# Patient Record
Sex: MALE | Race: WHITE | Employment: OTHER | URBAN - METROPOLITAN AREA
[De-identification: names, ages, dates, MRNs, and addresses within clinical notes are randomized per-mention and may not be internally consistent; named-entity substitution may affect disease eponyms.]

---

## 2017-01-30 PROBLEM — F41.9 ANXIETY: Status: ACTIVE | Noted: 2017-01-30

## 2017-01-30 PROBLEM — I10 ESSENTIAL HYPERTENSION: Status: ACTIVE | Noted: 2017-01-30

## 2017-01-30 PROBLEM — J41.0 SIMPLE CHRONIC BRONCHITIS (HCC): Status: ACTIVE | Noted: 2017-01-30

## 2017-03-27 PROBLEM — R10.13 EPIGASTRIC PAIN: Status: ACTIVE | Noted: 2017-03-27

## 2017-08-04 PROBLEM — Z86.79 S/P AAA (ABDOMINAL AORTIC ANEURYSM) REPAIR: Status: ACTIVE | Noted: 2017-08-04

## 2017-08-04 PROBLEM — Z98.890 S/P AAA (ABDOMINAL AORTIC ANEURYSM) REPAIR: Status: ACTIVE | Noted: 2017-08-04

## 2017-11-17 PROBLEM — J40 BRONCHITIS: Status: ACTIVE | Noted: 2017-11-17

## 2018-04-15 ENCOUNTER — APPOINTMENT (OUTPATIENT)
Dept: GENERAL RADIOLOGY | Age: 73
DRG: 247 | End: 2018-04-15
Payer: MEDICARE

## 2018-04-15 ENCOUNTER — HOSPITAL ENCOUNTER (INPATIENT)
Age: 73
LOS: 1 days | Discharge: HOME | DRG: 247 | End: 2018-04-16
Attending: EMERGENCY MEDICINE | Admitting: INTERNAL MEDICINE
Payer: MEDICARE

## 2018-04-15 DIAGNOSIS — I25.9 CHEST PAIN DUE TO MYOCARDIAL ISCHEMIA, UNSPECIFIED ISCHEMIC CHEST PAIN TYPE: ICD-10-CM

## 2018-04-15 DIAGNOSIS — I21.3 ST ELEVATION MYOCARDIAL INFARCTION (STEMI), UNSPECIFIED ARTERY (HCC): Primary | ICD-10-CM

## 2018-04-15 LAB
ABO/RH: NORMAL
ALBUMIN SERPL-MCNC: 3.8 G/DL (ref 3.5–5.2)
ALP BLD-CCNC: 89 U/L (ref 40–129)
ALT SERPL-CCNC: 9 U/L (ref 0–40)
ANION GAP SERPL CALCULATED.3IONS-SCNC: 13 MMOL/L (ref 7–16)
ANTIBODY SCREEN: NORMAL
AST SERPL-CCNC: 13 U/L (ref 0–39)
BILIRUB SERPL-MCNC: 0.7 MG/DL (ref 0–1.2)
BUN BLDV-MCNC: 15 MG/DL (ref 8–23)
CALCIUM SERPL-MCNC: 9 MG/DL (ref 8.6–10.2)
CHLORIDE BLD-SCNC: 101 MMOL/L (ref 98–107)
CO2: 25 MMOL/L (ref 22–29)
CREAT SERPL-MCNC: 1.3 MG/DL (ref 0.7–1.2)
EKG ATRIAL RATE: 92 BPM
EKG ATRIAL RATE: 95 BPM
EKG P AXIS: 65 DEGREES
EKG P AXIS: 69 DEGREES
EKG P-R INTERVAL: 172 MS
EKG P-R INTERVAL: 200 MS
EKG Q-T INTERVAL: 338 MS
EKG Q-T INTERVAL: 340 MS
EKG QRS DURATION: 78 MS
EKG QRS DURATION: 82 MS
EKG QTC CALCULATION (BAZETT): 420 MS
EKG QTC CALCULATION (BAZETT): 424 MS
EKG R AXIS: -56 DEGREES
EKG R AXIS: 39 DEGREES
EKG T AXIS: 56 DEGREES
EKG T AXIS: 74 DEGREES
EKG VENTRICULAR RATE: 92 BPM
EKG VENTRICULAR RATE: 95 BPM
GFR AFRICAN AMERICAN: >60
GFR NON-AFRICAN AMERICAN: 54 ML/MIN/1.73
GLUCOSE BLD-MCNC: 117 MG/DL (ref 74–109)
HCT VFR BLD CALC: 45.2 % (ref 37–54)
HEMOGLOBIN: 14.9 G/DL (ref 12.5–16.5)
INR BLD: 0.9
LV EF: 55 %
LVEF MODALITY: NORMAL
MCH RBC QN AUTO: 30.1 PG (ref 26–35)
MCHC RBC AUTO-ENTMCNC: 33 % (ref 32–34.5)
MCV RBC AUTO: 91.3 FL (ref 80–99.9)
PDW BLD-RTO: 14.1 FL (ref 11.5–15)
PLATELET # BLD: 276 E9/L (ref 130–450)
PMV BLD AUTO: 11.1 FL (ref 7–12)
POTASSIUM SERPL-SCNC: 4.1 MMOL/L (ref 3.5–5)
PROTHROMBIN TIME: 10.7 SEC (ref 9.3–12.4)
RBC # BLD: 4.95 E12/L (ref 3.8–5.8)
SODIUM BLD-SCNC: 139 MMOL/L (ref 132–146)
TOTAL PROTEIN: 6.5 G/DL (ref 6.4–8.3)
TROPONIN: <0.01 NG/ML (ref 0–0.03)
WBC # BLD: 12.5 E9/L (ref 4.5–11.5)

## 2018-04-15 PROCEDURE — 36415 COLL VENOUS BLD VENIPUNCTURE: CPT

## 2018-04-15 PROCEDURE — 6360000002 HC RX W HCPCS: Performed by: EMERGENCY MEDICINE

## 2018-04-15 PROCEDURE — 85027 COMPLETE CBC AUTOMATED: CPT

## 2018-04-15 PROCEDURE — 6370000000 HC RX 637 (ALT 250 FOR IP): Performed by: INTERNAL MEDICINE

## 2018-04-15 PROCEDURE — 2500000003 HC RX 250 WO HCPCS

## 2018-04-15 PROCEDURE — 71045 X-RAY EXAM CHEST 1 VIEW: CPT

## 2018-04-15 PROCEDURE — 80053 COMPREHEN METABOLIC PANEL: CPT

## 2018-04-15 PROCEDURE — 2580000003 HC RX 258: Performed by: EMERGENCY MEDICINE

## 2018-04-15 PROCEDURE — 4A023N7 MEASUREMENT OF CARDIAC SAMPLING AND PRESSURE, LEFT HEART, PERCUTANEOUS APPROACH: ICD-10-PCS | Performed by: INTERNAL MEDICINE

## 2018-04-15 PROCEDURE — 93010 ELECTROCARDIOGRAM REPORT: CPT | Performed by: INTERNAL MEDICINE

## 2018-04-15 PROCEDURE — 6360000002 HC RX W HCPCS

## 2018-04-15 PROCEDURE — 93005 ELECTROCARDIOGRAM TRACING: CPT | Performed by: INTERNAL MEDICINE

## 2018-04-15 PROCEDURE — B2151ZZ FLUOROSCOPY OF LEFT HEART USING LOW OSMOLAR CONTRAST: ICD-10-PCS | Performed by: INTERNAL MEDICINE

## 2018-04-15 PROCEDURE — 84484 ASSAY OF TROPONIN QUANT: CPT

## 2018-04-15 PROCEDURE — 6360000002 HC RX W HCPCS: Performed by: INTERNAL MEDICINE

## 2018-04-15 PROCEDURE — 02703ZZ DILATION OF CORONARY ARTERY, ONE ARTERY, PERCUTANEOUS APPROACH: ICD-10-PCS | Performed by: INTERNAL MEDICINE

## 2018-04-15 PROCEDURE — B2111ZZ FLUOROSCOPY OF MULTIPLE CORONARY ARTERIES USING LOW OSMOLAR CONTRAST: ICD-10-PCS | Performed by: INTERNAL MEDICINE

## 2018-04-15 PROCEDURE — 85610 PROTHROMBIN TIME: CPT

## 2018-04-15 PROCEDURE — 027034Z DILATION OF CORONARY ARTERY, ONE ARTERY WITH DRUG-ELUTING INTRALUMINAL DEVICE, PERCUTANEOUS APPROACH: ICD-10-PCS | Performed by: INTERNAL MEDICINE

## 2018-04-15 PROCEDURE — C1769 GUIDE WIRE: HCPCS

## 2018-04-15 PROCEDURE — 86900 BLOOD TYPING SEROLOGIC ABO: CPT

## 2018-04-15 PROCEDURE — 2709999900 HC NON-CHARGEABLE SUPPLY

## 2018-04-15 PROCEDURE — 6370000000 HC RX 637 (ALT 250 FOR IP): Performed by: EMERGENCY MEDICINE

## 2018-04-15 PROCEDURE — 93005 ELECTROCARDIOGRAM TRACING: CPT | Performed by: EMERGENCY MEDICINE

## 2018-04-15 PROCEDURE — C9606 PERC D-E COR REVASC W AMI S: HCPCS | Performed by: INTERNAL MEDICINE

## 2018-04-15 PROCEDURE — 86901 BLOOD TYPING SEROLOGIC RH(D): CPT

## 2018-04-15 PROCEDURE — 86850 RBC ANTIBODY SCREEN: CPT

## 2018-04-15 PROCEDURE — C1887 CATHETER, GUIDING: HCPCS

## 2018-04-15 PROCEDURE — 99291 CRITICAL CARE FIRST HOUR: CPT | Performed by: INTERNAL MEDICINE

## 2018-04-15 PROCEDURE — 93458 L HRT ARTERY/VENTRICLE ANGIO: CPT | Performed by: INTERNAL MEDICINE

## 2018-04-15 PROCEDURE — 93306 TTE W/DOPPLER COMPLETE: CPT

## 2018-04-15 PROCEDURE — C1874 STENT, COATED/COV W/DEL SYS: HCPCS

## 2018-04-15 PROCEDURE — 2140000000 HC CCU INTERMEDIATE R&B

## 2018-04-15 PROCEDURE — C1725 CATH, TRANSLUMIN NON-LASER: HCPCS

## 2018-04-15 PROCEDURE — 99285 EMERGENCY DEPT VISIT HI MDM: CPT

## 2018-04-15 PROCEDURE — 92928 PRQ TCAT PLMT NTRAC ST 1 LES: CPT | Performed by: INTERNAL MEDICINE

## 2018-04-15 PROCEDURE — APPSS30 APP SPLIT SHARED TIME 16-30 MINUTES: Performed by: NURSE PRACTITIONER

## 2018-04-15 PROCEDURE — C1894 INTRO/SHEATH, NON-LASER: HCPCS

## 2018-04-15 PROCEDURE — 2580000003 HC RX 258: Performed by: INTERNAL MEDICINE

## 2018-04-15 RX ORDER — HEPARIN SODIUM 1000 [USP'U]/ML
4000 INJECTION, SOLUTION INTRAVENOUS; SUBCUTANEOUS ONCE
Status: COMPLETED | OUTPATIENT
Start: 2018-04-15 | End: 2018-04-15

## 2018-04-15 RX ORDER — METOPROLOL SUCCINATE 50 MG/1
50 TABLET, EXTENDED RELEASE ORAL DAILY
Status: DISCONTINUED | OUTPATIENT
Start: 2018-04-16 | End: 2018-04-16 | Stop reason: HOSPADM

## 2018-04-15 RX ORDER — NICOTINE 21 MG/24HR
1 PATCH, TRANSDERMAL 24 HOURS TRANSDERMAL DAILY
Status: DISCONTINUED | OUTPATIENT
Start: 2018-04-15 | End: 2018-04-16 | Stop reason: HOSPADM

## 2018-04-15 RX ORDER — HEPARIN SODIUM 10000 [USP'U]/ML
5000 INJECTION, SOLUTION INTRAVENOUS; SUBCUTANEOUS EVERY 8 HOURS
Status: DISCONTINUED | OUTPATIENT
Start: 2018-04-15 | End: 2018-04-16 | Stop reason: HOSPADM

## 2018-04-15 RX ORDER — PANTOPRAZOLE SODIUM 40 MG/1
40 TABLET, DELAYED RELEASE ORAL
Status: DISCONTINUED | OUTPATIENT
Start: 2018-04-15 | End: 2018-04-16 | Stop reason: HOSPADM

## 2018-04-15 RX ORDER — ASPIRIN 81 MG/1
81 TABLET, CHEWABLE ORAL DAILY
Status: DISCONTINUED | OUTPATIENT
Start: 2018-04-15 | End: 2018-04-15 | Stop reason: SDUPTHER

## 2018-04-15 RX ORDER — ATORVASTATIN CALCIUM 40 MG/1
80 TABLET, FILM COATED ORAL NIGHTLY
Status: DISCONTINUED | OUTPATIENT
Start: 2018-04-15 | End: 2018-04-16 | Stop reason: HOSPADM

## 2018-04-15 RX ORDER — METOPROLOL SUCCINATE 25 MG/1
25 TABLET, EXTENDED RELEASE ORAL ONCE
Status: COMPLETED | OUTPATIENT
Start: 2018-04-15 | End: 2018-04-15

## 2018-04-15 RX ORDER — FLUTICASONE FUROATE AND VILANTEROL 100; 25 UG/1; UG/1
1 POWDER RESPIRATORY (INHALATION) DAILY
Status: DISCONTINUED | OUTPATIENT
Start: 2018-04-15 | End: 2018-04-15 | Stop reason: SDUPTHER

## 2018-04-15 RX ORDER — ACETAMINOPHEN 325 MG/1
650 TABLET ORAL EVERY 6 HOURS PRN
Status: DISCONTINUED | OUTPATIENT
Start: 2018-04-15 | End: 2018-04-15 | Stop reason: SDUPTHER

## 2018-04-15 RX ORDER — SODIUM CHLORIDE 9 MG/ML
INJECTION, SOLUTION INTRAVENOUS CONTINUOUS
Status: ACTIVE | OUTPATIENT
Start: 2018-04-15 | End: 2018-04-15

## 2018-04-15 RX ORDER — SODIUM CHLORIDE 0.9 % (FLUSH) 0.9 %
10 SYRINGE (ML) INJECTION EVERY 12 HOURS SCHEDULED
Status: DISCONTINUED | OUTPATIENT
Start: 2018-04-15 | End: 2018-04-16 | Stop reason: HOSPADM

## 2018-04-15 RX ORDER — ACETAMINOPHEN 325 MG/1
650 TABLET ORAL EVERY 4 HOURS PRN
Status: DISCONTINUED | OUTPATIENT
Start: 2018-04-15 | End: 2018-04-16 | Stop reason: HOSPADM

## 2018-04-15 RX ORDER — METOPROLOL SUCCINATE 25 MG/1
25 TABLET, EXTENDED RELEASE ORAL DAILY
Status: DISCONTINUED | OUTPATIENT
Start: 2018-04-15 | End: 2018-04-15

## 2018-04-15 RX ORDER — ASPIRIN 81 MG/1
81 TABLET, CHEWABLE ORAL DAILY
Status: DISCONTINUED | OUTPATIENT
Start: 2018-04-16 | End: 2018-04-16 | Stop reason: HOSPADM

## 2018-04-15 RX ORDER — ALBUTEROL SULFATE 90 UG/1
2 AEROSOL, METERED RESPIRATORY (INHALATION) EVERY 6 HOURS PRN
Status: DISCONTINUED | OUTPATIENT
Start: 2018-04-15 | End: 2018-04-16 | Stop reason: HOSPADM

## 2018-04-15 RX ORDER — ONDANSETRON 2 MG/ML
4 INJECTION INTRAMUSCULAR; INTRAVENOUS EVERY 6 HOURS PRN
Status: DISCONTINUED | OUTPATIENT
Start: 2018-04-15 | End: 2018-04-16 | Stop reason: HOSPADM

## 2018-04-15 RX ORDER — ISOSORBIDE MONONITRATE 30 MG/1
30 TABLET, EXTENDED RELEASE ORAL DAILY
Status: DISCONTINUED | OUTPATIENT
Start: 2018-04-15 | End: 2018-04-16 | Stop reason: HOSPADM

## 2018-04-15 RX ORDER — LORAZEPAM 1 MG/1
1 TABLET ORAL 2 TIMES DAILY
Status: DISCONTINUED | OUTPATIENT
Start: 2018-04-15 | End: 2018-04-16 | Stop reason: HOSPADM

## 2018-04-15 RX ORDER — ONDANSETRON 2 MG/ML
4 INJECTION INTRAMUSCULAR; INTRAVENOUS EVERY 6 HOURS PRN
Status: DISCONTINUED | OUTPATIENT
Start: 2018-04-15 | End: 2018-04-15 | Stop reason: SDUPTHER

## 2018-04-15 RX ORDER — NITROGLYCERIN 0.4 MG/1
0.4 TABLET SUBLINGUAL EVERY 5 MIN PRN
Status: DISCONTINUED | OUTPATIENT
Start: 2018-04-15 | End: 2018-04-16 | Stop reason: HOSPADM

## 2018-04-15 RX ORDER — LISINOPRIL 5 MG/1
5 TABLET ORAL DAILY
Status: DISCONTINUED | OUTPATIENT
Start: 2018-04-15 | End: 2018-04-16 | Stop reason: HOSPADM

## 2018-04-15 RX ORDER — ROPINIROLE 1 MG/1
1 TABLET, FILM COATED ORAL NIGHTLY
Status: DISCONTINUED | OUTPATIENT
Start: 2018-04-15 | End: 2018-04-16 | Stop reason: HOSPADM

## 2018-04-15 RX ORDER — SODIUM CHLORIDE 0.9 % (FLUSH) 0.9 %
10 SYRINGE (ML) INJECTION PRN
Status: DISCONTINUED | OUTPATIENT
Start: 2018-04-15 | End: 2018-04-16 | Stop reason: HOSPADM

## 2018-04-15 RX ADMIN — METOPROLOL SUCCINATE 25 MG: 25 TABLET, FILM COATED, EXTENDED RELEASE ORAL at 16:33

## 2018-04-15 RX ADMIN — LORAZEPAM 1 MG: 1 TABLET ORAL at 20:30

## 2018-04-15 RX ADMIN — TIOTROPIUM BROMIDE 18 MCG: 18 CAPSULE ORAL; RESPIRATORY (INHALATION) at 13:07

## 2018-04-15 RX ADMIN — ISOSORBIDE MONONITRATE 30 MG: 30 TABLET ORAL at 15:13

## 2018-04-15 RX ADMIN — NITROGLYCERIN 0.4 MG: 0.4 TABLET SUBLINGUAL at 14:00

## 2018-04-15 RX ADMIN — NITROGLYCERIN 0.4 MG: 0.4 TABLET SUBLINGUAL at 14:05

## 2018-04-15 RX ADMIN — TICAGRELOR 90 MG: 90 TABLET ORAL at 20:31

## 2018-04-15 RX ADMIN — TICAGRELOR 90 MG: 90 TABLET ORAL at 08:57

## 2018-04-15 RX ADMIN — HEPARIN SODIUM 5000 UNITS: 10000 INJECTION, SOLUTION INTRAVENOUS; SUBCUTANEOUS at 08:57

## 2018-04-15 RX ADMIN — HEPARIN SODIUM 5000 UNITS: 10000 INJECTION, SOLUTION INTRAVENOUS; SUBCUTANEOUS at 16:30

## 2018-04-15 RX ADMIN — Medication 10 ML: at 20:31

## 2018-04-15 RX ADMIN — TICAGRELOR 180 MG: 90 TABLET ORAL at 00:23

## 2018-04-15 RX ADMIN — Medication 10 ML: at 08:58

## 2018-04-15 RX ADMIN — ROPINIROLE HYDROCHLORIDE 1 MG: 1 TABLET, FILM COATED ORAL at 20:31

## 2018-04-15 RX ADMIN — METOPROLOL SUCCINATE 25 MG: 25 TABLET, FILM COATED, EXTENDED RELEASE ORAL at 06:46

## 2018-04-15 RX ADMIN — PANTOPRAZOLE SODIUM 40 MG: 40 TABLET, DELAYED RELEASE ORAL at 05:57

## 2018-04-15 RX ADMIN — HEPARIN SODIUM 4000 UNITS: 1000 INJECTION, SOLUTION INTRAVENOUS; SUBCUTANEOUS at 00:24

## 2018-04-15 RX ADMIN — MOMETASONE FUROATE AND FORMOTEROL FUMARATE DIHYDRATE 2 PUFF: 200; 5 AEROSOL RESPIRATORY (INHALATION) at 13:07

## 2018-04-15 RX ADMIN — SODIUM CHLORIDE: 9 INJECTION, SOLUTION INTRAVENOUS at 02:46

## 2018-04-15 RX ADMIN — ATORVASTATIN CALCIUM 80 MG: 40 TABLET, FILM COATED ORAL at 20:30

## 2018-04-15 RX ADMIN — NITROGLYCERIN 1 INCH: 20 OINTMENT TOPICAL at 05:57

## 2018-04-15 RX ADMIN — LORAZEPAM 1 MG: 1 TABLET ORAL at 08:57

## 2018-04-15 RX ADMIN — LISINOPRIL 5 MG: 5 TABLET ORAL at 15:13

## 2018-04-15 RX ADMIN — MOMETASONE FUROATE AND FORMOTEROL FUMARATE DIHYDRATE 2 PUFF: 200; 5 AEROSOL RESPIRATORY (INHALATION) at 20:31

## 2018-04-15 ASSESSMENT — PAIN SCALES - GENERAL
PAINLEVEL_OUTOF10: 2
PAINLEVEL_OUTOF10: 0
PAINLEVEL_OUTOF10: 6
PAINLEVEL_OUTOF10: 0
PAINLEVEL_OUTOF10: 0

## 2018-04-15 ASSESSMENT — PAIN DESCRIPTION - ONSET: ONSET: SUDDEN

## 2018-04-15 ASSESSMENT — PAIN DESCRIPTION - PROGRESSION: CLINICAL_PROGRESSION: GRADUALLY IMPROVING

## 2018-04-15 ASSESSMENT — PAIN DESCRIPTION - LOCATION: LOCATION: CHEST;ABDOMEN

## 2018-04-15 ASSESSMENT — PAIN DESCRIPTION - ORIENTATION: ORIENTATION: RIGHT;LOWER

## 2018-04-15 ASSESSMENT — PAIN DESCRIPTION - DESCRIPTORS: DESCRIPTORS: ACHING

## 2018-04-15 ASSESSMENT — PAIN DESCRIPTION - FREQUENCY: FREQUENCY: CONTINUOUS

## 2018-04-15 ASSESSMENT — PAIN DESCRIPTION - PAIN TYPE: TYPE: ACUTE PAIN

## 2018-04-16 VITALS
OXYGEN SATURATION: 95 % | BODY MASS INDEX: 24.47 KG/M2 | WEIGHT: 174.8 LBS | DIASTOLIC BLOOD PRESSURE: 57 MMHG | TEMPERATURE: 99.4 F | SYSTOLIC BLOOD PRESSURE: 106 MMHG | HEIGHT: 71 IN | HEART RATE: 94 BPM | RESPIRATION RATE: 18 BRPM

## 2018-04-16 LAB
ALBUMIN SERPL-MCNC: 3.6 G/DL (ref 3.5–5.2)
ALP BLD-CCNC: 94 U/L (ref 40–129)
ALT SERPL-CCNC: 36 U/L (ref 0–40)
ANION GAP SERPL CALCULATED.3IONS-SCNC: 16 MMOL/L (ref 7–16)
AST SERPL-CCNC: 174 U/L (ref 0–39)
BASOPHILS ABSOLUTE: 0 E9/L (ref 0–0.2)
BASOPHILS RELATIVE PERCENT: 0.2 % (ref 0–2)
BILIRUB SERPL-MCNC: 1 MG/DL (ref 0–1.2)
BUN BLDV-MCNC: 15 MG/DL (ref 8–23)
CALCIUM SERPL-MCNC: 9.1 MG/DL (ref 8.6–10.2)
CHLORIDE BLD-SCNC: 96 MMOL/L (ref 98–107)
CHOLESTEROL, TOTAL: 164 MG/DL (ref 0–199)
CO2: 22 MMOL/L (ref 22–29)
CREAT SERPL-MCNC: 1.2 MG/DL (ref 0.7–1.2)
EKG ATRIAL RATE: 83 BPM
EKG P AXIS: 69 DEGREES
EKG P-R INTERVAL: 174 MS
EKG Q-T INTERVAL: 356 MS
EKG QRS DURATION: 82 MS
EKG QTC CALCULATION (BAZETT): 418 MS
EKG R AXIS: -40 DEGREES
EKG T AXIS: 58 DEGREES
EKG VENTRICULAR RATE: 83 BPM
EOSINOPHILS ABSOLUTE: 0 E9/L (ref 0.05–0.5)
EOSINOPHILS RELATIVE PERCENT: 0.1 % (ref 0–6)
GFR AFRICAN AMERICAN: >60
GFR NON-AFRICAN AMERICAN: 59 ML/MIN/1.73
GLUCOSE BLD-MCNC: 94 MG/DL (ref 74–109)
HCT VFR BLD CALC: 42.4 % (ref 37–54)
HDLC SERPL-MCNC: 35 MG/DL
HEMOGLOBIN: 14 G/DL (ref 12.5–16.5)
LDL CHOLESTEROL CALCULATED: 103 MG/DL (ref 0–99)
LYMPHOCYTES ABSOLUTE: 0.97 E9/L (ref 1.5–4)
LYMPHOCYTES RELATIVE PERCENT: 5.1 % (ref 20–42)
MAGNESIUM: 1.9 MG/DL (ref 1.6–2.6)
MCH RBC QN AUTO: 30 PG (ref 26–35)
MCHC RBC AUTO-ENTMCNC: 33 % (ref 32–34.5)
MCV RBC AUTO: 90.8 FL (ref 80–99.9)
MONOCYTES ABSOLUTE: 2.91 E9/L (ref 0.1–0.95)
MONOCYTES RELATIVE PERCENT: 15.4 % (ref 2–12)
NEUTROPHILS ABSOLUTE: 15.52 E9/L (ref 1.8–7.3)
NEUTROPHILS RELATIVE PERCENT: 79.5 % (ref 43–80)
PDW BLD-RTO: 14.4 FL (ref 11.5–15)
PLATELET # BLD: 256 E9/L (ref 130–450)
PMV BLD AUTO: 11.7 FL (ref 7–12)
POTASSIUM REFLEX MAGNESIUM: 3.8 MMOL/L (ref 3.5–5)
RBC # BLD: 4.67 E12/L (ref 3.8–5.8)
RBC # BLD: NORMAL 10*6/UL
SODIUM BLD-SCNC: 134 MMOL/L (ref 132–146)
TOTAL PROTEIN: 6.5 G/DL (ref 6.4–8.3)
TRIGL SERPL-MCNC: 131 MG/DL (ref 0–149)
VLDLC SERPL CALC-MCNC: 26 MG/DL
WBC # BLD: 19.4 E9/L (ref 4.5–11.5)

## 2018-04-16 PROCEDURE — 83735 ASSAY OF MAGNESIUM: CPT

## 2018-04-16 PROCEDURE — 99231 SBSQ HOSP IP/OBS SF/LOW 25: CPT | Performed by: INTERNAL MEDICINE

## 2018-04-16 PROCEDURE — 93010 ELECTROCARDIOGRAM REPORT: CPT | Performed by: INTERNAL MEDICINE

## 2018-04-16 PROCEDURE — 6370000000 HC RX 637 (ALT 250 FOR IP): Performed by: INTERNAL MEDICINE

## 2018-04-16 PROCEDURE — 6360000002 HC RX W HCPCS: Performed by: INTERNAL MEDICINE

## 2018-04-16 PROCEDURE — 80061 LIPID PANEL: CPT

## 2018-04-16 PROCEDURE — 80053 COMPREHEN METABOLIC PANEL: CPT

## 2018-04-16 PROCEDURE — 85025 COMPLETE CBC W/AUTO DIFF WBC: CPT

## 2018-04-16 PROCEDURE — 36415 COLL VENOUS BLD VENIPUNCTURE: CPT

## 2018-04-16 PROCEDURE — 2580000003 HC RX 258: Performed by: EMERGENCY MEDICINE

## 2018-04-16 RX ORDER — NITROGLYCERIN 0.4 MG/1
TABLET SUBLINGUAL
Qty: 25 TABLET | Refills: 3 | Status: SHIPPED | OUTPATIENT
Start: 2018-04-16 | End: 2018-04-16

## 2018-04-16 RX ORDER — ATORVASTATIN CALCIUM 80 MG/1
80 TABLET, FILM COATED ORAL NIGHTLY
Qty: 30 TABLET | Refills: 3 | Status: SHIPPED | OUTPATIENT
Start: 2018-04-16 | End: 2018-04-16

## 2018-04-16 RX ORDER — UREA 10 %
1 LOTION (ML) TOPICAL NIGHTLY PRN
Status: DISCONTINUED | OUTPATIENT
Start: 2018-04-16 | End: 2018-04-16 | Stop reason: HOSPADM

## 2018-04-16 RX ORDER — METOPROLOL SUCCINATE 50 MG/1
50 TABLET, EXTENDED RELEASE ORAL DAILY
Qty: 30 TABLET | Refills: 3 | Status: SHIPPED | OUTPATIENT
Start: 2018-04-17

## 2018-04-16 RX ORDER — NITROGLYCERIN 0.4 MG/1
TABLET SUBLINGUAL
Qty: 25 TABLET | Refills: 3 | Status: SHIPPED | OUTPATIENT
Start: 2018-04-16

## 2018-04-16 RX ORDER — ISOSORBIDE MONONITRATE 30 MG/1
30 TABLET, EXTENDED RELEASE ORAL DAILY
Qty: 30 TABLET | Refills: 3 | Status: SHIPPED | OUTPATIENT
Start: 2018-04-17 | End: 2018-04-16

## 2018-04-16 RX ORDER — ISOSORBIDE MONONITRATE 30 MG/1
30 TABLET, EXTENDED RELEASE ORAL DAILY
Qty: 30 TABLET | Refills: 3 | Status: SHIPPED | OUTPATIENT
Start: 2018-04-17

## 2018-04-16 RX ORDER — LISINOPRIL 5 MG/1
5 TABLET ORAL DAILY
Qty: 30 TABLET | Refills: 3 | Status: SHIPPED | OUTPATIENT
Start: 2018-04-17 | End: 2018-04-16

## 2018-04-16 RX ORDER — METOPROLOL SUCCINATE 50 MG/1
50 TABLET, EXTENDED RELEASE ORAL DAILY
Qty: 30 TABLET | Refills: 3 | Status: SHIPPED | OUTPATIENT
Start: 2018-04-17 | End: 2018-04-16

## 2018-04-16 RX ORDER — NICOTINE 21 MG/24HR
1 PATCH, TRANSDERMAL 24 HOURS TRANSDERMAL DAILY
Qty: 30 PATCH | Refills: 3 | Status: SHIPPED | OUTPATIENT
Start: 2018-04-17 | End: 2018-04-16

## 2018-04-16 RX ORDER — ATORVASTATIN CALCIUM 80 MG/1
80 TABLET, FILM COATED ORAL NIGHTLY
Qty: 30 TABLET | Refills: 3 | Status: SHIPPED | OUTPATIENT
Start: 2018-04-16

## 2018-04-16 RX ORDER — LISINOPRIL 5 MG/1
5 TABLET ORAL DAILY
Qty: 30 TABLET | Refills: 3 | Status: SHIPPED | OUTPATIENT
Start: 2018-04-17 | End: 2021-09-20

## 2018-04-16 RX ORDER — NICOTINE 21 MG/24HR
1 PATCH, TRANSDERMAL 24 HOURS TRANSDERMAL DAILY
Qty: 30 PATCH | Refills: 3 | Status: SHIPPED | OUTPATIENT
Start: 2018-04-17 | End: 2018-10-08 | Stop reason: CLARIF

## 2018-04-16 RX ADMIN — LISINOPRIL 5 MG: 5 TABLET ORAL at 08:29

## 2018-04-16 RX ADMIN — HEPARIN SODIUM 5000 UNITS: 10000 INJECTION, SOLUTION INTRAVENOUS; SUBCUTANEOUS at 06:26

## 2018-04-16 RX ADMIN — ASPIRIN 81 MG 81 MG: 81 TABLET ORAL at 08:29

## 2018-04-16 RX ADMIN — TIOTROPIUM BROMIDE 18 MCG: 18 CAPSULE ORAL; RESPIRATORY (INHALATION) at 08:34

## 2018-04-16 RX ADMIN — MOMETASONE FUROATE AND FORMOTEROL FUMARATE DIHYDRATE 2 PUFF: 200; 5 AEROSOL RESPIRATORY (INHALATION) at 08:30

## 2018-04-16 RX ADMIN — Medication 10 ML: at 08:34

## 2018-04-16 RX ADMIN — LORAZEPAM 1 MG: 1 TABLET ORAL at 08:29

## 2018-04-16 RX ADMIN — TICAGRELOR 90 MG: 90 TABLET ORAL at 08:29

## 2018-04-16 RX ADMIN — METOPROLOL SUCCINATE 50 MG: 50 TABLET, FILM COATED, EXTENDED RELEASE ORAL at 08:29

## 2018-04-16 RX ADMIN — Medication 1 MG: at 01:49

## 2018-04-16 RX ADMIN — ISOSORBIDE MONONITRATE 30 MG: 30 TABLET ORAL at 08:29

## 2018-04-16 RX ADMIN — PANTOPRAZOLE SODIUM 40 MG: 40 TABLET, DELAYED RELEASE ORAL at 06:26

## 2018-04-16 ASSESSMENT — PAIN SCALES - GENERAL: PAINLEVEL_OUTOF10: 0

## 2018-04-19 ENCOUNTER — TELEPHONE (OUTPATIENT)
Dept: CARDIOLOGY CLINIC | Age: 73
End: 2018-04-19

## 2018-04-19 LAB
EKG ATRIAL RATE: 70 BPM
EKG P AXIS: 41 DEGREES
EKG P-R INTERVAL: 178 MS
EKG Q-T INTERVAL: 378 MS
EKG QRS DURATION: 78 MS
EKG QTC CALCULATION (BAZETT): 408 MS
EKG R AXIS: 42 DEGREES
EKG T AXIS: 57 DEGREES
EKG VENTRICULAR RATE: 70 BPM

## 2018-06-14 ENCOUNTER — HOSPITAL ENCOUNTER (OUTPATIENT)
Age: 73
Discharge: HOME | End: 2018-06-14
Payer: MEDICARE

## 2018-06-14 LAB
ALBUMIN SERPL-MCNC: 3.9 G/DL (ref 3.5–5.2)
ALP BLD-CCNC: 92 U/L (ref 40–129)
ALT SERPL-CCNC: 14 U/L (ref 0–40)
ANION GAP SERPL CALCULATED.3IONS-SCNC: 12 MMOL/L (ref 7–16)
AST SERPL-CCNC: 17 U/L (ref 0–39)
BILIRUB SERPL-MCNC: 0.6 MG/DL (ref 0–1.2)
BUN BLDV-MCNC: 19 MG/DL (ref 8–23)
CALCIUM SERPL-MCNC: 9.8 MG/DL (ref 8.6–10.2)
CHLORIDE BLD-SCNC: 104 MMOL/L (ref 98–107)
CHOLESTEROL, TOTAL: 115 MG/DL (ref 0–199)
CO2: 26 MMOL/L (ref 22–29)
CREAT SERPL-MCNC: 1.5 MG/DL (ref 0.7–1.2)
GFR AFRICAN AMERICAN: 56
GFR NON-AFRICAN AMERICAN: 46 ML/MIN/1.73
GLUCOSE BLD-MCNC: 95 MG/DL (ref 74–109)
HCT VFR BLD CALC: 46.4 % (ref 37–54)
HDLC SERPL-MCNC: 30 MG/DL
HEMOGLOBIN: 15.7 G/DL (ref 12.5–16.5)
LDL CHOLESTEROL CALCULATED: 63 MG/DL (ref 0–99)
MCH RBC QN AUTO: 30 PG (ref 26–35)
MCHC RBC AUTO-ENTMCNC: 33.8 % (ref 32–34.5)
MCV RBC AUTO: 88.7 FL (ref 80–99.9)
PDW BLD-RTO: 15.7 FL (ref 11.5–15)
PLATELET # BLD: 242 E9/L (ref 130–450)
PMV BLD AUTO: 11.3 FL (ref 7–12)
POTASSIUM SERPL-SCNC: 4.9 MMOL/L (ref 3.5–5)
RBC # BLD: 5.23 E12/L (ref 3.8–5.8)
SODIUM BLD-SCNC: 142 MMOL/L (ref 132–146)
T3 TOTAL: 135.2 NG/DL (ref 80–200)
T4 TOTAL: 8.1 MCG/DL (ref 4.5–11.7)
TOTAL PROTEIN: 6.9 G/DL (ref 6.4–8.3)
TRIGL SERPL-MCNC: 112 MG/DL (ref 0–149)
TSH SERPL DL<=0.05 MIU/L-ACNC: 1.82 UIU/ML (ref 0.27–4.2)
VLDLC SERPL CALC-MCNC: 22 MG/DL
WBC # BLD: 8.6 E9/L (ref 4.5–11.5)

## 2018-06-14 PROCEDURE — 84480 ASSAY TRIIODOTHYRONINE (T3): CPT

## 2018-06-14 PROCEDURE — 80053 COMPREHEN METABOLIC PANEL: CPT

## 2018-06-14 PROCEDURE — 84436 ASSAY OF TOTAL THYROXINE: CPT

## 2018-06-14 PROCEDURE — 85027 COMPLETE CBC AUTOMATED: CPT

## 2018-06-14 PROCEDURE — 80061 LIPID PANEL: CPT

## 2018-06-14 PROCEDURE — 84443 ASSAY THYROID STIM HORMONE: CPT

## 2018-06-14 PROCEDURE — 36415 COLL VENOUS BLD VENIPUNCTURE: CPT

## 2018-08-28 PROBLEM — F32.2 CURRENT SEVERE EPISODE OF MAJOR DEPRESSIVE DISORDER WITHOUT PSYCHOTIC FEATURES WITHOUT PRIOR EPISODE (HCC): Status: ACTIVE | Noted: 2018-08-28

## 2018-08-28 PROBLEM — F51.01 PRIMARY INSOMNIA: Status: ACTIVE | Noted: 2018-08-28

## 2018-09-28 ENCOUNTER — HOSPITAL ENCOUNTER (EMERGENCY)
Age: 73
Discharge: HOME | End: 2018-09-28
Attending: EMERGENCY MEDICINE
Payer: MEDICARE

## 2018-09-28 ENCOUNTER — APPOINTMENT (OUTPATIENT)
Dept: GENERAL RADIOLOGY | Age: 73
End: 2018-09-28
Payer: MEDICARE

## 2018-09-28 VITALS
TEMPERATURE: 98 F | SYSTOLIC BLOOD PRESSURE: 175 MMHG | OXYGEN SATURATION: 95 % | HEIGHT: 71 IN | DIASTOLIC BLOOD PRESSURE: 85 MMHG | HEART RATE: 97 BPM | BODY MASS INDEX: 24.5 KG/M2 | WEIGHT: 175 LBS | RESPIRATION RATE: 16 BRPM

## 2018-09-28 DIAGNOSIS — J06.9 ACUTE UPPER RESPIRATORY INFECTION: Primary | ICD-10-CM

## 2018-09-28 LAB
ALBUMIN SERPL-MCNC: 4 G/DL (ref 3.5–5.2)
ALP BLD-CCNC: 92 U/L (ref 40–129)
ALT SERPL-CCNC: 17 U/L (ref 0–40)
ANION GAP SERPL CALCULATED.3IONS-SCNC: 12 MMOL/L (ref 7–16)
AST SERPL-CCNC: 24 U/L (ref 0–39)
BASOPHILS ABSOLUTE: 0.08 E9/L (ref 0–0.2)
BASOPHILS RELATIVE PERCENT: 0.8 % (ref 0–2)
BILIRUB SERPL-MCNC: 0.4 MG/DL (ref 0–1.2)
BUN BLDV-MCNC: 21 MG/DL (ref 8–23)
CALCIUM SERPL-MCNC: 8.8 MG/DL (ref 8.6–10.2)
CHLORIDE BLD-SCNC: 102 MMOL/L (ref 98–107)
CO2: 25 MMOL/L (ref 22–29)
CREAT SERPL-MCNC: 1.5 MG/DL (ref 0.7–1.2)
EKG ATRIAL RATE: 93 BPM
EKG P AXIS: 64 DEGREES
EKG P-R INTERVAL: 156 MS
EKG Q-T INTERVAL: 352 MS
EKG QRS DURATION: 82 MS
EKG QTC CALCULATION (BAZETT): 437 MS
EKG R AXIS: 8 DEGREES
EKG T AXIS: 18 DEGREES
EKG VENTRICULAR RATE: 93 BPM
EOSINOPHILS ABSOLUTE: 0.36 E9/L (ref 0.05–0.5)
EOSINOPHILS RELATIVE PERCENT: 3.6 % (ref 0–6)
GFR AFRICAN AMERICAN: 55
GFR NON-AFRICAN AMERICAN: 46 ML/MIN/1.73
GLUCOSE BLD-MCNC: 80 MG/DL (ref 74–109)
HCT VFR BLD CALC: 43.7 % (ref 37–54)
HEMOGLOBIN: 14.8 G/DL (ref 12.5–16.5)
IMMATURE GRANULOCYTES #: 0.05 E9/L
IMMATURE GRANULOCYTES %: 0.5 % (ref 0–5)
LYMPHOCYTES ABSOLUTE: 1.25 E9/L (ref 1.5–4)
LYMPHOCYTES RELATIVE PERCENT: 12.5 % (ref 20–42)
MCH RBC QN AUTO: 30.2 PG (ref 26–35)
MCHC RBC AUTO-ENTMCNC: 33.9 % (ref 32–34.5)
MCV RBC AUTO: 89.2 FL (ref 80–99.9)
MONOCYTES ABSOLUTE: 1.08 E9/L (ref 0.1–0.95)
MONOCYTES RELATIVE PERCENT: 10.8 % (ref 2–12)
NEUTROPHILS ABSOLUTE: 7.22 E9/L (ref 1.8–7.3)
NEUTROPHILS RELATIVE PERCENT: 71.8 % (ref 43–80)
PDW BLD-RTO: 14.6 FL (ref 11.5–15)
PLATELET # BLD: 256 E9/L (ref 130–450)
PMV BLD AUTO: 11 FL (ref 7–12)
POTASSIUM SERPL-SCNC: 4.1 MMOL/L (ref 3.5–5)
PRO-BNP: 1412 PG/ML (ref 0–125)
RBC # BLD: 4.9 E12/L (ref 3.8–5.8)
SODIUM BLD-SCNC: 139 MMOL/L (ref 132–146)
TOTAL PROTEIN: 6.9 G/DL (ref 6.4–8.3)
TROPONIN: <0.01 NG/ML (ref 0–0.03)
WBC # BLD: 10 E9/L (ref 4.5–11.5)

## 2018-09-28 PROCEDURE — 83880 ASSAY OF NATRIURETIC PEPTIDE: CPT

## 2018-09-28 PROCEDURE — 71045 X-RAY EXAM CHEST 1 VIEW: CPT

## 2018-09-28 PROCEDURE — 36415 COLL VENOUS BLD VENIPUNCTURE: CPT

## 2018-09-28 PROCEDURE — 80053 COMPREHEN METABOLIC PANEL: CPT

## 2018-09-28 PROCEDURE — 94644 CONT INHLJ TX 1ST HOUR: CPT

## 2018-09-28 PROCEDURE — 99285 EMERGENCY DEPT VISIT HI MDM: CPT

## 2018-09-28 PROCEDURE — 6370000000 HC RX 637 (ALT 250 FOR IP): Performed by: EMERGENCY MEDICINE

## 2018-09-28 PROCEDURE — 85025 COMPLETE CBC W/AUTO DIFF WBC: CPT

## 2018-09-28 PROCEDURE — 93005 ELECTROCARDIOGRAM TRACING: CPT | Performed by: EMERGENCY MEDICINE

## 2018-09-28 PROCEDURE — 94640 AIRWAY INHALATION TREATMENT: CPT

## 2018-09-28 PROCEDURE — 84484 ASSAY OF TROPONIN QUANT: CPT

## 2018-09-28 RX ORDER — PREDNISONE 20 MG/1
TABLET ORAL
Qty: 10 TABLET | Refills: 0 | Status: SHIPPED | OUTPATIENT
Start: 2018-09-28 | End: 2018-10-08 | Stop reason: CLARIF

## 2018-09-28 RX ORDER — IPRATROPIUM BROMIDE AND ALBUTEROL SULFATE 2.5; .5 MG/3ML; MG/3ML
3 SOLUTION RESPIRATORY (INHALATION) ONCE
Status: COMPLETED | OUTPATIENT
Start: 2018-09-28 | End: 2018-09-28

## 2018-09-28 RX ORDER — PREDNISONE 20 MG/1
40 TABLET ORAL ONCE
Status: COMPLETED | OUTPATIENT
Start: 2018-09-28 | End: 2018-09-28

## 2018-09-28 RX ADMIN — PREDNISONE 40 MG: 20 TABLET ORAL at 19:03

## 2018-09-28 RX ADMIN — IPRATROPIUM BROMIDE AND ALBUTEROL SULFATE 3 AMPULE: .5; 3 SOLUTION RESPIRATORY (INHALATION) at 18:27

## 2018-09-28 ASSESSMENT — ENCOUNTER SYMPTOMS
DIARRHEA: 1
COUGH: 1
SINUS PRESSURE: 1
BACK PAIN: 0
ABDOMINAL PAIN: 0
SHORTNESS OF BREATH: 1

## 2018-10-08 ENCOUNTER — OFFICE VISIT (OUTPATIENT)
Dept: FAMILY MEDICINE CLINIC | Age: 73
End: 2018-10-08
Payer: MEDICARE

## 2018-10-08 VITALS
OXYGEN SATURATION: 94 % | BODY MASS INDEX: 23.24 KG/M2 | DIASTOLIC BLOOD PRESSURE: 78 MMHG | RESPIRATION RATE: 20 BRPM | SYSTOLIC BLOOD PRESSURE: 118 MMHG | HEIGHT: 71 IN | HEART RATE: 75 BPM | WEIGHT: 166 LBS

## 2018-10-08 DIAGNOSIS — Z72.0 TOBACCO ABUSE: ICD-10-CM

## 2018-10-08 DIAGNOSIS — I10 ESSENTIAL HYPERTENSION: ICD-10-CM

## 2018-10-08 DIAGNOSIS — Z86.79 S/P AAA (ABDOMINAL AORTIC ANEURYSM) REPAIR: ICD-10-CM

## 2018-10-08 DIAGNOSIS — Z12.11 SCREENING FOR COLON CANCER: ICD-10-CM

## 2018-10-08 DIAGNOSIS — F51.01 PRIMARY INSOMNIA: ICD-10-CM

## 2018-10-08 DIAGNOSIS — Z98.890 S/P AAA (ABDOMINAL AORTIC ANEURYSM) REPAIR: ICD-10-CM

## 2018-10-08 DIAGNOSIS — Z23 IMMUNIZATION DUE: ICD-10-CM

## 2018-10-08 DIAGNOSIS — I21.02 ST ELEVATION MYOCARDIAL INFARCTION INVOLVING LEFT ANTERIOR DESCENDING (LAD) CORONARY ARTERY (HCC): Primary | ICD-10-CM

## 2018-10-08 DIAGNOSIS — R53.83 FATIGUE, UNSPECIFIED TYPE: ICD-10-CM

## 2018-10-08 PROCEDURE — G8420 CALC BMI NORM PARAMETERS: HCPCS | Performed by: FAMILY MEDICINE

## 2018-10-08 PROCEDURE — 99204 OFFICE O/P NEW MOD 45 MIN: CPT | Performed by: FAMILY MEDICINE

## 2018-10-08 PROCEDURE — G8598 ASA/ANTIPLAT THER USED: HCPCS | Performed by: FAMILY MEDICINE

## 2018-10-08 PROCEDURE — G0009 ADMIN PNEUMOCOCCAL VACCINE: HCPCS | Performed by: FAMILY MEDICINE

## 2018-10-08 PROCEDURE — 1123F ACP DISCUSS/DSCN MKR DOCD: CPT | Performed by: FAMILY MEDICINE

## 2018-10-08 PROCEDURE — 4004F PT TOBACCO SCREEN RCVD TLK: CPT | Performed by: FAMILY MEDICINE

## 2018-10-08 PROCEDURE — G8427 DOCREV CUR MEDS BY ELIG CLIN: HCPCS | Performed by: FAMILY MEDICINE

## 2018-10-08 PROCEDURE — G0008 ADMIN INFLUENZA VIRUS VAC: HCPCS | Performed by: FAMILY MEDICINE

## 2018-10-08 PROCEDURE — 1101F PT FALLS ASSESS-DOCD LE1/YR: CPT | Performed by: FAMILY MEDICINE

## 2018-10-08 PROCEDURE — 4040F PNEUMOC VAC/ADMIN/RCVD: CPT | Performed by: FAMILY MEDICINE

## 2018-10-08 PROCEDURE — 90662 IIV NO PRSV INCREASED AG IM: CPT | Performed by: FAMILY MEDICINE

## 2018-10-08 PROCEDURE — 90670 PCV13 VACCINE IM: CPT | Performed by: FAMILY MEDICINE

## 2018-10-08 PROCEDURE — 3017F COLORECTAL CA SCREEN DOC REV: CPT | Performed by: FAMILY MEDICINE

## 2018-10-08 PROCEDURE — G8482 FLU IMMUNIZE ORDER/ADMIN: HCPCS | Performed by: FAMILY MEDICINE

## 2018-10-08 RX ORDER — NIFEDIPINE 30 MG/1
30 TABLET, FILM COATED, EXTENDED RELEASE ORAL DAILY
COMMUNITY

## 2018-10-08 RX ORDER — ALBUTEROL SULFATE 90 UG/1
2 AEROSOL, METERED RESPIRATORY (INHALATION) EVERY 6 HOURS PRN
COMMUNITY

## 2018-10-08 RX ORDER — ACETAMINOPHEN 160 MG
1 TABLET,DISINTEGRATING ORAL DAILY
COMMUNITY

## 2018-10-11 PROBLEM — Z23 IMMUNIZATION DUE: Status: ACTIVE | Noted: 2018-10-11

## 2018-10-11 PROBLEM — Z72.0 TOBACCO ABUSE: Status: ACTIVE | Noted: 2018-10-11

## 2018-10-11 PROBLEM — R53.83 FATIGUE: Status: ACTIVE | Noted: 2018-10-11

## 2018-10-11 ASSESSMENT — ENCOUNTER SYMPTOMS
EYE PAIN: 0
BACK PAIN: 0
BLURRED VISION: 0
DIARRHEA: 0
ABDOMINAL PAIN: 0
PHOTOPHOBIA: 0
SPUTUM PRODUCTION: 0
STRIDOR: 0
EYES NEGATIVE: 1
HEMOPTYSIS: 0
DOUBLE VISION: 0
SINUS PAIN: 0
WHEEZING: 0
COUGH: 0
HEARTBURN: 0
VOMITING: 0
CONSTIPATION: 0
NAUSEA: 0
SHORTNESS OF BREATH: 0
EYE REDNESS: 0
BLOOD IN STOOL: 0
GASTROINTESTINAL NEGATIVE: 1
SORE THROAT: 0
ORTHOPNEA: 0
EYE DISCHARGE: 0

## 2018-11-06 ENCOUNTER — HOSPITAL ENCOUNTER (OUTPATIENT)
Age: 73
Discharge: HOME | End: 2018-11-06
Payer: MEDICARE

## 2018-11-06 LAB
ANION GAP SERPL CALCULATED.3IONS-SCNC: 10 MMOL/L (ref 7–16)
BUN BLDV-MCNC: 22 MG/DL (ref 8–23)
CALCIUM SERPL-MCNC: 9.4 MG/DL (ref 8.6–10.2)
CHLORIDE BLD-SCNC: 105 MMOL/L (ref 98–107)
CO2: 26 MMOL/L (ref 22–29)
CREAT SERPL-MCNC: 1.6 MG/DL (ref 0.7–1.2)
GFR AFRICAN AMERICAN: 51
GFR NON-AFRICAN AMERICAN: 43 ML/MIN/1.73
GLUCOSE BLD-MCNC: 90 MG/DL (ref 74–99)
POTASSIUM SERPL-SCNC: 4.3 MMOL/L (ref 3.5–5)
SODIUM BLD-SCNC: 141 MMOL/L (ref 132–146)

## 2018-11-06 PROCEDURE — 36415 COLL VENOUS BLD VENIPUNCTURE: CPT

## 2018-11-06 PROCEDURE — 80048 BASIC METABOLIC PNL TOTAL CA: CPT

## 2018-11-07 ENCOUNTER — HOSPITAL ENCOUNTER (OUTPATIENT)
Dept: CT IMAGING | Age: 73
Discharge: HOME | End: 2018-11-09
Payer: MEDICARE

## 2018-11-07 ENCOUNTER — HOSPITAL ENCOUNTER (OUTPATIENT)
Age: 73
Discharge: HOME | End: 2018-11-09
Payer: MEDICARE

## 2018-11-07 DIAGNOSIS — Z72.0 TOBACCO ABUSE: ICD-10-CM

## 2018-11-07 PROCEDURE — G0297 LDCT FOR LUNG CA SCREEN: HCPCS

## 2018-11-08 ENCOUNTER — TELEPHONE (OUTPATIENT)
Dept: CASE MANAGEMENT | Age: 73
End: 2018-11-08

## 2019-04-22 ENCOUNTER — HOSPITAL ENCOUNTER (OUTPATIENT)
Age: 74
Discharge: HOME | End: 2019-04-22
Payer: MEDICARE

## 2019-04-22 LAB
ABO/RH: NORMAL
ABO/RH: NORMAL
ANION GAP SERPL CALCULATED.3IONS-SCNC: 11 MMOL/L (ref 7–16)
ANTIBODY SCREEN: NORMAL
APTT: 30.1 SEC (ref 25.7–34.7)
BACTERIA: NORMAL /HPF
BILIRUBIN URINE: NEGATIVE
BLOOD, URINE: NEGATIVE
BUN BLDV-MCNC: 25 MG/DL (ref 8–23)
CALCIUM SERPL-MCNC: 9.3 MG/DL (ref 8.6–10.2)
CHLORIDE BLD-SCNC: 103 MMOL/L (ref 98–107)
CLARITY: CLEAR
CO2: 24 MMOL/L (ref 22–29)
COLOR: YELLOW
CREAT SERPL-MCNC: 1.5 MG/DL (ref 0.7–1.2)
GFR AFRICAN AMERICAN: 55
GFR NON-AFRICAN AMERICAN: 46 ML/MIN/1.73
GLUCOSE BLD-MCNC: 100 MG/DL (ref 74–99)
GLUCOSE URINE: NEGATIVE MG/DL
HCT VFR BLD CALC: 48.2 % (ref 37–54)
HEMOGLOBIN: 15.8 G/DL (ref 12.5–16.5)
INR BLD: 1
KETONES, URINE: NEGATIVE MG/DL
LEUKOCYTE ESTERASE, URINE: ABNORMAL
MCH RBC QN AUTO: 30.1 PG (ref 26–35)
MCHC RBC AUTO-ENTMCNC: 32.8 % (ref 32–34.5)
MCV RBC AUTO: 91.8 FL (ref 80–99.9)
NITRITE, URINE: NEGATIVE
PDW BLD-RTO: 14.6 FL (ref 11.5–15)
PH UA: 6 (ref 5–9)
PLATELET # BLD: 284 E9/L (ref 130–450)
PMV BLD AUTO: 11 FL (ref 7–12)
POTASSIUM SERPL-SCNC: 4.9 MMOL/L (ref 3.5–5)
PROTEIN UA: NEGATIVE MG/DL
PROTHROMBIN TIME: 10.9 SEC (ref 9.3–12.4)
RBC # BLD: 5.25 E12/L (ref 3.8–5.8)
RBC UA: NORMAL /HPF (ref 0–2)
SODIUM BLD-SCNC: 138 MMOL/L (ref 132–146)
SPECIFIC GRAVITY UA: 1.01 (ref 1–1.03)
UROBILINOGEN, URINE: 0.2 E.U./DL
WBC # BLD: 9.1 E9/L (ref 4.5–11.5)
WBC UA: NORMAL /HPF (ref 0–5)

## 2019-04-22 PROCEDURE — 86901 BLOOD TYPING SEROLOGIC RH(D): CPT

## 2019-04-22 PROCEDURE — 85730 THROMBOPLASTIN TIME PARTIAL: CPT

## 2019-04-22 PROCEDURE — 80048 BASIC METABOLIC PNL TOTAL CA: CPT

## 2019-04-22 PROCEDURE — 36415 COLL VENOUS BLD VENIPUNCTURE: CPT

## 2019-04-22 PROCEDURE — 81001 URINALYSIS AUTO W/SCOPE: CPT

## 2019-04-22 PROCEDURE — 85027 COMPLETE CBC AUTOMATED: CPT

## 2019-04-22 PROCEDURE — 85610 PROTHROMBIN TIME: CPT

## 2019-04-22 PROCEDURE — 86850 RBC ANTIBODY SCREEN: CPT

## 2019-04-22 PROCEDURE — 86900 BLOOD TYPING SEROLOGIC ABO: CPT

## 2019-04-23 ENCOUNTER — HOSPITAL ENCOUNTER (OUTPATIENT)
Dept: CARDIAC CATH/INVASIVE PROCEDURES | Age: 74
Setting detail: OBSERVATION
Discharge: HOME | End: 2019-04-24
Attending: INTERNAL MEDICINE | Admitting: INTERNAL MEDICINE
Payer: MEDICARE

## 2019-04-23 DIAGNOSIS — I25.10 CAD IN NATIVE ARTERY: ICD-10-CM

## 2019-04-23 PROCEDURE — 2709999900 HC NON-CHARGEABLE SUPPLY

## 2019-04-23 PROCEDURE — C1894 INTRO/SHEATH, NON-LASER: HCPCS

## 2019-04-23 PROCEDURE — 6360000002 HC RX W HCPCS

## 2019-04-23 PROCEDURE — C1887 CATHETER, GUIDING: HCPCS

## 2019-04-23 PROCEDURE — C1874 STENT, COATED/COV W/DEL SYS: HCPCS

## 2019-04-23 PROCEDURE — C9600 PERC DRUG-EL COR STENT SING: HCPCS | Performed by: INTERNAL MEDICINE

## 2019-04-23 PROCEDURE — 6370000000 HC RX 637 (ALT 250 FOR IP): Performed by: INTERNAL MEDICINE

## 2019-04-23 PROCEDURE — G0378 HOSPITAL OBSERVATION PER HR: HCPCS

## 2019-04-23 PROCEDURE — 2580000003 HC RX 258: Performed by: INTERNAL MEDICINE

## 2019-04-23 PROCEDURE — 2500000003 HC RX 250 WO HCPCS

## 2019-04-23 PROCEDURE — 6370000000 HC RX 637 (ALT 250 FOR IP)

## 2019-04-23 PROCEDURE — 93458 L HRT ARTERY/VENTRICLE ANGIO: CPT | Performed by: INTERNAL MEDICINE

## 2019-04-23 PROCEDURE — C1769 GUIDE WIRE: HCPCS

## 2019-04-23 PROCEDURE — C1725 CATH, TRANSLUMIN NON-LASER: HCPCS

## 2019-04-23 RX ORDER — SODIUM CHLORIDE 9 MG/ML
INJECTION, SOLUTION INTRAVENOUS ONCE
Status: COMPLETED | OUTPATIENT
Start: 2019-04-23 | End: 2019-04-23

## 2019-04-23 RX ORDER — ASPIRIN 81 MG/1
81 TABLET, CHEWABLE ORAL DAILY
Status: DISCONTINUED | OUTPATIENT
Start: 2019-04-24 | End: 2019-04-24 | Stop reason: HOSPADM

## 2019-04-23 RX ORDER — ALPRAZOLAM 0.25 MG/1
0.5 TABLET ORAL 3 TIMES DAILY PRN
Status: DISCONTINUED | OUTPATIENT
Start: 2019-04-23 | End: 2019-04-24 | Stop reason: HOSPADM

## 2019-04-23 RX ORDER — ISOSORBIDE MONONITRATE 30 MG/1
30 TABLET, EXTENDED RELEASE ORAL DAILY
Status: DISCONTINUED | OUTPATIENT
Start: 2019-04-23 | End: 2019-04-24 | Stop reason: HOSPADM

## 2019-04-23 RX ORDER — ATORVASTATIN CALCIUM 40 MG/1
80 TABLET, FILM COATED ORAL NIGHTLY
Status: DISCONTINUED | OUTPATIENT
Start: 2019-04-23 | End: 2019-04-24 | Stop reason: HOSPADM

## 2019-04-23 RX ORDER — SODIUM CHLORIDE 9 MG/ML
INJECTION, SOLUTION INTRAVENOUS CONTINUOUS
Status: DISCONTINUED | OUTPATIENT
Start: 2019-04-23 | End: 2019-04-24 | Stop reason: HOSPADM

## 2019-04-23 RX ORDER — METOPROLOL SUCCINATE 50 MG/1
50 TABLET, EXTENDED RELEASE ORAL DAILY
Status: DISCONTINUED | OUTPATIENT
Start: 2019-04-24 | End: 2019-04-24 | Stop reason: HOSPADM

## 2019-04-23 RX ORDER — SODIUM CHLORIDE 0.9 % (FLUSH) 0.9 %
10 SYRINGE (ML) INJECTION PRN
Status: DISCONTINUED | OUTPATIENT
Start: 2019-04-23 | End: 2019-04-24 | Stop reason: HOSPADM

## 2019-04-23 RX ORDER — CLOPIDOGREL BISULFATE 75 MG/1
300 TABLET ORAL ONCE
Status: COMPLETED | OUTPATIENT
Start: 2019-04-24 | End: 2019-04-24

## 2019-04-23 RX ORDER — ACETAMINOPHEN 325 MG/1
650 TABLET ORAL EVERY 4 HOURS PRN
Status: DISCONTINUED | OUTPATIENT
Start: 2019-04-23 | End: 2019-04-24 | Stop reason: HOSPADM

## 2019-04-23 RX ORDER — NIFEDIPINE 30 MG/1
30 TABLET, EXTENDED RELEASE ORAL DAILY
Status: DISCONTINUED | OUTPATIENT
Start: 2019-04-24 | End: 2019-04-24 | Stop reason: HOSPADM

## 2019-04-23 RX ORDER — LISINOPRIL 5 MG/1
5 TABLET ORAL DAILY
Status: DISCONTINUED | OUTPATIENT
Start: 2019-04-24 | End: 2019-04-24 | Stop reason: HOSPADM

## 2019-04-23 RX ORDER — ALBUTEROL SULFATE 90 UG/1
2 AEROSOL, METERED RESPIRATORY (INHALATION) EVERY 6 HOURS PRN
Status: DISCONTINUED | OUTPATIENT
Start: 2019-04-23 | End: 2019-04-24 | Stop reason: HOSPADM

## 2019-04-23 RX ORDER — SODIUM CHLORIDE 0.9 % (FLUSH) 0.9 %
10 SYRINGE (ML) INJECTION EVERY 12 HOURS SCHEDULED
Status: DISCONTINUED | OUTPATIENT
Start: 2019-04-23 | End: 2019-04-24 | Stop reason: HOSPADM

## 2019-04-23 RX ORDER — NITROGLYCERIN 0.4 MG/1
0.4 TABLET SUBLINGUAL EVERY 5 MIN PRN
Status: DISCONTINUED | OUTPATIENT
Start: 2019-04-23 | End: 2019-04-24 | Stop reason: HOSPADM

## 2019-04-23 RX ADMIN — SODIUM CHLORIDE: 9 INJECTION, SOLUTION INTRAVENOUS at 12:44

## 2019-04-23 RX ADMIN — ISOSORBIDE MONONITRATE 30 MG: 30 TABLET, EXTENDED RELEASE ORAL at 22:09

## 2019-04-23 RX ADMIN — SODIUM CHLORIDE: 9 INJECTION, SOLUTION INTRAVENOUS at 20:36

## 2019-04-23 RX ADMIN — Medication 10 ML: at 22:10

## 2019-04-23 RX ADMIN — ATORVASTATIN CALCIUM 80 MG: 40 TABLET, FILM COATED ORAL at 22:09

## 2019-04-23 RX ADMIN — SODIUM CHLORIDE: 9 INJECTION, SOLUTION INTRAVENOUS at 12:43

## 2019-04-23 ASSESSMENT — PAIN SCALES - GENERAL
PAINLEVEL_OUTOF10: 0
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
## 2019-04-23 NOTE — PROCEDURES
[address]                  [address] [email or web address]  [facility]                            CARDIAC CATHETERIZATION    PATIENT NAME: Prince Gar                   :        1945  MED REC NO:   [id number]                            ROOM:       6323  ACCOUNT NO:   [de-identified]                           ADMIT DATE: 2019  PROVIDER:     Espinoza Cohn MD    DATE OF PROCEDURE:  2019    PROCEDURE PERFORMED:  1. Coronary angiography. 2.  Percutaneous coronary intervention with deployment of 3.0 x 16 mm  [place]y Resolute drug-eluting stent in the proximal left circumflex artery. 3.  Conscious sedation using Versed and fentanyl. Indication #4 with score of 7. Indication for PCI is 16 and 7. INDICATION FOR PROCEDURE:  The patient is a 77-year-old male with a  history of CAD, status post PCI to RCA, totally occluded first OM  branch, has been having exertional shortness of breath and chest pain,  had negative Lexiscan stress test.  The patient continued to have  symptoms, continued to smoke. The patient was brought to the cath lab  to evaluate the anatomy of his coronary arteries with the intention to  intervene as warranted. DESCRIPTION OF PROCEDURE:  After the appropriate informed consent, the  right wrist area was prepped and draped in the usual sterile fashion. A  time-out was called. The right wrist area was locally anesthetized with  2 mL of 2% lidocaine. A 21-gauge needle was used to access the right  radial artery. A 6-Solomon Islander introducer sheath was used to cannulate the  right radial artery. A 6-[place] Islander JL-3.5 and 6-Sol[place] Islander Britton right  catheter were used for left and right coronary angiography in multiple  projections. A 6-[place] Islander JL-4 and 6-[place] Islander JR-1 catheters were used  unsuccessfully. ANGIOGRAPHIC FINDINGS:  The left main coronary artery arises normally  from the left sinus of Valsalva.   It is a good-sized vessel without any  significant disease. It bifurcates into the left anterior descending  artery and left circumflex artery. The left anterior descending artery is a good-size vessel, extends down  to the apex. It has 20% to 30% ostial disease. The vessel has luminal  irregularities. There is 30% to 40% mid segment disease. It gives off  two mid-sized diagonal branches and diffuse small ones that have luminal  irregularities. The left circumflex artery is a good-size vessel that has totally  occluded first OM branch. There was 80% to 90% occlusion of the  proximal segment just before the takeoff of that OM branch. Then, there  was 180-degree loop of the vessel followed by 40% to 50% distal lesion. The right coronary artery arises normally from the right sinus of  Valsalva. There is patent stent in the mid segment. The rest of the  vessel is mildly to moderately diffusely diseased. However, it is a 1.5  to 2-mm vessel at most.  There is 70% to 80% distal RCA stenosis. After completing the coronary angiography, we proceeded with  percutaneous coronary intervention. We used 6-Hungarian JL-4 guiding  catheter to engage the left main coronary artery. We were not able to  corss the tortuosity of the left circumflex artery using BMW wire. Then, we used a floppy wire with the support of a 1.5 balloon to cross  distally. Then we tried to predilate the lesion using 2.0 balloon  unsuccessfully, then the lesion was dilated using 1.5 balloon and then  we predilated using 2.0 balloon, then 2.5 balloon, then with the support  of 6-Hungarian guideliner, we were able to advance this into the area of  the stenosis. The stent was deployed. Followup angiography showed  through the lesion distal to the stent. That resolved when pulling back  the wire into the soft part of it. Followup angiography showed 0%  residual stenosis and VASQUEZ-3 flow distally.     At the end of the procedure, the catheter and the wire were pulled out  from the body. A Vasc band was applied to the right wrist area with  effective hemostasis. COMPLICATIONS:  None. MEDICATIONS USED DURING THE PROCEDURE:  We used intraarterial verapamil  to prevent vasospasm. We used intraarterial heparin for  anticoagulation. We used Angiomax for anticoagulation for the  interventional procedure. The patient was given 60 mg of Effient prior to  leaving the cath lab area. CONSCIOUS SEDATION:  We used Versed and fentanyl for conscious sedation. First dose was given at 1436 hours, procedure was a long complicated  procedure, ended at 1605 hours. There was 89 minutes of direct  face-to-face supervision during conscious sedation administration. Total contrast used was 240 mL. Total fluoroscopy time was 31.5 minutes. IMPRESSION:  1.  80% to 90% disease involving the proximal left circumflex artery  with successful deployment of 3.0 x 16 mm Resolute [place] drug-eluting  stent. 2.  Mild disease involving the LAD. 3.  Patent stent in the RCA, has significant disease involving the  distal vessel where it becomes a 2.0 mm vessel. RECOMMENDATIONS:  1. Aspirin for life. 2.  Plavix for at least a year, preferably longer. 3.  Aggressive coronary artery disease risk factor modifications. 4.  Smoking cessation. 5.  The patient will be admitted to CIU for overnight observation and IV  hydration.         Nora Caceres MD    D: 04/23/2019 16:34:47       T: 04/23/2019 17:45:18     [email or web address]  Job#: [id number]     Doc#: [id number]    CC:  Jenna Peters MD

## 2019-04-24 VITALS
BODY MASS INDEX: 23.1 KG/M2 | HEART RATE: 82 BPM | HEIGHT: 71 IN | TEMPERATURE: 97.7 F | DIASTOLIC BLOOD PRESSURE: 85 MMHG | RESPIRATION RATE: 14 BRPM | WEIGHT: 165 LBS | OXYGEN SATURATION: 94 % | SYSTOLIC BLOOD PRESSURE: 168 MMHG

## 2019-04-24 LAB
ANION GAP SERPL CALCULATED.3IONS-SCNC: 12 MMOL/L (ref 7–16)
BUN BLDV-MCNC: 22 MG/DL (ref 8–23)
CALCIUM SERPL-MCNC: 8.4 MG/DL (ref 8.6–10.2)
CHLORIDE BLD-SCNC: 106 MMOL/L (ref 98–107)
CO2: 19 MMOL/L (ref 22–29)
CREAT SERPL-MCNC: 1.3 MG/DL (ref 0.7–1.2)
GFR AFRICAN AMERICAN: >60
GFR NON-AFRICAN AMERICAN: 54 ML/MIN/1.73
GLUCOSE BLD-MCNC: 81 MG/DL (ref 74–99)
HCT VFR BLD CALC: 39.5 % (ref 37–54)
HEMOGLOBIN: 13.3 G/DL (ref 12.5–16.5)
MCH RBC QN AUTO: 30.8 PG (ref 26–35)
MCHC RBC AUTO-ENTMCNC: 33.7 % (ref 32–34.5)
MCV RBC AUTO: 91.4 FL (ref 80–99.9)
PDW BLD-RTO: 14.6 FL (ref 11.5–15)
PLATELET # BLD: 250 E9/L (ref 130–450)
PMV BLD AUTO: 11.8 FL (ref 7–12)
POTASSIUM SERPL-SCNC: 3.9 MMOL/L (ref 3.5–5)
RBC # BLD: 4.32 E12/L (ref 3.8–5.8)
SODIUM BLD-SCNC: 137 MMOL/L (ref 132–146)
WBC # BLD: 9.8 E9/L (ref 4.5–11.5)

## 2019-04-24 PROCEDURE — 6370000000 HC RX 637 (ALT 250 FOR IP): Performed by: INTERNAL MEDICINE

## 2019-04-24 PROCEDURE — 2580000003 HC RX 258: Performed by: INTERNAL MEDICINE

## 2019-04-24 PROCEDURE — G0378 HOSPITAL OBSERVATION PER HR: HCPCS

## 2019-04-24 PROCEDURE — 36415 COLL VENOUS BLD VENIPUNCTURE: CPT

## 2019-04-24 PROCEDURE — 85027 COMPLETE CBC AUTOMATED: CPT

## 2019-04-24 PROCEDURE — 80048 BASIC METABOLIC PNL TOTAL CA: CPT

## 2019-04-24 RX ADMIN — Medication 10 ML: at 09:37

## 2019-04-24 RX ADMIN — LISINOPRIL 5 MG: 5 TABLET ORAL at 09:34

## 2019-04-24 RX ADMIN — NIFEDIPINE 30 MG: 30 TABLET, FILM COATED, EXTENDED RELEASE ORAL at 16:54

## 2019-04-24 RX ADMIN — ASPIRIN 81 MG 81 MG: 81 TABLET ORAL at 09:33

## 2019-04-24 RX ADMIN — MOMETASONE FUROATE AND FORMOTEROL FUMARATE DIHYDRATE 2 PUFF: 200; 5 AEROSOL RESPIRATORY (INHALATION) at 09:00

## 2019-04-24 RX ADMIN — CLOPIDOGREL BISULFATE 300 MG: 75 TABLET ORAL at 07:13

## 2019-04-24 RX ADMIN — METOPROLOL SUCCINATE 50 MG: 50 TABLET, EXTENDED RELEASE ORAL at 09:34

## 2019-04-24 ASSESSMENT — PAIN SCALES - GENERAL
PAINLEVEL_OUTOF10: 0

## 2019-04-24 NOTE — CONSULTS
Counseled patient about making positive lifestyle changes and about attending outpatient cardiac rehab. Patient is mildly interested in cardiac rehab at [facility].  Thank you for the direct referral!

## 2019-05-06 ENCOUNTER — OFFICE VISIT (OUTPATIENT)
Dept: FAMILY MEDICINE CLINIC | Age: 74
End: 2019-05-06
Payer: MEDICARE

## 2019-05-06 VITALS
SYSTOLIC BLOOD PRESSURE: 116 MMHG | HEIGHT: 71 IN | BODY MASS INDEX: 22.9 KG/M2 | OXYGEN SATURATION: 97 % | WEIGHT: 163.6 LBS | DIASTOLIC BLOOD PRESSURE: 60 MMHG | HEART RATE: 75 BPM

## 2019-05-06 DIAGNOSIS — I10 ESSENTIAL HYPERTENSION: ICD-10-CM

## 2019-05-06 DIAGNOSIS — I21.02 ST ELEVATION MYOCARDIAL INFARCTION INVOLVING LEFT ANTERIOR DESCENDING (LAD) CORONARY ARTERY (HCC): Primary | ICD-10-CM

## 2019-05-06 DIAGNOSIS — E78.2 MIXED HYPERLIPIDEMIA: ICD-10-CM

## 2019-05-06 DIAGNOSIS — Z72.0 TOBACCO ABUSE: ICD-10-CM

## 2019-05-06 PROCEDURE — 1123F ACP DISCUSS/DSCN MKR DOCD: CPT | Performed by: FAMILY MEDICINE

## 2019-05-06 PROCEDURE — 3017F COLORECTAL CA SCREEN DOC REV: CPT | Performed by: FAMILY MEDICINE

## 2019-05-06 PROCEDURE — G8420 CALC BMI NORM PARAMETERS: HCPCS | Performed by: FAMILY MEDICINE

## 2019-05-06 PROCEDURE — G8427 DOCREV CUR MEDS BY ELIG CLIN: HCPCS | Performed by: FAMILY MEDICINE

## 2019-05-06 PROCEDURE — G8598 ASA/ANTIPLAT THER USED: HCPCS | Performed by: FAMILY MEDICINE

## 2019-05-06 PROCEDURE — 99214 OFFICE O/P EST MOD 30 MIN: CPT | Performed by: FAMILY MEDICINE

## 2019-05-06 PROCEDURE — 1036F TOBACCO NON-USER: CPT | Performed by: FAMILY MEDICINE

## 2019-05-06 PROCEDURE — 4040F PNEUMOC VAC/ADMIN/RCVD: CPT | Performed by: FAMILY MEDICINE

## 2019-05-07 LAB
EKG ATRIAL RATE: 89 BPM
EKG P AXIS: 74 DEGREES
EKG P-R INTERVAL: 156 MS
EKG Q-T INTERVAL: 368 MS
EKG QRS DURATION: 92 MS
EKG QTC CALCULATION (BAZETT): 447 MS
EKG R AXIS: -6 DEGREES
EKG T AXIS: 62 DEGREES
EKG VENTRICULAR RATE: 89 BPM

## 2019-05-09 ASSESSMENT — ENCOUNTER SYMPTOMS
EYE REDNESS: 0
ABDOMINAL PAIN: 0
COUGH: 0
SORE THROAT: 0
GASTROINTESTINAL NEGATIVE: 1
DIARRHEA: 0
ORTHOPNEA: 0
SPUTUM PRODUCTION: 0
HEMOPTYSIS: 0
BLURRED VISION: 0
BACK PAIN: 0
EYE DISCHARGE: 0
NAUSEA: 0
HEARTBURN: 0
BLOOD IN STOOL: 0
PHOTOPHOBIA: 0
EYES NEGATIVE: 1
SINUS PAIN: 0
WHEEZING: 0
SHORTNESS OF BREATH: 1
EYE PAIN: 0
VOMITING: 0
CONSTIPATION: 0
DOUBLE VISION: 0
STRIDOR: 0

## 2019-05-10 NOTE — PROGRESS NOTES
SUBJECTIVE  Aidan Bedolla is a 68 y.o. male. HPI/Chief C/O:  Chief Complaint   Patient presents with    Coronary Artery Disease     patient here to follow up from cardiology and cardiac cath with heart stent placement     Allergies   Allergen Reactions    Codeine      MAKES PT VERY HIGH   this 68year old male new pt presents with stemi [facility]), hypertension, hyperlipidemia, S/P AAA repair followed by specialist, tobacco abuse, and fatigue. Pt was in hospital on 4/23/2019 for cardiac cath. Pt has tobacco abuse. ROS:  Review of Systems   Constitutional: Positive for malaise/fatigue. Negative for chills, diaphoresis, fever and weight loss. HENT: Negative. Negative for congestion, ear discharge, ear pain, hearing loss, nosebleeds, sinus pain, sore throat and tinnitus. Eyes: Negative. Negative for blurred vision, double vision, photophobia, pain, discharge and redness. Respiratory: Positive for shortness of breath. Negative for cough, hemoptysis, sputum production, wheezing and stridor. Pt has tobacco abuse. Cardiovascular: Negative for chest pain, palpitations, orthopnea, claudication, leg swelling and PND. Pt has stemi [facility]), S/P AAA repair, and hypertension. Gastrointestinal: Negative. Negative for abdominal pain, blood in stool, constipation, diarrhea, heartburn, melena, nausea and vomiting. Genitourinary: Negative. Negative for dysuria, flank pain, frequency, hematuria and urgency. Musculoskeletal: Positive for myalgias. Negative for back pain, falls, joint pain and neck pain. Skin: Negative. Negative for itching and rash. Neurological: Positive for tingling. Negative for dizziness, tremors, sensory change, speech change, focal weakness, seizures, loss of consciousness, weakness and headaches. Endo/Heme/Allergies: Negative. Negative for environmental allergies and polydipsia. Does not bruise/bleed easily.    Psychiatric/Behavioral: Negative for depression, Pupils are equal, round, and reactive to light. Conjunctivae and EOM are normal. Right eye exhibits no discharge. Left eye exhibits no discharge. No scleral icterus. Neck: Normal range of motion. Neck supple. No JVD present. No tracheal deviation present. No thyromegaly present. Cardiovascular: Normal rate, regular rhythm and intact distal pulses. Exam reveals no gallop and no friction rub. Murmur heard. Pulmonary/Chest: Effort normal and breath sounds normal. No stridor. No respiratory distress. He has no wheezes. He has no rales. He exhibits no tenderness. Abdominal: Soft. Bowel sounds are normal. He exhibits no distension and no mass. There is no tenderness. There is no rebound and no guarding. No hernia. Musculoskeletal: He exhibits tenderness. He exhibits no edema or deformity. Pain and decreased ROM multiple joints. Lymphadenopathy:     He has no cervical adenopathy. Neurological: He is alert and oriented to person, place, and time. He has normal reflexes. He displays normal reflexes. No cranial nerve deficit or sensory deficit. He exhibits normal muscle tone. Coordination normal.   Skin: Skin is warm. No rash noted. He is not diaphoretic. No erythema. No pallor. Psychiatric: He has a normal mood and affect. His behavior is normal. Judgment and thought content normal.   Nursing note and vitals reviewed. ASSESSMENT/PLAN  Poncho Doctor was seen today for insomnia, discuss medications and health maintenance. Diagnoses and all orders for this visit:    ST elevation myocardial infarction involving left anterior descending (LAD) coronary artery (HCC)  Stable. Plavix, BB, ace, indur, adalat, statin, aspirin, cardiology. Essential hypertension  Controlled. Imdur, ace, BB, adalat, statin, aspirin, low salt diet. S/P AAA (abdominal aortic aneurysm) repair  Stable. Vascular. Primary insomnia  -     Internal Referral to Sleep Medicine  Not controlled. Sleep lab.  Pt is taking ativan at night.  Tobacco abuse  Not controlled. Pt instructed to DC SMOKING. CT lung screen. Pt instructed if any worse go ED ASAP. Outpatient Encounter Medications as of 5/6/2019   Medication Sig Dispense Refill    Cholecalciferol (VITAMIN D3) 2000 units CAPS Take 1 capsule by mouth daily      NIFEdipine (ADALAT CC) 30 MG extended release tablet Take 30 mg by mouth daily      albuterol sulfate HFA (VENTOLIN HFA) 108 (90 Base) MCG/ACT inhaler Inhale 2 puffs into the lungs every 6 hours as needed for Wheezing      clopidogrel (PLAVIX) 75 MG tablet Take 1 tablet by mouth daily 30 tablet 11    isosorbide mononitrate (IMDUR) 30 MG extended release tablet Take 1 tablet by mouth daily 30 tablet 3    nitroGLYCERIN (NITROSTAT) 0.4 MG SL tablet up to max of 3 total doses. If no relief after 1 dose, call 911. 25 tablet 3    atorvastatin (LIPITOR) 80 MG tablet Take 1 tablet by mouth nightly 30 tablet 3    lisinopril (PRINIVIL;ZESTRIL) 5 MG tablet Take 1 tablet by mouth daily 30 tablet 3    metoprolol succinate (TOPROL XL) 50 MG extended release tablet Take 1 tablet by mouth daily 30 tablet 3    aspirin 81 MG tablet Take 81 mg by mouth daily      fluticasone-vilanterol (BREO ELLIPTA) 100-25 MCG/INH AEPB inhaler Inhale 1 puff into the lungs daily       No facility-administered encounter medications on file as of 5/6/2019. Return in about 6 weeks (around 6/17/2019).         Reviewed recent labs related to [facility] current problems      Discussed importance of regular Health Maintenance follow up  Health Maintenance   Topic    Hepatitis C screen     DTaP/Tdap/Td vaccine (1 - Tdap)    Shingles Vaccine (1 of 2)    Pneumococcal 65+ years Vaccine (2 of 2 - PPSV23)    Low dose CT lung screening     Potassium monitoring     Creatinine monitoring     Lipid screen     Colon cancer screen colonoscopy     Flu vaccine     AAA screen                                                  Review of Systems

## 2019-06-17 ENCOUNTER — HOSPITAL ENCOUNTER (OUTPATIENT)
Age: 74
Discharge: HOME | End: 2019-06-19
Payer: MEDICARE

## 2019-06-17 ENCOUNTER — OFFICE VISIT (OUTPATIENT)
Dept: FAMILY MEDICINE CLINIC | Age: 74
End: 2019-06-17
Payer: MEDICARE

## 2019-06-17 VITALS
HEIGHT: 71 IN | WEIGHT: 165 LBS | RESPIRATION RATE: 18 BRPM | SYSTOLIC BLOOD PRESSURE: 122 MMHG | DIASTOLIC BLOOD PRESSURE: 74 MMHG | OXYGEN SATURATION: 95 % | HEART RATE: 80 BPM | BODY MASS INDEX: 23.1 KG/M2

## 2019-06-17 DIAGNOSIS — Z72.0 TOBACCO ABUSE: ICD-10-CM

## 2019-06-17 DIAGNOSIS — R73.01 IFG (IMPAIRED FASTING GLUCOSE): ICD-10-CM

## 2019-06-17 DIAGNOSIS — I10 ESSENTIAL HYPERTENSION: ICD-10-CM

## 2019-06-17 DIAGNOSIS — Z00.00 ENCOUNTER FOR MEDICARE ANNUAL WELLNESS EXAM: Primary | ICD-10-CM

## 2019-06-17 DIAGNOSIS — Z12.5 SCREENING FOR PROSTATE CANCER: ICD-10-CM

## 2019-06-17 DIAGNOSIS — E78.2 MIXED HYPERLIPIDEMIA: ICD-10-CM

## 2019-06-17 DIAGNOSIS — R53.83 FATIGUE, UNSPECIFIED TYPE: ICD-10-CM

## 2019-06-17 DIAGNOSIS — R06.02 SHORTNESS OF BREATH: ICD-10-CM

## 2019-06-17 DIAGNOSIS — I21.02 ST ELEVATION MYOCARDIAL INFARCTION INVOLVING LEFT ANTERIOR DESCENDING (LAD) CORONARY ARTERY (HCC): ICD-10-CM

## 2019-06-17 DIAGNOSIS — D22.9 NEVUS: ICD-10-CM

## 2019-06-17 DIAGNOSIS — F32.A DEPRESSION, UNSPECIFIED DEPRESSION TYPE: ICD-10-CM

## 2019-06-17 DIAGNOSIS — Z00.00 ENCOUNTER FOR MEDICARE ANNUAL WELLNESS EXAM: ICD-10-CM

## 2019-06-17 DIAGNOSIS — Z00.00 ROUTINE GENERAL MEDICAL EXAMINATION AT A HEALTH CARE FACILITY: ICD-10-CM

## 2019-06-17 LAB
BASOPHILS ABSOLUTE: 0.13 E9/L (ref 0–0.2)
BASOPHILS RELATIVE PERCENT: 1.4 % (ref 0–2)
CHOLESTEROL, TOTAL: 127 MG/DL (ref 0–199)
EOSINOPHILS ABSOLUTE: 0.27 E9/L (ref 0.05–0.5)
EOSINOPHILS RELATIVE PERCENT: 2.8 % (ref 0–6)
HBA1C MFR BLD: 5.7 % (ref 4–5.6)
HCT VFR BLD CALC: 47.3 % (ref 37–54)
HDLC SERPL-MCNC: 40 MG/DL
HEMOGLOBIN: 15.3 G/DL (ref 12.5–16.5)
IMMATURE GRANULOCYTES #: 0.06 E9/L
IMMATURE GRANULOCYTES %: 0.6 % (ref 0–5)
LDL CHOLESTEROL CALCULATED: 66 MG/DL (ref 0–99)
LYMPHOCYTES ABSOLUTE: 1.24 E9/L (ref 1.5–4)
LYMPHOCYTES RELATIVE PERCENT: 13 % (ref 20–42)
MCH RBC QN AUTO: 30.6 PG (ref 26–35)
MCHC RBC AUTO-ENTMCNC: 32.3 % (ref 32–34.5)
MCV RBC AUTO: 94.6 FL (ref 80–99.9)
MONOCYTES ABSOLUTE: 1.04 E9/L (ref 0.1–0.95)
MONOCYTES RELATIVE PERCENT: 10.9 % (ref 2–12)
NEUTROPHILS ABSOLUTE: 6.8 E9/L (ref 1.8–7.3)
NEUTROPHILS RELATIVE PERCENT: 71.3 % (ref 43–80)
PDW BLD-RTO: 14.3 FL (ref 11.5–15)
PLATELET # BLD: 301 E9/L (ref 130–450)
PMV BLD AUTO: 11.2 FL (ref 7–12)
PROSTATE SPECIFIC ANTIGEN: 2.36 NG/ML (ref 0–4)
RBC # BLD: 5 E12/L (ref 3.8–5.8)
TRIGL SERPL-MCNC: 105 MG/DL (ref 0–149)
TSH SERPL DL<=0.05 MIU/L-ACNC: 1.6 UIU/ML (ref 0.27–4.2)
VLDLC SERPL CALC-MCNC: 21 MG/DL
WBC # BLD: 9.5 E9/L (ref 4.5–11.5)

## 2019-06-17 PROCEDURE — G0438 PPPS, INITIAL VISIT: HCPCS | Performed by: FAMILY MEDICINE

## 2019-06-17 PROCEDURE — 4040F PNEUMOC VAC/ADMIN/RCVD: CPT | Performed by: FAMILY MEDICINE

## 2019-06-17 PROCEDURE — 3017F COLORECTAL CA SCREEN DOC REV: CPT | Performed by: FAMILY MEDICINE

## 2019-06-17 PROCEDURE — 1123F ACP DISCUSS/DSCN MKR DOCD: CPT | Performed by: FAMILY MEDICINE

## 2019-06-17 PROCEDURE — 85025 COMPLETE CBC W/AUTO DIFF WBC: CPT

## 2019-06-17 PROCEDURE — G0103 PSA SCREENING: HCPCS

## 2019-06-17 PROCEDURE — G8598 ASA/ANTIPLAT THER USED: HCPCS | Performed by: FAMILY MEDICINE

## 2019-06-17 PROCEDURE — 83036 HEMOGLOBIN GLYCOSYLATED A1C: CPT

## 2019-06-17 PROCEDURE — 84443 ASSAY THYROID STIM HORMONE: CPT

## 2019-06-17 PROCEDURE — 80061 LIPID PANEL: CPT

## 2019-06-17 RX ORDER — SERTRALINE HYDROCHLORIDE 25 MG/1
25 TABLET, FILM COATED ORAL DAILY
Qty: 30 TABLET | Refills: 3 | Status: SHIPPED | OUTPATIENT
Start: 2019-06-17 | End: 2019-10-11 | Stop reason: SDUPTHER

## 2019-06-17 ASSESSMENT — LIFESTYLE VARIABLES
HAS A RELATIVE, FRIEND, DOCTOR, OR ANOTHER HEALTH PROFESSIONAL EXPRESSED CONCERN ABOUT YOUR DRINKING OR SUGGESTED YOU CUT DOWN: 0
AUDIT-C TOTAL SCORE: 1
HOW MANY STANDARD DRINKS CONTAINING ALCOHOL DO YOU HAVE ON A TYPICAL DAY: 0
HAVE YOU OR SOMEONE ELSE BEEN INJURED AS A RESULT OF YOUR DRINKING: 0
HOW OFTEN DURING THE LAST YEAR HAVE YOU NEEDED AN ALCOHOLIC DRINK FIRST THING IN THE MORNING TO GET YOURSELF GOING AFTER A NIGHT OF HEAVY DRINKING: 0
HOW OFTEN DO YOU HAVE A DRINK CONTAINING ALCOHOL: 1
HOW OFTEN DURING THE LAST YEAR HAVE YOU BEEN UNABLE TO REMEMBER WHAT HAPPENED THE NIGHT BEFORE BECAUSE YOU HAD BEEN DRINKING: 0
HOW OFTEN DURING THE LAST YEAR HAVE YOU HAD A FEELING OF GUILT OR REMORSE AFTER DRINKING: 0
AUDIT TOTAL SCORE: 1
HOW OFTEN DURING THE LAST YEAR HAVE YOU FOUND THAT YOU WERE NOT ABLE TO STOP DRINKING ONCE YOU HAD STARTED: 0
HOW OFTEN DO YOU HAVE SIX OR MORE DRINKS ON ONE OCCASION: 0
HOW OFTEN DURING THE LAST YEAR HAVE YOU FAILED TO DO WHAT WAS NORMALLY EXPECTED FROM YOU BECAUSE OF DRINKING: 0

## 2019-06-17 ASSESSMENT — PATIENT HEALTH QUESTIONNAIRE - PHQ9
SUM OF ALL RESPONSES TO PHQ QUESTIONS 1-9: 2
SUM OF ALL RESPONSES TO PHQ QUESTIONS 1-9: 2

## 2019-06-17 ASSESSMENT — ANXIETY QUESTIONNAIRES: GAD7 TOTAL SCORE: 3
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
## 2019-10-11 ENCOUNTER — TELEPHONE (OUTPATIENT)
Dept: CASE MANAGEMENT | Age: 74
End: 2019-10-11

## 2019-10-11 ENCOUNTER — TELEPHONE (OUTPATIENT)
Dept: FAMILY MEDICINE CLINIC | Age: 74
End: 2019-10-11

## 2019-10-11 DIAGNOSIS — F32.A DEPRESSION, UNSPECIFIED DEPRESSION TYPE: ICD-10-CM

## 2019-10-11 RX ORDER — SERTRALINE HYDROCHLORIDE 25 MG/1
25 TABLET, FILM COATED ORAL DAILY
Qty: 30 TABLET | Refills: 0 | Status: SHIPPED | OUTPATIENT
Start: 2019-10-11 | End: 2019-10-18 | Stop reason: SDUPTHER

## 2019-10-18 ENCOUNTER — OFFICE VISIT (OUTPATIENT)
Dept: FAMILY MEDICINE CLINIC | Age: 74
End: 2019-10-18
Payer: MEDICARE

## 2019-10-18 VITALS
WEIGHT: 158.2 LBS | OXYGEN SATURATION: 95 % | HEART RATE: 80 BPM | SYSTOLIC BLOOD PRESSURE: 110 MMHG | DIASTOLIC BLOOD PRESSURE: 62 MMHG | TEMPERATURE: 97.4 F | BODY MASS INDEX: 22.15 KG/M2 | HEIGHT: 71 IN | RESPIRATION RATE: 18 BRPM

## 2019-10-18 DIAGNOSIS — I21.11 ST ELEVATION MYOCARDIAL INFARCTION INVOLVING RIGHT CORONARY ARTERY (HCC): ICD-10-CM

## 2019-10-18 DIAGNOSIS — I10 ESSENTIAL HYPERTENSION: ICD-10-CM

## 2019-10-18 DIAGNOSIS — F32.A DEPRESSION, UNSPECIFIED DEPRESSION TYPE: ICD-10-CM

## 2019-10-18 DIAGNOSIS — L03.115 CELLULITIS OF RIGHT LOWER EXTREMITY: Primary | ICD-10-CM

## 2019-10-18 DIAGNOSIS — J44.9 CHRONIC OBSTRUCTIVE PULMONARY DISEASE, UNSPECIFIED COPD TYPE (HCC): ICD-10-CM

## 2019-10-18 PROCEDURE — G8926 SPIRO NO PERF OR DOC: HCPCS | Performed by: FAMILY MEDICINE

## 2019-10-18 PROCEDURE — 3017F COLORECTAL CA SCREEN DOC REV: CPT | Performed by: FAMILY MEDICINE

## 2019-10-18 PROCEDURE — 1123F ACP DISCUSS/DSCN MKR DOCD: CPT | Performed by: FAMILY MEDICINE

## 2019-10-18 PROCEDURE — 99214 OFFICE O/P EST MOD 30 MIN: CPT | Performed by: FAMILY MEDICINE

## 2019-10-18 PROCEDURE — 4040F PNEUMOC VAC/ADMIN/RCVD: CPT | Performed by: FAMILY MEDICINE

## 2019-10-18 PROCEDURE — 4004F PT TOBACCO SCREEN RCVD TLK: CPT | Performed by: FAMILY MEDICINE

## 2019-10-18 PROCEDURE — G8420 CALC BMI NORM PARAMETERS: HCPCS | Performed by: FAMILY MEDICINE

## 2019-10-18 PROCEDURE — G8484 FLU IMMUNIZE NO ADMIN: HCPCS | Performed by: FAMILY MEDICINE

## 2019-10-18 PROCEDURE — G8427 DOCREV CUR MEDS BY ELIG CLIN: HCPCS | Performed by: FAMILY MEDICINE

## 2019-10-18 PROCEDURE — 3023F SPIROM DOC REV: CPT | Performed by: FAMILY MEDICINE

## 2019-10-18 PROCEDURE — G8598 ASA/ANTIPLAT THER USED: HCPCS | Performed by: FAMILY MEDICINE

## 2019-10-18 RX ORDER — DOXYCYCLINE HYCLATE 100 MG
100 TABLET ORAL 2 TIMES DAILY
Qty: 14 TABLET | Refills: 0 | Status: SHIPPED | OUTPATIENT
Start: 2019-10-18 | End: 2019-10-25

## 2019-10-18 RX ORDER — SERTRALINE HYDROCHLORIDE 25 MG/1
25 TABLET, FILM COATED ORAL DAILY
Qty: 90 TABLET | Refills: 1 | Status: SHIPPED
Start: 2019-10-18 | End: 2020-05-06

## 2019-10-23 ASSESSMENT — ENCOUNTER SYMPTOMS
SHORTNESS OF BREATH: 1
EYE PAIN: 0
PHOTOPHOBIA: 0
VOMITING: 0
STRIDOR: 0
BLURRED VISION: 0
EYES NEGATIVE: 1
NAUSEA: 0
SORE THROAT: 0
BACK PAIN: 0
CONSTIPATION: 0
EYE DISCHARGE: 0
EYE REDNESS: 0
ORTHOPNEA: 0
WHEEZING: 0
COUGH: 0
ABDOMINAL PAIN: 0
SINUS PAIN: 0
DIARRHEA: 0
BLOOD IN STOOL: 0
GASTROINTESTINAL NEGATIVE: 1
HEARTBURN: 0
SPUTUM PRODUCTION: 0
HEMOPTYSIS: 0
DOUBLE VISION: 0

## 2019-11-12 ENCOUNTER — TELEPHONE (OUTPATIENT)
Dept: FAMILY MEDICINE CLINIC | Age: 74
End: 2019-11-12

## 2019-11-19 ENCOUNTER — OFFICE VISIT (OUTPATIENT)
Dept: PULMONOLOGY | Age: 74
End: 2019-11-19
Payer: MEDICARE

## 2019-11-19 VITALS
DIASTOLIC BLOOD PRESSURE: 72 MMHG | RESPIRATION RATE: 16 BRPM | HEIGHT: 71 IN | OXYGEN SATURATION: 94 % | BODY MASS INDEX: 22.4 KG/M2 | SYSTOLIC BLOOD PRESSURE: 144 MMHG | HEART RATE: 101 BPM | WEIGHT: 160 LBS | TEMPERATURE: 97 F

## 2019-11-19 DIAGNOSIS — R06.02 SOB (SHORTNESS OF BREATH): Primary | ICD-10-CM

## 2019-11-19 PROCEDURE — G8598 ASA/ANTIPLAT THER USED: HCPCS | Performed by: INTERNAL MEDICINE

## 2019-11-19 PROCEDURE — 99203 OFFICE O/P NEW LOW 30 MIN: CPT | Performed by: INTERNAL MEDICINE

## 2019-11-19 PROCEDURE — G8420 CALC BMI NORM PARAMETERS: HCPCS | Performed by: INTERNAL MEDICINE

## 2019-11-19 PROCEDURE — 4004F PT TOBACCO SCREEN RCVD TLK: CPT | Performed by: INTERNAL MEDICINE

## 2019-11-19 PROCEDURE — 1123F ACP DISCUSS/DSCN MKR DOCD: CPT | Performed by: INTERNAL MEDICINE

## 2019-11-19 PROCEDURE — 3017F COLORECTAL CA SCREEN DOC REV: CPT | Performed by: INTERNAL MEDICINE

## 2019-11-19 PROCEDURE — G8427 DOCREV CUR MEDS BY ELIG CLIN: HCPCS | Performed by: INTERNAL MEDICINE

## 2019-11-19 PROCEDURE — 4040F PNEUMOC VAC/ADMIN/RCVD: CPT | Performed by: INTERNAL MEDICINE

## 2019-11-19 PROCEDURE — G8484 FLU IMMUNIZE NO ADMIN: HCPCS | Performed by: INTERNAL MEDICINE

## 2019-11-19 PROCEDURE — 99204 OFFICE O/P NEW MOD 45 MIN: CPT | Performed by: INTERNAL MEDICINE

## 2019-12-16 ENCOUNTER — OFFICE VISIT (OUTPATIENT)
Dept: FAMILY MEDICINE CLINIC | Age: 74
End: 2019-12-16
Payer: MEDICARE

## 2019-12-16 VITALS
BODY MASS INDEX: 22.54 KG/M2 | TEMPERATURE: 98.3 F | WEIGHT: 161 LBS | HEIGHT: 71 IN | HEART RATE: 82 BPM | OXYGEN SATURATION: 92 % | DIASTOLIC BLOOD PRESSURE: 60 MMHG | SYSTOLIC BLOOD PRESSURE: 104 MMHG | RESPIRATION RATE: 18 BRPM

## 2019-12-16 DIAGNOSIS — I10 ESSENTIAL HYPERTENSION: ICD-10-CM

## 2019-12-16 DIAGNOSIS — J43.9 PULMONARY EMPHYSEMA, UNSPECIFIED EMPHYSEMA TYPE (HCC): ICD-10-CM

## 2019-12-16 DIAGNOSIS — Z23 IMMUNIZATION DUE: ICD-10-CM

## 2019-12-16 DIAGNOSIS — L03.115 CELLULITIS OF RIGHT LOWER EXTREMITY: ICD-10-CM

## 2019-12-16 DIAGNOSIS — F41.9 ANXIETY: ICD-10-CM

## 2019-12-16 DIAGNOSIS — I21.02 ST ELEVATION MYOCARDIAL INFARCTION INVOLVING LEFT ANTERIOR DESCENDING (LAD) CORONARY ARTERY (HCC): Primary | ICD-10-CM

## 2019-12-16 PROCEDURE — 3017F COLORECTAL CA SCREEN DOC REV: CPT | Performed by: FAMILY MEDICINE

## 2019-12-16 PROCEDURE — 1123F ACP DISCUSS/DSCN MKR DOCD: CPT | Performed by: FAMILY MEDICINE

## 2019-12-16 PROCEDURE — 90732 PPSV23 VACC 2 YRS+ SUBQ/IM: CPT | Performed by: FAMILY MEDICINE

## 2019-12-16 PROCEDURE — G8926 SPIRO NO PERF OR DOC: HCPCS | Performed by: FAMILY MEDICINE

## 2019-12-16 PROCEDURE — G8482 FLU IMMUNIZE ORDER/ADMIN: HCPCS | Performed by: FAMILY MEDICINE

## 2019-12-16 PROCEDURE — 4004F PT TOBACCO SCREEN RCVD TLK: CPT | Performed by: FAMILY MEDICINE

## 2019-12-16 PROCEDURE — 3023F SPIROM DOC REV: CPT | Performed by: FAMILY MEDICINE

## 2019-12-16 PROCEDURE — G8420 CALC BMI NORM PARAMETERS: HCPCS | Performed by: FAMILY MEDICINE

## 2019-12-16 PROCEDURE — G8598 ASA/ANTIPLAT THER USED: HCPCS | Performed by: FAMILY MEDICINE

## 2019-12-16 PROCEDURE — 4040F PNEUMOC VAC/ADMIN/RCVD: CPT | Performed by: FAMILY MEDICINE

## 2019-12-16 PROCEDURE — G8427 DOCREV CUR MEDS BY ELIG CLIN: HCPCS | Performed by: FAMILY MEDICINE

## 2019-12-16 PROCEDURE — 99214 OFFICE O/P EST MOD 30 MIN: CPT | Performed by: FAMILY MEDICINE

## 2019-12-16 PROCEDURE — G0009 ADMIN PNEUMOCOCCAL VACCINE: HCPCS | Performed by: FAMILY MEDICINE

## 2019-12-29 PROBLEM — L03.115 CELLULITIS OF RIGHT LOWER EXTREMITY: Status: ACTIVE | Noted: 2019-12-29

## 2019-12-29 PROBLEM — J43.9 PULMONARY EMPHYSEMA (HCC): Status: ACTIVE | Noted: 2019-12-29

## 2019-12-29 ASSESSMENT — ENCOUNTER SYMPTOMS
ABDOMINAL PAIN: 0
COLOR CHANGE: 0
EYE DISCHARGE: 0
BACK PAIN: 0
DIARRHEA: 0
ORTHOPNEA: 0
TROUBLE SWALLOWING: 0
GASTROINTESTINAL NEGATIVE: 1
HEARTBURN: 0
CHEST TIGHTNESS: 0
EYE ITCHING: 0
VOICE CHANGE: 0
EYES NEGATIVE: 1
ABDOMINAL DISTENTION: 0
SHORTNESS OF BREATH: 1
WHEEZING: 0
SORE THROAT: 0
STRIDOR: 0
VOMITING: 0
PHOTOPHOBIA: 0
SPUTUM PRODUCTION: 0
NAUSEA: 0
DOUBLE VISION: 0
EYE PAIN: 0
BLURRED VISION: 0
COUGH: 0
SINUS PAIN: 0
SINUS PRESSURE: 0
BLOOD IN STOOL: 0
RECTAL PAIN: 0
RHINORRHEA: 0
HEMOPTYSIS: 0
CHOKING: 0
CONSTIPATION: 0
FACIAL SWELLING: 0
EYE REDNESS: 0
ANAL BLEEDING: 0

## 2020-02-11 ENCOUNTER — HOSPITAL ENCOUNTER (OUTPATIENT)
Age: 75
Discharge: HOME | End: 2020-02-11
Payer: MEDICARE

## 2020-02-11 LAB
ALBUMIN SERPL-MCNC: 4.2 G/DL (ref 3.5–5.2)
ALP BLD-CCNC: 93 U/L (ref 40–129)
ALT SERPL-CCNC: 7 U/L (ref 0–40)
ANION GAP SERPL CALCULATED.3IONS-SCNC: 11 MMOL/L (ref 7–16)
AST SERPL-CCNC: 15 U/L (ref 0–39)
BASOPHILS ABSOLUTE: 0.11 E9/L (ref 0–0.2)
BASOPHILS RELATIVE PERCENT: 1.2 % (ref 0–2)
BILIRUB SERPL-MCNC: 0.5 MG/DL (ref 0–1.2)
BUN BLDV-MCNC: 14 MG/DL (ref 8–23)
CALCIUM SERPL-MCNC: 9.5 MG/DL (ref 8.6–10.2)
CHLORIDE BLD-SCNC: 103 MMOL/L (ref 98–107)
CO2: 24 MMOL/L (ref 22–29)
CREAT SERPL-MCNC: 1.3 MG/DL (ref 0.7–1.2)
EOSINOPHILS ABSOLUTE: 0.22 E9/L (ref 0.05–0.5)
EOSINOPHILS RELATIVE PERCENT: 2.4 % (ref 0–6)
GFR AFRICAN AMERICAN: >60
GFR NON-AFRICAN AMERICAN: 54 ML/MIN/1.73
GLUCOSE BLD-MCNC: 92 MG/DL (ref 74–99)
HCT VFR BLD CALC: 46.7 % (ref 37–54)
HEMOGLOBIN: 15.4 G/DL (ref 12.5–16.5)
IMMATURE GRANULOCYTES #: 0.05 E9/L
IMMATURE GRANULOCYTES %: 0.5 % (ref 0–5)
LYMPHOCYTES ABSOLUTE: 1.43 E9/L (ref 1.5–4)
LYMPHOCYTES RELATIVE PERCENT: 15.4 % (ref 20–42)
MCH RBC QN AUTO: 30.6 PG (ref 26–35)
MCHC RBC AUTO-ENTMCNC: 33 % (ref 32–34.5)
MCV RBC AUTO: 92.8 FL (ref 80–99.9)
MONOCYTES ABSOLUTE: 0.99 E9/L (ref 0.1–0.95)
MONOCYTES RELATIVE PERCENT: 10.7 % (ref 2–12)
NEUTROPHILS ABSOLUTE: 6.46 E9/L (ref 1.8–7.3)
NEUTROPHILS RELATIVE PERCENT: 69.8 % (ref 43–80)
PDW BLD-RTO: 14.4 FL (ref 11.5–15)
PLATELET # BLD: 290 E9/L (ref 130–450)
PMV BLD AUTO: 10.8 FL (ref 7–12)
POTASSIUM SERPL-SCNC: 4.4 MMOL/L (ref 3.5–5)
RBC # BLD: 5.03 E12/L (ref 3.8–5.8)
SODIUM BLD-SCNC: 138 MMOL/L (ref 132–146)
TOTAL PROTEIN: 7.1 G/DL (ref 6.4–8.3)
WBC # BLD: 9.3 E9/L (ref 4.5–11.5)

## 2020-02-11 PROCEDURE — 36415 COLL VENOUS BLD VENIPUNCTURE: CPT

## 2020-02-11 PROCEDURE — 85025 COMPLETE CBC W/AUTO DIFF WBC: CPT

## 2020-02-11 PROCEDURE — 80053 COMPREHEN METABOLIC PANEL: CPT

## 2020-02-18 ENCOUNTER — HOSPITAL ENCOUNTER (OUTPATIENT)
Age: 75
Discharge: HOME | End: 2020-02-20
Payer: MEDICARE

## 2020-02-18 ENCOUNTER — HOSPITAL ENCOUNTER (OUTPATIENT)
Dept: CT IMAGING | Age: 75
Discharge: HOME | End: 2020-02-20
Payer: MEDICARE

## 2020-02-18 PROCEDURE — 2580000003 HC RX 258: Performed by: RADIOLOGY

## 2020-02-18 PROCEDURE — 74177 CT ABD & PELVIS W/CONTRAST: CPT

## 2020-02-18 PROCEDURE — 6360000004 HC RX CONTRAST MEDICATION: Performed by: RADIOLOGY

## 2020-02-18 RX ORDER — SODIUM CHLORIDE 0.9 % (FLUSH) 0.9 %
10 SYRINGE (ML) INJECTION PRN
Status: DISCONTINUED | OUTPATIENT
Start: 2020-02-18 | End: 2020-02-21 | Stop reason: HOSPADM

## 2020-02-18 RX ADMIN — Medication 10 ML: at 13:35

## 2020-02-18 RX ADMIN — IOPAMIDOL 100 ML: 755 INJECTION, SOLUTION INTRAVENOUS at 13:35

## 2020-03-04 ENCOUNTER — TELEPHONE (OUTPATIENT)
Dept: PULMONOLOGY | Age: 75
End: 2020-03-04

## 2020-03-04 NOTE — TELEPHONE ENCOUNTER
Mailed a letter to patient informing him that his CT Lung Screening is scheduled for 3-27-20 at 8:30 am at the [facility]. He must arrive by 8:00 am. There is no prep for this test.    Also, his PFT is scheduled for 3-27-20 at 9:00 am directly after his Lung Screening.  He is to have no caffeine for 24 hours prior to test and no resp meds for at least 4 hours prior to test

## 2020-05-05 ENCOUNTER — TELEPHONE (OUTPATIENT)
Dept: FAMILY MEDICINE CLINIC | Age: 75
End: 2020-05-05

## 2020-05-06 ENCOUNTER — VIRTUAL VISIT (OUTPATIENT)
Dept: FAMILY MEDICINE CLINIC | Age: 75
End: 2020-05-06
Payer: MEDICARE

## 2020-05-06 VITALS — BODY MASS INDEX: 23.1 KG/M2 | HEIGHT: 71 IN | WEIGHT: 165 LBS

## 2020-05-06 PROCEDURE — 99443 PR PHYS/QHP TELEPHONE EVALUATION 21-30 MIN: CPT | Performed by: FAMILY MEDICINE

## 2020-05-06 RX ORDER — SERTRALINE HYDROCHLORIDE 25 MG/1
25 TABLET, FILM COATED ORAL DAILY
Qty: 90 TABLET | Refills: 1 | Status: CANCELLED | OUTPATIENT
Start: 2020-05-06

## 2020-05-06 ASSESSMENT — ENCOUNTER SYMPTOMS
ORTHOPNEA: 0
DIARRHEA: 0
ABDOMINAL DISTENTION: 0
PHOTOPHOBIA: 0
CONSTIPATION: 0
SHORTNESS OF BREATH: 1
EYE REDNESS: 0
EYE PAIN: 0
VOICE CHANGE: 0
TROUBLE SWALLOWING: 0
COLOR CHANGE: 0
RHINORRHEA: 0
STRIDOR: 0
RECTAL PAIN: 0
ANAL BLEEDING: 0
WHEEZING: 0
EYES NEGATIVE: 1
SINUS PRESSURE: 0
CHEST TIGHTNESS: 0
SINUS PAIN: 0
FACIAL SWELLING: 0
BACK PAIN: 0
GASTROINTESTINAL NEGATIVE: 1
BLURRED VISION: 0
BLOOD IN STOOL: 0
EYE ITCHING: 0
EYE DISCHARGE: 0
CHOKING: 0

## 2020-05-06 NOTE — PROGRESS NOTES
SUBJECTIVE  Brigette Tomas is a 76 y.o. male. HPI/Chief C/O:  Chief Complaint   Patient presents with    Medication Refill     meds reviewed- pended    Other     Pt verbally agreed to telemedicine consult; calling from home in [address] [place] [address] Yael      MAKES PT VERY HIGH   The patient is here for a medication list and treatment planning review  We will go over our care planning goals as well as take care of all refills  We will set up labs as well   Brigette Tomas is a 76 y.o. male evaluated via telephone on 5/6/2020. Consent:  He and/or health care decision maker is aware that that he may receive a bill for this telephone service, depending on his insurance coverage, and has provided verbal consent to proceed: Yes      Documentation:  I communicated with the patient and/or health care decision maker about refills. Details of this discussion including any medical advice provided: refills      I affirm this is a Patient Initiated Episode with a Patient who has not had a related appointment within my department in the past 7 days or scheduled within the next 24 hours. Patient identification was verified at the start of the visit: Yes    Total Time: minutes: 21-30 minutes    Note: not billable if this call serves to triage the patient into an appointment for the relevant concern      Arely Back     Hypertension   This is a chronic problem. The current episode started more than 1 year ago. The problem is controlled. Associated symptoms include anxiety, malaise/fatigue and shortness of breath. Pertinent negatives include no blurred vision, chest pain, headaches, neck pain, orthopnea, palpitations, PND or sweats. Risk factors for coronary artery disease include male gender, stress, smoking/tobacco exposure and dyslipidemia. Past treatments include lifestyle changes, calcium channel blockers, beta blockers, ACE inhibitors and direct vasodilators.  The current Distress      ASSESSMENT/PLAN  Sina Hair was seen today for medication refill and other. Diagnoses and all orders for this visit:    Pulmonary emphysema, unspecified emphysema type ([address]  -     Basic Metabolic Panel; Future  -     CBC Auto Differential; Future  --stable on current care planning  -- continue treatment as we are meeting goals       Depression, unspecified depression type  -     Basic Metabolic Panel; Future  -     CBC Auto Differential; Future    Tobacco abuse  -     CT Lung Screen (Initial/Annual); Future  -     Basic Metabolic Panel; Future  -     CBC Auto Differential; Future    CAD in native artery  -     Basic Metabolic Panel; Future  -     CBC Auto Differential; Future    ---VASCULAR PANEL  A) ASA, PLAVIX, aggrenox  B) coumadin, pletal, tzd, STATIN  C) ACE, hctz, folic, CCB  D) cannikinumab, fish oils     ---CARDIAC---ASA, ACE, BETA, STATIN, hctz, ( CCB )    Fatigue, unspecified type  -     TSH without Reflex; Future  -     Uric Acid; Future  -     Basic Metabolic Panel; Future  -     CBC Auto Differential; Future    Essential hypertension  -     Basic Metabolic Panel; Future  -     CBC Auto Differential; Future    IFG (impaired fasting glucose)  -     Basic Metabolic Panel; Future  -     CBC Auto Differential; Future  -     Hemoglobin A1C; Future    Dyslipidemia  -     Basic Metabolic Panel; Future  -     Lipid Panel; Future  -     CBC Auto Differential; Future    Anxiety  -     sertraline (ZOLOFT) 50 MG tablet; Take 1 tablet by mouth daily  -     Basic Metabolic Panel; Future  -     CBC Auto Differential; Future    Screening PSA (prostate specific antigen)  -     Psa screening;  Future        Outpatient Encounter Medications as of 5/6/2020   Medication Sig Dispense Refill    sertraline (ZOLOFT) 50 MG tablet Take 1 tablet by mouth daily 90 tablet 1    Cholecalciferol (VITAMIN D3) 2000 units CAPS Take 1 capsule by mouth daily      NIFEdipine (ADALAT CC) 30 MG extended release tablet Take 30

## 2020-05-11 ENCOUNTER — TELEPHONE (OUTPATIENT)
Dept: CASE MANAGEMENT | Age: 75
End: 2020-05-11

## 2020-05-15 ENCOUNTER — HOSPITAL ENCOUNTER (OUTPATIENT)
Dept: CT IMAGING | Age: 75
Discharge: HOME | End: 2020-05-17
Payer: MEDICARE

## 2020-05-15 ENCOUNTER — TELEPHONE (OUTPATIENT)
Dept: CASE MANAGEMENT | Age: 75
End: 2020-05-15

## 2020-05-15 PROCEDURE — G0297 LDCT FOR LUNG CA SCREEN: HCPCS

## 2020-05-15 NOTE — TELEPHONE ENCOUNTER
No call, encounter opened to process CT Lung Screening. CT Lung Screen: 5/15/2020  Impression   Stable thoracic appearance with emphysematous pulmonary changes in   this patient with a chronic smoking history, but no evidence of   nodules or mass lesions or acute pneumonia or pleural effusion. There   is no evidence of cardiac enlargement or decompensation.       Atherosclerotic coronary and aortic calcification is present, with   ectasia of the ascending thoracic aorta to 3.9 cm diameter and at the   lower margin of the study, there is an aorto-bi iliac stent graft.           LUNG RADS CATEGORY: 1- no significant nodular/mass findings, with the   recommendation of resumption of routine annual screening surveillance     Pack years: 62    Social History     Tobacco Use  Smoking Status: Current Every Day Smoker   Start Date: 1/1/1960   Quit Date:    Types: Cigarettes   Packs/Day: 1.00   Years: 58.00   Pack Years: 62   Smokeless Tobacco:  Never used         Results letter sent to patient via my chart or mailed.      [address]

## 2020-06-08 ENCOUNTER — OFFICE VISIT (OUTPATIENT)
Dept: FAMILY MEDICINE CLINIC | Age: 75
End: 2020-06-08
Payer: MEDICARE

## 2020-06-08 ENCOUNTER — HOSPITAL ENCOUNTER (OUTPATIENT)
Age: 75
Discharge: HOME | End: 2020-06-10
Payer: MEDICARE

## 2020-06-08 VITALS
TEMPERATURE: 98 F | HEART RATE: 70 BPM | BODY MASS INDEX: 22.12 KG/M2 | HEIGHT: 71 IN | RESPIRATION RATE: 18 BRPM | SYSTOLIC BLOOD PRESSURE: 118 MMHG | WEIGHT: 158 LBS | OXYGEN SATURATION: 96 % | DIASTOLIC BLOOD PRESSURE: 68 MMHG

## 2020-06-08 LAB
ANION GAP SERPL CALCULATED.3IONS-SCNC: 10 MMOL/L (ref 7–16)
BASOPHILS ABSOLUTE: 0.12 E9/L (ref 0–0.2)
BASOPHILS RELATIVE PERCENT: 1.4 % (ref 0–2)
BUN BLDV-MCNC: 22 MG/DL (ref 8–23)
CALCIUM SERPL-MCNC: 9.5 MG/DL (ref 8.6–10.2)
CHLORIDE BLD-SCNC: 99 MMOL/L (ref 98–107)
CHOLESTEROL, TOTAL: 123 MG/DL (ref 0–199)
CO2: 22 MMOL/L (ref 22–29)
CREAT SERPL-MCNC: 1.4 MG/DL (ref 0.7–1.2)
EOSINOPHILS ABSOLUTE: 0.19 E9/L (ref 0.05–0.5)
EOSINOPHILS RELATIVE PERCENT: 2.3 % (ref 0–6)
GFR AFRICAN AMERICAN: 60
GFR NON-AFRICAN AMERICAN: 49 ML/MIN/1.73
GLUCOSE BLD-MCNC: 160 MG/DL (ref 74–99)
HCT VFR BLD CALC: 50.3 % (ref 37–54)
HDLC SERPL-MCNC: 35 MG/DL
HEMOGLOBIN: 15.8 G/DL (ref 12.5–16.5)
IMMATURE GRANULOCYTES #: 0.03 E9/L
IMMATURE GRANULOCYTES %: 0.4 % (ref 0–5)
LDL CHOLESTEROL CALCULATED: 52 MG/DL (ref 0–99)
LYMPHOCYTES ABSOLUTE: 1.17 E9/L (ref 1.5–4)
LYMPHOCYTES RELATIVE PERCENT: 13.9 % (ref 20–42)
MCH RBC QN AUTO: 30 PG (ref 26–35)
MCHC RBC AUTO-ENTMCNC: 31.4 % (ref 32–34.5)
MCV RBC AUTO: 95.4 FL (ref 80–99.9)
MONOCYTES ABSOLUTE: 0.85 E9/L (ref 0.1–0.95)
MONOCYTES RELATIVE PERCENT: 10.1 % (ref 2–12)
NEUTROPHILS ABSOLUTE: 6.03 E9/L (ref 1.8–7.3)
NEUTROPHILS RELATIVE PERCENT: 71.9 % (ref 43–80)
PDW BLD-RTO: 15.2 FL (ref 11.5–15)
PLATELET # BLD: 276 E9/L (ref 130–450)
PMV BLD AUTO: 11.6 FL (ref 7–12)
POTASSIUM SERPL-SCNC: 4.6 MMOL/L (ref 3.5–5)
PROSTATE SPECIFIC ANTIGEN: 2.26 NG/ML (ref 0–4)
RBC # BLD: 5.27 E12/L (ref 3.8–5.8)
SODIUM BLD-SCNC: 131 MMOL/L (ref 132–146)
TRIGL SERPL-MCNC: 179 MG/DL (ref 0–149)
TSH SERPL DL<=0.05 MIU/L-ACNC: 1.75 UIU/ML (ref 0.27–4.2)
URIC ACID, SERUM: 5.7 MG/DL (ref 3.4–7)
VLDLC SERPL CALC-MCNC: 36 MG/DL
WBC # BLD: 8.4 E9/L (ref 4.5–11.5)

## 2020-06-08 PROCEDURE — 4040F PNEUMOC VAC/ADMIN/RCVD: CPT | Performed by: FAMILY MEDICINE

## 2020-06-08 PROCEDURE — 3023F SPIROM DOC REV: CPT | Performed by: FAMILY MEDICINE

## 2020-06-08 PROCEDURE — G0103 PSA SCREENING: HCPCS

## 2020-06-08 PROCEDURE — G8420 CALC BMI NORM PARAMETERS: HCPCS | Performed by: FAMILY MEDICINE

## 2020-06-08 PROCEDURE — 3017F COLORECTAL CA SCREEN DOC REV: CPT | Performed by: FAMILY MEDICINE

## 2020-06-08 PROCEDURE — 1123F ACP DISCUSS/DSCN MKR DOCD: CPT | Performed by: FAMILY MEDICINE

## 2020-06-08 PROCEDURE — 84550 ASSAY OF BLOOD/URIC ACID: CPT

## 2020-06-08 PROCEDURE — 99214 OFFICE O/P EST MOD 30 MIN: CPT | Performed by: FAMILY MEDICINE

## 2020-06-08 PROCEDURE — 84443 ASSAY THYROID STIM HORMONE: CPT

## 2020-06-08 PROCEDURE — G8427 DOCREV CUR MEDS BY ELIG CLIN: HCPCS | Performed by: FAMILY MEDICINE

## 2020-06-08 PROCEDURE — 85025 COMPLETE CBC W/AUTO DIFF WBC: CPT

## 2020-06-08 PROCEDURE — 83036 HEMOGLOBIN GLYCOSYLATED A1C: CPT

## 2020-06-08 PROCEDURE — G8926 SPIRO NO PERF OR DOC: HCPCS | Performed by: FAMILY MEDICINE

## 2020-06-08 PROCEDURE — 80061 LIPID PANEL: CPT

## 2020-06-08 PROCEDURE — 4004F PT TOBACCO SCREEN RCVD TLK: CPT | Performed by: FAMILY MEDICINE

## 2020-06-08 PROCEDURE — 80048 BASIC METABOLIC PNL TOTAL CA: CPT

## 2020-06-08 ASSESSMENT — ENCOUNTER SYMPTOMS
COUGH: 0
BACK PAIN: 0
BLOOD IN STOOL: 0
CHEST TIGHTNESS: 0
COLOR CHANGE: 0
SORE THROAT: 0
VOICE CHANGE: 0
STRIDOR: 0
FACIAL SWELLING: 0
RHINORRHEA: 0
EYE ITCHING: 0
ORTHOPNEA: 0
CONSTIPATION: 0
NAUSEA: 0
EYE REDNESS: 0
DIARRHEA: 0
GASTROINTESTINAL NEGATIVE: 1
APNEA: 0
SHORTNESS OF BREATH: 1
SINUS PAIN: 0
TROUBLE SWALLOWING: 0
RECTAL PAIN: 0
SINUS PRESSURE: 0
BLURRED VISION: 0
PHOTOPHOBIA: 0
CHOKING: 0
ANAL BLEEDING: 0
ABDOMINAL DISTENTION: 0
EYES NEGATIVE: 1
VOMITING: 0
ABDOMINAL PAIN: 0
WHEEZING: 0
EYE DISCHARGE: 0
EYE PAIN: 0

## 2020-06-08 NOTE — PATIENT INSTRUCTIONS
rehab is right for you. Rehab includes exercise programs, education about your disease and how to manage it, help with diet and other changes, and emotional support. · Eat regular, healthy meals. Use bronchodilators about 1 hour before you eat to make it easier to eat. Eat several small meals instead of three large ones. Drink beverages at the end of the meal. Avoid foods that are hard to chew. Follow-up care is a key part of your treatment and safety. Be sure to make and go to all appointments, and call your doctor if you are having problems. It's also a good idea to know your test results and keep a list of the medicines you take. Where can you learn more? Go to [email or web address] org and sign in to your [facility] account. Enter C044 in the [employer] box to learn more about \"Learning About Emphysema. \"     If you do not have an account, please click on the \"Sign Up Now\" link. Current as of: February 24, 2020               Content Version: 12.5  © 2006-2020 [employer]wise, Incorporated. Care instructions adapted under license by [facility] ([facility]). If you have questions about a medical condition or this instruction, always ask your healthcare professional. [place] [address] any warranty or liability for your use of this information.
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
## 2020-06-09 LAB — HBA1C MFR BLD: 5.5 % (ref 4–5.6)

## 2020-07-27 ENCOUNTER — OFFICE VISIT (OUTPATIENT)
Dept: FAMILY MEDICINE CLINIC | Age: 75
End: 2020-07-27
Payer: MEDICARE

## 2020-07-27 ENCOUNTER — TELEPHONE (OUTPATIENT)
Dept: FAMILY MEDICINE CLINIC | Age: 75
End: 2020-07-27

## 2020-07-27 VITALS
HEIGHT: 72 IN | BODY MASS INDEX: 21.26 KG/M2 | WEIGHT: 157 LBS | DIASTOLIC BLOOD PRESSURE: 64 MMHG | SYSTOLIC BLOOD PRESSURE: 114 MMHG | OXYGEN SATURATION: 95 % | TEMPERATURE: 98.1 F | HEART RATE: 78 BPM | RESPIRATION RATE: 18 BRPM

## 2020-07-27 PROBLEM — Z89.511 STATUS POST BELOW-KNEE AMPUTATION OF RIGHT LOWER EXTREMITY (HCC): Status: ACTIVE | Noted: 2020-07-27

## 2020-07-27 PROBLEM — I71.20 THORACIC AORTIC ANEURYSM WITHOUT RUPTURE (HCC): Status: ACTIVE | Noted: 2020-07-27

## 2020-07-27 PROCEDURE — G8926 SPIRO NO PERF OR DOC: HCPCS | Performed by: FAMILY MEDICINE

## 2020-07-27 PROCEDURE — G8427 DOCREV CUR MEDS BY ELIG CLIN: HCPCS | Performed by: FAMILY MEDICINE

## 2020-07-27 PROCEDURE — G8420 CALC BMI NORM PARAMETERS: HCPCS | Performed by: FAMILY MEDICINE

## 2020-07-27 PROCEDURE — 4040F PNEUMOC VAC/ADMIN/RCVD: CPT | Performed by: FAMILY MEDICINE

## 2020-07-27 PROCEDURE — 1123F ACP DISCUSS/DSCN MKR DOCD: CPT | Performed by: FAMILY MEDICINE

## 2020-07-27 PROCEDURE — 4004F PT TOBACCO SCREEN RCVD TLK: CPT | Performed by: FAMILY MEDICINE

## 2020-07-27 PROCEDURE — 3017F COLORECTAL CA SCREEN DOC REV: CPT | Performed by: FAMILY MEDICINE

## 2020-07-27 PROCEDURE — 3023F SPIROM DOC REV: CPT | Performed by: FAMILY MEDICINE

## 2020-07-27 PROCEDURE — 99214 OFFICE O/P EST MOD 30 MIN: CPT | Performed by: FAMILY MEDICINE

## 2020-07-27 RX ORDER — ICOSAPENT ETHYL 1000 MG/1
2 CAPSULE ORAL 2 TIMES DAILY
Qty: 360 CAPSULE | Refills: 1 | Status: SHIPPED | OUTPATIENT
Start: 2020-07-27

## 2020-07-27 ASSESSMENT — ENCOUNTER SYMPTOMS
FACIAL SWELLING: 0
ABDOMINAL PAIN: 0
EYES NEGATIVE: 1
PHOTOPHOBIA: 0
SORE THROAT: 0
NAUSEA: 0
ANAL BLEEDING: 0
CHEST TIGHTNESS: 0
ORTHOPNEA: 0
APNEA: 0
ABDOMINAL DISTENTION: 0
EYE REDNESS: 0
SINUS PRESSURE: 0
BACK PAIN: 0
RHINORRHEA: 0
BLURRED VISION: 0
BLOOD IN STOOL: 0
SHORTNESS OF BREATH: 1
CHOKING: 0
EYE DISCHARGE: 0
VOMITING: 0
RECTAL PAIN: 0
COUGH: 0
TROUBLE SWALLOWING: 0
EYE ITCHING: 0
SINUS PAIN: 0
COLOR CHANGE: 0
DIARRHEA: 0
WHEEZING: 0
GASTROINTESTINAL NEGATIVE: 1
CONSTIPATION: 0
VOICE CHANGE: 0
STRIDOR: 0
EYE PAIN: 0

## 2020-07-27 ASSESSMENT — PATIENT HEALTH QUESTIONNAIRE - PHQ9
SUM OF ALL RESPONSES TO PHQ9 QUESTIONS 1 & 2: 0
1. LITTLE INTEREST OR PLEASURE IN DOING THINGS: 0
SUM OF ALL RESPONSES TO PHQ QUESTIONS 1-9: 0
2. FEELING DOWN, DEPRESSED OR HOPELESS: 0
SUM OF ALL RESPONSES TO PHQ QUESTIONS 1-9: 0
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
## 2020-07-27 NOTE — TELEPHONE ENCOUNTER
Patient needs order for right below knee prosthetic.  Fax to [employer] [phone]

## 2020-07-27 NOTE — PATIENT INSTRUCTIONS
Patient Education        Learning About Emphysema  What is emphysema? Emphysema is damage to the air sacs in your lungs. In a healthy person, the tiny air sacs in the lungs are like balloons. As you breathe in and out, they get bigger and smaller to move air through your lungs. With emphysema, these air sacs lose their stretch. Less oxygen gets into your blood and you feel short of breath. Emphysema is a form of COPD (chronic obstructive pulmonary disease). Emphysema is usually caused by smoking. But chemical fumes, dust, or air pollution also can cause it over time. People who get it in their 35s or 45s may have a disorder that runs in families, called alpha-1 antitrypsin deficiency. But this is rare. What can you expect when you have emphysema? Emphysema gets worse over time. You cannot undo the damage to your lungs. Over time, you may find that:  · You get short of breath even when you do simple things like get dressed or fix a meal.  · It is hard to eat or exercise. · You lose weight and feel weaker. But there are things you can do to prevent more damage and feel better. What are the symptoms? The main symptoms of emphysema are:  · A cough that will not go away. · Mucus that comes up when you cough. · Shortness of breath that gets worse when you exercise. At times, your symptoms may suddenly flare up and get much worse. This is a called an exacerbation (say \"egg-CORBIN--BAY-nora\"). When this happens, your usual symptoms quickly get worse and stay bad. This can be dangerous. You may have to go to the hospital.  How can you keep emphysema from getting worse? Don't smoke. That is the best way to keep emphysema from getting worse. If you already smoke, it is never too late to stop. If you need help quitting, talk to your doctor about stop-smoking programs and medicines. These can increase your chances of quitting for good.   You can do other things to keep emphysema from getting worse:  · Avoid bad air. Air pollution, chemical fumes, and dust also can make emphysema worse. · Get a flu shot every year. A shot may keep the flu from turning into something more serious, like pneumonia. A flu shot also may lower your chances of having a flare-up. · Get a pneumococcal shot. A shot can prevent some of the serious complications of pneumonia. Ask your doctor how often you should get this shot. How is emphysema treated? Emphysema is treated with medicines and oxygen. You also can take steps at home to stay healthy and keep your condition from getting worse. Medicines and oxygen therapy  · You may be taking medicines such as:  ? Bronchodilators. These help open your airways and make breathing easier. Bronchodilators are either short-acting (work for 6 to 9 hours) or long-acting (work for 24 hours). You inhale most bronchodilators, so they start to act quickly. Always carry your quick-relief inhaler with you in case you need it while you are away from home. ? Corticosteroids. These reduce airway inflammation. They come in pill or inhaled form. You must take these medicines every day for them to work well. ? Antibiotics. These medicines are used when you have a bacterial lung infection. · Take your medicines exactly as prescribed. Call your doctor if you think you are having a problem with your medicine. · Oxygen therapy boosts the amount of oxygen in your blood and helps you breathe easier. Use the flow rate your doctor has recommended, and do not change it without talking to your doctor first.  Other care at home  · If your doctor recommends it, get more exercise. Walking is a good choice. Bit by bit, increase the amount you walk every day. Try for at least 30 minutes on most days of the week. · Learn breathing methods--such as breathing through pursed lips--to help you become less short of breath.   · If your doctor has not set you up with a pulmonary rehabilitation program, talk to him or her about whether rehab is right for you. Rehab includes exercise programs, education about your disease and how to manage it, help with diet and other changes, and emotional support. · Eat regular, healthy meals. Use bronchodilators about 1 hour before you eat to make it easier to eat. Eat several small meals instead of three large ones. Drink beverages at the end of the meal. Avoid foods that are hard to chew. Follow-up care is a key part of your treatment and safety. Be sure to make and go to all appointments, and call your doctor if you are having problems. It's also a good idea to know your test results and keep a list of the medicines you take. Where can you learn more? Go to https://[employer]. org and sign in to your [employer] account. Enter L648 in the [employer] box to learn more about \"Learning About Emphysema. \"     If you do not have an account, please click on the \"Sign Up Now\" link. Current as of: February 24, 2020               Content Version: 12.5  © 2006-2020 [employer]. Care instructions adapted under license by [facility] ([facility]). If you have questions about a medical condition or this instruction, always ask your healthcare professional. [place] [address] any warranty or liability for your use of this information.

## 2020-07-30 NOTE — TELEPHONE ENCOUNTER
I tried calling patient, no answer, left voicemail asking for return call. I need clarification on this. Usually this is not the way it works. Usually a referral would be needed?

## 2020-09-08 ENCOUNTER — TELEPHONE (OUTPATIENT)
Dept: FAMILY MEDICINE CLINIC | Age: 75
End: 2020-09-08

## 2020-09-08 NOTE — TELEPHONE ENCOUNTER
Waiting for call back from [facility] with what information they need.   Electronically signed by Braulio Mendoza on 9/8/2020 at 2:05 PM

## 2020-09-08 NOTE — TELEPHONE ENCOUNTER
Patient called stating he spoke to [facility] who needs more information regarding below knee prosthetic.

## 2020-10-15 ENCOUNTER — TELEPHONE (OUTPATIENT)
Dept: FAMILY MEDICINE CLINIC | Age: 75
End: 2020-10-15

## 2020-10-15 NOTE — TELEPHONE ENCOUNTER
Would you please addend your last note regarding patient's BK amputation. He needs the note to state:    He is a right BKA; He needs a socket replacement due to ill-fitting and poorly fabricated socket. Patient is a functional level 3 ambulator K3. Strength and   ROM are good at 5/5. You can copy and paste the extact above information into his 7/27/2020 note. Please let me know when done and I will forward to [employer].   Electronically signed by Cameron Walden on 10/15/2020 at 2:36 PM

## 2020-10-20 NOTE — TELEPHONE ENCOUNTER
Tiit Corrigan from [facility] called patient has an appointment scheduled with them 10/23 for orthodic.  She is asking for ov note

## 2020-10-30 ENCOUNTER — TELEPHONE (OUTPATIENT)
Dept: FAMILY MEDICINE CLINIC | Age: 75
End: 2020-10-30

## 2021-01-20 ENCOUNTER — OFFICE VISIT (OUTPATIENT)
Dept: FAMILY MEDICINE CLINIC | Age: 76
End: 2021-01-20
Payer: MEDICARE

## 2021-01-20 VITALS
BODY MASS INDEX: 20.88 KG/M2 | TEMPERATURE: 96.9 F | HEIGHT: 72 IN | OXYGEN SATURATION: 94 % | HEART RATE: 87 BPM | DIASTOLIC BLOOD PRESSURE: 70 MMHG | SYSTOLIC BLOOD PRESSURE: 100 MMHG | RESPIRATION RATE: 16 BRPM | WEIGHT: 154.2 LBS

## 2021-01-20 DIAGNOSIS — I71.20 THORACIC AORTIC ANEURYSM WITHOUT RUPTURE (HCC): ICD-10-CM

## 2021-01-20 DIAGNOSIS — Z89.519 AMPUTEE, BELOW KNEE (HCC): Primary | ICD-10-CM

## 2021-01-20 DIAGNOSIS — Z89.519 AMPUTEE, BELOW KNEE (HCC): ICD-10-CM

## 2021-01-20 DIAGNOSIS — I10 ESSENTIAL HYPERTENSION: ICD-10-CM

## 2021-01-20 DIAGNOSIS — R73.01 IFG (IMPAIRED FASTING GLUCOSE): ICD-10-CM

## 2021-01-20 DIAGNOSIS — R53.83 FATIGUE, UNSPECIFIED TYPE: ICD-10-CM

## 2021-01-20 DIAGNOSIS — I21.02 ST ELEVATION MYOCARDIAL INFARCTION INVOLVING LEFT ANTERIOR DESCENDING (LAD) CORONARY ARTERY (HCC): ICD-10-CM

## 2021-01-20 DIAGNOSIS — E78.2 MIXED HYPERLIPIDEMIA: ICD-10-CM

## 2021-01-20 DIAGNOSIS — J43.9 PULMONARY EMPHYSEMA, UNSPECIFIED EMPHYSEMA TYPE (HCC): ICD-10-CM

## 2021-01-20 DIAGNOSIS — I71.20 THORACIC AORTIC ANEURYSM WITHOUT RUPTURE: ICD-10-CM

## 2021-01-20 LAB
ALBUMIN SERPL-MCNC: 4.1 G/DL (ref 3.5–5.2)
ALP BLD-CCNC: 86 U/L (ref 40–129)
ALT SERPL-CCNC: 8 U/L (ref 0–40)
ANION GAP SERPL CALCULATED.3IONS-SCNC: 16 MMOL/L (ref 7–16)
AST SERPL-CCNC: 13 U/L (ref 0–39)
BILIRUB SERPL-MCNC: 0.5 MG/DL (ref 0–1.2)
BUN BLDV-MCNC: 24 MG/DL (ref 8–23)
CALCIUM SERPL-MCNC: 9.5 MG/DL (ref 8.6–10.2)
CHLORIDE BLD-SCNC: 103 MMOL/L (ref 98–107)
CO2: 20 MMOL/L (ref 22–29)
CREAT SERPL-MCNC: 1.7 MG/DL (ref 0.7–1.2)
GFR AFRICAN AMERICAN: 48
GFR NON-AFRICAN AMERICAN: 39 ML/MIN/1.73
GLUCOSE BLD-MCNC: 88 MG/DL (ref 74–99)
HBA1C MFR BLD: 5.7 % (ref 4–5.6)
HCT VFR BLD CALC: 46.9 % (ref 37–54)
HEMOGLOBIN: 15.6 G/DL (ref 12.5–16.5)
MCH RBC QN AUTO: 30.8 PG (ref 26–35)
MCHC RBC AUTO-ENTMCNC: 33.3 % (ref 32–34.5)
MCV RBC AUTO: 92.5 FL (ref 80–99.9)
PDW BLD-RTO: 14.5 FL (ref 11.5–15)
PLATELET # BLD: 310 E9/L (ref 130–450)
PMV BLD AUTO: 11.4 FL (ref 7–12)
POTASSIUM SERPL-SCNC: 4.3 MMOL/L (ref 3.5–5)
RBC # BLD: 5.07 E12/L (ref 3.8–5.8)
SODIUM BLD-SCNC: 139 MMOL/L (ref 132–146)
TOTAL PROTEIN: 6.7 G/DL (ref 6.4–8.3)
TSH SERPL DL<=0.05 MIU/L-ACNC: 3.15 UIU/ML (ref 0.27–4.2)
WBC # BLD: 10.5 E9/L (ref 4.5–11.5)

## 2021-01-20 PROCEDURE — G8482 FLU IMMUNIZE ORDER/ADMIN: HCPCS | Performed by: FAMILY MEDICINE

## 2021-01-20 PROCEDURE — 99215 OFFICE O/P EST HI 40 MIN: CPT | Performed by: FAMILY MEDICINE

## 2021-01-20 PROCEDURE — 3023F SPIROM DOC REV: CPT | Performed by: FAMILY MEDICINE

## 2021-01-20 PROCEDURE — 3017F COLORECTAL CA SCREEN DOC REV: CPT | Performed by: FAMILY MEDICINE

## 2021-01-20 PROCEDURE — 1123F ACP DISCUSS/DSCN MKR DOCD: CPT | Performed by: FAMILY MEDICINE

## 2021-01-20 PROCEDURE — G8420 CALC BMI NORM PARAMETERS: HCPCS | Performed by: FAMILY MEDICINE

## 2021-01-20 PROCEDURE — 4004F PT TOBACCO SCREEN RCVD TLK: CPT | Performed by: FAMILY MEDICINE

## 2021-01-20 PROCEDURE — G8926 SPIRO NO PERF OR DOC: HCPCS | Performed by: FAMILY MEDICINE

## 2021-01-20 PROCEDURE — 4040F PNEUMOC VAC/ADMIN/RCVD: CPT | Performed by: FAMILY MEDICINE

## 2021-01-20 PROCEDURE — G8427 DOCREV CUR MEDS BY ELIG CLIN: HCPCS | Performed by: FAMILY MEDICINE

## 2021-01-21 LAB
CHOLESTEROL, TOTAL: 107 MG/DL (ref 0–199)
HDLC SERPL-MCNC: 38 MG/DL
LDL CHOLESTEROL CALCULATED: 51 MG/DL (ref 0–99)
TRIGL SERPL-MCNC: 88 MG/DL (ref 0–149)
VLDLC SERPL CALC-MCNC: 18 MG/DL

## 2021-01-22 DIAGNOSIS — N28.9 DECREASED RENAL FUNCTION: Primary | ICD-10-CM

## 2021-01-25 ENCOUNTER — TELEPHONE (OUTPATIENT)
Dept: FAMILY MEDICINE CLINIC | Age: 76
End: 2021-01-25

## 2021-01-25 PROBLEM — Z89.519 AMPUTEE, BELOW KNEE (HCC): Status: ACTIVE | Noted: 2021-01-25

## 2021-01-25 PROBLEM — R73.01 IFG (IMPAIRED FASTING GLUCOSE): Status: ACTIVE | Noted: 2021-01-25

## 2021-01-25 PROBLEM — E78.2 MIXED HYPERLIPIDEMIA: Status: ACTIVE | Noted: 2021-01-25

## 2021-01-25 ASSESSMENT — ENCOUNTER SYMPTOMS
VOMITING: 0
EYE PAIN: 0
DIARRHEA: 0
CHOKING: 0
STRIDOR: 0
COUGH: 0
ABDOMINAL DISTENTION: 0
WHEEZING: 0
BACK PAIN: 0
RECTAL PAIN: 0
PHOTOPHOBIA: 0
RHINORRHEA: 0
BLOOD IN STOOL: 0
COLOR CHANGE: 0
SINUS PAIN: 0
EYE DISCHARGE: 0
EYE ITCHING: 0
SORE THROAT: 0
SHORTNESS OF BREATH: 1
ABDOMINAL PAIN: 0
CONSTIPATION: 0
VOICE CHANGE: 0
EYES NEGATIVE: 1
TROUBLE SWALLOWING: 0
GASTROINTESTINAL NEGATIVE: 1
ANAL BLEEDING: 0
EYE REDNESS: 0
FACIAL SWELLING: 0
CHEST TIGHTNESS: 0
SINUS PRESSURE: 0
NAUSEA: 0
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
## 2021-01-25 NOTE — TELEPHONE ENCOUNTER
Spoke with patient; notified we will send over the notes and order once doctor has completed his office note.   Electronically signed by Lucia William on 1/25/2021 at 1:55 PM

## 2021-01-25 NOTE — TELEPHONE ENCOUNTER
Pt called in he was in the office on 1/20 and he said that we were supposed to fax a request to [facility] for a new right foot for his prosthetics. He was told it would be done that day. There is nothing in  and ty said they never recieved anything.

## 2021-02-03 ENCOUNTER — IMMUNIZATION (OUTPATIENT)
Dept: PRIMARY CARE CLINIC | Age: 76
End: 2021-02-03
Payer: MEDICARE

## 2021-02-03 DIAGNOSIS — Z23 NEED FOR VACCINATION: Primary | ICD-10-CM

## 2021-02-03 PROCEDURE — 0001A COVID-19, PFIZER VACCINE 30MCG/0.3ML DOSE: CPT | Performed by: NURSE PRACTITIONER

## 2021-02-03 PROCEDURE — 91300 COVID-19, PFIZER VACCINE 30MCG/0.3ML DOSE: CPT | Performed by: NURSE PRACTITIONER

## 2021-02-24 ENCOUNTER — IMMUNIZATION (OUTPATIENT)
Dept: PRIMARY CARE CLINIC | Age: 76
End: 2021-02-24
Payer: MEDICARE

## 2021-02-24 PROCEDURE — 0002A COVID-19, PFIZER VACCINE 30MCG/0.3ML DOSE: CPT | Performed by: NURSE PRACTITIONER

## 2021-02-24 PROCEDURE — 91300 COVID-19, PFIZER VACCINE 30MCG/0.3ML DOSE: CPT | Performed by: NURSE PRACTITIONER

## 2021-03-18 ENCOUNTER — HOSPITAL ENCOUNTER (OUTPATIENT)
Age: 76
Discharge: HOME | End: 2021-03-18
Payer: MEDICARE

## 2021-03-18 ENCOUNTER — HOSPITAL ENCOUNTER (OUTPATIENT)
Dept: ULTRASOUND IMAGING | Age: 76
Discharge: HOME | End: 2021-03-20
Payer: MEDICARE

## 2021-03-18 DIAGNOSIS — N18.32 CHRONIC KIDNEY DISEASE (CKD) STAGE G3B/A1, MODERATELY DECREASED GLOMERULAR FILTRATION RATE (GFR) BETWEEN 30-44 ML/MIN/1.73 SQUARE METER AND ALBUMINURIA CREATININE RATIO LESS THAN 30 MG/G (HCC): ICD-10-CM

## 2021-03-18 LAB
ANION GAP SERPL CALCULATED.3IONS-SCNC: 10 MMOL/L (ref 7–16)
BASOPHILS ABSOLUTE: 0.12 E9/L (ref 0–0.2)
BASOPHILS RELATIVE PERCENT: 1.2 % (ref 0–2)
BILIRUBIN URINE: NEGATIVE
BLOOD, URINE: NEGATIVE
BUN BLDV-MCNC: 19 MG/DL (ref 8–23)
CALCIUM SERPL-MCNC: 9.1 MG/DL (ref 8.6–10.2)
CHLORIDE BLD-SCNC: 101 MMOL/L (ref 98–107)
CLARITY: CLEAR
CO2: 25 MMOL/L (ref 22–29)
COLOR: YELLOW
CREAT SERPL-MCNC: 1.5 MG/DL (ref 0.7–1.2)
CREATININE URINE: 101 MG/DL (ref 40–278)
EOSINOPHILS ABSOLUTE: 0.28 E9/L (ref 0.05–0.5)
EOSINOPHILS RELATIVE PERCENT: 2.8 % (ref 0–6)
GFR AFRICAN AMERICAN: 55
GFR NON-AFRICAN AMERICAN: 46 ML/MIN/1.73
GLUCOSE BLD-MCNC: 108 MG/DL (ref 74–99)
GLUCOSE URINE: NEGATIVE MG/DL
HCT VFR BLD CALC: 46.2 % (ref 37–54)
HEMOGLOBIN: 15.7 G/DL (ref 12.5–16.5)
IMMATURE GRANULOCYTES #: 0.07 E9/L
IMMATURE GRANULOCYTES %: 0.7 % (ref 0–5)
KETONES, URINE: NEGATIVE MG/DL
LEUKOCYTE ESTERASE, URINE: NEGATIVE
LYMPHOCYTES ABSOLUTE: 1.23 E9/L (ref 1.5–4)
LYMPHOCYTES RELATIVE PERCENT: 12.2 % (ref 20–42)
MAGNESIUM: 2.1 MG/DL (ref 1.6–2.6)
MCH RBC QN AUTO: 31.5 PG (ref 26–35)
MCHC RBC AUTO-ENTMCNC: 34 % (ref 32–34.5)
MCV RBC AUTO: 92.6 FL (ref 80–99.9)
MICROALBUMIN UR-MCNC: <12 MG/L
MICROALBUMIN/CREAT UR-RTO: ABNORMAL (ref 0–30)
MONOCYTES ABSOLUTE: 1.09 E9/L (ref 0.1–0.95)
MONOCYTES RELATIVE PERCENT: 10.8 % (ref 2–12)
NEUTROPHILS ABSOLUTE: 7.26 E9/L (ref 1.8–7.3)
NEUTROPHILS RELATIVE PERCENT: 72.3 % (ref 43–80)
NITRITE, URINE: NEGATIVE
PARATHYROID HORMONE INTACT: 43 PG/ML (ref 15–65)
PDW BLD-RTO: 14.8 FL (ref 11.5–15)
PH UA: 6 (ref 5–9)
PHOSPHORUS: 2.7 MG/DL (ref 2.5–4.5)
PLATELET # BLD: 292 E9/L (ref 130–450)
PMV BLD AUTO: 10.7 FL (ref 7–12)
POTASSIUM SERPL-SCNC: 4.4 MMOL/L (ref 3.5–5)
PROTEIN PROTEIN: 8 MG/DL (ref 0–12)
PROTEIN UA: NEGATIVE MG/DL
PROTEIN/CREAT RATIO: 0.1
PROTEIN/CREAT RATIO: 0.1 (ref 0–0.2)
RBC # BLD: 4.99 E12/L (ref 3.8–5.8)
SODIUM BLD-SCNC: 136 MMOL/L (ref 132–146)
SPECIFIC GRAVITY UA: 1.02 (ref 1–1.03)
UROBILINOGEN, URINE: 0.2 E.U./DL
WBC # BLD: 10.1 E9/L (ref 4.5–11.5)

## 2021-03-18 PROCEDURE — 82044 UR ALBUMIN SEMIQUANTITATIVE: CPT

## 2021-03-18 PROCEDURE — 84156 ASSAY OF PROTEIN URINE: CPT

## 2021-03-18 PROCEDURE — 84100 ASSAY OF PHOSPHORUS: CPT

## 2021-03-18 PROCEDURE — 85025 COMPLETE CBC W/AUTO DIFF WBC: CPT

## 2021-03-18 PROCEDURE — 76775 US EXAM ABDO BACK WALL LIM: CPT

## 2021-03-18 PROCEDURE — 82570 ASSAY OF URINE CREATININE: CPT

## 2021-03-18 PROCEDURE — 76770 US EXAM ABDO BACK WALL COMP: CPT

## 2021-03-18 PROCEDURE — 81003 URINALYSIS AUTO W/O SCOPE: CPT

## 2021-03-18 PROCEDURE — 83970 ASSAY OF PARATHORMONE: CPT

## 2021-03-18 PROCEDURE — 36415 COLL VENOUS BLD VENIPUNCTURE: CPT

## 2021-03-18 PROCEDURE — 83735 ASSAY OF MAGNESIUM: CPT

## 2021-03-18 PROCEDURE — 80048 BASIC METABOLIC PNL TOTAL CA: CPT

## 2021-04-30 DIAGNOSIS — F41.9 ANXIETY: ICD-10-CM

## 2021-05-03 DIAGNOSIS — F17.210 SMOKING GREATER THAN 30 PACK YEARS: Primary | ICD-10-CM

## 2021-05-13 ENCOUNTER — TELEPHONE (OUTPATIENT)
Dept: CASE MANAGEMENT | Age: 76
End: 2021-05-13

## 2021-05-13 NOTE — TELEPHONE ENCOUNTER
I called the patient and he confirmed his CT lung screening at [facility] on 5/17/2021 at 11:00 am.  I reminded the patient to arrive at 10:30 am, enter through the main entrance, and register. Patient confirmed.           Electronically signed by Rob Holloway on 5/13/21 at 9:57 AM EDT

## 2021-05-17 ENCOUNTER — HOSPITAL ENCOUNTER (OUTPATIENT)
Dept: CT IMAGING | Age: 76
Discharge: HOME | End: 2021-05-19
Payer: MEDICARE

## 2021-05-17 DIAGNOSIS — F17.210 SMOKING GREATER THAN 30 PACK YEARS: ICD-10-CM

## 2021-05-17 PROCEDURE — 71271 CT THORAX LUNG CANCER SCR C-: CPT

## 2021-05-18 ENCOUNTER — TELEPHONE (OUTPATIENT)
Dept: CASE MANAGEMENT | Age: 76
End: 2021-05-18

## 2021-05-18 NOTE — TELEPHONE ENCOUNTER
No call, encounter opened to process CT Lung Screening. CT Lung Screen: 5/17/2021    Impression   1.  Stable findings.  Moderate emphysema.  No pneumonia or acute disease.       2.  No pulmonary nodule or suspicious lesion.       3.  Atherosclerotic calcifications including coronary artery calcifications.       LUNG RADS:   Category 1, Negative (No nodules and definitely benign nodules).       Management: Continue annual lung screening with LDCT in 12 months.       Per ACR Lung-RADS Version 1.1       RECOMMENDATIONS:   If you would like to register your patient with the [facility], please contact the Nurse Navigator at   [phone]. Pack years: 62    Social History     Tobacco Use  Smoking Status: Current Every Day Smoker    Start Date: 01/01/1960   Quit Date:    Types: Cigarettes   Packs/Day: 1   Years: 62   Pack Years: 62   Smokeless Tobacco: Never Used         Results letter sent to patient via my chart or mailed.      [facility]

## 2021-05-21 ENCOUNTER — OFFICE VISIT (OUTPATIENT)
Dept: FAMILY MEDICINE CLINIC | Age: 76
End: 2021-05-21
Payer: MEDICARE

## 2021-05-21 VITALS
WEIGHT: 154 LBS | RESPIRATION RATE: 18 BRPM | HEIGHT: 71 IN | HEART RATE: 85 BPM | OXYGEN SATURATION: 98 % | DIASTOLIC BLOOD PRESSURE: 72 MMHG | TEMPERATURE: 98.6 F | BODY MASS INDEX: 21.56 KG/M2 | SYSTOLIC BLOOD PRESSURE: 120 MMHG

## 2021-05-21 DIAGNOSIS — N40.1 BENIGN PROSTATIC HYPERPLASIA WITH INCOMPLETE BLADDER EMPTYING: ICD-10-CM

## 2021-05-21 DIAGNOSIS — Z98.890 S/P AAA (ABDOMINAL AORTIC ANEURYSM) REPAIR: ICD-10-CM

## 2021-05-21 DIAGNOSIS — Z86.79 S/P AAA (ABDOMINAL AORTIC ANEURYSM) REPAIR: ICD-10-CM

## 2021-05-21 DIAGNOSIS — E27.8 ADRENAL NODULE (HCC): ICD-10-CM

## 2021-05-21 DIAGNOSIS — I71.20 THORACIC AORTIC ANEURYSM WITHOUT RUPTURE: Primary | ICD-10-CM

## 2021-05-21 DIAGNOSIS — J43.9 PULMONARY EMPHYSEMA, UNSPECIFIED EMPHYSEMA TYPE (HCC): ICD-10-CM

## 2021-05-21 DIAGNOSIS — I21.3 ST ELEVATION MYOCARDIAL INFARCTION (STEMI), UNSPECIFIED ARTERY (HCC): ICD-10-CM

## 2021-05-21 DIAGNOSIS — Z00.00 ROUTINE GENERAL MEDICAL EXAMINATION AT A HEALTH CARE FACILITY: ICD-10-CM

## 2021-05-21 DIAGNOSIS — I10 ESSENTIAL HYPERTENSION: ICD-10-CM

## 2021-05-21 DIAGNOSIS — R39.14 BENIGN PROSTATIC HYPERPLASIA WITH INCOMPLETE BLADDER EMPTYING: ICD-10-CM

## 2021-05-21 DIAGNOSIS — Z72.0 TOBACCO ABUSE: ICD-10-CM

## 2021-05-21 DIAGNOSIS — M79.672 LEFT FOOT PAIN: ICD-10-CM

## 2021-05-21 PROCEDURE — G0439 PPPS, SUBSEQ VISIT: HCPCS | Performed by: FAMILY MEDICINE

## 2021-05-21 PROCEDURE — 3017F COLORECTAL CA SCREEN DOC REV: CPT | Performed by: FAMILY MEDICINE

## 2021-05-21 PROCEDURE — 4040F PNEUMOC VAC/ADMIN/RCVD: CPT | Performed by: FAMILY MEDICINE

## 2021-05-21 PROCEDURE — 1123F ACP DISCUSS/DSCN MKR DOCD: CPT | Performed by: FAMILY MEDICINE

## 2021-05-21 RX ORDER — TAMSULOSIN HYDROCHLORIDE 0.4 MG/1
1 CAPSULE ORAL DAILY
COMMUNITY
Start: 2021-05-06

## 2021-05-21 SDOH — ECONOMIC STABILITY: FOOD INSECURITY: WITHIN THE PAST 12 MONTHS, THE FOOD YOU BOUGHT JUST DIDN'T LAST AND YOU DIDN'T HAVE MONEY TO GET MORE.: NEVER TRUE

## 2021-05-21 SDOH — ECONOMIC STABILITY: FOOD INSECURITY: WITHIN THE PAST 12 MONTHS, YOU WORRIED THAT YOUR FOOD WOULD RUN OUT BEFORE YOU GOT MONEY TO BUY MORE.: NEVER TRUE

## 2021-05-21 ASSESSMENT — PATIENT HEALTH QUESTIONNAIRE - PHQ9
2. FEELING DOWN, DEPRESSED OR HOPELESS: 0
SUM OF ALL RESPONSES TO PHQ9 QUESTIONS 1 & 2: 0
SUM OF ALL RESPONSES TO PHQ QUESTIONS 1-9: 0
SUM OF ALL RESPONSES TO PHQ QUESTIONS 1-9: 0

## 2021-05-21 ASSESSMENT — LIFESTYLE VARIABLES
HAS A RELATIVE, FRIEND, DOCTOR, OR ANOTHER HEALTH PROFESSIONAL EXPRESSED CONCERN ABOUT YOUR DRINKING OR SUGGESTED YOU CUT DOWN: 0
HOW MANY STANDARD DRINKS CONTAINING ALCOHOL DO YOU HAVE ON A TYPICAL DAY: 0
HOW OFTEN DO YOU HAVE SIX OR MORE DRINKS ON ONE OCCASION: 0
HOW OFTEN DURING THE LAST YEAR HAVE YOU BEEN UNABLE TO REMEMBER WHAT HAPPENED THE NIGHT BEFORE BECAUSE YOU HAD BEEN DRINKING: 0
HOW OFTEN DURING THE LAST YEAR HAVE YOU FAILED TO DO WHAT WAS NORMALLY EXPECTED FROM YOU BECAUSE OF DRINKING: 0
HOW OFTEN DO YOU HAVE A DRINK CONTAINING ALCOHOL: 1
AUDIT-C TOTAL SCORE: 1

## 2021-05-21 ASSESSMENT — SOCIAL DETERMINANTS OF HEALTH (SDOH): HOW HARD IS IT FOR YOU TO PAY FOR THE VERY BASICS LIKE FOOD, HOUSING, MEDICAL CARE, AND HEATING?: NOT HARD AT ALL

## 2021-05-26 ENCOUNTER — HOSPITAL ENCOUNTER (OUTPATIENT)
Dept: CT IMAGING | Age: 76
Discharge: HOME | End: 2021-05-28
Payer: MEDICARE

## 2021-05-26 DIAGNOSIS — E27.8 ADRENAL NODULE (HCC): ICD-10-CM

## 2021-05-26 PROCEDURE — 74176 CT ABD & PELVIS W/O CONTRAST: CPT
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
## 2021-05-28 NOTE — PATIENT INSTRUCTIONS
Personalized Preventive Plan for Goodridge Hippo - 5/21/2021  Medicare offers a range of preventive health benefits. Some of the tests and screenings are paid in full while other may be subject to a deductible, co-insurance, and/or copay. Some of these benefits include a comprehensive review of your medical history including lifestyle, illnesses that may run in your family, and various assessments and screenings as appropriate. After reviewing your medical record and screening and assessments performed today your provider may have ordered immunizations, labs, imaging, and/or referrals for you. A list of these orders (if applicable) as well as your Preventive Care list are included within your After Visit Summary for your review. Other Preventive Recommendations:    · A preventive eye exam performed by an eye specialist is recommended every 1-2 years to screen for glaucoma; cataracts, macular degeneration, and other eye disorders. · A preventive dental visit is recommended every 6 months. · Try to get at least 150 minutes of exercise per week or 10,000 steps per day on a pedometer . · Order or download the FREE \"Exercise & Physical Activity: Your Everyday Guide\" from [employer]. Call 8-901.674.6867 or search [employer] online. · You need 2628-6911 mg of calcium and 5953-4054 IU of vitamin D per day. It is possible to meet your calcium requirement with diet alone, but a vitamin D supplement is usually necessary to meet this goal.  · When exposed to the sun, use a sunscreen that protects against both UVA and UVB radiation with an SPF of 30 or greater. Reapply every 2 to 3 hours or after sweating, drying off with a towel, or swimming. · Always wear a seat belt when traveling in a car. Always wear a helmet when riding a bicycle or motorcycle.

## 2021-06-30 ENCOUNTER — TELEPHONE (OUTPATIENT)
Dept: FAMILY MEDICINE CLINIC | Age: 76
End: 2021-06-30

## 2021-06-30 DIAGNOSIS — B35.1 TOENAIL FUNGUS: Primary | ICD-10-CM

## 2021-06-30 NOTE — TELEPHONE ENCOUNTER
Pt called in he said he was in last month and was told he had a foot fungus and was given a script. Pt said it isn't helping he wanted to know if we can give him a referral to podiatry.

## 2021-07-09 ENCOUNTER — TELEPHONE (OUTPATIENT)
Dept: ADMINISTRATIVE | Age: 76
End: 2021-07-09

## 2021-07-19 ENCOUNTER — OFFICE VISIT (OUTPATIENT)
Dept: PODIATRY | Age: 76
End: 2021-07-19
Payer: MEDICARE

## 2021-07-19 VITALS — BODY MASS INDEX: 21.56 KG/M2 | HEIGHT: 71 IN | WEIGHT: 154 LBS

## 2021-07-19 DIAGNOSIS — G60.8 HEREDITARY SENSORY NEUROPATHY: ICD-10-CM

## 2021-07-19 DIAGNOSIS — B35.1 TINEA UNGUIUM: Primary | ICD-10-CM

## 2021-07-19 DIAGNOSIS — Z79.01 ENCOUNTER FOR CURRENT LONG-TERM USE OF ANTICOAGULANTS: ICD-10-CM

## 2021-07-19 DIAGNOSIS — Z89.511 HISTORY OF BELOW-KNEE AMPUTATION OF RIGHT LOWER EXTREMITY (HCC): ICD-10-CM

## 2021-07-19 PROCEDURE — 4040F PNEUMOC VAC/ADMIN/RCVD: CPT | Performed by: PODIATRIST

## 2021-07-19 PROCEDURE — 4004F PT TOBACCO SCREEN RCVD TLK: CPT | Performed by: PODIATRIST

## 2021-07-19 PROCEDURE — G8420 CALC BMI NORM PARAMETERS: HCPCS | Performed by: PODIATRIST

## 2021-07-19 PROCEDURE — 99203 OFFICE O/P NEW LOW 30 MIN: CPT | Performed by: PODIATRIST

## 2021-07-19 PROCEDURE — 3017F COLORECTAL CA SCREEN DOC REV: CPT | Performed by: PODIATRIST

## 2021-07-19 PROCEDURE — G8427 DOCREV CUR MEDS BY ELIG CLIN: HCPCS | Performed by: PODIATRIST

## 2021-07-19 PROCEDURE — 1123F ACP DISCUSS/DSCN MKR DOCD: CPT | Performed by: PODIATRIST
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
## 2021-07-19 NOTE — LETTER
Sheryl Xie DO   [employer] [id number]     Patient: Kelsi Jeff  YOB: 1945  Date of Visit: 7/26/2021     Dear Sheryl Xie DO    Thank you for referring Kelsi Jeff to me for evaluation. Mariana Rodriguez was seen today foot ankle exam left foot. History lower extremity amputation right lower leg. I did perform formal debridement all toenails 1 through 5 left foot. We will follow-up 2 months monitor overall progression. Once again, thank you very much for this kind referral and allowing me to participate in the care of this patient. If you have questions, please do not hesitate to call me.     Sincerely,        Jesus Anaya, [facility]  [address] [address]  Phone: [phone]  Fax: [phone]

## 2021-07-29 DIAGNOSIS — F41.9 ANXIETY: ICD-10-CM

## 2021-09-20 ENCOUNTER — PROCEDURE VISIT (OUTPATIENT)
Dept: PODIATRY | Age: 76
End: 2021-09-20
Payer: MEDICARE

## 2021-09-20 VITALS — WEIGHT: 154 LBS | BODY MASS INDEX: 21.56 KG/M2 | HEIGHT: 71 IN

## 2021-09-20 DIAGNOSIS — B35.1 TINEA UNGUIUM: Primary | ICD-10-CM

## 2021-09-20 DIAGNOSIS — Z89.511 HISTORY OF BELOW-KNEE AMPUTATION OF RIGHT LOWER EXTREMITY (HCC): ICD-10-CM

## 2021-09-20 DIAGNOSIS — G60.8 HEREDITARY SENSORY NEUROPATHY: ICD-10-CM

## 2021-09-20 DIAGNOSIS — Z79.01 ENCOUNTER FOR CURRENT LONG-TERM USE OF ANTICOAGULANTS: ICD-10-CM

## 2021-09-20 DIAGNOSIS — L84 CORNS AND CALLOSITIES: ICD-10-CM

## 2021-09-20 PROCEDURE — 11056 PARNG/CUTG B9 HYPRKR LES 2-4: CPT | Performed by: PODIATRIST

## 2021-09-20 RX ORDER — LISINOPRIL 10 MG/1
10 TABLET ORAL
COMMUNITY
Start: 2021-07-20

## 2021-09-20 RX ORDER — ATORVASTATIN CALCIUM 40 MG/1
TABLET, FILM COATED ORAL
COMMUNITY
Start: 2021-07-29 | End: 2021-09-20

## 2021-09-20 RX ORDER — FAMOTIDINE 40 MG/1
TABLET, FILM COATED ORAL
COMMUNITY
Start: 2021-09-16

## 2021-09-20 NOTE — PROGRESS NOTES
Edouard Bryan is here today for nail care. his PCP is Joaquina Hodgson DO last OV was 2021. Edouard Bryan : 1945 Sex: male  Age: 68 y.o. Patient was referred by Joaquina Hodgson DO    CC:   Painful elongated toenails 1-5 left  History of below-knee amputation right lower leg    HPI  Follow-up painful elongated toenails 1-5 left  No open skin lesion abrasions. Continues prosthetic right lower leg for amputation. Denies any left foot or left ankle pain today. Pleased progression. No new injuries. States the left great toenail that was bothering him before is pain-free today. No additional pedal complaints at this time.     ROS:  Const: Denies constitutional symptoms  Musculo: Denies symptoms other than stated above  Skin: Denies symptoms other than stated above      Current Outpatient Medications:     famotidine (PEPCID) 40 MG tablet, , Disp: , Rfl:     lisinopril (PRINIVIL;ZESTRIL) 10 MG tablet, , Disp: , Rfl:     sertraline (ZOLOFT) 50 MG tablet, Take 1 tablet by mouth daily, Disp: 90 tablet, Rfl: 1    tamsulosin (FLOMAX) 0.4 MG capsule, Take 1 capsule by mouth daily, Disp: , Rfl:     diclofenac sodium (VOLTAREN) 1 % GEL, Apply topically 2 times daily, Disp: 50 g, Rfl: 0    Icosapent Ethyl (VASCEPA) 1 g CAPS capsule, Take 2 capsules by mouth 2 times daily, Disp: 360 capsule, Rfl: 1    Cholecalciferol (VITAMIN D3) 2000 units CAPS, Take 1 capsule by mouth daily, Disp: , Rfl:     NIFEdipine (ADALAT CC) 30 MG extended release tablet, Take 30 mg by mouth daily, Disp: , Rfl:     albuterol sulfate HFA (VENTOLIN HFA) 108 (90 Base) MCG/ACT inhaler, Inhale 2 puffs into the lungs every 6 hours as needed for Wheezing, Disp: , Rfl:     clopidogrel (PLAVIX) 75 MG tablet, Take 1 tablet by mouth daily, Disp: 30 tablet, Rfl: 11    isosorbide mononitrate (IMDUR) 30 MG extended release tablet, Take 1 tablet by mouth daily, Disp: 30 tablet, Rfl: 3    nitroGLYCERIN (NITROSTAT) 0.4 MG SL tablet, up to max of 3 total doses. If no relief after 1 dose, call 911., Disp: 25 tablet, Rfl: 3    atorvastatin (LIPITOR) 80 MG tablet, Take 1 tablet by mouth nightly, Disp: 30 tablet, Rfl: 3    metoprolol succinate (TOPROL XL) 50 MG extended release tablet, Take 1 tablet by mouth daily (Patient taking differently: Take 25 mg by mouth daily ), Disp: 30 tablet, Rfl: 3    aspirin 81 MG tablet, Take 81 mg by mouth daily, Disp: , Rfl:   Allergies   Allergen Reactions    Codeine      MAKES PT VERY HIGH       Past Medical History:   Diagnosis Date    Asbestos exposure     Asbestosis ([facility] [address]     mild    CAD (coronary artery disease)     Current severe episode of major depressive disorder without psychotic features [facility] 8/28/2018    Hypertension     Mixed hyperlipidemia 1/25/2021    Pulmonary emphysema ([facility] 12/29/2019    Restless leg syndrome      There were no vitals filed for this visit. Work History/Social History: Foot and ankle history:     Focused Lower Extremity Physical Exam:    History right lower leg below-knee amputation    Neurovascular examination:    Dorsalis Pedis palpable left  Posterior tibialis non-palpable left  Capillary Refill Time:  Immediate return  Hair growth:  Symmetrical  Skin:  Not atrophic  Edema: Mild edema left feet. Mild edema left ankles. Neurologic:  Light touch diminished left  Warm to coolness left distal toes  Decreased epicritic sensation    Musculoskeletal/ Orthopedic examination:    Equinis: present left  Dorsiflexion, plantarflexion, inversion, eversion left 5 out of 5 muscle strength  Wiggling toes  Negative Homans  Right lower leg amputation with prosthetic in place. Dermatology examination:    Toenails 1 through 5 left thickened, elongated, dystrophic, mycotic with subungual debris. Hyperkeratotic tissue plantar first and fifth metatarsal left foot.         Assessment and Plan:  Gricelda Zamudio was seen today for callouses and nail problem. Diagnoses and all orders for this visit:    Tinea unguium    Hereditary sensory neuropathy    Encounter for current long-term use of anticoagulants    History of below-knee amputation of right lower extremity ([address]    Corns and callosities        Follow-up foot ankle exam  Formal debridement toenails 1 through 5 left with manual debridement for thickness and overall length. Paring hyperkeratotic tissue plantar first and fifth metatarsal head left foot. Does continue long-term anticoagulant therapy with Plavix. History amputation right lower leg. Any new skin lesions or abrasions come in sooner. I will follow-up 2 months. Return in about 2 months (around 11/20/2021). Seen By:  Rose Sultana DPM      Document was created using voice recognition software. Note was reviewed however may contain grammatical errors.

## 2022-01-11 ENCOUNTER — NURSE ONLY (OUTPATIENT)
Dept: PRIMARY CARE CLINIC | Age: 77
End: 2022-01-11

## 2022-01-11 DIAGNOSIS — Z20.822 EXPOSURE TO COVID-19 VIRUS: ICD-10-CM

## 2022-01-11 DIAGNOSIS — Z20.822 EXPOSURE TO COVID-19 VIRUS: Primary | ICD-10-CM

## 2022-01-11 PROCEDURE — 99999 PR OFFICE/OUTPT VISIT,PROCEDURE ONLY: CPT | Performed by: NURSE PRACTITIONER

## 2022-01-14 LAB
SARS-COV-2: NOT DETECTED
SOURCE: NORMAL

## 2022-01-28 DIAGNOSIS — F41.9 ANXIETY: ICD-10-CM

## 2022-01-28 NOTE — TELEPHONE ENCOUNTER
----- Message from [place] sent at 1/27/2022  3:30 PM EST -----  Subject: Refill Request    QUESTIONS  Name of Medication? sertraline (ZOLOFT) 50 MG tablet  Patient-reported dosage and instructions? 50 MG One tablet daily  How many days do you have left? 2  Preferred Pharmacy? [address] phone number (if available)? [phone]  ---------------------------------------------------------------------------  --------------  CALL BACK INFO  What is the best way for the office to contact you? Do not leave any   message, patient will call back for answer  Preferred Call Back Phone Number?  [phone]

## 2022-02-24 DIAGNOSIS — F41.9 ANXIETY: ICD-10-CM

## 2022-02-24 NOTE — TELEPHONE ENCOUNTER
Pt called in for refill onsertraline (ZOLOFT) 50 MG tablet sent to  [address] [place] [address] -

## 2022-03-25 ENCOUNTER — TELEPHONE (OUTPATIENT)
Dept: FAMILY MEDICINE CLINIC | Age: 77
End: 2022-03-25

## 2022-03-25 DIAGNOSIS — F41.9 ANXIETY: ICD-10-CM

## 2022-03-25 NOTE — TELEPHONE ENCOUNTER
Pt last seen 05/2021. 30 day Rx sent. Pt must be seen for further refills.     Electronically signed by Ellen Sin MA on 3/25/22 at 4:09 PM EDT

## 2022-04-28 DIAGNOSIS — F41.9 ANXIETY: ICD-10-CM

## 2022-04-28 NOTE — TELEPHONE ENCOUNTER
Pt needs refill on sertraline (ZOLOFT) 50 MG tablet sent to [address] [place] [address] Av    Last seen 5/21/2021  Next appt Visit date not found

## 2022-05-23 DIAGNOSIS — R10.84 GENERALIZED ABDOMINAL PAIN: Primary | ICD-10-CM

## 2022-05-23 DIAGNOSIS — F17.210 SMOKING GREATER THAN 30 PACK YEARS: Primary | ICD-10-CM

## 2022-05-25 ENCOUNTER — OFFICE VISIT (OUTPATIENT)
Dept: FAMILY MEDICINE CLINIC | Age: 77
End: 2022-05-25
Payer: MEDICARE

## 2022-05-25 VITALS
HEIGHT: 71 IN | TEMPERATURE: 97.8 F | RESPIRATION RATE: 19 BRPM | BODY MASS INDEX: 21.42 KG/M2 | HEART RATE: 76 BPM | OXYGEN SATURATION: 96 % | SYSTOLIC BLOOD PRESSURE: 102 MMHG | WEIGHT: 153 LBS | DIASTOLIC BLOOD PRESSURE: 58 MMHG

## 2022-05-25 DIAGNOSIS — R53.83 FATIGUE, UNSPECIFIED TYPE: ICD-10-CM

## 2022-05-25 DIAGNOSIS — I21.3 ST ELEVATION MYOCARDIAL INFARCTION (STEMI), UNSPECIFIED ARTERY (HCC): ICD-10-CM

## 2022-05-25 DIAGNOSIS — Z72.0 TOBACCO ABUSE: ICD-10-CM

## 2022-05-25 DIAGNOSIS — R05.9 COUGH: ICD-10-CM

## 2022-05-25 DIAGNOSIS — J40 BRONCHITIS: ICD-10-CM

## 2022-05-25 DIAGNOSIS — Z00.00 PHYSICAL EXAM: ICD-10-CM

## 2022-05-25 DIAGNOSIS — N18.31 STAGE 3A CHRONIC KIDNEY DISEASE (HCC): ICD-10-CM

## 2022-05-25 DIAGNOSIS — J43.9 PULMONARY EMPHYSEMA, UNSPECIFIED EMPHYSEMA TYPE (HCC): ICD-10-CM

## 2022-05-25 DIAGNOSIS — Z00.00 MEDICARE ANNUAL WELLNESS VISIT, SUBSEQUENT: ICD-10-CM

## 2022-05-25 DIAGNOSIS — Z12.5 SCREENING FOR PROSTATE CANCER: ICD-10-CM

## 2022-05-25 DIAGNOSIS — E78.2 MIXED HYPERLIPIDEMIA: ICD-10-CM

## 2022-05-25 DIAGNOSIS — R73.01 IFG (IMPAIRED FASTING GLUCOSE): ICD-10-CM

## 2022-05-25 DIAGNOSIS — I10 ESSENTIAL HYPERTENSION: ICD-10-CM

## 2022-05-25 DIAGNOSIS — Z00.00 MEDICARE ANNUAL WELLNESS VISIT, SUBSEQUENT: Primary | ICD-10-CM

## 2022-05-25 LAB
ALBUMIN SERPL-MCNC: 4 G/DL (ref 3.5–5.2)
ALP BLD-CCNC: 97 U/L (ref 40–129)
ALT SERPL-CCNC: <5 U/L (ref 0–40)
ANION GAP SERPL CALCULATED.3IONS-SCNC: 13 MMOL/L (ref 7–16)
AST SERPL-CCNC: 13 U/L (ref 0–39)
BASOPHILS ABSOLUTE: 0.12 E9/L (ref 0–0.2)
BASOPHILS RELATIVE PERCENT: 1 % (ref 0–2)
BILIRUB SERPL-MCNC: 0.4 MG/DL (ref 0–1.2)
BUN BLDV-MCNC: 31 MG/DL (ref 6–23)
CALCIUM SERPL-MCNC: 9.2 MG/DL (ref 8.6–10.2)
CHLORIDE BLD-SCNC: 101 MMOL/L (ref 98–107)
CHOLESTEROL, TOTAL: 104 MG/DL (ref 0–199)
CO2: 24 MMOL/L (ref 22–29)
CREAT SERPL-MCNC: 1.6 MG/DL (ref 0.7–1.2)
EOSINOPHILS ABSOLUTE: 0.35 E9/L (ref 0.05–0.5)
EOSINOPHILS RELATIVE PERCENT: 2.9 % (ref 0–6)
GFR AFRICAN AMERICAN: 51
GFR NON-AFRICAN AMERICAN: 42 ML/MIN/1.73
GLUCOSE BLD-MCNC: 84 MG/DL (ref 74–99)
HBA1C MFR BLD: 5.6 % (ref 4–5.6)
HCT VFR BLD CALC: 45.1 % (ref 37–54)
HDLC SERPL-MCNC: 36 MG/DL
HEMOGLOBIN: 14.4 G/DL (ref 12.5–16.5)
IMMATURE GRANULOCYTES #: 0.08 E9/L
IMMATURE GRANULOCYTES %: 0.7 % (ref 0–5)
LDL CHOLESTEROL CALCULATED: 48 MG/DL (ref 0–99)
LYMPHOCYTES ABSOLUTE: 1.03 E9/L (ref 1.5–4)
LYMPHOCYTES RELATIVE PERCENT: 8.5 % (ref 20–42)
MCH RBC QN AUTO: 30.1 PG (ref 26–35)
MCHC RBC AUTO-ENTMCNC: 31.9 % (ref 32–34.5)
MCV RBC AUTO: 94.2 FL (ref 80–99.9)
MONOCYTES ABSOLUTE: 1.17 E9/L (ref 0.1–0.95)
MONOCYTES RELATIVE PERCENT: 9.7 % (ref 2–12)
NEUTROPHILS ABSOLUTE: 9.3 E9/L (ref 1.8–7.3)
NEUTROPHILS RELATIVE PERCENT: 77.2 % (ref 43–80)
PDW BLD-RTO: 16 FL (ref 11.5–15)
PLATELET # BLD: 381 E9/L (ref 130–450)
PMV BLD AUTO: 10.9 FL (ref 7–12)
POTASSIUM SERPL-SCNC: 4.5 MMOL/L (ref 3.5–5)
PROSTATE SPECIFIC ANTIGEN: 2.44 NG/ML (ref 0–4)
RBC # BLD: 4.79 E12/L (ref 3.8–5.8)
SODIUM BLD-SCNC: 138 MMOL/L (ref 132–146)
TOTAL PROTEIN: 6.9 G/DL (ref 6.4–8.3)
TRIGL SERPL-MCNC: 100 MG/DL (ref 0–149)
TSH SERPL DL<=0.05 MIU/L-ACNC: 1.84 UIU/ML (ref 0.27–4.2)
VLDLC SERPL CALC-MCNC: 20 MG/DL
WBC # BLD: 12.1 E9/L (ref 4.5–11.5)

## 2022-05-25 PROCEDURE — 1123F ACP DISCUSS/DSCN MKR DOCD: CPT | Performed by: FAMILY MEDICINE

## 2022-05-25 PROCEDURE — G0439 PPPS, SUBSEQ VISIT: HCPCS | Performed by: FAMILY MEDICINE

## 2022-05-25 RX ORDER — DOXYCYCLINE HYCLATE 100 MG
100 TABLET ORAL 2 TIMES DAILY
Qty: 14 TABLET | Refills: 0 | Status: SHIPPED | OUTPATIENT
Start: 2022-05-25 | End: 2022-06-01

## 2022-05-25 RX ORDER — BUDESONIDE AND FORMOTEROL FUMARATE DIHYDRATE 160; 4.5 UG/1; UG/1
2 AEROSOL RESPIRATORY (INHALATION) 2 TIMES DAILY
Qty: 10.2 G | Refills: 5 | Status: SHIPPED | OUTPATIENT
Start: 2022-05-25

## 2022-05-25 SDOH — ECONOMIC STABILITY: FOOD INSECURITY: WITHIN THE PAST 12 MONTHS, THE FOOD YOU BOUGHT JUST DIDN'T LAST AND YOU DIDN'T HAVE MONEY TO GET MORE.: NEVER TRUE

## 2022-05-25 SDOH — ECONOMIC STABILITY: FOOD INSECURITY: WITHIN THE PAST 12 MONTHS, YOU WORRIED THAT YOUR FOOD WOULD RUN OUT BEFORE YOU GOT MONEY TO BUY MORE.: NEVER TRUE

## 2022-05-25 ASSESSMENT — PATIENT HEALTH QUESTIONNAIRE - PHQ9
4. FEELING TIRED OR HAVING LITTLE ENERGY: 1
SUM OF ALL RESPONSES TO PHQ QUESTIONS 1-9: 5
10. IF YOU CHECKED OFF ANY PROBLEMS, HOW DIFFICULT HAVE THESE PROBLEMS MADE IT FOR YOU TO DO YOUR WORK, TAKE CARE OF THINGS AT HOME, OR GET ALONG WITH OTHER PEOPLE: 0
10. IF YOU CHECKED OFF ANY PROBLEMS, HOW DIFFICULT HAVE THESE PROBLEMS MADE IT FOR YOU TO DO YOUR WORK, TAKE CARE OF THINGS AT HOME, OR GET ALONG WITH OTHER PEOPLE: 0
9. THOUGHTS THAT YOU WOULD BE BETTER OFF DEAD, OR OF HURTING YOURSELF: 0
SUM OF ALL RESPONSES TO PHQ QUESTIONS 1-9: 5
5. POOR APPETITE OR OVEREATING: 1
SUM OF ALL RESPONSES TO PHQ QUESTIONS 1-9: 5
5. POOR APPETITE OR OVEREATING: 1
2. FEELING DOWN, DEPRESSED OR HOPELESS: 1
4. FEELING TIRED OR HAVING LITTLE ENERGY: 1
1. LITTLE INTEREST OR PLEASURE IN DOING THINGS: 1
SUM OF ALL RESPONSES TO PHQ9 QUESTIONS 1 & 2: 2
SUM OF ALL RESPONSES TO PHQ QUESTIONS 1-9: 5
SUM OF ALL RESPONSES TO PHQ QUESTIONS 1-9: 5
6. FEELING BAD ABOUT YOURSELF - OR THAT YOU ARE A FAILURE OR HAVE LET YOURSELF OR YOUR FAMILY DOWN: 0
8. MOVING OR SPEAKING SO SLOWLY THAT OTHER PEOPLE COULD HAVE NOTICED. OR THE OPPOSITE, BEING SO FIGETY OR RESTLESS THAT YOU HAVE BEEN MOVING AROUND A LOT MORE THAN USUAL: 0
SUM OF ALL RESPONSES TO PHQ QUESTIONS 1-9: 5
3. TROUBLE FALLING OR STAYING ASLEEP: 1
1. LITTLE INTEREST OR PLEASURE IN DOING THINGS: 1
9. THOUGHTS THAT YOU WOULD BE BETTER OFF DEAD, OR OF HURTING YOURSELF: 0
7. TROUBLE CONCENTRATING ON THINGS, SUCH AS READING THE NEWSPAPER OR WATCHING TELEVISION: 0
SUM OF ALL RESPONSES TO PHQ9 QUESTIONS 1 & 2: 2
6. FEELING BAD ABOUT YOURSELF - OR THAT YOU ARE A FAILURE OR HAVE LET YOURSELF OR YOUR FAMILY DOWN: 0
7. TROUBLE CONCENTRATING ON THINGS, SUCH AS READING THE NEWSPAPER OR WATCHING TELEVISION: 0
SUM OF ALL RESPONSES TO PHQ QUESTIONS 1-9: 5
8. MOVING OR SPEAKING SO SLOWLY THAT OTHER PEOPLE COULD HAVE NOTICED. OR THE OPPOSITE, BEING SO FIGETY OR RESTLESS THAT YOU HAVE BEEN MOVING AROUND A LOT MORE THAN USUAL: 0
3. TROUBLE FALLING OR STAYING ASLEEP: 1
SUM OF ALL RESPONSES TO PHQ QUESTIONS 1-9: 5
2. FEELING DOWN, DEPRESSED OR HOPELESS: 1

## 2022-05-25 ASSESSMENT — LIFESTYLE VARIABLES
HOW OFTEN DO YOU HAVE A DRINK CONTAINING ALCOHOL: NEVER
HOW MANY STANDARD DRINKS CONTAINING ALCOHOL DO YOU HAVE ON A TYPICAL DAY: PATIENT DECLINED

## 2022-05-25 ASSESSMENT — SOCIAL DETERMINANTS OF HEALTH (SDOH): HOW HARD IS IT FOR YOU TO PAY FOR THE VERY BASICS LIKE FOOD, HOUSING, MEDICAL CARE, AND HEATING?: NOT HARD AT ALL

## 2022-05-27 ENCOUNTER — TELEPHONE (OUTPATIENT)
Dept: FAMILY MEDICINE CLINIC | Age: 77
End: 2022-05-27

## 2022-05-27 NOTE — TELEPHONE ENCOUNTER
----- Message from Kam Garcia sent at 5/27/2022 11:01 AM EDT -----  Subject: Message to Provider    QUESTIONS  Information for Provider? Joselyn Rodríguez was in the office on 5/25 and dr. Ethel Gutiérrez told him to cut back on one of his medications, to cut the   tablet in half. Joselyn Rodríguez does not remember what medication he needs to cut   back on. Please reach out to [facility] AND [facility].  ---------------------------------------------------------------------------  --------------  [address]  What is the best way for the office to contact you? OK to leave message on   voicemail  Preferred Call Back Phone Number? [phone]  ---------------------------------------------------------------------------  --------------  SCRIPT ANSWERS  Relationship to Patient?  Self

## 2022-05-28 PROBLEM — R05.9 COUGH: Status: ACTIVE | Noted: 2022-05-28

## 2022-05-28 PROBLEM — Z00.00 PHYSICAL EXAM: Status: ACTIVE | Noted: 2022-05-28

## 2022-05-28 PROBLEM — N18.30 CHRONIC RENAL DISEASE, STAGE III (HCC): Status: ACTIVE | Noted: 2022-05-28

## 2022-05-28 PROBLEM — Z00.00 MEDICARE ANNUAL WELLNESS VISIT, SUBSEQUENT: Status: ACTIVE | Noted: 2022-05-28

## 2022-05-28 NOTE — PROGRESS NOTES
Medicare Annual Wellness Visit    Lyle Ceron is here for Medicare AWV (AWV) and Cough (Pt states he has had a lingering cough for about 3-4 months. Pt has a lung Ct scheduled on 6/1/22.)    Assessment & Plan   Medicare annual wellness visit, subsequent  -     CBC with Auto Differential; Future  -     Comprehensive Metabolic Panel; Future  Physical exam  -     CBC with Auto Differential; Future  -     Comprehensive Metabolic Panel; Future  ST elevation myocardial infarction (STEMI), unspecified artery (HCC)  -     CBC with Auto Differential; Future  -     Comprehensive Metabolic Panel; Future  Essential hypertension  -     CBC with Auto Differential; Future  -     Comprehensive Metabolic Panel; Future  Pulmonary emphysema, unspecified emphysema type ([facility] [address]  -     CBC with Auto Differential; Future  -     Comprehensive Metabolic Panel; Future  -     budesonide-formoterol (SYMBICORT) 160-4.5 MCG/ACT AERO; Inhale 2 puffs into the lungs 2 times daily, Disp-10.2 g, R-5Normal  Tobacco abuse  -     CBC with Auto Differential; Future  -     Comprehensive Metabolic Panel; Future  Mixed hyperlipidemia  -     CBC with Auto Differential; Future  -     Comprehensive Metabolic Panel; Future  -     Lipid Panel; Future  IFG (impaired fasting glucose)  -     CBC with Auto Differential; Future  -     Comprehensive Metabolic Panel; Future  -     Hemoglobin A1C; Future  Fatigue, unspecified type  -     CBC with Auto Differential; Future  -     Comprehensive Metabolic Panel; Future  -     TSH; Future  Screening for prostate cancer  -     CBC with Auto Differential; Future  -     Comprehensive Metabolic Panel; Future  -     PSA Screening; Future  Cough  -     CBC with Auto Differential; Future  -     Comprehensive Metabolic Panel; Future  -     budesonide-formoterol (SYMBICORT) 160-4.5 MCG/ACT AERO;  Inhale 2 puffs into the lungs 2 times daily, Disp-10.2 g, R-5Normal  Bronchitis  -     CBC with Auto Differential; Future  - Comprehensive Metabolic Panel; Future  -     doxycycline hyclate (VIBRA-TABS) 100 MG tablet; Take 1 tablet by mouth 2 times daily for 7 days, Disp-14 tablet, R-0Normal  -     budesonide-formoterol (SYMBICORT) 160-4.5 MCG/ACT AERO; Inhale 2 puffs into the lungs 2 times daily, Disp-10.2 g, R-5Normal  Stage 3a chronic kidney disease ([facility] [address]     Pt instructed if any worse go ED ASAP. Recommendations for Preventive Services Due: see orders and patient instructions/AVS.  Recommended screening schedule for the next 5-10 years is provided to the patient in written form: see Patient Instructions/AVS.     Return in about 4 weeks (around 6/22/2022). Subjective   The following acute and/or chronic problems were also addressed today: This 68year old male presents for physical exam. Pt c/o cough for 4 months. Patient's complete Health Risk Assessment and screening values have been reviewed and are found in Flowsheets. The following problems were reviewed today and where indicated follow up appointments were made and/or referrals ordered. Positive Risk Factor Screenings with Interventions:      Depression:  PHQ-2 Score: 2  PHQ-9 Total Score: 5    Severity:1-4 = minimal depression, 5-9 = mild depression, 10-14 = moderate depression, 15-19 = moderately severe depression, 20-27 = severe depression    Depression Interventions:  · pt denies SI and HI. Tobacco Use:     Tobacco Use: High Risk    Smoking Tobacco Use: Current Every Day Smoker    Smokeless Tobacco Use: Never Used     E-Cigarettes/Vaping Use     Questions Responses    E-Cigarette/Vaping Use     Start Date     Passive Exposure     Quit Date     Counseling Given     Comments         Substance Use - Tobacco Interventions:  pt instructed to DC SMOKING.                      Objective   Vitals:    05/25/22 1026   BP: (!) 102/58   Site: Right Upper Arm   Position: Sitting   Cuff Size: Medium Adult   Pulse: 76   Resp: 19   Temp: 97.8 °F (36.6 °C)   TempSrc: Temporal   SpO2: 96%   Weight: 153 lb (69.4 kg)   Height: 5' 11\" (1.803 m)      Body mass index is 21.34 kg/m². General Appearance: alert and oriented to person, place and time, well-developed and well-nourished, in no acute distress  Skin: warm and dry, no rash or erythema  Head: normocephalic and atraumatic  Eyes: pupils equal, round, and reactive to light, extraocular eye movements intact, conjunctivae normal  ENT: tympanic membrane, external ear and ear canal normal bilaterally, oropharynx clear and moist with normal mucous membranes  Neck: neck supple and non tender without mass, no thyromegaly or thyroid nodules, no cervical lymphadenopathy   Pulmonary/Chest: pt has decreased breath sounds. Cardiovascular: normal rate and regular rhythm  Abdomen: soft, non-tender, non-distended, normal bowel sounds, no masses or organomegaly  Extremities: pt has amputee below knee  Musculoskeletal: Pain and decreased ROM multiple joints. Neurologic: speech normal       Allergies   Allergen Reactions    Codeine      MAKES PT VERY HIGH     Prior to Visit Medications    Medication Sig Taking?  Authorizing Provider   doxycycline hyclate (VIBRA-TABS) 100 MG tablet Take 1 tablet by mouth 2 times daily for 7 days Yes Aranza P Catterlin, DO   budesonide-formoterol (SYMBICORT) 160-4.5 MCG/ACT AERO Inhale 2 puffs into the lungs 2 times daily Yes Aranza P Catterlin, DO   sertraline (ZOLOFT) 50 MG tablet Take 1 tablet by mouth daily Yes Aranza P Catterlin, DO   famotidine (PEPCID) 40 MG tablet  Yes Historical Provider, MD   lisinopril (PRINIVIL;ZESTRIL) 10 MG tablet 10 mg Take 1/2 tablet daily Yes Historical Provider, MD   tamsulosin (FLOMAX) 0.4 MG capsule Take 1 capsule by mouth daily Yes Historical Provider, MD   diclofenac sodium (VOLTAREN) 1 % GEL Apply topically 2 times daily Yes Aranza P Catterlin, DO   Icosapent Ethyl (VASCEPA) 1 g CAPS capsule Take 2 capsules by mouth 2 times daily Yes Ángela Bowling, DO

## 2022-05-28 NOTE — PATIENT INSTRUCTIONS
Personalized Preventive Plan for Leonila Cordova - 5/25/2022  Medicare offers a range of preventive health benefits. Some of the tests and screenings are paid in full while other may be subject to a deductible, co-insurance, and/or copay. Some of these benefits include a comprehensive review of your medical history including lifestyle, illnesses that may run in your family, and various assessments and screenings as appropriate. After reviewing your medical record and screening and assessments performed today your provider may have ordered immunizations, labs, imaging, and/or referrals for you. A list of these orders (if applicable) as well as your Preventive Care list are included within your After Visit Summary for your review. Other Preventive Recommendations:    · A preventive eye exam performed by an eye specialist is recommended every 1-2 years to screen for glaucoma; cataracts, macular degeneration, and other eye disorders. · A preventive dental visit is recommended every 6 months. · Try to get at least 150 minutes of exercise per week or 10,000 steps per day on a pedometer . · Order or download the FREE \"Exercise & Physical Activity: Your Everyday Guide\" from [employer]. Call 6-817.804.9925 or search [employer] online. · You need 8071-5791 mg of calcium and 1381-2563 IU of vitamin D per day. It is possible to meet your calcium requirement with diet alone, but a vitamin D supplement is usually necessary to meet this goal.  · When exposed to the sun, use a sunscreen that protects against both UVA and UVB radiation with an SPF of 30 or greater. Reapply every 2 to 3 hours or after sweating, drying off with a towel, or swimming. · Always wear a seat belt when traveling in a car. Always wear a helmet when riding a bicycle or motorcycle.

## 2022-05-31 NOTE — TELEPHONE ENCOUNTER
This MA spoke with Joanie Decker pt advised of results and recommendations per Dr. Alcides Zamora. Pt verbalized that he  understood and is amendable to taking 1/2 Lisinopril daily.     Electronically signed by Yusef Duke MA on 5/31/22 at 3:32 PM EDT

## 2022-06-01 ENCOUNTER — HOSPITAL ENCOUNTER (OUTPATIENT)
Dept: CT IMAGING | Age: 77
Discharge: HOME | End: 2022-06-03
Payer: MEDICARE

## 2022-06-01 DIAGNOSIS — R10.84 GENERALIZED ABDOMINAL PAIN: ICD-10-CM

## 2022-06-01 DIAGNOSIS — F17.210 SMOKING GREATER THAN 30 PACK YEARS: ICD-10-CM

## 2022-06-01 PROCEDURE — 6360000004 HC RX CONTRAST MEDICATION: Performed by: RADIOLOGY

## 2022-06-01 PROCEDURE — 71271 CT THORAX LUNG CANCER SCR C-: CPT

## 2022-06-01 PROCEDURE — 74178 CT ABD&PLV WO CNTR FLWD CNTR: CPT

## 2022-06-01 PROCEDURE — 2580000003 HC RX 258: Performed by: RADIOLOGY

## 2022-06-01 RX ORDER — SODIUM CHLORIDE 0.9 % (FLUSH) 0.9 %
10 SYRINGE (ML) INJECTION PRN
Status: DISCONTINUED | OUTPATIENT
Start: 2022-06-01 | End: 2022-06-04 | Stop reason: HOSPADM

## 2022-06-01 RX ADMIN — SODIUM CHLORIDE, PRESERVATIVE FREE 10 ML: 5 INJECTION INTRAVENOUS at 13:08

## 2022-06-01 RX ADMIN — IOPAMIDOL 75 ML: 755 INJECTION, SOLUTION INTRAVENOUS at 13:09

## 2022-06-06 ENCOUNTER — TELEPHONE (OUTPATIENT)
Dept: FAMILY MEDICINE CLINIC | Age: 77
End: 2022-06-06

## 2022-06-06 DIAGNOSIS — I71.40 ABDOMINAL AORTIC ANEURYSM (AAA) WITHOUT RUPTURE: Primary | ICD-10-CM

## 2022-06-06 DIAGNOSIS — J43.9 PULMONARY EMPHYSEMA, UNSPECIFIED EMPHYSEMA TYPE (HCC): Primary | ICD-10-CM

## 2022-06-07 ENCOUNTER — TELEPHONE (OUTPATIENT)
Dept: VASCULAR SURGERY | Age: 77
End: 2022-06-07

## 2022-06-07 NOTE — TELEPHONE ENCOUNTER
Received referral from Dr. Debi Encarnacion for AAA, attempted to schedule the patient but he stated he wasn't aware of the referral and would like to speak with Dr. Debi Encarnacion prior to scheduling. Patient to call to schedule.

## 2022-06-08 ENCOUNTER — OFFICE VISIT (OUTPATIENT)
Dept: FAMILY MEDICINE CLINIC | Age: 77
End: 2022-06-08
Payer: MEDICARE

## 2022-06-08 VITALS
HEIGHT: 71 IN | SYSTOLIC BLOOD PRESSURE: 100 MMHG | TEMPERATURE: 97.6 F | WEIGHT: 138 LBS | HEART RATE: 73 BPM | BODY MASS INDEX: 19.32 KG/M2 | DIASTOLIC BLOOD PRESSURE: 50 MMHG | OXYGEN SATURATION: 94 % | RESPIRATION RATE: 18 BRPM

## 2022-06-08 DIAGNOSIS — I21.3 ST ELEVATION MYOCARDIAL INFARCTION (STEMI), UNSPECIFIED ARTERY (HCC): Primary | ICD-10-CM

## 2022-06-08 DIAGNOSIS — J43.9 PULMONARY EMPHYSEMA, UNSPECIFIED EMPHYSEMA TYPE (HCC): ICD-10-CM

## 2022-06-08 DIAGNOSIS — Z86.79 S/P AAA (ABDOMINAL AORTIC ANEURYSM) REPAIR: ICD-10-CM

## 2022-06-08 DIAGNOSIS — N18.32 STAGE 3B CHRONIC KIDNEY DISEASE (HCC): ICD-10-CM

## 2022-06-08 DIAGNOSIS — Z98.890 S/P AAA (ABDOMINAL AORTIC ANEURYSM) REPAIR: ICD-10-CM

## 2022-06-08 PROCEDURE — G8427 DOCREV CUR MEDS BY ELIG CLIN: HCPCS | Performed by: FAMILY MEDICINE

## 2022-06-08 PROCEDURE — 3023F SPIROM DOC REV: CPT | Performed by: FAMILY MEDICINE

## 2022-06-08 PROCEDURE — 99214 OFFICE O/P EST MOD 30 MIN: CPT | Performed by: FAMILY MEDICINE

## 2022-06-08 PROCEDURE — 4004F PT TOBACCO SCREEN RCVD TLK: CPT | Performed by: FAMILY MEDICINE

## 2022-06-08 PROCEDURE — 1123F ACP DISCUSS/DSCN MKR DOCD: CPT | Performed by: FAMILY MEDICINE

## 2022-06-08 PROCEDURE — G8420 CALC BMI NORM PARAMETERS: HCPCS | Performed by: FAMILY MEDICINE

## 2022-06-08 NOTE — ADDENDUM NOTE
Addended by: Isela Mena on: 6/8/2022 04:25 PM     Modules accepted: Orders Please call-  Blood test earlier this month were fine except for a borderline level of thyroid hormone.    She may be developing a slight underactive thyroid.      I would like to have her repeat the TSH and also I will run to other thyroid function tests to confirm whether or not she has an underactive thyroid     Her other laboratory results were normal - kidney function, liver function, blood sugar      Component    Latest Ref Rng & Units 12/5/2017   TSH    0.350 - 5.000 mcUnits/mL 5.165 (H)       Orders Placed This Encounter   • Thyroid Stimulating Hormone     Order Specific Question:   If TSH abnormal, reflex Free T4     Answer:   NO   • Free T4   • Free T3         ICD-10-CM   1. Subclinical hypothyroidism E03.9

## 2022-06-08 NOTE — TELEPHONE ENCOUNTER
Pt seen today 6/8/22 for results review. Referral was placed to [facility] who is not currently accepting new patients. New referral placed to Dr. Alberto Santos for emphysema.     Electronically signed by Fabi Schulte MA on 6/8/22 at 4:23 PM EDT

## 2022-06-10 ASSESSMENT — ENCOUNTER SYMPTOMS
GASTROINTESTINAL NEGATIVE: 1
COUGH: 0
CONSTIPATION: 0
BACK PAIN: 0
PHOTOPHOBIA: 0
SORE THROAT: 0
EYES NEGATIVE: 1
STRIDOR: 0
EYE PAIN: 0
EYE REDNESS: 0
SINUS PAIN: 0
NAUSEA: 0
COLOR CHANGE: 0
CHEST TIGHTNESS: 0
DIARRHEA: 0
ANAL BLEEDING: 0
ABDOMINAL PAIN: 0
RECTAL PAIN: 0
VOMITING: 0
SHORTNESS OF BREATH: 1
EYE ITCHING: 0
BLOOD IN STOOL: 0
EYE DISCHARGE: 0
ABDOMINAL DISTENTION: 0
FACIAL SWELLING: 0
CHOKING: 0
TROUBLE SWALLOWING: 0
RHINORRHEA: 0
VOICE CHANGE: 0
SINUS PRESSURE: 0
WHEEZING: 0
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
## 2022-06-24 ENCOUNTER — TELEPHONE (OUTPATIENT)
Dept: CASE MANAGEMENT | Age: 77
End: 2022-06-24

## 2022-06-24 NOTE — TELEPHONE ENCOUNTER
No call, encounter opened to process CT Lung Screening. CT Lung Screen: 6/1/2022    Impression   Chronic changes including severe emphysematous change and central   bronchiectasis without dominant nodule or mass       LUNG RADS:   Per ACR Lung-RADS Version 1.1       Lung rads category 1 with continued recommended annual low-dose CT screening   follow-up       RECOMMENDATIONS:   If you would like to register your patient with the [facility], please contact the Nurse Navigator at   [phone]. Pack years: 62    Social History     Tobacco Use  Smoking Status: Current Every Day Smoker    Start Date: 01/01/1960   Quit Date:    Types: Cigarettes   Packs/Day: 1   Years: 62   Pack Years: 62   Smokeless Tobacco: Never Used        Results letter sent to patient via my chart or mailed.      [facility]

## 2022-06-27 PROBLEM — R05.9 COUGH: Status: RESOLVED | Noted: 2022-05-28 | Resolved: 2022-06-27

## 2022-06-27 PROBLEM — Z00.00 PHYSICAL EXAM: Status: RESOLVED | Noted: 2022-05-28 | Resolved: 2022-06-27

## 2022-06-27 PROBLEM — Z00.00 MEDICARE ANNUAL WELLNESS VISIT, SUBSEQUENT: Status: RESOLVED | Noted: 2022-05-28 | Resolved: 2022-06-27

## 2022-07-29 DIAGNOSIS — F41.9 ANXIETY: ICD-10-CM

## 2022-07-29 NOTE — TELEPHONE ENCOUNTER
Pt called again about his rf. Attempted to contact pt to let him know the doctor is out of the office. Last seen 6/8/2022  Next appt Visit date not found      ----- Message from Sami Olsen sent at 7/29/2022 12:46 PM EDT -----  Subject: Message to Provider    QUESTIONS  Information for Provider? Pt is waiting on a refill for zoloft pharmacy   still doesn't have it   ---------------------------------------------------------------------------  --------------  [id number] [employer]  [phone];  Do not leave any message, patient will call back for answer  ---------------------------------------------------------------------------  --------------  SCRIPT ANSWERS  undefined

## 2022-07-29 NOTE — TELEPHONE ENCOUNTER
----- Message from Emigdio Sams sent at 7/28/2022 11:58 AM EDT -----  Subject: Refill Request    QUESTIONS  Name of Medication? sertraline (ZOLOFT) 50 MG tablet  Patient-reported dosage and instructions? once a day   How many days do you have left? 3  Preferred Pharmacy? [address] phone number (if available)? [phone]  ---------------------------------------------------------------------------  --------------  CALL BACK INFO  What is the best way for the office to contact you? OK to leave message on   voicemail  Preferred Call Back Phone Number? [phone]  ---------------------------------------------------------------------------  --------------  SCRIPT ANSWERS  Relationship to Patient?  Self

## 2022-10-20 DIAGNOSIS — F41.9 ANXIETY: ICD-10-CM

## 2022-10-20 NOTE — TELEPHONE ENCOUNTER
Last seen 6/8/2022  Next appt Visit date not found    Rx pended. Please review and sign.     Electronically signed by Az Gong MA on 10/20/22 at 1:08 PM EDT

## 2022-11-30 ENCOUNTER — OFFICE VISIT (OUTPATIENT)
Dept: FAMILY MEDICINE CLINIC | Age: 77
End: 2022-11-30

## 2022-11-30 VITALS
HEIGHT: 71 IN | DIASTOLIC BLOOD PRESSURE: 78 MMHG | RESPIRATION RATE: 18 BRPM | HEART RATE: 73 BPM | WEIGHT: 131 LBS | BODY MASS INDEX: 18.34 KG/M2 | SYSTOLIC BLOOD PRESSURE: 132 MMHG | OXYGEN SATURATION: 92 %

## 2022-11-30 DIAGNOSIS — E78.2 MIXED HYPERLIPIDEMIA: ICD-10-CM

## 2022-11-30 DIAGNOSIS — R73.01 IFG (IMPAIRED FASTING GLUCOSE): ICD-10-CM

## 2022-11-30 DIAGNOSIS — I21.02 ST ELEVATION MYOCARDIAL INFARCTION INVOLVING LEFT ANTERIOR DESCENDING (LAD) CORONARY ARTERY (HCC): ICD-10-CM

## 2022-11-30 DIAGNOSIS — R53.83 FATIGUE, UNSPECIFIED TYPE: ICD-10-CM

## 2022-11-30 DIAGNOSIS — E27.8 ADRENAL NODULE (HCC): ICD-10-CM

## 2022-11-30 DIAGNOSIS — R23.3 EASY BRUISING: ICD-10-CM

## 2022-11-30 DIAGNOSIS — H25.9 AGE-RELATED CATARACT OF BOTH EYES, UNSPECIFIED AGE-RELATED CATARACT TYPE: ICD-10-CM

## 2022-11-30 DIAGNOSIS — Z00.00 PHYSICAL EXAM: ICD-10-CM

## 2022-11-30 DIAGNOSIS — J43.9 PULMONARY EMPHYSEMA, UNSPECIFIED EMPHYSEMA TYPE (HCC): ICD-10-CM

## 2022-11-30 DIAGNOSIS — Z72.0 TOBACCO ABUSE: ICD-10-CM

## 2022-11-30 DIAGNOSIS — I10 ESSENTIAL HYPERTENSION: ICD-10-CM

## 2022-11-30 DIAGNOSIS — N18.32 STAGE 3B CHRONIC KIDNEY DISEASE (HCC): ICD-10-CM

## 2022-11-30 DIAGNOSIS — I25.10 CAD IN NATIVE ARTERY: ICD-10-CM

## 2022-11-30 DIAGNOSIS — Z00.00 PHYSICAL EXAM: Primary | ICD-10-CM

## 2022-11-30 PROBLEM — E27.9 ADRENAL NODULE (HCC): Status: ACTIVE | Noted: 2022-11-30

## 2022-11-30 PROBLEM — F32.2 CURRENT SEVERE EPISODE OF MAJOR DEPRESSIVE DISORDER WITHOUT PSYCHOTIC FEATURES WITHOUT PRIOR EPISODE (HCC): Status: RESOLVED | Noted: 2018-08-28 | Resolved: 2022-11-30

## 2022-11-30 LAB
BASOPHILS ABSOLUTE: 0.11 E9/L (ref 0–0.2)
BASOPHILS RELATIVE PERCENT: 1.1 % (ref 0–2)
EOSINOPHILS ABSOLUTE: 0.18 E9/L (ref 0.05–0.5)
EOSINOPHILS RELATIVE PERCENT: 1.9 % (ref 0–6)
HBA1C MFR BLD: 5.5 % (ref 4–5.6)
HCT VFR BLD CALC: 41.3 % (ref 37–54)
HEMOGLOBIN: 13.2 G/DL (ref 12.5–16.5)
IMMATURE GRANULOCYTES #: 0.03 E9/L
IMMATURE GRANULOCYTES %: 0.3 % (ref 0–5)
LYMPHOCYTES ABSOLUTE: 1.11 E9/L (ref 1.5–4)
LYMPHOCYTES RELATIVE PERCENT: 11.6 % (ref 20–42)
MCH RBC QN AUTO: 30.6 PG (ref 26–35)
MCHC RBC AUTO-ENTMCNC: 32 % (ref 32–34.5)
MCV RBC AUTO: 95.6 FL (ref 80–99.9)
MONOCYTES ABSOLUTE: 1.26 E9/L (ref 0.1–0.95)
MONOCYTES RELATIVE PERCENT: 13.1 % (ref 2–12)
NEUTROPHILS ABSOLUTE: 6.91 E9/L (ref 1.8–7.3)
NEUTROPHILS RELATIVE PERCENT: 72 % (ref 43–80)
PDW BLD-RTO: 15.7 FL (ref 11.5–15)
PLATELET # BLD: 329 E9/L (ref 130–450)
PMV BLD AUTO: 11.4 FL (ref 7–12)
RBC # BLD: 4.32 E12/L (ref 3.8–5.8)
WBC # BLD: 9.6 E9/L (ref 4.5–11.5)

## 2022-11-30 RX ORDER — ATORVASTATIN CALCIUM 40 MG/1
TABLET, FILM COATED ORAL
COMMUNITY
Start: 2022-10-13

## 2022-12-01 LAB
ALBUMIN SERPL-MCNC: 3.8 G/DL (ref 3.5–5.2)
ALP BLD-CCNC: 91 U/L (ref 40–129)
ALT SERPL-CCNC: 6 U/L (ref 0–40)
ANION GAP SERPL CALCULATED.3IONS-SCNC: 13 MMOL/L (ref 7–16)
AST SERPL-CCNC: 15 U/L (ref 0–39)
BILIRUB SERPL-MCNC: 0.4 MG/DL (ref 0–1.2)
BUN BLDV-MCNC: 34 MG/DL (ref 6–23)
CALCIUM SERPL-MCNC: 9.6 MG/DL (ref 8.6–10.2)
CHLORIDE BLD-SCNC: 100 MMOL/L (ref 98–107)
CHOLESTEROL, TOTAL: 108 MG/DL (ref 0–199)
CO2: 25 MMOL/L (ref 22–29)
CREAT SERPL-MCNC: 1.8 MG/DL (ref 0.7–1.2)
GFR SERPL CREATININE-BSD FRML MDRD: 38 ML/MIN/1.73
GLUCOSE BLD-MCNC: 72 MG/DL (ref 74–99)
HDLC SERPL-MCNC: 35 MG/DL
LDL CHOLESTEROL CALCULATED: 56 MG/DL (ref 0–99)
POTASSIUM SERPL-SCNC: 4.4 MMOL/L (ref 3.5–5)
SODIUM BLD-SCNC: 138 MMOL/L (ref 132–146)
TOTAL PROTEIN: 6.5 G/DL (ref 6.4–8.3)
TRIGL SERPL-MCNC: 84 MG/DL (ref 0–149)
TSH SERPL DL<=0.05 MIU/L-ACNC: 2.04 UIU/ML (ref 0.27–4.2)
VLDLC SERPL CALC-MCNC: 17 MG/DL

## 2022-12-03 PROBLEM — Z00.00 PHYSICAL EXAM: Status: ACTIVE | Noted: 2022-12-03

## 2022-12-03 PROBLEM — H25.9 AGE-RELATED CATARACT OF BOTH EYES: Status: ACTIVE | Noted: 2022-12-03

## 2022-12-03 PROBLEM — R23.3 EASY BRUISING: Status: ACTIVE | Noted: 2022-12-03

## 2022-12-03 ASSESSMENT — ENCOUNTER SYMPTOMS
VOICE CHANGE: 0
ABDOMINAL DISTENTION: 0
SINUS PRESSURE: 1
ANAL BLEEDING: 0
WHEEZING: 0
EYE DISCHARGE: 0
RECTAL PAIN: 0
SINUS PAIN: 1
EYE REDNESS: 0
SHORTNESS OF BREATH: 1
DIARRHEA: 0
CONSTIPATION: 0
EYE ITCHING: 0
BLOOD IN STOOL: 0
SORE THROAT: 0
EYE PAIN: 0
CHEST TIGHTNESS: 0
COLOR CHANGE: 0
ABDOMINAL PAIN: 0
VOMITING: 0
NAUSEA: 0
FACIAL SWELLING: 0
RHINORRHEA: 0
BACK PAIN: 0
STRIDOR: 0
PHOTOPHOBIA: 0
TROUBLE SWALLOWING: 0
COUGH: 1
CHOKING: 0
GASTROINTESTINAL NEGATIVE: 1
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
## 2022-12-07 ENCOUNTER — TELEPHONE (OUTPATIENT)
Dept: FAMILY MEDICINE CLINIC | Age: 77
End: 2022-12-07

## 2022-12-07 DIAGNOSIS — R79.9 ELEVATED BUN: Primary | ICD-10-CM

## 2022-12-07 DIAGNOSIS — N18.32 STAGE 3B CHRONIC KIDNEY DISEASE (HCC): ICD-10-CM

## 2022-12-07 NOTE — TELEPHONE ENCOUNTER
Pt  called in asking for the results of his blood work. Please advise.     Electronically signed by Michele Gaviria MA on 12/7/22 at 12:33 PM EST

## 2022-12-12 ENCOUNTER — TELEPHONE (OUTPATIENT)
Dept: FAMILY MEDICINE CLINIC | Age: 77
End: 2022-12-12

## 2022-12-12 NOTE — TELEPHONE ENCOUNTER
----- Message from Chely Prudencio sent at 12/12/2022 10:19 AM EST -----  Subject: Message to Provider    QUESTIONS  Information for Provider? Dr. Sarahy Fan wanted pt to see his Neurologist   regarding to some bloodwork he had done. The Neurologist would like the   labs faxed to [phone] (Dr. Suzie Horan).  ---------------------------------------------------------------------------  --------------  [id number]  [phone]; OK to leave message on voicemail  ---------------------------------------------------------------------------  --------------  SCRIPT ANSWERS  Relationship to Patient?  Self

## 2022-12-15 ENCOUNTER — OFFICE VISIT (OUTPATIENT)
Dept: FAMILY MEDICINE CLINIC | Age: 77
End: 2022-12-15
Payer: COMMERCIAL

## 2022-12-15 VITALS
BODY MASS INDEX: 18.2 KG/M2 | HEIGHT: 71 IN | TEMPERATURE: 98.1 F | SYSTOLIC BLOOD PRESSURE: 118 MMHG | WEIGHT: 130 LBS | HEART RATE: 89 BPM | OXYGEN SATURATION: 91 % | DIASTOLIC BLOOD PRESSURE: 70 MMHG | RESPIRATION RATE: 18 BRPM

## 2022-12-15 DIAGNOSIS — R63.4 WEIGHT LOSS: ICD-10-CM

## 2022-12-15 DIAGNOSIS — R05.9 COUGH, UNSPECIFIED TYPE: Primary | ICD-10-CM

## 2022-12-15 DIAGNOSIS — J43.9 PULMONARY EMPHYSEMA, UNSPECIFIED EMPHYSEMA TYPE (HCC): ICD-10-CM

## 2022-12-15 DIAGNOSIS — F17.208 NICOTINE DEPENDENCE WITH OTHER NICOTINE-INDUCED DISORDER, UNSPECIFIED NICOTINE PRODUCT TYPE: ICD-10-CM

## 2022-12-15 LAB
ALBUMIN SERPL-MCNC: 3.8 G/DL (ref 3.5–5.2)
ALP BLD-CCNC: 105 U/L (ref 40–129)
ALT SERPL-CCNC: 7 U/L (ref 0–40)
ANION GAP SERPL CALCULATED.3IONS-SCNC: 14 MMOL/L (ref 7–16)
AST SERPL-CCNC: 16 U/L (ref 0–39)
BASOPHILS ABSOLUTE: 0.08 E9/L (ref 0–0.2)
BASOPHILS RELATIVE PERCENT: 0.6 % (ref 0–2)
BILIRUB SERPL-MCNC: 0.4 MG/DL (ref 0–1.2)
BILIRUBIN, POC: NORMAL
BLOOD URINE, POC: NORMAL
BUN BLDV-MCNC: 30 MG/DL (ref 6–23)
CALCIUM SERPL-MCNC: 9.8 MG/DL (ref 8.6–10.2)
CHLORIDE BLD-SCNC: 105 MMOL/L (ref 98–107)
CLARITY, POC: CLEAR
CO2: 22 MMOL/L (ref 22–29)
COLOR, POC: YELLOW
CREAT SERPL-MCNC: 1.6 MG/DL (ref 0.7–1.2)
EOSINOPHILS ABSOLUTE: 0.28 E9/L (ref 0.05–0.5)
EOSINOPHILS RELATIVE PERCENT: 2 % (ref 0–6)
GFR SERPL CREATININE-BSD FRML MDRD: 44 ML/MIN/1.73
GLUCOSE BLD-MCNC: 105 MG/DL (ref 74–99)
GLUCOSE URINE, POC: NORMAL
HCT VFR BLD CALC: 41.1 % (ref 37–54)
HEMOGLOBIN: 13.6 G/DL (ref 12.5–16.5)
IMMATURE GRANULOCYTES #: 0.09 E9/L
IMMATURE GRANULOCYTES %: 0.7 % (ref 0–5)
KETONES, POC: NORMAL
LACTATE DEHYDROGENASE: 153 U/L (ref 135–225)
LEUKOCYTE EST, POC: NORMAL
LYMPHOCYTES ABSOLUTE: 1.13 E9/L (ref 1.5–4)
LYMPHOCYTES RELATIVE PERCENT: 8.2 % (ref 20–42)
MCH RBC QN AUTO: 30.2 PG (ref 26–35)
MCHC RBC AUTO-ENTMCNC: 33.1 % (ref 32–34.5)
MCV RBC AUTO: 91.1 FL (ref 80–99.9)
MONOCYTES ABSOLUTE: 1.28 E9/L (ref 0.1–0.95)
MONOCYTES RELATIVE PERCENT: 9.3 % (ref 2–12)
NEUTROPHILS ABSOLUTE: 10.97 E9/L (ref 1.8–7.3)
NEUTROPHILS RELATIVE PERCENT: 79.2 % (ref 43–80)
NITRITE, POC: NORMAL
PDW BLD-RTO: 15.5 FL (ref 11.5–15)
PH, POC: 6.5
PLATELET # BLD: 443 E9/L (ref 130–450)
PMV BLD AUTO: 10.8 FL (ref 7–12)
POTASSIUM SERPL-SCNC: 4.6 MMOL/L (ref 3.5–5)
PROSTATE SPECIFIC ANTIGEN: 2.12 NG/ML (ref 0–4)
PROTEIN, POC: NORMAL
RBC # BLD: 4.51 E12/L (ref 3.8–5.8)
SODIUM BLD-SCNC: 141 MMOL/L (ref 132–146)
SPECIFIC GRAVITY, POC: 1.02
TOTAL PROTEIN: 7 G/DL (ref 6.4–8.3)
UROBILINOGEN, POC: 0.2
WBC # BLD: 13.8 E9/L (ref 4.5–11.5)

## 2022-12-15 PROCEDURE — 81003 URINALYSIS AUTO W/O SCOPE: CPT | Performed by: FAMILY MEDICINE

## 2022-12-15 PROCEDURE — 3074F SYST BP LT 130 MM HG: CPT | Performed by: FAMILY MEDICINE

## 2022-12-15 PROCEDURE — 99215 OFFICE O/P EST HI 40 MIN: CPT | Performed by: FAMILY MEDICINE

## 2022-12-15 PROCEDURE — 3078F DIAST BP <80 MM HG: CPT | Performed by: FAMILY MEDICINE

## 2022-12-15 PROCEDURE — 1123F ACP DISCUSS/DSCN MKR DOCD: CPT | Performed by: FAMILY MEDICINE

## 2022-12-15 NOTE — PROGRESS NOTES
[facility])  Family Medicine Outpatient        SUBJECTIVE:  CC: had concerns including Cough (Pt c/o cough for the past 3-4 months. ). HPI:  Neil Mathew is a male 68 y.o. presented to the clinic for productive cough for the past few months. He tried Mucinex, but it didn't help. In the last 4-5 months he states he has lost approximately 40 lb. He has been seeing his primary care to try and figure out the weight loss. Angela Soto reports having a good appetite. He smokes 1 ppd for his \"whole life. \" He has a known history of emphysema. His last lung CT was in  of this year. He has Ckd 3b reports having uro follow up coming up. Suppose to see uro. No other symptoms reported    Review of Systems   Constitutional:  Positive for unexpected weight change. Negative for appetite change, fatigue and fever. Respiratory:  Positive for cough. Negative for shortness of breath and wheezing. Cardiovascular:  Negative for chest pain and palpitations. Gastrointestinal:  Negative for abdominal pain, constipation, diarrhea, nausea and vomiting.      Outpatient Medications Marked as Taking for the 12/15/22 encounter (Office Visit) with Kathie Mascorro MD   Medication Sig Dispense Refill    [] doxycycline hyclate (VIBRA-TABS) 100 MG tablet Take 1 tablet by mouth 2 times daily for 7 days 14 tablet 0    [] predniSONE (DELTASONE) 20 MG tablet Take 1 tablet by mouth 2 times daily for 5 days 10 tablet 0    atorvastatin (LIPITOR) 40 MG tablet       tiotropium (SPIRIVA RESPIMAT) 2.5 MCG/ACT AERS inhaler Inhale 2 puffs into the lungs daily 1 each 1    sertraline (ZOLOFT) 50 MG tablet Take 1 tablet by mouth daily 90 tablet 0    budesonide-formoterol (SYMBICORT) 160-4.5 MCG/ACT AERO Inhale 2 puffs into the lungs 2 times daily 10.2 g 5    famotidine (PEPCID) 40 MG tablet       tamsulosin (FLOMAX) 0.4 MG capsule Take 1 capsule by mouth daily      diclofenac sodium (VOLTAREN) 1 % GEL Apply topically 2 times daily 50 g 0 Icosapent Ethyl (VASCEPA) 1 g CAPS capsule Take 2 capsules by mouth 2 times daily 360 capsule 1    Cholecalciferol (VITAMIN D3) 2000 units CAPS Take 1 capsule by mouth daily      NIFEdipine (ADALAT CC) 30 MG extended release tablet Take 30 mg by mouth daily      albuterol sulfate HFA (PROVENTIL;VENTOLIN;PROAIR) 108 (90 Base) MCG/ACT inhaler Inhale 2 puffs into the lungs every 6 hours as needed for Wheezing      clopidogrel (PLAVIX) 75 MG tablet Take 1 tablet by mouth daily 30 tablet 11    isosorbide mononitrate (IMDUR) 30 MG extended release tablet Take 1 tablet by mouth daily 30 tablet 3    nitroGLYCERIN (NITROSTAT) 0.4 MG SL tablet up to max of 3 total doses. If no relief after 1 dose, call 911. 25 tablet 3    metoprolol succinate (TOPROL XL) 50 MG extended release tablet Take 1 tablet by mouth daily (Patient taking differently: Take 25 mg by mouth daily) 30 tablet 3    aspirin 81 MG tablet Take 81 mg by mouth daily         I have reviewed all pertinent PMHx, PSHx, FamHx, SocialHx, medications, and allergies and updated history as appropriate. OBJECTIVE    VS: /70   Pulse 89   Temp 98.1 °F (36.7 °C)   Resp 18   Ht 5' 11\" (1.803 m)   Wt 130 lb (59 kg)   SpO2 91%   BMI 18.13 kg/m²   Physical Exam  Constitutional:       General: He is not in acute distress. Appearance: He is well-developed. He is not diaphoretic. Comments: Thin elderly male   HENT:      Head: Normocephalic and atraumatic. Right Ear: External ear normal.      Left Ear: External ear normal.   Eyes:      Conjunctiva/sclera: Conjunctivae normal.      Pupils: Pupils are equal, round, and reactive to light. Cardiovascular:      Rate and Rhythm: Normal rate and regular rhythm. Heart sounds: Normal heart sounds. No murmur heard. No friction rub. No gallop. Pulmonary:      Effort: Pulmonary effort is normal.      Breath sounds: Normal breath sounds.    Abdominal:      General: Bowel sounds are normal. There is no distension. Palpations: Abdomen is soft. Tenderness: There is no abdominal tenderness. Hernia: No hernia is present. Musculoskeletal:         General: Normal range of motion. Cervical back: Normal range of motion and neck supple. Skin:     General: Skin is warm and dry. Neurological:      Mental Status: He is alert and oriented to person, place, and time. Psychiatric:         Behavior: Behavior normal.     ASSESSMENT/PLAN:  1. Cough, unspecified type  With known emphysema. Encourage patient to STOP smoking!  - CT CHEST WO CONTRAST; Future    2. Weight loss  Reviewed most recent office note from pcp, 11/30. Updated imaging and labs placed at this time.   - CT CHEST WO CONTRAST; Future  - CBC with Auto Differential; Future  - Comprehensive Metabolic Panel; Future  - Hepatitis C Antibody; Future  - RPR; Future  - PSA Screening; Future  - Lactate Dehydrogenase; Future  - POCT Urinalysis No Micro (Auto)  - Fecal Blood Immunochemical Test; Future    3. Pulmonary emphysema, unspecified emphysema type ([address]  - CT CHEST WO CONTRAST; Future    4. Nicotine dependence with other nicotine-induced disorder, unspecified nicotine product type  Encourage smoking cessation. Patient smoking 1ppd. Not interested in quitting. Understands and accepts risk of smoking, including death. Updated CT chest placed. - CT CHEST WO CONTRAST; Future    >40 minutes spent on this visit. I have reviewed my findings and recommendations with Karime Cardenas MD  12/26/2022 12:28 AM  Return in 3 weeks (on 1/5/2023) for established f/u. Counseled regarding above diagnosis, including possible risks and complications, especially if left uncontrolled. Patient counseled on red flag symptoms and if they occur to go to the ED. Discussed medications risk/benefits and possible side effects and alternatives to treatment.  Patient and/or guardian verbalizes understanding, agrees, feels comfortable with and wishes to proceed with above treatment plan. Advised patient regarding importance of keeping up with recommended health maintenance and to schedule as soon as possible if overdue, as this is important in assessing for undiagnosed pathology, especially cancer, as well as protecting against potentially harmful/life threatening disease. Patient and/or guardian verbalizes understanding and agrees with above counseling, assessment and plan. All questions answered. Please note this report has been partially produced using speech recognition software  and may contain errors related to that system including grammar, punctuation and spelling as well as words and phrases that may seem inappropriate. If there are questions or concerns please feel free to contact me to clarify.

## 2022-12-16 ENCOUNTER — TELEPHONE (OUTPATIENT)
Dept: FAMILY MEDICINE CLINIC | Age: 77
End: 2022-12-16

## 2022-12-16 LAB
HEPATITIS C ANTIBODY INTERPRETATION: NORMAL
RPR: NORMAL

## 2022-12-16 RX ORDER — DOXYCYCLINE HYCLATE 100 MG
100 TABLET ORAL 2 TIMES DAILY
Qty: 14 TABLET | Refills: 0 | Status: SHIPPED | OUTPATIENT
Start: 2022-12-16 | End: 2022-12-23

## 2022-12-16 RX ORDER — PREDNISONE 20 MG/1
20 TABLET ORAL 2 TIMES DAILY
Qty: 10 TABLET | Refills: 0 | Status: SHIPPED | OUTPATIENT
Start: 2022-12-16 | End: 2022-12-21

## 2022-12-16 NOTE — TELEPHONE ENCOUNTER
Pt came in for appointment yesterday, pt states his medication was never sent in that he was getting prescribed for being sick.      Electronically signed by Charisse Hensley on 12/16/22 at 11:33 AM EST

## 2022-12-20 LAB
Lab: NORMAL
REPORT: NORMAL
THIS TEST SENT TO: NORMAL

## 2022-12-26 ASSESSMENT — ENCOUNTER SYMPTOMS
DIARRHEA: 0
COUGH: 1
VOMITING: 0
SHORTNESS OF BREATH: 0
CONSTIPATION: 0
ABDOMINAL PAIN: 0
NAUSEA: 0
WHEEZING: 0

## 2023-01-02 PROBLEM — Z00.00 PHYSICAL EXAM: Status: RESOLVED | Noted: 2022-12-03 | Resolved: 2023-01-02

## 2023-01-04 ENCOUNTER — HOSPITAL ENCOUNTER (OUTPATIENT)
Dept: CT IMAGING | Age: 78
Discharge: HOME | End: 2023-01-06
Payer: MEDICARE

## 2023-01-04 DIAGNOSIS — R63.4 WEIGHT LOSS: ICD-10-CM

## 2023-01-04 DIAGNOSIS — J43.9 PULMONARY EMPHYSEMA, UNSPECIFIED EMPHYSEMA TYPE (HCC): ICD-10-CM

## 2023-01-04 DIAGNOSIS — F17.208 NICOTINE DEPENDENCE WITH OTHER NICOTINE-INDUCED DISORDER, UNSPECIFIED NICOTINE PRODUCT TYPE: ICD-10-CM

## 2023-01-04 DIAGNOSIS — R05.9 COUGH, UNSPECIFIED TYPE: ICD-10-CM

## 2023-01-04 PROCEDURE — 71250 CT THORAX DX C-: CPT

## 2023-01-13 ENCOUNTER — OFFICE VISIT (OUTPATIENT)
Dept: FAMILY MEDICINE CLINIC | Age: 78
End: 2023-01-13
Payer: MEDICARE

## 2023-01-13 VITALS
OXYGEN SATURATION: 98 % | TEMPERATURE: 97.9 F | DIASTOLIC BLOOD PRESSURE: 82 MMHG | SYSTOLIC BLOOD PRESSURE: 134 MMHG | HEIGHT: 71 IN | RESPIRATION RATE: 16 BRPM | HEART RATE: 57 BPM | WEIGHT: 129 LBS | BODY MASS INDEX: 18.06 KG/M2

## 2023-01-13 DIAGNOSIS — I21.02 ST ELEVATION MYOCARDIAL INFARCTION INVOLVING LEFT ANTERIOR DESCENDING (LAD) CORONARY ARTERY (HCC): ICD-10-CM

## 2023-01-13 DIAGNOSIS — N18.32 STAGE 3B CHRONIC KIDNEY DISEASE (HCC): ICD-10-CM

## 2023-01-13 DIAGNOSIS — R63.4 WEIGHT LOSS: ICD-10-CM

## 2023-01-13 DIAGNOSIS — D72.829 LEUKOCYTOSIS, UNSPECIFIED TYPE: Primary | ICD-10-CM

## 2023-01-13 DIAGNOSIS — E27.8 ADRENAL NODULE (HCC): ICD-10-CM

## 2023-01-13 DIAGNOSIS — J43.9 PULMONARY EMPHYSEMA, UNSPECIFIED EMPHYSEMA TYPE (HCC): ICD-10-CM

## 2023-01-13 DIAGNOSIS — J43.2 CENTRILOBULAR EMPHYSEMA (HCC): ICD-10-CM

## 2023-01-13 DIAGNOSIS — Z89.511 ACQUIRED ABSENCE OF RIGHT LEG BELOW KNEE (HCC): ICD-10-CM

## 2023-01-13 DIAGNOSIS — D72.810 LYMPHOPENIA: ICD-10-CM

## 2023-01-13 DIAGNOSIS — Z72.0 TOBACCO ABUSE: ICD-10-CM

## 2023-01-13 PROCEDURE — 99214 OFFICE O/P EST MOD 30 MIN: CPT | Performed by: FAMILY MEDICINE

## 2023-01-13 PROCEDURE — G8484 FLU IMMUNIZE NO ADMIN: HCPCS | Performed by: FAMILY MEDICINE

## 2023-01-13 PROCEDURE — 3023F SPIROM DOC REV: CPT | Performed by: FAMILY MEDICINE

## 2023-01-13 PROCEDURE — G8427 DOCREV CUR MEDS BY ELIG CLIN: HCPCS | Performed by: FAMILY MEDICINE

## 2023-01-13 PROCEDURE — 3074F SYST BP LT 130 MM HG: CPT | Performed by: FAMILY MEDICINE

## 2023-01-13 PROCEDURE — 1123F ACP DISCUSS/DSCN MKR DOCD: CPT | Performed by: FAMILY MEDICINE

## 2023-01-13 PROCEDURE — 4004F PT TOBACCO SCREEN RCVD TLK: CPT | Performed by: FAMILY MEDICINE

## 2023-01-13 PROCEDURE — G8419 CALC BMI OUT NRM PARAM NOF/U: HCPCS | Performed by: FAMILY MEDICINE

## 2023-01-13 PROCEDURE — 3078F DIAST BP <80 MM HG: CPT | Performed by: FAMILY MEDICINE

## 2023-01-13 RX ORDER — OXYBUTYNIN CHLORIDE 10 MG/1
TABLET, EXTENDED RELEASE ORAL
COMMUNITY
Start: 2022-12-22

## 2023-01-13 ASSESSMENT — PATIENT HEALTH QUESTIONNAIRE - PHQ9
SUM OF ALL RESPONSES TO PHQ9 QUESTIONS 1 & 2: 0
1. LITTLE INTEREST OR PLEASURE IN DOING THINGS: 0
SUM OF ALL RESPONSES TO PHQ QUESTIONS 1-9: 0
SUM OF ALL RESPONSES TO PHQ QUESTIONS 1-9: 0
2. FEELING DOWN, DEPRESSED OR HOPELESS: 0
SUM OF ALL RESPONSES TO PHQ QUESTIONS 1-9: 0
SUM OF ALL RESPONSES TO PHQ QUESTIONS 1-9: 0

## 2023-01-16 PROBLEM — Z89.511 ACQUIRED ABSENCE OF RIGHT LEG BELOW KNEE (HCC): Status: ACTIVE | Noted: 2023-01-16

## 2023-01-16 PROBLEM — D72.829 LEUKOCYTOSIS: Status: ACTIVE | Noted: 2023-01-16

## 2023-01-16 PROBLEM — R63.4 WEIGHT LOSS: Status: ACTIVE | Noted: 2023-01-16

## 2023-01-16 PROBLEM — D72.810 LYMPHOPENIA: Status: ACTIVE | Noted: 2023-01-16

## 2023-01-16 ASSESSMENT — ENCOUNTER SYMPTOMS
ANAL BLEEDING: 0
WHEEZING: 0
CONSTIPATION: 0
EYE REDNESS: 0
CHOKING: 0
VOMITING: 0
BLOOD IN STOOL: 0
RHINORRHEA: 0
EYE DISCHARGE: 0
GASTROINTESTINAL NEGATIVE: 1
SINUS PAIN: 0
ABDOMINAL PAIN: 0
FACIAL SWELLING: 0
BACK PAIN: 0
EYE PAIN: 0
CHEST TIGHTNESS: 0
STRIDOR: 0
TROUBLE SWALLOWING: 0
PHOTOPHOBIA: 0
EYE ITCHING: 0
RECTAL PAIN: 0
SHORTNESS OF BREATH: 0
SORE THROAT: 0
COLOR CHANGE: 0
COUGH: 1
DIARRHEA: 0
VOICE CHANGE: 0
NAUSEA: 0
SINUS PRESSURE: 0
ABDOMINAL DISTENTION: 0
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
## 2023-01-17 ENCOUNTER — TELEPHONE (OUTPATIENT)
Dept: FAMILY MEDICINE CLINIC | Age: 78
End: 2023-01-17

## 2023-01-17 NOTE — TELEPHONE ENCOUNTER
Melissa from [facility] called today to notify   that this pt has cancelled several appts with  at their office.

## 2023-01-18 DIAGNOSIS — F41.9 ANXIETY: ICD-10-CM

## 2023-01-19 ENCOUNTER — HOSPITAL ENCOUNTER (OUTPATIENT)
Dept: INFUSION THERAPY | Age: 78
Discharge: HOME | End: 2023-01-19
Payer: MEDICARE

## 2023-01-19 ENCOUNTER — OFFICE VISIT (OUTPATIENT)
Dept: ONCOLOGY | Age: 78
End: 2023-01-19
Payer: MEDICARE

## 2023-01-19 VITALS
WEIGHT: 128 LBS | HEIGHT: 71 IN | RESPIRATION RATE: 16 BRPM | DIASTOLIC BLOOD PRESSURE: 57 MMHG | TEMPERATURE: 98 F | SYSTOLIC BLOOD PRESSURE: 111 MMHG | OXYGEN SATURATION: 94 % | BODY MASS INDEX: 17.92 KG/M2 | HEART RATE: 73 BPM

## 2023-01-19 DIAGNOSIS — D72.829 LEUKOCYTOSIS, UNSPECIFIED TYPE: ICD-10-CM

## 2023-01-19 DIAGNOSIS — D72.829 LEUKOCYTOSIS, UNSPECIFIED TYPE: Primary | ICD-10-CM

## 2023-01-19 LAB
ALBUMIN SERPL-MCNC: 3.6 G/DL (ref 3.5–5.2)
ALP BLD-CCNC: 98 U/L (ref 40–129)
ALT SERPL-CCNC: 9 U/L (ref 0–40)
ANION GAP SERPL CALCULATED.3IONS-SCNC: 11 MMOL/L (ref 7–16)
AST SERPL-CCNC: 16 U/L (ref 0–39)
BASOPHILS ABSOLUTE: 0.08 E9/L (ref 0–0.2)
BASOPHILS RELATIVE PERCENT: 0.7 % (ref 0–2)
BILIRUB SERPL-MCNC: 0.3 MG/DL (ref 0–1.2)
BUN BLDV-MCNC: 33 MG/DL (ref 6–23)
C-REACTIVE PROTEIN: <0.3 MG/DL (ref 0–0.4)
CALCIUM SERPL-MCNC: 9.6 MG/DL (ref 8.6–10.2)
CHLORIDE BLD-SCNC: 103 MMOL/L (ref 98–107)
CO2: 23 MMOL/L (ref 22–29)
CREAT SERPL-MCNC: 1.7 MG/DL (ref 0.7–1.2)
EOSINOPHILS ABSOLUTE: 0.18 E9/L (ref 0.05–0.5)
EOSINOPHILS RELATIVE PERCENT: 1.7 % (ref 0–6)
GFR SERPL CREATININE-BSD FRML MDRD: 41 ML/MIN/1.73
GLUCOSE BLD-MCNC: 97 MG/DL (ref 74–99)
HCT VFR BLD CALC: 39.2 % (ref 37–54)
HEMOGLOBIN: 13.1 G/DL (ref 12.5–16.5)
IMMATURE GRANULOCYTES #: 0.05 E9/L
IMMATURE GRANULOCYTES %: 0.5 % (ref 0–5)
LYMPHOCYTES ABSOLUTE: 1 E9/L (ref 1.5–4)
LYMPHOCYTES RELATIVE PERCENT: 9.3 % (ref 20–42)
Lab: NORMAL
MCH RBC QN AUTO: 31 PG (ref 26–35)
MCHC RBC AUTO-ENTMCNC: 33.4 % (ref 32–34.5)
MCV RBC AUTO: 92.9 FL (ref 80–99.9)
MONOCYTES ABSOLUTE: 1.01 E9/L (ref 0.1–0.95)
MONOCYTES RELATIVE PERCENT: 9.4 % (ref 2–12)
NEUTROPHILS ABSOLUTE: 8.48 E9/L (ref 1.8–7.3)
NEUTROPHILS RELATIVE PERCENT: 78.4 % (ref 43–80)
PATHOLOGIST REVIEW: NORMAL
PDW BLD-RTO: 16.3 FL (ref 11.5–15)
PLATELET # BLD: 329 E9/L (ref 130–450)
PMV BLD AUTO: 10.5 FL (ref 7–12)
POTASSIUM SERPL-SCNC: 4.5 MMOL/L (ref 3.5–5)
RBC # BLD: 4.22 E12/L (ref 3.8–5.8)
RHEUMATOID FACTOR: <10 IU/ML (ref 0–13)
SEDIMENTATION RATE, ERYTHROCYTE: 8 MM/HR (ref 0–15)
SODIUM BLD-SCNC: 137 MMOL/L (ref 132–146)
THIS TEST SENT TO: NORMAL
TOTAL PROTEIN: 6.5 G/DL (ref 6.4–8.3)
WBC # BLD: 10.8 E9/L (ref 4.5–11.5)

## 2023-01-19 PROCEDURE — 3074F SYST BP LT 130 MM HG: CPT | Performed by: INTERNAL MEDICINE

## 2023-01-19 PROCEDURE — 4004F PT TOBACCO SCREEN RCVD TLK: CPT | Performed by: INTERNAL MEDICINE

## 2023-01-19 PROCEDURE — 99204 OFFICE O/P NEW MOD 45 MIN: CPT | Performed by: INTERNAL MEDICINE

## 2023-01-19 PROCEDURE — G8427 DOCREV CUR MEDS BY ELIG CLIN: HCPCS | Performed by: INTERNAL MEDICINE

## 2023-01-19 PROCEDURE — 3078F DIAST BP <80 MM HG: CPT | Performed by: INTERNAL MEDICINE

## 2023-01-19 PROCEDURE — 1123F ACP DISCUSS/DSCN MKR DOCD: CPT | Performed by: INTERNAL MEDICINE

## 2023-01-19 PROCEDURE — 36415 COLL VENOUS BLD VENIPUNCTURE: CPT

## 2023-01-19 PROCEDURE — G8484 FLU IMMUNIZE NO ADMIN: HCPCS | Performed by: INTERNAL MEDICINE

## 2023-01-19 PROCEDURE — 99214 OFFICE O/P EST MOD 30 MIN: CPT

## 2023-01-19 PROCEDURE — G8419 CALC BMI OUT NRM PARAM NOF/U: HCPCS | Performed by: INTERNAL MEDICINE

## 2023-01-19 RX ORDER — CLOPIDOGREL BISULFATE 75 MG/1
75 TABLET ORAL DAILY
COMMUNITY

## 2023-01-19 RX ORDER — VIBEGRON 75 MG/1
75 TABLET, FILM COATED ORAL DAILY
COMMUNITY
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
## 2023-01-20 LAB
ANTI-NUCLEAR ANTIBODY (ANA): NEGATIVE
HAV IGM SER IA-ACNC: NORMAL
HEPATITIS B CORE IGM ANTIBODY: NORMAL
HEPATITIS B SURFACE ANTIGEN INTERPRETATION: NORMAL
HEPATITIS C ANTIBODY INTERPRETATION: NORMAL
IGA: 257 MG/DL (ref 70–400)
IGG: 981 MG/DL (ref 700–1600)
IGM: 113 MG/DL (ref 40–230)

## 2023-01-22 LAB
Lab: NORMAL
REPORT: NORMAL
THIS TEST SENT TO: NORMAL

## 2023-01-26 LAB
JAK2 QUAL MUTATION BY PCR: DETECTED
SOURCE: ABNORMAL

## 2023-01-27 LAB
Lab: NORMAL
REPORT: NORMAL
THIS TEST SENT TO: NORMAL

## 2023-01-31 ENCOUNTER — HOSPITAL ENCOUNTER (OUTPATIENT)
Dept: ULTRASOUND IMAGING | Age: 78
Discharge: HOME | End: 2023-02-02
Payer: MEDICARE

## 2023-01-31 PROCEDURE — 76705 ECHO EXAM OF ABDOMEN: CPT

## 2023-02-01 ENCOUNTER — ANESTHESIA EVENT (OUTPATIENT)
Dept: OPERATING ROOM | Age: 78
End: 2023-02-01
Payer: MEDICARE
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
## 2023-02-06 ASSESSMENT — LIFESTYLE VARIABLES: SMOKING_STATUS: 1
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
## 2023-02-07 ENCOUNTER — ANESTHESIA (OUTPATIENT)
Dept: OPERATING ROOM | Age: 78
End: 2023-02-07
Payer: MEDICARE

## 2023-02-07 ENCOUNTER — HOSPITAL ENCOUNTER (OUTPATIENT)
Age: 78
Setting detail: OUTPATIENT SURGERY
Discharge: HOME | End: 2023-02-07
Attending: OPHTHALMOLOGY | Admitting: OPHTHALMOLOGY
Payer: MEDICARE

## 2023-02-07 VITALS
WEIGHT: 130 LBS | DIASTOLIC BLOOD PRESSURE: 59 MMHG | HEART RATE: 69 BPM | RESPIRATION RATE: 16 BRPM | OXYGEN SATURATION: 95 % | SYSTOLIC BLOOD PRESSURE: 106 MMHG | HEIGHT: 71 IN | BODY MASS INDEX: 18.2 KG/M2 | TEMPERATURE: 97.7 F

## 2023-02-07 PROBLEM — H26.9 LEFT CATARACT: Status: ACTIVE | Noted: 2023-02-07

## 2023-02-07 PROCEDURE — 2580000003 HC RX 258: Performed by: ANESTHESIOLOGY

## 2023-02-07 PROCEDURE — 7100000011 HC PHASE II RECOVERY - ADDTL 15 MIN: Performed by: OPHTHALMOLOGY

## 2023-02-07 PROCEDURE — 2500000003 HC RX 250 WO HCPCS: Performed by: OPHTHALMOLOGY

## 2023-02-07 PROCEDURE — V2632 POST CHMBR INTRAOCULAR LENS: HCPCS | Performed by: OPHTHALMOLOGY

## 2023-02-07 PROCEDURE — 3700000000 HC ANESTHESIA ATTENDED CARE: Performed by: OPHTHALMOLOGY

## 2023-02-07 PROCEDURE — 6370000000 HC RX 637 (ALT 250 FOR IP): Performed by: OPHTHALMOLOGY

## 2023-02-07 PROCEDURE — 6360000002 HC RX W HCPCS: Performed by: NURSE ANESTHETIST, CERTIFIED REGISTERED

## 2023-02-07 PROCEDURE — 7100000010 HC PHASE II RECOVERY - FIRST 15 MIN: Performed by: OPHTHALMOLOGY

## 2023-02-07 PROCEDURE — 3600000003 HC SURGERY LEVEL 3 BASE: Performed by: OPHTHALMOLOGY

## 2023-02-07 PROCEDURE — 2709999900 HC NON-CHARGEABLE SUPPLY: Performed by: OPHTHALMOLOGY

## 2023-02-07 DEVICE — LENS INTOCU +20.0 DIOPT A CONSTANT 118.8 L13MM DIA6MM 0DEG: Type: IMPLANTABLE DEVICE | Site: EYE | Status: FUNCTIONAL

## 2023-02-07 RX ORDER — SODIUM CHLORIDE 0.9 % (FLUSH) 0.9 %
5-40 SYRINGE (ML) INJECTION PRN
Status: CANCELLED | OUTPATIENT
Start: 2023-02-07

## 2023-02-07 RX ORDER — MIDAZOLAM HYDROCHLORIDE 1 MG/ML
INJECTION INTRAMUSCULAR; INTRAVENOUS PRN
Status: DISCONTINUED | OUTPATIENT
Start: 2023-02-07 | End: 2023-02-07 | Stop reason: SDUPTHER

## 2023-02-07 RX ORDER — ONDANSETRON 2 MG/ML
4 INJECTION INTRAMUSCULAR; INTRAVENOUS
Status: CANCELLED | OUTPATIENT
Start: 2023-02-07 | End: 2023-02-08

## 2023-02-07 RX ORDER — SODIUM CHLORIDE 0.9 % (FLUSH) 0.9 %
5-40 SYRINGE (ML) INJECTION EVERY 12 HOURS SCHEDULED
Status: CANCELLED | OUTPATIENT
Start: 2023-02-07

## 2023-02-07 RX ORDER — FLURBIPROFEN SODIUM 0.3 MG/ML
1 SOLUTION/ DROPS OPHTHALMIC
Status: COMPLETED | OUTPATIENT
Start: 2023-02-07 | End: 2023-02-07

## 2023-02-07 RX ORDER — SODIUM CHLORIDE 9 MG/ML
INJECTION, SOLUTION INTRAVENOUS PRN
Status: CANCELLED | OUTPATIENT
Start: 2023-02-07

## 2023-02-07 RX ORDER — TETRACAINE HYDROCHLORIDE 5 MG/ML
1 SOLUTION OPHTHALMIC ONCE
Status: COMPLETED | OUTPATIENT
Start: 2023-02-07 | End: 2023-02-07

## 2023-02-07 RX ORDER — PROPARACAINE HYDROCHLORIDE 5 MG/ML
1 SOLUTION/ DROPS OPHTHALMIC
Status: COMPLETED | OUTPATIENT
Start: 2023-02-07 | End: 2023-02-07

## 2023-02-07 RX ORDER — CYCLOPENTOLATE HYDROCHLORIDE 10 MG/ML
1 SOLUTION/ DROPS OPHTHALMIC
Status: COMPLETED | OUTPATIENT
Start: 2023-02-07 | End: 2023-02-07

## 2023-02-07 RX ORDER — PHENYLEPHRINE HCL 2.5 %
1 DROPS OPHTHALMIC (EYE)
Status: COMPLETED | OUTPATIENT
Start: 2023-02-07 | End: 2023-02-07

## 2023-02-07 RX ORDER — SODIUM CHLORIDE, SODIUM LACTATE, POTASSIUM CHLORIDE, CALCIUM CHLORIDE 600; 310; 30; 20 MG/100ML; MG/100ML; MG/100ML; MG/100ML
INJECTION, SOLUTION INTRAVENOUS CONTINUOUS
Status: DISCONTINUED | OUTPATIENT
Start: 2023-02-07 | End: 2023-02-07 | Stop reason: HOSPADM

## 2023-02-07 RX ORDER — DIPHENHYDRAMINE HYDROCHLORIDE 50 MG/ML
12.5 INJECTION INTRAMUSCULAR; INTRAVENOUS
Status: CANCELLED | OUTPATIENT
Start: 2023-02-07 | End: 2023-02-08

## 2023-02-07 RX ORDER — PHENYLEPHRINE HYDROCHLORIDE 100 MG/ML
1 SOLUTION/ DROPS OPHTHALMIC PRN
Status: DISCONTINUED | OUTPATIENT
Start: 2023-02-07 | End: 2023-02-07 | Stop reason: HOSPADM

## 2023-02-07 RX ORDER — FENTANYL CITRATE 0.05 MG/ML
50 INJECTION, SOLUTION INTRAMUSCULAR; INTRAVENOUS EVERY 5 MIN PRN
Status: CANCELLED | OUTPATIENT
Start: 2023-02-07

## 2023-02-07 RX ORDER — TETRACAINE HYDROCHLORIDE 5 MG/ML
SOLUTION OPHTHALMIC PRN
Status: DISCONTINUED | OUTPATIENT
Start: 2023-02-07 | End: 2023-02-07 | Stop reason: ALTCHOICE

## 2023-02-07 RX ORDER — BALANCED SALT SOLUTION 6.4; .75; .48; .3; 3.9; 1.7 MG/ML; MG/ML; MG/ML; MG/ML; MG/ML; MG/ML
SOLUTION OPHTHALMIC PRN
Status: DISCONTINUED | OUTPATIENT
Start: 2023-02-07 | End: 2023-02-07 | Stop reason: ALTCHOICE

## 2023-02-07 RX ORDER — FENTANYL CITRATE 50 UG/ML
INJECTION, SOLUTION INTRAMUSCULAR; INTRAVENOUS PRN
Status: DISCONTINUED | OUTPATIENT
Start: 2023-02-07 | End: 2023-02-07 | Stop reason: SDUPTHER

## 2023-02-07 RX ADMIN — PHENYLEPHRINE HYDROCHLORIDE 1 DROP: 25 SOLUTION/ DROPS OPHTHALMIC at 07:40

## 2023-02-07 RX ADMIN — CYCLOPENTOLATE HYDROCHLORIDE 1 DROP: 10 SOLUTION/ DROPS OPHTHALMIC at 07:40

## 2023-02-07 RX ADMIN — PHENYLEPHRINE HYDROCHLORIDE 1 DROP: 25 SOLUTION/ DROPS OPHTHALMIC at 07:30

## 2023-02-07 RX ADMIN — FENTANYL CITRATE 50 MCG: 50 INJECTION INTRAMUSCULAR; INTRAVENOUS at 08:38

## 2023-02-07 RX ADMIN — FLURBIPROFEN SODIUM 1 DROP: 0.3 SOLUTION/ DROPS OPHTHALMIC at 07:30

## 2023-02-07 RX ADMIN — PROPARACAINE HYDROCHLORIDE 1 DROP: 5 SOLUTION/ DROPS OPHTHALMIC at 07:35

## 2023-02-07 RX ADMIN — FLURBIPROFEN SODIUM 1 DROP: 0.3 SOLUTION/ DROPS OPHTHALMIC at 07:35

## 2023-02-07 RX ADMIN — CYCLOPENTOLATE HYDROCHLORIDE 1 DROP: 10 SOLUTION/ DROPS OPHTHALMIC at 07:35

## 2023-02-07 RX ADMIN — SODIUM CHLORIDE, POTASSIUM CHLORIDE, SODIUM LACTATE AND CALCIUM CHLORIDE: 600; 310; 30; 20 INJECTION, SOLUTION INTRAVENOUS at 07:40

## 2023-02-07 RX ADMIN — PHENYLEPHRINE HYDROCHLORIDE 1 DROP: 25 SOLUTION/ DROPS OPHTHALMIC at 07:35

## 2023-02-07 RX ADMIN — CYCLOPENTOLATE HYDROCHLORIDE 1 DROP: 10 SOLUTION/ DROPS OPHTHALMIC at 07:30

## 2023-02-07 RX ADMIN — TETRACAINE HYDROCHLORIDE 1 DROP: 25 LIQUID OPHTHALMIC at 07:40

## 2023-02-07 RX ADMIN — PROPARACAINE HYDROCHLORIDE 1 DROP: 5 SOLUTION/ DROPS OPHTHALMIC at 07:30

## 2023-02-07 RX ADMIN — PROPARACAINE HYDROCHLORIDE 1 DROP: 5 SOLUTION/ DROPS OPHTHALMIC at 07:40

## 2023-02-07 RX ADMIN — MIDAZOLAM 1 MG: 1 INJECTION INTRAMUSCULAR; INTRAVENOUS at 08:33

## 2023-02-07 RX ADMIN — FLURBIPROFEN SODIUM 1 DROP: 0.3 SOLUTION/ DROPS OPHTHALMIC at 07:40

## 2023-02-07 RX ADMIN — FENTANYL CITRATE 50 MCG: 50 INJECTION INTRAMUSCULAR; INTRAVENOUS at 08:33

## 2023-02-07 NOTE — OP NOTE
PREOPERATIVE DIAGNOSIS:  Left Cataract    POSTOPERATIVE DIAGNOSIS: Left Cataract    PROCEDURE:  Left phacoemulsification with intraocular lens implant. ANESTHESIA:  Local Mac    ESTIMATED BLOOD LOSS:  Minimal    COMPLICATIONS:  None    DESCRIPTION OF PROCEDURE:  The patient was brought into the operating room. The operative eye was marked, which was the left eye. The left eye was then prepped with a full-strength Betadine preparation. The periorbital area was copiously washed with Betadine. The lashes were washed with Betadine. Dilute Betadine was then also put in the inferior and superior fornices and left in place for approximately a minute or 2. This was then irrigated with sterile water. The face was then wiped and the preparation was repeated 1 more time. The drape was then placed over the operative eye with the sticky adhesive placed at the lid margins so that it draped the lashes out of the operative field superiorly and inferiorly, as well as isolated the meibomian glands behind the drape. This cleared the operative field of any meibomian gland secretions and eyelashes. Next, a lid speculum was positioned in the left eye. A super sharp blade was used to make a side-port incision at the 2 o'clock position. A clear corneal incision was fashioned over the 12 o;clock meridian with a 2.3mm keratome at the limbus. Healon was used to reform the anterior chamber. A continuous tear anterior capsulotomy was then performed with a bent needle cystotome. Hydrodissection was performed by irrigating with balanced salt solution through a syringe underneath the anterior capsular flap to loosen and allow free rotation of the lens nucleus. Phacoemulsification was used and the entire lens nucleus was removed. The I A unit was instilled in the eye, and the remaining cortex was removed. Healon was used to separate the anterior and posterior capsular flaps.   The PCBOO 20.0 diopter lens was then instilled into the capsular bag and dialed to 3 and 9 o'clock positions. Healon was removed from in front of and behind the lens. The lens was found to be well centered, well positioned in the bag and stable. The wound was checked and found to be airtight and watertight. The lid speculum was removed and the drape was removed. The patient was brought to the recovery room in excellent condition, given postoperative instructions, and discharge in excellent condition. Operative Note      Patient: Catina Gupta  YOB: 1945  MRN: [id number]    Date of Procedure: 2/7/2023    Pre-Op Diagnosis: Combined forms of age-related cataract of left eye [H25.812]    Post-Op Diagnosis: Same       Procedure(s):  LEFT  CATARACT EXTRACTION    Surgeon(s):  Jodie Gordillo MD    Assistant:   * No surgical staff found *    Anesthesia: Monitor Anesthesia Care    Estimated Blood Loss (mL): Minimal    Complications: None    Specimens:   * No specimens in log *    Implants:  Implant Name Type Inv.  Item Serial No.  Lot No. LRB No. Used Action   LENS INTOCU +20.0 DIOPT A CONSTANT 118.8 L13MM DIA6MM 0DEG - [id number]  LENS INTOCU +20.0 DIOPT A CONSTANT 118.8 L13MM DIA6MM 0DEG [id number] [facility] [employer]-  Left 1 Implanted         Drains: * No LDAs found *    Findings: Left cataract    Detailed Description of Procedure:   Left cataract extraction with intra ocular lens implant    Electronically signed by Isela Hearn MD on 2/7/2023 at 8:41 AM

## 2023-02-07 NOTE — DISCHARGE INSTRUCTIONS
Cataract Post-Op Instructions  KAYLAH Rodríguez Jr., M.D.  [phone]    Dr. Rodríguez has used the most modern surgical techniques during your cataract extraction; therefore, there are few restrictions.    You may move your eye in any direction, watch TV, read, cook dinner, clean house, and go up and down steps.    There are no physical restrictions, though you should avoid extreme exertion, do not drive day of surgery, and avoid swimming for three weeks.    We make every attempt to provide a pain-free procedure.  At times you may notice a dull headache or even a sharp pain.  This is normal.  Should the discomfort persist, please call the office.    If you see any flashes of light, floaters, or a black cloud over the eyes, call the office immediately.    The vision in the operated eye will be blurry at first. Be patient. Your vision will improve dramatically over the next few days. You will see glares and halos around lights for the first day or so. This is a result of the dilating drops used for the surgery. You may also notice red or pink tinged vision. This is normal and will go away in a few days.    Your eye will continue to heal over the next two to three weeks. At the end of the healing time you will see Dr. Rodríguez in the office to check your vision and determine your new glasses prescription.    Resume regular diet and medications (unless otherwise instructed by your doctor).    Medicine drops will be necessary to ensure as rapid healing as possible.  These include:    Vigamox one drop three times a day  Nevanac one drop three times a day   Pred Forte one drop three times a day    Your post-op visit will be __________ at __________ at the office.                                                Date                 Time    Your satisfaction is our primary concern.  If you have any questions, please contact the office.  It is our pleasure to be your choice in eye care.    ***DO NOT RUB OR  TOUCH THE OPERATIVE EYE***      If any problems occur or if you have any further questions, please call your doctor as soon as possible. If you find that you cannot reach your doctor but feel that your condition needs a doctors attention go to an emergency room. Nausea and Vomiting After Surgery: Care Instructions  Your Care Instructions     After you've had surgery, you may feel sick to your stomach (nauseated) or you may vomit. Sometimes anesthesia can make you feel sick. It's a common side effect and often doesn't last long. Pain also can make you feel sick or vomit. After the anesthesia wears off, you may feel pain from the incision (cut). That pain could then upset your stomach. Taking pain medicine can also make you feel sick to your stomach. Whatever the cause, you may get medicine that can help. There are also some things you can do at home to prevent nausea and feel better. The doctor has checked you carefully, but problems can develop later. If you notice any problems or new symptoms, get medical treatment right away. Follow-up care is a key part of your treatment and safety. Be sure to make and go to all appointments, and call your doctor if you are having problems. It's also a good idea to know your test results and keep a list of the medicines you take. How can you care for yourself at home? Be safe with medicines. Read and follow all instructions on the label. If the doctor gave you a prescription medicine for pain, take it as prescribed. If you are not taking a prescription pain medicine, ask your doctor if you can take an over-the-counter medicine. Take your pain medicine as soon as you have pain. It works better if you take it before the pain gets bad. Call your doctor if you have any problems with your medicine. Rest in bed until you feel better. To prevent dehydration, drink plenty of fluids. Choose water and other clear liquids until you feel better.  If you have kidney, heart, or liver disease and have to limit fluids, talk with your doctor before you increase the amount of fluids you drink. When you are able to eat, try clear soups, mild foods, and liquids until all symptoms are gone for 12 to 48 hours. Other good choices include dry toast, crackers, cooked cereal, and gelatin dessert, such as Jell-O. Do not smoke. Smoking and being around smoke can make nausea worse. If you need help quitting, talk to your doctor about stop-smoking programs and medicines. These can increase your chances of quitting for good. When should you call for help? Call 911  anytime you think you may need emergency care. For example, call if:    You passed out (lost consciousness). Call your doctor now or seek immediate medical care if:    You have new or worse nausea or vomiting. You are too sick to your stomach to drink any fluids. You cannot keep down fluids. You have symptoms of dehydration, such as:  Dry eyes and a dry mouth. Passing only a little urine. Feeling thirstier than usual.     Your pain medicine is not helping. You are dizzy or lightheaded, or you feel like you may faint. Watch closely for changes in your health, and be sure to contact your doctor if:    You do not get better as expected. Current as of: March 9, 2022               Content Version: 13.5  © 2006-2022 [employer]. Care instructions adapted under license by [facility] ([facility]). If you have questions about a medical condition or this instruction, always ask your healthcare professional. [place] [address] any warranty or liability for your use of this information.

## 2023-02-07 NOTE — ANESTHESIA POSTPROCEDURE EVALUATION
Department of Anesthesiology  Postprocedure Note    Patient: Gaudencio Smith  MRN: [id number]  [place]: 1945  Date of evaluation: 2/7/2023      Procedure Summary     Date: 02/07/23 Room / Location: [address] [place] [address]    Anesthesia Start: 5397 Anesthesia Stop: 7593    Procedure: LEFT  CATARACT EXTRACTION (Left: Eye) Diagnosis:       Combined forms of age-related cataract of left eye      (Combined forms of age-related cataract of left eye [H25.812])    Surgeons: Fior Garza MD Responsible Provider: Leonarda Mohs, MD    Anesthesia Type: MAC ASA Status: 3          Anesthesia Type: MAC    Yuliana Phase I: Yuliana Score: 10    Yuliana Phase II: Yuliana Score: 10      Anesthesia Post Evaluation    Patient location during evaluation: PACU  Patient participation: complete - patient participated  Level of consciousness: awake and alert  Airway patency: patent  Nausea & Vomiting: no nausea and no vomiting  Complications: no  Cardiovascular status: hemodynamically stable (BBB noted on monitor intraop)  Respiratory status: room air and spontaneous ventilation  Hydration status: stable

## 2023-02-07 NOTE — H&P
Update History & Physical    The patient's History and Physical of 2 / 3 / 2023 was reviewed with the patient and there were no significant changes. I examined the patient and there were no significant changes from the previous History and Physical.    Plan: The risk, benefits, expected outcome, and alternative to the recommended procedure have been discussed with the patient. Patient understands and wants to proceed with the procedure.     Electronically signed by Liz Escalante MD on 2/7/23 at 7:19 AM EST

## 2023-02-09 ENCOUNTER — TELEPHONE (OUTPATIENT)
Dept: INFUSION THERAPY | Age: 78
End: 2023-02-09

## 2023-02-09 ENCOUNTER — OFFICE VISIT (OUTPATIENT)
Dept: ONCOLOGY | Age: 78
End: 2023-02-09
Payer: MEDICARE

## 2023-02-09 ENCOUNTER — HOSPITAL ENCOUNTER (OUTPATIENT)
Dept: INFUSION THERAPY | Age: 78
Discharge: HOME | End: 2023-02-09

## 2023-02-09 VITALS
HEIGHT: 71 IN | RESPIRATION RATE: 16 BRPM | OXYGEN SATURATION: 95 % | WEIGHT: 133 LBS | SYSTOLIC BLOOD PRESSURE: 116 MMHG | HEART RATE: 74 BPM | BODY MASS INDEX: 18.62 KG/M2 | TEMPERATURE: 97.2 F | DIASTOLIC BLOOD PRESSURE: 62 MMHG

## 2023-02-09 DIAGNOSIS — D72.829 LEUKOCYTOSIS, UNSPECIFIED TYPE: Primary | ICD-10-CM

## 2023-02-09 DIAGNOSIS — R23.3 EASY BRUISING: ICD-10-CM

## 2023-02-09 DIAGNOSIS — D72.825 BANDEMIA: Primary | ICD-10-CM

## 2023-02-09 PROCEDURE — G8420 CALC BMI NORM PARAMETERS: HCPCS | Performed by: INTERNAL MEDICINE

## 2023-02-09 PROCEDURE — 4004F PT TOBACCO SCREEN RCVD TLK: CPT | Performed by: INTERNAL MEDICINE

## 2023-02-09 PROCEDURE — 3074F SYST BP LT 130 MM HG: CPT | Performed by: INTERNAL MEDICINE

## 2023-02-09 PROCEDURE — 99214 OFFICE O/P EST MOD 30 MIN: CPT | Performed by: INTERNAL MEDICINE

## 2023-02-09 PROCEDURE — G8484 FLU IMMUNIZE NO ADMIN: HCPCS | Performed by: INTERNAL MEDICINE

## 2023-02-09 PROCEDURE — 1123F ACP DISCUSS/DSCN MKR DOCD: CPT | Performed by: INTERNAL MEDICINE

## 2023-02-09 PROCEDURE — 99213 OFFICE O/P EST LOW 20 MIN: CPT

## 2023-02-09 PROCEDURE — 3078F DIAST BP <80 MM HG: CPT | Performed by: INTERNAL MEDICINE

## 2023-02-09 PROCEDURE — G8427 DOCREV CUR MEDS BY ELIG CLIN: HCPCS | Performed by: INTERNAL MEDICINE

## 2023-02-09 NOTE — PROGRESS NOTES
Department of [facility] Oncology  Attending Clinic Note    Reason for Visit: Follow-up on a patient with Leukocytosis/neutrophilia    PCP:  Nate Garcias DO    History of Present Illness:  26-year-old male with history of CAD, hypertension, hyperlipidemia, CKD stage III, restless leg syndrome, emphysema, smoker who was noted to have leukocytosis/neutrophilia on routine CBC  On 12/15/2022: Hb 13.6  Hct 41.1  MCV 91.1    WBC 13.8  ANC 10.97 ALC 1.13 Abs mono 1.28  BUN 30 Creat 1.6     RPR Non-Reactive  Hep C Ab Non-Reactive  HIV Non-reactive  No systemic symptoms. He continues to smoke. No history of recurrent infections. No known history of autoimmune disorders. No history of neoplasm  Referred to our clinic for further evaluation  Today 02/09/2023; No fever, chills. Fair appetite and energy level. No N.V.    Review of Systems:  CONSTITUTIONAL: No fever, chills. Fair appetite and energy level. ENMT: Eyes: No diplopia; Nose: No epistaxis. Mouth: No sore throat. RESPIRATORY: No hemoptysis, shortness of breath, cough. CARDIOVASCULAR: No chest pain, palpitations. GASTROINTESTINAL: No nausea/vomiting, abdominal pain  GENITOURINARY: No dysuria, urinary frequency, hematuria. NEURO: No syncope, presyncope, headache. Remainder: ROS NEGATIVE    Past Medical History:      Diagnosis Date    Asbestos exposure     Asbestosis ([facility] [address]     mild    CAD (coronary artery disease)     Dr. Belle Grand - 11/22    Chronic renal disease, stage III [facility]) [[id number]] 05/28/2022    Current severe episode of major depressive disorder without psychotic features without prior episode ([address] 08/28/2018    Hypertension     Mixed hyperlipidemia 01/25/2021    Pulmonary emphysema ([facility] [address] 12/29/2019    Restless leg syndrome      Medications:  Reviewed and reconciled. Allergies:   Allergies   Allergen Reactions    Codeine      MAKES PT VERY HIGH     Physical Exam:  /62 (Site: Right Upper Arm, Position: Sitting, Cuff Size: Large Adult)   Pulse 74   Temp 97.2 °F (36.2 °C) (Infrared)   Resp 16   Ht 5' 11\" (1.803 m)   Wt 133 lb (60.3 kg)   SpO2 95%   BMI 18.55 kg/m²   GENERAL: Alert, oriented x 3, not in acute distress. HEENT: PERRLA; EOMI. Oropharynx clear. LUNGS: Good air entry bilaterally. No wheezing, crackles or ronchi. CARDIOVASCULAR: Regular rate. No murmurs, rubs or gallops. EXTREMITIES: Without clubbing, cyanosis, or edema. NEUROLOGIC: No focal deficits. Impression/Plan:  25-year-old male with history of CAD, hypertension, hyperlipidemia, CKD stage III, restless leg syndrome, emphysema, smoker who was noted to have leukocytosis/neutrophilia on routine CBC  On 12/15/2022: Hb 13.6  Hct 41.1  MCV 91.1    WBC 13.8  ANC 10.97 ALC 1.13 Abs mono 1.28  BUN 30 Creat 1.6  LFTs wnl    RPR Non-Reactive  Hep C Ab Non-Reactive  HIV Non-reactive  No systemic symptoms. He continues to smoke. No history of recurrent infections. No known history of autoimmune disorders. No history of neoplasm  Referred to our clinic for further evaluation    Extensive blood work ordered to evaluate leukocytosis/neutrophilia on 01/19/2023  Hb 13.1  Hct 39.2  MCV 92.9    WBC 10.8  ANC 8.48  ALC 1.00 Abs mono 1.01  BUN 33 Creat 1.7  LFTs wnl    Examination of the peripheral smear and blood counts/indices reveals mild absolute granulocytosis/monocytosis, and mild absolute lymphocytopenia. The red cell population appears essentially normal.   Circulating granulocytes show no dysplastic changes or hypersegmentation. There are no circulating blast forms. The remaining leukocytes appear morphologically normal.  No platelet abnormalities are detected. ESR 8  CRP <0.3    RF <10  IMELDA Negative    Acute hepatitis panel Non-Reactive    IgA, IgG, IgM WNL    Peripheral blood Flow Cytometry noted no immunophenotypic evidence of a lymphoproliferative disorder, acute leukemia or circulating blast is identified. Peripheral blood BCR/ABL FISH: Negative  There is no evidence of BCR/ABL1 fusion  JAK2 mutation Detected     Abdominal ultrasound 01/31/2023 noted hepatomegaly with normal liver parenchyma  Imaging reviewed. Labs reviewed. Pathology reviewed. 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

## 2023-02-09 NOTE — TELEPHONE ENCOUNTER
[facility] notified of ABD [place] needs to check for splenomegaly. [facility] states that an addendum will be placed by the reading radiologist by tomorrow. 2/10/23.  Dr Kishor Ramires aware

## 2023-02-10 ENCOUNTER — TELEPHONE (OUTPATIENT)
Dept: INFUSION THERAPY | Age: 78
End: 2023-02-10

## 2023-02-10 ENCOUNTER — OFFICE VISIT (OUTPATIENT)
Dept: FAMILY MEDICINE CLINIC | Age: 78
End: 2023-02-10
Payer: MEDICARE

## 2023-02-10 VITALS
WEIGHT: 134 LBS | TEMPERATURE: 97.3 F | OXYGEN SATURATION: 97 % | HEART RATE: 74 BPM | SYSTOLIC BLOOD PRESSURE: 92 MMHG | BODY MASS INDEX: 18.76 KG/M2 | HEIGHT: 71 IN | DIASTOLIC BLOOD PRESSURE: 50 MMHG | RESPIRATION RATE: 17 BRPM

## 2023-02-10 DIAGNOSIS — D72.825 BANDEMIA: ICD-10-CM

## 2023-02-10 DIAGNOSIS — I21.11 ST ELEVATION MYOCARDIAL INFARCTION INVOLVING RIGHT CORONARY ARTERY (HCC): ICD-10-CM

## 2023-02-10 DIAGNOSIS — J43.9 PULMONARY EMPHYSEMA, UNSPECIFIED EMPHYSEMA TYPE (HCC): ICD-10-CM

## 2023-02-10 DIAGNOSIS — F41.9 ANXIETY: ICD-10-CM

## 2023-02-10 DIAGNOSIS — D72.829 LEUKOCYTOSIS, UNSPECIFIED TYPE: Primary | ICD-10-CM

## 2023-02-10 DIAGNOSIS — I10 ESSENTIAL HYPERTENSION: Primary | ICD-10-CM

## 2023-02-10 PROCEDURE — 99213 OFFICE O/P EST LOW 20 MIN: CPT | Performed by: FAMILY MEDICINE

## 2023-02-10 PROCEDURE — 3078F DIAST BP <80 MM HG: CPT | Performed by: FAMILY MEDICINE

## 2023-02-10 PROCEDURE — G8427 DOCREV CUR MEDS BY ELIG CLIN: HCPCS | Performed by: FAMILY MEDICINE

## 2023-02-10 PROCEDURE — 1123F ACP DISCUSS/DSCN MKR DOCD: CPT | Performed by: FAMILY MEDICINE

## 2023-02-10 PROCEDURE — G8420 CALC BMI NORM PARAMETERS: HCPCS | Performed by: FAMILY MEDICINE

## 2023-02-10 PROCEDURE — 4004F PT TOBACCO SCREEN RCVD TLK: CPT | Performed by: FAMILY MEDICINE

## 2023-02-10 PROCEDURE — 3023F SPIROM DOC REV: CPT | Performed by: FAMILY MEDICINE

## 2023-02-10 PROCEDURE — 3074F SYST BP LT 130 MM HG: CPT | Performed by: FAMILY MEDICINE

## 2023-02-10 PROCEDURE — G8484 FLU IMMUNIZE NO ADMIN: HCPCS | Performed by: FAMILY MEDICINE

## 2023-02-10 RX ORDER — BUSPIRONE HYDROCHLORIDE 5 MG/1
5 TABLET ORAL 2 TIMES DAILY PRN
Qty: 60 TABLET | Refills: 0 | Status: SHIPPED | OUTPATIENT
Start: 2023-02-10 | End: 2023-03-12

## 2023-02-10 SDOH — ECONOMIC STABILITY: INCOME INSECURITY: HOW HARD IS IT FOR YOU TO PAY FOR THE VERY BASICS LIKE FOOD, HOUSING, MEDICAL CARE, AND HEATING?: NOT HARD AT ALL

## 2023-02-10 SDOH — ECONOMIC STABILITY: HOUSING INSECURITY
IN THE LAST 12 MONTHS, WAS THERE A TIME WHEN YOU DID NOT HAVE A STEADY PLACE TO SLEEP OR SLEPT IN A SHELTER (INCLUDING NOW)?: NO

## 2023-02-10 SDOH — ECONOMIC STABILITY: FOOD INSECURITY: WITHIN THE PAST 12 MONTHS, THE FOOD YOU BOUGHT JUST DIDN'T LAST AND YOU DIDN'T HAVE MONEY TO GET MORE.: NEVER TRUE

## 2023-02-10 SDOH — ECONOMIC STABILITY: FOOD INSECURITY: WITHIN THE PAST 12 MONTHS, YOU WORRIED THAT YOUR FOOD WOULD RUN OUT BEFORE YOU GOT MONEY TO BUY MORE.: NEVER TRUE

## 2023-02-13 ASSESSMENT — ENCOUNTER SYMPTOMS
RECTAL PAIN: 0
SINUS PRESSURE: 0
VOMITING: 0
COUGH: 0
EYE REDNESS: 0
SINUS PAIN: 0
VOICE CHANGE: 0
STRIDOR: 0
CHEST TIGHTNESS: 0
ABDOMINAL DISTENTION: 0
SHORTNESS OF BREATH: 0
FACIAL SWELLING: 0
EYE PAIN: 0
EYE DISCHARGE: 0
PHOTOPHOBIA: 0
WHEEZING: 0
DIARRHEA: 0
TROUBLE SWALLOWING: 0
SORE THROAT: 0
ANAL BLEEDING: 0
COLOR CHANGE: 0
ABDOMINAL PAIN: 0
NAUSEA: 0
CHOKING: 0
CONSTIPATION: 0
BACK PAIN: 0
GASTROINTESTINAL NEGATIVE: 1
BLOOD IN STOOL: 0
EYE ITCHING: 0
RHINORRHEA: 0

## 2023-02-20 ENCOUNTER — TELEPHONE (OUTPATIENT)
Dept: FAMILY MEDICINE CLINIC | Age: 78
End: 2023-02-20

## 2023-02-20 DIAGNOSIS — M10.9 ACUTE GOUT, UNSPECIFIED CAUSE, UNSPECIFIED SITE: Primary | ICD-10-CM

## 2023-02-20 RX ORDER — ALLOPURINOL 100 MG/1
100 TABLET ORAL DAILY
Qty: 30 TABLET | Refills: 5 | Status: SHIPPED | OUTPATIENT
Start: 2023-02-20

## 2023-02-20 NOTE — TELEPHONE ENCOUNTER
This MA returned phone call to pt. Pt advised of Rx being sent to pharmacy. Pt verbalized he understood.    Electronically signed by Dona Pitt MA on 2/20/23 at 3:54 PM EST     non joint replacement

## 2023-02-20 NOTE — TELEPHONE ENCOUNTER
Pt called c/o possible gout in his L foot. Pt asking if something can be sent in ASAP as he has a bone marrow biopsy on 3/1 that he does not want to have to cancel. Please advise. Return call to pt at [phone].     Electronically signed by Nader Ta MA on 2/20/23 at 1:38 PM EST

## 2023-02-28 ENCOUNTER — HOSPITAL ENCOUNTER (OUTPATIENT)
Dept: ULTRASOUND IMAGING | Age: 78
Discharge: HOME | End: 2023-03-02
Payer: MEDICARE

## 2023-02-28 DIAGNOSIS — D72.829 LEUKOCYTOSIS, UNSPECIFIED TYPE: ICD-10-CM

## 2023-02-28 PROCEDURE — 76705 ECHO EXAM OF ABDOMEN: CPT

## 2023-03-01 ENCOUNTER — HOSPITAL ENCOUNTER (OUTPATIENT)
Dept: CT IMAGING | Age: 78
Discharge: HOME | End: 2023-03-03
Payer: MEDICARE

## 2023-03-01 VITALS
OXYGEN SATURATION: 97 % | DIASTOLIC BLOOD PRESSURE: 58 MMHG | HEART RATE: 80 BPM | BODY MASS INDEX: 18.62 KG/M2 | TEMPERATURE: 123.8 F | HEIGHT: 71 IN | SYSTOLIC BLOOD PRESSURE: 117 MMHG | WEIGHT: 133 LBS | RESPIRATION RATE: 18 BRPM

## 2023-03-01 DIAGNOSIS — D72.825 BANDEMIA: ICD-10-CM

## 2023-03-01 LAB
BASOPHILS ABSOLUTE: 0.11 E9/L (ref 0–0.2)
BASOPHILS RELATIVE PERCENT: 1 % (ref 0–2)
EOSINOPHILS ABSOLUTE: 0.26 E9/L (ref 0.05–0.5)
EOSINOPHILS RELATIVE PERCENT: 2.4 % (ref 0–6)
HCT VFR BLD CALC: 42.8 % (ref 37–54)
HEMOGLOBIN: 14.5 G/DL (ref 12.5–16.5)
IMMATURE GRANULOCYTES #: 0.04 E9/L
IMMATURE GRANULOCYTES %: 0.4 % (ref 0–5)
INR BLD: 1
LYMPHOCYTES ABSOLUTE: 0.71 E9/L (ref 1.5–4)
LYMPHOCYTES RELATIVE PERCENT: 6.6 % (ref 20–42)
MCH RBC QN AUTO: 30.3 PG (ref 26–35)
MCHC RBC AUTO-ENTMCNC: 33.9 % (ref 32–34.5)
MCV RBC AUTO: 89.5 FL (ref 80–99.9)
MONOCYTES ABSOLUTE: 1.11 E9/L (ref 0.1–0.95)
MONOCYTES RELATIVE PERCENT: 10.3 % (ref 2–12)
NEUTROPHILS ABSOLUTE: 8.5 E9/L (ref 1.8–7.3)
NEUTROPHILS RELATIVE PERCENT: 79.3 % (ref 43–80)
PDW BLD-RTO: 16.2 FL (ref 11.5–15)
PLATELET # BLD: 297 E9/L (ref 130–450)
PMV BLD AUTO: 10.2 FL (ref 7–12)
PROTHROMBIN TIME: 11.1 SEC (ref 9.3–12.4)
RBC # BLD: 4.78 E12/L (ref 3.8–5.8)
WBC # BLD: 10.7 E9/L (ref 4.5–11.5)

## 2023-03-01 PROCEDURE — 85025 COMPLETE CBC W/AUTO DIFF WBC: CPT

## 2023-03-01 PROCEDURE — 2709999900 CT GUIDED NEEDLE PLACEMENT

## 2023-03-01 PROCEDURE — 7100000011 HC PHASE II RECOVERY - ADDTL 15 MIN

## 2023-03-01 PROCEDURE — 6360000002 HC RX W HCPCS: Performed by: RADIOLOGY

## 2023-03-01 PROCEDURE — 2500000003 HC RX 250 WO HCPCS: Performed by: RADIOLOGY

## 2023-03-01 PROCEDURE — 38222 DX BONE MARROW BX & ASPIR: CPT

## 2023-03-01 PROCEDURE — 36415 COLL VENOUS BLD VENIPUNCTURE: CPT

## 2023-03-01 PROCEDURE — 7100000010 HC PHASE II RECOVERY - FIRST 15 MIN

## 2023-03-01 PROCEDURE — 85610 PROTHROMBIN TIME: CPT

## 2023-03-01 RX ORDER — MIDAZOLAM HYDROCHLORIDE 2 MG/2ML
INJECTION, SOLUTION INTRAMUSCULAR; INTRAVENOUS
Status: COMPLETED | OUTPATIENT
Start: 2023-03-01 | End: 2023-03-01

## 2023-03-01 RX ORDER — LIDOCAINE HYDROCHLORIDE 20 MG/ML
INJECTION, SOLUTION INFILTRATION; PERINEURAL
Status: COMPLETED | OUTPATIENT
Start: 2023-03-01 | End: 2023-03-01

## 2023-03-01 RX ORDER — FENTANYL CITRATE 50 UG/ML
INJECTION, SOLUTION INTRAMUSCULAR; INTRAVENOUS
Status: COMPLETED | OUTPATIENT
Start: 2023-03-01 | End: 2023-03-01

## 2023-03-01 RX ADMIN — LIDOCAINE HYDROCHLORIDE 7 ML: 20 INJECTION, SOLUTION INFILTRATION; PERINEURAL at 13:54

## 2023-03-01 RX ADMIN — MIDAZOLAM HYDROCHLORIDE 0.5 MG: 1 INJECTION, SOLUTION INTRAMUSCULAR; INTRAVENOUS at 13:51

## 2023-03-01 RX ADMIN — FENTANYL CITRATE 25 MCG: 50 INJECTION, SOLUTION INTRAMUSCULAR; INTRAVENOUS at 13:52

## 2023-03-01 NOTE — BRIEF OP NOTE
Brief Postoperative Note      Patient: Dav Wong  YOB: 1945  MRN: [id number]    Date of Procedure: 3/1/2023    Pre-Op Diagnosis: 68year old male with leucocytosis and neutrophelia. Post-Op Diagnosis: Same       * No procedures listed *    * No surgeons listed *    Assistant:  * No surgical staff found *    Anesthesia: * No anesthesia type entered *    Estimated Blood Loss (mL): Minimal    Complications: None    Specimens:   Bone marrow aspiration obtained, suboptimal core biopsy specimens due to friable bone. Implants:  * No implants in log *      Drains: * No LDAs found *    Findings: Bone marrow aspiration obtained, suboptimal core biopsy specimens due to friable bone.     Electronically signed by Deanna Browne MD on 3/1/2023 at 3:16 PM

## 2023-03-01 NOTE — PROCEDURES
1130Patient arrived to Radiology department for  bone marrow biopsy. Allergies, home medications, H&P and fasting instructions reviewed with patient. Vital signs taken. IV placed, blood obtained, IV flushed and prn adapter attached. Blood sample sent to lab for ordered tests. 1210Procedural instructions given, questions answered, understanding expressed and consent signed.  Patient given fluoroscopy education, no questions at this time

## 2023-03-01 NOTE — PROCEDURES
1410Patient returned from procedure. Dressing checked, clean, dry, and intact. Patient stable. No s/s of complications noted or reported. Vitals will be checked q 15min, see flow sheets. 1420Patient eating and drinking well with no s/s of complications noted or reported. 1440Patient discharged, site was checked with every set of vitals. Site clean dry and intact. Discharge papers reviewed with patient, questions answered, discharge paper signed. Patient taken to door. Patient in stable condition, no s/s of complications noted or reported.

## 2023-03-01 NOTE — PRE SEDATION
Sedation Pre-Procedure Note    Patient Name: Isamar Kenyon   YOB: 1945  Room/Bed: Room/bed info not found  Medical Record Number: [id number]  Date: 3/1/2023   Time: 3:12 PM       Indication:  68year old male with leucocytosis and neutrophilia . Consent: I have discussed with the patient and/or the patient representative the indication, alternatives, and the possible risks and/or complications of the planned procedure and the anesthesia methods. The patient and/or patient representative appear to understand and agree to proceed. Vital Signs:   Vitals:    03/01/23 1428   BP: (!) 117/58   Pulse: 80   Resp: 18   Temp:    SpO2: 97%       Past Medical History:   has a past medical history of Asbestos exposure, Asbestosis ([facility] [address] CAD (coronary artery disease), Chronic renal disease, stage III ([facility] [address] [[id number]], Current severe episode of major depressive disorder without psychotic features without prior episode ([facility] [address] Hypertension, Mixed hyperlipidemia, Pulmonary emphysema ([address] and Restless leg syndrome. Past Surgical History:   has a past surgical history that includes Leg amputation below knee (Right); Colonoscopy; Endoscopy, colon, diagnostic; other surgical history (Bilateral, 08/04/2017); Coronary angioplasty with stent (04/15/2018); Cardiac catheterization (04/23/2019); and Intracapsular cataract extraction (Left, 2/7/2023). Medications:   Scheduled Meds:   Continuous Infusions:   PRN Meds:   Home Meds:   Prior to Admission medications    Medication Sig Start Date End Date Taking?  Authorizing Provider   allopurinol (ZYLOPRIM) 100 MG tablet Take 1 tablet by mouth daily 2/20/23   Jazzy Pritchardden Catterlin, DO   busPIRone (BUSPAR) 5 MG tablet Take 1 tablet by mouth 2 times daily as needed (anxiety) 2/10/23 3/12/23  Aranza P Catterlin, DO   clopidogrel (PLAVIX) 75 MG tablet Take 75 mg by mouth daily Follow Dr. Jeanette Timmons anticoagulation order    Historical Provider, MD Dillardron (Alonna Brain) 75 MG TABS Take 75 mg by mouth daily    Historical Provider, MD   sertraline (ZOLOFT) 50 MG tablet Take 1 tablet by mouth daily 1/18/23 4/18/23  [facility] Catterpiotr, DO   oxybutynin (DITROPAN-XL) 10 MG extended release tablet  12/22/22   Historical Provider, MD   atorvastatin (LIPITOR) 40 MG tablet daily 10/13/22   Historical Provider, MD   tiotropium (SPIRIVA RESPIMAT) 2.5 MCG/ACT AERS inhaler Inhale 2 puffs into the lungs daily  Patient taking differently: Inhale 2 puffs into the lungs daily as needed 11/30/22   Keiry VELOZ Catterlin, DO   budesonide-formoterol (SYMBICORT) 160-4.5 MCG/ACT AERO Inhale 2 puffs into the lungs 2 times daily 5/25/22   Keiry Back DO   famotidine (PEPCID) 40 MG tablet Take 40 mg by mouth at bedtime 9/16/21   Historical Provider, MD   tamsulosin (FLOMAX) 0.4 MG capsule Take 1 capsule by mouth in the morning and at bedtime 5/6/21   Historical Provider, MD   diclofenac sodium (VOLTAREN) 1 % GEL Apply topically 2 times daily 5/21/21   Keiry Coronelterlin DO   Icosapent Ethyl (VASCEPA) 1 g CAPS capsule Take 2 capsules by mouth 2 times daily 7/27/20   [facility] Wong, DO   Cholecalciferol (VITAMIN D3) 2000 units CAPS Take 1 capsule by mouth daily    Historical Provider, MD   NIFEdipine (ADALAT CC) 30 MG extended release tablet Take 30 mg by mouth every evening    Historical Provider, MD   albuterol sulfate HFA (PROVENTIL;VENTOLIN;PROAIR) 108 (90 Base) MCG/ACT inhaler Inhale 2 puffs into the lungs every 6 hours as needed for Wheezing    Historical Provider, MD   clopidogrel (PLAVIX) 75 MG tablet Take 1 tablet by mouth daily 4/23/18 2/10/23  Ladonna Jiménez MD   isosorbide mononitrate (IMDUR) 30 MG extended release tablet Take 1 tablet by mouth daily  Patient taking differently: Take 30 mg by mouth at bedtime 4/17/18   Nasreen Woodruff MD   nitroGLYCERIN (NITROSTAT) 0.4 MG SL tablet up to max of 3 total doses.  If no relief after 1 dose, call 911. 4/16/18   Jodi Lynch MD metoprolol succinate (TOPROL XL) 50 MG extended release tablet Take 1 tablet by mouth daily  Patient taking differently: Take 25 mg by mouth every evening 4/17/18   Nasreen Woodruff MD   aspirin 81 MG tablet Take 81 mg by mouth at bedtime Follow Dr. Steve Nicole anticoagulation order    Historical Provider, MD     Coumadin Use Last 7 Days:  no  Antiplatelet drug therapy use last 7 days: no  Other anticoagulant use last 7 days: no  Additional Medication Information:  NA      Pre-Sedation Documentation and Exam:   I have reviewed the patient's history and review of systems.     Mallampati Airway Assessment:  normal    Prior History of Anesthesia Complications:   none    ASA Classification:  Class 2 - A normal healthy patient with mild systemic disease    Sedation/ Anesthesia Plan:   intravenous sedation    Medications Planned:   midazolam (Versed) intravenously and fentanyl intravenously    Patient is an appropriate candidate for plan of sedation: yes    Electronically signed by Zeeshan Arana MD on 3/1/2023 at 3:12 PM

## 2023-03-15 ENCOUNTER — OFFICE VISIT (OUTPATIENT)
Dept: ONCOLOGY | Age: 78
End: 2023-03-15
Payer: MEDICARE

## 2023-03-15 ENCOUNTER — HOSPITAL ENCOUNTER (OUTPATIENT)
Dept: INFUSION THERAPY | Age: 78
Discharge: HOME | End: 2023-03-15
Payer: MEDICARE

## 2023-03-15 VITALS
BODY MASS INDEX: 18.2 KG/M2 | OXYGEN SATURATION: 96 % | TEMPERATURE: 97.4 F | WEIGHT: 130 LBS | DIASTOLIC BLOOD PRESSURE: 56 MMHG | HEIGHT: 71 IN | HEART RATE: 65 BPM | SYSTOLIC BLOOD PRESSURE: 122 MMHG

## 2023-03-15 DIAGNOSIS — D47.1 MYELOPROLIFERATIVE DISORDER (HCC): ICD-10-CM

## 2023-03-15 DIAGNOSIS — D72.829 LEUKOCYTOSIS, UNSPECIFIED TYPE: ICD-10-CM

## 2023-03-15 DIAGNOSIS — D72.829 LEUKOCYTOSIS, UNSPECIFIED TYPE: Primary | ICD-10-CM

## 2023-03-15 DIAGNOSIS — R23.3 EASY BRUISING: ICD-10-CM

## 2023-03-15 LAB
BASOPHILS # BLD: 0.12 E9/L (ref 0–0.2)
BASOPHILS NFR BLD: 1.2 % (ref 0–2)
EOSINOPHIL # BLD: 0.15 E9/L (ref 0.05–0.5)
EOSINOPHIL NFR BLD: 1.5 % (ref 0–6)
ERYTHROCYTE [DISTWIDTH] IN BLOOD BY AUTOMATED COUNT: 15.8 FL (ref 11.5–15)
HCT VFR BLD AUTO: 46 % (ref 37–54)
HGB BLD-MCNC: 14.8 G/DL (ref 12.5–16.5)
IMM GRANULOCYTES # BLD: 0.04 E9/L
IMM GRANULOCYTES NFR BLD: 0.4 % (ref 0–5)
INR BLD: 1
LYMPHOCYTES # BLD: 0.91 E9/L (ref 1.5–4)
LYMPHOCYTES NFR BLD: 8.9 % (ref 20–42)
MCH RBC QN AUTO: 30.5 PG (ref 26–35)
MCHC RBC AUTO-ENTMCNC: 32.2 % (ref 32–34.5)
MCV RBC AUTO: 94.8 FL (ref 80–99.9)
MONOCYTES # BLD: 1.04 E9/L (ref 0.1–0.95)
MONOCYTES NFR BLD: 10.2 % (ref 2–12)
NEUTROPHILS # BLD: 7.96 E9/L (ref 1.8–7.3)
NEUTS SEG NFR BLD: 77.8 % (ref 43–80)
PLATELET # BLD AUTO: 386 E9/L (ref 130–450)
PMV BLD AUTO: 10.5 FL (ref 7–12)
PROTHROMBIN TIME: 11.1 SEC (ref 9.3–12.4)
RBC # BLD AUTO: 4.85 E12/L (ref 3.8–5.8)
WBC # BLD: 10.2 E9/L (ref 4.5–11.5)

## 2023-03-15 PROCEDURE — 3074F SYST BP LT 130 MM HG: CPT | Performed by: INTERNAL MEDICINE

## 2023-03-15 PROCEDURE — 36415 COLL VENOUS BLD VENIPUNCTURE: CPT

## 2023-03-15 PROCEDURE — 99212 OFFICE O/P EST SF 10 MIN: CPT

## 2023-03-15 PROCEDURE — 85025 COMPLETE CBC W/AUTO DIFF WBC: CPT

## 2023-03-15 PROCEDURE — 4004F PT TOBACCO SCREEN RCVD TLK: CPT | Performed by: INTERNAL MEDICINE

## 2023-03-15 PROCEDURE — 3078F DIAST BP <80 MM HG: CPT | Performed by: INTERNAL MEDICINE

## 2023-03-15 PROCEDURE — G8484 FLU IMMUNIZE NO ADMIN: HCPCS | Performed by: INTERNAL MEDICINE

## 2023-03-15 PROCEDURE — G8427 DOCREV CUR MEDS BY ELIG CLIN: HCPCS | Performed by: INTERNAL MEDICINE

## 2023-03-15 PROCEDURE — G8419 CALC BMI OUT NRM PARAM NOF/U: HCPCS | Performed by: INTERNAL MEDICINE

## 2023-03-15 PROCEDURE — 99214 OFFICE O/P EST MOD 30 MIN: CPT | Performed by: INTERNAL MEDICINE

## 2023-03-15 PROCEDURE — 1123F ACP DISCUSS/DSCN MKR DOCD: CPT | Performed by: INTERNAL MEDICINE

## 2023-03-15 PROCEDURE — 85610 PROTHROMBIN TIME: CPT

## 2023-03-15 NOTE — PROGRESS NOTES
Department of [facility] Oncology  Attending Clinic Note    Reason for Visit: Follow-up on a patient with Leukocytosis/neutrophilia    PCP:  Michael Posada DO    History of Present Illness:  72-year-old male with history of CAD, hypertension, hyperlipidemia, CKD stage III, restless leg syndrome, emphysema, smoker who was noted to have leukocytosis/neutrophilia on routine CBC  On 12/15/2022: Hb 13.6  Hct 41.1  MCV 91.1    WBC 13.8  ANC 10.97 ALC 1.13 Abs mono 1.28  BUN 30 Creat 1.6     RPR Non-Reactive  Hep C Ab Non-Reactive  HIV Non-reactive  No systemic symptoms. He continues to smoke. No history of recurrent infections. No known history of autoimmune disorders. No history of neoplasm  Referred to our clinic for further evaluation  Today 03/15/2023; No fever, chills. Fair appetite and energy level. No N.V.    Review of Systems:  CONSTITUTIONAL: No fever, chills. Fair appetite and energy level. ENMT: Eyes: No diplopia; Nose: No epistaxis. Mouth: No sore throat. RESPIRATORY: No hemoptysis, shortness of breath, cough. CARDIOVASCULAR: No chest pain, palpitations. GASTROINTESTINAL: No nausea/vomiting, abdominal pain  GENITOURINARY: No dysuria, urinary frequency, hematuria. NEURO: No syncope, presyncope, headache. Remainder: ROS NEGATIVE    Past Medical History:      Diagnosis Date    Asbestos exposure     Asbestosis ([facility] [address]     mild    CAD (coronary artery disease)     Dr. William Fitting - 11/22    Chronic renal disease, stage III [facility]) [[id number]] 05/28/2022    Current severe episode of major depressive disorder without psychotic features without prior episode ([facility] [address] 08/28/2018    Hypertension     Mixed hyperlipidemia 01/25/2021    Pulmonary emphysema ([facility] [address] 12/29/2019    Restless leg syndrome      Medications:  Reviewed and reconciled. Allergies:   Allergies   Allergen Reactions    Codeine      MAKES PT VERY HIGH     Physical Exam:  BP (!) 122/56   Pulse 65   Temp 97.4 °F (36.3 °C)   Ht 5' 11\" (1.803 m)   Wt 130 lb (59 kg)   SpO2 96%   BMI 18.13 kg/m²   GENERAL: Alert, oriented x 3, not in acute distress. HEENT: PERRLA; EOMI. Oropharynx clear. EXTREMITIES: Without clubbing, cyanosis, or edema. NEUROLOGIC: No focal deficits. Lab Results   Component Value Date    WBC 10.2 03/15/2023    HGB 14.8 03/15/2023    HCT 46.0 03/15/2023    MCV 94.8 03/15/2023     03/15/2023     Impression/Plan:  66-year-old male with history of CAD, hypertension, hyperlipidemia, CKD stage III, restless leg syndrome, emphysema, smoker who was noted to have leukocytosis/neutrophilia on routine CBC  On 12/15/2022: Hb 13.6  Hct 41.1  MCV 91.1    WBC 13.8  ANC 10.97 ALC 1.13 Abs mono 1.28  BUN 30 Creat 1.6  LFTs wnl    RPR Non-Reactive  Hep C Ab Non-Reactive  HIV Non-reactive  No systemic symptoms. He continues to smoke. No history of recurrent infections. No known history of autoimmune disorders. No history of neoplasm  Referred to our clinic for further evaluation    Extensive blood work ordered to evaluate leukocytosis/neutrophilia on 01/19/2023  Hb 13.1  Hct 39.2  MCV 92.9    WBC 10.8  ANC 8.48  ALC 1.00 Abs mono 1.01  BUN 33 Creat 1.7  LFTs wnl    Examination of the peripheral smear and blood counts/indices reveals mild absolute granulocytosis/monocytosis, and mild absolute lymphocytopenia. The red cell population appears essentially normal.   Circulating granulocytes show no dysplastic changes or hypersegmentation. There are no circulating blast forms. The remaining leukocytes appear morphologically normal.  No platelet abnormalities are detected. ESR 8  CRP <0.3    RF <10  IMELAD Negative    Acute hepatitis panel Non-Reactive    IgA, IgG, IgM WNL    Peripheral blood Flow Cytometry noted no immunophenotypic evidence of a lymphoproliferative disorder, acute leukemia or circulating blast is identified.    Peripheral blood BCR/ABL FISH: Negative  There is no evidence of BCR/ABL1 fusion  JAK2 mutation Detected     Abdominal ultrasound 01/31/2023 noted hepatomegaly with normal liver parenchyma    BMBx 03/01/2023:  Normocellular marrow with mild granulocytic and megakaryocytic hyperplasia. The bone marrow findings given the prior positive testing for JAK2 mutation are consistent with a myeloid neoplasm. Reticulin fibrosis is mild (grade 0). In the context of neutrophilia and mild monocytosis on CBC, the subtle morphologic findings will be seen mainly in prefibrotic primary myelofibrosis or chronic myelomonocytic leukemia. BM Flow cytometry: No immunophenotypic evidence of a lymphoproliferative disorder, acute leukemia, increase in blasts, or plasma cell neoplasm is identified. BM Cytogenetics: Abnormal male karyotype. The abnormal clone 4/20 cells displays a loss of the Y chromosome. 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 Tracy Bravo MD   3/15/2023  I spent a total of 30 minutes on the date of the service which included preparing to see the patient, face-to-face patient care, completing clinical documentation, counseling and educating the family/family members/caregiver, ordering medications, tests, or procedures complicating results with the patient/family/caregiver.

## 2023-03-17 DIAGNOSIS — R05.9 COUGH: ICD-10-CM

## 2023-03-17 DIAGNOSIS — J43.9 PULMONARY EMPHYSEMA, UNSPECIFIED EMPHYSEMA TYPE (HCC): ICD-10-CM

## 2023-03-17 DIAGNOSIS — J40 BRONCHITIS: ICD-10-CM

## 2023-03-17 RX ORDER — BUDESONIDE AND FORMOTEROL FUMARATE DIHYDRATE 160; 4.5 UG/1; UG/1
2 AEROSOL RESPIRATORY (INHALATION) 2 TIMES DAILY
Qty: 10.2 G | Refills: 5 | Status: SHIPPED | OUTPATIENT
Start: 2023-03-17

## 2023-03-17 NOTE — TELEPHONE ENCOUNTER
Last seen 2/10/2023  Next appt Visit date not found    Electronically signed by Robinette Claude on 3/17/23 at 12:44 PM EDT

## 2023-03-20 RX ORDER — BUSPIRONE HYDROCHLORIDE 5 MG/1
5 TABLET ORAL 2 TIMES DAILY PRN
Qty: 180 TABLET | Refills: 1 | Status: SHIPPED | OUTPATIENT
Start: 2023-03-20 | End: 2023-04-19

## 2023-03-27 ENCOUNTER — TELEPHONE (OUTPATIENT)
Dept: ONCOLOGY | Age: 78
End: 2023-03-27

## 2023-03-27 NOTE — TELEPHONE ENCOUNTER
Was informed by Rob Carrasco, that Stacy Blanchard needs to see [facility] dr prior to returning to see Dr Genevieve Ritter. 03/27/23 Called Stacy Blanchard and explained to him he needs to see dr Gayathri Wick from [facility] first, patient hipolito call us back once he has that appointment day and time. He was agreeable to this arrangement.

## 2023-03-31 ENCOUNTER — OFFICE VISIT (OUTPATIENT)
Dept: FAMILY MEDICINE CLINIC | Age: 78
End: 2023-03-31

## 2023-03-31 ENCOUNTER — HOSPITAL ENCOUNTER (OUTPATIENT)
Dept: ULTRASOUND IMAGING | Age: 78
End: 2023-03-31
Payer: MEDICARE

## 2023-03-31 VITALS
WEIGHT: 132.2 LBS | TEMPERATURE: 97.8 F | OXYGEN SATURATION: 93 % | HEART RATE: 67 BPM | DIASTOLIC BLOOD PRESSURE: 74 MMHG | BODY MASS INDEX: 18.51 KG/M2 | SYSTOLIC BLOOD PRESSURE: 130 MMHG | HEIGHT: 71 IN | RESPIRATION RATE: 18 BRPM

## 2023-03-31 DIAGNOSIS — I20.9 ANGINA PECTORIS, UNSPECIFIED (HCC): ICD-10-CM

## 2023-03-31 DIAGNOSIS — D47.1 MYELOPROLIFERATIVE NEOPLASM (HCC): ICD-10-CM

## 2023-03-31 DIAGNOSIS — I71.21 ANEURYSM OF ASCENDING AORTA WITHOUT RUPTURE (HCC): ICD-10-CM

## 2023-03-31 DIAGNOSIS — M79.89 LEG SWELLING: Primary | ICD-10-CM

## 2023-03-31 DIAGNOSIS — I21.11 ST ELEVATION MYOCARDIAL INFARCTION INVOLVING RIGHT CORONARY ARTERY (HCC): ICD-10-CM

## 2023-03-31 DIAGNOSIS — J43.9 PULMONARY EMPHYSEMA, UNSPECIFIED EMPHYSEMA TYPE (HCC): ICD-10-CM

## 2023-03-31 DIAGNOSIS — I25.119 ATHEROSCLEROSIS OF NATIVE CORONARY ARTERY OF NATIVE HEART WITH ANGINA PECTORIS (HCC): ICD-10-CM

## 2023-03-31 DIAGNOSIS — M79.89 LEG SWELLING: ICD-10-CM

## 2023-03-31 DIAGNOSIS — I10 ESSENTIAL HYPERTENSION: ICD-10-CM

## 2023-03-31 DIAGNOSIS — M79.605 PAIN OF LEFT LOWER EXTREMITY: ICD-10-CM

## 2023-03-31 PROCEDURE — 93971 EXTREMITY STUDY: CPT

## 2023-03-31 RX ORDER — ALBUTEROL SULFATE 90 UG/1
2 AEROSOL, METERED RESPIRATORY (INHALATION) EVERY 6 HOURS PRN
Qty: 18 G | Refills: 3 | Status: SHIPPED | OUTPATIENT
Start: 2023-03-31

## 2023-04-03 PROBLEM — D47.1 MYELOPROLIFERATIVE NEOPLASM (HCC): Status: ACTIVE | Noted: 2023-04-03

## 2023-04-03 PROBLEM — M79.605 PAIN OF LEFT LOWER EXTREMITY: Status: ACTIVE | Noted: 2023-04-03

## 2023-04-03 ASSESSMENT — ENCOUNTER SYMPTOMS
DIARRHEA: 0
WHEEZING: 0
COUGH: 0
SINUS PRESSURE: 0
ABDOMINAL DISTENTION: 0
ANAL BLEEDING: 0
BLOOD IN STOOL: 0
SHORTNESS OF BREATH: 0
VOICE CHANGE: 0
VOMITING: 0
EYE ITCHING: 0
CHEST TIGHTNESS: 0
SINUS PAIN: 0
PHOTOPHOBIA: 0
NAUSEA: 0
ABDOMINAL PAIN: 0
COLOR CHANGE: 0
STRIDOR: 0
GASTROINTESTINAL NEGATIVE: 1
SORE THROAT: 0
FACIAL SWELLING: 0
EYE DISCHARGE: 0
EYE REDNESS: 0
RECTAL PAIN: 0
CONSTIPATION: 0
CHOKING: 0
BACK PAIN: 0
EYE PAIN: 0
RHINORRHEA: 0
TROUBLE SWALLOWING: 0

## 2023-04-03 NOTE — PROGRESS NOTES
AIDAN Todd is a 68 y.o. male. HPI/Chief C/O:  Chief Complaint   Patient presents with    Hypertension    Results     He would like to discuss the outcome of his bone marrow biopsy      Allergies   Allergen Reactions    Codeine      MAKES PT VERY HIGH     This 68year old male presents for follow up after oncology and bone biopsy. Pt has myeloproliferative neoplasm ([address] Pt is referred to [facility]. Pt has hypertension, STEMI ([address] emphysema, and weight loss. Pt c/o leg swelling of left lower extremity. Pt denies chest pain and denies shortness of breath. Pt instructed to DC SMOKING. ROS:  Review of Systems   Constitutional:  Positive for activity change, fatigue and unexpected weight change. Negative for appetite change, chills, diaphoresis and fever. HENT:  Negative for congestion, dental problem, drooling, ear discharge, ear pain, facial swelling, hearing loss, mouth sores, nosebleeds, postnasal drip, rhinorrhea, sinus pressure, sinus pain, sneezing, sore throat, tinnitus, trouble swallowing and voice change. Eyes:  Positive for visual disturbance. Negative for photophobia, pain, discharge, redness and itching. Respiratory:  Negative for cough, choking, chest tightness, shortness of breath, wheezing and stridor. Cardiovascular:  Positive for leg swelling. Negative for chest pain and palpitations. Gastrointestinal: Negative. Negative for abdominal distention, abdominal pain, anal bleeding, blood in stool, constipation, diarrhea, nausea, rectal pain and vomiting. Endocrine: Negative. Negative for cold intolerance, heat intolerance, polydipsia, polyphagia and polyuria. Genitourinary: Negative. Negative for decreased urine volume, difficulty urinating, dysuria, flank pain, frequency, hematuria and urgency. Musculoskeletal:  Positive for arthralgias, gait problem and myalgias. Negative for back pain, joint swelling, neck pain and neck stiffness.    Skin:  Negative for color

## 2023-04-20 ENCOUNTER — TELEPHONE (OUTPATIENT)
Dept: INFUSION THERAPY | Age: 78
End: 2023-04-20

## 2023-04-20 ENCOUNTER — HOSPITAL ENCOUNTER (OUTPATIENT)
Dept: INFUSION THERAPY | Age: 78
Discharge: HOME | End: 2023-04-20

## 2023-04-20 ENCOUNTER — OFFICE VISIT (OUTPATIENT)
Dept: ONCOLOGY | Age: 78
End: 2023-04-20
Payer: MEDICARE

## 2023-04-20 VITALS
HEIGHT: 71 IN | WEIGHT: 129 LBS | TEMPERATURE: 98.1 F | BODY MASS INDEX: 18.06 KG/M2 | OXYGEN SATURATION: 92 % | DIASTOLIC BLOOD PRESSURE: 70 MMHG | HEART RATE: 73 BPM | SYSTOLIC BLOOD PRESSURE: 126 MMHG

## 2023-04-20 DIAGNOSIS — F41.9 ANXIETY: ICD-10-CM

## 2023-04-20 DIAGNOSIS — D47.1 MPN (MYELOPROLIFERATIVE NEOPLASM) (HCC): Primary | ICD-10-CM

## 2023-04-20 DIAGNOSIS — C93.10 CHRONIC MYELOMONOCYTIC LEUKEMIA NOT HAVING ACHIEVED REMISSION (HCC): ICD-10-CM

## 2023-04-20 PROCEDURE — G8419 CALC BMI OUT NRM PARAM NOF/U: HCPCS | Performed by: INTERNAL MEDICINE

## 2023-04-20 PROCEDURE — 99214 OFFICE O/P EST MOD 30 MIN: CPT | Performed by: INTERNAL MEDICINE

## 2023-04-20 PROCEDURE — 1123F ACP DISCUSS/DSCN MKR DOCD: CPT | Performed by: INTERNAL MEDICINE

## 2023-04-20 PROCEDURE — 3074F SYST BP LT 130 MM HG: CPT | Performed by: INTERNAL MEDICINE

## 2023-04-20 PROCEDURE — G8427 DOCREV CUR MEDS BY ELIG CLIN: HCPCS | Performed by: INTERNAL MEDICINE

## 2023-04-20 PROCEDURE — 4004F PT TOBACCO SCREEN RCVD TLK: CPT | Performed by: INTERNAL MEDICINE

## 2023-04-20 PROCEDURE — 3078F DIAST BP <80 MM HG: CPT | Performed by: INTERNAL MEDICINE

## 2023-04-20 NOTE — PROGRESS NOTES
Patient provided with discharge instructions, received printed AVS.  All questions answered. Patient understands follow up plan of care.
members/caregiver, ordering medications, tests, or procedures complicating results with the patient/family/caregiver.

## 2023-04-20 NOTE — TELEPHONE ENCOUNTER
This nurse reached out to Dr. Myrtle Guido office and spoke to Liane Dennis. Dr. Norman Santillan is out of office until Monday, 4/24/23. Liane Dennis will have Dr. Norman Santillan contact Dr. Leamon Gosselin on Monday when he returns to office.  Dr. Leamon Gosselin cell # give to office

## 2023-05-08 ENCOUNTER — HOSPITAL ENCOUNTER (OUTPATIENT)
Dept: NUCLEAR MEDICINE | Age: 78
Discharge: HOME | End: 2023-05-08
Payer: MEDICARE

## 2023-05-08 DIAGNOSIS — C93.10 CHRONIC MYELOMONOCYTIC LEUKEMIA NOT HAVING ACHIEVED REMISSION (HCC): ICD-10-CM

## 2023-05-08 DIAGNOSIS — D47.1 MPN (MYELOPROLIFERATIVE NEOPLASM) (HCC): ICD-10-CM

## 2023-05-09 PROCEDURE — A9552 F18 FDG: HCPCS | Performed by: RADIOLOGY

## 2023-05-09 PROCEDURE — 3430000000 HC RX DIAGNOSTIC RADIOPHARMACEUTICAL: Performed by: RADIOLOGY

## 2023-05-09 RX ORDER — FLUDEOXYGLUCOSE F 18 200 MCI/ML
15 INJECTION, SOLUTION INTRAVENOUS
Status: COMPLETED | OUTPATIENT
Start: 2023-05-09 | End: 2023-05-09

## 2023-05-09 RX ADMIN — FLUDEOXYGLUCOSE F 18 15 MILLICURIE: 200 INJECTION, SOLUTION INTRAVENOUS at 08:44

## 2023-05-12 ENCOUNTER — OFFICE VISIT (OUTPATIENT)
Dept: FAMILY MEDICINE CLINIC | Age: 78
End: 2023-05-12
Payer: MEDICARE

## 2023-05-12 VITALS
HEART RATE: 72 BPM | SYSTOLIC BLOOD PRESSURE: 132 MMHG | RESPIRATION RATE: 18 BRPM | DIASTOLIC BLOOD PRESSURE: 62 MMHG | TEMPERATURE: 97.7 F | HEIGHT: 71 IN | BODY MASS INDEX: 18.11 KG/M2 | WEIGHT: 129.4 LBS | OXYGEN SATURATION: 91 %

## 2023-05-12 DIAGNOSIS — L98.9 ARM LESION: ICD-10-CM

## 2023-05-12 DIAGNOSIS — R60.9 FLUID RETENTION: ICD-10-CM

## 2023-05-12 DIAGNOSIS — I10 ESSENTIAL HYPERTENSION: ICD-10-CM

## 2023-05-12 DIAGNOSIS — N39.46 MIXED STRESS AND URGE URINARY INCONTINENCE: ICD-10-CM

## 2023-05-12 DIAGNOSIS — I21.02 ST ELEVATION MYOCARDIAL INFARCTION INVOLVING LEFT ANTERIOR DESCENDING (LAD) CORONARY ARTERY (HCC): ICD-10-CM

## 2023-05-12 DIAGNOSIS — D47.1 MYELOPROLIFERATIVE NEOPLASM (HCC): Primary | ICD-10-CM

## 2023-05-12 DIAGNOSIS — J43.9 PULMONARY EMPHYSEMA, UNSPECIFIED EMPHYSEMA TYPE (HCC): ICD-10-CM

## 2023-05-12 PROCEDURE — 4004F PT TOBACCO SCREEN RCVD TLK: CPT | Performed by: FAMILY MEDICINE

## 2023-05-12 PROCEDURE — 3023F SPIROM DOC REV: CPT | Performed by: FAMILY MEDICINE

## 2023-05-12 PROCEDURE — 3078F DIAST BP <80 MM HG: CPT | Performed by: FAMILY MEDICINE

## 2023-05-12 PROCEDURE — 99214 OFFICE O/P EST MOD 30 MIN: CPT | Performed by: FAMILY MEDICINE

## 2023-05-12 PROCEDURE — 3074F SYST BP LT 130 MM HG: CPT | Performed by: FAMILY MEDICINE

## 2023-05-12 PROCEDURE — 1123F ACP DISCUSS/DSCN MKR DOCD: CPT | Performed by: FAMILY MEDICINE

## 2023-05-12 PROCEDURE — G8427 DOCREV CUR MEDS BY ELIG CLIN: HCPCS | Performed by: FAMILY MEDICINE

## 2023-05-12 PROCEDURE — G8419 CALC BMI OUT NRM PARAM NOF/U: HCPCS | Performed by: FAMILY MEDICINE

## 2023-05-12 RX ORDER — FUROSEMIDE 20 MG/1
20 TABLET ORAL DAILY
Qty: 90 TABLET | Refills: 1 | Status: SHIPPED | OUTPATIENT
Start: 2023-05-12

## 2023-05-12 RX ORDER — POTASSIUM CHLORIDE 750 MG/1
10 TABLET, EXTENDED RELEASE ORAL DAILY
Qty: 90 TABLET | Refills: 1 | Status: SHIPPED | OUTPATIENT
Start: 2023-05-12

## 2023-05-15 ENCOUNTER — CARE COORDINATION (OUTPATIENT)
Dept: CARE COORDINATION | Age: 78
End: 2023-05-15

## 2023-05-15 NOTE — CARE COORDINATION
Unable to lvm for pt, and mailbox is full  LVM with emergency contact ([employer])  Will send letter via [employer]

## 2023-05-16 ENCOUNTER — TELEPHONE (OUTPATIENT)
Dept: PHARMACY | Facility: CLINIC | Age: 78
End: 2023-05-16

## 2023-05-16 PROBLEM — N39.46 MIXED STRESS AND URGE URINARY INCONTINENCE: Status: ACTIVE | Noted: 2023-05-16

## 2023-05-16 PROBLEM — L98.9 ARM LESION: Status: ACTIVE | Noted: 2023-05-16

## 2023-05-16 PROBLEM — R60.9 FLUID RETENTION: Status: ACTIVE | Noted: 2023-05-16

## 2023-05-16 SDOH — ECONOMIC STABILITY: FOOD INSECURITY: WITHIN THE PAST 12 MONTHS, YOU WORRIED THAT YOUR FOOD WOULD RUN OUT BEFORE YOU GOT MONEY TO BUY MORE.: NEVER TRUE

## 2023-05-16 SDOH — ECONOMIC STABILITY: HOUSING INSECURITY: IN THE LAST 12 MONTHS, HOW MANY PLACES HAVE YOU LIVED?: 1

## 2023-05-16 SDOH — ECONOMIC STABILITY: TRANSPORTATION INSECURITY
IN THE PAST 12 MONTHS, HAS LACK OF TRANSPORTATION KEPT YOU FROM MEETINGS, WORK, OR FROM GETTING THINGS NEEDED FOR DAILY LIVING?: NO

## 2023-05-16 SDOH — HEALTH STABILITY: PHYSICAL HEALTH: ON AVERAGE, HOW MANY DAYS PER WEEK DO YOU ENGAGE IN MODERATE TO STRENUOUS EXERCISE (LIKE A BRISK WALK)?: 3 DAYS

## 2023-05-16 SDOH — ECONOMIC STABILITY: FOOD INSECURITY: WITHIN THE PAST 12 MONTHS, THE FOOD YOU BOUGHT JUST DIDN'T LAST AND YOU DIDN'T HAVE MONEY TO GET MORE.: NEVER TRUE

## 2023-05-16 SDOH — ECONOMIC STABILITY: TRANSPORTATION INSECURITY
IN THE PAST 12 MONTHS, HAS THE LACK OF TRANSPORTATION KEPT YOU FROM MEDICAL APPOINTMENTS OR FROM GETTING MEDICATIONS?: NO

## 2023-05-16 SDOH — ECONOMIC STABILITY: INCOME INSECURITY: IN THE LAST 12 MONTHS, WAS THERE A TIME WHEN YOU WERE NOT ABLE TO PAY THE MORTGAGE OR RENT ON TIME?: NO

## 2023-05-16 SDOH — HEALTH STABILITY: PHYSICAL HEALTH: ON AVERAGE, HOW MANY MINUTES DO YOU ENGAGE IN EXERCISE AT THIS LEVEL?: 10 MIN

## 2023-05-16 ASSESSMENT — ENCOUNTER SYMPTOMS
BACK PAIN: 0
SORE THROAT: 0
PHOTOPHOBIA: 0
EYE DISCHARGE: 0
STRIDOR: 0
EYE ITCHING: 0
EYE REDNESS: 0
SINUS PRESSURE: 0
ABDOMINAL DISTENTION: 0
RECTAL PAIN: 0
COLOR CHANGE: 0
NAUSEA: 0
ABDOMINAL PAIN: 0
VOMITING: 0
COUGH: 0
VOICE CHANGE: 0
TROUBLE SWALLOWING: 0
CONSTIPATION: 0
FACIAL SWELLING: 0
DYSPNEA ASSOCIATED WITH: EXERTION
BLOOD IN STOOL: 0
EYE PAIN: 0
SINUS PAIN: 0
DIARRHEA: 0
RHINORRHEA: 0
GASTROINTESTINAL NEGATIVE: 1
WHEEZING: 0
ANAL BLEEDING: 0
SHORTNESS OF BREATH: 0
CHOKING: 0
CHEST TIGHTNESS: 0

## 2023-05-16 ASSESSMENT — SOCIAL DETERMINANTS OF HEALTH (SDOH)
DO YOU BELONG TO ANY CLUBS OR ORGANIZATIONS SUCH AS CHURCH GROUPS UNIONS, FRATERNAL OR ATHLETIC GROUPS, OR SCHOOL GROUPS?: NO
ARE YOU MARRIED, WIDOWED, DIVORCED, SEPARATED, NEVER MARRIED, OR LIVING WITH A PARTNER?: LIVING WITH PARTNER
HOW OFTEN DO YOU ATTEND CHURCH OR RELIGIOUS SERVICES?: NEVER
HOW OFTEN DO YOU GET TOGETHER WITH FRIENDS OR RELATIVES?: MORE THAN THREE TIMES A WEEK
HOW HARD IS IT FOR YOU TO PAY FOR THE VERY BASICS LIKE FOOD, HOUSING, MEDICAL CARE, AND HEATING?: NOT HARD AT ALL
IN A TYPICAL WEEK, HOW MANY TIMES DO YOU TALK ON THE PHONE WITH FAMILY, FRIENDS, OR NEIGHBORS?: MORE THAN THREE TIMES A WEEK
HOW OFTEN DO YOU ATTENT MEETINGS OF THE CLUB OR ORGANIZATION YOU BELONG TO?: NEVER

## 2023-05-16 ASSESSMENT — LIFESTYLE VARIABLES
HOW MANY STANDARD DRINKS CONTAINING ALCOHOL DO YOU HAVE ON A TYPICAL DAY: PATIENT DOES NOT DRINK
HOW OFTEN DO YOU HAVE A DRINK CONTAINING ALCOHOL: NEVER

## 2023-05-16 NOTE — CARE COORDINATION
Pt identified by name and   Unable to lvm with pt as mailbox is full  ACM called his emergency contact, Yuliana Baez ([employer]). She states he is at the store, but has been waiting for my call. I did let her know his voicemail is full and that I was unable to leave a message. I also informed her that I did lvm for her yesterday as well. States she will have him call me when he returns home.  She has my contact information

## 2023-05-16 NOTE — TELEPHONE ENCOUNTER
----- Message from David Bonilla RN sent at 5/16/2023  4:34 PM EDT -----  Pt states he was to be on Breo Ellipta, but ACM did not see it listed. Pt states it was expensive, but worked well for him. He states he thought the  was going to send it to him, but wasn't sure who he was working with. Pt is also interested in having a pharmacist review his medications. He states he feels he is on a lot and it is overwhelming. He states he was newly diagnosed with Leukemia, and wants to have a handle on things.  ACM to refer to [facility] Pharmacist.                   Thank you

## 2023-05-16 NOTE — PROGRESS NOTES
elevation myocardial infarction involving left anterior descending (LAD) coronary artery [facility])  -     Ambulatory Referral to Care Management with Device (Remote Patient Monitoring)  Aspirin, plavix, cardiology. Arm lesion  -     AFL - Pia Ambriz MD, Dermatology, [place]    Fluid retention  -     furosemide (LASIX) 20 MG tablet; Take 1 tablet by mouth daily  -     potassium chloride (KLOR-CON M) 10 MEQ extended release tablet; Take 1 tablet by mouth daily  -     Ambulatory Referral to Care Management with Device (Remote Patient Monitoring)    Mixed stress and urge urinary incontinence  -     Incontinence Supply Disposable (DEPEND REAL FIT/BRIEF/MEN/S-M) MISC; 4 boxes by Does not apply route as needed (Change as needed)  -     Ambulatory Referral to Care Management with Device (Remote Patient Monitoring)  Urology. Pt instructed if any worse go ED ASAP.     Outpatient Encounter Medications as of 5/12/2023   Medication Sig Dispense Refill    Incontinence Supply Disposable (DEPEND REAL FIT/BRIEF/MEN/S-M) MISC 4 boxes by Does not apply route as needed (Change as needed) 56 each 5    furosemide (LASIX) 20 MG tablet Take 1 tablet by mouth daily 90 tablet 1    potassium chloride (KLOR-CON M) 10 MEQ extended release tablet Take 1 tablet by mouth daily 90 tablet 1    sertraline (ZOLOFT) 50 MG tablet Take 1 tablet by mouth daily 90 tablet 0    albuterol sulfate HFA (PROVENTIL;VENTOLIN;PROAIR) 108 (90 Base) MCG/ACT inhaler Inhale 2 puffs into the lungs every 6 hours as needed for Wheezing or Shortness of Breath 18 g 3    allopurinol (ZYLOPRIM) 100 MG tablet Take 1 tablet by mouth daily 30 tablet 5    oxybutynin (DITROPAN-XL) 10 MG extended release tablet       atorvastatin (LIPITOR) 40 MG tablet daily      famotidine (PEPCID) 40 MG tablet Take 1 tablet by mouth at bedtime      diclofenac sodium (VOLTAREN) 1 % GEL Apply topically 2 times daily 50 g 0    Cholecalciferol (VITAMIN D3) 2000 units CAPS Take 1 capsule by mouth

## 2023-05-16 NOTE — TELEPHONE ENCOUNTER
Received a referral:  from Care Coordinator to review patients medications. Called patient to schedule a time to speak with a pharmacist over the telephone. Spoke to patient and advised them of the above message. Patient verified understanding and scheduled their appointment: tomorrow at [facility].    [address] [place] [address] free: [phone]      For Pharmacy Admin Tracking Only    Program: [address] in place:  No  Recommendation Provided To: Patient/Caregiver: 1 via Telephone  Intervention Detail: Scheduled Appointment  Intervention Accepted By: Patient/Caregiver: 1  Gap Closed?: Yes   Time Spent (min): 15

## 2023-05-16 NOTE — CARE COORDINATION
Ambulatory Care Coordination Note  2023    Patient Current Location:  Home: [address] [facility] [address]    ACM contacted the patient by telephone. Verified name and  with patient as identifiers. Provided introduction to self, and explanation of the ACM role. ACM: Jessenia Gallardo RN    Challenges to be reviewed by the provider   Additional needs identified to be addressed with provider: No  none               Method of communication with provider: chart routing. Explained the Care Coordination Program to patient. Verbalized understanding and is agreeable to service. BPA Referral    COPD:  Denies s/s of COPD Exacerbation. Discussed the Zone Tool and verbalizes understanding. Pt is requesting script for incontinence briefs be faxed to [address] [facility]. ACM noted order is in [address] PCP office to fax, as ACM does not have access to do so. Pt states he was to be on Breo Ellipta, but ACM did not see it listed. Pt states it was expensive, but worked well for him. He states he thought the  was going to send it to him, but wasn't sure who he was working with. Pt is also interested in having a pharmacist review his medications. He states he feels he is on a lot and it is overwhelming. He states he was newly diagnosed with Leukemia, and wants to have a handle on things. ACM to refer to [facility] Pharmacist.     Declines need for SW and dietician at this time. Offered patient enrollment in the Remote Patient Monitoring (RPM) program for in-home monitoring: Yes, patient enrolled:     Remote Patient Monitoring Enrollment Note      Date/Time: 2023 4:16 PM    Offered patient enrollment in the [employer] Remote Patient Monitoring (RPM) program for in home monitoring for Kidney Disease, COPD, and HTN. Patient accepted RPM services.     Patient will be monitoring the following daily:  blood pressure reading, pulse ox reading, and weight    ACM reviewed the information below with

## 2023-05-17 ENCOUNTER — CARE COORDINATION (OUTPATIENT)
Dept: CARE COORDINATION | Age: 78
End: 2023-05-17

## 2023-05-17 DIAGNOSIS — J43.9 PULMONARY EMPHYSEMA, UNSPECIFIED EMPHYSEMA TYPE (HCC): ICD-10-CM

## 2023-05-17 DIAGNOSIS — I10 ESSENTIAL HYPERTENSION: Primary | ICD-10-CM

## 2023-05-17 DIAGNOSIS — N18.32 STAGE 3B CHRONIC KIDNEY DISEASE (HCC): ICD-10-CM

## 2023-05-17 NOTE — PROGRESS NOTES
5/17/23 10:41 AM      Remote Patient Monitoring Treatment Plan    Received request from ACM/CTMIKE Moss RN   to order remote patient monitoring for in home monitoring of Kidney Disease , COPD, and HTN and order completed. Patient will be monitoring   --blood pressure   --pulse ox   --weight Please set alert for ONLY weight gain of 5# in 7 days. Pt has no documented hx of HF.    --survey questions. Patient will engage in Remote Patient Monitoring each day to develop the skills necessary for self management. RPM Care Team Responsibilities:   Alerts will be reviewed daily and addressed within 2-4 hours during operational hours (Monday -Friday 9 am-4 pm)  Alert response and intervention documented in patient medical record  Alert response escalated to PCP per protocol and documented in patient medical record  Patient monitored over approximately  days  Discharge from program based on self-management readiness    See care coordination encounters for additional details.      Oliver See DNP, FNP-C, Remote Patient Monitoring NP, () [phone]

## 2023-05-17 NOTE — CARE COORDINATION
Remote Patient Kit Ordering Note      Date/Time:  5/17/2023 9:43 AM      [x] CCSS confirmed patient shipping address  [x] Patient will receive package over the next 2-4 business days. Someone 21 years or older must be present to sign for UPS delivery. [x] Patient to contact virtual installation-specific phone number listed in the patient instructions. [x] If the patient does not contact HRS within 24 hours, an [employer] [facility] Emilianojudy will call the patient directly: If the patient does not answer, HRS will follow up with the clinical team notifying them about the unsuccessful attempt to contact the patient. HRS will make three call attempts to the patient. [x] LPN will contact patient once equipment is active to welcome them to the program.                                                         [x] Hours of RPM monitoring - Monday-Friday [id number]                     All questions answered at this time. ACM made aware the RPM kit has been ordered. CCSS notified patient of RPM equipment order.

## 2023-05-18 ENCOUNTER — HOSPITAL ENCOUNTER (OUTPATIENT)
Dept: INFUSION THERAPY | Age: 78
Discharge: HOME | End: 2023-05-18

## 2023-05-18 ENCOUNTER — TELEPHONE (OUTPATIENT)
Dept: PHARMACY | Facility: CLINIC | Age: 78
End: 2023-05-18

## 2023-05-18 ENCOUNTER — OFFICE VISIT (OUTPATIENT)
Dept: ONCOLOGY | Age: 78
End: 2023-05-18
Payer: MEDICARE

## 2023-05-18 VITALS
DIASTOLIC BLOOD PRESSURE: 58 MMHG | WEIGHT: 130 LBS | OXYGEN SATURATION: 92 % | SYSTOLIC BLOOD PRESSURE: 120 MMHG | HEART RATE: 70 BPM | BODY MASS INDEX: 18.2 KG/M2 | TEMPERATURE: 97.7 F | HEIGHT: 71 IN

## 2023-05-18 DIAGNOSIS — D47.1 MPN (MYELOPROLIFERATIVE NEOPLASM) (HCC): Primary | ICD-10-CM

## 2023-05-18 PROCEDURE — 4004F PT TOBACCO SCREEN RCVD TLK: CPT | Performed by: INTERNAL MEDICINE

## 2023-05-18 PROCEDURE — 1123F ACP DISCUSS/DSCN MKR DOCD: CPT | Performed by: INTERNAL MEDICINE

## 2023-05-18 PROCEDURE — G8419 CALC BMI OUT NRM PARAM NOF/U: HCPCS | Performed by: INTERNAL MEDICINE

## 2023-05-18 PROCEDURE — 3074F SYST BP LT 130 MM HG: CPT | Performed by: INTERNAL MEDICINE

## 2023-05-18 PROCEDURE — G8427 DOCREV CUR MEDS BY ELIG CLIN: HCPCS | Performed by: INTERNAL MEDICINE

## 2023-05-18 PROCEDURE — 3078F DIAST BP <80 MM HG: CPT | Performed by: INTERNAL MEDICINE

## 2023-05-18 PROCEDURE — 99214 OFFICE O/P EST MOD 30 MIN: CPT | Performed by: INTERNAL MEDICINE

## 2023-05-18 RX ORDER — METOPROLOL SUCCINATE 25 MG/1
25 TABLET, EXTENDED RELEASE ORAL NIGHTLY
COMMUNITY

## 2023-05-18 RX ORDER — BUSPIRONE HYDROCHLORIDE 5 MG/1
5 TABLET ORAL 2 TIMES DAILY PRN
COMMUNITY

## 2023-05-18 NOTE — TELEPHONE ENCOUNTER
Willem Back DO, medication review completed with patient:  - from what I can tell on [employer] website it looks like the generic advair is covered, script pended  - patient may have a medication deductible to meet as well that is keeping cost high at this point    See note below for complete details. Thank you,  Nena Amaya, PharmD, [address] Pharmacist  Department: [phone]  =======================================================    CLINICAL PHARMACY NOTE - Medication Review  Patient outreach to review medications - Spoke with patient. SUBJECTIVE/OBJECTIVE:   Dianne Marino is a 68 y.o. male referred to a clinical pharmacy specialist by care coordination. Medications:  Current Outpatient Medications   Medication Instructions    albuterol sulfate HFA (PROVENTIL;VENTOLIN;PROAIR) 108 (90 Base) MCG/ACT inhaler 2 puffs, Inhalation, EVERY 6 HOURS PRN    allopurinol (ZYLOPRIM) 100 mg, Oral, DAILY    aspirin 81 mg, Oral, Nightly, Follow Dr. Lyndsay Romero anticoagulation order    atorvastatin (LIPITOR) 40 MG tablet DAILY    budesonide-formoterol (SYMBICORT) 160-4.5 MCG/ACT AERO 2 puffs, Inhalation, 2 TIMES DAILY    Cholecalciferol (VITAMIN D3) 2000 units CAPS 1 capsule, Oral, DAILY    clopidogrel (PLAVIX) 75 mg, Oral, DAILY    diclofenac sodium (VOLTAREN) 1 % GEL Topical, 2 TIMES DAILY    famotidine (PEPCID) 40 mg, Oral, Nightly    furosemide (LASIX) 20 mg, Oral, DAILY    Gemtesa 75 mg, DAILY    Icosapent Ethyl (VASCEPA) 1 g CAPS capsule 2 capsules, Oral, 2 TIMES DAILY    Incontinence Supply Disposable (DEPEND REAL FIT/BRIEF/MEN/S-M) MISC 4 boxes, Does not apply, PRN    isosorbide mononitrate (IMDUR) 30 mg, Oral, DAILY    metoprolol succinate (TOPROL XL) 50 mg, Oral, DAILY  - 25 mg daily    NIFEdipine (ADALAT CC) 30 mg, Oral, EVERY EVENING    nitroGLYCERIN (NITROSTAT) 0.4 MG SL tablet up to max of 3 total doses.  If no relief after 1 dose, call

## 2023-05-18 NOTE — PROGRESS NOTES
Department of [facility]  Attending Clinic Note    Reason for Visit: Follow-up on a patient with pre-PMF     PCP:  Beryle Plummer, DO    History of Present Illness:  72-year-old male with history of CAD, hypertension, hyperlipidemia, CKD stage III, restless leg syndrome, emphysema, smoker who was noted to have leukocytosis/neutrophilia on routine CBC  On 12/15/2022: Hb 13.6  Hct 41.1  MCV 91.1    WBC 13.8  ANC 10.97 ALC 1.13 Abs mono 1.28  BUN 30 Creat 1.6     RPR Non-Reactive  Hep C Ab Non-Reactive  HIV Non-reactive  No systemic symptoms. He continues to smoke. No history of recurrent infections. No known history of autoimmune disorders. No history of neoplasm  Referred to our clinic for further evaluation  Today 05/18/2023; No fever chills. Fair appetite and energy level. No N.V    Review of Systems:  CONSTITUTIONAL: No fever, chills. Fair appetite and energy level. ENMT: Eyes: No diplopia; Nose: No epistaxis. Mouth: No sore throat. RESPIRATORY: No hemoptysis, shortness of breath, cough. CARDIOVASCULAR: No chest pain, palpitations. GASTROINTESTINAL: No nausea/vomiting, abdominal pain  GENITOURINARY: No dysuria, urinary frequency, hematuria. NEURO: No syncope, presyncope, headache. Remainder: ROS NEGATIVE    Past Medical History:      Diagnosis Date    Asbestos exposure     Asbestosis ([facility]     mild    CAD (coronary artery disease)     Dr. Chris Interiano - 11/22    Chronic renal disease, stage III [facility]) [053195] 05/28/2022    Current severe episode of major depressive disorder without psychotic features without prior episode ([address] 08/28/2018    Hypertension     Mixed hyperlipidemia 01/25/2021    Myeloproliferative neoplasm ([address] 4/3/2023    Pulmonary emphysema ([address] 12/29/2019    Restless leg syndrome      Medications:  Reviewed and reconciled. Allergies:   Allergies   Allergen Reactions    Codeine      MAKES PT VERY HIGH     Physical Exam:  BP (!) 120/58   Pulse 70

## 2023-05-19 RX ORDER — FLUTICASONE PROPIONATE AND SALMETEROL 250; 50 UG/1; UG/1
1 POWDER RESPIRATORY (INHALATION) EVERY 12 HOURS
Qty: 60 EACH | Refills: 3 | Status: SHIPPED | OUTPATIENT
Start: 2023-05-19

## 2023-05-22 RX ORDER — RUXOLITINIB 20 MG/1
20 TABLET ORAL 2 TIMES DAILY
Qty: 60 TABLET | Refills: 2 | Status: ACTIVE | OUTPATIENT
Start: 2023-05-22

## 2023-05-23 ENCOUNTER — TELEPHONE (OUTPATIENT)
Dept: INFUSION THERAPY | Age: 78
End: 2023-05-23

## 2023-05-23 ENCOUNTER — CARE COORDINATION (OUTPATIENT)
Dept: CARE COORDINATION | Age: 78
End: 2023-05-23

## 2023-05-23 ASSESSMENT — ENCOUNTER SYMPTOMS: DYSPNEA ASSOCIATED WITH: EXERTION

## 2023-05-23 NOTE — TELEPHONE ENCOUNTER
Received notification from [address] [place] [address] is approved through 12/23, but co-pay is $3600/month. [address] is pursuing open grants/foundation assistance. If unable to enroll will refer patient to [facility] to pursue free drug. Awaiting update from [address] [facility].
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
## 2023-05-24 ENCOUNTER — TELEPHONE (OUTPATIENT)
Dept: INFUSION THERAPY | Age: 78
End: 2023-05-24

## 2023-05-24 RX ORDER — FLUTICASONE FUROATE AND VILANTEROL 100; 25 UG/1; UG/1
1 POWDER RESPIRATORY (INHALATION) DAILY
Qty: 1 EACH | Refills: 5 | Status: SHIPPED | OUTPATIENT
Start: 2023-05-24

## 2023-05-24 NOTE — TELEPHONE ENCOUNTER
Received notification from [facility], patient has been enrolled in [facility] for $10,500, which should last approximately 3 months supply medication. Once this runs out, Layne Rivera will pursue any other foundations/grants and if none available will notify Dominick Larose to pursue free drug.

## 2023-05-25 ENCOUNTER — TELEPHONE (OUTPATIENT)
Dept: ONCOLOGY | Age: 78
End: 2023-05-25

## 2023-05-25 NOTE — TELEPHONE ENCOUNTER
Oral Chemotherapy Management Program      Jared Flannery is a 68 y. o.male seen via telephone for initial consultation for pharmacist oral chemotherapy management. Diagnosis:  Primary Myelofibrosis    Medication name: Ruxolitinib Chaitanya Jorgensen  Dose: 20 mg   Frequency: BID    Ordering provider: Vazquez Bowen     An overview was given of the role of the pharmacist in their care. Dose, schedule, and mechanism of action of the prescribed therapy were discussed in detail. Some of the side effects discussed include, but are not limited to, bone marrow suppression, fatigue, diarrhea, hepatotoxicity, infection, and thyroid dysfunction. Patient demonstrated comprehension and understanding throughout the education session. A packet containing the information provided to the patient. Prescribed medication was reviewed for appropriate indication and dosing. Medication list was reviewed for potential drug interactions. Patient confirmed receipt of Jakafi today and will start therapy tonight.     Expected follow-up date: 1-2 weeks    Lab schedule: CBC/CMP every 2-4 weeks    Jeannine Cameron, PharmD, BCOP

## 2023-05-30 ENCOUNTER — CARE COORDINATION (OUTPATIENT)
Dept: CARE COORDINATION | Age: 78
End: 2023-05-30

## 2023-05-30 ASSESSMENT — ENCOUNTER SYMPTOMS: DYSPNEA ASSOCIATED WITH: EXERTION

## 2023-05-30 NOTE — CARE COORDINATION
Ambulatory Care Coordination Note  2023    Patient Current Location:  Home: [address] [facility] [address]     ACM contacted the patient by telephone. Verified name and  with patient as identifiers. Provided introduction to self, and explanation of the ACM role. Challenges to be reviewed by the provider   Additional needs identified to be addressed with provider: No  none               Method of communication with provider: chart routing. ACM: Heide Boston RN    COPD:  Denies s/s of COPD Exacerbation. Discussed the Zone Tool and verbalizes understanding. Pt states he has his inhalers and is feeling much better    Pt states his arms are just scabbed now with no drainage from the dog attack. Denies s/s infection. Pt aware PCP wanted to see him to evaluate. Pt states he will call office. Pt states he is feeling a little down, and feels it is hard to think about his health, his weight loss, his weakness. Denies suicidal/homicidal ideations. States he is trying to keep his spirits up, but it is hard to keep positive. States it's getting harder and harder to do his daily needs due to weakness. When asked if it had anything to do with not feeling motivated, pt strongly denied, and said it was because he just felt weak. Pt states his physical limitations are not helping his mood. Pt lives in [place], but is totally ok coming toward the [facility]. Pt has signed with the  [facility], but hasn't started yet. There is an office in [place], and pt has the contact information. Pt states he will call the [address] [place] [address] today, and let me know what they say about counseling. If he is unable to get counseling, he said he will call me. ACM plans to touch base again within a week to discuss again. Denies falls/near falls    Pt states he plans to call HRS today. He has the number.   Offered patient enrollment in the Remote Patient Monitoring (RPM) program for in-home monitoring: Yes, patient already

## 2023-05-30 NOTE — CARE COORDINATION
Unable to leave HIPAA compliant message for patient/caregiver to return call to [phone]. Re: Follow up:   COPD  Falls/near falls? Check on RPM Activation  Check if Educational Materials Received  Appointment Reminders  Review Plan of Care  PCP wanted office to set up an appt to check abrasions from dog attack. ACM to send to Clinical Pool again, as I don't see where an appt was set up or attempted.

## 2023-06-05 ENCOUNTER — CARE COORDINATION (OUTPATIENT)
Dept: CARE COORDINATION | Age: 78
End: 2023-06-05

## 2023-06-05 ASSESSMENT — ENCOUNTER SYMPTOMS: DYSPNEA ASSOCIATED WITH: EXERTION

## 2023-06-05 NOTE — CARE COORDINATION
Ambulatory Care Coordination Note  2023    Patient Current Location: [facility]     ACM contacted the patient by telephone. Verified name and  with patient as identifiers. Provided introduction to self, and explanation of the ACM role. Challenges to be reviewed by the provider   Additional needs identified to be addressed with provider: No  none               Method of communication with provider: none. ACM: Shade Ni RN    COPD:  Denies s/s of COPD Exacerbation. Discussed the Zone Tool and verbalizes understanding. Denies falls/near falls    Discussed Red Flag symptoms for which patient should seek emergent care. Patient verbalizes understanding. Pt has yet to call the [facility]. States he has been feeling better, but ACM still encouraged him to do so. He states he will. Pt has not yet activated his RPM kit. Pt confirmed he is still interested in using it. ACM encouraged pt to call [facility] to activate. Offered patient enrollment in the Remote Patient Monitoring (RPM) program for in-home monitoring: Yes, patient already enrolled. FOLLOW-UP PLAN:    Continue Care Coordination and Assess Plan Of Care  Discuss:   -RPM Kit received and installed? -COPD Management/Zone Tool  -Smoking Status  -Falls/Near Falls? -Appointment Reminders    Next Anticipated Outreach by [address]  Team (or sooner if needed): 2 Weeks  Pt has ACM's contact information to reach out sooner if needed. Lab Results       None            Care Coordination Interventions    Referral from Primary Care Provider: Yes  Suggested Interventions and Community Resources  Fall Risk Prevention: In Process (Comment: Fall Precautions, Bleeding Precautions)  Home Health Services: Declined  Medication Assistance Program: In Process (Comment: PAP)  Medi Set or Pill Pack: Declined  Pharmacist: In Process (Comment: PHP Pharmacist)  Registered Dietician: Declined  Smoking Cessation:  In Process (Comment: Smoking

## 2023-06-06 ENCOUNTER — CARE COORDINATION (OUTPATIENT)
Dept: CARE COORDINATION | Age: 78
End: 2023-06-06

## 2023-06-06 NOTE — CARE COORDINATION
Remote Patient Monitoring Welcome Note  Date/Time:  2023 2:47 PM  Patient Current Location: Home: [place] [address]  Verified patients name and  as identifiers. Completed and confirmed the following:   Emergency Contact: [place] (Other)   [phone] (Mobile  [x] Patient received all RPM equipment (tablet, scale, blood pressure device and cuff, and pulse oximeter)  Cuff Size: regular (9.05\"-15.74\")    Weight Scale:  regular (<330lbs)                    [x] Instructed patient keep box for use when returning equipment                                                          [x] Reviewed Patient Welcome Letter with patient                         [x] Reviewed expectations for patient and care team  Monitoring hours M-F 9-4pm  Completing monitoring by 12pm on  so that alerts can be responded to in the same day  Patient weighs self at same time every day (or after urinating and waking up)  Take blood pressure 1-2 hrs after medications   RPM team may have different phone area code (including [place], [place], [facility] or [address] [facility] [address])                              [x] Instructed patient to keep scale on flat surface                                                         [x] Instructed patient to keep tablet plugged in at all times                         [x] Instructed how to contact IT support ([phone])  [x] Provided Remote Patient Monitoring care  information               All questions answered at this time.

## 2023-06-07 ENCOUNTER — CARE COORDINATION (OUTPATIENT)
Dept: CARE COORDINATION | Age: 78
End: 2023-06-07

## 2023-06-07 DIAGNOSIS — F17.210 SMOKES WITH GREATER THAN 30 PACK YEAR HISTORY: Primary | ICD-10-CM

## 2023-06-07 DIAGNOSIS — R10.84 GENERALIZED ABDOMINAL PAIN: ICD-10-CM

## 2023-06-07 NOTE — CARE COORDINATION
Remote Alert Monitoring Note    Challenges to be reviewed by the provider   Additional needs identified to be addressed with provider No, unable to reach Pt                Date/Time:  6/7/2023 2:51 PM  Patient Current Location: Home: [place] [address]  LPN attempted to contact patient by telephone regarding red alert received for weight increase (3 lbs x 1 day). Background: Kidney Disease, High Blood-Pressure, COPD  Refer to 911 immediately if:  Patient unresponsive or unable to provide history  Change in cognition or sudden confusion  Patient unable to respond in complete sentences  Intense chest pain/tightness  Any concern for any clinical emergency  Red Alert: Provider response time of 1 hr required for any red alert requiring intervention  Yellow Alert: Provider response time of 3hr required for any escalated yellow alert  Clinical Interventions: Reviewed and followed up on alerts and treatments-Unable to leave voicemail. Mailbox full. Plan/Follow Up: Will continue to review, monitor and address alerts with follow up based on severity of symptoms and risk factors.

## 2023-06-15 ENCOUNTER — HOSPITAL ENCOUNTER (OUTPATIENT)
Dept: INFUSION THERAPY | Age: 78
Discharge: HOME | End: 2023-06-15
Payer: MEDICARE

## 2023-06-15 DIAGNOSIS — D72.829 LEUKOCYTOSIS, UNSPECIFIED TYPE: ICD-10-CM

## 2023-06-15 LAB
ALBUMIN SERPL-MCNC: 4 G/DL (ref 3.5–5.2)
ALP SERPL-CCNC: 124 U/L (ref 40–129)
ALT SERPL-CCNC: 14 U/L (ref 0–40)
ANION GAP SERPL CALCULATED.3IONS-SCNC: 12 MMOL/L (ref 7–16)
AST SERPL-CCNC: 20 U/L (ref 0–39)
BASOPHILS # BLD: 0.07 E9/L (ref 0–0.2)
BASOPHILS NFR BLD: 0.6 % (ref 0–2)
BILIRUB SERPL-MCNC: 0.4 MG/DL (ref 0–1.2)
BUN SERPL-MCNC: 29 MG/DL (ref 6–23)
CALCIUM SERPL-MCNC: 9 MG/DL (ref 8.6–10.2)
CHLORIDE SERPL-SCNC: 104 MMOL/L (ref 98–107)
CO2 SERPL-SCNC: 24 MMOL/L (ref 22–29)
CREAT SERPL-MCNC: 1.4 MG/DL (ref 0.7–1.2)
EOSINOPHIL # BLD: 0.19 E9/L (ref 0.05–0.5)
EOSINOPHIL NFR BLD: 1.6 % (ref 0–6)
ERYTHROCYTE [DISTWIDTH] IN BLOOD BY AUTOMATED COUNT: 15.4 FL (ref 11.5–15)
GLUCOSE SERPL-MCNC: 148 MG/DL (ref 74–99)
HCT VFR BLD AUTO: 41.6 % (ref 37–54)
HGB BLD-MCNC: 13.8 G/DL (ref 12.5–16.5)
IMM GRANULOCYTES # BLD: 0.08 E9/L
IMM GRANULOCYTES NFR BLD: 0.7 % (ref 0–5)
LYMPHOCYTES # BLD: 0.9 E9/L (ref 1.5–4)
LYMPHOCYTES NFR BLD: 7.5 % (ref 20–42)
MCH RBC QN AUTO: 29.9 PG (ref 26–35)
MCHC RBC AUTO-ENTMCNC: 33.2 % (ref 32–34.5)
MCV RBC AUTO: 90.2 FL (ref 80–99.9)
MONOCYTES # BLD: 0.93 E9/L (ref 0.1–0.95)
MONOCYTES NFR BLD: 7.8 % (ref 2–12)
NEUTROPHILS # BLD: 9.81 E9/L (ref 1.8–7.3)
NEUTS SEG NFR BLD: 81.8 % (ref 43–80)
PLATELET # BLD AUTO: 288 E9/L (ref 130–450)
PMV BLD AUTO: 11.1 FL (ref 7–12)
POTASSIUM SERPL-SCNC: 3.4 MMOL/L (ref 3.5–5)
PROT SERPL-MCNC: 6.8 G/DL (ref 6.4–8.3)
RBC # BLD AUTO: 4.61 E12/L (ref 3.8–5.8)
SODIUM SERPL-SCNC: 140 MMOL/L (ref 132–146)
WBC # BLD: 12 E9/L (ref 4.5–11.5)

## 2023-06-15 PROCEDURE — 36415 COLL VENOUS BLD VENIPUNCTURE: CPT

## 2023-06-15 PROCEDURE — 80053 COMPREHEN METABOLIC PANEL: CPT

## 2023-06-15 PROCEDURE — 85025 COMPLETE CBC W/AUTO DIFF WBC: CPT

## 2023-06-20 ENCOUNTER — CARE COORDINATION (OUTPATIENT)
Dept: CARE COORDINATION | Age: 78
End: 2023-06-20

## 2023-06-20 NOTE — CARE COORDINATION
Unable to leave HIPAA compliant message for patient/caregiver to return call to [phone]. Re: Follow up:   COPD  Falls/near falls?   Review RPM Metrics  Appointment Reminders  Review Plan of Care  Pt has appt with PCP 6/23/23

## 2023-06-20 NOTE — CARE COORDINATION
Remote Alert Monitoring Note    Challenges to be reviewed by the provider   Additional needs identified to be addressed with provider No            Date/Time:  2023 1:41 PM  Patient Current Location: Home: [place] [address]  LPN contacted patient by telephone regarding red alert received for pulse ox reading (90). Verified patients name and  as identifiers. Background: Kidney Disease, High Blood-Pressure, COPD  Refer to 911 immediately if:  Patient unresponsive or unable to provide history  Change in cognition or sudden confusion  Patient unable to respond in complete sentences  Intense chest pain/tightness  Any concern for any clinical emergency  Red Alert: Provider response time of 1 hr required for any red alert requiring intervention  Yellow Alert: Provider response time of 3hr required for any escalated yellow alert  O2 Triage  Are you having any Chest Pain? no   Are you having any Shortness of Breath? Yes, baseline   Swelling in your hands or feet? no   Are you having any other health concerns or issues? no   Clinical Interventions: Reviewed and followed up on alerts and treatments-Pt reports SOB baseline, denies CP, new/increased edema. Pt stated that he took inhaled medications post reading. Instructed Pt to take inhaled medications then obtain reading. Pt v/u. Upon recheck SpO2 92%. Pt compliant with medications, taking Lasix, Breo Ellipta, Symbicort, Spiriva as prescribed. Pt has albuterol rescue inhaler as needed. Plan/Follow Up: Will continue to review, monitor and address alerts with follow up based on severity of symptoms and risk factors.

## 2023-06-21 ENCOUNTER — CARE COORDINATION (OUTPATIENT)
Dept: CARE COORDINATION | Age: 78
End: 2023-06-21

## 2023-06-21 NOTE — CARE COORDINATION
Remote Alert Monitoring Note    Challenges to be reviewed by the provider   Additional needs identified to be addressed with provider No            Date/Time:  2023 1:36 PM  Patient Current Location: Home: [place] [address]  LPN contacted patient by telephone regarding red alert received for pulse ox reading (90) and weight increase (3 lbs x 1 day). Verified patients name and  as identifiers. Background: Kidney Disease, High Blood-Pressure, COPD  Refer to 911 immediately if:  Patient unresponsive or unable to provide history  Change in cognition or sudden confusion  Patient unable to respond in complete sentences  Intense chest pain/tightness  Any concern for any clinical emergency  Red Alert: Provider response time of 1 hr required for any red alert requiring intervention  Yellow Alert: Provider response time of 3hr required for any escalated yellow alert  O2 Triage  Are you having any Chest Pain? no   Are you having any Shortness of Breath? no   Swelling in your hands or feet? no   Are you having any other health concerns or issues? no   Weight Scale Triage  Was your weight obtained upon rising/waking today? yes   Was your weight obtained after voiding and/or use of the bathroom today? yes   Did you weigh yourself in the same amount of clothing today, compared to how you typically do? yes   Was the scale bumped or moved prior to today's weight? no   Is your scale on a flat/hard surface? yes   Did you obtain your weight with shoes on? no   If yes, is this something you normally do during your daily weights? NA   Were you standing up straight on the scale today? yes   Were you leaning on anything while obtaining your weight today? no   Clinical Interventions: Reviewed and followed up on alerts and treatments-Pt reports SOB (baseline), denies CP, new/increased edema. Pt compliant with medications, taking Lasix, Breo Ellipta, Symbicort, Spiriva as prescribed.  Pt has albuterol rescue

## 2023-06-22 ENCOUNTER — CARE COORDINATION (OUTPATIENT)
Dept: CARE COORDINATION | Age: 78
End: 2023-06-22

## 2023-06-22 NOTE — TELEPHONE ENCOUNTER
Last Appointment:  5/12/2023  Future Appointments   Date Time Provider Lyubov Norrisi   6/23/2023 12:00 PM Adolph Martinez [facility]   7/13/2023  3:30 PM KIARA MED ONC FAST TRACK 1 YZ Med Onc [facility]   7/13/2023  3:45 PM Real Copeland MD MED ONC [facility]

## 2023-06-22 NOTE — CARE COORDINATION
Remote Alert Monitoring Note    Challenges to be reviewed by the provider   Additional needs identified to be addressed with provider No            Date/Time:  2023 1:33 PM  Patient Current Location: Home: [address] [facility] [address]  LPN contacted patient by telephone regarding red alert received for pulse ox reading (91). Verified patients name and  as identifiers. Background: Kidney Disease, High Blood-Pressure, COPD  Refer to 911 immediately if:  Patient unresponsive or unable to provide history  Change in cognition or sudden confusion  Patient unable to respond in complete sentences  Intense chest pain/tightness  Any concern for any clinical emergency  Red Alert: Provider response time of 1 hr required for any red alert requiring intervention  Yellow Alert: Provider response time of 3hr required for any escalated yellow alert  O2 Triage  Are you having any Chest Pain? no   Are you having any Shortness of Breath? no   Swelling in your hands or feet? no   Are you having any other health concerns or issues? no   Clinical Interventions: Reviewed and followed up on alerts and treatments-   Pt reports SOB (baseline), denies CP, new/increased edema. Pt compliant with medications, taking Lasix, Breo Ellipta, Symbicort, Spiriva as prescribed. Pt has albuterol rescue inhaler as needed. Pt does not have a history of CHF. Upon recheck SpO2 92-94%. P/OX EDUCATION: Education of patient to support self-management: Have warm hands, hold hands and fingers very still while testing SpO2 level. Take a few deep breaths before testing. Pt v/u. Patient instructed to call PCP, or present to ED if symptoms occur, or become acutely worse. Pt v/u. Plan/Follow Up: Will continue to review, monitor and address alerts with follow up based on severity of symptoms and risk factors.

## 2023-06-27 ENCOUNTER — TELEPHONE (OUTPATIENT)
Dept: ONCOLOGY | Age: 78
End: 2023-06-27

## 2023-06-27 ENCOUNTER — CARE COORDINATION (OUTPATIENT)
Dept: CARE COORDINATION | Age: 78
End: 2023-06-27

## 2023-06-28 ENCOUNTER — CARE COORDINATION (OUTPATIENT)
Dept: CARE COORDINATION | Age: 78
End: 2023-06-28

## 2023-06-29 ENCOUNTER — CARE COORDINATION (OUTPATIENT)
Dept: CARE COORDINATION | Age: 78
End: 2023-06-29

## 2023-06-30 ENCOUNTER — CARE COORDINATION (OUTPATIENT)
Dept: CARE COORDINATION | Age: 78
End: 2023-06-30

## 2023-06-30 ENCOUNTER — OFFICE VISIT (OUTPATIENT)
Dept: FAMILY MEDICINE CLINIC | Age: 78
End: 2023-06-30
Payer: MEDICARE

## 2023-06-30 VITALS
TEMPERATURE: 97.6 F | HEART RATE: 71 BPM | HEIGHT: 71 IN | SYSTOLIC BLOOD PRESSURE: 136 MMHG | DIASTOLIC BLOOD PRESSURE: 80 MMHG | WEIGHT: 132.2 LBS | RESPIRATION RATE: 20 BRPM | BODY MASS INDEX: 18.51 KG/M2 | OXYGEN SATURATION: 91 %

## 2023-06-30 DIAGNOSIS — I10 ESSENTIAL HYPERTENSION: ICD-10-CM

## 2023-06-30 DIAGNOSIS — D47.1 MYELOPROLIFERATIVE NEOPLASM (HCC): ICD-10-CM

## 2023-06-30 DIAGNOSIS — I21.11 ST ELEVATION MYOCARDIAL INFARCTION INVOLVING RIGHT CORONARY ARTERY (HCC): ICD-10-CM

## 2023-06-30 DIAGNOSIS — N18.32 STAGE 3B CHRONIC KIDNEY DISEASE (HCC): ICD-10-CM

## 2023-06-30 DIAGNOSIS — N39.46 MIXED STRESS AND URGE URINARY INCONTINENCE: ICD-10-CM

## 2023-06-30 DIAGNOSIS — Z89.511 STATUS POST BELOW-KNEE AMPUTATION OF RIGHT LOWER EXTREMITY (HCC): ICD-10-CM

## 2023-06-30 DIAGNOSIS — Z00.00 MEDICARE ANNUAL WELLNESS VISIT, SUBSEQUENT: Primary | ICD-10-CM

## 2023-06-30 PROCEDURE — 1123F ACP DISCUSS/DSCN MKR DOCD: CPT | Performed by: FAMILY MEDICINE

## 2023-06-30 PROCEDURE — 3078F DIAST BP <80 MM HG: CPT | Performed by: FAMILY MEDICINE

## 2023-06-30 PROCEDURE — 3074F SYST BP LT 130 MM HG: CPT | Performed by: FAMILY MEDICINE

## 2023-06-30 PROCEDURE — G0439 PPPS, SUBSEQ VISIT: HCPCS | Performed by: FAMILY MEDICINE

## 2023-06-30 RX ORDER — DIAPER,BRIEF,ADULT, DISPOSABLE
1 EACH MISCELLANEOUS
Qty: 250 EACH | Refills: 1 | Status: SHIPPED | OUTPATIENT
Start: 2023-06-30 | End: 2023-12-27

## 2023-06-30 ASSESSMENT — PATIENT HEALTH QUESTIONNAIRE - PHQ9
SUM OF ALL RESPONSES TO PHQ QUESTIONS 1-9: 2
1. LITTLE INTEREST OR PLEASURE IN DOING THINGS: 1
SUM OF ALL RESPONSES TO PHQ QUESTIONS 1-9: 2
2. FEELING DOWN, DEPRESSED OR HOPELESS: 1
SUM OF ALL RESPONSES TO PHQ QUESTIONS 1-9: 2
SUM OF ALL RESPONSES TO PHQ QUESTIONS 1-9: 2
SUM OF ALL RESPONSES TO PHQ9 QUESTIONS 1 & 2: 2

## 2023-06-30 ASSESSMENT — ENCOUNTER SYMPTOMS: DYSPNEA ASSOCIATED WITH: EXERTION

## 2023-07-03 ENCOUNTER — CARE COORDINATION (OUTPATIENT)
Dept: CASE MANAGEMENT | Age: 78
End: 2023-07-03

## 2023-07-03 ENCOUNTER — TELEPHONE (OUTPATIENT)
Dept: FAMILY MEDICINE CLINIC | Age: 78
End: 2023-07-03

## 2023-07-03 NOTE — CARE COORDINATION
Remote Alert Monitoring Note  Rpm alert to be reviewed by the provider   red alert   pulse ox reading (91%)   Additional needs to be addressed by N/A: No                    Date/Time:  7/3/2023 11:30 AM  Patient Current Location: Home: [address] [place] [address] Dr HERRERA contacted patient by telephone regarding red alert received for pulse ox reading (91%). Verified patients name and  as identifiers. Background: ENROLLED IN RPM FOR KIDNEY DISEASE HTN AND COPD   Refer to 911 immediately if:  Patient unresponsive or unable to provide history  Change in cognition or sudden confusion  Patient unable to respond in complete sentences  Intense chest pain/tightness  Any concern for any clinical emergency  Red Alert: Provider response time of 1 hr required for any red alert requiring intervention  Yellow Alert: Provider response time of 3hr required for any escalated yellow alert    O2 Triage  Are you having any Chest Pain? no   Are you having any Shortness of Breath? no   Swelling in your hands or feet? no     Are you having any other health concerns or issues? no     Clinical Interventions: Reviewed and followed up on alerts and treatments-Spoke to [facility] [place] regarding low pulse ox reading. Pt states he feels fine. Speaks in clear sentences. No pressured speech. No use of oxygen. Reports he has used his inhalers today. He is in his home with air conditioning on. No issues or concerns. Pt states he knows when to go to the hospital for any chest pain or dyspnea. Plan/Follow Up: Will continue to review, monitor and address alerts with follow up based on severity of symptoms and risk factors.

## 2023-07-04 PROBLEM — Z00.00 MEDICARE ANNUAL WELLNESS VISIT, SUBSEQUENT: Status: ACTIVE | Noted: 2023-07-04

## 2023-07-05 ENCOUNTER — CARE COORDINATION (OUTPATIENT)
Dept: CARE COORDINATION | Age: 78
End: 2023-07-05

## 2023-07-05 ENCOUNTER — TELEPHONE (OUTPATIENT)
Dept: FAMILY MEDICINE CLINIC | Age: 78
End: 2023-07-05

## 2023-07-05 NOTE — CARE COORDINATION
Remote Patient Monitoring Note  Date/Time:  2023 3:39 PM  Patient Current Location: Home: [address] [address]  LPN contacted patient by telephone regarding yellow alert received for No Blood Pressure taken for 2 Days, No Weight taken for 2 Days. Verified patients name and  as identifiers. Background: Kidney Disease, High Blood-Pressure, COPD  Clinical Interventions: Reviewed and followed up on alerts and treatments-   Spoke with Pt and advised to update RPM metrics daily. Pt v/u. Pt stated that he was just released from the Hospital today. Pt stated that he was admitted to [facility] 7/3-23 for SOB, Pneumonia. Pt stated that he was discharged with Home O2 @ 2L [place], HHC, and meal services. Made Pt aware of Air Quality Advisory. Advised Pt to stay indoors, wear a Mask if you need to go out. Pt v/u. Discussed use of Rescue Inhaler for increased SOB, Cough, Wheezing. Pt v/u. Advised Pt to call PCP office and schedule a Hosp F/U appointment. Pt v/u. LPN will notify ACM of admit and discharge. Plan/Follow Up: Will continue to review, monitor and address alerts with follow up based on severity of symptoms and risk factors.

## 2023-07-05 NOTE — PROGRESS NOTES
Medicare Annual Wellness Visit    Beto Dillard is here for Medicare AWV (Pt here for his AWV.) and Other (Was just DX with leukemia and his Doctor just told him he was moving to [facility]. Pt requesting a referral for another Urologist as well.)    Assessment & Plan   Medicare annual wellness visit, subsequent  Essential hypertension  ST elevation myocardial infarction involving right coronary artery ([address])  -     Sudeep Romano MD, Hematology and Oncology, [place]  Stage 3b chronic kidney disease [facility])  Mixed stress and urge urinary incontinence  -     Incontinence Supply Disposable (DEPEND PANT SM/MED) MISC; 5 TIMES DAILY Starting Fri 6/30/2023, Until Wed 12/27/2023, For 180 days, Disp-250 each, R-1, Normal  -     AFL - Nessa Montiel MD, Urology, [place]  Myeloproliferative neoplasm [facility])  -     Sudeep Romano MD, Hematology and Oncology, [place]  Status post below-knee amputation of right lower extremity ([address])    Pt instructed if any worse go ED ASAP. Recommendations for Preventive Services Due: see orders and patient instructions/AVS.  Recommended screening schedule for the next 5-10 years is provided to the patient in written form: see Patient Instructions/AVS.     Return in about 3 months (around 9/30/2023). Subjective   The following acute and/or chronic problems were also addressed today:    Patient's complete Health Risk Assessment and screening values have been reviewed and are found in Flowsheets. The following problems were reviewed today and where indicated follow up appointments were made and/or referrals ordered. Positive Risk Factor Screenings with Interventions:              Self-assessment of health: In general, how would you say your health is?: (!) Poor    Interventions:  Pt instructed to continue with oncology, and urology.      General HRA Questions:  Select all that apply: (!) Stress    Stress   psych    Social and Emotional Support:  Do you get the

## 2023-07-05 NOTE — TELEPHONE ENCOUNTER
Will you sign orders for [facility]? They will be faxing the orders. He will be discharging today from the hospital. They will see him in the next 24-48 hours.

## 2023-07-06 ENCOUNTER — CARE COORDINATION (OUTPATIENT)
Dept: CARE COORDINATION | Age: 78
End: 2023-07-06

## 2023-07-06 ASSESSMENT — ENCOUNTER SYMPTOMS: DYSPNEA ASSOCIATED WITH: EXERTION

## 2023-07-06 NOTE — CARE COORDINATION
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 To date the patient has made gains as expected as reported.   Discharge from skilled therapy with instructions/recommendations: continue HEP, follow up with provider    PLAN    Discharge from therapy   Remote Alert Monitoring Note  Rpm alert to be reviewed by the provider   red alert   pulse ox reading (91)   Additional needs to be addressed by N/A: No            Date/Time:  2023 10:23 AM  Patient Current Location: Home: [address] [facility] [address]  LPN contacted patient by telephone regarding red alert received for pulse ox reading (91). Verified patients name and  as identifiers. Background: Kidney Disease, High Blood-Pressure, COPD  Refer to 911 immediately if:  Patient unresponsive or unable to provide history  Change in cognition or sudden confusion  Patient unable to respond in complete sentences  Intense chest pain/tightness  Any concern for any clinical emergency  Red Alert: Provider response time of 1 hr required for any red alert requiring intervention  Yellow Alert: Provider response time of 3hr required for any escalated yellow alert  O2 Triage  Are you having any Chest Pain? no   Are you having any Shortness of Breath? Yes, baseline   Swelling in your hands or feet? no   Are you having any other health concerns or issues? no   Clinical Interventions: Reviewed and followed up on alerts and treatments-Pt states SOB is baseline, wearing O2 @ @l. Upon recheck SpO2 94%. Visiting nurse was with Pt's at time of call. Pt compliant with medications, taking Lasix, Breo Ellipta, Symbicort, Spiriva as prescribed. Pt has albuterol rescue inhaler as needed. Made Pt aware of Air Quality Advisory. Advised Pt to stay indoors, wear a Mask if you need to go out. Pt v/u. Discussed use of Rescue Inhaler for increased SOB, Cough, Wheezing. Pt v/u. Patient instructed to call PCP, or present to ED if symptoms occur, or become acutely worse. Pt v/u. Plan/Follow Up: Will continue to review, monitor and address alerts with follow up based on severity of symptoms and risk factors.

## 2023-07-11 ENCOUNTER — CARE COORDINATION (OUTPATIENT)
Dept: CARE COORDINATION | Age: 78
End: 2023-07-11

## 2023-07-11 NOTE — CARE COORDINATION
Remote Alert Monitoring Note  Rpm alert to be reviewed by the provider   red alert   pulse ox reading (87)   Additional needs to be addressed by    :           Date/Time:  2023 3:03 PM  Patient Current Location: Home: [address] [facility] [address]  LPN contacted patient by telephone regarding red alert received for pulse ox reading (87). Verified patients name and  as identifiers. Background:  Kidney Disease, High Blood-Pressure, COPD  Refer to 911 immediately if:  Patient unresponsive or unable to provide history  Change in cognition or sudden confusion  Patient unable to respond in complete sentences  Intense chest pain/tightness  Any concern for any clinical emergency  Red Alert: Provider response time of 1 hr required for any red alert requiring intervention  Yellow Alert: Provider response time of 3hr required for any escalated yellow alert  Clinical Interventions: Reviewed and followed up on alerts and treatments-Unable to leave message. No answer, no voicemail. Plan/Follow Up: Will continue to review, monitor and address alerts with follow up based on severity of symptoms and risk factors.

## 2023-07-13 ENCOUNTER — OFFICE VISIT (OUTPATIENT)
Dept: ONCOLOGY | Age: 78
End: 2023-07-13
Payer: MEDICARE

## 2023-07-13 ENCOUNTER — CARE COORDINATION (OUTPATIENT)
Dept: CARE COORDINATION | Age: 78
End: 2023-07-13

## 2023-07-13 ENCOUNTER — HOSPITAL ENCOUNTER (OUTPATIENT)
Dept: INFUSION THERAPY | Age: 78
Discharge: HOME | End: 2023-07-13
Payer: MEDICARE

## 2023-07-13 VITALS
HEART RATE: 76 BPM | SYSTOLIC BLOOD PRESSURE: 126 MMHG | TEMPERATURE: 97.9 F | BODY MASS INDEX: 18.76 KG/M2 | HEIGHT: 71 IN | OXYGEN SATURATION: 95 % | DIASTOLIC BLOOD PRESSURE: 60 MMHG | WEIGHT: 134 LBS

## 2023-07-13 DIAGNOSIS — D47.1 MPN (MYELOPROLIFERATIVE NEOPLASM) (HCC): ICD-10-CM

## 2023-07-13 DIAGNOSIS — D47.1 MPN (MYELOPROLIFERATIVE NEOPLASM) (HCC): Primary | ICD-10-CM

## 2023-07-13 LAB
ALBUMIN SERPL-MCNC: 4 G/DL (ref 3.5–5.2)
ALP SERPL-CCNC: 109 U/L (ref 40–129)
ALT SERPL-CCNC: 25 U/L (ref 0–40)
ANION GAP SERPL CALCULATED.3IONS-SCNC: 9 MMOL/L (ref 7–16)
ANISOCYTOSIS: ABNORMAL
AST SERPL-CCNC: 26 U/L (ref 0–39)
BASOPHILIC STIPPLING: ABNORMAL
BASOPHILS # BLD: 0 E9/L (ref 0–0.2)
BASOPHILS NFR BLD: 0.4 % (ref 0–2)
BILIRUB SERPL-MCNC: 0.5 MG/DL (ref 0–1.2)
BUN SERPL-MCNC: 39 MG/DL (ref 6–23)
CALCIUM SERPL-MCNC: 9.2 MG/DL (ref 8.6–10.2)
CHLORIDE SERPL-SCNC: 101 MMOL/L (ref 98–107)
CO2 SERPL-SCNC: 28 MMOL/L (ref 22–29)
CREAT SERPL-MCNC: 1.6 MG/DL (ref 0.7–1.2)
EOSINOPHIL # BLD: 0 E9/L (ref 0.05–0.5)
EOSINOPHIL NFR BLD: 0.2 % (ref 0–6)
ERYTHROCYTE [DISTWIDTH] IN BLOOD BY AUTOMATED COUNT: 16.5 FL (ref 11.5–15)
GLUCOSE SERPL-MCNC: 116 MG/DL (ref 74–99)
HCT VFR BLD AUTO: 37.1 % (ref 37–54)
HGB BLD-MCNC: 12.2 G/DL (ref 12.5–16.5)
HYPOCHROMIA: ABNORMAL
LYMPHOCYTES # BLD: 0.92 E9/L (ref 1.5–4)
LYMPHOCYTES NFR BLD: 7 % (ref 20–42)
MCH RBC QN AUTO: 29.7 PG (ref 26–35)
MCHC RBC AUTO-ENTMCNC: 32.9 % (ref 32–34.5)
MCV RBC AUTO: 90.3 FL (ref 80–99.9)
METAMYELOCYTES NFR BLD MANUAL: 0.9 % (ref 0–1)
MONOCYTES # BLD: 0.92 E9/L (ref 0.1–0.95)
MONOCYTES NFR BLD: 7 % (ref 2–12)
NEUTROPHILS # BLD: 11.35 E9/L (ref 1.8–7.3)
NEUTS SEG NFR BLD: 85.1 % (ref 43–80)
OVALOCYTES: ABNORMAL
PLATELET # BLD AUTO: 351 E9/L (ref 130–450)
PMV BLD AUTO: 10.5 FL (ref 7–12)
POIKILOCYTES: ABNORMAL
POLYCHROMASIA: ABNORMAL
POTASSIUM SERPL-SCNC: 4.4 MMOL/L (ref 3.5–5)
PROT SERPL-MCNC: 6.7 G/DL (ref 6.4–8.3)
RBC # BLD AUTO: 4.11 E12/L (ref 3.8–5.8)
SODIUM SERPL-SCNC: 138 MMOL/L (ref 132–146)
WBC # BLD: 13.2 E9/L (ref 4.5–11.5)

## 2023-07-13 PROCEDURE — 3074F SYST BP LT 130 MM HG: CPT | Performed by: INTERNAL MEDICINE

## 2023-07-13 PROCEDURE — 99212 OFFICE O/P EST SF 10 MIN: CPT

## 2023-07-13 PROCEDURE — G8420 CALC BMI NORM PARAMETERS: HCPCS | Performed by: INTERNAL MEDICINE

## 2023-07-13 PROCEDURE — 3078F DIAST BP <80 MM HG: CPT | Performed by: INTERNAL MEDICINE

## 2023-07-13 PROCEDURE — 99214 OFFICE O/P EST MOD 30 MIN: CPT | Performed by: INTERNAL MEDICINE

## 2023-07-13 PROCEDURE — 1123F ACP DISCUSS/DSCN MKR DOCD: CPT | Performed by: INTERNAL MEDICINE

## 2023-07-13 PROCEDURE — G8427 DOCREV CUR MEDS BY ELIG CLIN: HCPCS | Performed by: INTERNAL MEDICINE

## 2023-07-13 PROCEDURE — 36415 COLL VENOUS BLD VENIPUNCTURE: CPT

## 2023-07-13 PROCEDURE — 4004F PT TOBACCO SCREEN RCVD TLK: CPT | Performed by: INTERNAL MEDICINE

## 2023-07-13 RX ORDER — PREDNISONE 10 MG/1
TABLET ORAL
COMMUNITY
Start: 2023-07-05

## 2023-07-13 RX ORDER — DOXYCYCLINE HYCLATE 100 MG/1
CAPSULE ORAL
COMMUNITY
Start: 2023-07-05

## 2023-07-13 NOTE — PROGRESS NOTES
Department of [facility] Oncology  Attending Clinic Note    Reason for Visit: Follow-up on a patient with pre-PMF     PCP:  Be Parekh DO    History of Present Illness:  27-year-old male with history of CAD, hypertension, hyperlipidemia, CKD stage III, restless leg syndrome, emphysema, smoker who was noted to have leukocytosis/neutrophilia on routine CBC  On 12/15/2022: Hb 13.6  Hct 41.1  MCV 91.1    WBC 13.8  ANC 10.97 ALC 1.13 Abs mono 1.28  BUN 30 Creat 1.6     RPR Non-Reactive  Hep C Ab Non-Reactive  HIV Non-reactive  No systemic symptoms. He continues to smoke. No history of recurrent infections. No known history of autoimmune disorders. No history of neoplasm  Referred to our clinic for further evaluation    Extensive blood work ordered to evaluate leukocytosis/neutrophilia on 01/19/2023  Hb 13.1  Hct 39.2  MCV 92.9    WBC 10.8  ANC 8.48  ALC 1.00 Abs mono 1.01  BUN 33 Creat 1.7  LFTs wnl    Examination of the peripheral smear and blood counts/indices reveals mild absolute granulocytosis/monocytosis, and mild absolute lymphocytopenia. The red cell population appears essentially normal.   Circulating granulocytes show no dysplastic changes or hypersegmentation. There are no circulating blast forms. The remaining leukocytes appear morphologically normal.  No platelet abnormalities are detected. ESR 8  CRP <0.3    RF <10  IMELDA Negative    Acute hepatitis panel Non-Reactive    IgA, IgG, IgM WNL    Peripheral blood Flow Cytometry noted no immunophenotypic evidence of a lymphoproliferative disorder, acute leukemia or circulating blast is identified. Peripheral blood BCR/ABL FISH: Negative  There is no evidence of BCR/ABL1 fusion  JAK2 mutation Detected     Abdominal ultrasound 01/31/2023 noted hepatomegaly with normal liver parenchyma    BMBx 03/01/2023:  Normocellular marrow with mild granulocytic and megakaryocytic hyperplasia.   The bone marrow findings given the

## 2023-07-13 NOTE — CARE COORDINATION
Remote Alert Monitoring Note  Rpm alert to be reviewed by the provider   red alert   pulse ox reading (90, 91)   Additional needs to be addressed by N/A: No            Date/Time:  2023 1:04 PM  Patient Current Location: Home: [address] [facility] [address]  LPN contacted patient by telephone regarding red alert received for pulse ox reading (90, 91). Verified patients name and  as identifiers. Background: Kidney Disease, High Blood-Pressure, COPD  Refer to 911 immediately if:  Patient unresponsive or unable to provide history  Change in cognition or sudden confusion  Patient unable to respond in complete sentences  Intense chest pain/tightness  Any concern for any clinical emergency  Red Alert: Provider response time of 1 hr required for any red alert requiring intervention  Yellow Alert: Provider response time of 3hr required for any escalated yellow alert  O2 Triage  Are you having any Chest Pain? no   Are you having any Shortness of Breath? yes   Swelling in your hands or feet? no   Are you having any other health concerns or issues? no   Clinical Interventions: Reviewed and followed up on alerts and treatments-   -Pt states SOB is baseline, wearing O2 @ 2L. Upon recheck SpO2 94%. Pt compliant with medications, taking Lasix, Breo Ellipta, Symbicort, Spiriva as prescribed. Pt has albuterol rescue inhaler as needed. Patient instructed to call PCP, or present to ED if symptoms occur, or become acutely worse. Pt v/u. Plan/Follow Up: Will continue to review, monitor and address alerts with follow up based on severity of symptoms and risk factors.

## 2023-07-18 DIAGNOSIS — D47.1 MPN (MYELOPROLIFERATIVE NEOPLASM) (HCC): Primary | ICD-10-CM

## 2023-07-18 RX ORDER — RUXOLITINIB 20 MG/1
20 TABLET ORAL 2 TIMES DAILY
Qty: 60 TABLET | Refills: 2 | Status: ACTIVE | OUTPATIENT
Start: 2023-07-18

## 2023-07-19 ENCOUNTER — CARE COORDINATION (OUTPATIENT)
Dept: CARE COORDINATION | Age: 78
End: 2023-07-19

## 2023-07-19 ENCOUNTER — TELEPHONE (OUTPATIENT)
Dept: PHARMACY | Facility: CLINIC | Age: 78
End: 2023-07-19

## 2023-07-19 DIAGNOSIS — F41.9 ANXIETY: ICD-10-CM

## 2023-07-19 DIAGNOSIS — D47.1 MPN (MYELOPROLIFERATIVE NEOPLASM) (HCC): ICD-10-CM

## 2023-07-19 RX ORDER — RUXOLITINIB 20 MG/1
20 TABLET ORAL 2 TIMES DAILY
Qty: 60 TABLET | Refills: 2 | OUTPATIENT
Start: 2023-07-19

## 2023-07-19 ASSESSMENT — ENCOUNTER SYMPTOMS: DYSPNEA ASSOCIATED WITH: EXERTION

## 2023-07-19 NOTE — TELEPHONE ENCOUNTER
----- Message from Constantine Magaña RN sent at 7/19/2023 11:11 AM EDT -----  Pt is requesting another call from the [facility] Pharmacist.  States he feels his medications are out of control and is feeling overwhelmed with all of them and if they are all needed.  ACM to place referral to [facility] Pharmacist.      Thank you

## 2023-07-19 NOTE — CARE COORDINATION
Ambulatory Care Coordination Note  2023    Patient Current Location:  Home: [facility] [address]     ACM contacted the patient by telephone. Verified name and  with patient as identifiers. Provided introduction to self, and explanation of the ACM role. Challenges to be reviewed by the provider   Additional needs identified to be addressed with provider: No  none               Method of communication with provider: chart routing. ACM: Barb Moreno RN    COPD:  Denies s/s of COPD Exacerbation. Discussed the Zone Tool and verbalizes understanding. Pt started smoking again. Pt is on Oxygen 2lpm.  Pt strongly advised to quit smoking. Discussed oxygen safety, especially because he is smoking again. Pt states he is ready to quit again. Denies falls/near falls    Pt saw Oncologist.  No changes made. Pt has no questions at this time. Pt met new Oncologists and is pleased. Pt has appt with PCP 23. States he plans on discussing urine incontinence with PCP at that time. Pt is requesting another call from the [facility] Pharmacist.  States he feels his medications are out of control and is feeling overwhelmed with all of them and if they are all needed. ACM to place referral to [facility] Pharmacist.     RPM metrics reviewed. Today's RPM Metrics:  BP: 143/82; HR: 74;  SpO2: 95% on 2lpm   Discussed metrics with patient, and further focused education provided as needed. Offered patient enrollment in the Remote Patient Monitoring (RPM) program for in-home monitoring: Yes, patient already enrolled. FOLLOW-UP PLAN:    Continue Care Coordination and Assess Plan Of Care  Review RPM Metrics and educate as appropriate  Discuss:   -COPD Management/Zone Tool  -Smoking Status  -Falls/Near Falls? -OV AVS Reinforcement  -Appointment Reminders    Next Anticipated Outreach by [address] [place] [address] Team (or sooner if needed):  3 Weeks  Pt has ACM's contact information to reach out sooner if needed.

## 2023-07-19 NOTE — CARE COORDINATION
Remote Alert Monitoring Note  Rpm alert to be reviewed by the provider   red alert   blood pressure heart rate (140)   Additional needs to be addressed by N/A: No          Date/Time:  7/19/2023 9:55 AM  Patient Current Location: [address]: [address] [facility] [address]  LPN noted red alert received for blood pressure heart rate (140). Background: Kidney Disease, High Blood-Pressure, COPD  Refer to 911 immediately if:  Patient unresponsive or unable to provide history  Change in cognition or sudden confusion  Patient unable to respond in complete sentences  Intense chest pain/tightness  Any concern for any clinical emergency  Red Alert: Provider response time of 1 hr required for any red alert requiring intervention  Yellow Alert: Provider response time of 3hr required for any escalated yellow alert  Clinical Interventions: Reviewed and followed up on alerts and treatments-Upon chart review noted Pulse Ox HR 74, within normal limits. Plan/Follow Up: Will continue to review, monitor and address alerts with follow up based on severity of symptoms and risk factors.

## 2023-07-20 ENCOUNTER — TELEPHONE (OUTPATIENT)
Dept: PHARMACY | Facility: CLINIC | Age: 78
End: 2023-07-20

## 2023-07-20 NOTE — TELEPHONE ENCOUNTER
CLINICAL PHARMACY NOTE - Medication Review    Darius Juarez is a 68 y.o. male referred to a clinical pharmacy specialist by care coordination. Patient states home care nurse is coming today.  Patient requested a call back tomorrow at 2pm.     Thank you,  Zi Elizalde, PharmD, [facility] Pharmacist  Department: [facility] Only    Program: [address] [place] [address]  Time Spent (min): 5

## 2023-07-21 ENCOUNTER — TELEPHONE (OUTPATIENT)
Dept: PHARMACY | Facility: CLINIC | Age: 78
End: 2023-07-21

## 2023-07-21 NOTE — TELEPHONE ENCOUNTER
CLINICAL PHARMACY NOTE - Medication Review    Tegan Teixeira is a 68 y.o. male referred to a clinical pharmacy specialist by care coordination. Pt does want to complete a comprehensive review, but would like to wait until after his appointment next week with his PCP. Rescheduled for 7/27/23 at 2pm    Thank you,  HELGA Perez, PharmD, [facility]  Department, toll free: [phone]      For Pharmacy Admin Tracking Only    Program: [place]oney in place:  No  Recommendation Provided To: Patient/Caregiver: 1 via Telephone  Intervention Detail: Scheduled Appointment  Intervention Accepted By: Patient/Caregiver: 1  Gap Closed?: No   Time Spent (min): 15

## 2023-07-21 NOTE — TELEPHONE ENCOUNTER
Last seen 6/30/2023  Next appt 7/26/2023    Electronically signed by Sujatha Moody, [facility] on 7/21/23 at 9:56 AM EDT

## 2023-07-24 ENCOUNTER — CARE COORDINATION (OUTPATIENT)
Dept: CARE COORDINATION | Age: 78
End: 2023-07-24

## 2023-07-24 NOTE — CARE COORDINATION
Remote Alert Monitoring Note  Rpm alert to be reviewed by the provider   red alert   weight (increase)   Additional needs to be addressed by N/A: No            Date/Time:  2023 12:56 PM  Patient Current Location: Home: [address] [place] [address] Dr HERRERA contacted patient by telephone regarding red alert received for weight increase (3 lbs x 1 day, 5 lbs x 7 days). Verified patients name and  as identifiers. Background:  Kidney Disease, High Blood-Pressure, COPD  Refer to 911 immediately if:  Patient unresponsive or unable to provide history  Change in cognition or sudden confusion  Patient unable to respond in complete sentences  Intense chest pain/tightness  Any concern for any clinical emergency  Red Alert: Provider response time of 1 hr required for any red alert requiring intervention  Yellow Alert: Provider response time of 3hr required for any escalated yellow alert  Clinical Interventions: Reviewed and followed up on alerts and treatments-Upon chart review noted inaccurate weight recorded on 23 of 126.4 lbs. Pt's weight currently stable at 132.4 lbs. Plan/Follow Up: Will continue to review, monitor and address alerts with follow up based on severity of symptoms and risk factors.

## 2023-07-26 ENCOUNTER — OFFICE VISIT (OUTPATIENT)
Dept: FAMILY MEDICINE CLINIC | Age: 78
End: 2023-07-26

## 2023-07-26 ENCOUNTER — CARE COORDINATION (OUTPATIENT)
Dept: CARE COORDINATION | Age: 78
End: 2023-07-26

## 2023-07-26 VITALS
HEART RATE: 70 BPM | HEIGHT: 71 IN | DIASTOLIC BLOOD PRESSURE: 58 MMHG | TEMPERATURE: 97.4 F | SYSTOLIC BLOOD PRESSURE: 126 MMHG | RESPIRATION RATE: 20 BRPM | OXYGEN SATURATION: 92 % | WEIGHT: 137.2 LBS | BODY MASS INDEX: 19.21 KG/M2

## 2023-07-26 DIAGNOSIS — Z72.0 TOBACCO ABUSE: ICD-10-CM

## 2023-07-26 DIAGNOSIS — I21.11 ST ELEVATION MYOCARDIAL INFARCTION INVOLVING RIGHT CORONARY ARTERY (HCC): Primary | ICD-10-CM

## 2023-07-26 DIAGNOSIS — I25.10 CAD IN NATIVE ARTERY: ICD-10-CM

## 2023-07-26 DIAGNOSIS — I10 ESSENTIAL HYPERTENSION: ICD-10-CM

## 2023-07-26 DIAGNOSIS — C95.91 LEUKEMIA IN REMISSION, UNSPECIFIED LEUKEMIA TYPE (HCC): ICD-10-CM

## 2023-07-26 DIAGNOSIS — B37.0 ORAL THRUSH: ICD-10-CM

## 2023-07-26 DIAGNOSIS — J18.9 PNEUMONIA DUE TO INFECTIOUS ORGANISM, UNSPECIFIED LATERALITY, UNSPECIFIED PART OF LUNG: ICD-10-CM

## 2023-07-26 PROBLEM — I25.119 ATHEROSCLEROTIC HEART DISEASE OF NATIVE CORONARY ARTERY WITH UNSPECIFIED ANGINA PECTORIS (HCC): Status: RESOLVED | Noted: 2023-03-31 | Resolved: 2023-07-26

## 2023-07-26 RX ORDER — CLOTRIMAZOLE 10 MG/1
10 LOZENGE ORAL; TOPICAL
Qty: 50 TABLET | Refills: 0 | Status: SHIPPED | OUTPATIENT
Start: 2023-07-26 | End: 2023-08-05

## 2023-07-26 NOTE — CARE COORDINATION
Remote Alert Monitoring Note  Rpm alert to be reviewed by the provider   red alert   pulse ox reading (91) and weight (increase 3 lbs x 1 day)   Additional needs to be addressed by N/A: No            Date/Time:  7/26/2023 11:02 AM  Patient Current Location: Home: [address] [place] [address] Drive [address]  LPN attempted to contact patient by telephone. Background: Kidney Disease, High Blood-Pressure, COPD  Refer to 911 immediately if:  Patient unresponsive or unable to provide history  Change in cognition or sudden confusion  Patient unable to respond in complete sentences  Intense chest pain/tightness  Any concern for any clinical emergency  Red Alert: Provider response time of 1 hr required for any red alert requiring intervention  Yellow Alert: Provider response time of 3hr required for any escalated yellow alert  Clinical Interventions: Reviewed and followed up on alerts and treatments-Unable to leave voicemail. Mailbox full. Plan/Follow Up: Will continue to review, monitor and address alerts with follow up based on severity of symptoms and risk factors.

## 2023-07-27 ENCOUNTER — CARE COORDINATION (OUTPATIENT)
Dept: CARE COORDINATION | Age: 78
End: 2023-07-27

## 2023-07-27 ENCOUNTER — TELEPHONE (OUTPATIENT)
Dept: PHARMACY | Facility: CLINIC | Age: 78
End: 2023-07-27

## 2023-07-27 NOTE — CARE COORDINATION
Remote Alert Monitoring Note  Rpm alert to be reviewed by the provider   red alert   pulse ox reading (90)   Additional needs to be addressed by N/A: No          Date/Time:  7/27/2023 3:01 PM  Patient Current Location: Home: [address] [place] [address] Drive [address]  LPN attempted to contact patient by telephone. Background: Kidney Disease, High Blood-Pressure, COPD  Refer to 911 immediately if:  Patient unresponsive or unable to provide history  Change in cognition or sudden confusion  Patient unable to respond in complete sentences  Intense chest pain/tightness  Any concern for any clinical emergency  Red Alert: Provider response time of 1 hr required for any red alert requiring intervention  Yellow Alert: Provider response time of 3hr required for any escalated yellow alert  Clinical Interventions: Reviewed and followed up on alerts and treatments-Unable to leave voicemail. Mailbox full. Plan/Follow Up: Will continue to review, monitor and address alerts with follow up based on severity of symptoms and risk factors.

## 2023-07-27 NOTE — TELEPHONE ENCOUNTER
CLINICAL PHARMACY NOTE - Medication Review    Nilam Odom is a 68 y.o. male referred to a clinical pharmacy specialist by care coordination    Pt stated he \"has it all figured out\" and declined med review after scheduling/rescheduling. Will sign off. Thank you,  HELGA Steve, PharmD,  [facility]  Department, toll free: [phone]    For Pharmacy Admin Tracking Only    Program: [address] [place] [address]  Time Spent (min): 15

## 2023-07-30 PROBLEM — B37.0 ORAL THRUSH: Status: ACTIVE | Noted: 2023-07-30

## 2023-07-30 PROBLEM — C95.91 LEUKEMIA IN REMISSION (HCC): Status: ACTIVE | Noted: 2023-07-30

## 2023-07-30 PROBLEM — J18.9 PNEUMONIA DUE TO INFECTIOUS ORGANISM: Status: ACTIVE | Noted: 2023-07-30

## 2023-07-30 ASSESSMENT — ENCOUNTER SYMPTOMS
ANAL BLEEDING: 0
EYE PAIN: 0
WHEEZING: 0
BLOOD IN STOOL: 0
EYE REDNESS: 0
COLOR CHANGE: 0
SHORTNESS OF BREATH: 0
SINUS PAIN: 0
GASTROINTESTINAL NEGATIVE: 1
TROUBLE SWALLOWING: 0
SORE THROAT: 0
ABDOMINAL PAIN: 0
STRIDOR: 0
EYE DISCHARGE: 0
CONSTIPATION: 0
NAUSEA: 0
VOMITING: 0
DIARRHEA: 0
CHOKING: 0
RECTAL PAIN: 0
CHEST TIGHTNESS: 0
SINUS PRESSURE: 0
ABDOMINAL DISTENTION: 0
RHINORRHEA: 0
EYE ITCHING: 0
VOICE CHANGE: 0
PHOTOPHOBIA: 0
FACIAL SWELLING: 0
COUGH: 0
BACK PAIN: 0

## 2023-07-30 NOTE — PROGRESS NOTES
leukemia type ([address])  Stable. Oncology. Pt instructed if any worse go ED ASAP.     Outpatient Encounter Medications as of 7/26/2023   Medication Sig Dispense Refill    NONFORMULARY Oxygen therapy 2L N/C PRN      clotrimazole (MYCELEX) 10 MG michelle Take 1 tablet by mouth 5 times daily for 10 days 50 tablet 0    sertraline (ZOLOFT) 50 MG tablet Take 1 tablet by mouth daily 90 tablet 0    ruxolitinib phosphate (JAKAFI) 20 MG tablet Take 1 tablet by mouth in the morning and at bedtime 60 tablet 2    predniSONE (DELTASONE) 10 MG tablet       Incontinence Supply Disposable (DEPEND PANT SM/MED) MISC 1 Package by Does not apply route 5 times daily 250 each 1    tiotropium (SPIRIVA RESPIMAT) 2.5 MCG/ACT AERS inhaler Inhale 2 puffs into the lungs daily 3 each 1    potassium chloride (KLOR-CON M) 20 MEQ extended release tablet Take 1 tablet by mouth daily for 3 days 3 tablet 0    fluticasone furoate-vilanterol (BREO ELLIPTA) 100-25 MCG/ACT inhaler Inhale 1 puff into the lungs daily 1 each 5    metoprolol succinate (TOPROL XL) 25 MG extended release tablet Take 1 tablet by mouth at bedtime      busPIRone (BUSPAR) 5 MG tablet Take 1 tablet by mouth 2 times daily as needed (anxiety)      Incontinence Supply Disposable (DEPEND REAL FIT/BRIEF/MEN/S-M) MISC 4 boxes by Does not apply route as needed (Change as needed) 56 each 5    furosemide (LASIX) 20 MG tablet Take 1 tablet by mouth daily 90 tablet 1    potassium chloride (KLOR-CON M) 10 MEQ extended release tablet Take 1 tablet by mouth daily 90 tablet 1    albuterol sulfate HFA (PROVENTIL;VENTOLIN;PROAIR) 108 (90 Base) MCG/ACT inhaler Inhale 2 puffs into the lungs every 6 hours as needed for Wheezing or Shortness of Breath 18 g 3    budesonide-formoterol (SYMBICORT) 160-4.5 MCG/ACT AERO Inhale 2 puffs into the lungs 2 times daily 10.2 g 5    allopurinol (ZYLOPRIM) 100 MG tablet Take 1 tablet by mouth daily 30 tablet 5    atorvastatin (LIPITOR) 40 MG tablet Take 1 tablet by

## 2023-08-01 ENCOUNTER — TELEPHONE (OUTPATIENT)
Dept: ONCOLOGY | Age: 78
End: 2023-08-01

## 2023-08-01 NOTE — TELEPHONE ENCOUNTER
Patient called having questions about a cancer care assistant foundation co payment. I let patient know that we would give him a call back. This nurse then spoke with Shila Sales and she stated that she will return patient's phone call with answers to his questions.

## 2023-08-02 ENCOUNTER — CARE COORDINATION (OUTPATIENT)
Dept: CASE MANAGEMENT | Age: 78
End: 2023-08-02

## 2023-08-02 NOTE — CARE COORDINATION
Date/Time:  8/2/2023 3:55 PM  LPN attempted to reach patient by telephone regarding red alert in remote patient monitoring program  UNABLE TO [address]. Mailbox is full  RED ALERT FOR weight of 155. 4. yesterdays weight was 132.   Enrolled in RPM for kidney disease COPD and HTN

## 2023-08-03 ENCOUNTER — CARE COORDINATION (OUTPATIENT)
Dept: CASE MANAGEMENT | Age: 78
End: 2023-08-03

## 2023-08-03 PROBLEM — Z00.00 MEDICARE ANNUAL WELLNESS VISIT, SUBSEQUENT: Status: RESOLVED | Noted: 2023-07-04 | Resolved: 2023-08-03

## 2023-08-03 NOTE — CARE COORDINATION
Remote Alert Monitoring Note  Rpm alert to be reviewed by the provider   red alert   weight (INCREASE OF 5 pounds in 7 days and 3 pounds in 1 day)   Additional needs to be addressed by N/A: No                 Date/Time:  8/3/2023 12:44 PM  Patient Current Location: Home: [address] [address]  LPN contacted patient by telephone. Verified patients name and  as identifiers. Background: enrolled in RPM FOR KIDNEY DISEASE HTN AND COPD    Clinical Interventions: Reviewed and followed up on alerts and treatments-spoke to [facility] regarding red alert for weight gain of 3 lbs in 1 day and 5 pounds in 7 days. Pt weighed self yesterday morning and weight recorded at   132# (baseline). He then had a weight of 155.4 at 3:38pm and 121.6 at 16:51pm. Pt reports his girlfriend and her friend got on the scale and weighed themselves. Educated on not allowing others to weigh self on RPM scale. Verbalized understanding. Inaccurate weights removed from metrics in RPM   Plan/Follow Up: Will continue to review, monitor and address alerts with follow up based on severity of symptoms and risk factors.

## 2023-08-04 ENCOUNTER — CARE COORDINATION (OUTPATIENT)
Dept: CASE MANAGEMENT | Age: 78
End: 2023-08-04

## 2023-08-04 VITALS
DIASTOLIC BLOOD PRESSURE: 67 MMHG | BODY MASS INDEX: 18.72 KG/M2 | OXYGEN SATURATION: 95 % | SYSTOLIC BLOOD PRESSURE: 136 MMHG | HEART RATE: 69 BPM | WEIGHT: 134.2 LBS

## 2023-08-04 NOTE — CARE COORDINATION
Remote Alert Monitoring Note  Rpm alert to be reviewed by the provider   red alert   pulse ox reading (91%)   Additional needs to be addressed by N/A: No                  Date/Time:  2023 10:28 AM  Patient Current Location: Home: [facility] [address]  LPN contacted patient by telephone. Verified patients name and  as identifiers. Background: ENROLLED IN RPM FOR KIDNEY DISEASE HTN COPD  Refer to 911 immediately if:  Patient unresponsive or unable to provide history  Change in cognition or sudden confusion  Patient unable to respond in complete sentences  Intense chest pain/tightness  Any concern for any clinical emergency  Red Alert: Provider response time of 1 hr required for any red alert requiring intervention  Yellow Alert: Provider response time of 3hr required for any escalated yellow alert    O2 Triage  Are you having any Chest Pain? no   Are you having any Shortness of Breath? no   Swelling in your hands or feet? no     Are you having any other health concerns or issues? no     Clinical Interventions: Reviewed and followed up on alerts and treatments-Spoke to [facility] [place] regarding red alert for low pulse ox reading of 91%. Pt denies any abnormal shortness of breath. He uses oxygen at home. Pt states he quit using inhalers after developing thrush one week ago. \" I had blisters on my tongue\" Educated on rinsing his mouth out after inhaler use. And not to swallow the water. Pt reports his Dr gave him medicaton for thrush. Verbalized understanding. Requested pt to recheck his pulse ox now. New reading 95%. Plan/Follow Up: Will continue to review, monitor and address alerts with follow up based on severity of symptoms and risk factors.

## 2023-08-08 ENCOUNTER — CARE COORDINATION (OUTPATIENT)
Dept: CARE COORDINATION | Age: 78
End: 2023-08-08

## 2023-08-08 NOTE — CARE COORDINATION
AC received the following message from RPM Team:  Niko Hawk RN  I received the following email from [phone]:     We just spoke to the patient PID [id number], the patient had some questions regarding their oxygen level readings that they would like to discuss with the clinical team. Please reach out to this patient. Let us know if you have any questions during this time. \"      ACM attempted to call pt. No answer and voicemail is full. Pt has AC's contact information.

## 2023-08-09 ENCOUNTER — CARE COORDINATION (OUTPATIENT)
Dept: CASE MANAGEMENT | Age: 78
End: 2023-08-09

## 2023-08-09 NOTE — CARE COORDINATION
Remote Alert Monitoring Note  Rpm alert to be reviewed by the provider   red alert   blood pressure reading (87/44)   Additional needs to be addressed by N/A: No                    Date/Time:  2023 1:06 PM  Patient Current Location: Home: [address] [address]  LPN contacted patient by telephone. Verified patients name and  as identifiers. Background: enrolled in RPM for Kidney disease HTN and COPD  Refer to 911 immediately if:  Patient unresponsive or unable to provide history  Change in cognition or sudden confusion  Patient unable to respond in complete sentences  Intense chest pain/tightness  Any concern for any clinical emergency  Red Alert: Provider response time of 1 hr required for any red alert requiring intervention  Yellow Alert: Provider response time of 3hr required for any escalated yellow alert    BP Triage  Are you having any Chest Pain? no   Are you having any Shortness of Breath? no   Do you have a headache or have any vision changes? no   Are you having any numbness or tingling? no   Are you having any other health concerns or issues? no        Clinical Interventions: Reviewed and followed up on alerts and treatments-Spoke to [facility] [place]   regarding RPM red alert for low blood pressure. Pt states he feels fine. No chest pain. No fatigue to dizziness. Pt checked his blood pressure 10 times today. Most Systolic readings below 90. Pulse ox 92%. Pt states he is taking all medications as directed. Pt replaced batteries in his blood pressure cuff during this call. He rechecked his metrics. His new blood pressure reading is 108/56. Plan/Follow Up: Will continue to review, monitor and address alerts with follow up based on severity of symptoms and risk factors.

## 2023-08-11 ENCOUNTER — CARE COORDINATION (OUTPATIENT)
Dept: CASE MANAGEMENT | Age: 78
End: 2023-08-11

## 2023-08-11 DIAGNOSIS — D72.829 LEUKOCYTOSIS, UNSPECIFIED TYPE: Primary | ICD-10-CM

## 2023-08-11 NOTE — CARE COORDINATION
Remote Alert Monitoring Note  Rpm alert to be reviewed by the provider   red alert   pulse ox reading (90%)   Additional needs to be addressed by N/A: No                  Date/Time:  2023 12:39 PM  Patient Current Location: Home: [address] [address]  LPN contacted patient by telephone. Verified patients name and  as identifiers. Background: ENROLLED IN RPM FOR KIDNEY DISEASE HTN AND COPD  Refer to 911 immediately if:  Patient unresponsive or unable to provide history  Change in cognition or sudden confusion  Patient unable to respond in complete sentences  Intense chest pain/tightness  Any concern for any clinical emergency  Red Alert: Provider response time of 1 hr required for any red alert requiring intervention  Yellow Alert: Provider response time of 3hr required for any escalated yellow alert    O2 Triage  Are you having any Chest Pain? no   Are you having any Shortness of Breath? no   Swelling in your hands or feet? no     Are you having any other health concerns or issues? no      Clinical Interventions: Reviewed and followed up on alerts and treatments-spoke to [facility] [place] regarding RPM red alert for low pulse ox reading of 90%. Pt states he has no chest pain or increased dyspnea. He is not wearing his oxygen at this time. Taking all medications as directed. Requested pt recheck his pulse ox. New reading 93%. Plan/Follow Up: Will continue to review, monitor and address alerts with follow up based on severity of symptoms and risk factors.

## 2023-08-14 DIAGNOSIS — M10.9 ACUTE GOUT, UNSPECIFIED CAUSE, UNSPECIFIED SITE: ICD-10-CM

## 2023-08-14 RX ORDER — ALLOPURINOL 100 MG/1
100 TABLET ORAL DAILY
Qty: 30 TABLET | Refills: 5 | Status: CANCELLED | OUTPATIENT
Start: 2023-08-14

## 2023-08-14 NOTE — TELEPHONE ENCOUNTER
Patient called and requested refills    Last seen 7/26/2023  Next appt 9/29/2023    [address] [facility]

## 2023-08-15 DIAGNOSIS — M10.9 ACUTE GOUT, UNSPECIFIED CAUSE, UNSPECIFIED SITE: ICD-10-CM

## 2023-08-15 NOTE — TELEPHONE ENCOUNTER
Last Appointment:  7/26/2023  Future Appointments   Date Time Provider [address]   8/17/2023  3:00 PM [facility] MED ONC FAST TRACK 1 [facility] Med Onc [facility]   8/17/2023  3:15 PM Yenni Love MD MED ONC [facility]   9/29/2023 12:00 PM Sami Back DO [facility]   10/25/2023  2:15 PM Adri Back DO [facility]

## 2023-08-17 ENCOUNTER — OFFICE VISIT (OUTPATIENT)
Dept: ONCOLOGY | Age: 78
End: 2023-08-17
Payer: MEDICARE

## 2023-08-17 ENCOUNTER — HOSPITAL ENCOUNTER (OUTPATIENT)
Dept: INFUSION THERAPY | Age: 78
Discharge: HOME | End: 2023-08-17
Payer: MEDICARE

## 2023-08-17 VITALS
OXYGEN SATURATION: 95 % | HEIGHT: 71 IN | SYSTOLIC BLOOD PRESSURE: 105 MMHG | HEART RATE: 66 BPM | WEIGHT: 142.1 LBS | BODY MASS INDEX: 19.9 KG/M2 | TEMPERATURE: 98.1 F | DIASTOLIC BLOOD PRESSURE: 51 MMHG

## 2023-08-17 DIAGNOSIS — D47.1 MPN (MYELOPROLIFERATIVE NEOPLASM) (HCC): Primary | ICD-10-CM

## 2023-08-17 DIAGNOSIS — D72.829 LEUKOCYTOSIS, UNSPECIFIED TYPE: ICD-10-CM

## 2023-08-17 LAB
ALBUMIN SERPL-MCNC: 4.3 G/DL (ref 3.5–5.2)
ALP SERPL-CCNC: 97 U/L (ref 40–129)
ALT SERPL-CCNC: 14 U/L (ref 0–40)
ANION GAP SERPL CALCULATED.3IONS-SCNC: 10 MMOL/L (ref 7–16)
AST SERPL-CCNC: 21 U/L (ref 0–39)
BASOPHILS # BLD: 0.06 K/UL (ref 0–0.2)
BASOPHILS NFR BLD: 1 % (ref 0–2)
BILIRUB SERPL-MCNC: 0.3 MG/DL (ref 0–1.2)
BUN SERPL-MCNC: 29 MG/DL (ref 6–23)
CALCIUM SERPL-MCNC: 9 MG/DL (ref 8.6–10.2)
CHLORIDE SERPL-SCNC: 104 MMOL/L (ref 98–107)
CO2 SERPL-SCNC: 25 MMOL/L (ref 22–29)
CREAT SERPL-MCNC: 1.5 MG/DL (ref 0.7–1.2)
EOSINOPHIL # BLD: 0.04 K/UL (ref 0.05–0.5)
EOSINOPHILS RELATIVE PERCENT: 0 % (ref 0–6)
ERYTHROCYTE [DISTWIDTH] IN BLOOD BY AUTOMATED COUNT: 18.2 % (ref 11.5–15)
GFR SERPL CREATININE-BSD FRML MDRD: 46 ML/MIN/1.73M2
GLUCOSE SERPL-MCNC: 93 MG/DL (ref 74–99)
HCT VFR BLD AUTO: 31.9 % (ref 37–54)
HGB BLD-MCNC: 10.4 G/DL (ref 12.5–16.5)
IMM GRANULOCYTES # BLD AUTO: 0.23 K/UL (ref 0–0.58)
IMM GRANULOCYTES NFR BLD: 3 % (ref 0–5)
LYMPHOCYTES NFR BLD: 1.89 K/UL (ref 1.5–4)
LYMPHOCYTES RELATIVE PERCENT: 21 % (ref 20–42)
MCH RBC QN AUTO: 30 PG (ref 26–35)
MCHC RBC AUTO-ENTMCNC: 32.6 G/DL (ref 32–34.5)
MCV RBC AUTO: 91.9 FL (ref 80–99.9)
MONOCYTES NFR BLD: 0.83 K/UL (ref 0.1–0.95)
MONOCYTES NFR BLD: 9 % (ref 2–12)
NEUTROPHILS NFR BLD: 66 % (ref 43–80)
NEUTS SEG NFR BLD: 5.94 K/UL (ref 1.8–7.3)
PLATELET # BLD AUTO: 240 K/UL (ref 130–450)
PMV BLD AUTO: 10.8 FL (ref 7–12)
POTASSIUM SERPL-SCNC: 4 MMOL/L (ref 3.5–5)
PROT SERPL-MCNC: 6.7 G/DL (ref 6.4–8.3)
RBC # BLD AUTO: 3.47 M/UL (ref 3.8–5.8)
SODIUM SERPL-SCNC: 139 MMOL/L (ref 132–146)
WBC OTHER # BLD: 9 K/UL (ref 4.5–11.5)

## 2023-08-17 PROCEDURE — G8427 DOCREV CUR MEDS BY ELIG CLIN: HCPCS | Performed by: STUDENT IN AN ORGANIZED HEALTH CARE EDUCATION/TRAINING PROGRAM

## 2023-08-17 PROCEDURE — 3075F SYST BP GE 130 - 139MM HG: CPT | Performed by: STUDENT IN AN ORGANIZED HEALTH CARE EDUCATION/TRAINING PROGRAM

## 2023-08-17 PROCEDURE — G8420 CALC BMI NORM PARAMETERS: HCPCS | Performed by: STUDENT IN AN ORGANIZED HEALTH CARE EDUCATION/TRAINING PROGRAM

## 2023-08-17 PROCEDURE — 36415 COLL VENOUS BLD VENIPUNCTURE: CPT

## 2023-08-17 PROCEDURE — 3078F DIAST BP <80 MM HG: CPT | Performed by: STUDENT IN AN ORGANIZED HEALTH CARE EDUCATION/TRAINING PROGRAM

## 2023-08-17 PROCEDURE — 4004F PT TOBACCO SCREEN RCVD TLK: CPT | Performed by: STUDENT IN AN ORGANIZED HEALTH CARE EDUCATION/TRAINING PROGRAM

## 2023-08-17 PROCEDURE — 85025 COMPLETE CBC W/AUTO DIFF WBC: CPT

## 2023-08-17 PROCEDURE — 99212 OFFICE O/P EST SF 10 MIN: CPT | Performed by: STUDENT IN AN ORGANIZED HEALTH CARE EDUCATION/TRAINING PROGRAM

## 2023-08-17 PROCEDURE — 1123F ACP DISCUSS/DSCN MKR DOCD: CPT | Performed by: STUDENT IN AN ORGANIZED HEALTH CARE EDUCATION/TRAINING PROGRAM

## 2023-08-17 PROCEDURE — 80053 COMPREHEN METABOLIC PANEL: CPT

## 2023-08-17 PROCEDURE — 99214 OFFICE O/P EST MOD 30 MIN: CPT | Performed by: STUDENT IN AN ORGANIZED HEALTH CARE EDUCATION/TRAINING PROGRAM

## 2023-08-17 RX ORDER — ALLOPURINOL 100 MG/1
100 TABLET ORAL DAILY
Qty: 30 TABLET | Refills: 5 | OUTPATIENT
Start: 2023-08-17

## 2023-08-17 NOTE — TELEPHONE ENCOUNTER
Patient is out of meds, called in to [facility] Consulted to dose: Zosyn for intraabdominal abscess     Zosyn Renal Monitoring  Day:  Recent Labs     02/01/21  1639   CREATININE <0.5*     CrCl cannot be calculated (This lab value cannot be used to calculate CrCl because it is not a number: <0.5). Recent Labs     02/01/21  1639   WBC 9.8       Extended infusion zosyn protocol  CrCl ?  20 mL/min - 3.375 g IV q8h over 4 hours  CrCl < 20 mL/min or ESRD - 3.375 g IV q12h over 4 hours    Received Zosyn 4500 mg x1 in ED, scheduled for Zosyn 3375mg q8h    Lynda Pavon 2/1/2021 8:40 PM

## 2023-08-17 NOTE — PROGRESS NOTES
screen    Dispo:  RTC in 1 month with labs        Approximately spent 29 minutes on chart review as well as time spent on patient encounter, discussion of the laboratory, imaging, clinical findings, and documentation. I have discussed clinical implications and recommendations on the patient's primary issues. More than 50% of time was spent counseling patient. The patient verbalized understanding.       Harshad Collier MD  Medical Oncology  [facility]  08/18/23 1:46 PM

## 2023-08-22 ENCOUNTER — CARE COORDINATION (OUTPATIENT)
Dept: CARE COORDINATION | Age: 78
End: 2023-08-22

## 2023-08-22 DIAGNOSIS — J43.9 PULMONARY EMPHYSEMA, UNSPECIFIED EMPHYSEMA TYPE (HCC): ICD-10-CM

## 2023-08-22 DIAGNOSIS — D47.1 MYELOPROLIFERATIVE DISORDER (HCC): Primary | ICD-10-CM

## 2023-08-22 DIAGNOSIS — I10 ESSENTIAL HYPERTENSION: Primary | ICD-10-CM

## 2023-08-22 DIAGNOSIS — N18.32 STAGE 3B CHRONIC KIDNEY DISEASE (HCC): ICD-10-CM

## 2023-08-22 ASSESSMENT — ENCOUNTER SYMPTOMS: DYSPNEA ASSOCIATED WITH: EXERTION

## 2023-08-22 NOTE — CARE COORDINATION
Ambulatory Care Coordination Note  2023    Patient Current Location:  Home: [address] [facility] [address]     ACM contacted the patient by telephone. Verified name and  with patient as identifiers. Provided introduction to self, and explanation of the ACM role. Challenges to be reviewed by the provider   Additional needs identified to be addressed with provider: No  none               Method of communication with provider: chart routing. ACM: Cristina Eisenberg RN    COPD:  Denies s/s of COPD Exacerbation. Discussed the Zone Tool and verbalizes understanding. Pt states using 2lpm oxygen seems to really help his breathing. States at times, he takes it off in the house when not doing anything. Pt continues to smoke 1ppd. No interest in quitting. Discussed oxygen safety with pt. Verbalizes understanding. Denies falls/near falls    Discussed Red Flag symptoms for which patient should seek emergent care. Patient verbalizes understanding. Pt states he continues to have issues with bladder control. States he saw a Urologist in the past, but did not get along. Pt denies symptoms of UTI. States it is just a matter of incontinence. Pt to see PCP 23. States he is going to call the office to see if he can be seen sooner. RPM metrics reviewed. Today's RPM Metrics:   HR: 73;  SpO2: 93% on 2lpm oxygen   Discussed metrics with patient, and further focused education provided as needed. Pt graduating from [address] [place] [address] Rd patient enrollment in the Remote Patient Monitoring (RPM) program for in-home monitoring:  Pt graduating . Remote Patient Monitoring Graduation Date/Time: 2023 12:11 PM Patient Current Location: Home: [address] [place] [address] Patient has graduated from the Remote Patient Monitoring program on 2023. RPM goals have been met at this time. Patient has been provided instruction on process to return RPM equipment and RPM has been deactivated.  ----

## 2023-08-22 NOTE — CARE COORDINATION
CCSS placed call to patient to arrange RPM kit  through [facility]. Reviewed with patient how to pack equipment in original packing. Verified patient's availability to schedule UPS  time. UPS  time requested. Anticipated  date range 2 to 4 business days.

## 2023-08-22 NOTE — PROGRESS NOTES
Remote Patient Order Discontinued    Received request from Venita Brizuela RN  to discontinue order for remote patient monitoring of Kidney Disease , COPD, and HTN and order completed.

## 2023-09-14 ENCOUNTER — HOSPITAL ENCOUNTER (OUTPATIENT)
Dept: INFUSION THERAPY | Age: 78
Discharge: HOME | End: 2023-09-14
Payer: MEDICARE

## 2023-09-14 ENCOUNTER — OFFICE VISIT (OUTPATIENT)
Dept: ONCOLOGY | Age: 78
End: 2023-09-14
Payer: MEDICARE

## 2023-09-14 VITALS
HEIGHT: 71 IN | BODY MASS INDEX: 19.67 KG/M2 | SYSTOLIC BLOOD PRESSURE: 118 MMHG | WEIGHT: 140.5 LBS | TEMPERATURE: 97.2 F | HEART RATE: 68 BPM | DIASTOLIC BLOOD PRESSURE: 56 MMHG | OXYGEN SATURATION: 96 %

## 2023-09-14 DIAGNOSIS — D47.1 MPN (MYELOPROLIFERATIVE NEOPLASM) (HCC): ICD-10-CM

## 2023-09-14 DIAGNOSIS — D47.1 MPN (MYELOPROLIFERATIVE NEOPLASM) (HCC): Primary | ICD-10-CM

## 2023-09-14 LAB
ALBUMIN SERPL-MCNC: 4.2 G/DL (ref 3.5–5.2)
ALP SERPL-CCNC: 82 U/L (ref 40–129)
ALT SERPL-CCNC: 16 U/L (ref 0–40)
ANION GAP SERPL CALCULATED.3IONS-SCNC: 10 MMOL/L (ref 7–16)
AST SERPL-CCNC: 23 U/L (ref 0–39)
BASOPHILS # BLD: 0.04 K/UL (ref 0–0.2)
BASOPHILS NFR BLD: 1 % (ref 0–2)
BILIRUB SERPL-MCNC: 0.5 MG/DL (ref 0–1.2)
BUN SERPL-MCNC: 29 MG/DL (ref 6–23)
CALCIUM SERPL-MCNC: 8.9 MG/DL (ref 8.6–10.2)
CHLORIDE SERPL-SCNC: 101 MMOL/L (ref 98–107)
CO2 SERPL-SCNC: 26 MMOL/L (ref 22–29)
CREAT SERPL-MCNC: 1.6 MG/DL (ref 0.7–1.2)
EOSINOPHIL # BLD: 0.04 K/UL (ref 0.05–0.5)
EOSINOPHILS RELATIVE PERCENT: 1 % (ref 0–6)
ERYTHROCYTE [DISTWIDTH] IN BLOOD BY AUTOMATED COUNT: 18.8 % (ref 11.5–15)
GFR SERPL CREATININE-BSD FRML MDRD: 44 ML/MIN/1.73M2
GLUCOSE SERPL-MCNC: 99 MG/DL (ref 74–99)
HCT VFR BLD AUTO: 31.6 % (ref 37–54)
HGB BLD-MCNC: 10.2 G/DL (ref 12.5–16.5)
IMM GRANULOCYTES # BLD AUTO: 0.16 K/UL (ref 0–0.58)
IMM GRANULOCYTES NFR BLD: 2 % (ref 0–5)
LYMPHOCYTES NFR BLD: 1.48 K/UL (ref 1.5–4)
LYMPHOCYTES RELATIVE PERCENT: 21 % (ref 20–42)
MCH RBC QN AUTO: 30.4 PG (ref 26–35)
MCHC RBC AUTO-ENTMCNC: 32.3 G/DL (ref 32–34.5)
MCV RBC AUTO: 94 FL (ref 80–99.9)
MONOCYTES NFR BLD: 0.89 K/UL (ref 0.1–0.95)
MONOCYTES NFR BLD: 13 % (ref 2–12)
NEUTROPHILS NFR BLD: 63 % (ref 43–80)
NEUTS SEG NFR BLD: 4.41 K/UL (ref 1.8–7.3)
PLATELET # BLD AUTO: 254 K/UL (ref 130–450)
PMV BLD AUTO: 10.9 FL (ref 7–12)
POTASSIUM SERPL-SCNC: 4 MMOL/L (ref 3.5–5)
PROT SERPL-MCNC: 6.7 G/DL (ref 6.4–8.3)
RBC # BLD AUTO: 3.36 M/UL (ref 3.8–5.8)
SODIUM SERPL-SCNC: 137 MMOL/L (ref 132–146)
WBC OTHER # BLD: 7 K/UL (ref 4.5–11.5)

## 2023-09-14 PROCEDURE — 1123F ACP DISCUSS/DSCN MKR DOCD: CPT | Performed by: STUDENT IN AN ORGANIZED HEALTH CARE EDUCATION/TRAINING PROGRAM

## 2023-09-14 PROCEDURE — 36415 COLL VENOUS BLD VENIPUNCTURE: CPT

## 2023-09-14 PROCEDURE — 3074F SYST BP LT 130 MM HG: CPT | Performed by: STUDENT IN AN ORGANIZED HEALTH CARE EDUCATION/TRAINING PROGRAM

## 2023-09-14 PROCEDURE — 3078F DIAST BP <80 MM HG: CPT | Performed by: STUDENT IN AN ORGANIZED HEALTH CARE EDUCATION/TRAINING PROGRAM

## 2023-09-14 PROCEDURE — G8427 DOCREV CUR MEDS BY ELIG CLIN: HCPCS | Performed by: STUDENT IN AN ORGANIZED HEALTH CARE EDUCATION/TRAINING PROGRAM

## 2023-09-14 PROCEDURE — 4004F PT TOBACCO SCREEN RCVD TLK: CPT | Performed by: STUDENT IN AN ORGANIZED HEALTH CARE EDUCATION/TRAINING PROGRAM

## 2023-09-14 PROCEDURE — G8420 CALC BMI NORM PARAMETERS: HCPCS | Performed by: STUDENT IN AN ORGANIZED HEALTH CARE EDUCATION/TRAINING PROGRAM

## 2023-09-14 PROCEDURE — 85025 COMPLETE CBC W/AUTO DIFF WBC: CPT

## 2023-09-14 PROCEDURE — 99213 OFFICE O/P EST LOW 20 MIN: CPT | Performed by: STUDENT IN AN ORGANIZED HEALTH CARE EDUCATION/TRAINING PROGRAM

## 2023-09-14 PROCEDURE — 80053 COMPREHEN METABOLIC PANEL: CPT

## 2023-09-14 NOTE — TELEPHONE ENCOUNTER
Patient requested refills.      Last seen 7/26/2023  Next appt 9/29/2023    [address] [facility]
none

## 2023-09-14 NOTE — PROGRESS NOTES
[address]  [address] [facility] MED ONCOLOGY  [address] [facility] [address] [facility] [address]  Dept: [address] [place] [address] Avenue: [phone]    Attending Clinic Note    Chief Complaint   Patient presents with    Cancer     Leukocytosis/ neutrophilia       Date of Encounter: 09/14/2023    Reason for Visit: Follow-up on a patient with pre-PMF     PCP:  Darrell Reddy DO      Subjective  Patient denies of any new changes or new/worsening symptoms. He is tolerating Jakafi well. Denies of new fevers, chill night sweat; he is gaining weight. HEMATOLOGIC HISTORY:  [Patient had received initial care with Dr. Rebeka Avalos, who eventually moved on from our practice. The patient subsequently transitioned his care with Dr. Jelly Johnson, as of 8/17/2023]. 66 y.o. male with history of CAD, hypertension, hyperlipidemia, CKD stage III, restless leg syndrome, emphysema, smoker who was noted to have leukocytosis/neutrophilia on routine CBC  On 12/15/2022: Hb 13.6  Hct 41.1  MCV 91.1    WBC 13.8  ANC 10.97 ALC 1.13 Abs mono 1.28  BUN 30 Creat 1.6     RPR Non-Reactive  Hep C Ab Non-Reactive  HIV Non-reactive  No systemic symptoms. He continues to smoke. No history of recurrent infections. No known history of autoimmune disorders. No history of neoplasm  Referred to our clinic for further evaluation    Extensive blood work ordered to evaluate leukocytosis/neutrophilia on 01/19/2023  Hb 13.1  Hct 39.2  MCV 92.9    WBC 10.8  ANC 8.48  ALC 1.00 Abs mono 1.01  BUN 33 Creat 1.7  LFTs wnl    Examination of the peripheral smear and blood counts/indices reveals mild absolute granulocytosis/monocytosis, and mild absolute lymphocytopenia. The red cell population appears essentially normal.   Circulating granulocytes show no dysplastic changes or hypersegmentation. There are no circulating blast forms.   The remaining leukocytes appear morphologically normal.  No platelet abnormalities are

## 2023-09-15 RX ORDER — BUSPIRONE HYDROCHLORIDE 5 MG/1
5 TABLET ORAL 2 TIMES DAILY PRN
Qty: 60 TABLET | Refills: 0 | Status: SHIPPED | OUTPATIENT
Start: 2023-09-15 | End: 2023-10-15

## 2023-09-24 ENCOUNTER — CARE COORDINATION (OUTPATIENT)
Dept: CARE COORDINATION | Age: 78
End: 2023-09-24

## 2023-09-25 ENCOUNTER — CARE COORDINATION (OUTPATIENT)
Dept: CARE COORDINATION | Age: 78
End: 2023-09-25

## 2023-09-25 ASSESSMENT — ENCOUNTER SYMPTOMS: DYSPNEA ASSOCIATED WITH: EXERTION

## 2023-09-25 NOTE — CARE COORDINATION
Ambulatory Care Coordination Note  2023    Patient Current Location:  Home: [address] [place] [id number]     ACM contacted the patient by telephone. Verified name and  with patient as identifiers. Provided introduction to self, and explanation of the ACM role. Challenges to be reviewed by the provider   Additional needs identified to be addressed with provider: No  none               Method of communication with provider: none. ACM: Carmela Sin RN  Offered patient enrollment in the Remote Patient Monitoring (RPM) program for in-home monitoring:  Patient graduated . ACM outreach to discuss COPD with patient and urinary incontinence. Patient frustrated regarding his issue of urinary incontinence. Voices that he feels that he has to go but is nearly completely done voiding prior to him reaching the bathroom. Is using Depends underwear and was encouraged to save by buying in bulk, non brand name incontinence underwear. Patient declined stating he would continue to buy from [facility], his Depends brand underwear. Appointments reviewed. Patient sees PCP on 23 and Dr. Lolis Kong urology on 10/3/2023. Follows oncology and next appt. on 10/12/2023. Continues Jakafi chemotherapy pill daily for leukocytosis/neutrophilia as per Oncology. COPD  See assessment  O2 2LNC continuous except at rest while awake. SaO2 93-96% on O2 2L NC  Denies any s/s COPD exacerbation. COPD zone tool  Smokes 1 pack per day. Declines smoking cessation or education. Has been smoking for \"60 years! \"    PLAN  Continue Care Coordination  Assess ongoing needs  Discuss symptom resolution (incontinence0  Review appt discussion PCP, urology, oncology  COPD zone tool review  Review appointments and medication changes      Lab Results       None                 Goals Addressed                   This Visit's Progress     Conditions and Symptoms   On track     I will schedule office visits, as directed by

## 2023-09-29 ENCOUNTER — OFFICE VISIT (OUTPATIENT)
Dept: FAMILY MEDICINE CLINIC | Age: 78
End: 2023-09-29
Payer: MEDICARE

## 2023-09-29 VITALS
HEIGHT: 71 IN | SYSTOLIC BLOOD PRESSURE: 90 MMHG | HEART RATE: 87 BPM | DIASTOLIC BLOOD PRESSURE: 72 MMHG | TEMPERATURE: 97.5 F | RESPIRATION RATE: 20 BRPM | OXYGEN SATURATION: 91 % | WEIGHT: 143.4 LBS | BODY MASS INDEX: 20.08 KG/M2

## 2023-09-29 DIAGNOSIS — N39.46 MIXED STRESS AND URGE URINARY INCONTINENCE: Primary | ICD-10-CM

## 2023-09-29 DIAGNOSIS — Z12.5 SCREENING FOR PROSTATE CANCER: ICD-10-CM

## 2023-09-29 DIAGNOSIS — I21.02 ST ELEVATION MYOCARDIAL INFARCTION INVOLVING LEFT ANTERIOR DESCENDING (LAD) CORONARY ARTERY (HCC): ICD-10-CM

## 2023-09-29 DIAGNOSIS — C95.91 LEUKEMIA IN REMISSION, UNSPECIFIED LEUKEMIA TYPE (HCC): ICD-10-CM

## 2023-09-29 DIAGNOSIS — N39.46 MIXED STRESS AND URGE URINARY INCONTINENCE: ICD-10-CM

## 2023-09-29 DIAGNOSIS — N18.32 STAGE 3B CHRONIC KIDNEY DISEASE (HCC): ICD-10-CM

## 2023-09-29 PROCEDURE — 3074F SYST BP LT 130 MM HG: CPT | Performed by: FAMILY MEDICINE

## 2023-09-29 PROCEDURE — 4004F PT TOBACCO SCREEN RCVD TLK: CPT | Performed by: FAMILY MEDICINE

## 2023-09-29 PROCEDURE — G8420 CALC BMI NORM PARAMETERS: HCPCS | Performed by: FAMILY MEDICINE

## 2023-09-29 PROCEDURE — G8427 DOCREV CUR MEDS BY ELIG CLIN: HCPCS | Performed by: FAMILY MEDICINE

## 2023-09-29 PROCEDURE — 1123F ACP DISCUSS/DSCN MKR DOCD: CPT | Performed by: FAMILY MEDICINE

## 2023-09-29 PROCEDURE — 99214 OFFICE O/P EST MOD 30 MIN: CPT | Performed by: FAMILY MEDICINE

## 2023-09-29 PROCEDURE — 3078F DIAST BP <80 MM HG: CPT | Performed by: FAMILY MEDICINE

## 2023-10-02 PROBLEM — Z12.5 SCREENING FOR PROSTATE CANCER: Status: ACTIVE | Noted: 2023-10-02

## 2023-10-02 LAB — PROSTATE SPECIFIC ANTIGEN: 3.79 NG/ML (ref 0–4)

## 2023-10-02 ASSESSMENT — ENCOUNTER SYMPTOMS
ANAL BLEEDING: 0
RECTAL PAIN: 0
VOICE CHANGE: 0
CHOKING: 0
WHEEZING: 0
FACIAL SWELLING: 0
CONSTIPATION: 0
EYE REDNESS: 0
STRIDOR: 0
COUGH: 0
NAUSEA: 0
EYE DISCHARGE: 0
BLOOD IN STOOL: 0
COLOR CHANGE: 0
GASTROINTESTINAL NEGATIVE: 1
VOMITING: 0
SINUS PRESSURE: 0
EYE ITCHING: 0
EYE PAIN: 0
SHORTNESS OF BREATH: 0
DIARRHEA: 0
RHINORRHEA: 0
PHOTOPHOBIA: 0
SINUS PAIN: 0
BACK PAIN: 0
SORE THROAT: 0
TROUBLE SWALLOWING: 0
ABDOMINAL PAIN: 0
CHEST TIGHTNESS: 0
ABDOMINAL DISTENTION: 0

## 2023-10-02 NOTE — PROGRESS NOTES
AIDAN Valero is a 66 y.o. male. HPI/Chief C/O:  Chief Complaint   Patient presents with    Follow-up     Pt here for a 3 month check. Has been feeling pretty well. Allergies   Allergen Reactions    Codeine      MAKES PT VERY HIGH     exam  ROS:  Review of Systems   Constitutional:  Positive for activity change, fatigue and unexpected weight change. Negative for appetite change, chills, diaphoresis and fever. HENT:  Negative for congestion, dental problem, drooling, ear discharge, ear pain, facial swelling, hearing loss, mouth sores, nosebleeds, postnasal drip, rhinorrhea, sinus pressure, sinus pain, sneezing, sore throat, tinnitus, trouble swallowing and voice change. Eyes:  Positive for visual disturbance. Negative for photophobia, pain, discharge, redness and itching. Respiratory:  Negative for cough, choking, chest tightness, shortness of breath, wheezing and stridor. Cardiovascular:  Negative for chest pain, palpitations and leg swelling. Gastrointestinal: Negative. Negative for abdominal distention, abdominal pain, anal bleeding, blood in stool, constipation, diarrhea, nausea, rectal pain and vomiting. Endocrine: Negative. Negative for cold intolerance, heat intolerance, polydipsia, polyphagia and polyuria. Genitourinary:  Positive for frequency and urgency. Negative for decreased urine volume, difficulty urinating, dysuria, flank pain and hematuria. Musculoskeletal:  Positive for arthralgias, gait problem and myalgias. Negative for back pain, joint swelling, neck pain and neck stiffness. Skin:  Positive for wound. Negative for color change, pallor and rash. Neurological:  Positive for weakness. Negative for dizziness, tremors, seizures, syncope, facial asymmetry, speech difficulty, light-headedness, numbness and headaches. Hematological: Negative.     Psychiatric/Behavioral:  Negative for agitation, behavioral problems, confusion, decreased concentration,

## 2023-10-03 ENCOUNTER — APPOINTMENT (OUTPATIENT)
Dept: RADIOLOGY | Facility: HOSPITAL | Age: 78
DRG: 177 | End: 2023-10-03
Payer: MEDICARE

## 2023-10-03 ENCOUNTER — HOSPITAL ENCOUNTER (INPATIENT)
Facility: HOSPITAL | Age: 78
LOS: 1 days | Discharge: HOME | DRG: 177 | End: 2023-10-05
Attending: EMERGENCY MEDICINE | Admitting: INTERNAL MEDICINE
Payer: MEDICARE

## 2023-10-03 DIAGNOSIS — J15.69 PNEUMONIA DUE TO GRAM-NEGATIVE BACTERIA (MULTI): ICD-10-CM

## 2023-10-03 DIAGNOSIS — J96.11 CHRONIC HYPOXIC RESPIRATORY FAILURE (MULTI): ICD-10-CM

## 2023-10-03 DIAGNOSIS — J98.4 PNEUMONITIS: Primary | ICD-10-CM

## 2023-10-03 LAB
ALBUMIN SERPL BCP-MCNC: 4.1 G/DL (ref 3.4–5)
ALP SERPL-CCNC: 67 U/L (ref 33–136)
ALT SERPL W P-5'-P-CCNC: 14 U/L (ref 10–52)
ANION GAP SERPL CALC-SCNC: 12 MMOL/L (ref 10–20)
AST SERPL W P-5'-P-CCNC: 18 U/L (ref 9–39)
BASOPHILS # BLD AUTO: 0.02 X10*3/UL (ref 0–0.1)
BASOPHILS NFR BLD AUTO: 0.3 %
BILIRUB SERPL-MCNC: 0.6 MG/DL (ref 0–1.2)
BUN SERPL-MCNC: 21 MG/DL (ref 6–23)
CALCIUM SERPL-MCNC: 9 MG/DL (ref 8.6–10.3)
CARDIAC TROPONIN I PNL SERPL HS: 12 NG/L (ref 0–20)
CARDIAC TROPONIN I PNL SERPL HS: 13 NG/L (ref 0–20)
CARDIAC TROPONIN I PNL SERPL HS: 13 NG/L (ref 0–20)
CHLORIDE SERPL-SCNC: 107 MMOL/L (ref 98–107)
CO2 SERPL-SCNC: 27 MMOL/L (ref 21–32)
CREAT SERPL-MCNC: 1.73 MG/DL (ref 0.5–1.3)
D DIMER PPP FEU-MCNC: 1014 NG/ML FEU
EOSINOPHIL # BLD AUTO: 0.04 X10*3/UL (ref 0–0.4)
EOSINOPHIL NFR BLD AUTO: 0.5 %
ERYTHROCYTE [DISTWIDTH] IN BLOOD BY AUTOMATED COUNT: 19.1 % (ref 11.5–14.5)
GFR SERPL CREATININE-BSD FRML MDRD: 40 ML/MIN/1.73M*2
GLUCOSE SERPL-MCNC: 127 MG/DL (ref 74–99)
HCT VFR BLD AUTO: 31.2 % (ref 41–52)
HGB BLD-MCNC: 10.3 G/DL (ref 13.5–17.5)
IMM GRANULOCYTES # BLD AUTO: 0.08 X10*3/UL (ref 0–0.5)
IMM GRANULOCYTES NFR BLD AUTO: 1.1 % (ref 0–0.9)
LYMPHOCYTES # BLD AUTO: 0.96 X10*3/UL (ref 0.8–3)
LYMPHOCYTES NFR BLD AUTO: 12.9 %
MAGNESIUM SERPL-MCNC: 2.02 MG/DL (ref 1.6–2.4)
MCH RBC QN AUTO: 31.5 PG (ref 26–34)
MCHC RBC AUTO-ENTMCNC: 33 G/DL (ref 32–36)
MCV RBC AUTO: 95 FL (ref 80–100)
MONOCYTES # BLD AUTO: 0.7 X10*3/UL (ref 0.05–0.8)
MONOCYTES NFR BLD AUTO: 9.4 %
NEUTROPHILS # BLD AUTO: 5.65 X10*3/UL (ref 1.6–5.5)
NEUTROPHILS NFR BLD AUTO: 75.8 %
NRBC BLD-RTO: 0 /100 WBCS (ref 0–0)
PLATELET # BLD AUTO: 252 X10*3/UL (ref 150–450)
PMV BLD AUTO: 10.9 FL (ref 7.5–11.5)
POTASSIUM SERPL-SCNC: 3.9 MMOL/L (ref 3.5–5.3)
PROT SERPL-MCNC: 6.4 G/DL (ref 6.4–8.2)
RBC # BLD AUTO: 3.27 X10*6/UL (ref 4.5–5.9)
SODIUM SERPL-SCNC: 142 MMOL/L (ref 136–145)
WBC # BLD AUTO: 7.5 X10*3/UL (ref 4.4–11.3)

## 2023-10-03 PROCEDURE — 84484 ASSAY OF TROPONIN QUANT: CPT | Performed by: EMERGENCY MEDICINE

## 2023-10-03 PROCEDURE — 96365 THER/PROPH/DIAG IV INF INIT: CPT

## 2023-10-03 PROCEDURE — 85025 COMPLETE CBC W/AUTO DIFF WBC: CPT | Performed by: EMERGENCY MEDICINE

## 2023-10-03 PROCEDURE — 99285 EMERGENCY DEPT VISIT HI MDM: CPT | Mod: 25 | Performed by: EMERGENCY MEDICINE

## 2023-10-03 PROCEDURE — 71045 X-RAY EXAM CHEST 1 VIEW: CPT | Mod: FOREIGN READ | Performed by: RADIOLOGY

## 2023-10-03 PROCEDURE — 71045 X-RAY EXAM CHEST 1 VIEW: CPT | Mod: FY

## 2023-10-03 PROCEDURE — 96367 TX/PROPH/DG ADDL SEQ IV INF: CPT

## 2023-10-03 PROCEDURE — G1004 CDSM NDSC: HCPCS

## 2023-10-03 PROCEDURE — 71275 CT ANGIOGRAPHY CHEST: CPT | Mod: FOREIGN READ | Performed by: RADIOLOGY

## 2023-10-03 PROCEDURE — 36415 COLL VENOUS BLD VENIPUNCTURE: CPT | Performed by: EMERGENCY MEDICINE

## 2023-10-03 PROCEDURE — 83735 ASSAY OF MAGNESIUM: CPT | Performed by: EMERGENCY MEDICINE

## 2023-10-03 PROCEDURE — 2500000004 HC RX 250 GENERAL PHARMACY W/ HCPCS (ALT 636 FOR OP/ED): Performed by: EMERGENCY MEDICINE

## 2023-10-03 PROCEDURE — 2500000002 HC RX 250 W HCPCS SELF ADMINISTERED DRUGS (ALT 637 FOR MEDICARE OP, ALT 636 FOR OP/ED): Performed by: EMERGENCY MEDICINE

## 2023-10-03 PROCEDURE — 2550000001 HC RX 255 CONTRASTS: Performed by: EMERGENCY MEDICINE

## 2023-10-03 PROCEDURE — 85379 FIBRIN DEGRADATION QUANT: CPT | Performed by: EMERGENCY MEDICINE

## 2023-10-03 PROCEDURE — 80053 COMPREHEN METABOLIC PANEL: CPT | Performed by: EMERGENCY MEDICINE

## 2023-10-03 RX ORDER — ICOSAPENT ETHYL 1 G/1
2 CAPSULE ORAL 2 TIMES DAILY
COMMUNITY
Start: 2020-07-27 | End: 2024-02-26 | Stop reason: HOSPADM

## 2023-10-03 RX ORDER — METOPROLOL SUCCINATE 25 MG/1
1 TABLET, EXTENDED RELEASE ORAL NIGHTLY
COMMUNITY
End: 2024-02-26 | Stop reason: HOSPADM

## 2023-10-03 RX ORDER — METOPROLOL SUCCINATE 25 MG/1
25 TABLET, EXTENDED RELEASE ORAL NIGHTLY
Status: DISCONTINUED | OUTPATIENT
Start: 2023-10-04 | End: 2023-10-05 | Stop reason: HOSPADM

## 2023-10-03 RX ORDER — NIFEDIPINE 30 MG/1
1 TABLET, FILM COATED, EXTENDED RELEASE ORAL DAILY
COMMUNITY

## 2023-10-03 RX ORDER — ICOSAPENT ETHYL 1 G/1
2 CAPSULE ORAL 2 TIMES DAILY
Status: DISCONTINUED | OUTPATIENT
Start: 2023-10-04 | End: 2023-10-05 | Stop reason: HOSPADM

## 2023-10-03 RX ORDER — TAMSULOSIN HYDROCHLORIDE 0.4 MG/1
0.4 CAPSULE ORAL DAILY
Status: DISCONTINUED | OUTPATIENT
Start: 2023-10-04 | End: 2023-10-05 | Stop reason: HOSPADM

## 2023-10-03 RX ORDER — ALBUTEROL SULFATE 90 UG/1
2 AEROSOL, METERED RESPIRATORY (INHALATION) EVERY 6 HOURS PRN
COMMUNITY
Start: 2023-03-31

## 2023-10-03 RX ORDER — BUSPIRONE HYDROCHLORIDE 5 MG/1
5 TABLET ORAL 2 TIMES DAILY PRN
COMMUNITY
Start: 2023-09-15 | End: 2023-10-15

## 2023-10-03 RX ORDER — BUSPIRONE HYDROCHLORIDE 5 MG/1
5 TABLET ORAL 2 TIMES DAILY PRN
Status: DISCONTINUED | OUTPATIENT
Start: 2023-10-03 | End: 2023-10-05 | Stop reason: HOSPADM

## 2023-10-03 RX ORDER — CLOPIDOGREL BISULFATE 75 MG/1
75 TABLET ORAL DAILY
Status: DISCONTINUED | OUTPATIENT
Start: 2023-10-04 | End: 2023-10-05 | Stop reason: HOSPADM

## 2023-10-03 RX ORDER — ATORVASTATIN CALCIUM 40 MG/1
40 TABLET, FILM COATED ORAL DAILY
Status: DISCONTINUED | OUTPATIENT
Start: 2023-10-04 | End: 2023-10-05 | Stop reason: HOSPADM

## 2023-10-03 RX ORDER — FAMOTIDINE 40 MG/1
20 TABLET, FILM COATED ORAL NIGHTLY
COMMUNITY
Start: 2021-09-16

## 2023-10-03 RX ORDER — NIFEDIPINE 30 MG/1
30 TABLET, FILM COATED, EXTENDED RELEASE ORAL DAILY
Status: DISCONTINUED | OUTPATIENT
Start: 2023-10-04 | End: 2023-10-05 | Stop reason: HOSPADM

## 2023-10-03 RX ORDER — FLUTICASONE FUROATE AND VILANTEROL 200; 25 UG/1; UG/1
1 POWDER RESPIRATORY (INHALATION)
Status: DISCONTINUED | OUTPATIENT
Start: 2023-10-04 | End: 2023-10-04

## 2023-10-03 RX ORDER — ALBUTEROL SULFATE 90 UG/1
2 AEROSOL, METERED RESPIRATORY (INHALATION) EVERY 6 HOURS PRN
Status: DISCONTINUED | OUTPATIENT
Start: 2023-10-03 | End: 2023-10-05 | Stop reason: HOSPADM

## 2023-10-03 RX ORDER — ASPIRIN 81 MG/1
1 TABLET ORAL NIGHTLY
COMMUNITY

## 2023-10-03 RX ORDER — CLOPIDOGREL BISULFATE 75 MG/1
75 TABLET ORAL DAILY
COMMUNITY
Start: 2018-04-23 | End: 2024-02-26 | Stop reason: HOSPADM

## 2023-10-03 RX ORDER — IPRATROPIUM BROMIDE AND ALBUTEROL SULFATE 2.5; .5 MG/3ML; MG/3ML
3 SOLUTION RESPIRATORY (INHALATION)
Status: DISCONTINUED | OUTPATIENT
Start: 2023-10-03 | End: 2023-10-04

## 2023-10-03 RX ORDER — ALLOPURINOL 100 MG/1
100 TABLET ORAL DAILY
COMMUNITY
Start: 2023-02-20

## 2023-10-03 RX ORDER — SERTRALINE HYDROCHLORIDE 50 MG/1
50 TABLET, FILM COATED ORAL DAILY
Status: DISCONTINUED | OUTPATIENT
Start: 2023-10-04 | End: 2023-10-05 | Stop reason: HOSPADM

## 2023-10-03 RX ORDER — SERTRALINE HYDROCHLORIDE 50 MG/1
50 TABLET, FILM COATED ORAL DAILY
COMMUNITY
Start: 2023-01-18 | End: 2024-02-26 | Stop reason: HOSPADM

## 2023-10-03 RX ORDER — FAMOTIDINE 20 MG/1
20 TABLET, FILM COATED ORAL NIGHTLY
Status: DISCONTINUED | OUTPATIENT
Start: 2023-10-04 | End: 2023-10-05 | Stop reason: HOSPADM

## 2023-10-03 RX ORDER — ISOSORBIDE MONONITRATE 30 MG/1
30 TABLET, EXTENDED RELEASE ORAL DAILY
Status: DISCONTINUED | OUTPATIENT
Start: 2023-10-04 | End: 2023-10-05 | Stop reason: HOSPADM

## 2023-10-03 RX ORDER — FLUTICASONE FUROATE AND VILANTEROL 100; 25 UG/1; UG/1
1 POWDER RESPIRATORY (INHALATION) DAILY
Status: DISCONTINUED | OUTPATIENT
Start: 2023-10-04 | End: 2023-10-05 | Stop reason: HOSPADM

## 2023-10-03 RX ORDER — FLUTICASONE FUROATE AND VILANTEROL 100; 25 UG/1; UG/1
1 POWDER RESPIRATORY (INHALATION) DAILY
COMMUNITY
Start: 2023-05-24

## 2023-10-03 RX ORDER — ATORVASTATIN CALCIUM 40 MG/1
40 TABLET, FILM COATED ORAL DAILY
COMMUNITY
Start: 2022-10-13

## 2023-10-03 RX ORDER — FUROSEMIDE 20 MG/1
20 TABLET ORAL DAILY
COMMUNITY
Start: 2023-05-12 | End: 2024-02-26 | Stop reason: HOSPADM

## 2023-10-03 RX ORDER — GUAIFENESIN 600 MG/1
1200 TABLET, EXTENDED RELEASE ORAL EVERY 12 HOURS
Status: DISCONTINUED | OUTPATIENT
Start: 2023-10-04 | End: 2023-10-05 | Stop reason: HOSPADM

## 2023-10-03 RX ORDER — TAMSULOSIN HYDROCHLORIDE 0.4 MG/1
0.4 CAPSULE ORAL 2 TIMES DAILY
COMMUNITY
Start: 2021-05-06

## 2023-10-03 RX ORDER — CEFTRIAXONE 1 G/50ML
1 INJECTION, SOLUTION INTRAVENOUS ONCE
Status: COMPLETED | OUTPATIENT
Start: 2023-10-03 | End: 2023-10-04

## 2023-10-03 RX ORDER — FUROSEMIDE 40 MG/1
20 TABLET ORAL DAILY
Status: DISCONTINUED | OUTPATIENT
Start: 2023-10-04 | End: 2023-10-05 | Stop reason: HOSPADM

## 2023-10-03 RX ORDER — ALLOPURINOL 100 MG/1
100 TABLET ORAL DAILY
Status: DISCONTINUED | OUTPATIENT
Start: 2023-10-04 | End: 2023-10-05 | Stop reason: HOSPADM

## 2023-10-03 RX ORDER — ASPIRIN 81 MG/1
81 TABLET ORAL NIGHTLY
Status: DISCONTINUED | OUTPATIENT
Start: 2023-10-04 | End: 2023-10-05 | Stop reason: HOSPADM

## 2023-10-03 RX ORDER — ISOSORBIDE MONONITRATE 30 MG/1
30 TABLET, EXTENDED RELEASE ORAL DAILY
COMMUNITY
Start: 2018-04-17 | End: 2024-02-26 | Stop reason: HOSPADM

## 2023-10-03 RX ORDER — BUDESONIDE AND FORMOTEROL FUMARATE DIHYDRATE 160; 4.5 UG/1; UG/1
2 AEROSOL RESPIRATORY (INHALATION) 2 TIMES DAILY
COMMUNITY
Start: 2023-03-17 | End: 2024-02-26 | Stop reason: HOSPADM

## 2023-10-03 RX ADMIN — IOHEXOL 75 ML: 350 INJECTION, SOLUTION INTRAVENOUS at 22:35

## 2023-10-03 RX ADMIN — AZITHROMYCIN MONOHYDRATE 500 MG: 500 INJECTION, POWDER, LYOPHILIZED, FOR SOLUTION INTRAVENOUS at 23:50

## 2023-10-03 RX ADMIN — IPRATROPIUM BROMIDE AND ALBUTEROL SULFATE 3 ML: .5; 3 SOLUTION RESPIRATORY (INHALATION) at 20:37

## 2023-10-03 ASSESSMENT — COLUMBIA-SUICIDE SEVERITY RATING SCALE - C-SSRS
2. HAVE YOU ACTUALLY HAD ANY THOUGHTS OF KILLING YOURSELF?: NO
6. HAVE YOU EVER DONE ANYTHING, STARTED TO DO ANYTHING, OR PREPARED TO DO ANYTHING TO END YOUR LIFE?: NO
1. IN THE PAST MONTH, HAVE YOU WISHED YOU WERE DEAD OR WISHED YOU COULD GO TO SLEEP AND NOT WAKE UP?: NO

## 2023-10-03 ASSESSMENT — PAIN DESCRIPTION - DESCRIPTORS: DESCRIPTORS: STABBING

## 2023-10-04 ENCOUNTER — APPOINTMENT (OUTPATIENT)
Dept: CARDIOLOGY | Facility: HOSPITAL | Age: 78
DRG: 177 | End: 2023-10-04
Payer: MEDICARE

## 2023-10-04 PROBLEM — I50.9 CHF (CONGESTIVE HEART FAILURE) (MULTI): Chronic | Status: ACTIVE | Noted: 2023-10-04

## 2023-10-04 PROBLEM — E78.5 HLD (HYPERLIPIDEMIA): Chronic | Status: ACTIVE | Noted: 2023-10-04

## 2023-10-04 PROBLEM — J96.11 CHRONIC HYPOXIC RESPIRATORY FAILURE (MULTI): Status: ACTIVE | Noted: 2023-10-04

## 2023-10-04 PROBLEM — I25.10 CORONARY ARTERY DISEASE INVOLVING NATIVE CORONARY ARTERY OF NATIVE HEART WITHOUT ANGINA PECTORIS: Chronic | Status: ACTIVE | Noted: 2023-10-04

## 2023-10-04 PROBLEM — N18.9 CKD (CHRONIC KIDNEY DISEASE): Chronic | Status: ACTIVE | Noted: 2023-10-04

## 2023-10-04 PROBLEM — J15.69 PNEUMONIA DUE TO GRAM-NEGATIVE BACTERIA (MULTI): Status: ACTIVE | Noted: 2023-10-04

## 2023-10-04 PROBLEM — J44.1 ACUTE EXACERBATION OF CHRONIC OBSTRUCTIVE PULMONARY DISEASE (COPD) (MULTI): Status: ACTIVE | Noted: 2023-10-04

## 2023-10-04 PROBLEM — J98.4 PNEUMONITIS: Status: ACTIVE | Noted: 2023-10-04

## 2023-10-04 PROBLEM — I10 PRIMARY HYPERTENSION: Chronic | Status: ACTIVE | Noted: 2023-10-04

## 2023-10-04 PROBLEM — F17.200 TOBACCO USE DISORDER: Status: ACTIVE | Noted: 2023-10-04

## 2023-10-04 PROCEDURE — 87075 CULTR BACTERIA EXCEPT BLOOD: CPT | Mod: CMCLAB,PORLAB | Performed by: EMERGENCY MEDICINE

## 2023-10-04 PROCEDURE — 2500000004 HC RX 250 GENERAL PHARMACY W/ HCPCS (ALT 636 FOR OP/ED)

## 2023-10-04 PROCEDURE — 2500000004 HC RX 250 GENERAL PHARMACY W/ HCPCS (ALT 636 FOR OP/ED): Performed by: EMERGENCY MEDICINE

## 2023-10-04 PROCEDURE — 97165 OT EVAL LOW COMPLEX 30 MIN: CPT | Mod: GO

## 2023-10-04 PROCEDURE — 2500000004 HC RX 250 GENERAL PHARMACY W/ HCPCS (ALT 636 FOR OP/ED): Performed by: INTERNAL MEDICINE

## 2023-10-04 PROCEDURE — 2500000005 HC RX 250 GENERAL PHARMACY W/O HCPCS: Performed by: STUDENT IN AN ORGANIZED HEALTH CARE EDUCATION/TRAINING PROGRAM

## 2023-10-04 PROCEDURE — 99233 SBSQ HOSP IP/OBS HIGH 50: CPT | Performed by: INTERNAL MEDICINE

## 2023-10-04 PROCEDURE — 94640 AIRWAY INHALATION TREATMENT: CPT

## 2023-10-04 PROCEDURE — 2500000004 HC RX 250 GENERAL PHARMACY W/ HCPCS (ALT 636 FOR OP/ED): Performed by: STUDENT IN AN ORGANIZED HEALTH CARE EDUCATION/TRAINING PROGRAM

## 2023-10-04 PROCEDURE — S4991 NICOTINE PATCH NONLEGEND: HCPCS | Performed by: STUDENT IN AN ORGANIZED HEALTH CARE EDUCATION/TRAINING PROGRAM

## 2023-10-04 PROCEDURE — 97161 PT EVAL LOW COMPLEX 20 MIN: CPT | Mod: GP

## 2023-10-04 PROCEDURE — 2500000002 HC RX 250 W HCPCS SELF ADMINISTERED DRUGS (ALT 637 FOR MEDICARE OP, ALT 636 FOR OP/ED): Performed by: STUDENT IN AN ORGANIZED HEALTH CARE EDUCATION/TRAINING PROGRAM

## 2023-10-04 PROCEDURE — 1200000002 HC GENERAL ROOM WITH TELEMETRY DAILY

## 2023-10-04 PROCEDURE — 87040 BLOOD CULTURE FOR BACTERIA: CPT | Mod: CMCLAB,PORLAB | Performed by: EMERGENCY MEDICINE

## 2023-10-04 PROCEDURE — 36415 COLL VENOUS BLD VENIPUNCTURE: CPT | Performed by: EMERGENCY MEDICINE

## 2023-10-04 PROCEDURE — 93005 ELECTROCARDIOGRAM TRACING: CPT

## 2023-10-04 PROCEDURE — 2500000001 HC RX 250 WO HCPCS SELF ADMINISTERED DRUGS (ALT 637 FOR MEDICARE OP): Performed by: STUDENT IN AN ORGANIZED HEALTH CARE EDUCATION/TRAINING PROGRAM

## 2023-10-04 PROCEDURE — 94667 MNPJ CHEST WALL 1ST: CPT

## 2023-10-04 RX ORDER — IBUPROFEN 200 MG
1 TABLET ORAL DAILY
Status: DISCONTINUED | OUTPATIENT
Start: 2023-11-15 | End: 2023-10-05 | Stop reason: HOSPADM

## 2023-10-04 RX ORDER — LISINOPRIL 10 MG/1
10 TABLET ORAL DAILY
Status: DISCONTINUED | OUTPATIENT
Start: 2023-10-05 | End: 2023-10-05 | Stop reason: HOSPADM

## 2023-10-04 RX ORDER — BISACODYL 5 MG
10 TABLET, DELAYED RELEASE (ENTERIC COATED) ORAL DAILY PRN
Status: DISCONTINUED | OUTPATIENT
Start: 2023-10-04 | End: 2023-10-05 | Stop reason: HOSPADM

## 2023-10-04 RX ORDER — BISACODYL 10 MG/1
10 SUPPOSITORY RECTAL DAILY PRN
Status: DISCONTINUED | OUTPATIENT
Start: 2023-10-04 | End: 2023-10-05 | Stop reason: HOSPADM

## 2023-10-04 RX ORDER — ONDANSETRON 4 MG/1
4 TABLET, ORALLY DISINTEGRATING ORAL EVERY 8 HOURS PRN
Status: DISCONTINUED | OUTPATIENT
Start: 2023-10-04 | End: 2023-10-05 | Stop reason: HOSPADM

## 2023-10-04 RX ORDER — GUAIFENESIN 600 MG/1
TABLET, EXTENDED RELEASE ORAL
Status: COMPLETED
Start: 2023-10-04 | End: 2023-10-04

## 2023-10-04 RX ORDER — NITROGLYCERIN 0.4 MG/1
0.4 TABLET SUBLINGUAL EVERY 5 MIN PRN
Status: DISCONTINUED | OUTPATIENT
Start: 2023-10-04 | End: 2023-10-05 | Stop reason: HOSPADM

## 2023-10-04 RX ORDER — ACETAMINOPHEN 500 MG
1 TABLET ORAL DAILY
COMMUNITY

## 2023-10-04 RX ORDER — IPRATROPIUM BROMIDE AND ALBUTEROL SULFATE 2.5; .5 MG/3ML; MG/3ML
3 SOLUTION RESPIRATORY (INHALATION)
Status: DISCONTINUED | OUTPATIENT
Start: 2023-10-04 | End: 2023-10-04

## 2023-10-04 RX ORDER — TALC
3 POWDER (GRAM) TOPICAL DAILY
Status: DISCONTINUED | OUTPATIENT
Start: 2023-10-04 | End: 2023-10-05 | Stop reason: HOSPADM

## 2023-10-04 RX ORDER — DICLOFENAC SODIUM 10 MG/G
4 GEL TOPICAL 2 TIMES DAILY PRN
COMMUNITY
Start: 2021-05-21 | End: 2024-02-26 | Stop reason: HOSPADM

## 2023-10-04 RX ORDER — AZITHROMYCIN 100 MG/ML
INJECTION, POWDER, LYOPHILIZED, FOR SOLUTION INTRAVENOUS
Status: COMPLETED
Start: 2023-10-04 | End: 2023-10-04

## 2023-10-04 RX ORDER — IBUPROFEN 200 MG
1 TABLET ORAL DAILY
Status: DISCONTINUED | OUTPATIENT
Start: 2023-10-04 | End: 2023-10-05 | Stop reason: HOSPADM

## 2023-10-04 RX ORDER — VIBEGRON 75 MG/1
75 TABLET, FILM COATED ORAL
COMMUNITY

## 2023-10-04 RX ORDER — DEXTROSE MONOHYDRATE 50 MG/ML
INJECTION, SOLUTION INTRAVENOUS
Status: DISCONTINUED
Start: 2023-10-04 | End: 2023-10-04 | Stop reason: WASHOUT

## 2023-10-04 RX ORDER — DOXYCYCLINE 100 MG/1
100 CAPSULE ORAL EVERY 12 HOURS SCHEDULED
Status: DISCONTINUED | OUTPATIENT
Start: 2023-10-04 | End: 2023-10-05 | Stop reason: HOSPADM

## 2023-10-04 RX ORDER — ACETAMINOPHEN 500 MG
50 TABLET ORAL DAILY
Status: DISCONTINUED | OUTPATIENT
Start: 2023-10-05 | End: 2023-10-05 | Stop reason: WASHOUT

## 2023-10-04 RX ORDER — IPRATROPIUM BROMIDE AND ALBUTEROL SULFATE 2.5; .5 MG/3ML; MG/3ML
3 SOLUTION RESPIRATORY (INHALATION) 3 TIMES DAILY
Status: DISCONTINUED | OUTPATIENT
Start: 2023-10-04 | End: 2023-10-05 | Stop reason: HOSPADM

## 2023-10-04 RX ORDER — HEPARIN SODIUM 5000 [USP'U]/ML
5000 INJECTION, SOLUTION INTRAVENOUS; SUBCUTANEOUS EVERY 8 HOURS
Status: DISCONTINUED | OUTPATIENT
Start: 2023-10-04 | End: 2023-10-05 | Stop reason: HOSPADM

## 2023-10-04 RX ORDER — LISINOPRIL 10 MG/1
10 TABLET ORAL DAILY
COMMUNITY
Start: 2023-01-08 | End: 2024-02-26 | Stop reason: HOSPADM

## 2023-10-04 RX ORDER — POLYETHYLENE GLYCOL 3350 17 G/17G
17 POWDER, FOR SOLUTION ORAL DAILY
Status: DISCONTINUED | OUTPATIENT
Start: 2023-10-04 | End: 2023-10-05 | Stop reason: HOSPADM

## 2023-10-04 RX ORDER — CEFTRIAXONE 2 G/50ML
2 INJECTION, SOLUTION INTRAVENOUS EVERY 24 HOURS
Status: DISCONTINUED | OUTPATIENT
Start: 2023-10-05 | End: 2023-10-05 | Stop reason: HOSPADM

## 2023-10-04 RX ORDER — ONDANSETRON HYDROCHLORIDE 2 MG/ML
4 INJECTION, SOLUTION INTRAVENOUS EVERY 8 HOURS PRN
Status: DISCONTINUED | OUTPATIENT
Start: 2023-10-04 | End: 2023-10-05 | Stop reason: HOSPADM

## 2023-10-04 RX ORDER — NICOTINE 7MG/24HR
1 PATCH, TRANSDERMAL 24 HOURS TRANSDERMAL DAILY
Status: DISCONTINUED | OUTPATIENT
Start: 2023-11-29 | End: 2023-10-05 | Stop reason: HOSPADM

## 2023-10-04 RX ORDER — HEPARIN SODIUM 5000 [USP'U]/ML
INJECTION, SOLUTION INTRAVENOUS; SUBCUTANEOUS
Status: COMPLETED
Start: 2023-10-04 | End: 2023-10-04

## 2023-10-04 RX ORDER — METHYLPREDNISOLONE SODIUM SUCCINATE 40 MG/ML
40 INJECTION INTRAMUSCULAR; INTRAVENOUS EVERY 8 HOURS SCHEDULED
Status: DISCONTINUED | OUTPATIENT
Start: 2023-10-04 | End: 2023-10-05 | Stop reason: HOSPADM

## 2023-10-04 RX ORDER — POTASSIUM CHLORIDE 750 MG/1
1 TABLET, FILM COATED, EXTENDED RELEASE ORAL DAILY
COMMUNITY
Start: 2023-05-12

## 2023-10-04 RX ORDER — NITROGLYCERIN 0.4 MG/1
0.4 TABLET SUBLINGUAL EVERY 5 MIN PRN
COMMUNITY
Start: 2018-04-16

## 2023-10-04 RX ADMIN — FAMOTIDINE 20 MG: 20 TABLET ORAL at 20:20

## 2023-10-04 RX ADMIN — CEFTRIAXONE SODIUM 1 G: 1 INJECTION, SOLUTION INTRAVENOUS at 00:42

## 2023-10-04 RX ADMIN — CLOPIDOGREL BISULFATE 75 MG: 75 TABLET ORAL at 08:43

## 2023-10-04 RX ADMIN — AZITHROMYCIN 500 MG: 500 INJECTION, POWDER, LYOPHILIZED, FOR SOLUTION INTRAVENOUS at 01:06

## 2023-10-04 RX ADMIN — GUAIFENESIN 1200 MG: 600 TABLET ORAL at 11:46

## 2023-10-04 RX ADMIN — TAMSULOSIN HYDROCHLORIDE 0.4 MG: 0.4 CAPSULE ORAL at 08:47

## 2023-10-04 RX ADMIN — ICOSAPENT ETHYL 2 G: 1 CAPSULE ORAL at 20:20

## 2023-10-04 RX ADMIN — NIFEDIPINE 30 MG: 30 TABLET, FILM COATED, EXTENDED RELEASE ORAL at 08:43

## 2023-10-04 RX ADMIN — POLYETHYLENE GLYCOL 3350 17 G: 17 POWDER, FOR SOLUTION ORAL at 08:43

## 2023-10-04 RX ADMIN — IPRATROPIUM BROMIDE AND ALBUTEROL SULFATE 3 ML: 2.5; .5 SOLUTION RESPIRATORY (INHALATION) at 09:03

## 2023-10-04 RX ADMIN — METHYLPREDNISOLONE SODIUM SUCCINATE 40 MG: 40 INJECTION, POWDER, LYOPHILIZED, FOR SOLUTION INTRAMUSCULAR; INTRAVENOUS at 06:00

## 2023-10-04 RX ADMIN — DOXYCYCLINE 100 MG: 100 CAPSULE ORAL at 20:20

## 2023-10-04 RX ADMIN — METHYLPREDNISOLONE SODIUM SUCCINATE 40 MG: 40 INJECTION, POWDER, LYOPHILIZED, FOR SOLUTION INTRAMUSCULAR; INTRAVENOUS at 21:58

## 2023-10-04 RX ADMIN — NICOTINE 1 PATCH: 21 PATCH, EXTENDED RELEASE TRANSDERMAL at 08:43

## 2023-10-04 RX ADMIN — DOXYCYCLINE 100 MG: 100 CAPSULE ORAL at 08:43

## 2023-10-04 RX ADMIN — TIOTROPIUM BROMIDE INHALATION SPRAY 2 PUFF: 3.12 SPRAY, METERED RESPIRATORY (INHALATION) at 08:47

## 2023-10-04 RX ADMIN — HEPARIN SODIUM 5000 UNITS: 5000 INJECTION INTRAVENOUS; SUBCUTANEOUS at 23:37

## 2023-10-04 RX ADMIN — IPRATROPIUM BROMIDE AND ALBUTEROL SULFATE 3 ML: 2.5; .5 SOLUTION RESPIRATORY (INHALATION) at 20:43

## 2023-10-04 RX ADMIN — Medication 3 MG: at 20:20

## 2023-10-04 RX ADMIN — METHYLPREDNISOLONE SODIUM SUCCINATE 40 MG: 40 INJECTION, POWDER, FOR SOLUTION INTRAMUSCULAR; INTRAVENOUS at 02:21

## 2023-10-04 RX ADMIN — GUAIFENESIN 1200 MG: 600 TABLET ORAL at 23:37

## 2023-10-04 RX ADMIN — GUAIFENESIN 1200 MG: 600 TABLET ORAL at 02:21

## 2023-10-04 RX ADMIN — HEPARIN SODIUM 5000 UNITS: 5000 INJECTION INTRAVENOUS; SUBCUTANEOUS at 02:21

## 2023-10-04 RX ADMIN — FLUTICASONE FUROATE AND VILANTEROL TRIFENATATE 1 PUFF: 100; 25 POWDER RESPIRATORY (INHALATION) at 08:47

## 2023-10-04 RX ADMIN — ALLOPURINOL 100 MG: 100 TABLET ORAL at 08:43

## 2023-10-04 RX ADMIN — CEFTRIAXONE SODIUM 2 G: 2 INJECTION, SOLUTION INTRAVENOUS at 23:37

## 2023-10-04 RX ADMIN — FUROSEMIDE 20 MG: 40 TABLET ORAL at 08:43

## 2023-10-04 RX ADMIN — METHYLPREDNISOLONE SODIUM SUCCINATE 40 MG: 40 INJECTION, POWDER, FOR SOLUTION INTRAMUSCULAR; INTRAVENOUS at 21:58

## 2023-10-04 RX ADMIN — METOPROLOL SUCCINATE 25 MG: 25 TABLET, EXTENDED RELEASE ORAL at 20:20

## 2023-10-04 RX ADMIN — DOXYCYCLINE 100 MG: 100 CAPSULE ORAL at 02:21

## 2023-10-04 RX ADMIN — HEPARIN SODIUM 5000 UNITS: 5000 INJECTION INTRAVENOUS; SUBCUTANEOUS at 08:43

## 2023-10-04 RX ADMIN — Medication 3 L/MIN: at 11:17

## 2023-10-04 RX ADMIN — ISOSORBIDE MONONITRATE 30 MG: 30 TABLET, EXTENDED RELEASE ORAL at 08:43

## 2023-10-04 RX ADMIN — HEPARIN SODIUM 5000 UNITS: 5000 INJECTION INTRAVENOUS; SUBCUTANEOUS at 15:29

## 2023-10-04 RX ADMIN — Medication 3 L/MIN: at 09:03

## 2023-10-04 RX ADMIN — IPRATROPIUM BROMIDE AND ALBUTEROL SULFATE 3 ML: 2.5; .5 SOLUTION RESPIRATORY (INHALATION) at 11:15

## 2023-10-04 RX ADMIN — ATORVASTATIN CALCIUM 40 MG: 40 TABLET, FILM COATED ORAL at 08:43

## 2023-10-04 RX ADMIN — METHYLPREDNISOLONE SODIUM SUCCINATE 40 MG: 40 INJECTION, POWDER, LYOPHILIZED, FOR SOLUTION INTRAMUSCULAR; INTRAVENOUS at 14:00

## 2023-10-04 RX ADMIN — SERTRALINE HYDROCHLORIDE 50 MG: 50 TABLET ORAL at 08:43

## 2023-10-04 RX ADMIN — ASPIRIN 81 MG: 81 TABLET, COATED ORAL at 20:20

## 2023-10-04 SDOH — SOCIAL STABILITY: SOCIAL INSECURITY: DO YOU FEEL UNSAFE GOING BACK TO THE PLACE WHERE YOU ARE LIVING?: NO

## 2023-10-04 SDOH — ECONOMIC STABILITY: INCOME INSECURITY: IN THE LAST 12 MONTHS, WAS THERE A TIME WHEN YOU WERE NOT ABLE TO PAY THE MORTGAGE OR RENT ON TIME?: NO

## 2023-10-04 SDOH — SOCIAL STABILITY: SOCIAL INSECURITY: DOES ANYONE TRY TO KEEP YOU FROM HAVING/CONTACTING OTHER FRIENDS OR DOING THINGS OUTSIDE YOUR HOME?: NO

## 2023-10-04 SDOH — SOCIAL STABILITY: SOCIAL INSECURITY: ARE YOU OR HAVE YOU BEEN THREATENED OR ABUSED PHYSICALLY, EMOTIONALLY, OR SEXUALLY BY ANYONE?: NO

## 2023-10-04 SDOH — SOCIAL STABILITY: SOCIAL INSECURITY: ARE THERE ANY APPARENT SIGNS OF INJURIES/BEHAVIORS THAT COULD BE RELATED TO ABUSE/NEGLECT?: NO

## 2023-10-04 SDOH — SOCIAL STABILITY: SOCIAL INSECURITY: ABUSE: ADULT

## 2023-10-04 SDOH — HEALTH STABILITY: PHYSICAL HEALTH: ON AVERAGE, HOW MANY DAYS PER WEEK DO YOU ENGAGE IN MODERATE TO STRENUOUS EXERCISE (LIKE A BRISK WALK)?: 0 DAYS

## 2023-10-04 SDOH — SOCIAL STABILITY: SOCIAL INSECURITY: HAVE YOU HAD THOUGHTS OF HARMING ANYONE ELSE?: NO

## 2023-10-04 SDOH — ECONOMIC STABILITY: HOUSING INSECURITY: IN THE LAST 12 MONTHS, HOW MANY PLACES HAVE YOU LIVED?: 1

## 2023-10-04 SDOH — SOCIAL STABILITY: SOCIAL INSECURITY: DO YOU FEEL ANYONE HAS EXPLOITED OR TAKEN ADVANTAGE OF YOU FINANCIALLY OR OF YOUR PERSONAL PROPERTY?: NO

## 2023-10-04 SDOH — SOCIAL STABILITY: SOCIAL INSECURITY: HAS ANYONE EVER THREATENED TO HURT YOUR FAMILY OR YOUR PETS?: NO

## 2023-10-04 SDOH — ECONOMIC STABILITY: INCOME INSECURITY: HOW HARD IS IT FOR YOU TO PAY FOR THE VERY BASICS LIKE FOOD, HOUSING, MEDICAL CARE, AND HEATING?: NOT HARD AT ALL

## 2023-10-04 SDOH — HEALTH STABILITY: PHYSICAL HEALTH: ON AVERAGE, HOW MANY MINUTES DO YOU ENGAGE IN EXERCISE AT THIS LEVEL?: 0 MIN

## 2023-10-04 ASSESSMENT — ACTIVITIES OF DAILY LIVING (ADL)
BATHING_ASSISTANCE: MODERATE
PATIENT'S MEMORY ADEQUATE TO SAFELY COMPLETE DAILY ACTIVITIES?: YES
DRESSING YOURSELF: INDEPENDENT
GROOMING: INDEPENDENT
JUDGMENT_ADEQUATE_SAFELY_COMPLETE_DAILY_ACTIVITIES: YES
HEARING - LEFT EAR: FUNCTIONAL
WALKS IN HOME: INDEPENDENT
FEEDING YOURSELF: INDEPENDENT
TOILETING: INDEPENDENT
BATHING: INDEPENDENT
HEARING - RIGHT EAR: FUNCTIONAL
ADEQUATE_TO_COMPLETE_ADL: YES

## 2023-10-04 ASSESSMENT — PATIENT HEALTH QUESTIONNAIRE - PHQ9
2. FEELING DOWN, DEPRESSED OR HOPELESS: SEVERAL DAYS
1. LITTLE INTEREST OR PLEASURE IN DOING THINGS: SEVERAL DAYS
SUM OF ALL RESPONSES TO PHQ9 QUESTIONS 1 & 2: 2

## 2023-10-04 ASSESSMENT — COGNITIVE AND FUNCTIONAL STATUS - GENERAL
MOVING TO AND FROM BED TO CHAIR: A LITTLE
TOILETING: A LOT
MOBILITY SCORE: 17
HELP NEEDED FOR BATHING: A LOT
TOILETING: A LITTLE
CLIMB 3 TO 5 STEPS WITH RAILING: A LOT
WALKING IN HOSPITAL ROOM: A LOT
EATING MEALS: A LITTLE
DAILY ACTIVITIY SCORE: 20
PERSONAL GROOMING: A LITTLE
STANDING UP FROM CHAIR USING ARMS: A LITTLE
TURNING FROM BACK TO SIDE WHILE IN FLAT BAD: A LITTLE
DRESSING REGULAR UPPER BODY CLOTHING: A LITTLE
WALKING IN HOSPITAL ROOM: A LITTLE
DAILY ACTIVITIY SCORE: 15
PERSONAL GROOMING: A LITTLE
WALKING IN HOSPITAL ROOM: A LOT
TOILETING: A LOT
HELP NEEDED FOR BATHING: A LITTLE
PATIENT BASELINE BEDBOUND: NO
MOVING FROM LYING ON BACK TO SITTING ON SIDE OF FLAT BED WITH BEDRAILS: A LITTLE
HELP NEEDED FOR BATHING: A LOT
MOBILITY SCORE: 13
DRESSING REGULAR LOWER BODY CLOTHING: A LOT
STANDING UP FROM CHAIR USING ARMS: A LOT
TURNING FROM BACK TO SIDE WHILE IN FLAT BAD: A LITTLE
DAILY ACTIVITIY SCORE: 15
MOVING TO AND FROM BED TO CHAIR: A LOT
CLIMB 3 TO 5 STEPS WITH RAILING: A LOT
DRESSING REGULAR UPPER BODY CLOTHING: A LITTLE
DRESSING REGULAR UPPER BODY CLOTHING: A LITTLE
TURNING FROM BACK TO SIDE WHILE IN FLAT BAD: A LITTLE
MOVING TO AND FROM BED TO CHAIR: A LOT
CLIMB 3 TO 5 STEPS WITH RAILING: TOTAL
STANDING UP FROM CHAIR USING ARMS: A LOT
MOVING FROM LYING ON BACK TO SITTING ON SIDE OF FLAT BED WITH BEDRAILS: A LITTLE
MOVING FROM LYING ON BACK TO SITTING ON SIDE OF FLAT BED WITH BEDRAILS: A LITTLE
DRESSING REGULAR LOWER BODY CLOTHING: A LITTLE
DRESSING REGULAR LOWER BODY CLOTHING: A LOT
EATING MEALS: A LITTLE
MOBILITY SCORE: 14

## 2023-10-04 ASSESSMENT — ENCOUNTER SYMPTOMS
WHEEZING: 1
TREMORS: 0
POLYDIPSIA: 0
HEMATURIA: 0
BLOOD IN STOOL: 0
CHEST TIGHTNESS: 1
WEAKNESS: 0
COUGH: 1
ABDOMINAL PAIN: 0
DYSPHORIC MOOD: 1
CONFUSION: 0
NAUSEA: 0
DIZZINESS: 0
COLOR CHANGE: 0
FATIGUE: 1
SLEEP DISTURBANCE: 0
CHILLS: 1
DYSURIA: 0
FEVER: 0
VOMITING: 0
LIGHT-HEADEDNESS: 0
PHOTOPHOBIA: 0
SHORTNESS OF BREATH: 1
CHILLS: 0
PALPITATIONS: 0

## 2023-10-04 ASSESSMENT — LIFESTYLE VARIABLES
HOW OFTEN DO YOU HAVE A DRINK CONTAINING ALCOHOL: NEVER
HOW MANY STANDARD DRINKS CONTAINING ALCOHOL DO YOU HAVE ON A TYPICAL DAY: PATIENT DOES NOT DRINK
SKIP TO QUESTIONS 9-10: 1
SUBSTANCE_ABUSE_PAST_12_MONTHS: NO
AUDIT-C TOTAL SCORE: 0
AUDIT-C TOTAL SCORE: 0
PRESCIPTION_ABUSE_PAST_12_MONTHS: NO
HOW OFTEN DO YOU HAVE 6 OR MORE DRINKS ON ONE OCCASION: NEVER

## 2023-10-04 ASSESSMENT — PAIN SCALES - GENERAL
PAINLEVEL_OUTOF10: 0 - NO PAIN
PAINLEVEL_OUTOF10: 3
PAINLEVEL_OUTOF10: 0 - NO PAIN
PAINLEVEL_OUTOF10: 0 - NO PAIN

## 2023-10-04 ASSESSMENT — PAIN - FUNCTIONAL ASSESSMENT
PAIN_FUNCTIONAL_ASSESSMENT: 0-10
PAIN_FUNCTIONAL_ASSESSMENT: 0-10

## 2023-10-04 NOTE — CARE PLAN
Problem: Mobility  Goal: Steps  Description: The patient will ascend/descend 2 steps with 1 railing for entering/exiting home  Outcome: Progressing  Goal: Gait  Description: The patient will be able to ambulate 50 feet with O2 Saturation >92% for household ADLs  Outcome: Progressing     Problem: Transfers  Goal: Goal 1  Description: The patient will be able to perform sit to stand transfers SBA x 1 without AD  Outcome: Progressing
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
## 2023-10-04 NOTE — ASSESSMENT & PLAN NOTE
-Continue home therapies  -Tele monitoring   -No acute ischemic changes on ECG   -Cards follow-up

## 2023-10-04 NOTE — PROGRESS NOTES
Met with patient and dtr at bedside to discuss discharge needs. PT/OT evals and recommendation for MOD intensity therapies reviewed with patient and daughter. Patient declined SNF in favor of home health care services. Patient was provided with choice of home care provider and requested a referral to the [facility] referrals portal for home health care. Patient endorses using home oxygen and reports he has all necessary equipment at home. Referral to be placed to [facility].

## 2023-10-04 NOTE — ASSESSMENT & PLAN NOTE
-Pt presenting with productive cough, L rib pain  -Imaging: Concerning for a postobstructive pneumonitis versus pneumonia  -Send urine antigens  -Send sputum cx if able   -Continue antibiotic coverage with ceftriaxone, doxycycline  -DuoNebs  -Pulmonary hygiene   -RT c/s appreciated  -Follow fever curve, WBCs  Doing well after antibiotics.  Converting to oral antibiotics for discharge.  Consider follow-up chest x-ray in 3 to 4 weeks.  Continue bronchial hygiene.

## 2023-10-04 NOTE — ED PROVIDER NOTES
HPI   Chief Complaint   Patient presents with    Chest Pain       Patient presents to the emergency department secondary to chest pain.  Chest pain has been present for the past couple hours.  It is a sharp pain in the left side of his chest.  Somewhat worsened by inspiration.  He has a chronic cough which is unchanged.  No change in sputum production.  No fever or chills.  No abdominal pain.  No nausea or vomiting.  Patient does have mild shortness of breath.  Patient does wear home oxygen at 2 L.  He does have a history of COPD.  He was not given breathing treatments by the paramedics.  He does have a history of coronary artery disease.  He has had 2 stents placed.  He does not recall when he saw his cardiologist last.  He did recently start oral chemotherapy medication after being diagnosed with leukemia.  He has been on this medication for the past 3 months or so.  He currently is on Plavix and has been taking it as prescribed.                        No data recorded                Patient History   No past medical history on file.  No past surgical history on file.  No family history on file.  Social History     Tobacco Use    Smoking status: Not on file    Smokeless tobacco: Not on file   Substance Use Topics    Alcohol use: Not on file    Drug use: Not on file       Physical Exam   ED Triage Vitals [10/1945]   Temp Heart Rate Resp BP   36.9 °C (98.5 °F) 95 22 152/73      SpO2 Temp src Heart Rate Source Patient Position   99 % -- Monitor Lying      BP Location FiO2 (%)     Left arm --       Physical Exam  Vitals and nursing note reviewed.   Constitutional:       General: He is not in acute distress.     Appearance: He is well-developed.   HENT:      Head: Normocephalic and atraumatic.   Eyes:      Conjunctiva/sclera: Conjunctivae normal.   Cardiovascular:      Rate and Rhythm: Normal rate and regular rhythm.      Heart sounds: Normal heart sounds. No murmur heard.  Pulmonary:      Effort: Pulmonary  effort is normal. No respiratory distress.      Breath sounds: Normal breath sounds.      Comments: Patient has mild tachypnea and mild accessory muscle use.  Patient does have wheezing respirations bilaterally.  Patient does have equal breath sounds bilaterally.  Abdominal:      General: Bowel sounds are normal. There is no abdominal bruit.      Palpations: Abdomen is soft.      Tenderness: There is no abdominal tenderness.   Musculoskeletal:         General: No swelling. Normal range of motion.      Cervical back: Normal range of motion and neck supple.      Comments: Patient has a right below the knee amputation.  No evidence of peripheral edema.   Skin:     General: Skin is warm and dry.      Capillary Refill: Capillary refill takes less than 2 seconds.   Neurological:      General: No focal deficit present.      Mental Status: He is alert and oriented to person, place, and time.   Psychiatric:         Mood and Affect: Mood normal.         Behavior: Behavior normal.       ED Course & MDM   Diagnoses as of 10/03/23 2348   Pneumonitis       Medical Decision Making  Patient presents secondary to chest pain for the past few hours.  Mild shortness of breath.  Differential diagnosis for this patient is coronary artery disease, PE, pneumonia, COPD exacerbation, other acute cause.  Patient is evaluated in the emergency department EKG lab work and given a breathing treatment.  D-dimer was significantly elevated over thousand.  CT angiogram was performed.  This shows no evidence of pulmonary embolism but does show evidence of left-sided pneumonitis.  Patient has no acute EKG changes.  Patient has a normal white blood cell count.  Patient is maintaining his O2 saturation on his home oxygen at this time.  He has improved pain after a DuoNeb breathing treatment.  Patient attempted ambulation.  Patient desaturated to 88% on his home oxygen just sitting up at bedside.  At this time decision was made to hospitalize the patient  for antibiotics and continued treatment.  Patient was discussed with Charissa from the hospitalist group and plan is for admission to the telemetry unit.        Procedure  ECG 12 lead    Performed by: Rosalba Gunter MD  Authorized by: Rosalba Gunter MD    ECG reviewed by ED Physician in the absence of a cardiologist: yes    Previous ECG:     Previous ECG:  Unavailable  Interpretation:     Details:  EKG shows sinus rhythm rate of 82.  No acute ST elevation.  There is mild artifact limitation.  MI interval is 179 and QTc is 444.  Quality:     Tracing quality:  Limited by artifact  Rate:     ECG rate assessment: normal    Rhythm:     Rhythm: sinus rhythm    Q waves:     Details:  Inferior Q waves present.       Rosalba Gunter MD  10/03/23 7974

## 2023-10-04 NOTE — ED TRIAGE NOTES
Pt arrives via EMS, Pt AXO3, tachypneic on 2 LNC home O2 skin pink warm and dry. Monitor leads applied at this time. Call light within reach.

## 2023-10-04 NOTE — CARE PLAN
Problem: COGNITION/SAFETY  Goal: Pt able to integrate 1-2 energy conservation techniques into ADL or therapeutic activities without verbal cuing   Outcome: Progressing     Problem: ADLs  Goal: Patient with complete lower body dressing with modified independent level of assistance with PRN adaptive equipment  Outcome: Progressing  Goal: Patient will complete toileting including hygiene clothing management/hygiene with modified independent level of assistance and least restrictive device.  Outcome: Progressing     Problem: TRANSFERS  Goal: Patient will complete functional transfers with least restrictive device with modified independent level of assistance.  Outcome: Progressing
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
## 2023-10-04 NOTE — PROGRESS NOTES
Social work consult placed for connection to community resources, met with pt and/or family to assess needs and provide support, introduced self and my role as  with care transition team. Pt needing assistance with meals at home, difficult for him and girlfriend to make meals due to declining health. Social work provided pt with information for mobile meal services and community resource guide for [facility]. Pt agreeable to social work placing direction home referral upon discharge, appreciative of social work's support.  No other needs identified at this time, SW signing off,  pt and care team aware of SW availability while inpt.     Linden Nieto, MSW, LSW ([id number])  Care Transitions Social Work

## 2023-10-04 NOTE — ASSESSMENT & PLAN NOTE
-Patient has a chronic hypoxic respiratory failure with a baseline oxygen requirement of 2 L nasal cannula  -At rest, he is requiring his baseline oxygen.  However, with movement in the bed, he is requiring more oxygen as he drops to <88% on his 2 L.  -Continue management as outlined above  Back on baseline O2.

## 2023-10-04 NOTE — PROGRESS NOTES
Physical Therapy    Physical Therapy Evaluation    Patient Name: Aristeo Barriga  MRN: [id number]  Today's Date: 10/4/2023   Time Calculation  Start Time: 1153  Stop Time: 1220  Time Calculation (min): 27 min    Assessment/Plan   PT Assessment  PT Assessment Results: Decreased strength, Decreased endurance, Impaired balance, Decreased mobility, Impaired vision, Decreased safety awareness  Rehab Prognosis: Fair  Evaluation/Treatment Tolerance:  (The patient demonstrated significant SOB after ambulating in the room.  O2 saturation was 89% & climbed to 92% after resting.)  End of Session Communication: Bedside nurse  Assessment Comment: The patient will benefit from skilled intevention to address current limitations & progress toward meeting goals.  End of Session Patient Position: Alarm on, Bed, 2 rail up  IP OR SWING BED PT PLAN  Inpatient or Swing Bed: Inpatient  PT Plan  Treatment/Interventions: Transfer training, Gait training, Stair training, Strengthening, Endurance training, Therapeutic exercise  PT Plan: Skilled PT  PT Frequency: 3 times per week  PT Discharge Recommendations: Moderate intensity level of continued care      Subjective   General Visit Information:  General  Reason for Referral: impaired mobility  Referred By: Carissa MCINTYRE  Past Medical History Relevant to Rehab: Ad Dx: pneumonitis;  PMHx: HTN, HLD, CAD s/p PCI, CHF, COPD/chronic bronchitis with chronic hypoxic resp. failure (2L NC baseline), leukemia, CKD, BPH, AAA s/p stenting, R BKA  Family/Caregiver Present: No  Prior to Session Communication: Bedside nurse  Patient Position Received: Bed, 2 rail up, Alarm off, not on at start of session  General Comment: The patient is agreeable to PT.  Home Living:  Home Living  Type of Home: House  Lives With: Significant other  Home Adaptive Equipment: Walker rolling or standard, Cane  Home Layout: One level  Home Access: Stairs to enter with rails (1 rail)  Entrance Stairs-Number of Steps: 2  Prior  Level of Function:  Prior Function Per Pt/Caregiver Report  Level of [place]: Independent with ADLs and functional transfers (having difficulty with cooking)  Precautions:  Precautions  Precautions Comment: pt. is on 2 L O2       Objective   Pain:  Pain Assessment  Pain Score: 0 - No pain  Cognition:  Cognition  Overall Cognitive Status: Within Functional Limits     Functional Assessments:   Transfers  Transfer: Yes  Transfer 1  Transfer From 1: Bed to, Sit to  Transfer to 1: Stand  Transfer Level of Assistance 1: Minimum assistance  Trials/Comments 1: x2    Ambulation/Gait Training  Ambulation/Gait Training Performed: Yes  Ambulation/Gait Training 1  Surface 1: Level tile  Assistance 1: Minimum assistance (x2)  Quality of Gait 1: Narrow base of support  Comments/Distance (ft) 1: 10    Extremity/Trunk Assessments:  Strength LLE  L Knee Flexion: 4+/5  L Knee Extension: 4/5    Outcome Measures:  [facility] Basic Mobility  Turning from your back to your side while in a flat bed without using bedrails: A little  Moving from lying on your back to sitting on the side of a flat bed without using bedrails: A little  Moving to and from bed to chair (including a wheelchair): A lot  Standing up from a chair using your arms (e.g. wheelchair or bedside chair): A lot  To walk in hospital room: A lot  Climbing 3-5 steps with railing: Total  Basic Mobility - Total Score: 13    Encounter Problems       Encounter Problems (Active)       Mobility       Steps (Progressing)       Start:  10/04/23    Expected End:  10/18/23       The patient will ascend/descend 2 steps with 1 railing for entering/exiting home         Gait (Progressing)       Start:  10/04/23    Expected End:  10/18/23       The patient will be able to ambulate 50 feet with O2 Saturation >92% for household ADLs            Transfers       Goal 1 (Progressing)       Start:  10/04/23    Expected End:  10/18/23       The patient will be able to perform sit to stand transfers  SBA x 1 without AD                Education Documentation  Precautions, taught by Heath Augustine at 10/4/2023 12:49 PM.  Learner: Patient  Readiness: Acceptance  Method: Explanation  Response: Verbalizes Understanding    Education Comments  No comments found.

## 2023-10-04 NOTE — ASSESSMENT & PLAN NOTE
-Patient with fairly diffuse expiratory wheezing on examination  -Admits that his breathing and wheezing have been worsening despite home therapies  -Continue empiric doxycycline  -IV steroids  -Continue home therapies  -RT consult appreciated  10/5: Continue current medications on discharge.

## 2023-10-04 NOTE — PROGRESS NOTES
Occupational Therapy    Evaluation    Patient Name: Aristeo Barriga  MRN: [id number]  Today's Date: 10/4/2023  Time Calculation  Start Time: 1152  Stop Time: 1213  Time Calculation (min): 21 min        Assessment:  OT Assessment: Pt admitted for pneumonitis. Pt performing ADLs and mobiltiy below functional baseline. Pt will benefit from skilled OT services during hospital stay.  Prognosis: Good  Medical Staff Made Aware: Yes  Prognosis: Good  Medical Staff Made Aware: Yes  Plan:  Treatment Interventions: ADL retraining, Endurance training, Functional transfer training  OT Frequency: 3 times per week  OT Discharge Recommendations: Moderate intensity level of continued care  OT Recommended Transfer Status: Assist of 2  Treatment Interventions: ADL retraining, Endurance training, Functional transfer training    Subjective   Current Problem:  1. Pneumonitis          General:  General  Reason for Referral: Pneumonitis  Referred By: Carissa  Past Medical History Relevant to Rehab: HTN, HLD, CAD s/p PCI, CHF, COPD/chronic bronchitis with chronic hypoxic resp. failure (2L NC baseline), leukemia, CKD, BPH, AAA s/p stenting, R BKA  Family/Caregiver Present: No  Co-Treatment: PT  Co-Treatment Reason: co-eval to optimize safety and mobiltiy while focusing on discipline specific goals  Prior to Session Communication: Bedside nurse  Patient Position Received: Bed, 2 rail up, Alarm off, not on at start of session  General Comment: Pt observed spine with HOB elevated upon arrival. RN cleared for OT. Pt agreeable to therapy. Communication to RN at end of session regarding functional assessment and recommendations.  Precautions:  Hearing/Visual Limitations: WFL  Medical Precautions: Oxygen therapy device and L/min, Fall precautions  Precautions Comment: 2L O2 NC  Vital Signs:  Heart Rate: 98  SpO2: 92 %  Patient Position: Sitting  Pain:  Pain Assessment  Pain Score: 0 - No pain    Objective   Cognition:  Overall Cognitive Status:  Within Functional Limits           Home Living:  Type of Home: House  Lives With: Significant other  Home Adaptive Equipment: Walker rolling or standard  Home Layout: One level (Basement present, but does not use)  Home Access: Stairs to enter with rails  Entrance Stairs-Rails:  (1HR)  Entrance Stairs-Number of Steps: 2  Bathroom Shower/Tub: Walk-in shower  Bathroom Toilet: Standard  Bathroom Equipment: Grab bars in shower  Bathroom Accessibility: Has shower chair  Home Living Comments: Oxygen equipment at home  Prior Function:  Level of [place]: Independent with ADLs and functional transfers  Prior Function Comments: Increased difficulty with IADLs, hace friend coming to assist with cleaning. Currently trying to get HHA to assist with household tasks to assist pt and s/o; O2 in evenings and PRN with increased exertion  IADL History:  IADL Comments: (+) drive; (-) work; Performing grocery shopping using electric scooters at store; (-) falls  ADL:  Eating Assistance: Stand by  Eating Deficit: Setup  Grooming Assistance: Stand by (CGA)  Grooming Deficit: Setup (Anticipated)  Bathing Assistance: Moderate  Bathing Deficit:  (Anticipated)  UE Dressing Assistance: Minimal  UE Dressing Deficit:  (Anticipated)  LE Dressing Assistance: Stand by  LE Dressing Deficit: Setup (Pt with RLE prosthetic donned prior to arrival, Donning B shoes with SBA. Anticipated increased assistance for additional lower body dressing.)  Toileting Assistance with Device: Moderate  Toileting Deficit:  (Anticipated)  Functional Assistance: Minimal (2 person)  Functional Deficit: Setup, Verbal cueing  Activity Tolerance:  Endurance: Decreased tolerance for upright activites  Activity Tolerance Comments: sit>stand with HHA x1 from EOB, functional mobility within hospital room for household distance with MIN A x2. Pt endorsed lightheaded sensation during mobility. Improved with rest.  Bed Mobility/Transfers: Bed Mobility  Bed Mobility: Yes  Bed  Mobility 1  Bed Mobility 1: Supine to sitting  Level of Assistance 1: Minimum assistance   and Transfers  Transfer: Yes  Transfer 1  Transfer From 1: Bed to, Sit to  Transfer to 1: Stand  Transfer Level of Assistance 1: Minimum assistance, +2, Minimal verbal cues, Hand held assistance  Trials/Comments 1: Static stand to gain balance  Transfers 2  Transfer From 2: Stand to  Transfer to 2: Bed, Sit  Technique 2: Stand to sit  Transfer Level of Assistance 2: Hand held assistance, Minimum assistance, +2   Modalities:     Vision:Vision - Basic Assessment  Visual History: Glaucoma, Surgical intervention (Surgery in L eye, awaiting surgery in R eye)  Sensation:  Sensation Comment: Denies numbness or tingling  Strength:  Strength Comments: BUE ROM WFL; BUE grossly 4-/5  Perception:     Coordination:      Hand Function:  Gross Grasp: Functional  Coordination: Functional    Outcome Measures:[facility] Daily Activity  Putting on and taking off regular lower body clothing: A lot  Bathing (including washing, rinsing, drying): A lot  Putting on and taking off regular upper body clothing: A little  Toileting, which includes using toilet, bedpan or urinal: A lot  Taking care of personal grooming such as brushing teeth: A little  Eating Meals: A little  Daily Activity - Total Score: 15        Education Documentation  ADL Training, taught by Monica Allison OT at 10/4/2023  2:10 PM.  Learner: Patient  Readiness: Acceptance  Method: Explanation  Response: Verbalizes Understanding, Needs Reinforcement  Comment: Education on ADLs and energy conservation    Education Comments  No comments found.        OP EDUCATION:       Goals:  Encounter Problems       Encounter Problems (Active)       ADLs       Patient with complete lower body dressing with modified independent level of assistance with PRN adaptive equipment (Progressing)       Start:  10/04/23    Expected End:  10/18/23            Patient will complete toileting including hygiene  clothing management/hygiene with modified independent level of assistance and least restrictive device. (Progressing)       Start:  10/04/23    Expected End:  10/18/23               COGNITION/SAFETY       Pt able to integrate 1-2 energy conservation techniques into ADL or therapeutic activities without verbal cuing  (Progressing)       Start:  10/04/23    Expected End:  10/18/23                     TRANSFERS       Patient will complete functional transfers with least restrictive device with modified independent level of assistance. (Progressing)       Start:  10/04/23    Expected End:  10/18/23

## 2023-10-05 ENCOUNTER — PHARMACY VISIT (OUTPATIENT)
Dept: PHARMACY | Facility: CLINIC | Age: 78
End: 2023-10-05
Payer: COMMERCIAL

## 2023-10-05 VITALS
HEIGHT: 71 IN | DIASTOLIC BLOOD PRESSURE: 58 MMHG | WEIGHT: 139.99 LBS | RESPIRATION RATE: 16 BRPM | OXYGEN SATURATION: 94 % | TEMPERATURE: 98.8 F | BODY MASS INDEX: 19.6 KG/M2 | HEART RATE: 73 BPM | SYSTOLIC BLOOD PRESSURE: 107 MMHG

## 2023-10-05 LAB
ANION GAP SERPL CALC-SCNC: 12 MMOL/L (ref 10–20)
BUN SERPL-MCNC: 37 MG/DL (ref 6–23)
CALCIUM SERPL-MCNC: 8.4 MG/DL (ref 8.6–10.3)
CHLORIDE SERPL-SCNC: 107 MMOL/L (ref 98–107)
CO2 SERPL-SCNC: 23 MMOL/L (ref 21–32)
CREAT SERPL-MCNC: 1.5 MG/DL (ref 0.5–1.3)
ERYTHROCYTE [DISTWIDTH] IN BLOOD BY AUTOMATED COUNT: 18.6 % (ref 11.5–14.5)
GFR SERPL CREATININE-BSD FRML MDRD: 47 ML/MIN/1.73M*2
GLUCOSE SERPL-MCNC: 147 MG/DL (ref 74–99)
HCT VFR BLD AUTO: 23.9 % (ref 41–52)
HGB BLD-MCNC: 8.1 G/DL (ref 13.5–17.5)
MCH RBC QN AUTO: 32 PG (ref 26–34)
MCHC RBC AUTO-ENTMCNC: 33.9 G/DL (ref 32–36)
MCV RBC AUTO: 95 FL (ref 80–100)
NRBC BLD-RTO: 0 /100 WBCS (ref 0–0)
PLATELET # BLD AUTO: 172 X10*3/UL (ref 150–450)
PMV BLD AUTO: 11.3 FL (ref 7.5–11.5)
POTASSIUM SERPL-SCNC: 4 MMOL/L (ref 3.5–5.3)
RBC # BLD AUTO: 2.53 X10*6/UL (ref 4.5–5.9)
SODIUM SERPL-SCNC: 138 MMOL/L (ref 136–145)
WBC # BLD AUTO: 7.4 X10*3/UL (ref 4.4–11.3)

## 2023-10-05 PROCEDURE — 94640 AIRWAY INHALATION TREATMENT: CPT

## 2023-10-05 PROCEDURE — S4991 NICOTINE PATCH NONLEGEND: HCPCS | Performed by: INTERNAL MEDICINE

## 2023-10-05 PROCEDURE — 2500000002 HC RX 250 W HCPCS SELF ADMINISTERED DRUGS (ALT 637 FOR MEDICARE OP, ALT 636 FOR OP/ED): Performed by: INTERNAL MEDICINE

## 2023-10-05 PROCEDURE — 2500000004 HC RX 250 GENERAL PHARMACY W/ HCPCS (ALT 636 FOR OP/ED): Performed by: INTERNAL MEDICINE

## 2023-10-05 PROCEDURE — 97116 GAIT TRAINING THERAPY: CPT | Mod: CQ,GP

## 2023-10-05 PROCEDURE — 2500000001 HC RX 250 WO HCPCS SELF ADMINISTERED DRUGS (ALT 637 FOR MEDICARE OP): Performed by: INTERNAL MEDICINE

## 2023-10-05 PROCEDURE — 2500000001 HC RX 250 WO HCPCS SELF ADMINISTERED DRUGS (ALT 637 FOR MEDICARE OP): Performed by: STUDENT IN AN ORGANIZED HEALTH CARE EDUCATION/TRAINING PROGRAM

## 2023-10-05 PROCEDURE — 85027 COMPLETE CBC AUTOMATED: CPT | Performed by: INTERNAL MEDICINE

## 2023-10-05 PROCEDURE — 99239 HOSP IP/OBS DSCHRG MGMT >30: CPT | Performed by: INTERNAL MEDICINE

## 2023-10-05 PROCEDURE — 80048 BASIC METABOLIC PNL TOTAL CA: CPT | Performed by: INTERNAL MEDICINE

## 2023-10-05 PROCEDURE — 2500000005 HC RX 250 GENERAL PHARMACY W/O HCPCS: Performed by: INTERNAL MEDICINE

## 2023-10-05 PROCEDURE — 2500000004 HC RX 250 GENERAL PHARMACY W/ HCPCS (ALT 636 FOR OP/ED): Performed by: STUDENT IN AN ORGANIZED HEALTH CARE EDUCATION/TRAINING PROGRAM

## 2023-10-05 PROCEDURE — 2500000004 HC RX 250 GENERAL PHARMACY W/ HCPCS (ALT 636 FOR OP/ED)

## 2023-10-05 PROCEDURE — 36415 COLL VENOUS BLD VENIPUNCTURE: CPT | Performed by: INTERNAL MEDICINE

## 2023-10-05 RX ORDER — PREDNISONE 10 MG/1
TABLET ORAL
Qty: 15 TABLET | Refills: 0 | Status: SHIPPED | OUTPATIENT
Start: 2023-10-05 | End: 2023-10-05 | Stop reason: HOSPADM

## 2023-10-05 RX ORDER — GUAIFENESIN 600 MG/1
1200 TABLET, EXTENDED RELEASE ORAL EVERY 12 HOURS
Qty: 40 TABLET | Refills: 0 | Status: SHIPPED | OUTPATIENT
Start: 2023-10-06 | End: 2024-02-26 | Stop reason: HOSPADM

## 2023-10-05 RX ORDER — CHOLECALCIFEROL (VITAMIN D3) 25 MCG
2000 TABLET ORAL DAILY
Status: DISCONTINUED | OUTPATIENT
Start: 2023-10-05 | End: 2023-10-05 | Stop reason: HOSPADM

## 2023-10-05 RX ORDER — PREDNISONE 10 MG/1
TABLET ORAL
Qty: 30 TABLET | Refills: 0 | Status: SHIPPED | OUTPATIENT
Start: 2023-10-05 | End: 2023-10-15

## 2023-10-05 RX ORDER — CEFDINIR 300 MG/1
300 CAPSULE ORAL 2 TIMES DAILY
Qty: 10 CAPSULE | Refills: 0 | Status: SHIPPED | OUTPATIENT
Start: 2023-10-05 | End: 2023-10-10

## 2023-10-05 RX ORDER — DOXYCYCLINE 100 MG/1
100 CAPSULE ORAL EVERY 12 HOURS SCHEDULED
Qty: 14 CAPSULE | Refills: 0 | Status: SHIPPED | OUTPATIENT
Start: 2023-10-05 | End: 2023-10-12

## 2023-10-05 RX ADMIN — METHYLPREDNISOLONE SODIUM SUCCINATE 40 MG: 40 INJECTION, POWDER, FOR SOLUTION INTRAMUSCULAR; INTRAVENOUS at 14:33

## 2023-10-05 RX ADMIN — POLYETHYLENE GLYCOL 3350 17 G: 17 POWDER, FOR SOLUTION ORAL at 08:47

## 2023-10-05 RX ADMIN — NICOTINE 1 PATCH: 21 PATCH, EXTENDED RELEASE TRANSDERMAL at 08:47

## 2023-10-05 RX ADMIN — ATORVASTATIN CALCIUM 40 MG: 40 TABLET, FILM COATED ORAL at 08:48

## 2023-10-05 RX ADMIN — NIFEDIPINE 30 MG: 30 TABLET, FILM COATED, EXTENDED RELEASE ORAL at 08:48

## 2023-10-05 RX ADMIN — DOXYCYCLINE 100 MG: 100 CAPSULE ORAL at 08:48

## 2023-10-05 RX ADMIN — IPRATROPIUM BROMIDE AND ALBUTEROL SULFATE 3 ML: 2.5; .5 SOLUTION RESPIRATORY (INHALATION) at 08:45

## 2023-10-05 RX ADMIN — METHYLPREDNISOLONE SODIUM SUCCINATE 40 MG: 40 INJECTION, POWDER, LYOPHILIZED, FOR SOLUTION INTRAMUSCULAR; INTRAVENOUS at 06:22

## 2023-10-05 RX ADMIN — TAMSULOSIN HYDROCHLORIDE 0.4 MG: 0.4 CAPSULE ORAL at 08:48

## 2023-10-05 RX ADMIN — SERTRALINE HYDROCHLORIDE 50 MG: 50 TABLET ORAL at 08:52

## 2023-10-05 RX ADMIN — FLUTICASONE FUROATE AND VILANTEROL TRIFENATATE 1 PUFF: 100; 25 POWDER RESPIRATORY (INHALATION) at 08:46

## 2023-10-05 RX ADMIN — Medication 2000 UNITS: at 08:47

## 2023-10-05 RX ADMIN — FUROSEMIDE 20 MG: 40 TABLET ORAL at 08:48

## 2023-10-05 RX ADMIN — ISOSORBIDE MONONITRATE 30 MG: 30 TABLET, EXTENDED RELEASE ORAL at 08:48

## 2023-10-05 RX ADMIN — METHYLPREDNISOLONE SODIUM SUCCINATE 40 MG: 40 INJECTION, POWDER, LYOPHILIZED, FOR SOLUTION INTRAMUSCULAR; INTRAVENOUS at 14:33

## 2023-10-05 RX ADMIN — GUAIFENESIN 1200 MG: 600 TABLET ORAL at 11:27

## 2023-10-05 RX ADMIN — ICOSAPENT ETHYL 2 G: 1 CAPSULE ORAL at 08:48

## 2023-10-05 RX ADMIN — HEPARIN SODIUM 5000 UNITS: 5000 INJECTION INTRAVENOUS; SUBCUTANEOUS at 08:50

## 2023-10-05 RX ADMIN — METHYLPREDNISOLONE SODIUM SUCCINATE 40 MG: 40 INJECTION, POWDER, FOR SOLUTION INTRAMUSCULAR; INTRAVENOUS at 06:22

## 2023-10-05 RX ADMIN — TIOTROPIUM BROMIDE INHALATION SPRAY 2 PUFF: 3.12 SPRAY, METERED RESPIRATORY (INHALATION) at 08:46

## 2023-10-05 RX ADMIN — LISINOPRIL 10 MG: 10 TABLET ORAL at 08:48

## 2023-10-05 RX ADMIN — Medication 2 L/MIN: at 08:45

## 2023-10-05 RX ADMIN — IPRATROPIUM BROMIDE AND ALBUTEROL SULFATE 3 ML: 2.5; .5 SOLUTION RESPIRATORY (INHALATION) at 12:15

## 2023-10-05 RX ADMIN — HEPARIN SODIUM 5000 UNITS: 5000 INJECTION INTRAVENOUS; SUBCUTANEOUS at 15:48

## 2023-10-05 RX ADMIN — ALLOPURINOL 100 MG: 100 TABLET ORAL at 08:48

## 2023-10-05 RX ADMIN — CLOPIDOGREL BISULFATE 75 MG: 75 TABLET ORAL at 08:48

## 2023-10-05 ASSESSMENT — COGNITIVE AND FUNCTIONAL STATUS - GENERAL
WALKING IN HOSPITAL ROOM: A LITTLE
MOVING FROM LYING ON BACK TO SITTING ON SIDE OF FLAT BED WITH BEDRAILS: A LITTLE
TOILETING: A LITTLE
DRESSING REGULAR UPPER BODY CLOTHING: A LITTLE
MOBILITY SCORE: 22
HELP NEEDED FOR BATHING: A LITTLE
CLIMB 3 TO 5 STEPS WITH RAILING: A LITTLE
DAILY ACTIVITIY SCORE: 19
CLIMB 3 TO 5 STEPS WITH RAILING: A LITTLE
MOBILITY SCORE: 18
STANDING UP FROM CHAIR USING ARMS: A LITTLE
PERSONAL GROOMING: A LITTLE
DRESSING REGULAR LOWER BODY CLOTHING: A LITTLE
TURNING FROM BACK TO SIDE WHILE IN FLAT BAD: A LITTLE
WALKING IN HOSPITAL ROOM: A LITTLE
MOVING TO AND FROM BED TO CHAIR: A LITTLE

## 2023-10-05 ASSESSMENT — PAIN SCALES - GENERAL: PAINLEVEL_OUTOF10: 0 - NO PAIN

## 2023-10-05 ASSESSMENT — PAIN - FUNCTIONAL ASSESSMENT: PAIN_FUNCTIONAL_ASSESSMENT: 0-10

## 2023-10-05 NOTE — PROGRESS NOTES
Physical Therapy    Physical Therapy Treatment    Patient Name: Aristeo Barriga  MRN: [id number]  Today's Date: 10/5/2023  Time Calculation  Start Time: 0842  Stop Time: 0858  Time Calculation (min): 16 min       Assessment/Plan      PT Plan  Treatment/Interventions: Transfer training, Gait training, Stair training, Strengthening, Endurance training, Therapeutic exercise  PT Plan: Skilled PT  PT Frequency: 3 times per week  PT Discharge Recommendations: Moderate intensity level of continued care      General Visit Information:   PT  Visit  PT Received On: 10/05/23  Response to Previous Treatment: Patient with no complaints from previous session.       Subjective   Precautions:  Precautions  Precautions Comment: O2    Vital Signs:  Vital Signs  Heart Rate: 69  SpO2: 93 %    Objective   Pain:  Pain Assessment  Pain Assessment: 0-10  Pain Score: 0 - No pain    Cognition:  Cognition  Overall Cognitive Status: Within Functional Limits    Activity Tolerance:  Activity Tolerance  Endurance: Tolerates 10 - 20 min exercise with multiple rests    Treatments:  Bed Mobility  Bed Mobility: Yes  Bed Mobility 1  Bed Mobility 1: Supine to sitting  Level of Assistance 1: Close supervision    Ambulation/Gait Training  Ambulation/Gait Training Performed: Yes  Ambulation/Gait Training 1  Surface 1: Level tile  Device 1: Rolling walker  Assistance 1: Close supervision  Comments/Distance (ft) 1: 20 feet x2 reps  Transfers  Transfer: Yes  Transfer 1  Technique 1: Sit to stand  Transfer Level of Assistance 1: Close supervision  Transfers 2  Technique 2: Stand to sit  Transfer Level of Assistance 2: Close supervision    Other Activity  Other Activity Performed:  (pt remained sitting EOB following tx)    Outcome Measures:  [facility] Basic Mobility  Turning from your back to your side while in a flat bed without using bedrails: A little  Moving from lying on your back to sitting on the side of a flat bed without using bedrails: A little  Moving  to and from bed to chair (including a wheelchair): A little  Standing up from a chair using your arms (e.g. wheelchair or bedside chair): A little  To walk in hospital room: A little  Climbing 3-5 steps with railing: A little  Basic Mobility - Total Score: 18    Education Documentation  ADL Training, taught by Bill Vicente PTA at 10/5/2023 11:48 AM.  Learner: Patient  Readiness: Acceptance  Method: Explanation, Demonstration  Response: Verbalizes Understanding, Demonstrated Understanding    Precautions, taught by Bill Vicente PTA at 10/5/2023 11:48 AM.  Learner: Patient  Readiness: Acceptance  Method: Explanation, Demonstration  Response: Verbalizes Understanding, Demonstrated Understanding    Education Comments  No comments found.        OP EDUCATION:       Encounter Problems       Encounter Problems (Active)       Mobility       Steps (Progressing)       Start:  10/04/23    Expected End:  10/18/23       The patient will ascend/descend 2 steps with 1 railing for entering/exiting home         Gait (Progressing)       Start:  10/04/23    Expected End:  10/18/23       The patient will be able to ambulate 50 feet with O2 Saturation >92% for household ADLs            Transfers       Goal 1 (Progressing)       Start:  10/04/23    Expected End:  10/18/23       The patient will be able to perform sit to stand transfers SBA x 1 without AD
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
## 2023-10-08 LAB
BACTERIA BLD CULT: NORMAL
BACTERIA BLD CULT: NORMAL

## 2023-10-10 ENCOUNTER — OFFICE VISIT (OUTPATIENT)
Dept: FAMILY MEDICINE CLINIC | Age: 78
End: 2023-10-10

## 2023-10-10 VITALS
BODY MASS INDEX: 19.99 KG/M2 | HEART RATE: 80 BPM | WEIGHT: 142.8 LBS | DIASTOLIC BLOOD PRESSURE: 70 MMHG | TEMPERATURE: 97.4 F | HEIGHT: 71 IN | RESPIRATION RATE: 20 BRPM | OXYGEN SATURATION: 94 % | SYSTOLIC BLOOD PRESSURE: 130 MMHG

## 2023-10-10 DIAGNOSIS — J43.9 PULMONARY EMPHYSEMA, UNSPECIFIED EMPHYSEMA TYPE (HCC): ICD-10-CM

## 2023-10-10 DIAGNOSIS — I10 ESSENTIAL HYPERTENSION: ICD-10-CM

## 2023-10-10 DIAGNOSIS — Z89.519 AMPUTEE, BELOW KNEE (HCC): ICD-10-CM

## 2023-10-10 DIAGNOSIS — E27.8 ADRENAL NODULE (HCC): ICD-10-CM

## 2023-10-10 DIAGNOSIS — Z09 HOSPITAL DISCHARGE FOLLOW-UP: ICD-10-CM

## 2023-10-10 DIAGNOSIS — N18.32 STAGE 3B CHRONIC KIDNEY DISEASE (HCC): ICD-10-CM

## 2023-10-10 DIAGNOSIS — Z89.511 STATUS POST BELOW-KNEE AMPUTATION OF RIGHT LOWER EXTREMITY (HCC): ICD-10-CM

## 2023-10-10 DIAGNOSIS — D47.1 MYELOPROLIFERATIVE NEOPLASM (HCC): ICD-10-CM

## 2023-10-10 DIAGNOSIS — I71.20 THORACIC AORTIC ANEURYSM WITHOUT RUPTURE, UNSPECIFIED PART (HCC): ICD-10-CM

## 2023-10-10 DIAGNOSIS — I25.2 H/O ST ELEVATION MYOCARDIAL INFARCTION: ICD-10-CM

## 2023-10-10 DIAGNOSIS — I21.3 ST ELEVATION MYOCARDIAL INFARCTION (STEMI), UNSPECIFIED ARTERY (HCC): ICD-10-CM

## 2023-10-10 DIAGNOSIS — J98.4 PNEUMONITIS: ICD-10-CM

## 2023-10-10 DIAGNOSIS — C95.91 LEUKEMIA IN REMISSION, UNSPECIFIED LEUKEMIA TYPE (HCC): Primary | ICD-10-CM

## 2023-10-10 PROBLEM — J41.0 SIMPLE CHRONIC BRONCHITIS (HCC): Status: RESOLVED | Noted: 2017-01-30 | Resolved: 2023-10-10

## 2023-10-10 RX ORDER — DOXYCYCLINE HYCLATE 100 MG/1
100 CAPSULE ORAL EVERY 12 HOURS SCHEDULED
Qty: 14 CAPSULE | Refills: 0 | COMMUNITY
Start: 2023-10-05 | End: 2023-10-12

## 2023-10-10 RX ORDER — CEFDINIR 300 MG/1
300 CAPSULE ORAL 2 TIMES DAILY
Qty: 10 CAPSULE | Refills: 0 | COMMUNITY
Start: 2023-10-05 | End: 2023-10-10

## 2023-10-10 RX ORDER — GUAIFENESIN 600 MG/1
1200 TABLET, EXTENDED RELEASE ORAL EVERY 12 HOURS
COMMUNITY
Start: 2023-10-06

## 2023-10-10 NOTE — PROGRESS NOTES
Post-Discharge Transitional Care  Follow Up      Kristal Uriarte   YOB: 1945    Date of Office Visit:  10/10/2023  Date of Hospital Admission: 4/11/23  Date of Hospital Discharge: 4/11/23  Risk of hospital readmission (high >=14%.  Medium >=10%) :No data recorded    Care management risk score Rising risk (score 2-5) and Complex Care (Scores >=6): No Risk Score On File     Non face to face  following discharge, date last encounter closed (first attempt may have been earlier): *No documented post hospital discharge outreach found in the last 14 days    Call initiated 2 business days of discharge: *No response recorded in the last 14 days    ASSESSMENT/PLAN:   Leukemia in remission, unspecified leukemia type ([address])  --stable on current care planning  -- continue treatment as we are meeting goals   --follows with oncology  Myeloproliferative neoplasm ([address])  --stable on current care planning  -- continue treatment as we are meeting goals     Stage 3b chronic kidney disease ([address])  --stable on current care planning  -- continue treatment as we are meeting goals     Amputee, below knee ([address])  --stable on current care planning  -- continue treatment as we are meeting goals     Thoracic aortic aneurysm without rupture, unspecified part ([address])  --stable on current care planning  -- continue treatment as we are meeting goals     Status post below-knee amputation of right lower extremity ([address])  --stable on current care planning  -- continue treatment as we are meeting goals     Pulmonary emphysema, unspecified emphysema type ([address])  --stable on current care planning  -- continue treatment as we are meeting goals   --PLAN--aerosol accuneb 1.25 plus chest percussion--Rx    ST elevation myocardial infarction (STEMI), unspecified artery ([address])  --stable on current care planning  -- continue treatment as we are meeting goals     Essential hypertension ---controlled     ---VASCULAR PANEL  A) ASA, PLAVIX, aggrenox  B)

## 2023-10-12 ENCOUNTER — OFFICE VISIT (OUTPATIENT)
Dept: ONCOLOGY | Age: 78
End: 2023-10-12
Payer: MEDICARE

## 2023-10-12 ENCOUNTER — HOSPITAL ENCOUNTER (OUTPATIENT)
Dept: INFUSION THERAPY | Age: 78
Discharge: HOME | End: 2023-10-12
Payer: MEDICARE

## 2023-10-12 VITALS
SYSTOLIC BLOOD PRESSURE: 101 MMHG | DIASTOLIC BLOOD PRESSURE: 54 MMHG | HEIGHT: 71 IN | BODY MASS INDEX: 20.02 KG/M2 | OXYGEN SATURATION: 94 % | TEMPERATURE: 97.7 F | HEART RATE: 73 BPM | WEIGHT: 143 LBS

## 2023-10-12 DIAGNOSIS — D47.1 MPN (MYELOPROLIFERATIVE NEOPLASM) (HCC): Primary | ICD-10-CM

## 2023-10-12 DIAGNOSIS — D47.1 MPN (MYELOPROLIFERATIVE NEOPLASM) (HCC): ICD-10-CM

## 2023-10-12 LAB
ALBUMIN SERPL-MCNC: 4 G/DL (ref 3.5–5.2)
ALP SERPL-CCNC: 79 U/L (ref 40–129)
ALT SERPL-CCNC: 24 U/L (ref 0–40)
ANION GAP SERPL CALCULATED.3IONS-SCNC: 13 MMOL/L (ref 7–16)
AST SERPL-CCNC: 24 U/L (ref 0–39)
BASOPHILS # BLD: 0 K/UL (ref 0–0.2)
BASOPHILS NFR BLD: 0 % (ref 0–2)
BILIRUB SERPL-MCNC: 0.4 MG/DL (ref 0–1.2)
BUN SERPL-MCNC: 44 MG/DL (ref 6–23)
CALCIUM SERPL-MCNC: 9.3 MG/DL (ref 8.6–10.2)
CHLORIDE SERPL-SCNC: 105 MMOL/L (ref 98–107)
CO2 SERPL-SCNC: 25 MMOL/L (ref 22–29)
CREAT SERPL-MCNC: 1.8 MG/DL (ref 0.7–1.2)
EOSINOPHIL # BLD: 0.12 K/UL (ref 0.05–0.5)
EOSINOPHILS RELATIVE PERCENT: 1 % (ref 0–6)
ERYTHROCYTE [DISTWIDTH] IN BLOOD BY AUTOMATED COUNT: 19 % (ref 11.5–15)
GFR SERPL CREATININE-BSD FRML MDRD: 39 ML/MIN/1.73M2
GLUCOSE SERPL-MCNC: 124 MG/DL (ref 74–99)
HCT VFR BLD AUTO: 31.1 % (ref 37–54)
HGB BLD-MCNC: 10.2 G/DL (ref 12.5–16.5)
LYMPHOCYTES NFR BLD: 0.98 K/UL (ref 1.5–4)
LYMPHOCYTES RELATIVE PERCENT: 7 % (ref 20–42)
MCH RBC QN AUTO: 32 PG (ref 26–35)
MCHC RBC AUTO-ENTMCNC: 32.8 G/DL (ref 32–34.5)
MCV RBC AUTO: 97.5 FL (ref 80–99.9)
METAMYELOCYTES ABSOLUTE COUNT: 0.49 K/UL (ref 0–0.12)
METAMYELOCYTES: 4 % (ref 0–1)
MONOCYTES NFR BLD: 0.49 K/UL (ref 0.1–0.95)
MONOCYTES NFR BLD: 4 % (ref 2–12)
MYELOCYTES ABSOLUTE COUNT: 0.37 K/UL
MYELOCYTES: 3 %
NEUTROPHILS NFR BLD: 82 % (ref 43–80)
NEUTS SEG NFR BLD: 11.44 K/UL (ref 1.8–7.3)
NUCLEATED RED BLOOD CELLS: 1 PER 100 WBC
PLATELET # BLD AUTO: 328 K/UL (ref 130–450)
PMV BLD AUTO: 10.7 FL (ref 7–12)
POTASSIUM SERPL-SCNC: 4.3 MMOL/L (ref 3.5–5)
PROT SERPL-MCNC: 6.3 G/DL (ref 6.4–8.3)
RBC # BLD AUTO: 3.19 M/UL (ref 3.8–5.8)
RBC # BLD: ABNORMAL 10*6/UL
SODIUM SERPL-SCNC: 143 MMOL/L (ref 132–146)
WBC OTHER # BLD: 13.9 K/UL (ref 4.5–11.5)

## 2023-10-12 PROCEDURE — G8427 DOCREV CUR MEDS BY ELIG CLIN: HCPCS | Performed by: STUDENT IN AN ORGANIZED HEALTH CARE EDUCATION/TRAINING PROGRAM

## 2023-10-12 PROCEDURE — 1123F ACP DISCUSS/DSCN MKR DOCD: CPT | Performed by: STUDENT IN AN ORGANIZED HEALTH CARE EDUCATION/TRAINING PROGRAM

## 2023-10-12 PROCEDURE — 4004F PT TOBACCO SCREEN RCVD TLK: CPT | Performed by: STUDENT IN AN ORGANIZED HEALTH CARE EDUCATION/TRAINING PROGRAM

## 2023-10-12 PROCEDURE — 80053 COMPREHEN METABOLIC PANEL: CPT

## 2023-10-12 PROCEDURE — 3074F SYST BP LT 130 MM HG: CPT | Performed by: STUDENT IN AN ORGANIZED HEALTH CARE EDUCATION/TRAINING PROGRAM

## 2023-10-12 PROCEDURE — 3078F DIAST BP <80 MM HG: CPT | Performed by: STUDENT IN AN ORGANIZED HEALTH CARE EDUCATION/TRAINING PROGRAM

## 2023-10-12 PROCEDURE — G8484 FLU IMMUNIZE NO ADMIN: HCPCS | Performed by: STUDENT IN AN ORGANIZED HEALTH CARE EDUCATION/TRAINING PROGRAM

## 2023-10-12 PROCEDURE — 85025 COMPLETE CBC W/AUTO DIFF WBC: CPT

## 2023-10-12 PROCEDURE — G8420 CALC BMI NORM PARAMETERS: HCPCS | Performed by: STUDENT IN AN ORGANIZED HEALTH CARE EDUCATION/TRAINING PROGRAM

## 2023-10-12 PROCEDURE — 36415 COLL VENOUS BLD VENIPUNCTURE: CPT

## 2023-10-12 PROCEDURE — 99213 OFFICE O/P EST LOW 20 MIN: CPT | Performed by: STUDENT IN AN ORGANIZED HEALTH CARE EDUCATION/TRAINING PROGRAM

## 2023-10-13 RX ORDER — RUXOLITINIB 20 MG/1
TABLET ORAL
Qty: 60 TABLET | Refills: 2 | Status: ACTIVE | OUTPATIENT
Start: 2023-10-13

## 2023-10-13 NOTE — PROGRESS NOTES
Patient provided with discharge instructions, received printed AVS.  All questions answered. Patient understands follow up plan of care.
Outpatient Medications on File Prior to Visit   Medication Sig Dispense Refill    guaiFENesin (MUCINEX) 600 MG extended release tablet Take 2 tablets by mouth in the morning and 2 tablets in the evening.       busPIRone (BUSPAR) 5 MG tablet Take 1 tablet by mouth 2 times daily as needed (anxiety) 60 tablet 0    allopurinol (ZYLOPRIM) 100 MG tablet Take 1 tablet by mouth daily 30 tablet 5    NONFORMULARY Oxygen therapy 2L N/C PRN      sertraline (ZOLOFT) 50 MG tablet Take 1 tablet by mouth daily 90 tablet 0    predniSONE (DELTASONE) 10 MG tablet       Incontinence Supply Disposable (DEPEND PANT SM/MED) MISC 1 Package by Does not apply route 5 times daily 250 each 1    tiotropium (SPIRIVA RESPIMAT) 2.5 MCG/ACT AERS inhaler Inhale 2 puffs into the lungs daily 3 each 1    fluticasone furoate-vilanterol (BREO ELLIPTA) 100-25 MCG/ACT inhaler Inhale 1 puff into the lungs daily 1 each 5    metoprolol succinate (TOPROL XL) 25 MG extended release tablet Take 1 tablet by mouth at bedtime      Incontinence Supply Disposable (DEPEND REAL FIT/BRIEF/MEN/S-M) MISC 4 boxes by Does not apply route as needed (Change as needed) 56 each 5    furosemide (LASIX) 20 MG tablet Take 1 tablet by mouth daily 90 tablet 1    potassium chloride (KLOR-CON M) 10 MEQ extended release tablet Take 1 tablet by mouth daily 90 tablet 1    albuterol sulfate HFA (PROVENTIL;VENTOLIN;PROAIR) 108 (90 Base) MCG/ACT inhaler Inhale 2 puffs into the lungs every 6 hours as needed for Wheezing or Shortness of Breath 18 g 3    budesonide-formoterol (SYMBICORT) 160-4.5 MCG/ACT AERO Inhale 2 puffs into the lungs 2 times daily 10.2 g 5    atorvastatin (LIPITOR) 40 MG tablet Take 1 tablet by mouth daily      famotidine (PEPCID) 40 MG tablet Take 1 tablet by mouth at bedtime      tamsulosin (FLOMAX) 0.4 MG capsule Take 1 capsule by mouth in the morning and at bedtime      diclofenac sodium (VOLTAREN) 1 % GEL Apply topically 2 times daily 50 g 0    Icosapent Ethyl

## 2023-10-16 DIAGNOSIS — B37.0 ORAL THRUSH: ICD-10-CM

## 2023-10-16 RX ORDER — CLOTRIMAZOLE 10 MG/1
10 LOZENGE ORAL; TOPICAL
Qty: 50 TABLET | Refills: 0 | Status: SHIPPED | OUTPATIENT
Start: 2023-10-16 | End: 2023-10-26

## 2023-10-17 ENCOUNTER — CARE COORDINATION (OUTPATIENT)
Dept: CARE COORDINATION | Age: 78
End: 2023-10-17

## 2023-10-17 ASSESSMENT — ENCOUNTER SYMPTOMS: DYSPNEA ASSOCIATED WITH: EXERTION

## 2023-10-17 NOTE — DOCUMENTATION CLARIFICATION NOTE
"    PATIENT:               AMITA BELTRAN  ACCT #:                  [id number]  MRN:                       [id number]  :                       1945  ADMIT DATE:       10/3/2023 7:45 PM  DISCH DATE:        10/5/2023 4:53 PM  RESPONDING PROVIDER #:        [id number]          PROVIDER RESPONSE TEXT:    Severe Protein Calorie Malnutrition    CDI QUERY TEXT:    [facility]_Nutrition Diagnosis    Instruction:    Based on your assessment of the patient and the clinical information, please provide the requested documentation by clicking on the appropriate radio button and enter any additional information if prompted.    Question: Please further clarify this patient nutritional status as    When answering this query, please exercise your independent professional judgment. The fact that a question is being asked, does not imply that any particular answer is desired or expected.    The patient's clinical indicators include:  Clinical Information: 78 y.o. male with PMHx s/f HTN, HLD, CAD s/p PCI, CHF, COPD / chronic bronchitis with chronic hypoxic respiratory failure (2L NC baseline) with worsening shortness of breath and chest pain    Clinical Indicators: BMI 19.53 Nutrition assessment 10/4/23- documented \"Malnutrition Diagnosis Patient has Malnutrition Diagnosis: Yes-Diagnosis Status: New-Malnutrition Diagnosis: Severe malnutrition related to disease or condition-As Evidenced by: severe muscle and fat losses, significant weight loss >10% x 6 months\"    Treatment: Per Nutrition recommendations \"Nutrition Prescription-Individualized Nutrition Prescription Provided for : 1.Continue Regular diet 2.Encourge intakes focusing on-increasing calories/protein to promote weight gain 3.Patient declined supplements 4.Obtain weekly weights\".    Risk Factors: Leukemia, COPD with chronic respiratory failure, CKD, Pneumonia and BMI of 19.53.  Options provided:  -- Severe Protein Calorie Malnutrition  -- Protein Calorie Malnutrition, Please specify " severity  -- Other - I will add my own diagnosis  -- Refer to Clinical Documentation Reviewer    Query created by: Irais Jerez on 10/11/2023 2:49 PM      Electronically signed by:  BARRON CLIFFORD MD 10/17/2023 8:48 AM

## 2023-10-17 NOTE — CARE COORDINATION
medications?: Yes  Does the patient have a nebulizer?: Yes  Does the patient use a space with inhaled medications?: No            Symptoms:  None: Yes      Symptom course: stable  Breathlessness: exertion  Increase use of rapid acting/rescue inhaled medications?: No  Change in chronic cough?: No/At Baseline  Change in sputum?: No/At Baseline  Self Monitoring - SaO2: Yes (Comment: O2 2L NC continuous)  Baseline SaO2 Readin  Have you had a recent diagnosis of pneumonia either by PCP or at a hospital?: Yes at Hospital  Have you seen your PCP for follow up or do you have an appointment scheduled?: Yes  Are you taking your antibiotics as prescribed?: Yes  Symptom Course: improving (Comment: 10/17/2023 denies any further c/o s/s pneumonia)     , and   General Assessment    Do you have any symptoms that are causing concern?: Yes  Progression since Onset: Intermittent - Waxing/Waning  Reported Symptoms: Other (Comment: Continual urinary incontinence. Using briefs. Sees Dr. Malini Lamas urology. New medication form urologist.  Steffany Gutiérrez to remember name, not able to get name at this time. Repeat appt.  in  with urology)

## 2023-10-19 DIAGNOSIS — F41.9 ANXIETY: ICD-10-CM

## 2023-10-20 RX ORDER — BUSPIRONE HYDROCHLORIDE 5 MG/1
5 TABLET ORAL 2 TIMES DAILY PRN
Qty: 60 TABLET | Refills: 0 | Status: SHIPPED | OUTPATIENT
Start: 2023-10-20 | End: 2023-11-19

## 2023-10-20 NOTE — TELEPHONE ENCOUNTER
Last seen 10/10/2023  Next appt 10/25/2023    Electronically signed by Perez Echols, [facility] on 10/20/23 at 10:13 AM EDT

## 2023-10-25 ENCOUNTER — HOSPITAL ENCOUNTER (OUTPATIENT)
Age: 78
Discharge: HOME | End: 2023-10-27

## 2023-10-25 ENCOUNTER — OFFICE VISIT (OUTPATIENT)
Dept: FAMILY MEDICINE CLINIC | Age: 78
End: 2023-10-25

## 2023-10-25 VITALS
HEART RATE: 82 BPM | DIASTOLIC BLOOD PRESSURE: 68 MMHG | OXYGEN SATURATION: 95 % | SYSTOLIC BLOOD PRESSURE: 108 MMHG | TEMPERATURE: 97.7 F | BODY MASS INDEX: 20.27 KG/M2 | HEIGHT: 71 IN | WEIGHT: 144.8 LBS | RESPIRATION RATE: 18 BRPM

## 2023-10-25 DIAGNOSIS — J32.9 CHRONIC SINUSITIS, UNSPECIFIED LOCATION: Primary | ICD-10-CM

## 2023-10-25 DIAGNOSIS — J43.9 PULMONARY EMPHYSEMA, UNSPECIFIED EMPHYSEMA TYPE (HCC): ICD-10-CM

## 2023-10-25 DIAGNOSIS — I10 ESSENTIAL HYPERTENSION: ICD-10-CM

## 2023-10-25 DIAGNOSIS — C95.91 LEUKEMIA IN REMISSION, UNSPECIFIED LEUKEMIA TYPE (HCC): ICD-10-CM

## 2023-10-25 DIAGNOSIS — N39.46 MIXED STRESS AND URGE URINARY INCONTINENCE: ICD-10-CM

## 2023-10-25 RX ORDER — MONTELUKAST SODIUM 10 MG/1
10 TABLET ORAL DAILY
Qty: 30 TABLET | Refills: 3 | Status: SHIPPED | OUTPATIENT
Start: 2023-10-25

## 2023-10-28 PROBLEM — J32.9 CHRONIC SINUSITIS: Status: ACTIVE | Noted: 2023-10-28

## 2023-10-28 ASSESSMENT — ENCOUNTER SYMPTOMS
CONSTIPATION: 0
BACK PAIN: 0
NAUSEA: 0
PHOTOPHOBIA: 0
EYE REDNESS: 0
VOMITING: 0
SINUS PAIN: 0
WHEEZING: 0
GASTROINTESTINAL NEGATIVE: 1
SHORTNESS OF BREATH: 0
RHINORRHEA: 0
STRIDOR: 0
ABDOMINAL DISTENTION: 0
SORE THROAT: 0
VOICE CHANGE: 0
COUGH: 0
CHEST TIGHTNESS: 0
ANAL BLEEDING: 0
DIARRHEA: 0
TROUBLE SWALLOWING: 0
SINUS PRESSURE: 0
EYE ITCHING: 0
BLOOD IN STOOL: 0
CHOKING: 0
EYE PAIN: 0
RECTAL PAIN: 0
FACIAL SWELLING: 0
ABDOMINAL PAIN: 0
COLOR CHANGE: 0
EYE DISCHARGE: 0

## 2023-11-01 ENCOUNTER — HOSPITAL ENCOUNTER (OUTPATIENT)
Dept: GENERAL RADIOLOGY | Age: 78
Discharge: HOME | End: 2023-11-03
Payer: MEDICARE

## 2023-11-01 ENCOUNTER — HOSPITAL ENCOUNTER (OUTPATIENT)
Age: 78
Discharge: HOME | End: 2023-11-03
Payer: MEDICARE

## 2023-11-01 DIAGNOSIS — J98.4 PNEUMONITIS: ICD-10-CM

## 2023-11-01 PROBLEM — Z12.5 SCREENING FOR PROSTATE CANCER: Status: RESOLVED | Noted: 2023-10-02 | Resolved: 2023-11-01

## 2023-11-01 PROCEDURE — 71046 X-RAY EXAM CHEST 2 VIEWS: CPT

## 2023-11-06 DIAGNOSIS — R60.9 FLUID RETENTION: ICD-10-CM

## 2023-11-07 ENCOUNTER — HOSPITAL ENCOUNTER (OUTPATIENT)
Age: 78
Discharge: HOME | End: 2023-11-07
Payer: MEDICARE

## 2023-11-07 LAB — PSA SERPL-MCNC: 5.17 NG/ML (ref 0–4)

## 2023-11-07 PROCEDURE — 36415 COLL VENOUS BLD VENIPUNCTURE: CPT

## 2023-11-07 PROCEDURE — G0103 PSA SCREENING: HCPCS

## 2023-11-08 RX ORDER — FUROSEMIDE 20 MG/1
20 TABLET ORAL DAILY
Qty: 90 TABLET | Refills: 1 | Status: SHIPPED | OUTPATIENT
Start: 2023-11-08

## 2023-11-09 ENCOUNTER — OFFICE VISIT (OUTPATIENT)
Dept: ONCOLOGY | Age: 78
End: 2023-11-09
Payer: MEDICARE

## 2023-11-09 ENCOUNTER — HOSPITAL ENCOUNTER (OUTPATIENT)
Dept: INFUSION THERAPY | Age: 78
Discharge: HOME | End: 2023-11-09
Payer: MEDICARE

## 2023-11-09 VITALS
TEMPERATURE: 97.7 F | HEART RATE: 71 BPM | OXYGEN SATURATION: 91 % | SYSTOLIC BLOOD PRESSURE: 106 MMHG | BODY MASS INDEX: 20.31 KG/M2 | WEIGHT: 145.1 LBS | HEIGHT: 71 IN | DIASTOLIC BLOOD PRESSURE: 54 MMHG

## 2023-11-09 DIAGNOSIS — D72.829 LEUKOCYTOSIS, UNSPECIFIED TYPE: ICD-10-CM

## 2023-11-09 DIAGNOSIS — D47.1 MPN (MYELOPROLIFERATIVE NEOPLASM) (HCC): Primary | ICD-10-CM

## 2023-11-09 LAB
ALBUMIN SERPL-MCNC: 4.1 G/DL (ref 3.5–5.2)
ALP SERPL-CCNC: 86 U/L (ref 40–129)
ALT SERPL-CCNC: 11 U/L (ref 0–40)
ANION GAP SERPL CALCULATED.3IONS-SCNC: 10 MMOL/L (ref 7–16)
AST SERPL-CCNC: 20 U/L (ref 0–39)
BASOPHILS # BLD: 0.04 K/UL (ref 0–0.2)
BASOPHILS NFR BLD: 1 % (ref 0–2)
BILIRUB SERPL-MCNC: 0.3 MG/DL (ref 0–1.2)
BUN SERPL-MCNC: 23 MG/DL (ref 6–23)
CALCIUM SERPL-MCNC: 8.9 MG/DL (ref 8.6–10.2)
CHLORIDE SERPL-SCNC: 103 MMOL/L (ref 98–107)
CO2 SERPL-SCNC: 26 MMOL/L (ref 22–29)
CREAT SERPL-MCNC: 1.7 MG/DL (ref 0.7–1.2)
EOSINOPHIL # BLD: 0.04 K/UL (ref 0.05–0.5)
EOSINOPHILS RELATIVE PERCENT: 1 % (ref 0–6)
ERYTHROCYTE [DISTWIDTH] IN BLOOD BY AUTOMATED COUNT: 16.7 % (ref 11.5–15)
GFR SERPL CREATININE-BSD FRML MDRD: 40 ML/MIN/1.73M2
GLUCOSE SERPL-MCNC: 97 MG/DL (ref 74–99)
HCT VFR BLD AUTO: 29.9 % (ref 37–54)
HGB BLD-MCNC: 9.6 G/DL (ref 12.5–16.5)
IMM GRANULOCYTES # BLD AUTO: 0.3 K/UL (ref 0–0.58)
IMM GRANULOCYTES NFR BLD: 5 % (ref 0–5)
LYMPHOCYTES NFR BLD: 1.25 K/UL (ref 1.5–4)
LYMPHOCYTES RELATIVE PERCENT: 21 % (ref 20–42)
MCH RBC QN AUTO: 31.8 PG (ref 26–35)
MCHC RBC AUTO-ENTMCNC: 32.1 G/DL (ref 32–34.5)
MCV RBC AUTO: 99 FL (ref 80–99.9)
MONOCYTES NFR BLD: 0.72 K/UL (ref 0.1–0.95)
MONOCYTES NFR BLD: 12 % (ref 2–12)
NEUTROPHILS NFR BLD: 61 % (ref 43–80)
NEUTS SEG NFR BLD: 3.69 K/UL (ref 1.8–7.3)
PLATELET # BLD AUTO: 260 K/UL (ref 130–450)
PMV BLD AUTO: 10.8 FL (ref 7–12)
POTASSIUM SERPL-SCNC: 4 MMOL/L (ref 3.5–5)
PROT SERPL-MCNC: 6.3 G/DL (ref 6.4–8.3)
RBC # BLD AUTO: 3.02 M/UL (ref 3.8–5.8)
SODIUM SERPL-SCNC: 139 MMOL/L (ref 132–146)
WBC OTHER # BLD: 6 K/UL (ref 4.5–11.5)

## 2023-11-09 PROCEDURE — 4004F PT TOBACCO SCREEN RCVD TLK: CPT | Performed by: STUDENT IN AN ORGANIZED HEALTH CARE EDUCATION/TRAINING PROGRAM

## 2023-11-09 PROCEDURE — 36415 COLL VENOUS BLD VENIPUNCTURE: CPT

## 2023-11-09 PROCEDURE — G8484 FLU IMMUNIZE NO ADMIN: HCPCS | Performed by: STUDENT IN AN ORGANIZED HEALTH CARE EDUCATION/TRAINING PROGRAM

## 2023-11-09 PROCEDURE — 99213 OFFICE O/P EST LOW 20 MIN: CPT | Performed by: STUDENT IN AN ORGANIZED HEALTH CARE EDUCATION/TRAINING PROGRAM

## 2023-11-09 PROCEDURE — 80053 COMPREHEN METABOLIC PANEL: CPT

## 2023-11-09 PROCEDURE — 3078F DIAST BP <80 MM HG: CPT | Performed by: STUDENT IN AN ORGANIZED HEALTH CARE EDUCATION/TRAINING PROGRAM

## 2023-11-09 PROCEDURE — 3074F SYST BP LT 130 MM HG: CPT | Performed by: STUDENT IN AN ORGANIZED HEALTH CARE EDUCATION/TRAINING PROGRAM

## 2023-11-09 PROCEDURE — 1123F ACP DISCUSS/DSCN MKR DOCD: CPT | Performed by: STUDENT IN AN ORGANIZED HEALTH CARE EDUCATION/TRAINING PROGRAM

## 2023-11-09 PROCEDURE — G8420 CALC BMI NORM PARAMETERS: HCPCS | Performed by: STUDENT IN AN ORGANIZED HEALTH CARE EDUCATION/TRAINING PROGRAM

## 2023-11-09 PROCEDURE — 85025 COMPLETE CBC W/AUTO DIFF WBC: CPT

## 2023-11-09 PROCEDURE — G8427 DOCREV CUR MEDS BY ELIG CLIN: HCPCS | Performed by: STUDENT IN AN ORGANIZED HEALTH CARE EDUCATION/TRAINING PROGRAM
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
## 2023-11-10 DIAGNOSIS — R60.9 FLUID RETENTION: ICD-10-CM

## 2023-11-10 RX ORDER — POTASSIUM CHLORIDE 750 MG/1
10 TABLET, EXTENDED RELEASE ORAL DAILY
Qty: 90 TABLET | Refills: 1 | Status: SHIPPED | OUTPATIENT
Start: 2023-11-10

## 2023-11-10 NOTE — TELEPHONE ENCOUNTER
Last seen 10/25/2023  Next appt 1/24/2024    Electronically signed by Len Jamil, [facility] on 11/10/23 at 10:51 AM EST

## 2023-11-15 ENCOUNTER — CARE COORDINATION (OUTPATIENT)
Dept: CARE COORDINATION | Age: 78
End: 2023-11-15

## 2023-11-15 ENCOUNTER — CARE COORDINATION (OUTPATIENT)
Dept: PRIMARY CARE CLINIC | Age: 78
End: 2023-11-15

## 2023-11-15 DIAGNOSIS — I10 ESSENTIAL HYPERTENSION: ICD-10-CM

## 2023-11-15 DIAGNOSIS — J43.9 PULMONARY EMPHYSEMA, UNSPECIFIED EMPHYSEMA TYPE (HCC): ICD-10-CM

## 2023-11-15 DIAGNOSIS — I50.9 CONGESTIVE HEART FAILURE, UNSPECIFIED HF CHRONICITY, UNSPECIFIED HEART FAILURE TYPE (HCC): ICD-10-CM

## 2023-11-15 ASSESSMENT — ENCOUNTER SYMPTOMS: DYSPNEA ASSOCIATED WITH: EXERTION

## 2023-11-15 NOTE — PROGRESS NOTES
Remote Patient Monitoring Treatment Plan    Received request from ACM/CTN Augustin Phalen, RN  to order remote patient monitoring for in home monitoring of CHF, COPD, and HTN and order completed. Patient will be monitoring blood pressure   pulse ox   weight  survey questions. Patient will engage in Remote Patient Monitoring each day to develop the skills necessary for self management. RPM Care Team Responsibilities:   Alerts will be reviewed daily and addressed within 2-4 hours during operational hours (Monday -Friday 9 am-4 pm)  Alert response and intervention documented in patient medical record  Alert response escalated to PCP per protocol and documented in patient medical record  Patient monitored over approximately  days  Discharge from program based on self-management readiness    See care coordination encounters for additional details.

## 2023-11-15 NOTE — CARE COORDINATION
Remote Patient Kit Ordering Note      Date/Time:  11/15/2023 1:04 PM      [x] [facility] confirmed patient shipping address  [x] Patient will receive package over the next 1-3 business days. Someone 21 years or older must be present to sign for UPS delivery. [x] HRS will contact patient within 24 hours, an [address] [place] [address] will call the patient directly: If the patient does not answer, HRS will follow up with the clinical team notifying them about the unsuccessful attempt to contact the patient. HRS will make three call attempts to the patient. Provide patient with [facility] Virtual install number is: [phone]. [x] ACM will contact patient once equipment is active to welcome them to the program.                                                         [x] Hours of RPM monitoring - Monday-Friday [id number]; encourage patient to get vitals entered by [facility] each day to have the alert addressed same day. [x][facility] mailed RPM Patient flyer to patient. All questions answered at this time. ACM made aware the RPM kit has been ordered. [facility]PM equipment order.

## 2023-11-15 NOTE — CARE COORDINATION
Ambulatory Care Coordination Note  11/15/2023    Patient Current Location:  Home: [address] [facility] [address]     ACM contacted the patient by telephone. Verified name and  with patient as identifiers. Provided introduction to self, and explanation of the ACM role. Challenges to be reviewed by the provider   Additional needs identified to be addressed with provider: No  None               Method of communication with provider: chart routing. ACM: Danita Weiss RN    Offered patient enrollment in the Remote Patient Monitoring (RPM) program for in-home monitoring: Yes, patient enrolled:           Remote Patient Monitoring Enrollment Note      Date/Time: 11/15/2023 10:03 AM    Offered patient enrollment in the [facility] Patient Monitoring (RPM) program for in home monitoring for CHF, COPD, and HTN. Patient accepted RPM services. Patient will be monitoring the following daily:  blood pressure reading, blood pressure heart rate, pulse ox reading, pulse ox heart rate, and weight    ACM reviewed the information below with patient:    Emergency Contact (name and contact number): Margaret Tejada (Other)                                                                                       [phone] (Mobile)    [x] A member from the care coordination team will reach out to notify the patient once the RPM kit is ordered. [x] Once the kit is delivered, the [facility] team will contact the patient after UPS deliver to assist with set up. [x] Determined BP cuff size: regular (9.05\"-15.74\")      [x] Determined weight scale: regular (<330lbs)                                                [x] Hours of ACM monitoring - Monday-Friday [id number]                     All questions about RPM program answered at this time.            Heart Failure Education outreach Date/Time: 11/15/2023 10:26 AM    Ambulatory Care Manager (ACM) contacted the patient by telephone to perform Ambulatory Care

## 2023-11-20 RX ORDER — BUSPIRONE HYDROCHLORIDE 5 MG/1
5 TABLET ORAL 2 TIMES DAILY PRN
Qty: 60 TABLET | Refills: 0 | Status: SHIPPED | OUTPATIENT
Start: 2023-11-20 | End: 2023-12-20

## 2023-11-21 ENCOUNTER — CARE COORDINATION (OUTPATIENT)
Dept: CARE COORDINATION | Age: 78
End: 2023-11-21

## 2023-11-21 NOTE — CARE COORDINATION
Remote Patient Monitoring Welcome Note  Date/Time:  2023 1:31 PM  Patient Current Location: Home: [address] [address]  Verified patients name and  as identifiers. Completed and confirmed the following:   Emergency Contact: Teetee Rendon (Other)                                       [phone] (Mobile)  [x] Patient received all RPM equipment (tablet, scale, blood pressure device and cuff, and pulse oximeter)  Cuff Size: regular (9.05\"-15.74\")    Weight Scale:  regular (<330lbs)                    [x] Instructed patient keep box for use when returning equipment                                                          [x] Reviewed Patient Welcome Letter with patient    [x]  Reviewed Consent Form  Copy of consent form in chart. [x] Reviewed expectations for patient and care team  Monitoring hours M-F 9-4pm  Completing monitoring by 12pm on  so that alerts can be responded to in the same day  Patient weighs self at same time every day (or after urinating and waking up)  Take blood pressure 1-2 hrs after medications   RPM team may have different phone area code (including [facility], [facility], [facility] or [address] [place] [address])                              [x] Instructed patient to keep scale on flat surface                                                         [x] Instructed patient to keep tablet plugged in at all times                         [x] Instructed how to contact IT support ([phone])  [x] Provided Remote Patient Monitoring care  information                All questions answered at this time.

## 2023-11-27 ENCOUNTER — CARE COORDINATION (OUTPATIENT)
Dept: CASE MANAGEMENT | Age: 78
End: 2023-11-27

## 2023-11-27 NOTE — CARE COORDINATION
Remote Alert Monitoring Note  Rpm alert to be reviewed by the provider   red alert   pulse ox reading (91%)   Additional needs to be addressed by N/A: No                    Date/Time:  2023 1:29 PM  Patient Current Location: [address]: [address] [facility] [address]  LPN contacted patient by telephone. Verified patients name and  as identifiers. Background: enrolled in RPM for Kidney disease HTN AND COPD  Refer to 911 immediately if:  Patient unresponsive or unable to provide history  Change in cognition or sudden confusion  Patient unable to respond in complete sentences  Intense chest pain/tightness  Any concern for any clinical emergency  Red Alert: Provider response time of 1 hr required for any red alert requiring intervention  Yellow Alert: Provider response time of 3hr required for any escalated yellow alert    O2 Triage  Are you having any Chest Pain? no   Are you having any Shortness of Breath? Yes  COPD   Swelling in your hands or feet? no     Are you having any other health concerns or issues? Yes  COUGH      Clinical Interventions: Reviewed and followed up on alerts and treatments-spoke to [facility] [place] regarding RPM red alert for low pulse ox reading. Pt states he has no chest pain. He is short of breath r/t COPD \" No more short of breath than usual\". Wears oxygen continuously at 2lpm. Pt reports he has a chronic cough with increased amt of phlegm. Pt has albuterol, Breo, Symbicort and Spriva inhalers. He has not used them today. Pt has Mucinex for cough/phlegm  . Reports it is not effective. Pt is calling his PCP today. Requested pt recheck his pulse ox. New reading is 92%. Plan/Follow Up: Will continue to review, monitor and address alerts with follow up based on severity of symptoms and risk factors.

## 2023-11-28 ENCOUNTER — TELEPHONE (OUTPATIENT)
Dept: FAMILY MEDICINE CLINIC | Age: 78
End: 2023-11-28

## 2023-11-28 DIAGNOSIS — J43.9 PULMONARY EMPHYSEMA, UNSPECIFIED EMPHYSEMA TYPE (HCC): Primary | ICD-10-CM

## 2023-11-28 NOTE — TELEPHONE ENCOUNTER
----- Message from Luis Garrett sent at 11/28/2023  2:05 PM EST -----  Subject: Message to Provider    QUESTIONS  Information for Provider? Patient stated that [facility] wants him to get a 6   minute breathing test done. Patient wants to know if you are able to do   this in the office. This is so that the patient can get the portable   oxygen. Please call the patient  ---------------------------------------------------------------------------  --------------  [facility] INFO  [phone]; OK to leave message on voicemail  ---------------------------------------------------------------------------  --------------  SCRIPT ANSWERS  Relationship to Patient?  Self

## 2023-11-28 NOTE — TELEPHONE ENCOUNTER
Pt called and cc of increased mucus,/ congestion, sob that's worse at night. Schedule appt for next week pt informed if symptoms worsen to be seen in ed.

## 2023-11-29 ENCOUNTER — HOSPITAL ENCOUNTER (OUTPATIENT)
Facility: HOSPITAL | Age: 78
Setting detail: OBSERVATION
Discharge: HOME HEALTH | End: 2023-12-01
Attending: EMERGENCY MEDICINE | Admitting: FAMILY MEDICINE
Payer: MEDICARE

## 2023-11-29 ENCOUNTER — CARE COORDINATION (OUTPATIENT)
Dept: CASE MANAGEMENT | Age: 78
End: 2023-11-29

## 2023-11-29 ENCOUNTER — APPOINTMENT (OUTPATIENT)
Dept: CARDIOLOGY | Facility: HOSPITAL | Age: 78
End: 2023-11-29
Payer: MEDICARE

## 2023-11-29 ENCOUNTER — APPOINTMENT (OUTPATIENT)
Dept: RADIOLOGY | Facility: HOSPITAL | Age: 78
End: 2023-11-29
Payer: MEDICARE

## 2023-11-29 DIAGNOSIS — R79.89 ELEVATED TROPONIN: ICD-10-CM

## 2023-11-29 DIAGNOSIS — J44.1 COPD EXACERBATION (MULTI): Primary | ICD-10-CM

## 2023-11-29 DIAGNOSIS — U07.1 COVID-19: ICD-10-CM

## 2023-11-29 PROBLEM — R07.9 CHEST PAIN: Status: ACTIVE | Noted: 2023-11-29

## 2023-11-29 LAB
ALBUMIN SERPL BCP-MCNC: 3.9 G/DL (ref 3.4–5)
ALP SERPL-CCNC: 74 U/L (ref 33–136)
ALT SERPL W P-5'-P-CCNC: 11 U/L (ref 10–52)
ANION GAP BLDV CALCULATED.4IONS-SCNC: 10 MMOL/L (ref 10–25)
ANION GAP SERPL CALC-SCNC: 12 MMOL/L (ref 10–20)
AST SERPL W P-5'-P-CCNC: 17 U/L (ref 9–39)
BASE EXCESS BLDV CALC-SCNC: 3.6 MMOL/L (ref -2–3)
BASOPHILS # BLD AUTO: 0.02 X10*3/UL (ref 0–0.1)
BASOPHILS NFR BLD AUTO: 0.4 %
BILIRUB SERPL-MCNC: 0.4 MG/DL (ref 0–1.2)
BNP SERPL-MCNC: 136 PG/ML (ref 0–99)
BODY TEMPERATURE: 37 DEGREES CELSIUS
BUN SERPL-MCNC: 30 MG/DL (ref 6–23)
CA-I BLDV-SCNC: 1.11 MMOL/L (ref 1.1–1.33)
CALCIUM SERPL-MCNC: 8.4 MG/DL (ref 8.6–10.3)
CARDIAC TROPONIN I PNL SERPL HS: 21 NG/L (ref 0–20)
CARDIAC TROPONIN I PNL SERPL HS: 25 NG/L (ref 0–20)
CHLORIDE BLDV-SCNC: 103 MMOL/L (ref 98–107)
CHLORIDE SERPL-SCNC: 102 MMOL/L (ref 98–107)
CO2 SERPL-SCNC: 27 MMOL/L (ref 21–32)
CREAT SERPL-MCNC: 1.88 MG/DL (ref 0.5–1.3)
EOSINOPHIL # BLD AUTO: 0.01 X10*3/UL (ref 0–0.4)
EOSINOPHIL NFR BLD AUTO: 0.2 %
ERYTHROCYTE [DISTWIDTH] IN BLOOD BY AUTOMATED COUNT: 16.7 % (ref 11.5–14.5)
GFR SERPL CREATININE-BSD FRML MDRD: 36 ML/MIN/1.73M*2
GLUCOSE BLDV-MCNC: 135 MG/DL (ref 74–99)
GLUCOSE SERPL-MCNC: 139 MG/DL (ref 74–99)
HCO3 BLDV-SCNC: 27.7 MMOL/L (ref 22–26)
HCT VFR BLD AUTO: 29.2 % (ref 41–52)
HCT VFR BLD EST: 28 % (ref 41–52)
HGB BLD-MCNC: 9.8 G/DL (ref 13.5–17.5)
HGB BLDV-MCNC: 9.3 G/DL (ref 13.5–17.5)
IMM GRANULOCYTES # BLD AUTO: 0.07 X10*3/UL (ref 0–0.5)
IMM GRANULOCYTES NFR BLD AUTO: 1.5 % (ref 0–0.9)
INHALED O2 CONCENTRATION: 21 %
LACTATE BLDV-SCNC: 1.8 MMOL/L (ref 0.4–2)
LYMPHOCYTES # BLD AUTO: 0.68 X10*3/UL (ref 0.8–3)
LYMPHOCYTES NFR BLD AUTO: 14.2 %
MCH RBC QN AUTO: 31.9 PG (ref 26–34)
MCHC RBC AUTO-ENTMCNC: 33.6 G/DL (ref 32–36)
MCV RBC AUTO: 95 FL (ref 80–100)
MONOCYTES # BLD AUTO: 0.57 X10*3/UL (ref 0.05–0.8)
MONOCYTES NFR BLD AUTO: 11.9 %
NEUTROPHILS # BLD AUTO: 3.44 X10*3/UL (ref 1.6–5.5)
NEUTROPHILS NFR BLD AUTO: 71.8 %
NRBC BLD-RTO: 0 /100 WBCS (ref 0–0)
OXYHGB MFR BLDV: 78.7 % (ref 45–75)
PCO2 BLDV: 39 MM HG (ref 41–51)
PH BLDV: 7.46 PH (ref 7.33–7.43)
PLATELET # BLD AUTO: 186 X10*3/UL (ref 150–450)
PO2 BLDV: 48 MM HG (ref 35–45)
POTASSIUM BLDV-SCNC: 3.6 MMOL/L (ref 3.5–5.3)
POTASSIUM SERPL-SCNC: 3.6 MMOL/L (ref 3.5–5.3)
PROT SERPL-MCNC: 6.3 G/DL (ref 6.4–8.2)
RBC # BLD AUTO: 3.07 X10*6/UL (ref 4.5–5.9)
SAO2 % BLDV: 82 % (ref 45–75)
SARS-COV-2 RNA RESP QL NAA+PROBE: DETECTED
SODIUM BLDV-SCNC: 137 MMOL/L (ref 136–145)
SODIUM SERPL-SCNC: 137 MMOL/L (ref 136–145)
WBC # BLD AUTO: 4.8 X10*3/UL (ref 4.4–11.3)

## 2023-11-29 PROCEDURE — 36415 COLL VENOUS BLD VENIPUNCTURE: CPT | Performed by: EMERGENCY MEDICINE

## 2023-11-29 PROCEDURE — 83880 ASSAY OF NATRIURETIC PEPTIDE: CPT | Performed by: EMERGENCY MEDICINE

## 2023-11-29 PROCEDURE — 84484 ASSAY OF TROPONIN QUANT: CPT | Performed by: EMERGENCY MEDICINE

## 2023-11-29 PROCEDURE — 2500000002 HC RX 250 W HCPCS SELF ADMINISTERED DRUGS (ALT 637 FOR MEDICARE OP, ALT 636 FOR OP/ED): Performed by: EMERGENCY MEDICINE

## 2023-11-29 PROCEDURE — 84295 ASSAY OF SERUM SODIUM: CPT | Performed by: EMERGENCY MEDICINE

## 2023-11-29 PROCEDURE — 93005 ELECTROCARDIOGRAM TRACING: CPT

## 2023-11-29 PROCEDURE — 99223 1ST HOSP IP/OBS HIGH 75: CPT | Performed by: FAMILY MEDICINE

## 2023-11-29 PROCEDURE — 99285 EMERGENCY DEPT VISIT HI MDM: CPT | Performed by: EMERGENCY MEDICINE

## 2023-11-29 PROCEDURE — 94640 AIRWAY INHALATION TREATMENT: CPT

## 2023-11-29 PROCEDURE — 84132 ASSAY OF SERUM POTASSIUM: CPT | Performed by: EMERGENCY MEDICINE

## 2023-11-29 PROCEDURE — 71045 X-RAY EXAM CHEST 1 VIEW: CPT | Performed by: RADIOLOGY

## 2023-11-29 PROCEDURE — 85025 COMPLETE CBC W/AUTO DIFF WBC: CPT | Performed by: EMERGENCY MEDICINE

## 2023-11-29 PROCEDURE — 82805 BLOOD GASES W/O2 SATURATION: CPT | Performed by: EMERGENCY MEDICINE

## 2023-11-29 PROCEDURE — 2500000004 HC RX 250 GENERAL PHARMACY W/ HCPCS (ALT 636 FOR OP/ED): Performed by: EMERGENCY MEDICINE

## 2023-11-29 PROCEDURE — 71045 X-RAY EXAM CHEST 1 VIEW: CPT | Mod: FY

## 2023-11-29 PROCEDURE — G0378 HOSPITAL OBSERVATION PER HR: HCPCS

## 2023-11-29 PROCEDURE — 87635 SARS-COV-2 COVID-19 AMP PRB: CPT | Performed by: EMERGENCY MEDICINE

## 2023-11-29 RX ORDER — GUAIFENESIN/DEXTROMETHORPHAN 100-10MG/5
10 SYRUP ORAL EVERY 4 HOURS PRN
Status: DISCONTINUED | OUTPATIENT
Start: 2023-11-29 | End: 2023-12-01 | Stop reason: HOSPADM

## 2023-11-29 RX ORDER — IPRATROPIUM BROMIDE AND ALBUTEROL SULFATE 2.5; .5 MG/3ML; MG/3ML
3 SOLUTION RESPIRATORY (INHALATION) ONCE
Status: COMPLETED | OUTPATIENT
Start: 2023-11-29 | End: 2023-11-29

## 2023-11-29 RX ORDER — POLYETHYLENE GLYCOL 3350 17 G/17G
17 POWDER, FOR SOLUTION ORAL DAILY PRN
Status: DISCONTINUED | OUTPATIENT
Start: 2023-11-29 | End: 2023-12-01 | Stop reason: HOSPADM

## 2023-11-29 RX ORDER — ENOXAPARIN SODIUM 100 MG/ML
30 INJECTION SUBCUTANEOUS EVERY 24 HOURS
Status: DISCONTINUED | OUTPATIENT
Start: 2023-11-30 | End: 2023-12-01 | Stop reason: HOSPADM

## 2023-11-29 RX ADMIN — IPRATROPIUM BROMIDE AND ALBUTEROL SULFATE 3 ML: 2.5; .5 SOLUTION RESPIRATORY (INHALATION) at 15:15

## 2023-11-29 RX ADMIN — DOXYCYCLINE 100 MG: 100 INJECTION, POWDER, LYOPHILIZED, FOR SOLUTION INTRAVENOUS at 17:40

## 2023-11-29 SDOH — SOCIAL STABILITY: SOCIAL INSECURITY: HAS ANYONE EVER THREATENED TO HURT YOUR FAMILY OR YOUR PETS?: NO

## 2023-11-29 SDOH — SOCIAL STABILITY: SOCIAL INSECURITY: ARE THERE ANY APPARENT SIGNS OF INJURIES/BEHAVIORS THAT COULD BE RELATED TO ABUSE/NEGLECT?: NO

## 2023-11-29 SDOH — SOCIAL STABILITY: SOCIAL INSECURITY: DO YOU FEEL ANYONE HAS EXPLOITED OR TAKEN ADVANTAGE OF YOU FINANCIALLY OR OF YOUR PERSONAL PROPERTY?: NO

## 2023-11-29 SDOH — SOCIAL STABILITY: SOCIAL INSECURITY: DO YOU FEEL UNSAFE GOING BACK TO THE PLACE WHERE YOU ARE LIVING?: NO

## 2023-11-29 SDOH — SOCIAL STABILITY: SOCIAL INSECURITY: ARE YOU OR HAVE YOU BEEN THREATENED OR ABUSED PHYSICALLY, EMOTIONALLY, OR SEXUALLY BY ANYONE?: NO

## 2023-11-29 SDOH — SOCIAL STABILITY: SOCIAL INSECURITY: HAVE YOU HAD THOUGHTS OF HARMING ANYONE ELSE?: NO

## 2023-11-29 SDOH — SOCIAL STABILITY: SOCIAL INSECURITY: DOES ANYONE TRY TO KEEP YOU FROM HAVING/CONTACTING OTHER FRIENDS OR DOING THINGS OUTSIDE YOUR HOME?: NO

## 2023-11-29 SDOH — SOCIAL STABILITY: SOCIAL INSECURITY: WERE YOU ABLE TO COMPLETE ALL THE BEHAVIORAL HEALTH SCREENINGS?: YES

## 2023-11-29 ASSESSMENT — COGNITIVE AND FUNCTIONAL STATUS - GENERAL
PATIENT BASELINE BEDBOUND: NO
DAILY ACTIVITIY SCORE: 24
MOBILITY SCORE: 24

## 2023-11-29 ASSESSMENT — LIFESTYLE VARIABLES
AUDIT-C TOTAL SCORE: 0
HOW MANY STANDARD DRINKS CONTAINING ALCOHOL DO YOU HAVE ON A TYPICAL DAY: PATIENT DOES NOT DRINK
REASON UNABLE TO ASSESS: NO
EVER FELT BAD OR GUILTY ABOUT YOUR DRINKING: NO
SUBSTANCE_ABUSE_PAST_12_MONTHS: NO
EVER HAD A DRINK FIRST THING IN THE MORNING TO STEADY YOUR NERVES TO GET RID OF A HANGOVER: NO
HAVE PEOPLE ANNOYED YOU BY CRITICIZING YOUR DRINKING: NO
PRESCIPTION_ABUSE_PAST_12_MONTHS: NO
HOW OFTEN DO YOU HAVE A DRINK CONTAINING ALCOHOL: NEVER
HAVE YOU EVER FELT YOU SHOULD CUT DOWN ON YOUR DRINKING: NO
SKIP TO QUESTIONS 9-10: 1
AUDIT-C TOTAL SCORE: 0
HOW OFTEN DO YOU HAVE 6 OR MORE DRINKS ON ONE OCCASION: NEVER

## 2023-11-29 ASSESSMENT — ACTIVITIES OF DAILY LIVING (ADL)
HEARING - RIGHT EAR: FUNCTIONAL
JUDGMENT_ADEQUATE_SAFELY_COMPLETE_DAILY_ACTIVITIES: YES
FEEDING YOURSELF: INDEPENDENT
ASSISTIVE_DEVICE: PROSTHESIS
LACK_OF_TRANSPORTATION: NO
GROOMING: INDEPENDENT
PATIENT'S MEMORY ADEQUATE TO SAFELY COMPLETE DAILY ACTIVITIES?: YES
ADEQUATE_TO_COMPLETE_ADL: YES
WALKS IN HOME: INDEPENDENT
HEARING - LEFT EAR: FUNCTIONAL
TOILETING: INDEPENDENT
BATHING: INDEPENDENT
DRESSING YOURSELF: INDEPENDENT

## 2023-11-29 ASSESSMENT — COLUMBIA-SUICIDE SEVERITY RATING SCALE - C-SSRS
2. HAVE YOU ACTUALLY HAD ANY THOUGHTS OF KILLING YOURSELF?: NO
1. IN THE PAST MONTH, HAVE YOU WISHED YOU WERE DEAD OR WISHED YOU COULD GO TO SLEEP AND NOT WAKE UP?: NO
6. HAVE YOU EVER DONE ANYTHING, STARTED TO DO ANYTHING, OR PREPARED TO DO ANYTHING TO END YOUR LIFE?: NO

## 2023-11-29 ASSESSMENT — PATIENT HEALTH QUESTIONNAIRE - PHQ9
2. FEELING DOWN, DEPRESSED OR HOPELESS: NOT AT ALL
1. LITTLE INTEREST OR PLEASURE IN DOING THINGS: NOT AT ALL
SUM OF ALL RESPONSES TO PHQ9 QUESTIONS 1 & 2: 0

## 2023-11-29 ASSESSMENT — PAIN SCALES - GENERAL: PAINLEVEL_OUTOF10: 0 - NO PAIN

## 2023-11-29 ASSESSMENT — PAIN - FUNCTIONAL ASSESSMENT: PAIN_FUNCTIONAL_ASSESSMENT: 0-10

## 2023-11-29 NOTE — ED PROVIDER NOTES
HPI   Chief Complaint   Patient presents with    Shortness of Breath       Patient presents emergency department secondary to shortness of breath and cough with sputum production.  Patient has had a cough for about a month.  He was on antibiotics about a month ago did not seem like it improved his symptoms.  Today he took his oxygen at home and noticed that it was in the 80s.  Patient is supposed to wear home O2 at 2 L.  He was not wearing his home O2 at the time of the low oxygen saturation.  He believes he might have pneumonia so he had paramedics called to bring him in to be evaluated.  He has been treated twice recently for pneumonia in this hospital.  He does have a history of COPD.  He is a chronic smoker and still does smoke a small amount of cigarettes.  He has no chest pain.  No abdominal pain.  No nausea or vomiting.  No change in bowel movements.  No fever.  No other complaints at this time.                          [place] Coma Scale Score: 15                  Patient History   Past Medical History:   Diagnosis Date    CHF (congestive heart failure) (CMS/Formerly [facility])     Chronic kidney disease     COPD (chronic obstructive pulmonary disease) (CMS/Formerly [facility])     Emphysema of lung (CMS/Formerly [facility])     GERD (gastroesophageal reflux disease)      History reviewed. No pertinent surgical history.  No family history on file.  Social History     Tobacco Use    Smoking status: Every Day     Packs/day: 1.00     Years: 60.00     Additional pack years: 0.00     Total pack years: 60.00     Types: Cigarettes    Smokeless tobacco: Never   Substance Use Topics    Alcohol use: Not Currently    Drug use: Not on file     Comment: His girlfriend smokes marijuana in their home       Physical Exam   ED Triage Vitals [11/29/23 1445]   Temp Heart Rate Resp BP   37 °C (98.6 °F) 105 16 --      SpO2 Temp Source Heart Rate Source Patient Position   93 % Temporal Monitor Lying      BP Location FiO2 (%)     Left arm --       Physical Exam  Vitals and  nursing note reviewed.   Constitutional:       General: He is not in acute distress.     Appearance: Normal appearance. He is well-developed. He is not ill-appearing.   HENT:      Head: Normocephalic.      Right Ear: Tympanic membrane normal.      Left Ear: Tympanic membrane normal.      Nose: Nose normal.      Mouth/Throat:      Mouth: Mucous membranes are moist.   Eyes:      Extraocular Movements: Extraocular movements intact.      Pupils: Pupils are equal, round, and reactive to light.   Cardiovascular:      Rate and Rhythm: Normal rate and regular rhythm.      Pulses: Normal pulses.      Heart sounds: Normal heart sounds.   Pulmonary:      Effort: Pulmonary effort is normal.      Comments: Scant end expiratory wheeze and mildly diminished at the bases bilaterally.  Abdominal:      General: Abdomen is flat. Bowel sounds are normal.      Palpations: Abdomen is soft.   Musculoskeletal:         General: Normal range of motion.   Skin:     General: Skin is warm and dry.      Capillary Refill: Capillary refill takes less than 2 seconds.   Neurological:      General: No focal deficit present.      Mental Status: He is alert and oriented to person, place, and time.   Psychiatric:         Mood and Affect: Mood normal.         Behavior: Behavior normal.       Labs Reviewed   BLOOD GAS VENOUS FULL PANEL - Abnormal       Result Value    POCT pH, Venous 7.46 (*)     POCT pCO2, Venous 39 (*)     POCT pO2, Venous 48 (*)     POCT SO2, Venous 82 (*)     POCT Oxy Hemoglobin, Venous 78.7 (*)     POCT Hematocrit Calculated, Venous 28.0 (*)     POCT Sodium, Venous 137      POCT Potassium, Venous 3.6      POCT Chloride, Venous 103      POCT Ionized Calicum, Venous 1.11      POCT Glucose, Venous 135 (*)     POCT Lactate, Venous 1.8      POCT Base Excess, Venous 3.6 (*)     POCT HCO3 Calculated, Venous 27.7 (*)     POCT Hemoglobin, Venous 9.3 (*)     POCT Anion Gap, Venous 10.0      Patient Temperature 37.0      FiO2 21     CBC WITH  AUTO DIFFERENTIAL - Abnormal    WBC 4.8      nRBC 0.0      RBC 3.07 (*)     Hemoglobin 9.8 (*)     Hematocrit 29.2 (*)     MCV 95      MCH 31.9      MCHC 33.6      RDW 16.7 (*)     Platelets 186      Neutrophils % 71.8      Immature Granulocytes %, Automated 1.5 (*)     Lymphocytes % 14.2      Monocytes % 11.9      Eosinophils % 0.2      Basophils % 0.4      Neutrophils Absolute 3.44      Immature Granulocytes Absolute, Automated 0.07      Lymphocytes Absolute 0.68 (*)     Monocytes Absolute 0.57      Eosinophils Absolute 0.01      Basophils Absolute 0.02     COMPREHENSIVE METABOLIC PANEL - Abnormal    Glucose 139 (*)     Sodium 137      Potassium 3.6      Chloride 102      Bicarbonate 27      Anion Gap 12      Urea Nitrogen 30 (*)     Creatinine 1.88 (*)     eGFR 36 (*)     Calcium 8.4 (*)     Albumin 3.9      Alkaline Phosphatase 74      Total Protein 6.3 (*)     AST 17      Bilirubin, Total 0.4      ALT 11     TROPONIN I, HIGH SENSITIVITY - Abnormal    Troponin I, High Sensitivity 25 (*)     Narrative:     Less than 99th percentile of normal range cutoff-  Female and children under 18 years old <14 ng/L; Male <21 ng/L: Negative  Repeat testing should be performed if clinically indicated.     Female and children under 18 years old 14-50 ng/L; Male 21-50 ng/L:  Consistent with possible cardiac damage and possible increased clinical   risk. Serial measurements may help to assess extent of myocardial damage.     >50 ng/L: Consistent with cardiac damage, increased clinical risk and  myocardial infarction. Serial measurements may help assess extent of   myocardial damage.      NOTE: Children less than 1 year old may have higher baseline troponin   levels and results should be interpreted in conjunction with the overall   clinical context.     NOTE: Troponin I testing is performed using a different   testing methodology at [facility] than at other   [facility]. Direct result comparisons should only    be made within the same method.   B-TYPE NATRIURETIC PEPTIDE - Abnormal     (*)     Narrative:        <100 pg/mL - Heart failure unlikely  100-299 pg/mL - Intermediate probability of acute heart                  failure exacerbation. Correlate with clinical                  context and patient history.    >=300 pg/mL - Heart Failure likely. Correlate with clinical                  context and patient history.    BNP testing is performed using different testing methodology at [facility] than at other [facility] hospitals. Direct result comparisons should only be made within the same method.      TROPONIN I, HIGH SENSITIVITY   SARS-COV-2 PCR, SYMPTOMATIC     Pain Management Panel           No data to display              XR chest 1 view   Final Result   1.  Persistent probable retrocardiac infiltrate or atelectasis which   appears mildly improved.                  MACRO:   None        Signed by: Garrett Steele 11/29/2023 4:03 PM   Dictation workstation:   [id number]        ED Course & MDM   Diagnoses as of 11/29/23 1621   COPD exacerbation (CMS/Former[facility])   Elevated troponin       Medical Decision Making  Patient presents secondary to shortness of breath and increased cough with sputum production.  Differential diagnosis for this patient is pneumonia, congestive heart failure, coronary disease, COPD exacerbation or other acute cause.  Patient is evaluated in the emergency department EKG lab work and chest x-ray.  He is given a DuoNeb breathing treatment and Solu-Medrol IV.  Chest x-ray shows chronic changes.  Troponin is mildly elevated at 25.  BNP was mildly elevated in the 130s.  Patient was given doxycycline for increased sputum production and concern for COPD exacerbation due to bacterial illness.  Patient was discussed with Dr. Garcia from the hospitalist service and plan is for the patient to be admitted to the telemetry unit for further treatment of COPD exacerbation with elevated  troponin.        Procedure  ECG 12 lead    Performed by: Rosalba Gunter MD  Authorized by: Rosalba Gunter MD    Interpretation:     Details:  EKG was done at 253.  There is mild artifact limitation from respiratory variation.  It is sinus rhythm rate of 79.  There are inferior Q waves present.  No acute ST elevation.  TN interval is 148 and QTc is 421.       Rosalba Gunter MD  11/29/23 6938

## 2023-11-29 NOTE — TELEPHONE ENCOUNTER
Referral placed. Pt notified.      Electronically signed by Len Jamil, [facility] on 11/29/23 at 11:07 AM EST

## 2023-11-29 NOTE — TELEPHONE ENCOUNTER
Called pt. No answer, v/m full, see case management notes pt was instructed to go to Ed and has no current admit.

## 2023-11-29 NOTE — CARE COORDINATION
Remote Alert Monitoring Note  Rpm alert to be reviewed by the provider   red alert   pulse ox reading (91%)   Additional needs to be addressed by PCP: No                    Date/Time:  2023 12:48 PM  Patient Current Location: Home: [address] [address]  LPN contacted patient by telephone. Verified patients name and  as identifiers. Background: ENROLLED IN RPM FOR KIDNEY DISEASE HTN AND COPD  Refer to 911 immediately if:  Patient unresponsive or unable to provide history  Change in cognition or sudden confusion  Patient unable to respond in complete sentences  Intense chest pain/tightness  Any concern for any clinical emergency  Red Alert: Provider response time of 1 hr required for any red alert requiring intervention  Yellow Alert: Provider response time of 3hr required for any escalated yellow alert    O2 Triage  Are you having any Chest Pain? no   Are you having any Shortness of Breath? yes   Swelling in your hands or feet? no     Are you having any other health concerns or issues? Yes  cough with phlegm      Clinical Interventions: Reviewed and followed up on alerts and treatments-Spoke to [facility] [place] regarding RPM red alert for low pulse ox reading. Pt reports he is short of breath with a cough/phlegm. Denies chest pain. He wears his oxygen at 2lpm. Used all of his inhalers today. Requested pt recheck his pulse ox. New reading is 91%. Message routed to PCP. Pt called to PCP yesterday and has a referral to Pulmonologist. Next appt with PCP is 23. Escalated alert to RN-Janina Solis RN ACM  Escalated alert to PCP-Aranza Back DO   Plan/Follow Up: Will continue to review, monitor and address alerts with follow up based on severity of symptoms and risk factors.

## 2023-11-29 NOTE — CARE COORDINATION
Remote Patient Monitoring Note    Date/Time:  2023 2:02 PM  Patient Current Location: Home: [address] [address]  LPN contacted patient by telephone regarding red alert received for pulse ox reading (91%). Verified patients name and  as identifiers. Background: enrolled in RPM for Kidney disease HTN AND COPD  Clinical Interventions: Reviewed and followed up on alerts and treatments-spoke to [facility] [place] regarding RPM RED alert for low pulse ox reading. Pt reports his breathing is \" not good\" \" I probably have pneumonia again\"  updated pt  on message from PCP to go to the ED ASAP. Pt verbalized understanding. Pt used his Life Alert to call EMS. Stayed on the phone with pt. Until EMS was reached . His girlfriend is also there with him. He states they will take him to the closest hospital in [place]. Update to ACM and PCP  Escalated alert to RN-Janina live RN ACM  Escalated alert to PCP-Dr Elaina Cooks  Plan/Follow Up: Will continue to review, monitor and address alerts with follow up based on severity of symptoms and risk factors.

## 2023-11-30 ENCOUNTER — CARE COORDINATION (OUTPATIENT)
Dept: CASE MANAGEMENT | Age: 78
End: 2023-11-30

## 2023-11-30 PROBLEM — H26.9 LEFT CATARACT: Status: ACTIVE | Noted: 2023-02-07

## 2023-11-30 PROBLEM — D47.1 MYELOPROLIFERATIVE NEOPLASM (MULTI): Status: ACTIVE | Noted: 2023-04-03

## 2023-11-30 PROBLEM — N18.30 CHRONIC RENAL DISEASE, STAGE III (MULTI): Status: ACTIVE | Noted: 2022-05-28

## 2023-11-30 PROBLEM — D72.829 LEUKOCYTOSIS: Status: ACTIVE | Noted: 2023-01-16

## 2023-11-30 PROBLEM — D72.810 LYMPHOPENIA: Status: ACTIVE | Noted: 2023-01-16

## 2023-11-30 PROBLEM — L03.115 CELLULITIS OF RIGHT LOWER EXTREMITY: Status: ACTIVE | Noted: 2019-12-29

## 2023-11-30 PROBLEM — I20.9 ANGINA PECTORIS, UNSPECIFIED (CMS-HCC): Status: ACTIVE | Noted: 2023-03-31

## 2023-11-30 PROBLEM — Z86.79 S/P AAA (ABDOMINAL AORTIC ANEURYSM) REPAIR: Status: ACTIVE | Noted: 2017-08-04

## 2023-11-30 PROBLEM — Z89.511 STATUS POST BELOW-KNEE AMPUTATION OF RIGHT LOWER EXTREMITY (MULTI): Status: ACTIVE | Noted: 2020-07-27

## 2023-11-30 PROBLEM — L98.9 ARM LESION: Status: ACTIVE | Noted: 2023-05-16

## 2023-11-30 PROBLEM — E27.9 ADRENAL NODULE: Status: ACTIVE | Noted: 2022-11-30

## 2023-11-30 PROBLEM — I25.2 H/O ST ELEVATION MYOCARDIAL INFARCTION: Status: ACTIVE | Noted: 2023-10-10

## 2023-11-30 PROBLEM — E78.2 MIXED HYPERLIPIDEMIA: Status: ACTIVE | Noted: 2021-01-25

## 2023-11-30 PROBLEM — J43.9 PULMONARY EMPHYSEMA (MULTI): Status: ACTIVE | Noted: 2019-12-29

## 2023-11-30 PROBLEM — R63.4 WEIGHT LOSS: Status: ACTIVE | Noted: 2023-01-16

## 2023-11-30 PROBLEM — E27.8 ADRENAL NODULE (MULTI): Status: ACTIVE | Noted: 2022-11-30

## 2023-11-30 PROBLEM — N39.46 MIXED STRESS AND URGE URINARY INCONTINENCE: Status: ACTIVE | Noted: 2023-05-16

## 2023-11-30 PROBLEM — E46 PROTEIN-CALORIE MALNUTRITION (MULTI): Status: ACTIVE | Noted: 2023-05-05

## 2023-11-30 PROBLEM — U07.1 COVID-19: Status: ACTIVE | Noted: 2023-11-30

## 2023-11-30 PROBLEM — Z98.890 S/P AAA (ABDOMINAL AORTIC ANEURYSM) REPAIR: Status: ACTIVE | Noted: 2017-08-04

## 2023-11-30 PROBLEM — J40 BRONCHITIS: Status: ACTIVE | Noted: 2017-11-17

## 2023-11-30 PROBLEM — R73.01 IFG (IMPAIRED FASTING GLUCOSE): Status: ACTIVE | Noted: 2021-01-25

## 2023-11-30 PROBLEM — C95.91 LEUKEMIA IN REMISSION (MULTI): Status: ACTIVE | Noted: 2023-07-30

## 2023-11-30 PROBLEM — B37.0 ORAL THRUSH: Status: ACTIVE | Noted: 2023-07-30

## 2023-11-30 PROBLEM — F51.01 PRIMARY INSOMNIA: Status: ACTIVE | Noted: 2018-08-28

## 2023-11-30 PROBLEM — R23.3 EASY BRUISING: Status: ACTIVE | Noted: 2022-12-03

## 2023-11-30 PROBLEM — R60.9 FLUID RETENTION: Status: ACTIVE | Noted: 2023-05-16

## 2023-11-30 PROBLEM — M79.605 PAIN OF LEFT LOWER EXTREMITY: Status: ACTIVE | Noted: 2023-04-03

## 2023-11-30 PROBLEM — R10.13 EPIGASTRIC PAIN: Status: ACTIVE | Noted: 2017-03-27

## 2023-11-30 PROBLEM — I71.20 THORACIC AORTIC ANEURYSM WITHOUT RUPTURE (CMS-HCC): Status: ACTIVE | Noted: 2020-07-27

## 2023-11-30 PROBLEM — R53.83 FATIGUE: Status: ACTIVE | Noted: 2018-10-11

## 2023-11-30 PROBLEM — H25.9 AGE-RELATED CATARACT OF BOTH EYES: Status: ACTIVE | Noted: 2022-12-03

## 2023-11-30 PROBLEM — J18.9 PNEUMONIA DUE TO INFECTIOUS ORGANISM: Status: ACTIVE | Noted: 2023-07-30

## 2023-11-30 PROBLEM — F41.9 ANXIETY: Status: ACTIVE | Noted: 2017-01-30

## 2023-11-30 PROBLEM — J32.9 CHRONIC SINUSITIS: Status: ACTIVE | Noted: 2023-10-28

## 2023-11-30 LAB
ALBUMIN SERPL BCP-MCNC: 3.7 G/DL (ref 3.4–5)
ALP SERPL-CCNC: 67 U/L (ref 33–136)
ALT SERPL W P-5'-P-CCNC: 10 U/L (ref 10–52)
ANION GAP SERPL CALC-SCNC: 11 MMOL/L (ref 10–20)
AST SERPL W P-5'-P-CCNC: 19 U/L (ref 9–39)
BASOPHILS # BLD AUTO: 0.01 X10*3/UL (ref 0–0.1)
BASOPHILS NFR BLD AUTO: 0.2 %
BILIRUB SERPL-MCNC: 0.4 MG/DL (ref 0–1.2)
BUN SERPL-MCNC: 25 MG/DL (ref 6–23)
CALCIUM SERPL-MCNC: 8.4 MG/DL (ref 8.6–10.3)
CHLORIDE SERPL-SCNC: 104 MMOL/L (ref 98–107)
CO2 SERPL-SCNC: 28 MMOL/L (ref 21–32)
CREAT SERPL-MCNC: 1.62 MG/DL (ref 0.5–1.3)
EOSINOPHIL # BLD AUTO: 0.02 X10*3/UL (ref 0–0.4)
EOSINOPHIL NFR BLD AUTO: 0.5 %
ERYTHROCYTE [DISTWIDTH] IN BLOOD BY AUTOMATED COUNT: 16.9 % (ref 11.5–14.5)
FERRITIN SERPL-MCNC: 790 NG/ML (ref 20–300)
GFR SERPL CREATININE-BSD FRML MDRD: 43 ML/MIN/1.73M*2
GLUCOSE SERPL-MCNC: 82 MG/DL (ref 74–99)
HCT VFR BLD AUTO: 27.8 % (ref 41–52)
HGB BLD-MCNC: 9.1 G/DL (ref 13.5–17.5)
IMM GRANULOCYTES # BLD AUTO: 0.06 X10*3/UL (ref 0–0.5)
IMM GRANULOCYTES NFR BLD AUTO: 1.4 % (ref 0–0.9)
INR PPP: 1.1 (ref 0.9–1.1)
LDH SERPL L TO P-CCNC: 252 U/L (ref 84–246)
LYMPHOCYTES # BLD AUTO: 0.65 X10*3/UL (ref 0.8–3)
LYMPHOCYTES NFR BLD AUTO: 15.7 %
MAGNESIUM SERPL-MCNC: 1.92 MG/DL (ref 1.6–2.4)
MCH RBC QN AUTO: 31.7 PG (ref 26–34)
MCHC RBC AUTO-ENTMCNC: 32.7 G/DL (ref 32–36)
MCV RBC AUTO: 97 FL (ref 80–100)
MONOCYTES # BLD AUTO: 0.43 X10*3/UL (ref 0.05–0.8)
MONOCYTES NFR BLD AUTO: 10.4 %
NEUTROPHILS # BLD AUTO: 2.98 X10*3/UL (ref 1.6–5.5)
NEUTROPHILS NFR BLD AUTO: 71.8 %
NRBC BLD-RTO: 0 /100 WBCS (ref 0–0)
PLATELET # BLD AUTO: 175 X10*3/UL (ref 150–450)
POTASSIUM SERPL-SCNC: 3.7 MMOL/L (ref 3.5–5.3)
PROT SERPL-MCNC: 6.7 G/DL (ref 6.4–8.2)
PROTHROMBIN TIME: 12.3 SECONDS (ref 9.8–12.8)
RBC # BLD AUTO: 2.87 X10*6/UL (ref 4.5–5.9)
SODIUM SERPL-SCNC: 139 MMOL/L (ref 136–145)
WBC # BLD AUTO: 4.2 X10*3/UL (ref 4.4–11.3)

## 2023-11-30 PROCEDURE — 2500000002 HC RX 250 W HCPCS SELF ADMINISTERED DRUGS (ALT 637 FOR MEDICARE OP, ALT 636 FOR OP/ED): Performed by: FAMILY MEDICINE

## 2023-11-30 PROCEDURE — 36415 COLL VENOUS BLD VENIPUNCTURE: CPT | Performed by: FAMILY MEDICINE

## 2023-11-30 PROCEDURE — 83735 ASSAY OF MAGNESIUM: CPT | Performed by: FAMILY MEDICINE

## 2023-11-30 PROCEDURE — 85025 COMPLETE CBC W/AUTO DIFF WBC: CPT | Performed by: FAMILY MEDICINE

## 2023-11-30 PROCEDURE — 96372 THER/PROPH/DIAG INJ SC/IM: CPT | Performed by: FAMILY MEDICINE

## 2023-11-30 PROCEDURE — 82728 ASSAY OF FERRITIN: CPT | Performed by: FAMILY MEDICINE

## 2023-11-30 PROCEDURE — 83615 LACTATE (LD) (LDH) ENZYME: CPT | Performed by: FAMILY MEDICINE

## 2023-11-30 PROCEDURE — G0378 HOSPITAL OBSERVATION PER HR: HCPCS

## 2023-11-30 PROCEDURE — 2500000001 HC RX 250 WO HCPCS SELF ADMINISTERED DRUGS (ALT 637 FOR MEDICARE OP): Performed by: FAMILY MEDICINE

## 2023-11-30 PROCEDURE — 99233 SBSQ HOSP IP/OBS HIGH 50: CPT | Performed by: PHYSICIAN ASSISTANT

## 2023-11-30 PROCEDURE — 85610 PROTHROMBIN TIME: CPT | Performed by: FAMILY MEDICINE

## 2023-11-30 PROCEDURE — 82435 ASSAY OF BLOOD CHLORIDE: CPT | Performed by: FAMILY MEDICINE

## 2023-11-30 PROCEDURE — 2500000004 HC RX 250 GENERAL PHARMACY W/ HCPCS (ALT 636 FOR OP/ED): Performed by: FAMILY MEDICINE

## 2023-11-30 RX ORDER — DEXAMETHASONE 6 MG/1
6 TABLET ORAL DAILY
Status: DISCONTINUED | OUTPATIENT
Start: 2023-11-30 | End: 2023-12-01 | Stop reason: HOSPADM

## 2023-11-30 RX ORDER — ALLOPURINOL 100 MG/1
100 TABLET ORAL DAILY
Status: DISCONTINUED | OUTPATIENT
Start: 2023-11-30 | End: 2023-12-01 | Stop reason: HOSPADM

## 2023-11-30 RX ORDER — ISOSORBIDE MONONITRATE 30 MG/1
30 TABLET, EXTENDED RELEASE ORAL DAILY
Status: DISCONTINUED | OUTPATIENT
Start: 2023-12-01 | End: 2023-12-01 | Stop reason: HOSPADM

## 2023-11-30 RX ORDER — ASPIRIN 81 MG/1
81 TABLET ORAL NIGHTLY
Status: DISCONTINUED | OUTPATIENT
Start: 2023-11-30 | End: 2023-12-01 | Stop reason: HOSPADM

## 2023-11-30 RX ORDER — METOPROLOL SUCCINATE 25 MG/1
25 TABLET, EXTENDED RELEASE ORAL NIGHTLY
Status: DISCONTINUED | OUTPATIENT
Start: 2023-11-30 | End: 2023-12-01 | Stop reason: HOSPADM

## 2023-11-30 RX ORDER — TAMSULOSIN HYDROCHLORIDE 0.4 MG/1
0.4 CAPSULE ORAL NIGHTLY
Status: DISCONTINUED | OUTPATIENT
Start: 2023-11-30 | End: 2023-12-01 | Stop reason: HOSPADM

## 2023-11-30 RX ORDER — ICOSAPENT ETHYL 1 G/1
2 CAPSULE ORAL
Status: DISCONTINUED | OUTPATIENT
Start: 2023-12-01 | End: 2023-12-01 | Stop reason: HOSPADM

## 2023-11-30 RX ORDER — LOPERAMIDE HYDROCHLORIDE 2 MG/1
2 CAPSULE ORAL 4 TIMES DAILY PRN
Status: DISCONTINUED | OUTPATIENT
Start: 2023-11-30 | End: 2023-12-01 | Stop reason: HOSPADM

## 2023-11-30 RX ORDER — FLUTICASONE FUROATE AND VILANTEROL 200; 25 UG/1; UG/1
1 POWDER RESPIRATORY (INHALATION) DAILY
Status: DISCONTINUED | OUTPATIENT
Start: 2023-11-30 | End: 2023-12-01 | Stop reason: HOSPADM

## 2023-11-30 RX ORDER — CLOPIDOGREL BISULFATE 75 MG/1
75 TABLET ORAL DAILY
Status: DISCONTINUED | OUTPATIENT
Start: 2023-11-30 | End: 2023-12-01 | Stop reason: HOSPADM

## 2023-11-30 RX ORDER — ATORVASTATIN CALCIUM 40 MG/1
40 TABLET, FILM COATED ORAL DAILY
Status: DISCONTINUED | OUTPATIENT
Start: 2023-11-30 | End: 2023-12-01 | Stop reason: HOSPADM

## 2023-11-30 RX ORDER — LISINOPRIL 10 MG/1
10 TABLET ORAL DAILY
Status: DISCONTINUED | OUTPATIENT
Start: 2023-12-01 | End: 2023-12-01 | Stop reason: HOSPADM

## 2023-11-30 RX ORDER — ALBUTEROL SULFATE 90 UG/1
2 AEROSOL, METERED RESPIRATORY (INHALATION) EVERY 6 HOURS PRN
Status: DISCONTINUED | OUTPATIENT
Start: 2023-11-30 | End: 2023-11-30

## 2023-11-30 RX ORDER — IPRATROPIUM BROMIDE AND ALBUTEROL SULFATE 2.5; .5 MG/3ML; MG/3ML
3 SOLUTION RESPIRATORY (INHALATION) EVERY 6 HOURS PRN
Status: DISCONTINUED | OUTPATIENT
Start: 2023-11-30 | End: 2023-12-01 | Stop reason: HOSPADM

## 2023-11-30 RX ORDER — MONTELUKAST SODIUM 10 MG/1
1 TABLET ORAL DAILY
COMMUNITY
Start: 2023-10-25

## 2023-11-30 RX ORDER — NIFEDIPINE 30 MG/1
30 TABLET, FILM COATED, EXTENDED RELEASE ORAL DAILY
Status: DISCONTINUED | OUTPATIENT
Start: 2023-12-01 | End: 2023-12-01 | Stop reason: HOSPADM

## 2023-11-30 RX ADMIN — METOPROLOL SUCCINATE 25 MG: 25 TABLET, EXTENDED RELEASE ORAL at 23:02

## 2023-11-30 RX ADMIN — ALLOPURINOL 100 MG: 100 TABLET ORAL at 09:03

## 2023-11-30 RX ADMIN — CLOPIDOGREL 75 MG: 75 TABLET ORAL at 09:03

## 2023-11-30 RX ADMIN — ENOXAPARIN SODIUM 30 MG: 30 INJECTION SUBCUTANEOUS at 09:03

## 2023-11-30 RX ADMIN — FLUTICASONE FUROATE AND VILANTEROL TRIFENATATE 1 PUFF: 200; 25 POWDER RESPIRATORY (INHALATION) at 09:16

## 2023-11-30 RX ADMIN — ATORVASTATIN CALCIUM 40 MG: 40 TABLET, FILM COATED ORAL at 09:03

## 2023-11-30 RX ADMIN — TAMSULOSIN HYDROCHLORIDE 0.4 MG: 0.4 CAPSULE ORAL at 23:02

## 2023-11-30 RX ADMIN — RUXOLITINIB 20 MG: 20 TABLET ORAL at 22:07

## 2023-11-30 RX ADMIN — ASPIRIN 81 MG: 81 TABLET, COATED ORAL at 21:58

## 2023-11-30 ASSESSMENT — ENCOUNTER SYMPTOMS
DIARRHEA: 0
BLOOD IN STOOL: 0
SORE THROAT: 0
NUMBNESS: 0
PALPITATIONS: 0
WOUND: 0
DYSURIA: 0
CONSTIPATION: 0
HEADACHES: 0
CHILLS: 0
DIAPHORESIS: 0
CHEST TIGHTNESS: 0
ABDOMINAL PAIN: 0
HEMATURIA: 0
EYE PAIN: 0
JOINT SWELLING: 0
FREQUENCY: 0
SHORTNESS OF BREATH: 1
APPETITE CHANGE: 0
BRUISES/BLEEDS EASILY: 0
VOMITING: 0
FATIGUE: 0
HALLUCINATIONS: 0
FACIAL SWELLING: 0
TROUBLE SWALLOWING: 0
WEAKNESS: 0
BACK PAIN: 0
NAUSEA: 0
WHEEZING: 0
FEVER: 0
LIGHT-HEADEDNESS: 0
COUGH: 1
FLANK PAIN: 0
DIZZINESS: 0

## 2023-11-30 ASSESSMENT — COGNITIVE AND FUNCTIONAL STATUS - GENERAL
MOBILITY SCORE: 24
DAILY ACTIVITIY SCORE: 24

## 2023-11-30 ASSESSMENT — PAIN SCALES - GENERAL
PAINLEVEL_OUTOF10: 0 - NO PAIN
PAINLEVEL_OUTOF10: 0 - NO PAIN

## 2023-11-30 ASSESSMENT — ACTIVITIES OF DAILY LIVING (ADL): LACK_OF_TRANSPORTATION: NO

## 2023-11-30 NOTE — CARE COORDINATION
Remote Patient Monitoring Note    Date/Time:  2023 10:35 AM  Patient Current Location: [place]  LPN contacted caregiver by telephone regarding  RPM  . Verified patients name and  as identifiers. Background: enrolled in RPM for Kidney disease HTN AND COPD  Clinical Interventions: Reviewed and followed up on alerts and treatments-spoke to   emergency contact [address] [place] [address]. 29 [facility] states pt was admitted to [facility] in [place]. RPM paused while admitted. Instructed to call when discharged home. Verbalized understanding,. Plan/Follow Up: Will continue to review, monitor and address alerts with follow up based on severity of symptoms and risk factors.    UPDATE TO ACVIRGINIA Lozoya RN
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
## 2023-11-30 NOTE — PROGRESS NOTES
11/30/23 0943   Discharge Planning   Living Arrangements Spouse/significant other   Support Systems Spouse/significant other   Assistance Needed Independent   Type of Residence Private residence   Home or Post Acute Services In home services   Type of Home Care Services Home nursing visits;Home PT   Patient expects to be discharged to: Home   Financial Resource Strain   How hard is it for you to pay for the very basics like food, housing, medical care, and heating? Not very   Housing Stability   In the last 12 months, was there a time when you were not able to pay the mortgage or rent on time? N   In the last 12 months, was there a time when you did not have a steady place to sleep or slept in a shelter (including now)? N   Transportation Needs   In the past 12 months, has lack of transportation kept you from medical appointments or from getting medications? no   In the past 12 months, has lack of transportation kept you from meetings, work, or from getting things needed for daily living? No     PCP is Beth Conroy. Patient is from home with his girlfriend. Patient wears 2L at baseline, but is unsure of company. Patient expressed concerns regarding housework and meals as his girlfriend is ill so she cannot assist. Resources for private duty home care, mobile meals and [facility] pocket guide were provided to patient. PT/ OT Pending. Patient does not want to go to SNF but is open to HHC if recommended. TCC to follow.

## 2023-11-30 NOTE — ASSESSMENT & PLAN NOTE
Home symbicort substituted with Breo as per formulary  S/p solumedrol, switch to Decadron 6mg PO every day. Will plan for 5 day steroid burst but may extend if clinically worsening under Covid.  Duoneb q6 PRN

## 2023-11-30 NOTE — ASSESSMENT & PLAN NOTE
CXR shows Persistent probable retrocardiac infiltrate or atelectasis which appears mildly improved.  Appears stable on baseline 2L oxygen  S/p doxycycline in ED. Will hold for now given no leukocytosis and more likely symptoms from Covid infection  S/p solumedrol in ED for concern for COPD exacerabtion. Will switch to decadron for now in setting of Covid but will consider short burst given patient is on baseline O2  Duoneb q6 PRN  Monitor continuous pulse ox
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
## 2023-11-30 NOTE — ASSESSMENT & PLAN NOTE
Appears to be on baseline oxygen  Unclear start date of symptoms but first positive test 11/29  S/p solumedrol in ED. Will switch to decadron for now  Will defer starting remdesivir as patient is at baseline O2.  droplet precautions  trend labs and inflammatory markers; CBC w diff, CMP, ferritin, INR  monitor SpO2, goal >92%, goal baseline 2L  Guaifenesin-DM PRN cough, Loperamide PRN diarrhea

## 2023-12-01 ENCOUNTER — PHARMACY VISIT (OUTPATIENT)
Dept: PHARMACY | Facility: CLINIC | Age: 78
End: 2023-12-01
Payer: COMMERCIAL

## 2023-12-01 VITALS
BODY MASS INDEX: 20.16 KG/M2 | OXYGEN SATURATION: 97 % | WEIGHT: 144 LBS | DIASTOLIC BLOOD PRESSURE: 79 MMHG | RESPIRATION RATE: 18 BRPM | SYSTOLIC BLOOD PRESSURE: 133 MMHG | HEIGHT: 71 IN | HEART RATE: 83 BPM | TEMPERATURE: 98.2 F

## 2023-12-01 LAB
ANION GAP SERPL CALC-SCNC: 11 MMOL/L (ref 10–20)
BUN SERPL-MCNC: 30 MG/DL (ref 6–23)
CALCIUM SERPL-MCNC: 8.4 MG/DL (ref 8.6–10.3)
CHLORIDE SERPL-SCNC: 104 MMOL/L (ref 98–107)
CO2 SERPL-SCNC: 25 MMOL/L (ref 21–32)
CREAT SERPL-MCNC: 1.39 MG/DL (ref 0.5–1.3)
ERYTHROCYTE [DISTWIDTH] IN BLOOD BY AUTOMATED COUNT: 16.3 % (ref 11.5–14.5)
GFR SERPL CREATININE-BSD FRML MDRD: 52 ML/MIN/1.73M*2
GLUCOSE SERPL-MCNC: 94 MG/DL (ref 74–99)
HCT VFR BLD AUTO: 26 % (ref 41–52)
HGB BLD-MCNC: 8.8 G/DL (ref 13.5–17.5)
MAGNESIUM SERPL-MCNC: 2.01 MG/DL (ref 1.6–2.4)
MCH RBC QN AUTO: 31.9 PG (ref 26–34)
MCHC RBC AUTO-ENTMCNC: 33.8 G/DL (ref 32–36)
MCV RBC AUTO: 94 FL (ref 80–100)
NRBC BLD-RTO: 0 /100 WBCS (ref 0–0)
PLATELET # BLD AUTO: 168 X10*3/UL (ref 150–450)
POTASSIUM SERPL-SCNC: 3.6 MMOL/L (ref 3.5–5.3)
RBC # BLD AUTO: 2.76 X10*6/UL (ref 4.5–5.9)
SODIUM SERPL-SCNC: 136 MMOL/L (ref 136–145)
WBC # BLD AUTO: 4.4 X10*3/UL (ref 4.4–11.3)

## 2023-12-01 PROCEDURE — G0378 HOSPITAL OBSERVATION PER HR: HCPCS

## 2023-12-01 PROCEDURE — 85027 COMPLETE CBC AUTOMATED: CPT | Performed by: PHYSICIAN ASSISTANT

## 2023-12-01 PROCEDURE — 36415 COLL VENOUS BLD VENIPUNCTURE: CPT | Performed by: PHYSICIAN ASSISTANT

## 2023-12-01 PROCEDURE — 96365 THER/PROPH/DIAG IV INF INIT: CPT

## 2023-12-01 PROCEDURE — 2500000001 HC RX 250 WO HCPCS SELF ADMINISTERED DRUGS (ALT 637 FOR MEDICARE OP): Performed by: FAMILY MEDICINE

## 2023-12-01 PROCEDURE — 96372 THER/PROPH/DIAG INJ SC/IM: CPT | Mod: 59 | Performed by: FAMILY MEDICINE

## 2023-12-01 PROCEDURE — 80048 BASIC METABOLIC PNL TOTAL CA: CPT | Performed by: PHYSICIAN ASSISTANT

## 2023-12-01 PROCEDURE — 2500000002 HC RX 250 W HCPCS SELF ADMINISTERED DRUGS (ALT 637 FOR MEDICARE OP, ALT 636 FOR OP/ED): Performed by: FAMILY MEDICINE

## 2023-12-01 PROCEDURE — 2500000004 HC RX 250 GENERAL PHARMACY W/ HCPCS (ALT 636 FOR OP/ED): Performed by: FAMILY MEDICINE

## 2023-12-01 PROCEDURE — RXMED WILLOW AMBULATORY MEDICATION CHARGE

## 2023-12-01 PROCEDURE — 99239 HOSP IP/OBS DSCHRG MGMT >30: CPT | Performed by: PHYSICIAN ASSISTANT

## 2023-12-01 PROCEDURE — 83735 ASSAY OF MAGNESIUM: CPT | Performed by: PHYSICIAN ASSISTANT

## 2023-12-01 PROCEDURE — 2500000005 HC RX 250 GENERAL PHARMACY W/O HCPCS: Performed by: INTERNAL MEDICINE

## 2023-12-01 RX ORDER — DEXAMETHASONE 6 MG/1
6 TABLET ORAL DAILY
Qty: 8 TABLET | Refills: 0 | Status: SHIPPED | OUTPATIENT
Start: 2023-12-02 | End: 2024-02-26 | Stop reason: HOSPADM

## 2023-12-01 RX ADMIN — DEXAMETHASONE 6 MG: 6 TABLET ORAL at 10:12

## 2023-12-01 RX ADMIN — LISINOPRIL 10 MG: 10 TABLET ORAL at 10:11

## 2023-12-01 RX ADMIN — NIFEDIPINE 30 MG: 30 TABLET, FILM COATED, EXTENDED RELEASE ORAL at 10:11

## 2023-12-01 RX ADMIN — ATORVASTATIN CALCIUM 40 MG: 40 TABLET, FILM COATED ORAL at 10:12

## 2023-12-01 RX ADMIN — RUXOLITINIB 20 MG: 20 TABLET ORAL at 10:21

## 2023-12-01 RX ADMIN — ENOXAPARIN SODIUM 30 MG: 30 INJECTION SUBCUTANEOUS at 10:12

## 2023-12-01 RX ADMIN — ALLOPURINOL 100 MG: 100 TABLET ORAL at 10:12

## 2023-12-01 RX ADMIN — Medication 2 L/MIN: at 04:35

## 2023-12-01 RX ADMIN — CLOPIDOGREL 75 MG: 75 TABLET ORAL at 10:12

## 2023-12-01 RX ADMIN — ISOSORBIDE MONONITRATE 30 MG: 30 TABLET, EXTENDED RELEASE ORAL at 10:12

## 2023-12-01 RX ADMIN — ICOSAPENT ETHYL 2 G: 1 CAPSULE ORAL at 10:11

## 2023-12-01 ASSESSMENT — PAIN SCALES - GENERAL
PAINLEVEL_OUTOF10: 0 - NO PAIN
PAINLEVEL_OUTOF10: 0 - NO PAIN

## 2023-12-01 NOTE — CARE PLAN
Problem: Fall/Injury  Goal: Not fall by end of shift  Outcome: Progressing  Goal: Be free from injury by end of the shift  Outcome: Progressing  Goal: Verbalize understanding of personal risk factors for fall in the hospital  Outcome: Progressing  Goal: Verbalize understanding of risk factor reduction measures to prevent injury from fall in the home  Outcome: Progressing     Problem: Respiratory  Goal: Minimize anxiety/maximize coping throughout shift  Outcome: Progressing  Goal: No signs of respiratory distress (eg. Use of accessory muscles. Peds grunting)  Outcome: Progressing  Goal: Patent airway maintained this shift  Outcome: Progressing     Problem: Safety  Goal: Patient will be injury free during hospitalization  Outcome: Progressing  Goal: I will remain free of falls  Outcome: Progressing     Problem: Daily Care  Goal: Daily care needs are met  Outcome: Progressing     Problem: Pain - Adult  Goal: Verbalizes/displays adequate comfort level or baseline comfort level  Outcome: Progressing     Problem: Safety - Adult  Goal: Free from fall injury  Outcome: Progressing     Problem: Discharge Planning  Goal: Discharge to home or other facility with appropriate resources  Outcome: Progressing     Problem: Chronic Conditions and Co-morbidities  Goal: Patient's chronic conditions and co-morbidity symptoms are monitored and maintained or improved  Outcome: Progressing     The patient's goals for the shift include      The clinical goals for the shift include Patient will be free from respiratory distress    Over the shift, the patient is progressing toward the following goals.   
The patient's goals for the shift include      The clinical goals for the shift include pt will remain free from falls      Problem: Fall/Injury  Goal: Not fall by end of shift  Outcome: Progressing  Goal: Be free from injury by end of the shift  Outcome: Progressing  Goal: Verbalize understanding of personal risk factors for fall in the hospital  Outcome: Progressing  Goal: Verbalize understanding of risk factor reduction measures to prevent injury from fall in the home  Outcome: Progressing     Problem: Respiratory  Goal: Minimize anxiety/maximize coping throughout shift  Outcome: Progressing  Goal: No signs of respiratory distress (eg. Use of accessory muscles. Peds grunting)  Outcome: Progressing  Goal: Patent airway maintained this shift  Outcome: Progressing     Problem: Safety  Goal: Patient will be injury free during hospitalization  Outcome: Progressing  Goal: I will remain free of falls  Outcome: Progressing     Problem: Daily Care  Goal: Daily care needs are met  Outcome: Progressing     Problem: Pain - Adult  Goal: Verbalizes/displays adequate comfort level or baseline comfort level  Outcome: Progressing     Problem: Safety - Adult  Goal: Free from fall injury  Outcome: Progressing     Problem: Discharge Planning  Goal: Discharge to home or other facility with appropriate resources  Outcome: Progressing     Problem: Chronic Conditions and Co-morbidities  Goal: Patient's chronic conditions and co-morbidity symptoms are monitored and maintained or improved  Outcome: Progressing       
Detail Level: Simple
Instructions: This plan will send the code FBSE to the PM system.  DO NOT or CHANGE the price.
Price (Do Not Change): 0.00

## 2023-12-01 NOTE — TELEPHONE ENCOUNTER
Thank you for letting me know Writer returned phone call to patient's son Sabas. He states that after they got home from their apt with Dr. Bobo patient complained of lump/mass above her knee on the outer aspect. Son states it is swollen and \"hangs down\". He is unsure whether is it tender to the touch or warm. He states it is not urgent but is wondering if the US from June showed anything and what could be done about the swelling/lump. Advised the imaging indicated at [facility]. He states he doesn't think its that but is unsure. He is hoping Dr. Murrieta can help with it or refer patient to another provider who could. Advised would discuss with Dr. Murrieta and call back with an update.

## 2023-12-01 NOTE — PROGRESS NOTES
12/01/23 1119   Discharge Planning   Living Arrangements Spouse/significant other   Support Systems Spouse/significant other   Assistance Needed Pt independnet in bathing, dresing. Has resources for meals and cleaning assistance.   Home or Post Acute Services Other (Comment)  (Private duty cleaning and meals on wheels.)   Patient expects to be discharged to: Home   Does the patient need discharge transport arranged?   (unable to reach pt, attempted.)   Financial Resource Strain   How hard is it for you to pay for the very basics like food, housing, medical care, and heating? Not very     REMOTE COVERAGE- Attempt made to reach pt by room phone, no answer. Pt personal phone went strait to voicemail as well. Called number listed to friend as listed in chart. Tran reports that she does live with pt and helps him with oxygen and other medical needs. Let her know that pt has resources for meals and cleaning help that they would have to call and coordinate. Verbalized understanding. Friend could not talk long as she began vomiting. Denied needing me to call anyone for her as she has an emergency system if needed. Plan- discharge home today without CT needs. CT will follow.
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
## 2023-12-04 ENCOUNTER — CARE COORDINATION (OUTPATIENT)
Dept: CARE COORDINATION | Age: 78
End: 2023-12-04

## 2023-12-04 ENCOUNTER — CARE COORDINATION (OUTPATIENT)
Dept: CASE MANAGEMENT | Age: 78
End: 2023-12-04

## 2023-12-04 NOTE — CARE COORDINATION
ACM attempted to reach patient to follow up for Care Coordination with no answer. ACM unable to leave voicemail message due to mailbox full. Plan:   If no return call, ACM will attempt outreach again at a later time.

## 2023-12-04 NOTE — CARE COORDINATION
Remote Patient Monitoring Note    Date/Time:  2023 11:37 AM  Patient Current Location: Home: [address] [facility] [address]  LPN contacted patient by telephone regarding  RPM  received for PAUSED. Verified patients name and  as identifiers. Background: ENROLLED IN RPM FOR KIDNEY DISEASE HTN AND COPD  Clinical Interventions: Reviewed and followed up on alerts and treatments-spoke to [facility] [place] regarding RPM paused during admit to [facility]. Pt reports he was discharged on 23. Educated pt that equipment is now back on and can resume obtaining metrics. verbalized understanding. Plan/Follow Up: Will continue to review, monitor and address alerts with follow up based on severity of symptoms and risk factors.

## 2023-12-05 ENCOUNTER — CARE COORDINATION (OUTPATIENT)
Dept: CASE MANAGEMENT | Age: 78
End: 2023-12-05

## 2023-12-05 NOTE — CARE COORDINATION
Remote Patient Monitoring Note      Date/Time:  2023 2:13 PM  Patient Current Location: [place]  LPN contacted patient by telephone regarding yellow alert received for No VS for 2 days. Verified patients name and  as identifiers. Background: KD, HTN, COPD  Clinical Interventions: Reviewed and followed up on alerts and treatments-       Called and spoke with patient. He will start putting in VS tomorrow. Plan/Follow Up: Will continue to review, monitor and address alerts with follow up based on severity of symptoms and risk factors.

## 2023-12-06 ENCOUNTER — CARE COORDINATION (OUTPATIENT)
Dept: CASE MANAGEMENT | Age: 78
End: 2023-12-06

## 2023-12-06 NOTE — CARE COORDINATION
Date/Time:  12/6/2023 8:53 AM  LPN attempted to reach patient by telephone regarding yellow alert in remote patient monitoring program. Mailbox is full. Unable to leave message. Will attempt to reach patient again.     RPM YELLOW ALERT for no metrics x 3 days  Message to [address] RN

## 2023-12-06 NOTE — CARE COORDINATION
Remote Patient Monitoring Note    Date/Time:  2023 10:23 AM  Patient Current Location: Home: [address] [address]  LPN contacted caregiver by telephone regarding  NO METRICS x 3 days   Verified patients name and  as identifiers. Background: enrolled in RPM for Kidney disease HTN AND COPD  Clinical Interventions: Reviewed and followed up on alerts and treatments-spoke to alternate contact [address] [place] [address]. [facility] reports pt is still sleeping at this time. Requested he check metrics when available. Verbalized understanding. Plan/Follow Up: Will continue to review, monitor and address alerts with follow up based on severity of symptoms and risk factors.

## 2023-12-13 ENCOUNTER — CARE COORDINATION (OUTPATIENT)
Dept: CARE COORDINATION | Age: 78
End: 2023-12-13

## 2023-12-14 ENCOUNTER — CARE COORDINATION (OUTPATIENT)
Dept: CARE COORDINATION | Age: 78
End: 2023-12-14

## 2023-12-14 NOTE — CARE COORDINATION
Ambulatory Care Coordination Note  2023    Patient Current Location:  Home: [address] [facility] [id number]     ACM contacted the patient by telephone. Verified name and  with patient as identifiers. Provided introduction to self, and explanation of the ACM role. Challenges to be reviewed by the provider   Additional needs identified to be addressed with provider: Yes  Patient in hospital . Covid +. Status unchaged-still some SOB with exertion, O2 2L. SaO2 98% Admits to continuing to smoke. See RPM.               Method of communication with provider: staff message. ACM: Cece Back RN    Offered patient enrollment in the Remote Patient Monitoring (RPM) program for in-home monitoring: Yes, patient already enrolled. See RPM metrics:        ACM outreach to speak with patient after his last hospitalization on -. Patient has not been available for outreach since last call. ACM notified patient via [facility] phone that his voicemail box is full. He was unaware. Patient last admit to [facility] after he was instructed to be treated in the ED for low oxygen saturation in which he couldn't get leveled. He ended up calling EMS and was admitted with exacerbation COPD and SOB. Patient did end up testing COVID + and denies a worsening of symptoms. Afebrile. No change or worsening since hospitalization but \"not much better\". Patient remains smoking 1 pack of cigarettes per day. He continues with all inhalers as prescribed and takes Albuteral aerosols sparingly when needed. He wears O2 2L NC and his oxygen levels are maintaining 94-98% on O2. He does c/o SOB with minimal activity, weakness and tiredness. He has a cough that is productive with white/yellow milky sputum. All antibiotics complete from admission. Will update PCP. Patient to see Dr. Jani Norwood oncology on 23. Verbalized understanding of appt. PCP appt 24.   Patient declined ACM assistance with making

## 2023-12-15 ENCOUNTER — CARE COORDINATION (OUTPATIENT)
Dept: CARE COORDINATION | Age: 78
End: 2023-12-15

## 2023-12-15 NOTE — CARE COORDINATION
Patient notified as per PCP request, Edgar Fleming needs to call to make a f/u appt as previously discussed. Edgar Fleming receptive to notification. Declined ACM assistance but will call today to make a f/u appt.

## 2023-12-21 ENCOUNTER — HOSPITAL ENCOUNTER (OUTPATIENT)
Dept: INFUSION THERAPY | Age: 78
Discharge: HOME | End: 2023-12-21
Payer: MEDICARE

## 2023-12-21 DIAGNOSIS — D47.1 MPN (MYELOPROLIFERATIVE NEOPLASM) (HCC): ICD-10-CM

## 2023-12-21 LAB
ALBUMIN SERPL-MCNC: 4.3 G/DL (ref 3.5–5.2)
ALP SERPL-CCNC: 90 U/L (ref 40–129)
ALT SERPL-CCNC: 15 U/L (ref 0–40)
ANION GAP SERPL CALCULATED.3IONS-SCNC: 11 MMOL/L (ref 7–16)
AST SERPL-CCNC: 19 U/L (ref 0–39)
BASOPHILS # BLD: 0.01 K/UL (ref 0–0.2)
BASOPHILS NFR BLD: 0 % (ref 0–2)
BILIRUB SERPL-MCNC: 0.2 MG/DL (ref 0–1.2)
BUN SERPL-MCNC: 23 MG/DL (ref 6–23)
CALCIUM SERPL-MCNC: 8.9 MG/DL (ref 8.6–10.2)
CHLORIDE SERPL-SCNC: 102 MMOL/L (ref 98–107)
CO2 SERPL-SCNC: 26 MMOL/L (ref 22–29)
CREAT SERPL-MCNC: 1.5 MG/DL (ref 0.7–1.2)
EOSINOPHIL # BLD: 0.03 K/UL (ref 0.05–0.5)
EOSINOPHILS RELATIVE PERCENT: 1 % (ref 0–6)
ERYTHROCYTE [DISTWIDTH] IN BLOOD BY AUTOMATED COUNT: 17.4 % (ref 11.5–15)
GFR SERPL CREATININE-BSD FRML MDRD: 47 ML/MIN/1.73M2
GLUCOSE SERPL-MCNC: 96 MG/DL (ref 74–99)
HCT VFR BLD AUTO: 29.7 % (ref 37–54)
HGB BLD-MCNC: 9.6 G/DL (ref 12.5–16.5)
IMM GRANULOCYTES # BLD AUTO: 0.07 K/UL (ref 0–0.58)
IMM GRANULOCYTES NFR BLD: 1 % (ref 0–5)
LYMPHOCYTES NFR BLD: 0.99 K/UL (ref 1.5–4)
LYMPHOCYTES RELATIVE PERCENT: 16 % (ref 20–42)
MCH RBC QN AUTO: 31.4 PG (ref 26–35)
MCHC RBC AUTO-ENTMCNC: 32.3 G/DL (ref 32–34.5)
MCV RBC AUTO: 97.1 FL (ref 80–99.9)
MONOCYTES NFR BLD: 0.64 K/UL (ref 0.1–0.95)
MONOCYTES NFR BLD: 10 % (ref 2–12)
NEUTROPHILS NFR BLD: 73 % (ref 43–80)
NEUTS SEG NFR BLD: 4.62 K/UL (ref 1.8–7.3)
PLATELET # BLD AUTO: 196 K/UL (ref 130–450)
PMV BLD AUTO: 11.3 FL (ref 7–12)
POTASSIUM SERPL-SCNC: 3.9 MMOL/L (ref 3.5–5)
PROT SERPL-MCNC: 6.3 G/DL (ref 6.4–8.3)
RBC # BLD AUTO: 3.06 M/UL (ref 3.8–5.8)
SODIUM SERPL-SCNC: 139 MMOL/L (ref 132–146)
WBC OTHER # BLD: 6.4 K/UL (ref 4.5–11.5)

## 2023-12-21 PROCEDURE — 36415 COLL VENOUS BLD VENIPUNCTURE: CPT

## 2023-12-21 PROCEDURE — 85025 COMPLETE CBC W/AUTO DIFF WBC: CPT

## 2023-12-21 PROCEDURE — 80053 COMPREHEN METABOLIC PANEL: CPT

## 2024-01-02 DIAGNOSIS — D47.1 MPN (MYELOPROLIFERATIVE NEOPLASM) (HCC): ICD-10-CM

## 2024-01-02 RX ORDER — RUXOLITINIB 20 MG/1
TABLET ORAL
Qty: 60 TABLET | Refills: 2 | Status: ACTIVE | OUTPATIENT
Start: 2024-01-02

## 2024-01-03 ENCOUNTER — TELEPHONE (OUTPATIENT)
Dept: FAMILY MEDICINE CLINIC | Age: 79
End: 2024-01-03

## 2024-01-03 ENCOUNTER — OFFICE VISIT (OUTPATIENT)
Dept: FAMILY MEDICINE CLINIC | Age: 79
End: 2024-01-03

## 2024-01-03 VITALS
WEIGHT: 146 LBS | OXYGEN SATURATION: 93 % | BODY MASS INDEX: 20.44 KG/M2 | HEART RATE: 75 BPM | HEIGHT: 71 IN | RESPIRATION RATE: 18 BRPM | SYSTOLIC BLOOD PRESSURE: 96 MMHG | DIASTOLIC BLOOD PRESSURE: 56 MMHG | TEMPERATURE: 97.3 F

## 2024-01-03 DIAGNOSIS — U09.9 COVID-19 LONG HAULER: ICD-10-CM

## 2024-01-03 DIAGNOSIS — J43.9 PULMONARY EMPHYSEMA, UNSPECIFIED EMPHYSEMA TYPE (HCC): ICD-10-CM

## 2024-01-03 DIAGNOSIS — I25.119 ATHEROSCLEROSIS OF NATIVE CORONARY ARTERY OF NATIVE HEART WITH ANGINA PECTORIS (HCC): ICD-10-CM

## 2024-01-03 DIAGNOSIS — Z89.519 AMPUTEE, BELOW KNEE (HCC): ICD-10-CM

## 2024-01-03 DIAGNOSIS — C93.10 CHRONIC MYELOMONOCYTIC LEUKEMIA NOT HAVING ACHIEVED REMISSION (HCC): ICD-10-CM

## 2024-01-03 DIAGNOSIS — B96.89 ACUTE BACTERIAL SINUSITIS: Primary | ICD-10-CM

## 2024-01-03 DIAGNOSIS — N18.32 STAGE 3B CHRONIC KIDNEY DISEASE (HCC): ICD-10-CM

## 2024-01-03 DIAGNOSIS — J01.90 ACUTE BACTERIAL SINUSITIS: Primary | ICD-10-CM

## 2024-01-03 DIAGNOSIS — E27.8 ADRENAL NODULE (HCC): ICD-10-CM

## 2024-01-03 DIAGNOSIS — I71.20 THORACIC AORTIC ANEURYSM WITHOUT RUPTURE, UNSPECIFIED PART (HCC): ICD-10-CM

## 2024-01-03 DIAGNOSIS — D47.1 MPN (MYELOPROLIFERATIVE NEOPLASM) (HCC): ICD-10-CM

## 2024-01-03 DIAGNOSIS — Z72.0 TOBACCO ABUSE: ICD-10-CM

## 2024-01-03 DIAGNOSIS — I10 ESSENTIAL HYPERTENSION: Primary | ICD-10-CM

## 2024-01-03 RX ORDER — FLUTICASONE FUROATE AND VILANTEROL 100; 25 UG/1; UG/1
1 POWDER RESPIRATORY (INHALATION) DAILY
Qty: 1 EACH | Refills: 5 | Status: SHIPPED | OUTPATIENT
Start: 2024-01-03

## 2024-01-03 RX ORDER — CEFDINIR 300 MG/1
300 CAPSULE ORAL 2 TIMES DAILY
Qty: 14 CAPSULE | Refills: 0 | Status: SHIPPED | OUTPATIENT
Start: 2024-01-03 | End: 2024-01-10

## 2024-01-03 ASSESSMENT — PATIENT HEALTH QUESTIONNAIRE - PHQ9
2. FEELING DOWN, DEPRESSED OR HOPELESS: 0
1. LITTLE INTEREST OR PLEASURE IN DOING THINGS: 0
SUM OF ALL RESPONSES TO PHQ QUESTIONS 1-9: 0
SUM OF ALL RESPONSES TO PHQ9 QUESTIONS 1 & 2: 0
SUM OF ALL RESPONSES TO PHQ QUESTIONS 1-9: 0

## 2024-01-03 NOTE — TELEPHONE ENCOUNTER
Patient called and states he just left the pharmacy and was expecting an antibiotic to be called in. If ok please send, to [facility].

## 2024-01-04 ENCOUNTER — TELEPHONE (OUTPATIENT)
Dept: FAMILY MEDICINE CLINIC | Age: 79
End: 2024-01-04

## 2024-01-04 NOTE — TELEPHONE ENCOUNTER
Dr. Guidry office called and states pt no showed his appointment today at their office and just wanted to let us know.     Electronically signed by PAM CHAVEZ MA on 1/4/24 at 12:38 PM EST

## 2024-01-05 PROBLEM — U09.9 COVID-19 LONG HAULER: Status: ACTIVE | Noted: 2024-01-05

## 2024-01-05 PROBLEM — I25.119 ATHEROSCLEROSIS OF NATIVE CORONARY ARTERY OF NATIVE HEART WITH ANGINA PECTORIS (HCC): Status: ACTIVE | Noted: 2024-01-05

## 2024-01-05 PROBLEM — D47.1 MPN (MYELOPROLIFERATIVE NEOPLASM) (HCC): Status: ACTIVE | Noted: 2024-01-05

## 2024-01-05 ASSESSMENT — ENCOUNTER SYMPTOMS
VOMITING: 0
SHORTNESS OF BREATH: 0
WHEEZING: 0
CONSTIPATION: 0
NAUSEA: 0
GASTROINTESTINAL NEGATIVE: 1
ABDOMINAL PAIN: 0
TROUBLE SWALLOWING: 0
EYE ITCHING: 0
COUGH: 0
COLOR CHANGE: 0
BLOOD IN STOOL: 0
STRIDOR: 0
FACIAL SWELLING: 0
EYE PAIN: 0
PHOTOPHOBIA: 0
SORE THROAT: 0
EYE DISCHARGE: 0
DIARRHEA: 0
VOICE CHANGE: 0
ABDOMINAL DISTENTION: 0
BACK PAIN: 0
EYE REDNESS: 0
RECTAL PAIN: 0
SINUS PRESSURE: 0
ANAL BLEEDING: 0
CHOKING: 0
SINUS PAIN: 0
CHEST TIGHTNESS: 0
RHINORRHEA: 0

## 2024-01-05 NOTE — PROGRESS NOTES
SUBJECTIVE  Juan Jose Weinstein is a 78 y.o. male.    HPI/Chief C/O:  Chief Complaint   Patient presents with    Follow-Up from Hospital     Pt was in  hospital in [place] 2-3 weeks ago because tested positive for covid.  Pt states he still cannot taste anything and has been fatigued, but does feel better some.    Other     Pt will be having the cystoscopy done but Pia told Pt they will not do the procedure for 10 weeks after Pt recovered from the covid.     Allergies   Allergen Reactions    Codeine      MAKES PT VERY HIGH     This 78 year old male presents for hospital follow up from [facility] for covid. Pt states he feels better, c/o no taste, and fatigue. Pt to have cystoscopy in 10 weeks. Pt has MPN (HCC), follows with oncology. Pt has pulmonology appointment on 1/4/2023.   ROS:  Review of Systems   Constitutional:  Positive for activity change, fatigue and unexpected weight change. Negative for appetite change, chills, diaphoresis and fever.   HENT:  Negative for congestion, dental problem, drooling, ear discharge, ear pain, facial swelling, hearing loss, mouth sores, nosebleeds, postnasal drip, rhinorrhea, sinus pressure, sinus pain, sneezing, sore throat, tinnitus, trouble swallowing and voice change.    Eyes:  Positive for visual disturbance. Negative for photophobia, pain, discharge, redness and itching.   Respiratory:  Negative for cough, choking, chest tightness, shortness of breath, wheezing and stridor.    Cardiovascular:  Negative for chest pain, palpitations and leg swelling.   Gastrointestinal: Negative.  Negative for abdominal distention, abdominal pain, anal bleeding, blood in stool, constipation, diarrhea, nausea, rectal pain and vomiting.   Endocrine: Negative.  Negative for cold intolerance, heat intolerance, polydipsia, polyphagia and polyuria.   Genitourinary:  Positive for frequency and urgency. Negative for decreased urine volume, difficulty urinating, dysuria, flank pain and

## 2024-01-08 ENCOUNTER — CARE COORDINATION (OUTPATIENT)
Dept: CASE MANAGEMENT | Age: 79
End: 2024-01-08

## 2024-01-08 NOTE — CARE COORDINATION
Remote Alert Monitoring Note  Rpm alert to be reviewed by the provider   red alert   pulse ox reading (91%)   Additional needs to be addressed by N/A: No                  Date/Time:  2024 1:18 PM  Patient Current Location: Home: [address] [place] [address] [place] [address]  LPN contacted patient by telephone. Verified patients name and  as identifiers.  Background: enrolled in RPM for kidney disease HTN AND COPD  Refer to 911 immediately if:  Patient unresponsive or unable to provide history  Change in cognition or sudden confusion  Patient unable to respond in complete sentences  Intense chest pain/tightness  Any concern for any clinical emergency  Red Alert: Provider response time of 1 hr required for any red alert requiring intervention  Yellow Alert: Provider response time of 3hr required for any escalated yellow alert    O2 Triage  Are you having any Chest Pain? no   Are you having any Shortness of Breath? no   Swelling in your hands or feet? no     Are you having any other health concerns or issues? no      Clinical Interventions: Reviewed and followed up on alerts and treatments-Spoke to Abebe regarding RPM RED alert for low pulse ox reading. Pt denies chest pain or dyspnea. Speaks clearly in full sentences with no respiratory distress.  Takes medications and breathing treatments as directed.  Requested pt recheck his pulse ox. New reading is 97%  Plan/Follow Up: Will continue to review, monitor and address alerts with follow up based on severity of symptoms and risk factors.

## 2024-01-12 ENCOUNTER — CARE COORDINATION (OUTPATIENT)
Dept: CARE COORDINATION | Age: 79
End: 2024-01-12

## 2024-01-12 ASSESSMENT — ENCOUNTER SYMPTOMS: DYSPNEA ASSOCIATED WITH: EXERTION

## 2024-01-12 NOTE — CARE COORDINATION
Ambulatory Care Coordination Note  2024    Patient Current Location:  Home: [address] [place] [address] [place] [address]     ACM contacted the patient by telephone. Verified name and  with patient as identifiers. Provided introduction to self, and explanation of the ACM role.     Challenges to be reviewed by the provider   Additional needs identified to be addressed with provider: Yes  Diarrhea intermittent - declines appt.  Finished antibiotic yesterday.  Sputum much less but still productive cough. Spo2 96%.  Denies exac sx.  See RPM data               Method of communication with provider: chart routing.    ACM: Janina Vance RN      Offered patient enrollment in the Remote Patient Monitoring (RPM) program for in-home monitoring: Yes, patient already enrolled.  See RPM DATA                  Patient denies a worsening of symptoms from previous COVID.  Afebrile.  No worsening since received antibiotic from PCP on 1/3/24. Still having MNPC with yellowish sputum.  Was green and larger amount.  Patient has noticed it lessening after ATB. He is c/o diarrhea and was worried about the antibiotic causing it - but the patient has been done with ATB since yesterday..  Patient remains smoking 1 pack of cigarettes per day.  He continues with all inhalers as prescribed and takes Albuteral aerosols sparingly when needed.  He wears O2 2L NC and his oxygen levels are maintaining 94-98% on O2.  He does c/o SOB with  activity, weakness and tiredness.  Will update PCP.  He still has no taste.  Patient to see Dr. Bahena oncology on 24 Verbalized understanding of appt.  PCP appt 24.  Patient declined ACM assistance with making sooner appt.  (Patient procedure on  with botox injection (urology) for bladder incontinence cancelled.  He voiced he must wait 10 weeks after a covid diagnosis of COVID)  Patient attempted to reschedule appt with Dr Pickering but he reached a machine that they were closed.  Patient declined Dr number

## 2024-01-15 ENCOUNTER — TELEPHONE (OUTPATIENT)
Dept: FAMILY MEDICINE CLINIC | Age: 79
End: 2024-01-15

## 2024-01-15 RX ORDER — LOPERAMIDE HYDROCHLORIDE 2 MG/1
2 CAPSULE ORAL 3 TIMES DAILY PRN
Qty: 20 CAPSULE | Refills: 0 | Status: SHIPPED | OUTPATIENT
Start: 2024-01-15 | End: 2024-01-25

## 2024-01-15 NOTE — TELEPHONE ENCOUNTER
Patient called and states he's had Diarrhea all weekend long since stopping his antibiotic and wants to know if something can be called in for him. Please send to [facility] if ok

## 2024-01-18 ENCOUNTER — OFFICE VISIT (OUTPATIENT)
Dept: ONCOLOGY | Age: 79
End: 2024-01-18
Payer: MEDICARE

## 2024-01-18 ENCOUNTER — HOSPITAL ENCOUNTER (OUTPATIENT)
Dept: INFUSION THERAPY | Age: 79
Discharge: HOME | End: 2024-01-18
Payer: MEDICARE

## 2024-01-18 VITALS
BODY MASS INDEX: 20.06 KG/M2 | TEMPERATURE: 97.3 F | HEIGHT: 71 IN | OXYGEN SATURATION: 94 % | HEART RATE: 77 BPM | DIASTOLIC BLOOD PRESSURE: 60 MMHG | SYSTOLIC BLOOD PRESSURE: 129 MMHG | WEIGHT: 143.3 LBS

## 2024-01-18 DIAGNOSIS — D47.1 MPN (MYELOPROLIFERATIVE NEOPLASM) (HCC): Primary | ICD-10-CM

## 2024-01-18 DIAGNOSIS — D72.829 LEUKOCYTOSIS, UNSPECIFIED TYPE: ICD-10-CM

## 2024-01-18 LAB
ALBUMIN SERPL-MCNC: 3.9 G/DL (ref 3.5–5.2)
ALP SERPL-CCNC: 95 U/L (ref 40–129)
ALT SERPL-CCNC: 10 U/L (ref 0–40)
ANION GAP SERPL CALCULATED.3IONS-SCNC: 10 MMOL/L (ref 7–16)
AST SERPL-CCNC: 17 U/L (ref 0–39)
BASOPHILS # BLD: 0.03 K/UL (ref 0–0.2)
BASOPHILS NFR BLD: 0 % (ref 0–2)
BILIRUB SERPL-MCNC: 0.4 MG/DL (ref 0–1.2)
BUN SERPL-MCNC: 19 MG/DL (ref 6–23)
CALCIUM SERPL-MCNC: 8.8 MG/DL (ref 8.6–10.2)
CHLORIDE SERPL-SCNC: 105 MMOL/L (ref 98–107)
CO2 SERPL-SCNC: 27 MMOL/L (ref 22–29)
CREAT SERPL-MCNC: 1.7 MG/DL (ref 0.7–1.2)
EOSINOPHIL # BLD: 0.02 K/UL (ref 0.05–0.5)
EOSINOPHILS RELATIVE PERCENT: 0 % (ref 0–6)
ERYTHROCYTE [DISTWIDTH] IN BLOOD BY AUTOMATED COUNT: 18.2 % (ref 11.5–15)
GFR SERPL CREATININE-BSD FRML MDRD: 41 ML/MIN/1.73M2
GLUCOSE SERPL-MCNC: 97 MG/DL (ref 74–99)
HCT VFR BLD AUTO: 28.6 % (ref 37–54)
HGB BLD-MCNC: 9.3 G/DL (ref 12.5–16.5)
IMM GRANULOCYTES # BLD AUTO: 0.31 K/UL (ref 0–0.58)
IMM GRANULOCYTES NFR BLD: 4 % (ref 0–5)
LYMPHOCYTES NFR BLD: 0.99 K/UL (ref 1.5–4)
LYMPHOCYTES RELATIVE PERCENT: 13 % (ref 20–42)
MCH RBC QN AUTO: 32.4 PG (ref 26–35)
MCHC RBC AUTO-ENTMCNC: 32.5 G/DL (ref 32–34.5)
MCV RBC AUTO: 99.7 FL (ref 80–99.9)
MONOCYTES NFR BLD: 0.86 K/UL (ref 0.1–0.95)
MONOCYTES NFR BLD: 11 % (ref 2–12)
NEUTROPHILS NFR BLD: 71 % (ref 43–80)
NEUTS SEG NFR BLD: 5.52 K/UL (ref 1.8–7.3)
PLATELET # BLD AUTO: 262 K/UL (ref 130–450)
PMV BLD AUTO: 11.1 FL (ref 7–12)
POTASSIUM SERPL-SCNC: 4.2 MMOL/L (ref 3.5–5)
PROT SERPL-MCNC: 6.3 G/DL (ref 6.4–8.3)
RBC # BLD AUTO: 2.87 M/UL (ref 3.8–5.8)
SODIUM SERPL-SCNC: 142 MMOL/L (ref 132–146)
WBC OTHER # BLD: 7.7 K/UL (ref 4.5–11.5)

## 2024-01-18 PROCEDURE — 1123F ACP DISCUSS/DSCN MKR DOCD: CPT | Performed by: STUDENT IN AN ORGANIZED HEALTH CARE EDUCATION/TRAINING PROGRAM

## 2024-01-18 PROCEDURE — 3074F SYST BP LT 130 MM HG: CPT | Performed by: STUDENT IN AN ORGANIZED HEALTH CARE EDUCATION/TRAINING PROGRAM

## 2024-01-18 PROCEDURE — 99213 OFFICE O/P EST LOW 20 MIN: CPT | Performed by: STUDENT IN AN ORGANIZED HEALTH CARE EDUCATION/TRAINING PROGRAM

## 2024-01-18 PROCEDURE — 85025 COMPLETE CBC W/AUTO DIFF WBC: CPT

## 2024-01-18 PROCEDURE — 4004F PT TOBACCO SCREEN RCVD TLK: CPT | Performed by: STUDENT IN AN ORGANIZED HEALTH CARE EDUCATION/TRAINING PROGRAM

## 2024-01-18 PROCEDURE — G8484 FLU IMMUNIZE NO ADMIN: HCPCS | Performed by: STUDENT IN AN ORGANIZED HEALTH CARE EDUCATION/TRAINING PROGRAM

## 2024-01-18 PROCEDURE — 80053 COMPREHEN METABOLIC PANEL: CPT

## 2024-01-18 PROCEDURE — 3078F DIAST BP <80 MM HG: CPT | Performed by: STUDENT IN AN ORGANIZED HEALTH CARE EDUCATION/TRAINING PROGRAM

## 2024-01-18 PROCEDURE — 36415 COLL VENOUS BLD VENIPUNCTURE: CPT

## 2024-01-18 PROCEDURE — G8427 DOCREV CUR MEDS BY ELIG CLIN: HCPCS | Performed by: STUDENT IN AN ORGANIZED HEALTH CARE EDUCATION/TRAINING PROGRAM

## 2024-01-18 PROCEDURE — G8420 CALC BMI NORM PARAMETERS: HCPCS | Performed by: STUDENT IN AN ORGANIZED HEALTH CARE EDUCATION/TRAINING PROGRAM

## 2024-01-18 NOTE — PROGRESS NOTES
Patient provided with discharge instructions, received printed AVS.  All questions answered.  Patient understands follow up plan of care.     
12/21/2023    BUN 23 12/21/2023    CREATININE 1.5 (H) 12/21/2023    GLUCOSE 96 12/21/2023    CALCIUM 8.9 12/21/2023    PROT 6.3 (L) 12/21/2023    LABALBU 4.3 12/21/2023    BILITOT 0.2 12/21/2023    ALKPHOS 90 12/21/2023    AST 19 12/21/2023    ALT 15 12/21/2023    LABGLOM 47 (L) 12/21/2023    GFRAA 51 05/25/2022     ASSESSMENT    Myeloproliferative neoplasm, JAK2+, favoring pre-fibrotic MF  COPD   CKD stage III  CAD on aspirin & Plavix  Family history of leukemia: Affected father.    01/18/2024: Overall denies of new changes. He continues to feel fatigued. Consider possible underlying COPD could be contributing; he will be seeing Dr. Guidry on 1/29/24. After patient had left, labs had resulted. Still has anemia but largely stable with minimal decreased. He also be fatigued from multiple other causes including his recent hospitalization, his MPN and/or treatment. Vitals stable.     Plan  Continue Jakafi 20 mg BID for now  Monitor counts      Will be seeing Dr. Guidry with pulm later this month  But consider reducing Jakafi dose if there is concern for clinical/hematologic toxicity  Has been seen by Dr. Degroot at [facility]    Dispo:  RTC in 1 month with labs        Approximately spent 29 minutes on chart review as well as time spent on patient encounter, discussion of the laboratory, imaging, clinical findings, and documentation. I have discussed clinical implications and recommendations on the patient's primary issues. More than 50% of time was spent counseling patient. The patient verbalized understanding.      Jewel Bahena MD  Medical Oncology  [facility]

## 2024-01-19 VITALS
SYSTOLIC BLOOD PRESSURE: 112 MMHG | WEIGHT: 143.2 LBS | HEART RATE: 69 BPM | OXYGEN SATURATION: 93 % | BODY MASS INDEX: 19.97 KG/M2 | DIASTOLIC BLOOD PRESSURE: 67 MMHG

## 2024-01-22 ENCOUNTER — CARE COORDINATION (OUTPATIENT)
Dept: CASE MANAGEMENT | Age: 79
End: 2024-01-22

## 2024-01-22 DIAGNOSIS — F41.9 ANXIETY: ICD-10-CM

## 2024-01-22 RX ORDER — BUSPIRONE HYDROCHLORIDE 5 MG/1
5 TABLET ORAL 2 TIMES DAILY PRN
Qty: 60 TABLET | Refills: 0 | Status: SHIPPED | OUTPATIENT
Start: 2024-01-22 | End: 2024-02-21

## 2024-01-22 NOTE — CARE COORDINATION
Remote Patient Monitoring Note  Date/Time:  2024 10:47 AM  Patient Current Location: Home: [address] [place] [address] [place] [address]  LPN contacted patient by telephone regarding  no metrics x 3 days  . Verified patients name and  as identifiers.  Background: enrolled in RPM for kidney disease HTN AND COPD  Clinical Interventions: Reviewed and followed up on alerts and treatments-spoke to Abebe regarding no metrics in RPM for 3 days. Pt states he has been sick with the flu. He also reports he has had no internet x 3 days. Pt will attempt to check metrics today    Plan/Follow Up: Will continue to review, monitor and address alerts with follow up based on severity of symptoms and risk factors.

## 2024-01-24 ENCOUNTER — TELEMEDICINE (OUTPATIENT)
Dept: FAMILY MEDICINE CLINIC | Age: 79
End: 2024-01-24

## 2024-01-24 DIAGNOSIS — E43 SEVERE PROTEIN-CALORIE MALNUTRITION (HCC): ICD-10-CM

## 2024-01-24 DIAGNOSIS — U07.1 COVID: ICD-10-CM

## 2024-01-24 DIAGNOSIS — J40 BRONCHITIS: Primary | ICD-10-CM

## 2024-01-24 RX ORDER — DOXYCYCLINE HYCLATE 100 MG
100 TABLET ORAL 2 TIMES DAILY
Qty: 20 TABLET | Refills: 0 | Status: SHIPPED | OUTPATIENT
Start: 2024-01-24 | End: 2024-02-03

## 2024-01-24 RX ORDER — FEEDER CONTAINER WITH PUMP SET
2 EACH MISCELLANEOUS 2 TIMES DAILY
Qty: 237000 ML | Refills: 2 | Status: SHIPPED | OUTPATIENT
Start: 2024-01-24

## 2024-01-24 RX ORDER — NITROGLYCERIN 0.4 MG/1
TABLET SUBLINGUAL
Qty: 25 TABLET | Refills: 3 | Status: SHIPPED | OUTPATIENT
Start: 2024-01-24

## 2024-01-28 PROBLEM — E43 SEVERE PROTEIN-CALORIE MALNUTRITION (HCC): Status: ACTIVE | Noted: 2024-01-28

## 2024-01-28 PROBLEM — U07.1 COVID: Status: ACTIVE | Noted: 2024-01-28

## 2024-01-29 ENCOUNTER — CARE COORDINATION (OUTPATIENT)
Dept: CARE COORDINATION | Age: 79
End: 2024-01-29

## 2024-01-29 ASSESSMENT — ENCOUNTER SYMPTOMS: DYSPNEA ASSOCIATED WITH: EXERTION

## 2024-01-29 NOTE — CARE COORDINATION
Ambulatory Care Coordination Note  2024    Patient Current Location:  Home: [address] [place] [address] [place] [address]     ACM contacted the patient by telephone. Verified name and  with patient as identifiers. Provided introduction to self, and explanation of the ACM role.     Challenges to be reviewed by the provider   Additional needs identified to be addressed with provider: No                 Method of communication with provider: none.    ACM: Janina Vance RN    Offered patient enrollment in the Remote Patient Monitoring (RPM) program for in-home monitoring: Yes, patient already enrolled.  Patient graduation at next outreach.  Patient agrees and is aware.  Patient denies a worsening of symptoms from previous COVID.  Afebrile.  No worsening.  Continues Doxycycline. Patient remains smoking 1 pack of cigarettes per day.  He continues with all inhalers as prescribed and takes Albuteral aerosols sparingly when needed.  He wears O2 2L NC and his oxygen levels are maintaining 94-98% on O2.      (Patient procedure on  with botox injection (urology) for bladder incontinence cancelled.  He voiced he must wait 10 weeks after a covid diagnosis of COVID)  Juan Jose is unsure if he'd like to pursue this avenue.  Patient has appointment with Dr. Pickering on  and is aware of time.  Patient seeing Dr. Bahena and has a CT scan scheduled in the meantime.       CHF  See assessment  Denies s/s exac. CHF  CHF zone tool discussion  .4 today via RPM     COPD  See assessment  O2 2LNC continuous except at rest while awake.  SaO2 96% on O2 2L NC  Denies any s/s COPD exacerbation.    COPD zone tool  Smokes 1 pack per day. Patient educated on smoking cessation methods but continues to decline.  Patient educated to never smoke while on oxygen and he voiced he would never attempt that.       HTN  See assessment  Denies s/s hypo/hypertension at this time  /66        PLAN  Continue Care Coordination  Assess ongoing

## 2024-02-01 NOTE — TELEPHONE ENCOUNTER
Last seen 1/24/2024  Next appt 4/3/2024    Electronically signed by PAM CHAVEZ MA on 2/1/24 at 11:09 AM EST

## 2024-02-14 ENCOUNTER — HOSPITAL ENCOUNTER (EMERGENCY)
Facility: HOSPITAL | Age: 79
Discharge: HOME | DRG: 177 | End: 2024-02-14
Attending: EMERGENCY MEDICINE
Payer: MEDICARE

## 2024-02-14 ENCOUNTER — CARE COORDINATION (OUTPATIENT)
Dept: CASE MANAGEMENT | Age: 79
End: 2024-02-14

## 2024-02-14 ENCOUNTER — PHARMACY VISIT (OUTPATIENT)
Dept: PHARMACY | Facility: CLINIC | Age: 79
End: 2024-02-14
Payer: COMMERCIAL

## 2024-02-14 ENCOUNTER — APPOINTMENT (OUTPATIENT)
Dept: RADIOLOGY | Facility: HOSPITAL | Age: 79
DRG: 177 | End: 2024-02-14
Payer: MEDICARE

## 2024-02-14 ENCOUNTER — TELEPHONE (OUTPATIENT)
Dept: FAMILY MEDICINE CLINIC | Age: 79
End: 2024-02-14

## 2024-02-14 ENCOUNTER — CARE COORDINATION (OUTPATIENT)
Dept: CARE COORDINATION | Age: 79
End: 2024-02-14

## 2024-02-14 VITALS
RESPIRATION RATE: 14 BRPM | DIASTOLIC BLOOD PRESSURE: 63 MMHG | BODY MASS INDEX: 21.42 KG/M2 | HEART RATE: 82 BPM | HEIGHT: 71 IN | SYSTOLIC BLOOD PRESSURE: 106 MMHG | TEMPERATURE: 98.1 F | WEIGHT: 153 LBS | OXYGEN SATURATION: 98 %

## 2024-02-14 DIAGNOSIS — D47.1 MPN (MYELOPROLIFERATIVE NEOPLASM) (HCC): ICD-10-CM

## 2024-02-14 DIAGNOSIS — C93.10 CHRONIC MYELOMONOCYTIC LEUKEMIA NOT HAVING ACHIEVED REMISSION (HCC): ICD-10-CM

## 2024-02-14 DIAGNOSIS — N18.32 STAGE 3B CHRONIC KIDNEY DISEASE (HCC): ICD-10-CM

## 2024-02-14 DIAGNOSIS — U09.9 COVID-19 LONG HAULER: ICD-10-CM

## 2024-02-14 DIAGNOSIS — J44.1 COPD EXACERBATION (MULTI): Primary | ICD-10-CM

## 2024-02-14 DIAGNOSIS — Z89.519 AMPUTEE, BELOW KNEE (HCC): ICD-10-CM

## 2024-02-14 DIAGNOSIS — E43 SEVERE PROTEIN-CALORIE MALNUTRITION (HCC): Primary | ICD-10-CM

## 2024-02-14 LAB
ALBUMIN SERPL BCP-MCNC: 4.2 G/DL (ref 3.4–5)
ALP SERPL-CCNC: 88 U/L (ref 33–136)
ALT SERPL W P-5'-P-CCNC: 13 U/L (ref 10–52)
ANION GAP BLDV CALCULATED.4IONS-SCNC: 6 MMOL/L (ref 10–25)
ANION GAP SERPL CALC-SCNC: 10 MMOL/L (ref 10–20)
AST SERPL W P-5'-P-CCNC: 23 U/L (ref 9–39)
BASE EXCESS BLDV CALC-SCNC: 3.6 MMOL/L (ref -2–3)
BASE EXCESS BLDV CALC-SCNC: 5 MMOL/L (ref -2–3)
BASOPHILS # BLD AUTO: 0.02 X10*3/UL (ref 0–0.1)
BASOPHILS NFR BLD AUTO: 0.3 %
BILIRUB SERPL-MCNC: 0.5 MG/DL (ref 0–1.2)
BNP SERPL-MCNC: 125 PG/ML (ref 0–99)
BODY TEMPERATURE: 37 DEGREES CELSIUS
BODY TEMPERATURE: 37 DEGREES CELSIUS
BUN SERPL-MCNC: 22 MG/DL (ref 6–23)
CA-I BLDV-SCNC: 1.18 MMOL/L (ref 1.1–1.33)
CALCIUM SERPL-MCNC: 8.5 MG/DL (ref 8.6–10.3)
CARDIAC TROPONIN I PNL SERPL HS: 24 NG/L (ref 0–20)
CARDIAC TROPONIN I PNL SERPL HS: 25 NG/L (ref 0–20)
CHLORIDE BLDV-SCNC: 99 MMOL/L (ref 98–107)
CHLORIDE SERPL-SCNC: 101 MMOL/L (ref 98–107)
CO2 SERPL-SCNC: 32 MMOL/L (ref 21–32)
CREAT SERPL-MCNC: 1.28 MG/DL (ref 0.5–1.3)
EGFRCR SERPLBLD CKD-EPI 2021: 57 ML/MIN/1.73M*2
EOSINOPHIL # BLD AUTO: 0.03 X10*3/UL (ref 0–0.4)
EOSINOPHIL NFR BLD AUTO: 0.4 %
ERYTHROCYTE [DISTWIDTH] IN BLOOD BY AUTOMATED COUNT: 18.2 % (ref 11.5–14.5)
FLUAV RNA RESP QL NAA+PROBE: NOT DETECTED
FLUBV RNA RESP QL NAA+PROBE: NOT DETECTED
GLUCOSE BLDV-MCNC: 130 MG/DL (ref 74–99)
GLUCOSE SERPL-MCNC: 120 MG/DL (ref 74–99)
HCO3 BLDV-SCNC: 30.1 MMOL/L (ref 22–26)
HCO3 BLDV-SCNC: 34.8 MMOL/L (ref 22–26)
HCT VFR BLD AUTO: 30.4 % (ref 41–52)
HCT VFR BLD EST: 46 % (ref 41–52)
HGB BLD-MCNC: 9.6 G/DL (ref 13.5–17.5)
HGB BLDV-MCNC: 15.4 G/DL (ref 13.5–17.5)
IMM GRANULOCYTES # BLD AUTO: 0.06 X10*3/UL (ref 0–0.5)
IMM GRANULOCYTES NFR BLD AUTO: 0.8 % (ref 0–0.9)
INHALED O2 CONCENTRATION: 29 %
INHALED O2 CONCENTRATION: 33 %
LACTATE BLDV-SCNC: 1 MMOL/L (ref 0.4–2)
LACTATE BLDV-SCNC: 2.1 MMOL/L (ref 0.4–2)
LYMPHOCYTES # BLD AUTO: 0.57 X10*3/UL (ref 0.8–3)
LYMPHOCYTES NFR BLD AUTO: 7.4 %
MCH RBC QN AUTO: 30.8 PG (ref 26–34)
MCHC RBC AUTO-ENTMCNC: 31.6 G/DL (ref 32–36)
MCV RBC AUTO: 97 FL (ref 80–100)
MONOCYTES # BLD AUTO: 1.09 X10*3/UL (ref 0.05–0.8)
MONOCYTES NFR BLD AUTO: 14.1 %
NEUTROPHILS # BLD AUTO: 5.96 X10*3/UL (ref 1.6–5.5)
NEUTROPHILS NFR BLD AUTO: 77 %
NRBC BLD-RTO: 0 /100 WBCS (ref 0–0)
OXYHGB MFR BLDV: 42.9 % (ref 45–75)
OXYHGB MFR BLDV: 87.2 % (ref 45–75)
PCO2 BLDV: 52 MM HG (ref 41–51)
PCO2 BLDV: 74 MM HG (ref 41–51)
PH BLDV: 7.28 PH (ref 7.33–7.43)
PH BLDV: 7.37 PH (ref 7.33–7.43)
PLATELET # BLD AUTO: 234 X10*3/UL (ref 150–450)
PO2 BLDV: 29 MM HG (ref 35–45)
PO2 BLDV: 61 MM HG (ref 35–45)
POTASSIUM BLDV-SCNC: 3.9 MMOL/L (ref 3.5–5.3)
POTASSIUM SERPL-SCNC: 3.7 MMOL/L (ref 3.5–5.3)
PROT SERPL-MCNC: 6.7 G/DL (ref 6.4–8.2)
RBC # BLD AUTO: 3.12 X10*6/UL (ref 4.5–5.9)
RSV RNA RESP QL NAA+PROBE: NOT DETECTED
SAO2 % BLDV: 45 % (ref 45–75)
SAO2 % BLDV: 92 % (ref 45–75)
SARS-COV-2 RNA RESP QL NAA+PROBE: NOT DETECTED
SODIUM BLDV-SCNC: 136 MMOL/L (ref 136–145)
SODIUM SERPL-SCNC: 139 MMOL/L (ref 136–145)
WBC # BLD AUTO: 7.7 X10*3/UL (ref 4.4–11.3)

## 2024-02-14 PROCEDURE — 2500000004 HC RX 250 GENERAL PHARMACY W/ HCPCS (ALT 636 FOR OP/ED): Performed by: EMERGENCY MEDICINE

## 2024-02-14 PROCEDURE — 94640 AIRWAY INHALATION TREATMENT: CPT

## 2024-02-14 PROCEDURE — RXMED WILLOW AMBULATORY MEDICATION CHARGE

## 2024-02-14 PROCEDURE — 82805 BLOOD GASES W/O2 SATURATION: CPT | Performed by: EMERGENCY MEDICINE

## 2024-02-14 PROCEDURE — 36415 COLL VENOUS BLD VENIPUNCTURE: CPT | Performed by: EMERGENCY MEDICINE

## 2024-02-14 PROCEDURE — 84132 ASSAY OF SERUM POTASSIUM: CPT | Performed by: EMERGENCY MEDICINE

## 2024-02-14 PROCEDURE — 84484 ASSAY OF TROPONIN QUANT: CPT | Performed by: EMERGENCY MEDICINE

## 2024-02-14 PROCEDURE — 71045 X-RAY EXAM CHEST 1 VIEW: CPT | Mod: FOREIGN READ | Performed by: RADIOLOGY

## 2024-02-14 PROCEDURE — 99284 EMERGENCY DEPT VISIT MOD MDM: CPT | Mod: 25

## 2024-02-14 PROCEDURE — 2500000002 HC RX 250 W HCPCS SELF ADMINISTERED DRUGS (ALT 637 FOR MEDICARE OP, ALT 636 FOR OP/ED): Performed by: EMERGENCY MEDICINE

## 2024-02-14 PROCEDURE — 83605 ASSAY OF LACTIC ACID: CPT | Performed by: EMERGENCY MEDICINE

## 2024-02-14 PROCEDURE — 71045 X-RAY EXAM CHEST 1 VIEW: CPT

## 2024-02-14 PROCEDURE — 87637 SARSCOV2&INF A&B&RSV AMP PRB: CPT | Performed by: EMERGENCY MEDICINE

## 2024-02-14 PROCEDURE — 85025 COMPLETE CBC W/AUTO DIFF WBC: CPT | Performed by: EMERGENCY MEDICINE

## 2024-02-14 PROCEDURE — 96374 THER/PROPH/DIAG INJ IV PUSH: CPT

## 2024-02-14 PROCEDURE — 99285 EMERGENCY DEPT VISIT HI MDM: CPT | Mod: 25 | Performed by: EMERGENCY MEDICINE

## 2024-02-14 PROCEDURE — 83880 ASSAY OF NATRIURETIC PEPTIDE: CPT | Performed by: EMERGENCY MEDICINE

## 2024-02-14 RX ORDER — IPRATROPIUM BROMIDE AND ALBUTEROL SULFATE 2.5; .5 MG/3ML; MG/3ML
3 SOLUTION RESPIRATORY (INHALATION)
Status: DISCONTINUED | OUTPATIENT
Start: 2024-02-14 | End: 2024-02-14 | Stop reason: HOSPADM

## 2024-02-14 RX ORDER — IPRATROPIUM BROMIDE AND ALBUTEROL SULFATE 2.5; .5 MG/3ML; MG/3ML
3 SOLUTION RESPIRATORY (INHALATION) ONCE
Status: COMPLETED | OUTPATIENT
Start: 2024-02-14 | End: 2024-02-14

## 2024-02-14 RX ORDER — PREDNISONE 10 MG/1
TABLET ORAL
Qty: 30 TABLET | Refills: 0 | Status: SHIPPED | OUTPATIENT
Start: 2024-02-14 | End: 2024-02-26 | Stop reason: HOSPADM

## 2024-02-14 RX ADMIN — IPRATROPIUM BROMIDE AND ALBUTEROL SULFATE 3 ML: 2.5; .5 SOLUTION RESPIRATORY (INHALATION) at 13:44

## 2024-02-14 RX ADMIN — IPRATROPIUM BROMIDE AND ALBUTEROL SULFATE 3 ML: 2.5; .5 SOLUTION RESPIRATORY (INHALATION) at 16:46

## 2024-02-14 RX ADMIN — METHYLPREDNISOLONE SODIUM SUCCINATE 125 MG: 125 INJECTION, POWDER, FOR SOLUTION INTRAMUSCULAR; INTRAVENOUS at 13:47

## 2024-02-14 ASSESSMENT — LIFESTYLE VARIABLES
EVER FELT BAD OR GUILTY ABOUT YOUR DRINKING: NO
EVER HAD A DRINK FIRST THING IN THE MORNING TO STEADY YOUR NERVES TO GET RID OF A HANGOVER: NO
HAVE PEOPLE ANNOYED YOU BY CRITICIZING YOUR DRINKING: NO
HAVE YOU EVER FELT YOU SHOULD CUT DOWN ON YOUR DRINKING: NO

## 2024-02-14 ASSESSMENT — PAIN - FUNCTIONAL ASSESSMENT: PAIN_FUNCTIONAL_ASSESSMENT: 0-10

## 2024-02-14 ASSESSMENT — PAIN SCALES - GENERAL: PAINLEVEL_OUTOF10: 0 - NO PAIN

## 2024-02-14 NOTE — ED PROVIDER NOTES
HPI   Chief Complaint   Patient presents with    Shortness of Breath     SOB for a few days. Pt also stated that they have been experiencing chills and have had a productive cough (green sputum)       Patient presents emergency department for shortness of breath.  The patient's been short of breath for the last 2 to 3 days.  States he was started on oxygen within the past year.  Increase his oxygen from 2 L to 4 L recently which helped with some shortness of breath about a week ago.  He did recently decrease it back down to 2 L.  He returned back up because he had no how high he can go with the oxygen.  Patient denies any fevers, chest pain, diaphoresis.  Patient called [facility] which gave him DuoNeb and route but no steroids.                        [place] Coma Scale Score: 15                     Patient History   Past Medical History:   Diagnosis Date    CHF (congestive heart failure) (CMS/[facility])     Chronic kidney disease     COPD (chronic obstructive pulmonary disease) (CMS/[facility])     Emphysema of lung (CMS/[facility])     GERD (gastroesophageal reflux disease)     Hypertension      No past surgical history on file.  No family history on file.  Social History     Tobacco Use    Smoking status: Every Day     Packs/day: 1.00     Years: 60.00     Additional pack years: 0.00     Total pack years: 60.00     Types: Cigarettes    Smokeless tobacco: Never   Substance Use Topics    Alcohol use: Not Currently    Drug use: Not on file     Comment: His girlfriend smokes marijuana in their home       Physical Exam   ED Triage Vitals   Temperature Heart Rate Respirations BP   02/14/24 1309 02/14/24 1302 02/14/24 1309 02/14/24 1309   36.7 °C (98.1 °F) 82 17 137/79      Pulse Ox Temp src Heart Rate Source Patient Position   02/14/24 1302 -- 02/14/24 1302 02/14/24 1302   100 %  Monitor Lying      BP Location FiO2 (%)     -- --             Physical Exam  Vitals and nursing note reviewed.   Constitutional:       General: He is not in acute  distress.     Appearance: He is well-developed.   HENT:      Head: Normocephalic and atraumatic.      Right Ear: External ear normal.      Left Ear: External ear normal.      Mouth/Throat:      Mouth: Mucous membranes are moist.      Pharynx: Oropharynx is clear.   Eyes:      Extraocular Movements: Extraocular movements intact.      Conjunctiva/sclera: Conjunctivae normal.   Neck:      Trachea: Trachea normal.   Cardiovascular:      Rate and Rhythm: Normal rate.   Pulmonary:      Effort: Pulmonary effort is normal. No respiratory distress.      Breath sounds: Examination of the left-middle field reveals wheezing. Examination of the right-lower field reveals wheezing. Examination of the left-lower field reveals wheezing. Wheezing present.   Abdominal:      General: Bowel sounds are normal.      Palpations: Abdomen is soft.      Tenderness: There is no abdominal tenderness.   Musculoskeletal:         General: No swelling or tenderness.      Cervical back: No tenderness.   Skin:     General: Skin is warm and dry.      Capillary Refill: Capillary refill takes less than 2 seconds.   Neurological:      General: No focal deficit present.      Mental Status: He is alert and oriented to person, place, and time.   Psychiatric:         Mood and Affect: Mood normal.         Thought Content: Thought content does not include homicidal or suicidal ideation.       ED Course & MDM   Diagnoses as of 02/14/24 1731   COPD exacerbation (CMS/[facility])       Medical Decision Making  Patient presents with shortness of breath for few days. Available chart reviewed. On initial assessment the patient was found non-toxic, no acute distress, vitals hemodynamically stable and afebrile. Initial concern for MI, ACS, pneumonia, pneumothorax, COPD exacerbation, viral infection. CBC shows no leukocytosis, improved anemia with hemoglobin 9.6, no thrombocytopenia.  Metabolic panel shows no electrolyte abnormalities, elevated normal BUN and creatinine,  normal liver function.  Initial venous blood gas shows respiratory acidemia with CO2 retention at 74.  There is a delay in communicating this result to myself.  Repeat venous blood gas shows normalization without any interventions.  Troponin is elevated at 24 and 25 which appears to be the patient's baseline.  Lactic acid is normal at 1.  Flu and COVID are not detected.  BNP is slightly better than 2 months ago at 125.  Chest x-ray is read as small pleural effusion.  On reassessment patient is feeling better after steroids and DuoNeb.  Will discharge home with prednisone.  Patient instructed to use his inhaler every 2 hours while he is awake for the next few days.    No indication for admission.  Discussed findings and diagnosis with the patient, follow-up and return to ED precautions given, patient voiced understanding, agrees with plan, questions answered, patient was discharged in stable condition.        Procedure  Procedures     Carson Scott MD  02/14/24 4844

## 2024-02-14 NOTE — CARE COORDINATION
Remote Patient Monitoring Note    Date/Time:  2024 10:51 AM  Patient Current Location: Home: [address] [place] [address] [place] [address]  LPN contacted patient by telephone regarding  NO METRICS x 2 days   Verified patients name and  as identifiers.\  Background: enrolled in RPM for kidney disease HTN AND COPD  Clinical Interventions: Reviewed and followed up on alerts and treatments-spoke to Abebe regarding no metrics x 2 days. Pt states his life \" has been hectic for a few days\"/ pt reports he had to call EMS yesterday r/t SOB. He did not go to the hospital. Oxygen level turned up. Pt states he will attempt to get metrics today.  Update to [facility] Janina Vance RN  Plan/Follow Up: Will continue to review, monitor and address alerts with follow up based on severity of symptoms and risk factors.

## 2024-02-14 NOTE — TELEPHONE ENCOUNTER
Pt called and states he is very SOB and the nurse at his house just checked his lungs and said they sound terrible and that his blood pressure is 184/86. Pt was told he needed to go to the ED and the pt states he will call an ambulance and go.     Electronically signed by PAM CHAVEZ MA on 2/14/24 at 12:38 PM EST

## 2024-02-14 NOTE — ED NOTES
Pt OK for d/c home per provider. All results and findings discussed with patient by provider. Home care and follow up instructions provided to patient. All questions answered. Pt verbalized understanding of all information. Pt A&Ox4, resp easy on normal 2 lpm NC, skin warm dry and of appropriate color. Departs ED with daughter to home.     Nadine Verduzco RN  02/14/24 1817       Nadine Verduzco RN  02/14/24 1817

## 2024-02-15 ENCOUNTER — CARE COORDINATION (OUTPATIENT)
Dept: CASE MANAGEMENT | Age: 79
End: 2024-02-15

## 2024-02-15 ENCOUNTER — TELEPHONE (OUTPATIENT)
Dept: ONCOLOGY | Age: 79
End: 2024-02-15

## 2024-02-15 ENCOUNTER — TELEPHONE (OUTPATIENT)
Dept: FAMILY MEDICINE CLINIC | Age: 79
End: 2024-02-15

## 2024-02-15 NOTE — CARE COORDINATION
Date/Time:  2/15/2024 1:58 PM  LPN attempted to reach patient by telephone regarding  No BP x 2 days  in remote patient monitoring program. Left HIPPA compliant message requesting a return call. Will attempt to reach patient again.    Noted pt called his PCP yesterday and recommended her call EMS and go to the ED.  No ED notes. Pt often uses  [facility]. Unknown if admitted.   Enrolled in [facility] for Kidney disease HTN AND COPD  Update to [facility] Janina Vance RN

## 2024-02-15 NOTE — TELEPHONE ENCOUNTER
02/15/24 Patient called and left vm with late day/same day cancellation. I called patient back to reschedule appt but no one answered and mailbox was full.

## 2024-02-15 NOTE — TELEPHONE ENCOUNTER
Patient went to ed and was d/c to home with an increase in oxygen from 2lpm to 4lpm. Dx with bronchitis given, steroids do you want to f/u?

## 2024-02-15 NOTE — CARE COORDINATION
review  Review appointments and medication changes  Check SW referral  Check medication compliance  Discuss symptom resolution (SOB, HTN)  Discuss new F/U made with Dr. Bahena  Discuss if HHC for [facility] CPA/MC       Lab Results       None            Care Coordination Interventions    Referral from Primary Care Provider: Yes  Suggested Interventions and Community Resources  Fall Risk Prevention: In Process (Comment: Fall Precautions, Bleeding Precautions)  Home Health Services: Declined  Medication Assistance Program: In Process (Comment: PAP)  Medi Set or Pill Pack: Declined  Pharmacist: In Process (Comment: PHP Pharmacist)  Registered Dietician: Declined  Smoking Cessation: In Process (Comment: Smoking cessation)  Social Work: Declined  Zone Management Tools: In Process (Comment: COPD)          Goals Addressed                   This Visit's Progress     Conditions and Symptoms   No change     I will schedule office visits, as directed by my provider.  I will keep my appointment or reschedule if I have to cancel.  I will notify my provider of any barriers to my plan of care.  I will follow my Zone Management tool to seek urgent or emergent care.  I will notify my provider of any symptoms that indicate a worsening of my condition.    Barriers: lack of education  Plan for overcoming my barriers: Notify care team of any changes in treatment from external providers.   Confidence: 10/10  Anticipated Goal Completion Date: 2/5/2024         Medication Management   Worsening     I will take my medication as directed.  I will notify my provider of any problems with medications, like adverse effects or side effects.  I will notify my provider/Care Coordinator if I am unable to afford my medications.  I will notify my provider for advice before I stop taking any of my medication.    Barriers: lack of education  Plan for overcoming my barriers: Speak with [facility] Pharmacist. Voice concerns, and ask questions  Confidence:

## 2024-02-15 NOTE — TELEPHONE ENCOUNTER
Rn nurse coordinator called and states that patient is unsure of his medications what he's supposed to take requesting home skilled nurse be ordered.

## 2024-02-16 ENCOUNTER — TELEPHONE (OUTPATIENT)
Dept: FAMILY MEDICINE CLINIC | Age: 79
End: 2024-02-16

## 2024-02-16 ENCOUNTER — CARE COORDINATION (OUTPATIENT)
Dept: CASE MANAGEMENT | Age: 79
End: 2024-02-16

## 2024-02-16 DIAGNOSIS — E43 SEVERE PROTEIN-CALORIE MALNUTRITION (HCC): Primary | ICD-10-CM

## 2024-02-16 DIAGNOSIS — Z89.519 AMPUTEE, BELOW KNEE (HCC): ICD-10-CM

## 2024-02-16 DIAGNOSIS — U09.9 COVID-19 LONG HAULER: ICD-10-CM

## 2024-02-16 DIAGNOSIS — D47.1 MPN (MYELOPROLIFERATIVE NEOPLASM) (HCC): ICD-10-CM

## 2024-02-16 DIAGNOSIS — C95.91 LEUKEMIA IN REMISSION, UNSPECIFIED LEUKEMIA TYPE (HCC): ICD-10-CM

## 2024-02-16 DIAGNOSIS — R60.9 FLUID RETENTION: ICD-10-CM

## 2024-02-16 NOTE — TELEPHONE ENCOUNTER
Pt's wife called and states the pt needs a referral to [facility] for home health. Phone is [phone]. Please advise if okay to place order.     Electronically signed by PAM CHAVEZ MA on 2/16/24 at 12:33 PM EST

## 2024-02-16 NOTE — TELEPHONE ENCOUNTER
Patient g/f  called and states patient is having swollen throat, since starting steroid Dx with bronchitis no antibiotic given , patient g/f wants to d/c the med and make sure this is ok, patient is having trouble with transportation to and from appointments please advise if ok to order home care nurse, D/c steroid and further recommendations.

## 2024-02-16 NOTE — CARE COORDINATION
Remote Patient Monitoring Note      Date/Time:  2024 1:17 PM  Patient Current Location: Home: [address] [place] [address] [place] [address]  LPN contacted patient by telephone regarding  no metrics x 2 days   . Verified patients name and  as identifiers.  Background: enrolled in RPM for Kidney disease  HTN AND COPD   Clinical Interventions: Reviewed and followed up on alerts and treatments-Spoke to sig other Teetee regarding RPM . No metrics x 2 days. No blood pressure x 4 days. Teetee reports pt is very sick. She does not know how to operate the RPM equipment.  Teetee states she called Dr Back's office for a referral to [facility]  Escalated alert to KEELEY Vance RN  Plan/Follow Up: Will continue to review, monitor and address alerts with follow up based on severity of symptoms and risk factors.

## 2024-02-17 ENCOUNTER — HOSPITAL ENCOUNTER (INPATIENT)
Facility: HOSPITAL | Age: 79
LOS: 9 days | Discharge: HOME HEALTH | DRG: 177 | End: 2024-02-26
Attending: EMERGENCY MEDICINE | Admitting: INTERNAL MEDICINE
Payer: MEDICARE

## 2024-02-17 ENCOUNTER — APPOINTMENT (OUTPATIENT)
Dept: CARDIOLOGY | Facility: HOSPITAL | Age: 79
DRG: 177 | End: 2024-02-17
Payer: MEDICARE

## 2024-02-17 ENCOUNTER — APPOINTMENT (OUTPATIENT)
Dept: RADIOLOGY | Facility: HOSPITAL | Age: 79
DRG: 177 | End: 2024-02-17
Payer: MEDICARE

## 2024-02-17 DIAGNOSIS — R10.13 EPIGASTRIC PAIN: ICD-10-CM

## 2024-02-17 DIAGNOSIS — J44.1 COPD EXACERBATION (MULTI): Primary | ICD-10-CM

## 2024-02-17 DIAGNOSIS — N39.46 MIXED STRESS AND URGE URINARY INCONTINENCE: ICD-10-CM

## 2024-02-17 DIAGNOSIS — J18.1 LOBAR PNEUMONIA, UNSPECIFIED ORGANISM (CMS-HCC): ICD-10-CM

## 2024-02-17 DIAGNOSIS — R13.12 OROPHARYNGEAL DYSPHAGIA: ICD-10-CM

## 2024-02-17 LAB
ALBUMIN SERPL BCP-MCNC: 4.1 G/DL (ref 3.4–5)
ALP SERPL-CCNC: 91 U/L (ref 33–136)
ALT SERPL W P-5'-P-CCNC: 20 U/L (ref 10–52)
ANION GAP BLDV CALCULATED.4IONS-SCNC: 6 MMOL/L (ref 10–25)
ANION GAP SERPL CALC-SCNC: 10 MMOL/L (ref 10–20)
AST SERPL W P-5'-P-CCNC: 33 U/L (ref 9–39)
BASE EXCESS BLDV CALC-SCNC: 9.2 MMOL/L (ref -2–3)
BASOPHILS # BLD AUTO: 0.03 X10*3/UL (ref 0–0.1)
BASOPHILS NFR BLD AUTO: 0.4 %
BILIRUB SERPL-MCNC: 0.5 MG/DL (ref 0–1.2)
BNP SERPL-MCNC: 158 PG/ML (ref 0–99)
BODY TEMPERATURE: 37 DEGREES CELSIUS
BUN SERPL-MCNC: 30 MG/DL (ref 6–23)
CA-I BLDV-SCNC: 1.2 MMOL/L (ref 1.1–1.33)
CALCIUM SERPL-MCNC: 8.9 MG/DL (ref 8.6–10.3)
CARDIAC TROPONIN I PNL SERPL HS: 30 NG/L (ref 0–20)
CARDIAC TROPONIN I PNL SERPL HS: 32 NG/L (ref 0–20)
CHLORIDE BLDV-SCNC: 99 MMOL/L (ref 98–107)
CHLORIDE SERPL-SCNC: 98 MMOL/L (ref 98–107)
CO2 SERPL-SCNC: 35 MMOL/L (ref 21–32)
CREAT SERPL-MCNC: 1.25 MG/DL (ref 0.5–1.3)
EGFRCR SERPLBLD CKD-EPI 2021: 59 ML/MIN/1.73M*2
EOSINOPHIL # BLD AUTO: 0.02 X10*3/UL (ref 0–0.4)
EOSINOPHIL NFR BLD AUTO: 0.2 %
ERYTHROCYTE [DISTWIDTH] IN BLOOD BY AUTOMATED COUNT: 17.8 % (ref 11.5–14.5)
FLUAV RNA RESP QL NAA+PROBE: NOT DETECTED
FLUBV RNA RESP QL NAA+PROBE: NOT DETECTED
GLUCOSE BLDV-MCNC: 141 MG/DL (ref 74–99)
GLUCOSE SERPL-MCNC: 135 MG/DL (ref 74–99)
HCO3 BLDV-SCNC: 36.7 MMOL/L (ref 22–26)
HCT VFR BLD AUTO: 33 % (ref 41–52)
HCT VFR BLD EST: 29 % (ref 41–52)
HGB BLD-MCNC: 10.6 G/DL (ref 13.5–17.5)
HGB BLDV-MCNC: 9.7 G/DL (ref 13.5–17.5)
IMM GRANULOCYTES # BLD AUTO: 0.18 X10*3/UL (ref 0–0.5)
IMM GRANULOCYTES NFR BLD AUTO: 2.2 % (ref 0–0.9)
INHALED O2 CONCENTRATION: 28 %
LACTATE BLDV-SCNC: 1.2 MMOL/L (ref 0.4–2)
LACTATE SERPL-SCNC: 0.8 MMOL/L (ref 0.4–2)
LIPASE SERPL-CCNC: 19 U/L (ref 9–82)
LYMPHOCYTES # BLD AUTO: 0.63 X10*3/UL (ref 0.8–3)
LYMPHOCYTES NFR BLD AUTO: 7.8 %
MAGNESIUM SERPL-MCNC: 2.32 MG/DL (ref 1.6–2.4)
MCH RBC QN AUTO: 30.6 PG (ref 26–34)
MCHC RBC AUTO-ENTMCNC: 32.1 G/DL (ref 32–36)
MCV RBC AUTO: 95 FL (ref 80–100)
MONOCYTES # BLD AUTO: 0.98 X10*3/UL (ref 0.05–0.8)
MONOCYTES NFR BLD AUTO: 12.1 %
NEUTROPHILS # BLD AUTO: 6.23 X10*3/UL (ref 1.6–5.5)
NEUTROPHILS NFR BLD AUTO: 77.3 %
NRBC BLD-RTO: 0.4 /100 WBCS (ref 0–0)
OXYHGB MFR BLDV: 65.1 % (ref 45–75)
PCO2 BLDV: 68 MM HG (ref 41–51)
PH BLDV: 7.34 PH (ref 7.33–7.43)
PLATELET # BLD AUTO: 325 X10*3/UL (ref 150–450)
PO2 BLDV: 41 MM HG (ref 35–45)
POTASSIUM BLDV-SCNC: 3.9 MMOL/L (ref 3.5–5.3)
POTASSIUM SERPL-SCNC: 3.8 MMOL/L (ref 3.5–5.3)
PROT SERPL-MCNC: 6.7 G/DL (ref 6.4–8.2)
RBC # BLD AUTO: 3.46 X10*6/UL (ref 4.5–5.9)
SAO2 % BLDV: 68 % (ref 45–75)
SARS-COV-2 RNA RESP QL NAA+PROBE: NOT DETECTED
SODIUM BLDV-SCNC: 138 MMOL/L (ref 136–145)
SODIUM SERPL-SCNC: 139 MMOL/L (ref 136–145)
WBC # BLD AUTO: 8.1 X10*3/UL (ref 4.4–11.3)

## 2024-02-17 PROCEDURE — 36415 COLL VENOUS BLD VENIPUNCTURE: CPT | Performed by: STUDENT IN AN ORGANIZED HEALTH CARE EDUCATION/TRAINING PROGRAM

## 2024-02-17 PROCEDURE — 85025 COMPLETE CBC W/AUTO DIFF WBC: CPT | Performed by: STUDENT IN AN ORGANIZED HEALTH CARE EDUCATION/TRAINING PROGRAM

## 2024-02-17 PROCEDURE — 96360 HYDRATION IV INFUSION INIT: CPT

## 2024-02-17 PROCEDURE — 84132 ASSAY OF SERUM POTASSIUM: CPT | Performed by: STUDENT IN AN ORGANIZED HEALTH CARE EDUCATION/TRAINING PROGRAM

## 2024-02-17 PROCEDURE — 83880 ASSAY OF NATRIURETIC PEPTIDE: CPT | Performed by: STUDENT IN AN ORGANIZED HEALTH CARE EDUCATION/TRAINING PROGRAM

## 2024-02-17 PROCEDURE — 2500000004 HC RX 250 GENERAL PHARMACY W/ HCPCS (ALT 636 FOR OP/ED): Performed by: STUDENT IN AN ORGANIZED HEALTH CARE EDUCATION/TRAINING PROGRAM

## 2024-02-17 PROCEDURE — 99285 EMERGENCY DEPT VISIT HI MDM: CPT | Mod: 25 | Performed by: EMERGENCY MEDICINE

## 2024-02-17 PROCEDURE — 71046 X-RAY EXAM CHEST 2 VIEWS: CPT | Performed by: RADIOLOGY

## 2024-02-17 PROCEDURE — 83735 ASSAY OF MAGNESIUM: CPT | Performed by: STUDENT IN AN ORGANIZED HEALTH CARE EDUCATION/TRAINING PROGRAM

## 2024-02-17 PROCEDURE — 1210000001 HC SEMI-PRIVATE ROOM DAILY

## 2024-02-17 PROCEDURE — 2500000005 HC RX 250 GENERAL PHARMACY W/O HCPCS: Performed by: INTERNAL MEDICINE

## 2024-02-17 PROCEDURE — 93005 ELECTROCARDIOGRAM TRACING: CPT

## 2024-02-17 PROCEDURE — 96361 HYDRATE IV INFUSION ADD-ON: CPT

## 2024-02-17 PROCEDURE — 83605 ASSAY OF LACTIC ACID: CPT | Performed by: STUDENT IN AN ORGANIZED HEALTH CARE EDUCATION/TRAINING PROGRAM

## 2024-02-17 PROCEDURE — 71046 X-RAY EXAM CHEST 2 VIEWS: CPT

## 2024-02-17 PROCEDURE — 84484 ASSAY OF TROPONIN QUANT: CPT | Performed by: STUDENT IN AN ORGANIZED HEALTH CARE EDUCATION/TRAINING PROGRAM

## 2024-02-17 PROCEDURE — 83690 ASSAY OF LIPASE: CPT | Performed by: STUDENT IN AN ORGANIZED HEALTH CARE EDUCATION/TRAINING PROGRAM

## 2024-02-17 PROCEDURE — 2500000002 HC RX 250 W HCPCS SELF ADMINISTERED DRUGS (ALT 637 FOR MEDICARE OP, ALT 636 FOR OP/ED): Performed by: STUDENT IN AN ORGANIZED HEALTH CARE EDUCATION/TRAINING PROGRAM

## 2024-02-17 PROCEDURE — 99223 1ST HOSP IP/OBS HIGH 75: CPT | Performed by: INTERNAL MEDICINE

## 2024-02-17 PROCEDURE — 87636 SARSCOV2 & INF A&B AMP PRB: CPT | Performed by: STUDENT IN AN ORGANIZED HEALTH CARE EDUCATION/TRAINING PROGRAM

## 2024-02-17 RX ORDER — IPRATROPIUM BROMIDE AND ALBUTEROL SULFATE 2.5; .5 MG/3ML; MG/3ML
3 SOLUTION RESPIRATORY (INHALATION) ONCE
Status: COMPLETED | OUTPATIENT
Start: 2024-02-17 | End: 2024-02-17

## 2024-02-17 RX ORDER — CLOPIDOGREL BISULFATE 75 MG/1
75 TABLET ORAL DAILY
Status: DISCONTINUED | OUTPATIENT
Start: 2024-02-18 | End: 2024-02-26 | Stop reason: HOSPADM

## 2024-02-17 RX ORDER — TAMSULOSIN HYDROCHLORIDE 0.4 MG/1
0.4 CAPSULE ORAL DAILY
Status: DISCONTINUED | OUTPATIENT
Start: 2024-02-18 | End: 2024-02-26 | Stop reason: HOSPADM

## 2024-02-17 RX ORDER — FUROSEMIDE 20 MG/1
20 TABLET ORAL DAILY
Status: DISCONTINUED | OUTPATIENT
Start: 2024-02-18 | End: 2024-02-26 | Stop reason: HOSPADM

## 2024-02-17 RX ORDER — IPRATROPIUM BROMIDE AND ALBUTEROL SULFATE 2.5; .5 MG/3ML; MG/3ML
3 SOLUTION RESPIRATORY (INHALATION)
Status: DISCONTINUED | OUTPATIENT
Start: 2024-02-18 | End: 2024-02-18

## 2024-02-17 RX ORDER — SERTRALINE HYDROCHLORIDE 50 MG/1
50 TABLET, FILM COATED ORAL DAILY
Status: DISCONTINUED | OUTPATIENT
Start: 2024-02-18 | End: 2024-02-26 | Stop reason: HOSPADM

## 2024-02-17 RX ORDER — GUAIFENESIN 600 MG/1
1200 TABLET, EXTENDED RELEASE ORAL EVERY 12 HOURS
Status: DISCONTINUED | OUTPATIENT
Start: 2024-02-18 | End: 2024-02-26 | Stop reason: HOSPADM

## 2024-02-17 RX ORDER — ASPIRIN 81 MG/1
81 TABLET ORAL NIGHTLY
Status: DISCONTINUED | OUTPATIENT
Start: 2024-02-18 | End: 2024-02-26 | Stop reason: HOSPADM

## 2024-02-17 RX ORDER — POTASSIUM CHLORIDE 750 MG/1
10 TABLET, FILM COATED, EXTENDED RELEASE ORAL DAILY
Status: DISCONTINUED | OUTPATIENT
Start: 2024-02-18 | End: 2024-02-26 | Stop reason: HOSPADM

## 2024-02-17 RX ORDER — ISOSORBIDE MONONITRATE 30 MG/1
30 TABLET, EXTENDED RELEASE ORAL DAILY
Status: DISCONTINUED | OUTPATIENT
Start: 2024-02-18 | End: 2024-02-26 | Stop reason: HOSPADM

## 2024-02-17 RX ORDER — ATORVASTATIN CALCIUM 40 MG/1
40 TABLET, FILM COATED ORAL DAILY
Status: DISCONTINUED | OUTPATIENT
Start: 2024-02-18 | End: 2024-02-26 | Stop reason: HOSPADM

## 2024-02-17 RX ORDER — NITROGLYCERIN 0.4 MG/1
0.4 TABLET SUBLINGUAL EVERY 5 MIN PRN
Status: DISCONTINUED | OUTPATIENT
Start: 2024-02-17 | End: 2024-02-26 | Stop reason: HOSPADM

## 2024-02-17 RX ORDER — FAMOTIDINE 20 MG/1
20 TABLET, FILM COATED ORAL NIGHTLY
Status: DISCONTINUED | OUTPATIENT
Start: 2024-02-18 | End: 2024-02-26 | Stop reason: HOSPADM

## 2024-02-17 RX ORDER — ALBUTEROL SULFATE 90 UG/1
2 AEROSOL, METERED RESPIRATORY (INHALATION) EVERY 6 HOURS PRN
Status: DISCONTINUED | OUTPATIENT
Start: 2024-02-17 | End: 2024-02-18

## 2024-02-17 RX ORDER — LISINOPRIL 10 MG/1
10 TABLET ORAL DAILY
Status: DISCONTINUED | OUTPATIENT
Start: 2024-02-18 | End: 2024-02-26 | Stop reason: HOSPADM

## 2024-02-17 RX ORDER — ALLOPURINOL 100 MG/1
100 TABLET ORAL DAILY
Status: DISCONTINUED | OUTPATIENT
Start: 2024-02-18 | End: 2024-02-26 | Stop reason: HOSPADM

## 2024-02-17 RX ORDER — METOPROLOL SUCCINATE 25 MG/1
25 TABLET, EXTENDED RELEASE ORAL NIGHTLY
Status: DISCONTINUED | OUTPATIENT
Start: 2024-02-18 | End: 2024-02-26 | Stop reason: HOSPADM

## 2024-02-17 RX ORDER — FLUTICASONE FUROATE AND VILANTEROL 200; 25 UG/1; UG/1
1 POWDER RESPIRATORY (INHALATION)
Status: DISCONTINUED | OUTPATIENT
Start: 2024-02-18 | End: 2024-02-26 | Stop reason: HOSPADM

## 2024-02-17 RX ORDER — IPRATROPIUM BROMIDE AND ALBUTEROL SULFATE 2.5; .5 MG/3ML; MG/3ML
3 SOLUTION RESPIRATORY (INHALATION) ONCE
Status: DISCONTINUED | OUTPATIENT
Start: 2024-02-17 | End: 2024-02-18

## 2024-02-17 RX ORDER — ICOSAPENT ETHYL 1 G/1
2 CAPSULE ORAL 2 TIMES DAILY
Status: DISCONTINUED | OUTPATIENT
Start: 2024-02-18 | End: 2024-02-26 | Stop reason: HOSPADM

## 2024-02-17 RX ORDER — CHOLECALCIFEROL (VITAMIN D3) 25 MCG
2000 TABLET ORAL DAILY
Status: DISCONTINUED | OUTPATIENT
Start: 2024-02-18 | End: 2024-02-26 | Stop reason: HOSPADM

## 2024-02-17 RX ORDER — MONTELUKAST SODIUM 10 MG/1
10 TABLET ORAL DAILY
Status: DISCONTINUED | OUTPATIENT
Start: 2024-02-18 | End: 2024-02-26 | Stop reason: HOSPADM

## 2024-02-17 RX ORDER — ENOXAPARIN SODIUM 100 MG/ML
40 INJECTION SUBCUTANEOUS EVERY 24 HOURS
Status: DISCONTINUED | OUTPATIENT
Start: 2024-02-18 | End: 2024-02-26 | Stop reason: HOSPADM

## 2024-02-17 RX ORDER — NIFEDIPINE 30 MG/1
30 TABLET, FILM COATED, EXTENDED RELEASE ORAL DAILY
Status: DISCONTINUED | OUTPATIENT
Start: 2024-02-18 | End: 2024-02-26 | Stop reason: HOSPADM

## 2024-02-17 RX ORDER — BUSPIRONE HYDROCHLORIDE 5 MG/1
5 TABLET ORAL 2 TIMES DAILY PRN
Status: DISCONTINUED | OUTPATIENT
Start: 2024-02-17 | End: 2024-02-26 | Stop reason: HOSPADM

## 2024-02-17 RX ORDER — CEFTRIAXONE 2 G/50ML
2 INJECTION, SOLUTION INTRAVENOUS ONCE
Status: DISCONTINUED | OUTPATIENT
Start: 2024-02-17 | End: 2024-02-18

## 2024-02-17 RX ADMIN — Medication 2 L/MIN: at 23:00

## 2024-02-17 RX ADMIN — IPRATROPIUM BROMIDE AND ALBUTEROL SULFATE 3 ML: 2.5; .5 SOLUTION RESPIRATORY (INHALATION) at 19:27

## 2024-02-17 RX ADMIN — SODIUM CHLORIDE, POTASSIUM CHLORIDE, SODIUM LACTATE AND CALCIUM CHLORIDE 500 ML: 600; 310; 30; 20 INJECTION, SOLUTION INTRAVENOUS at 19:27

## 2024-02-17 ASSESSMENT — COLUMBIA-SUICIDE SEVERITY RATING SCALE - C-SSRS
1. IN THE PAST MONTH, HAVE YOU WISHED YOU WERE DEAD OR WISHED YOU COULD GO TO SLEEP AND NOT WAKE UP?: NO
6. HAVE YOU EVER DONE ANYTHING, STARTED TO DO ANYTHING, OR PREPARED TO DO ANYTHING TO END YOUR LIFE?: NO
2. HAVE YOU ACTUALLY HAD ANY THOUGHTS OF KILLING YOURSELF?: NO

## 2024-02-17 ASSESSMENT — LIFESTYLE VARIABLES
EVER HAD A DRINK FIRST THING IN THE MORNING TO STEADY YOUR NERVES TO GET RID OF A HANGOVER: NO
EVER FELT BAD OR GUILTY ABOUT YOUR DRINKING: NO
HAVE YOU EVER FELT YOU SHOULD CUT DOWN ON YOUR DRINKING: NO
HAVE PEOPLE ANNOYED YOU BY CRITICIZING YOUR DRINKING: NO

## 2024-02-17 ASSESSMENT — PAIN - FUNCTIONAL ASSESSMENT: PAIN_FUNCTIONAL_ASSESSMENT: 0-10

## 2024-02-17 ASSESSMENT — PAIN SCALES - GENERAL: PAINLEVEL_OUTOF10: 0 - NO PAIN

## 2024-02-18 ENCOUNTER — APPOINTMENT (OUTPATIENT)
Dept: CARDIOLOGY | Facility: HOSPITAL | Age: 79
DRG: 177 | End: 2024-02-18
Payer: MEDICARE

## 2024-02-18 LAB
ANION GAP SERPL CALC-SCNC: 12 MMOL/L (ref 10–20)
BUN SERPL-MCNC: 31 MG/DL (ref 6–23)
CALCIUM SERPL-MCNC: 8.6 MG/DL (ref 8.6–10.3)
CARDIAC TROPONIN I PNL SERPL HS: 39 NG/L (ref 0–20)
CARDIAC TROPONIN I PNL SERPL HS: 40 NG/L (ref 0–20)
CHLORIDE SERPL-SCNC: 96 MMOL/L (ref 98–107)
CO2 SERPL-SCNC: 32 MMOL/L (ref 21–32)
CREAT SERPL-MCNC: 1.76 MG/DL (ref 0.5–1.3)
EGFRCR SERPLBLD CKD-EPI 2021: 39 ML/MIN/1.73M*2
ERYTHROCYTE [DISTWIDTH] IN BLOOD BY AUTOMATED COUNT: 18.2 % (ref 11.5–14.5)
GLUCOSE SERPL-MCNC: 193 MG/DL (ref 74–99)
HCT VFR BLD AUTO: 28.3 % (ref 41–52)
HGB BLD-MCNC: 9.3 G/DL (ref 13.5–17.5)
LEGIONELLA AG UR QL: NEGATIVE
MAGNESIUM SERPL-MCNC: 2.29 MG/DL (ref 1.6–2.4)
MCH RBC QN AUTO: 31.2 PG (ref 26–34)
MCHC RBC AUTO-ENTMCNC: 32.9 G/DL (ref 32–36)
MCV RBC AUTO: 95 FL (ref 80–100)
MRSA DNA SPEC QL NAA+PROBE: NOT DETECTED
NRBC BLD-RTO: 0.3 /100 WBCS (ref 0–0)
PLATELET # BLD AUTO: 286 X10*3/UL (ref 150–450)
POTASSIUM SERPL-SCNC: 3.5 MMOL/L (ref 3.5–5.3)
PROCALCITONIN SERPL-MCNC: 0.05 NG/ML
RBC # BLD AUTO: 2.98 X10*6/UL (ref 4.5–5.9)
S PNEUM AG UR QL: NEGATIVE
SODIUM SERPL-SCNC: 136 MMOL/L (ref 136–145)
VANCOMYCIN TROUGH SERPL-MCNC: 19.7 UG/ML (ref 5–20)
WBC # BLD AUTO: 11.8 X10*3/UL (ref 4.4–11.3)

## 2024-02-18 PROCEDURE — S4991 NICOTINE PATCH NONLEGEND: HCPCS | Performed by: INTERNAL MEDICINE

## 2024-02-18 PROCEDURE — 85027 COMPLETE CBC AUTOMATED: CPT | Performed by: STUDENT IN AN ORGANIZED HEALTH CARE EDUCATION/TRAINING PROGRAM

## 2024-02-18 PROCEDURE — 80048 BASIC METABOLIC PNL TOTAL CA: CPT | Performed by: STUDENT IN AN ORGANIZED HEALTH CARE EDUCATION/TRAINING PROGRAM

## 2024-02-18 PROCEDURE — 2500000004 HC RX 250 GENERAL PHARMACY W/ HCPCS (ALT 636 FOR OP/ED): Performed by: STUDENT IN AN ORGANIZED HEALTH CARE EDUCATION/TRAINING PROGRAM

## 2024-02-18 PROCEDURE — 87449 NOS EACH ORGANISM AG IA: CPT | Mod: PORLAB | Performed by: INTERNAL MEDICINE

## 2024-02-18 PROCEDURE — 99233 SBSQ HOSP IP/OBS HIGH 50: CPT | Performed by: FAMILY MEDICINE

## 2024-02-18 PROCEDURE — 80202 ASSAY OF VANCOMYCIN: CPT | Performed by: INTERNAL MEDICINE

## 2024-02-18 PROCEDURE — 36415 COLL VENOUS BLD VENIPUNCTURE: CPT | Performed by: STUDENT IN AN ORGANIZED HEALTH CARE EDUCATION/TRAINING PROGRAM

## 2024-02-18 PROCEDURE — 2500000002 HC RX 250 W HCPCS SELF ADMINISTERED DRUGS (ALT 637 FOR MEDICARE OP, ALT 636 FOR OP/ED): Performed by: FAMILY MEDICINE

## 2024-02-18 PROCEDURE — 2500000004 HC RX 250 GENERAL PHARMACY W/ HCPCS (ALT 636 FOR OP/ED): Performed by: INTERNAL MEDICINE

## 2024-02-18 PROCEDURE — 84145 PROCALCITONIN (PCT): CPT | Mod: PORLAB | Performed by: INTERNAL MEDICINE

## 2024-02-18 PROCEDURE — 94640 AIRWAY INHALATION TREATMENT: CPT

## 2024-02-18 PROCEDURE — 93010 ELECTROCARDIOGRAM REPORT: CPT | Performed by: INTERNAL MEDICINE

## 2024-02-18 PROCEDURE — 36415 COLL VENOUS BLD VENIPUNCTURE: CPT | Performed by: INTERNAL MEDICINE

## 2024-02-18 PROCEDURE — 87899 AGENT NOS ASSAY W/OPTIC: CPT | Mod: PORLAB | Performed by: INTERNAL MEDICINE

## 2024-02-18 PROCEDURE — 84484 ASSAY OF TROPONIN QUANT: CPT | Performed by: STUDENT IN AN ORGANIZED HEALTH CARE EDUCATION/TRAINING PROGRAM

## 2024-02-18 PROCEDURE — 83735 ASSAY OF MAGNESIUM: CPT | Performed by: STUDENT IN AN ORGANIZED HEALTH CARE EDUCATION/TRAINING PROGRAM

## 2024-02-18 PROCEDURE — 2500000001 HC RX 250 WO HCPCS SELF ADMINISTERED DRUGS (ALT 637 FOR MEDICARE OP): Performed by: INTERNAL MEDICINE

## 2024-02-18 PROCEDURE — 1200000002 HC GENERAL ROOM WITH TELEMETRY DAILY

## 2024-02-18 PROCEDURE — 93005 ELECTROCARDIOGRAM TRACING: CPT

## 2024-02-18 PROCEDURE — 87641 MR-STAPH DNA AMP PROBE: CPT | Performed by: INTERNAL MEDICINE

## 2024-02-18 PROCEDURE — 2500000002 HC RX 250 W HCPCS SELF ADMINISTERED DRUGS (ALT 637 FOR MEDICARE OP, ALT 636 FOR OP/ED): Performed by: INTERNAL MEDICINE

## 2024-02-18 PROCEDURE — 2500000005 HC RX 250 GENERAL PHARMACY W/O HCPCS: Performed by: INTERNAL MEDICINE

## 2024-02-18 RX ORDER — IPRATROPIUM BROMIDE AND ALBUTEROL SULFATE 2.5; .5 MG/3ML; MG/3ML
3 SOLUTION RESPIRATORY (INHALATION)
Status: DISCONTINUED | OUTPATIENT
Start: 2024-02-18 | End: 2024-02-18

## 2024-02-18 RX ORDER — IPRATROPIUM BROMIDE AND ALBUTEROL SULFATE 2.5; .5 MG/3ML; MG/3ML
3 SOLUTION RESPIRATORY (INHALATION) EVERY 6 HOURS PRN
Status: DISCONTINUED | OUTPATIENT
Start: 2024-02-18 | End: 2024-02-26 | Stop reason: HOSPADM

## 2024-02-18 RX ORDER — VANCOMYCIN HYDROCHLORIDE 500 MG/100ML
500 INJECTION, SOLUTION INTRAVENOUS EVERY 12 HOURS
Status: DISCONTINUED | OUTPATIENT
Start: 2024-02-18 | End: 2024-02-19

## 2024-02-18 RX ORDER — IBUPROFEN 200 MG
1 TABLET ORAL DAILY
Status: DISCONTINUED | OUTPATIENT
Start: 2024-02-18 | End: 2024-02-26 | Stop reason: HOSPADM

## 2024-02-18 RX ORDER — ACETAMINOPHEN 325 MG/1
650 TABLET ORAL EVERY 4 HOURS PRN
Status: DISCONTINUED | OUTPATIENT
Start: 2024-02-18 | End: 2024-02-26 | Stop reason: HOSPADM

## 2024-02-18 RX ORDER — IPRATROPIUM BROMIDE AND ALBUTEROL SULFATE 2.5; .5 MG/3ML; MG/3ML
3 SOLUTION RESPIRATORY (INHALATION)
Status: DISCONTINUED | OUTPATIENT
Start: 2024-02-18 | End: 2024-02-26 | Stop reason: HOSPADM

## 2024-02-18 RX ADMIN — VANCOMYCIN HYDROCHLORIDE 500 MG: 500 INJECTION, SOLUTION INTRAVENOUS at 13:14

## 2024-02-18 RX ADMIN — Medication 2 L/MIN: at 07:52

## 2024-02-18 RX ADMIN — ASPIRIN 81 MG: 81 TABLET, COATED ORAL at 22:19

## 2024-02-18 RX ADMIN — MONTELUKAST 10 MG: 10 TABLET, FILM COATED ORAL at 09:43

## 2024-02-18 RX ADMIN — IPRATROPIUM BROMIDE AND ALBUTEROL SULFATE 3 ML: 2.5; .5 SOLUTION RESPIRATORY (INHALATION) at 07:53

## 2024-02-18 RX ADMIN — GUAIFENESIN 1200 MG: 600 TABLET ORAL at 13:14

## 2024-02-18 RX ADMIN — FAMOTIDINE 20 MG: 20 TABLET ORAL at 22:19

## 2024-02-18 RX ADMIN — IPRATROPIUM BROMIDE AND ALBUTEROL SULFATE 3 ML: 2.5; .5 SOLUTION RESPIRATORY (INHALATION) at 11:22

## 2024-02-18 RX ADMIN — ASPIRIN 81 MG: 81 TABLET, COATED ORAL at 00:41

## 2024-02-18 RX ADMIN — FUROSEMIDE 20 MG: 20 TABLET ORAL at 09:44

## 2024-02-18 RX ADMIN — FAMOTIDINE 20 MG: 20 TABLET ORAL at 00:41

## 2024-02-18 RX ADMIN — Medication 2000 UNITS: at 09:44

## 2024-02-18 RX ADMIN — ACETAMINOPHEN 650 MG: 325 TABLET ORAL at 23:49

## 2024-02-18 RX ADMIN — GUAIFENESIN 1200 MG: 600 TABLET ORAL at 23:30

## 2024-02-18 RX ADMIN — PIPERACILLIN SODIUM AND TAZOBACTAM SODIUM 4.5 G: 4; .5 INJECTION, SOLUTION INTRAVENOUS at 23:30

## 2024-02-18 RX ADMIN — CLOPIDOGREL 75 MG: 75 TABLET ORAL at 09:43

## 2024-02-18 RX ADMIN — ALLOPURINOL 100 MG: 100 TABLET ORAL at 09:44

## 2024-02-18 RX ADMIN — ISOSORBIDE MONONITRATE 30 MG: 30 TABLET, EXTENDED RELEASE ORAL at 09:43

## 2024-02-18 RX ADMIN — DOXYCYCLINE 100 MG: 100 INJECTION, POWDER, LYOPHILIZED, FOR SOLUTION INTRAVENOUS at 00:16

## 2024-02-18 RX ADMIN — SERTRALINE HYDROCHLORIDE 50 MG: 50 TABLET ORAL at 09:43

## 2024-02-18 RX ADMIN — METOPROLOL SUCCINATE 25 MG: 25 TABLET, EXTENDED RELEASE ORAL at 00:41

## 2024-02-18 RX ADMIN — ATORVASTATIN CALCIUM 40 MG: 40 TABLET, FILM COATED ORAL at 09:44

## 2024-02-18 RX ADMIN — GUAIFENESIN 1200 MG: 600 TABLET ORAL at 00:41

## 2024-02-18 RX ADMIN — TAMSULOSIN HYDROCHLORIDE 0.4 MG: 0.4 CAPSULE ORAL at 09:44

## 2024-02-18 RX ADMIN — PIPERACILLIN SODIUM AND TAZOBACTAM SODIUM 4.5 G: 4; .5 INJECTION, SOLUTION INTRAVENOUS at 07:51

## 2024-02-18 RX ADMIN — METOPROLOL SUCCINATE 25 MG: 25 TABLET, EXTENDED RELEASE ORAL at 22:19

## 2024-02-18 RX ADMIN — PIPERACILLIN SODIUM AND TAZOBACTAM SODIUM 4.5 G: 4; .5 INJECTION, SOLUTION INTRAVENOUS at 01:16

## 2024-02-18 RX ADMIN — FLUTICASONE FUROATE AND VILANTEROL TRIFENATATE 1 PUFF: 200; 25 POWDER RESPIRATORY (INHALATION) at 09:50

## 2024-02-18 RX ADMIN — PIPERACILLIN SODIUM AND TAZOBACTAM SODIUM 4.5 G: 4; .5 INJECTION, SOLUTION INTRAVENOUS at 16:22

## 2024-02-18 RX ADMIN — ENOXAPARIN SODIUM 40 MG: 40 INJECTION SUBCUTANEOUS at 00:41

## 2024-02-18 RX ADMIN — IPRATROPIUM BROMIDE AND ALBUTEROL SULFATE 3 ML: 2.5; .5 SOLUTION RESPIRATORY (INHALATION) at 20:56

## 2024-02-18 RX ADMIN — POTASSIUM CHLORIDE 10 MEQ: 750 TABLET, FILM COATED, EXTENDED RELEASE ORAL at 09:43

## 2024-02-18 RX ADMIN — IPRATROPIUM BROMIDE AND ALBUTEROL SULFATE 3 ML: 2.5; .5 SOLUTION RESPIRATORY (INHALATION) at 03:47

## 2024-02-18 RX ADMIN — ENOXAPARIN SODIUM 40 MG: 40 INJECTION SUBCUTANEOUS at 23:30

## 2024-02-18 RX ADMIN — TIOTROPIUM BROMIDE INHALATION SPRAY 2 PUFF: 3.12 SPRAY, METERED RESPIRATORY (INHALATION) at 09:49

## 2024-02-18 RX ADMIN — VANCOMYCIN HYDROCHLORIDE 1250 MG: 1.25 INJECTION, POWDER, LYOPHILIZED, FOR SOLUTION INTRAVENOUS at 01:49

## 2024-02-18 RX ADMIN — LISINOPRIL 10 MG: 10 TABLET ORAL at 09:43

## 2024-02-18 RX ADMIN — NIFEDIPINE 30 MG: 30 TABLET, FILM COATED, EXTENDED RELEASE ORAL at 09:44

## 2024-02-18 RX ADMIN — RUXOLITINIB 20 MG: 20 TABLET ORAL at 22:19

## 2024-02-18 RX ADMIN — NICOTINE 1 PATCH: 14 PATCH, EXTENDED RELEASE TRANSDERMAL at 09:43

## 2024-02-18 SDOH — SOCIAL STABILITY: SOCIAL INSECURITY: HAVE YOU HAD THOUGHTS OF HARMING ANYONE ELSE?: NO

## 2024-02-18 SDOH — SOCIAL STABILITY: SOCIAL INSECURITY: ARE THERE ANY APPARENT SIGNS OF INJURIES/BEHAVIORS THAT COULD BE RELATED TO ABUSE/NEGLECT?: NO

## 2024-02-18 SDOH — SOCIAL STABILITY: SOCIAL INSECURITY: WERE YOU ABLE TO COMPLETE ALL THE BEHAVIORAL HEALTH SCREENINGS?: YES

## 2024-02-18 SDOH — SOCIAL STABILITY: SOCIAL INSECURITY: DO YOU FEEL UNSAFE GOING BACK TO THE PLACE WHERE YOU ARE LIVING?: NO

## 2024-02-18 SDOH — SOCIAL STABILITY: SOCIAL INSECURITY: ABUSE: ADULT

## 2024-02-18 SDOH — SOCIAL STABILITY: SOCIAL INSECURITY: HAS ANYONE EVER THREATENED TO HURT YOUR FAMILY OR YOUR PETS?: NO

## 2024-02-18 SDOH — SOCIAL STABILITY: SOCIAL INSECURITY: ARE YOU OR HAVE YOU BEEN THREATENED OR ABUSED PHYSICALLY, EMOTIONALLY, OR SEXUALLY BY ANYONE?: NO

## 2024-02-18 SDOH — SOCIAL STABILITY: SOCIAL INSECURITY: DO YOU FEEL ANYONE HAS EXPLOITED OR TAKEN ADVANTAGE OF YOU FINANCIALLY OR OF YOUR PERSONAL PROPERTY?: NO

## 2024-02-18 SDOH — SOCIAL STABILITY: SOCIAL INSECURITY: DOES ANYONE TRY TO KEEP YOU FROM HAVING/CONTACTING OTHER FRIENDS OR DOING THINGS OUTSIDE YOUR HOME?: NO

## 2024-02-18 ASSESSMENT — PAIN - FUNCTIONAL ASSESSMENT
PAIN_FUNCTIONAL_ASSESSMENT: 0-10
PAIN_FUNCTIONAL_ASSESSMENT: 0-10

## 2024-02-18 ASSESSMENT — ACTIVITIES OF DAILY LIVING (ADL)
WALKS IN HOME: INDEPENDENT
ASSISTIVE_DEVICE: PROSTHESIS
BATHING: INDEPENDENT
PATIENT'S MEMORY ADEQUATE TO SAFELY COMPLETE DAILY ACTIVITIES?: YES
GROOMING: INDEPENDENT
FEEDING YOURSELF: INDEPENDENT
LACK_OF_TRANSPORTATION: NO
TOILETING: INDEPENDENT
HEARING - RIGHT EAR: FUNCTIONAL
ADEQUATE_TO_COMPLETE_ADL: YES
DRESSING YOURSELF: INDEPENDENT
JUDGMENT_ADEQUATE_SAFELY_COMPLETE_DAILY_ACTIVITIES: YES
HEARING - LEFT EAR: FUNCTIONAL

## 2024-02-18 ASSESSMENT — ENCOUNTER SYMPTOMS
VOMITING: 0
ABDOMINAL PAIN: 1
ALLERGIC/IMMUNOLOGIC NEGATIVE: 1
NEUROLOGICAL NEGATIVE: 1
ENDOCRINE NEGATIVE: 1
DIARRHEA: 0
HEMATOLOGIC/LYMPHATIC NEGATIVE: 1
MUSCULOSKELETAL NEGATIVE: 1
PSYCHIATRIC NEGATIVE: 1
EYES NEGATIVE: 1
NAUSEA: 0

## 2024-02-18 ASSESSMENT — PATIENT HEALTH QUESTIONNAIRE - PHQ9
2. FEELING DOWN, DEPRESSED OR HOPELESS: NOT AT ALL
SUM OF ALL RESPONSES TO PHQ9 QUESTIONS 1 & 2: 0
1. LITTLE INTEREST OR PLEASURE IN DOING THINGS: NOT AT ALL

## 2024-02-18 ASSESSMENT — COGNITIVE AND FUNCTIONAL STATUS - GENERAL
DAILY ACTIVITIY SCORE: 24
MOVING FROM LYING ON BACK TO SITTING ON SIDE OF FLAT BED WITH BEDRAILS: A LITTLE
DAILY ACTIVITIY SCORE: 24
STANDING UP FROM CHAIR USING ARMS: A LITTLE
CLIMB 3 TO 5 STEPS WITH RAILING: A LITTLE
CLIMB 3 TO 5 STEPS WITH RAILING: A LITTLE
MOBILITY SCORE: 19
MOVING TO AND FROM BED TO CHAIR: A LITTLE
PATIENT BASELINE BEDBOUND: NO
WALKING IN HOSPITAL ROOM: A LITTLE
MOBILITY SCORE: 19
MOVING TO AND FROM BED TO CHAIR: A LITTLE
WALKING IN HOSPITAL ROOM: A LITTLE
MOVING FROM LYING ON BACK TO SITTING ON SIDE OF FLAT BED WITH BEDRAILS: A LITTLE
STANDING UP FROM CHAIR USING ARMS: A LITTLE

## 2024-02-18 ASSESSMENT — PAIN SCALES - GENERAL
PAINLEVEL_OUTOF10: 0 - NO PAIN
PAINLEVEL_OUTOF10: 4
PAINLEVEL_OUTOF10: 0 - NO PAIN
PAINLEVEL_OUTOF10: 0 - NO PAIN

## 2024-02-18 ASSESSMENT — LIFESTYLE VARIABLES
SKIP TO QUESTIONS 9-10: 1
HOW OFTEN DO YOU HAVE A DRINK CONTAINING ALCOHOL: NEVER
AUDIT-C TOTAL SCORE: 0
HOW MANY STANDARD DRINKS CONTAINING ALCOHOL DO YOU HAVE ON A TYPICAL DAY: PATIENT DOES NOT DRINK
AUDIT-C TOTAL SCORE: 0
HOW OFTEN DO YOU HAVE 6 OR MORE DRINKS ON ONE OCCASION: NEVER

## 2024-02-18 ASSESSMENT — PAIN DESCRIPTION - LOCATION: LOCATION: OTHER (COMMENT)

## 2024-02-18 ASSESSMENT — PAIN DESCRIPTION - DESCRIPTORS: DESCRIPTORS: ACHING;DISCOMFORT;SORE

## 2024-02-18 NOTE — CARE PLAN
The patient's goals for the shift include      The clinical goals for the shift include Patient's oxygen saturation will remain 92% or above throughout shift      Problem: Safety - Adult  Goal: Free from fall injury  Outcome: Progressing     Problem: Discharge Planning  Goal: Discharge to home or other facility with appropriate resources  Outcome: Progressing     Problem: Chronic Conditions and Co-morbidities  Goal: Patient's chronic conditions and co-morbidity symptoms are monitored and maintained or improved  Outcome: Progressing

## 2024-02-18 NOTE — PROGRESS NOTES
Aristeo Barriga is a 78 y.o. male on day 1 of admission presenting with COPD exacerbation (CMS/[facility]).      Subjective   78 y.o. male with history of COPD, tobacco use disorder (still smoking) heart failure, and leukemia presenting with SOB.  He reports being in his usual state of health until last week when he developed increasing shortness of breath associated with a moist cough, wheezing, malaise, chills, and rigors. No chest pain, fever, nausea or vomiting.  He states that he increased his oxygen from 2 L to 4 L over the last couple of days but did not see any improvement.  Additionally, he saw a provider a few days ago and was put on prednisone for presumed COPD exacerbation but he could not tolerate the oral steroid because it made him shaky and jittery.  [facility] and his chest x-ray was read as showing increased airspace opacity in the right lung base.       2/18:                Today the patient is awake alert and interactive appropriately.  Admits he really is not feeling any different than admission and not that well.  Does not feel any worse however.  Continues on 2 L which is his home oxygen level at this point.  Continues on doxycycline Zosyn and vancomycin empirically.  Otherwise denies interval new symptoms or complaints including chest pain palpitations pleuritic type pain worsening shortness of breath cough sputum, nausea abdominal pain flank pain dysuria diarrhea fever or chills.       Objective     Last Recorded Vitals  BP 95/52 (BP Location: Left arm, Patient Position: Lying)   Pulse 75   Temp 36.3 °C (97.3 °F) (Temporal)   Resp 16   Wt 63.5 kg (140 lb)   SpO2 96%   Intake/Output last 3 Shifts:    Intake/Output Summary (Last 24 hours) at 2/18/2024 0619  Last data filed at 2/18/2024 1333  Gross per 24 hour   Intake 1720 ml   Output 1550 ml   Net 170 ml       Admission Weight  Weight: 63.5 kg (140 lb) (02/17/24 4514)    Daily  Weight  02/17/24 : 63.5 kg (140 lb)    Image Results  XR chest 2 views  Narrative: Interpreted By:  Raghavendra Estrada,   STUDY:  XR CHEST 2 VIEWS;  2/17/2024 8:04 pm      INDICATION:  Signs/Symptoms:SOB.      COMPARISON:  Chest x-ray 02/14/2024      ACCESSION NUMBER(S):  [id number]      ORDERING CLINICIAN:  MIGUEL LOPEZ      FINDINGS:          CARDIOMEDIASTINAL SILHOUETTE:  Cardiomediastinal silhouette is stable in size and configuration.  Atherosclerotic calcification of the aorta.      LUNGS:  There is persistent left basilar opacity with blunting of the left  costophrenic angle which may relate to chronic pleural thickening  and/or scarring. There is subtle increased right basilar airspace  opacity which may relate to atelectasis versus developing airspace  disease. No pneumothorax.      ABDOMEN:  No remarkable upper abdominal findings.      BONES:  Multilevel degenerative changes of the spine generalized diffuse  osteopenia.      Impression: Increased airspace opacity in the right lung base medially is new  from prior exam and may relate to atelectasis versus developing  airspace disease. Clinical correlation continued short-term follow-up  to resolution is advised.      Persistent blunting of the left costophrenic angle likely relates to  chronic pleural thickening and/or scarring.      MACRO:  None      Signed by: Raghavendra Estrada 2/17/2024 8:25 PM  Dictation workstation:   [id number]      Physical Exam  Constitutional:       General: He is in no acute distress.  Slender elderly  male awake alert and interactive appropriately.     Appearance: He is obese. He is ill-appearing. He is not diaphoretic.        HENT:      Head: Normocephalic and atraumatic.      Nose: Nose normal.      Mouth/Throat:      Mouth: Mucous membranes are dry.      Pharynx: No oropharyngeal exudate or posterior oropharyngeal erythema.   Eyes:      General: No scleral icterus.        Right eye: No discharge.         Left eye:  No discharge.      Conjunctiva/sclera: Conjunctivae normal.   Cardiovascular:      Heart sounds: No murmur heard.     Comments: Regular rate and rhythm  Pulmonary:      Breath sounds: Rhonchi present.  Scattered bilateral posterior rales.      Comments: Diffuse  wheezing bilaterally  Abdominal:      General: There is no distension.      Palpations: Abdomen is soft. There is no mass.      Tenderness: There is no abdominal tenderness. There is no guarding or rebound.   Musculoskeletal:      Cervical back: Neck supple.      Right lower leg: No edema.      Left lower leg: No edema.   Lymphadenopathy:      Cervical: No cervical adenopathy.   Skin:     General: Skin is warm and dry.   Neurological:      General: No focal deficit present.      Mental Status: He is alert and oriented to person, place, and time.   Psychiatric:         Mood and Affect: Mood normal.         Behavior: Behavior normal.      Relevant Results               Assessment/Plan                  Principal Problem:    COPD exacerbation (CMS/[facility])    RLL pneumonia  Will treat as HAP due to recent hospitalization  Vancomycin and Zosyn, doxycycline  Oxygen  Aerosolized bronchodilator  Pulmonary toileting  Urinary antigens     COPD Exacerbation  Smoking cessation strongly emphasized  Duoneb  Oxygen  IV solumedrol. He says he can tolerate IV steroids but not oral form  Pulmonary rehab  2/18: Appears at his home oxygen level today.     Tobacco use disorder  As in #2  Transdermal nicotine     Leukemia  Currently in remission      HTN  Resume home BP med and monitor      Chronic urinary incontinence  Vibegron      Malnutrition          I agree with the dietitian's malnutrition diagnosis.         Nikolai Hazel MD
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
## 2024-02-18 NOTE — ED PROVIDER NOTES
HPI   Chief Complaint   Patient presents with    Shortness of Breath     Low spo2 at home oxygen in increased to 2 lt. Breathing treatment administered.        This is a 78-year-old male with past medical history of COPD on 2 to 4 L at baseline, hypertension, CML, thoracic aortic aneurysm, stage IIIb chronic kidney disease who presents to the emergency department for shortness of breath.  Patient states that he was seen here 3 days ago and was given prednisone.  His wife states that he was acting strange on the prednisone so his primary care told him to stop taking it.  He reports that he has still been feeling worsening short of breath and having multiple nebulizer treatments about 4 times a day which have not improved his symptoms.  He denies any chest pain.  Per EMS when they arrived he was in the low 80s on his 2 L.  He was given a DuoNeb treatment by them and has had improvement in symptoms.                          [place] Coma Scale Score: 15                  Patient History   Past Medical History:   Diagnosis Date    CHF (congestive heart failure) (CMS/[facility])     Chronic kidney disease     COPD (chronic obstructive pulmonary disease) (CMS/[facility])     Emphysema of lung (CMS/[facility])     GERD (gastroesophageal reflux disease)     Hypertension      History reviewed. No pertinent surgical history.  No family history on file.  Social History     Tobacco Use    Smoking status: Every Day     Packs/day: 1.00     Years: 60.00     Additional pack years: 0.00     Total pack years: 60.00     Types: Cigarettes    Smokeless tobacco: Never   Substance Use Topics    Alcohol use: Not Currently    Drug use: Never     Comment: His girlfriend smokes marijuana in their home       Physical Exam   ED Triage Vitals [02/17/24 1854]   Temperature Heart Rate Respirations BP   36.6 °C (97.8 °F) 83 (!) 28 (!) 187/95      Pulse Ox Temp src Heart Rate Source Patient Position   98 % -- -- --      BP Location FiO2 (%)     -- --       Physical  Exam  Constitutional:       General: He is not in acute distress.  HENT:      Head: Normocephalic and atraumatic.      Right Ear: Tympanic membrane normal.      Left Ear: Tympanic membrane normal.      Mouth/Throat:      Mouth: Mucous membranes are moist.   Eyes:      Extraocular Movements: Extraocular movements intact.      Conjunctiva/sclera: Conjunctivae normal.      Pupils: Pupils are equal, round, and reactive to light.   Cardiovascular:      Rate and Rhythm: Normal rate and regular rhythm.      Heart sounds: No murmur heard.  Pulmonary:      Effort: Pulmonary effort is normal. No respiratory distress.      Breath sounds: No stridor. Examination of the right-upper field reveals wheezing. Examination of the left-upper field reveals wheezing. Examination of the right-middle field reveals wheezing. Examination of the left-middle field reveals wheezing. Examination of the right-lower field reveals wheezing. Examination of the left-lower field reveals wheezing. Wheezing present. No rales.   Abdominal:      General: Bowel sounds are normal. There is no distension.      Tenderness: There is no abdominal tenderness. There is no guarding or rebound.   Musculoskeletal:         General: No swelling, tenderness or deformity. Normal range of motion.   Skin:     General: Skin is warm and dry.      Coloration: Skin is not jaundiced.      Findings: No bruising or lesion.   Neurological:      General: No focal deficit present.      Mental Status: He is alert and oriented to person, place, and time. Mental status is at baseline.      Cranial Nerves: No cranial nerve deficit.      Motor: No weakness.   Psychiatric:         Mood and Affect: Mood normal.       Labs Reviewed - No data to display  No orders to display       ED Course & [facility]   ED Course as of 04/06/24 0916   Sat Feb 17, 2024   1937 POCT pCO2, Venous(!): 68  Given patient's pH within normal limit suspect this is probably chronic for him. [MT]   1024  IMPRESSION:  Increased airspace opacity in the right lung base medially is new  from prior exam and may relate to atelectasis versus developing  airspace disease. Clinical correlation continued short-term follow-up  to resolution is advised.      Persistent blunting of the left costophrenic angle likely relates to  chronic pleural thickening and/or scarring.   [CF]      ED Course User Index  [CF] Hillary Callahan MD  [MT] Makayla Rios MD         Diagnoses as of 04/06/24 0916   COPD exacerbation (CMS/[facility])   Lobar pneumonia, unspecified organism (CMS/[facility])       Medical Decision Making  This is a 78-year-old male past medical history of COPD on 2 to 4 L at baseline presents emerged from for shortness of breath.  Patient was seen here 3 days ago and ultimately discharged on prednisone he states no acute episodes since admission anxious and generally has been doing his DuoNeb treatments every 4 hours reports worsening shortness of breath when EMS arrived he was saturating low 80s on his home oxygen.  Labs show CO2 retention at 68 but his pH within normal limits suspect this is chronic.  Bicarb is elevated at 35 which is slightly higher than last prior.  CBC shows no leukocytosis.  Chest x-ray shows increased air opacity in the right lobe concerning for possible pneumonia patient was given ceftriaxone doxycycline to antibiosis.  He is getting antibiotic treatment DuoNeb since he continues to be wheezy.  At this time will admit patient to [facility] for COPD exacerbation and pneumonia.  He was accepted    EKG on my read shows sinus rhythm at a rate 96 bpm does appear to have PVCs, T wave flattening in lead V2.  There is a lot of wander so difficult to interpret.  But otherwise normal intervals.  Overall appears similar to his prior        Procedure  Procedures     Hillary Callahan MD  02/17/24 2271       Hillary Callahan MD  04/06/24 0916

## 2024-02-18 NOTE — PROGRESS NOTES
Pharmacy to Dose Vancomycin - Results Assessment    Aristeo Barriga is a 78 y.o. male admitted for COPD exacerbation (CMS/[facility]). Pharmacy was consulted for vancomycin dosing and monitoring. Today is day 1 of vancomycin therapy.      Assessment  Vital signs reviewed, including temperature, HR, BP, I/Os.   Available lab results reviewed, including:WBC, serum creatinine, BUN, and MRSA screen.    Recent vancomycin received: 1250 mg x1 dose to present.              Trough level assessment: 2/19 @ 0500                   Plan     Within goal trough range. Continue current vancomycin regimen.  Expected vancomycin duration: . days of total therapy, to be completed on ..   Follow for continued vancomycin need, clinical response, and s/s of toxicity.     Raad Hernandez, PharmD

## 2024-02-18 NOTE — CARE PLAN
The patient's goals for the shift include      The clinical goals for the shift include to maintain oxygen levels WDL      Problem: Safety - Adult  Goal: Free from fall injury  Outcome: Progressing     Problem: Discharge Planning  Goal: Discharge to home or other facility with appropriate resources  Outcome: Progressing     Problem: Chronic Conditions and Co-morbidities  Goal: Patient's chronic conditions and co-morbidity symptoms are monitored and maintained or improved  Outcome: Progressing

## 2024-02-19 ENCOUNTER — CARE COORDINATION (OUTPATIENT)
Dept: CASE MANAGEMENT | Age: 79
End: 2024-02-19

## 2024-02-19 LAB
ANION GAP SERPL CALC-SCNC: 12 MMOL/L (ref 10–20)
ANION GAP SERPL CALC-SCNC: 9 MMOL/L (ref 10–20)
ATRIAL RATE: 70 BPM
BUN SERPL-MCNC: 35 MG/DL (ref 6–23)
BUN SERPL-MCNC: 38 MG/DL (ref 6–23)
CALCIUM SERPL-MCNC: 8.4 MG/DL (ref 8.6–10.3)
CALCIUM SERPL-MCNC: 8.4 MG/DL (ref 8.6–10.3)
CHLORIDE SERPL-SCNC: 97 MMOL/L (ref 98–107)
CHLORIDE SERPL-SCNC: 98 MMOL/L (ref 98–107)
CO2 SERPL-SCNC: 32 MMOL/L (ref 21–32)
CO2 SERPL-SCNC: 34 MMOL/L (ref 21–32)
CREAT SERPL-MCNC: 1.9 MG/DL (ref 0.5–1.3)
CREAT SERPL-MCNC: 2.02 MG/DL (ref 0.5–1.3)
EGFRCR SERPLBLD CKD-EPI 2021: 33 ML/MIN/1.73M*2
EGFRCR SERPLBLD CKD-EPI 2021: 36 ML/MIN/1.73M*2
ERYTHROCYTE [DISTWIDTH] IN BLOOD BY AUTOMATED COUNT: 18.4 % (ref 11.5–14.5)
GLUCOSE SERPL-MCNC: 88 MG/DL (ref 74–99)
GLUCOSE SERPL-MCNC: 91 MG/DL (ref 74–99)
HCT VFR BLD AUTO: 26.4 % (ref 41–52)
HGB BLD-MCNC: 8.4 G/DL (ref 13.5–17.5)
MAGNESIUM SERPL-MCNC: 2.29 MG/DL (ref 1.6–2.4)
MCH RBC QN AUTO: 30.4 PG (ref 26–34)
MCHC RBC AUTO-ENTMCNC: 31.8 G/DL (ref 32–36)
MCV RBC AUTO: 96 FL (ref 80–100)
NRBC BLD-RTO: 0.3 /100 WBCS (ref 0–0)
P AXIS: 82 DEGREES
PLATELET # BLD AUTO: 266 X10*3/UL (ref 150–450)
POTASSIUM SERPL-SCNC: 3.2 MMOL/L (ref 3.5–5.3)
POTASSIUM SERPL-SCNC: 3.5 MMOL/L (ref 3.5–5.3)
PR INTERVAL: 140 MS
Q ONSET: 253 MS
QRS COUNT: 11 BEATS
QRS DURATION: 83 MS
QT INTERVAL: 625 MS
QTC CALCULATION(BAZETT): 675 MS
QTC FREDERICIA: 658 MS
R AXIS: 17 DEGREES
RBC # BLD AUTO: 2.76 X10*6/UL (ref 4.5–5.9)
SODIUM SERPL-SCNC: 137 MMOL/L (ref 136–145)
SODIUM SERPL-SCNC: 138 MMOL/L (ref 136–145)
T AXIS: 88 DEGREES
T OFFSET: 566 MS
VANCOMYCIN SERPL-MCNC: 24.6 UG/ML (ref 5–20)
VENTRICULAR RATE: 70 BPM
WBC # BLD AUTO: 10.4 X10*3/UL (ref 4.4–11.3)

## 2024-02-19 PROCEDURE — 1200000002 HC GENERAL ROOM WITH TELEMETRY DAILY

## 2024-02-19 PROCEDURE — 85027 COMPLETE CBC AUTOMATED: CPT | Performed by: STUDENT IN AN ORGANIZED HEALTH CARE EDUCATION/TRAINING PROGRAM

## 2024-02-19 PROCEDURE — 80048 BASIC METABOLIC PNL TOTAL CA: CPT | Performed by: STUDENT IN AN ORGANIZED HEALTH CARE EDUCATION/TRAINING PROGRAM

## 2024-02-19 PROCEDURE — 83735 ASSAY OF MAGNESIUM: CPT | Performed by: STUDENT IN AN ORGANIZED HEALTH CARE EDUCATION/TRAINING PROGRAM

## 2024-02-19 PROCEDURE — S4991 NICOTINE PATCH NONLEGEND: HCPCS | Performed by: INTERNAL MEDICINE

## 2024-02-19 PROCEDURE — 2500000001 HC RX 250 WO HCPCS SELF ADMINISTERED DRUGS (ALT 637 FOR MEDICARE OP): Performed by: INTERNAL MEDICINE

## 2024-02-19 PROCEDURE — 2500000002 HC RX 250 W HCPCS SELF ADMINISTERED DRUGS (ALT 637 FOR MEDICARE OP, ALT 636 FOR OP/ED): Performed by: INTERNAL MEDICINE

## 2024-02-19 PROCEDURE — 2500000002 HC RX 250 W HCPCS SELF ADMINISTERED DRUGS (ALT 637 FOR MEDICARE OP, ALT 636 FOR OP/ED): Performed by: FAMILY MEDICINE

## 2024-02-19 PROCEDURE — 36415 COLL VENOUS BLD VENIPUNCTURE: CPT | Performed by: STUDENT IN AN ORGANIZED HEALTH CARE EDUCATION/TRAINING PROGRAM

## 2024-02-19 PROCEDURE — 94640 AIRWAY INHALATION TREATMENT: CPT

## 2024-02-19 PROCEDURE — 36415 COLL VENOUS BLD VENIPUNCTURE: CPT | Performed by: HOSPITALIST

## 2024-02-19 PROCEDURE — 80048 BASIC METABOLIC PNL TOTAL CA: CPT | Performed by: HOSPITALIST

## 2024-02-19 PROCEDURE — 2500000004 HC RX 250 GENERAL PHARMACY W/ HCPCS (ALT 636 FOR OP/ED): Performed by: INTERNAL MEDICINE

## 2024-02-19 PROCEDURE — 99233 SBSQ HOSP IP/OBS HIGH 50: CPT | Performed by: HOSPITALIST

## 2024-02-19 PROCEDURE — 80202 ASSAY OF VANCOMYCIN: CPT

## 2024-02-19 RX ORDER — VANCOMYCIN HYDROCHLORIDE 1 G/20ML
INJECTION, POWDER, LYOPHILIZED, FOR SOLUTION INTRAVENOUS DAILY PRN
Status: DISCONTINUED | OUTPATIENT
Start: 2024-02-19 | End: 2024-02-23

## 2024-02-19 RX ADMIN — CLOPIDOGREL 75 MG: 75 TABLET ORAL at 16:17

## 2024-02-19 RX ADMIN — FLUTICASONE FUROATE AND VILANTEROL TRIFENATATE 1 PUFF: 200; 25 POWDER RESPIRATORY (INHALATION) at 09:39

## 2024-02-19 RX ADMIN — PIPERACILLIN SODIUM AND TAZOBACTAM SODIUM 4.5 G: 4; .5 INJECTION, SOLUTION INTRAVENOUS at 18:49

## 2024-02-19 RX ADMIN — FUROSEMIDE 20 MG: 20 TABLET ORAL at 09:28

## 2024-02-19 RX ADMIN — RUXOLITINIB 20 MG: 20 TABLET ORAL at 09:39

## 2024-02-19 RX ADMIN — IPRATROPIUM BROMIDE AND ALBUTEROL SULFATE 3 ML: 2.5; .5 SOLUTION RESPIRATORY (INHALATION) at 21:03

## 2024-02-19 RX ADMIN — IPRATROPIUM BROMIDE AND ALBUTEROL SULFATE 3 ML: 2.5; .5 SOLUTION RESPIRATORY (INHALATION) at 09:13

## 2024-02-19 RX ADMIN — ALLOPURINOL 100 MG: 100 TABLET ORAL at 09:27

## 2024-02-19 RX ADMIN — GUAIFENESIN 1200 MG: 600 TABLET ORAL at 14:43

## 2024-02-19 RX ADMIN — VANCOMYCIN HYDROCHLORIDE 500 MG: 500 INJECTION, SOLUTION INTRAVENOUS at 00:13

## 2024-02-19 RX ADMIN — PIPERACILLIN SODIUM AND TAZOBACTAM SODIUM 4.5 G: 4; .5 INJECTION, SOLUTION INTRAVENOUS at 06:32

## 2024-02-19 RX ADMIN — FAMOTIDINE 20 MG: 20 TABLET ORAL at 21:36

## 2024-02-19 RX ADMIN — ASPIRIN 81 MG: 81 TABLET, COATED ORAL at 21:36

## 2024-02-19 RX ADMIN — TAMSULOSIN HYDROCHLORIDE 0.4 MG: 0.4 CAPSULE ORAL at 09:28

## 2024-02-19 RX ADMIN — ATORVASTATIN CALCIUM 40 MG: 40 TABLET, FILM COATED ORAL at 09:28

## 2024-02-19 RX ADMIN — ICOSAPENT ETHYL 2 G: 1 CAPSULE ORAL at 21:38

## 2024-02-19 RX ADMIN — Medication 2000 UNITS: at 09:28

## 2024-02-19 RX ADMIN — RUXOLITINIB 20 MG: 20 TABLET ORAL at 21:38

## 2024-02-19 RX ADMIN — METOPROLOL SUCCINATE 25 MG: 25 TABLET, EXTENDED RELEASE ORAL at 21:00

## 2024-02-19 RX ADMIN — SERTRALINE HYDROCHLORIDE 50 MG: 50 TABLET ORAL at 09:28

## 2024-02-19 RX ADMIN — LISINOPRIL 10 MG: 10 TABLET ORAL at 09:28

## 2024-02-19 RX ADMIN — IPRATROPIUM BROMIDE AND ALBUTEROL SULFATE 3 ML: 2.5; .5 SOLUTION RESPIRATORY (INHALATION) at 11:40

## 2024-02-19 RX ADMIN — ISOSORBIDE MONONITRATE 30 MG: 30 TABLET, EXTENDED RELEASE ORAL at 09:27

## 2024-02-19 RX ADMIN — PIPERACILLIN SODIUM AND TAZOBACTAM SODIUM 4.5 G: 4; .5 INJECTION, SOLUTION INTRAVENOUS at 14:43

## 2024-02-19 RX ADMIN — MONTELUKAST 10 MG: 10 TABLET, FILM COATED ORAL at 09:28

## 2024-02-19 RX ADMIN — POTASSIUM CHLORIDE 10 MEQ: 750 TABLET, FILM COATED, EXTENDED RELEASE ORAL at 16:17

## 2024-02-19 RX ADMIN — TIOTROPIUM BROMIDE INHALATION SPRAY 2 PUFF: 3.12 SPRAY, METERED RESPIRATORY (INHALATION) at 09:39

## 2024-02-19 RX ADMIN — NIFEDIPINE 30 MG: 30 TABLET, FILM COATED, EXTENDED RELEASE ORAL at 09:28

## 2024-02-19 RX ADMIN — NICOTINE 1 PATCH: 14 PATCH, EXTENDED RELEASE TRANSDERMAL at 09:28

## 2024-02-19 ASSESSMENT — COGNITIVE AND FUNCTIONAL STATUS - GENERAL
MOVING TO AND FROM BED TO CHAIR: A LITTLE
MOVING FROM LYING ON BACK TO SITTING ON SIDE OF FLAT BED WITH BEDRAILS: A LITTLE
WALKING IN HOSPITAL ROOM: A LITTLE
WALKING IN HOSPITAL ROOM: A LITTLE
MOVING FROM LYING ON BACK TO SITTING ON SIDE OF FLAT BED WITH BEDRAILS: A LITTLE
CLIMB 3 TO 5 STEPS WITH RAILING: A LITTLE
DAILY ACTIVITIY SCORE: 24
STANDING UP FROM CHAIR USING ARMS: A LITTLE
MOBILITY SCORE: 19
DAILY ACTIVITIY SCORE: 24
MOVING TO AND FROM BED TO CHAIR: A LITTLE
MOBILITY SCORE: 19
STANDING UP FROM CHAIR USING ARMS: A LITTLE
CLIMB 3 TO 5 STEPS WITH RAILING: A LITTLE

## 2024-02-19 ASSESSMENT — PAIN SCALES - GENERAL
PAINLEVEL_OUTOF10: 0 - NO PAIN
PAINLEVEL_OUTOF10: 2
PAINLEVEL_OUTOF10: 0 - NO PAIN

## 2024-02-19 ASSESSMENT — ACTIVITIES OF DAILY LIVING (ADL): LACK_OF_TRANSPORTATION: NO

## 2024-02-19 ASSESSMENT — PAIN DESCRIPTION - DESCRIPTORS: DESCRIPTORS: ACHING;DISCOMFORT;SORE

## 2024-02-19 ASSESSMENT — PAIN - FUNCTIONAL ASSESSMENT: PAIN_FUNCTIONAL_ASSESSMENT: 0-10

## 2024-02-19 NOTE — PROGRESS NOTES
"Vancomycin Dosing by Pharmacy- FOLLOW UP    MRSA PCR NEGATIVE    Aristeo Barriga is a 78 y.o. year old male who Pharmacy has been consulted for vancomycin dosing for Vancomycin Indications: Pneumonia. Based on the patient's indication and renal status this patient will be dosed based on a goal AUC of 400-600.     Renal function is currently declining.    Current vancomycin dose:  500 mg every 12 hours      Visit Vitals  /50 (BP Location: Left arm, Patient Position: Lying)   Pulse 64   Temp 36.4 °C (97.5 °F) (Temporal)   Resp 17           Lab Results   Component Value Date    CREATININE 2.02 (H) 02/19/2024    CREATININE 1.76 (H) 02/18/2024    CREATININE 1.25 02/17/2024    CREATININE 1.28 02/14/2024       Patient weight is No results found for: \"PTWEIGHT\"    No results found for: \"CULTURE\"    I/O last 3 completed shifts:  In: 2330 (36.7 mL/kg) [P.O.:790; I.V.:90 (1.4 mL/kg); IV Piggyback:1450]  Out: 1550 (24.4 mL/kg) [Urine:1550 (0.7 mL/kg/hr)]  Weight: 63.5 kg     Lab Results   Component Value Date    PATIENTTEMP 37.0 02/17/2024    PATIENTTEMP 37.0 02/14/2024    PATIENTTEMP 37.0 02/14/2024          Assessment/Plan     Serum creatinine increased from 1.25 to 2.02 over last 48 hours. Last dose of vancomycin 2/19 at approximately 00:13  Random level obtained 2/19 added-on to AM labs resulted at 24.6  HOLD vancomycin for 2/19 and begin dose by level. Obtain next level as below. Dose to follow tomorrow on 2/20/24  The next level will be obtained on 2/20 at 0500. May be obtained sooner if clinically indicated.   Will continue to monitor renal function daily while on vancomycin and order serum creatinine at least every 48 hours if not already ordered.  Follow for continued vancomycin needs, clinical response, and signs/symptoms of toxicity.     Jaison Lacey PharmD, BCPS      "

## 2024-02-19 NOTE — PROGRESS NOTES
Aristeo Barriga  [id number]   Service: Internal Medicine / Hospitalist Date of service: 2/19/2024                          Full Code        Aristeo Barriga is a 78 y.o. male presenting with COPD exacerbation           Subjective  78 y.o. male with history of COPD, tobacco use disorder (still smoking) heart failure, and leukemia presenting with SOB.  He reports being in his usual state of health until last week when he developed increasing shortness of breath associated with a moist cough, wheezing, malaise, chills, and rigors. No chest pain, fever, nausea or vomiting.  He states that he increased his oxygen from 2 L to 4 L over the last couple of days but did not see any improvement.  Additionally, he saw a provider a few days ago and was put on prednisone for presumed COPD exacerbation but he could not tolerate the oral steroid because it made him shaky and jittery.  He presented to the ER tonight, by EMS, in obvious respiratory distress and his chest x-ray was read as showing increased airspace opacity in the right lung base.        2/18:                Today the patient is awake alert and interactive appropriately.  Admits he really is not feeling any different than admission and not that well.  Does not feel any worse however.  Continues on 2 L which is his home oxygen level at this point.  Continues on doxycycline Zosyn and vancomycin empirically.  Otherwise denies interval new symptoms or complaints including chest pain palpitations pleuritic type pain worsening shortness of breath cough sputum, nausea abdominal pain flank pain dysuria diarrhea fever or chills.    2/19...............No reported: Fevers, chills, chest pains, headaches, nausea or vomiting.Patient voiced no acute complaints at the time of evaluation.      Patient seen and examined, lab data and diagnostics reviewed.  No reported: acute symptomatology at the time of evaluation including but not limited to :chest pain, dyspnea, constipation,  abdominal pain, dysuria , nausea, vomiting ,headache, new focal weakness, diaphoresis,fever, chills, syncope,presyncope or palpitations.       Review of Systems:   Review of system otherwise negative if not aforementioned above in subjective.    Objective              Physical Exam   Physical Exam  Constitutional:       Appearance: Patient appeared in no acute cardiopulmonary distress.     Comments: Patient alert and oriented to person place time and situation.Patient was able to speak in full sentence without difficulties.  HEENT:      Head: Normocephalic and atraumatic.Trachea midline      Nose:No observed congestion or rhinorrhea.     Mouth/Throat: Mucous membranes Moist, Trachea appeared  midline.  Eyes:      Extraocular Movements: Extraocular movements intact.      Pupils: Pupils are equal, round, and reactive to light.      Comments: No scleral icterus or conjunctival injection appreciated.   Cardiovascular:      Rate and Rhythm: Normal rate and regular rhythm. No clicks rubs or gallops, normal S1 and S2.No peripheral stigmata of endocarditis appreciated.     Pulmonary:      No appreciation of adventitious sounds but the lung bases were diminished.  Abdominal:      General: Abdomen soft, nontender, active bowel sounds, no involuntary guarding or rebound tenderness appreciated.     Comments: None   Musculoskeletal:       Patient appeared to have full active range of motion for upper and lower extremities, no acute apparent joint deformity appreciated on examination.   No pitting edema or cyanosis appreciated.       Lymphadenopathy:      No appreciable palpable lymphadenopathy  Skin:     General: Skin is warm.      Coloration:  No jaundice     Findings: No abnormal appearing skin rashes or lesions that appeared acute noted on unclothed area of the skin..   Neurological:      General: No focal sensory or motor deficits appreciated, no meningeal signs or dysmetria noted.      Cranial Nerves: Cranial nerves II to  XII appearing grossly intact.     Genitals:  Deferred  Psychiatric:         The patient appears to be displaying normal mood and affect at the time of evaluation.    Labs:     Lab Results   Component Value Date    GLUCOSE 88 02/19/2024    CALCIUM 8.4 (L) 02/19/2024     02/19/2024    K 3.5 02/19/2024    CO2 32 02/19/2024    CL 98 02/19/2024    BUN 38 (H) 02/19/2024    CREATININE 2.02 (H) 02/19/2024      Lab Results   Component Value Date    WBC 10.4 02/19/2024    HGB 8.4 (L) 02/19/2024    HCT 26.4 (L) 02/19/2024    MCV 96 02/19/2024     02/19/2024      [unfilled]   [unfilled]   No results found for the last 90 days.              X-rays/ Images    [unfilled]   Radiology Results (last 21 days)    Procedure Component Value Units Date/Time   XR chest 2 views [[id number]] Collected: 02/17/24 2026   Order Status: Completed Updated: 02/17/24 2026   Narrative:     Interpreted By:  Raghavendra Estrada,  STUDY:  XR CHEST 2 VIEWS;  2/17/2024 8:04 pm      INDICATION:  Signs/Symptoms:SOB.      COMPARISON:  Chest x-ray 02/14/2024      ACCESSION NUMBER(S):  [id number]      ORDERING CLINICIAN:  MIGUEL LOPEZ      FINDINGS:          CARDIOMEDIASTINAL SILHOUETTE:  Cardiomediastinal silhouette is stable in size and configuration.  Atherosclerotic calcification of the aorta.      LUNGS:  There is persistent left basilar opacity with blunting of the left  costophrenic angle which may relate to chronic pleural thickening  and/or scarring. There is subtle increased right basilar airspace  opacity which may relate to atelectasis versus developing airspace  disease. No pneumothorax.      ABDOMEN:  No remarkable upper abdominal findings.      BONES:  Multilevel degenerative changes of the spine generalized diffuse  osteopenia.       Impression:     Increased airspace opacity in the right lung base medially is new  from prior exam and may relate to atelectasis versus developing  airspace disease. Clinical correlation  continued short-term follow-up  to resolution is advised.      Persistent blunting of the left costophrenic angle likely relates to  chronic pleural thickening and/or scarring.      MACRO:  None      Signed by: Raghavendra Estrada 2/17/2024 8:25 PM  Dictation workstation:   [id number]             Medical Problems       Problem List       * (Principal) COPD exacerbation (CMS/HCC)    Pneumonia due to gram-negative bacteria    Acute exacerbation of chronic obstructive pulmonary disease (COPD) (CMS/HCC)    Chronic hypoxic respiratory failure (CMS/HCC)    Tobacco use disorder    Primary hypertension (Chronic)    Coronary artery disease involving native coronary artery of native heart without angina pectoris (Chronic)    Mixed hyperlipidemia    CHF (congestive heart failure) (CMS/HCC) (Chronic)    Chronic renal disease, stage III (CMS/HCC)    Pneumonitis    Angina pectoris, unspecified (CMS/HCC)    Bronchitis    Pulmonary emphysema (CMS/HCC)    Weight loss    Thoracic aortic aneurysm without rupture (CMS/HCC)    Overview Signed 11/30/2023 12:01 AM by Nikolai Garcia MD     Formatting of this note might be different from the original. ..         Status post below-knee amputation of right lower extremity (CMS/[facility])    Overview Signed 11/30/2023 12:01 AM by Nikolai Garcia MD     Formatting of this note might be different from the original. ...         S/P AAA (abdominal aortic aneurysm) repair    Protein-calorie malnutrition (CMS/[facility])    Primary insomnia    Pneumonia due to infectious organism    Pain of left lower extremity    Oral thrush    Myeloproliferative neoplasm (CMS/HCC)    Mixed stress and urge urinary incontinence    Lymphopenia    Leukocytosis    Leukemia in remission (CMS/HCC)    Left cataract    IFG (impaired fasting glucose)    H/O ST elevation myocardial infarction    Fluid retention    Fatigue    Epigastric pain    Easy bruising    Chronic sinusitis    Cellulitis of right lower extremity    Arm lesion     Anxiety    Age-related cataract of both eyes    Adrenal nodule (CMS/HCC)    COVID-19               Above medical problems may be reflective of historical medical problems that may have resolved and may not related to acute clinical condition/medical problems.    Clinical impression/plan:         Principal Problem:    COPD exacerbation (CMS/HCC)     RLL pneumonia  Will treat as HAP due to recent hospitalization  Vancomycin and Zosyn, doxycycline  Oxygen  Aerosolized bronchodilator  Pulmonary toileting  Urinary antigens     COPD Exacerbation  Smoking cessation strongly emphasized  Duoneb  Oxygen  IV solumedrol. He says he can tolerate IV steroids but not oral form  Pulmonary rehab  2/18: Appears at his home oxygen level today.     Tobacco use disorder  As in #2  Transdermal nicotine     Leukemia  Currently in remission      HTN  Resume home BP med and monitor      Chronic urinary incontinence  Vibegron        Malnutrition    I agree with the dietitian's malnutrition diagnosis.         The patient was informed of differential diagnosis , work up , plan of care and possible sequelae of clinical disposition.Patient in agreement with plan of care. Further recommendations forthcoming in accordance with patient's clinical disposition and response to care.    Disposition/additional interventions: 2/19/2024    Continue empirical antibiotic therapy for pneumonia    Anticipate likely de-escalation of systematic corticosteroids.    Tobacco cessation advised.    Wean O2 as tolerated to baseline    Recent uptrending of creatinine levels,  continue monitoring with  diuretic use and concomitant ACE inhibitor and daily potassium supplementation ,BMP today and  in am.    Recent drop in hemoglobin hematocrit, continue monitoring no clinical signs of bleeding apparent at this time. CBC in am,cbc today if clinical  signs / disposition  warrants.         Discharge planning:Anticipate likely discharge in next 24 to 48 hours.    Care time:  >55 minutes           Dictation performed with assistance of voice recognition device therefore transcription errors are possible.

## 2024-02-19 NOTE — CARE COORDINATION
Remote Patient Monitoring Note      Date/Time:  2024 1:33 PM  Patient Current Location: [place]  LPN contacted patient by telephone regarding yellow alert received for No BP 7 days, No PO & Weight for 5 days. Verified patients name and  as identifiers.    Background: COPD, HTN, KD  Clinical Interventions: Reviewed and followed up on alerts and treatments-         Called and spoke with Patient. He says he is admitted to [facility] in [place].  Will pause tablet.  ACM Notified.     Plan/Follow Up: Will continue to review, monitor and address alerts with follow up based on severity of symptoms and risk factors.

## 2024-02-19 NOTE — PROGRESS NOTES
SW consult placed for community resources stating pt in need of transportation assistance. SW met with pt, introduced self and role as SW with care transitions, confirmed pt needing assistance with transportation to medical appointments outside [facility]. SW provided pt with [facility] Non Emergency Transportation guide that included information on NET transportation through [facility] that goes to surrounding counties. Pt did not identify any other areas of support at this time, No other needs identified at this time, SW signing off,  pt and care team aware of SW availability while inpt.     Linden Nieto, MSW, LSW ([phone])   Care Transitions

## 2024-02-19 NOTE — SIGNIFICANT EVENT
Patient had a run of NSVT on tele (occurred around shift change yesterday, 02/18/24). Throughout the event the patient was asymptomatic and at no point did he complain of anything outside of persistent SOB, which is relatively unchanged from prior evaluations. This continues to be the predominant complaint.     I had repeat labs drawn this evening (and noted no morning labs ordered, so I did go ahead and do that / order those as well). I also had an EKG completed -- no acute ST-T changes, but notable for prolonged Qtc. Troponin minimally elevated at 40 --> 39 (on admission was 32). Given patient is asymptomatic and no acute ischemic changes on ECG, along with no significant electrolyte abnormalities, patient is okay to remain on telemetry. Should patient condition change (I.e. chest pain, recurrent run of NSVT, change in vitals), will redraw trop/do EKG/likely transfer to SDU.

## 2024-02-19 NOTE — CARE PLAN
The patient's goals for the shift include      The clinical goals for the shift include Patient will have decreased SOB throughout shift      Problem: Safety - Adult  Goal: Free from fall injury  Outcome: Progressing     Problem: Discharge Planning  Goal: Discharge to home or other facility with appropriate resources  Outcome: Progressing     Problem: Chronic Conditions and Co-morbidities  Goal: Patient's chronic conditions and co-morbidity symptoms are monitored and maintained or improved  Outcome: Progressing     Problem: Fall/Injury  Goal: Not fall by end of shift  Outcome: Progressing  Goal: Be free from injury by end of the shift  Outcome: Progressing  Goal: Verbalize understanding of personal risk factors for fall in the hospital  Outcome: Progressing  Goal: Verbalize understanding of risk factor reduction measures to prevent injury from fall in the home  Outcome: Progressing  Goal: Use assistive devices by end of the shift  Outcome: Progressing  Goal: Pace activities to prevent fatigue by end of the shift  Outcome: Progressing     Problem: Pain  Goal: Takes deep breaths with improved pain control throughout the shift  Outcome: Progressing  Goal: Turns in bed with improved pain control throughout the shift  Outcome: Progressing  Goal: Walks with improved pain control throughout the shift  Outcome: Progressing  Goal: Performs ADL's with improved pain control throughout shift  Outcome: Progressing  Goal: Participates in PT with improved pain control throughout the shift  Outcome: Progressing  Goal: Free from opioid side effects throughout the shift  Outcome: Progressing  Goal: Free from acute confusion related to pain meds throughout the shift  Outcome: Progressing

## 2024-02-19 NOTE — PROGRESS NOTES
02/19/24 1043   Discharge Planning   Living Arrangements Spouse/significant other   Support Systems Spouse/significant other   Assistance Needed transportation   Type of Residence Private residence   Home or Post Acute Services None   Patient expects to be discharged to: Home   Does the patient need discharge transport arranged? No   Transportation Needs   In the past 12 months, has lack of transportation kept you from medical appointments or from getting medications? yes   In the past 12 months, has lack of transportation kept you from meetings, work, or from getting things needed for daily living? No     Discharge planning assessment completed with patient. Patient lives at home with his significant other. He is typically independent with ambulation and ADL's. Patient is on supplemental oxygen. He wears 2L NC at his baseline, with oxygen supplied through [facility]. Patient with concerns regarding transportation to appointments. Though he is typically able to drive, patient states he has had to cancel some appointments recently due to not feeling well enough to safely get behind the wheel. He mentioned that his significant other has not been well lately either. Combined, they have had several medical appointments to try to get to, and it has been stressful for them to manage all of the back and forth. Patient is insured with [employer]. Patient may have a transportation benefit available through his insurance company; I encouraged him to use that benefit for appointments, as all that is needed is a 48 hour advance notice. Patient follows with oncology and primary care through [facility] in [place]. [employer] could be an option for patient for transportation needs within [facility]. SW consult placed to assist with transportation resources. Patient states he is not interested in PT/OT at discharge at this time. TCC following for any discharge needs that may arise.

## 2024-02-20 LAB
ANION GAP SERPL CALC-SCNC: 12 MMOL/L (ref 10–20)
BUN SERPL-MCNC: 35 MG/DL (ref 6–23)
CALCIUM SERPL-MCNC: 8.3 MG/DL (ref 8.6–10.3)
CHLORIDE SERPL-SCNC: 100 MMOL/L (ref 98–107)
CO2 SERPL-SCNC: 31 MMOL/L (ref 21–32)
CREAT SERPL-MCNC: 1.89 MG/DL (ref 0.5–1.3)
EGFRCR SERPLBLD CKD-EPI 2021: 36 ML/MIN/1.73M*2
ERYTHROCYTE [DISTWIDTH] IN BLOOD BY AUTOMATED COUNT: 18.7 % (ref 11.5–14.5)
GLUCOSE SERPL-MCNC: 89 MG/DL (ref 74–99)
HCT VFR BLD AUTO: 26.2 % (ref 41–52)
HGB BLD-MCNC: 8.3 G/DL (ref 13.5–17.5)
MCH RBC QN AUTO: 30.4 PG (ref 26–34)
MCHC RBC AUTO-ENTMCNC: 31.7 G/DL (ref 32–36)
MCV RBC AUTO: 96 FL (ref 80–100)
NRBC BLD-RTO: 0 /100 WBCS (ref 0–0)
PLATELET # BLD AUTO: 285 X10*3/UL (ref 150–450)
POTASSIUM SERPL-SCNC: 3.3 MMOL/L (ref 3.5–5.3)
RBC # BLD AUTO: 2.73 X10*6/UL (ref 4.5–5.9)
SODIUM SERPL-SCNC: 140 MMOL/L (ref 136–145)
VANCOMYCIN SERPL-MCNC: 15.2 UG/ML (ref 5–20)
WBC # BLD AUTO: 9.6 X10*3/UL (ref 4.4–11.3)

## 2024-02-20 PROCEDURE — 36415 COLL VENOUS BLD VENIPUNCTURE: CPT | Performed by: HOSPITALIST

## 2024-02-20 PROCEDURE — 97166 OT EVAL MOD COMPLEX 45 MIN: CPT | Mod: GO | Performed by: OCCUPATIONAL THERAPIST

## 2024-02-20 PROCEDURE — 97161 PT EVAL LOW COMPLEX 20 MIN: CPT | Mod: GP | Performed by: PHYSICAL THERAPIST

## 2024-02-20 PROCEDURE — 2500000002 HC RX 250 W HCPCS SELF ADMINISTERED DRUGS (ALT 637 FOR MEDICARE OP, ALT 636 FOR OP/ED): Performed by: FAMILY MEDICINE

## 2024-02-20 PROCEDURE — 2500000004 HC RX 250 GENERAL PHARMACY W/ HCPCS (ALT 636 FOR OP/ED)

## 2024-02-20 PROCEDURE — 2500000002 HC RX 250 W HCPCS SELF ADMINISTERED DRUGS (ALT 637 FOR MEDICARE OP, ALT 636 FOR OP/ED): Performed by: INTERNAL MEDICINE

## 2024-02-20 PROCEDURE — 80048 BASIC METABOLIC PNL TOTAL CA: CPT | Performed by: HOSPITALIST

## 2024-02-20 PROCEDURE — 2500000002 HC RX 250 W HCPCS SELF ADMINISTERED DRUGS (ALT 637 FOR MEDICARE OP, ALT 636 FOR OP/ED): Performed by: HOSPITALIST

## 2024-02-20 PROCEDURE — 2500000001 HC RX 250 WO HCPCS SELF ADMINISTERED DRUGS (ALT 637 FOR MEDICARE OP): Performed by: INTERNAL MEDICINE

## 2024-02-20 PROCEDURE — 1200000002 HC GENERAL ROOM WITH TELEMETRY DAILY

## 2024-02-20 PROCEDURE — 85027 COMPLETE CBC AUTOMATED: CPT | Performed by: HOSPITALIST

## 2024-02-20 PROCEDURE — 94640 AIRWAY INHALATION TREATMENT: CPT

## 2024-02-20 PROCEDURE — 2500000004 HC RX 250 GENERAL PHARMACY W/ HCPCS (ALT 636 FOR OP/ED): Performed by: INTERNAL MEDICINE

## 2024-02-20 PROCEDURE — 99233 SBSQ HOSP IP/OBS HIGH 50: CPT | Performed by: HOSPITALIST

## 2024-02-20 PROCEDURE — 80202 ASSAY OF VANCOMYCIN: CPT

## 2024-02-20 PROCEDURE — S4991 NICOTINE PATCH NONLEGEND: HCPCS | Performed by: INTERNAL MEDICINE

## 2024-02-20 RX ORDER — OXYBUTYNIN CHLORIDE 5 MG/1
5 TABLET ORAL 3 TIMES DAILY
Status: DISCONTINUED | OUTPATIENT
Start: 2024-02-20 | End: 2024-02-26 | Stop reason: HOSPADM

## 2024-02-20 RX ORDER — POTASSIUM CHLORIDE 750 MG/1
10 TABLET, FILM COATED, EXTENDED RELEASE ORAL ONCE
Status: COMPLETED | OUTPATIENT
Start: 2024-02-20 | End: 2024-02-20

## 2024-02-20 RX ORDER — VANCOMYCIN HYDROCHLORIDE 500 MG/100ML
500 INJECTION, SOLUTION INTRAVENOUS ONCE
Status: COMPLETED | OUTPATIENT
Start: 2024-02-20 | End: 2024-02-20

## 2024-02-20 RX ADMIN — PIPERACILLIN SODIUM AND TAZOBACTAM SODIUM 4.5 G: 4; .5 INJECTION, SOLUTION INTRAVENOUS at 05:26

## 2024-02-20 RX ADMIN — FLUTICASONE FUROATE AND VILANTEROL TRIFENATATE 1 PUFF: 200; 25 POWDER RESPIRATORY (INHALATION) at 08:30

## 2024-02-20 RX ADMIN — OXYBUTYNIN CHLORIDE 5 MG: 5 TABLET ORAL at 11:20

## 2024-02-20 RX ADMIN — NIFEDIPINE 30 MG: 30 TABLET, FILM COATED, EXTENDED RELEASE ORAL at 08:33

## 2024-02-20 RX ADMIN — VANCOMYCIN HYDROCHLORIDE 500 MG: 500 INJECTION, SOLUTION INTRAVENOUS at 11:19

## 2024-02-20 RX ADMIN — IPRATROPIUM BROMIDE AND ALBUTEROL SULFATE 3 ML: 2.5; .5 SOLUTION RESPIRATORY (INHALATION) at 11:13

## 2024-02-20 RX ADMIN — METOPROLOL SUCCINATE 25 MG: 25 TABLET, EXTENDED RELEASE ORAL at 20:34

## 2024-02-20 RX ADMIN — ALLOPURINOL 100 MG: 100 TABLET ORAL at 08:33

## 2024-02-20 RX ADMIN — PIPERACILLIN SODIUM AND TAZOBACTAM SODIUM 4.5 G: 4; .5 INJECTION, SOLUTION INTRAVENOUS at 00:08

## 2024-02-20 RX ADMIN — ISOSORBIDE MONONITRATE 30 MG: 30 TABLET, EXTENDED RELEASE ORAL at 08:33

## 2024-02-20 RX ADMIN — FUROSEMIDE 20 MG: 20 TABLET ORAL at 08:33

## 2024-02-20 RX ADMIN — OXYBUTYNIN CHLORIDE 5 MG: 5 TABLET ORAL at 20:34

## 2024-02-20 RX ADMIN — GUAIFENESIN 1200 MG: 600 TABLET ORAL at 00:08

## 2024-02-20 RX ADMIN — IPRATROPIUM BROMIDE AND ALBUTEROL SULFATE 3 ML: 2.5; .5 SOLUTION RESPIRATORY (INHALATION) at 07:35

## 2024-02-20 RX ADMIN — NICOTINE 1 PATCH: 14 PATCH, EXTENDED RELEASE TRANSDERMAL at 08:32

## 2024-02-20 RX ADMIN — Medication 2000 UNITS: at 08:32

## 2024-02-20 RX ADMIN — PIPERACILLIN SODIUM AND TAZOBACTAM SODIUM 4.5 G: 4; .5 INJECTION, SOLUTION INTRAVENOUS at 18:23

## 2024-02-20 RX ADMIN — TIOTROPIUM BROMIDE INHALATION SPRAY 2 PUFF: 3.12 SPRAY, METERED RESPIRATORY (INHALATION) at 08:30

## 2024-02-20 RX ADMIN — RUXOLITINIB 20 MG: 20 TABLET ORAL at 20:35

## 2024-02-20 RX ADMIN — RUXOLITINIB 20 MG: 20 TABLET ORAL at 08:33

## 2024-02-20 RX ADMIN — ACETAMINOPHEN 650 MG: 325 TABLET ORAL at 20:44

## 2024-02-20 RX ADMIN — POTASSIUM CHLORIDE 10 MEQ: 750 TABLET, FILM COATED, EXTENDED RELEASE ORAL at 11:19

## 2024-02-20 RX ADMIN — ICOSAPENT ETHYL 2 G: 1 CAPSULE ORAL at 08:33

## 2024-02-20 RX ADMIN — OXYBUTYNIN CHLORIDE 5 MG: 5 TABLET ORAL at 16:49

## 2024-02-20 RX ADMIN — IPRATROPIUM BROMIDE AND ALBUTEROL SULFATE 3 ML: 2.5; .5 SOLUTION RESPIRATORY (INHALATION) at 21:56

## 2024-02-20 RX ADMIN — ASPIRIN 81 MG: 81 TABLET, COATED ORAL at 20:34

## 2024-02-20 RX ADMIN — POTASSIUM CHLORIDE 10 MEQ: 750 TABLET, FILM COATED, EXTENDED RELEASE ORAL at 08:32

## 2024-02-20 RX ADMIN — GUAIFENESIN 1200 MG: 600 TABLET ORAL at 11:20

## 2024-02-20 RX ADMIN — ATORVASTATIN CALCIUM 40 MG: 40 TABLET, FILM COATED ORAL at 08:33

## 2024-02-20 RX ADMIN — PIPERACILLIN SODIUM AND TAZOBACTAM SODIUM 4.5 G: 4; .5 INJECTION, SOLUTION INTRAVENOUS at 11:56

## 2024-02-20 RX ADMIN — SERTRALINE HYDROCHLORIDE 50 MG: 50 TABLET ORAL at 08:33

## 2024-02-20 RX ADMIN — MONTELUKAST 10 MG: 10 TABLET, FILM COATED ORAL at 08:32

## 2024-02-20 RX ADMIN — TAMSULOSIN HYDROCHLORIDE 0.4 MG: 0.4 CAPSULE ORAL at 08:32

## 2024-02-20 RX ADMIN — ICOSAPENT ETHYL 2 G: 1 CAPSULE ORAL at 20:34

## 2024-02-20 RX ADMIN — FAMOTIDINE 20 MG: 20 TABLET ORAL at 20:34

## 2024-02-20 RX ADMIN — CLOPIDOGREL 75 MG: 75 TABLET ORAL at 08:33

## 2024-02-20 RX ADMIN — ENOXAPARIN SODIUM 40 MG: 40 INJECTION SUBCUTANEOUS at 00:08

## 2024-02-20 ASSESSMENT — COGNITIVE AND FUNCTIONAL STATUS - GENERAL
MOVING FROM LYING ON BACK TO SITTING ON SIDE OF FLAT BED WITH BEDRAILS: A LITTLE
CLIMB 3 TO 5 STEPS WITH RAILING: TOTAL
HELP NEEDED FOR BATHING: A LITTLE
PERSONAL GROOMING: A LITTLE
DAILY ACTIVITIY SCORE: 17
EATING MEALS: A LITTLE
EATING MEALS: A LITTLE
DRESSING REGULAR LOWER BODY CLOTHING: A LITTLE
MOBILITY SCORE: 14
DRESSING REGULAR UPPER BODY CLOTHING: A LITTLE
MOVING FROM LYING ON BACK TO SITTING ON SIDE OF FLAT BED WITH BEDRAILS: A LITTLE
WALKING IN HOSPITAL ROOM: A LITTLE
PERSONAL GROOMING: A LITTLE
STANDING UP FROM CHAIR USING ARMS: A LITTLE
CLIMB 3 TO 5 STEPS WITH RAILING: TOTAL
WALKING IN HOSPITAL ROOM: A LOT
HELP NEEDED FOR BATHING: A LITTLE
MOVING TO AND FROM BED TO CHAIR: A LITTLE
CLIMB 3 TO 5 STEPS WITH RAILING: A LITTLE
MOVING TO AND FROM BED TO CHAIR: A LOT
DRESSING REGULAR UPPER BODY CLOTHING: A LITTLE
MOVING TO AND FROM BED TO CHAIR: A LOT
DAILY ACTIVITIY SCORE: 24
STANDING UP FROM CHAIR USING ARMS: A LITTLE
MOBILITY SCORE: 14
WALKING IN HOSPITAL ROOM: A LOT
TURNING FROM BACK TO SIDE WHILE IN FLAT BAD: A LITTLE
MOVING FROM LYING ON BACK TO SITTING ON SIDE OF FLAT BED WITH BEDRAILS: A LITTLE
TOILETING: A LOT
TOILETING: A LOT
DRESSING REGULAR LOWER BODY CLOTHING: A LITTLE
MOBILITY SCORE: 19
DAILY ACTIVITIY SCORE: 17
STANDING UP FROM CHAIR USING ARMS: A LITTLE
TURNING FROM BACK TO SIDE WHILE IN FLAT BAD: A LITTLE

## 2024-02-20 ASSESSMENT — PAIN - FUNCTIONAL ASSESSMENT
PAIN_FUNCTIONAL_ASSESSMENT: 0-10

## 2024-02-20 ASSESSMENT — PAIN SCALES - GENERAL
PAINLEVEL_OUTOF10: 0 - NO PAIN
PAINLEVEL_OUTOF10: 0 - NO PAIN
PAINLEVEL_OUTOF10: 3
PAINLEVEL_OUTOF10: 0 - NO PAIN

## 2024-02-20 ASSESSMENT — ACTIVITIES OF DAILY LIVING (ADL): ADL_ASSISTANCE: INDEPENDENT

## 2024-02-20 NOTE — TELEPHONE ENCOUNTER
Attempted to contact [facility] multiple times, get a vm and left multiple vm's without any call backs. I called pt's wife and she states she will call someone she knows that works there and get the fax number.     ** Update ** Pt gave fax number [phone].    Electronically signed by PAM CHAVEZ MA on 2/20/24 at 9:41 AM EST

## 2024-02-20 NOTE — PROGRESS NOTES
Aristeo Barriga  [id number]   Service: Internal Medicine / Hospitalist Date of service: 2/20/2024                                  Full Code         Aristeo Barriga is a 78 y.o. male presenting with COPD exacerbation               Subjective  78 y.o. male with history of COPD, tobacco use disorder (still smoking) heart failure, and leukemia presenting with SOB.  He reports being in his usual state of health until last week when he developed increasing shortness of breath associated with a moist cough, wheezing, malaise, chills, and rigors. No chest pain, fever, nausea or vomiting.  He states that he increased his oxygen from 2 L to 4 L over the last couple of days but did not see any improvement.  Additionally, he saw a provider a few days ago and was put on prednisone for presumed COPD exacerbation but he could not tolerate the oral steroid because it made him shaky and jittery.  [facility] and his chest x-ray was read as showing increased airspace opacity in the right lung base.        2/18:                Today the patient is awake alert and interactive appropriately.  Admits he really is not feeling any different than admission and not that well.  Does not feel any worse however.  Continues on 2 L which is his home oxygen level at this point.  Continues on doxycycline Zosyn and vancomycin empirically.  Otherwise denies interval new symptoms or complaints including chest pain palpitations pleuritic type pain worsening shortness of breath cough sputum, nausea abdominal pain flank pain dysuria diarrhea fever or chills.     2/19...............No reported: Fevers, chills, chest pains, headaches, nausea or vomiting.Patient voiced no acute complaints at the time of evaluation.      2/20................No reported: Chest pains, palpitations, dyspnea at rest, nausea, vomiting, syncope or presyncope.      Patient seen and examined, lab data and diagnostics  reviewed.  No reported: acute symptomatology at the time of evaluation including but not limited to :chest pain, dyspnea, constipation, abdominal pain, dysuria , nausea, vomiting ,headache, new focal weakness, diaphoresis,fever, chills, syncope,presyncope or palpitations.         Review of Systems:   Review of system otherwise negative if not aforementioned above in subjective.     Objective     Physical Exam     Constitutional:       Appearance: Patient appeared in no acute cardiopulmonary distress.     Comments: Patient alert and oriented to person place time and situation.Patient was able to speak in full sentence without difficulties.  HEENT:      Head: Normocephalic and atraumatic.Trachea midline      Nose:No observed congestion or rhinorrhea.     Mouth/Throat: Mucous membranes Moist, Trachea appeared  midline.  Eyes:      Extraocular Movements: Extraocular movements intact.      Pupils: Pupils are equal, round, and reactive to light.      Comments: No scleral icterus or conjunctival injection appreciated.   Cardiovascular:      Rate and Rhythm: Normal rate and regular rhythm. No clicks rubs or gallops, normal S1 and S2.No peripheral stigmata of endocarditis appreciated.     Pulmonary:      No appreciation of adventitious sounds but the lung bases were diminished.  Abdominal:      General: Abdomen soft, nontender, active bowel sounds, no involuntary guarding or rebound tenderness appreciated.     Comments: None   Musculoskeletal:       Patient appeared to have full active range of motion for upper  no acute apparent new  joint deformity appreciated on examination.   No pitting edema or cyanosis appreciated.       Lymphadenopathy:      No appreciable palpable lymphadenopathy  Skin:     General: Skin is warm.      Coloration:  No jaundice     Findings: No abnormal appearing skin rashes or lesions that appeared acute noted on unclothed area of the skin..   Neurological:      General: No focal sensory or motor  deficits appreciated, no meningeal signs or dysmetria noted.      Cranial Nerves: Cranial nerves II to XII appearing grossly intact.     Genitals:  Deferred  Psychiatric:         The patient appears to be displaying normal mood and affect at the time of evaluation.     Labs:           Lab Results   Component Value Date     GLUCOSE 88 02/19/2024     CALCIUM 8.4 (L) 02/19/2024      02/19/2024     K 3.5 02/19/2024     CO2 32 02/19/2024     CL 98 02/19/2024     BUN 38 (H) 02/19/2024     CREATININE 2.02 (H) 02/19/2024            Lab Results   Component Value Date     WBC 10.4 02/19/2024     HGB 8.4 (L) 02/19/2024     HCT 26.4 (L) 02/19/2024     MCV 96 02/19/2024      02/19/2024      Lab Results   Component Value Date    GLUCOSE 89 02/20/2024    CALCIUM 8.3 (L) 02/20/2024     02/20/2024    K 3.3 (L) 02/20/2024    CO2 31 02/20/2024     02/20/2024    BUN 35 (H) 02/20/2024    CREATININE 1.89 (H) 02/20/2024      Lab Results   Component Value Date    WBC 9.6 02/20/2024    HGB 8.3 (L) 02/20/2024    HCT 26.2 (L) 02/20/2024    MCV 96 02/20/2024     02/20/2024      [unfilled]   [unfilled]   No results found for the last 90 days.                  X-rays/ Images     [unfilled]   Radiology Results (last 21 days)     Procedure Component Value Units Date/Time   XR chest 2 views [[id number]] Collected: 02/17/24 2026   Order Status: Completed Updated: 02/17/24 2026   Narrative:     Interpreted By:  Raghavendra Estrada,  STUDY:  XR CHEST 2 VIEWS;  2/17/2024 8:04 pm      INDICATION:  Signs/Symptoms:SOB.      COMPARISON:  Chest x-ray 02/14/2024      ACCESSION NUMBER(S):  [id number]      ORDERING CLINICIAN:  MIGUEL LOPEZ      FINDINGS:          CARDIOMEDIASTINAL SILHOUETTE:  Cardiomediastinal silhouette is stable in size and configuration.  Atherosclerotic calcification of the aorta.      LUNGS:  There is persistent left basilar opacity with blunting of the left  costophrenic angle which may relate  to chronic pleural thickening  and/or scarring. There is subtle increased right basilar airspace  opacity which may relate to atelectasis versus developing airspace  disease. No pneumothorax.      ABDOMEN:  No remarkable upper abdominal findings.      BONES:  Multilevel degenerative changes of the spine generalized diffuse  osteopenia.       Impression:     Increased airspace opacity in the right lung base medially is new  from prior exam and may relate to atelectasis versus developing  airspace disease. Clinical correlation continued short-term follow-up  to resolution is advised.      Persistent blunting of the left costophrenic angle likely relates to  chronic pleural thickening and/or scarring.      MACRO:  None      Signed by: Raghavendra Estrada 2/17/2024 8:25 PM  Dictation workstation:   [id number]                  Medical Problems         Problem List         * (Principal) COPD exacerbation (CMS/HCC)     Pneumonia due to gram-negative bacteria     Acute exacerbation of chronic obstructive pulmonary disease (COPD) (CMS/HCC)     Chronic hypoxic respiratory failure (CMS/HCC)     Tobacco use disorder     Primary hypertension (Chronic)     Coronary artery disease involving native coronary artery of native heart without angina pectoris (Chronic)     Mixed hyperlipidemia     CHF (congestive heart failure) (CMS/HCC) (Chronic)     Chronic renal disease, stage III (CMS/HCC)     Pneumonitis     Angina pectoris, unspecified (CMS/HCC)     Bronchitis     Pulmonary emphysema (CMS/HCC)     Weight loss     Thoracic aortic aneurysm without rupture (CMS/HCC)     Overview Signed 11/30/2023 12:01 AM by Nikolai Garcia MD       Formatting of this note might be different from the original. ..           Status post below-knee amputation of right lower extremity (CMS/HCC)     Overview Signed 11/30/2023 12:01 AM by Nikolai Garcia MD       Formatting of this note might be different from the original. ...           S/P AAA (abdominal  aortic aneurysm) repair     Protein-calorie malnutrition (CMS/HCC)     Primary insomnia     Pneumonia due to infectious organism     Pain of left lower extremity     Oral thrush     Myeloproliferative neoplasm (CMS/HCC)     Mixed stress and urge urinary incontinence     Lymphopenia     Leukocytosis     Leukemia in remission (CMS/HCC)     Left cataract     IFG (impaired fasting glucose)     H/O ST elevation myocardial infarction     Fluid retention     Fatigue     Epigastric pain     Easy bruising     Chronic sinusitis     Cellulitis of right lower extremity     Arm lesion     Anxiety     Age-related cataract of both eyes     Adrenal nodule (CMS/HCC)     COVID-19                  Above medical problems may be reflective of historical medical problems that may have resolved and may not related to acute clinical condition/medical problems.     Clinical impression/plan:           Principal Problem:    COPD exacerbation (CMS/HCC)     RLL pneumonia ( with Immunosuppressant use)  Will treat as HAP due to recent hospitalization  Vancomycin and Zosyn, doxycycline  Oxygen  Aerosolized bronchodilator  Pulmonary toileting  Urinary antigens     COPD Exacerbation  Smoking cessation strongly emphasized  Duoneb  Oxygen  IV solumedrol. He says he can tolerate IV steroids but not oral form  Pulmonary rehab  2/18: Appears at his home oxygen level today.     Tobacco use disorder  As in #2  Transdermal nicotine     Leukemia  Currently in remission      HTN  Resume home BP med and monitor      Chronic urinary incontinence  Vibegron     Hypokalemia  Additional supplementation of potassium     Malnutrition    I agree with the dietitian's malnutrition diagnosis.           The patient was informed of differential diagnosis , work up , plan of care and possible sequelae of clinical disposition.Patient in agreement with plan of care. Further recommendations forthcoming in accordance with patient's clinical disposition and response to care.      Disposition/additional interventions: 2/20/2024     Continue empirical antibiotic therapy for pneumonia     De-escalation of systematic corticosteroids.     Tobacco cessation advised.     Wean O2 as tolerated to baseline     Corticosteroid de- escalation.    Continue to hold ACE inhibitor    Additional supplementation of potassium today, for total of 20 mill equivalents, patient continued on Zosyn therapy, will monitor on unabsorbable anion related drop in potassium.    Active infectious process,NAOMI inhibitor immunosuppressants on hold ( Jakafi)     Patient girlfriend updated at the bedside concerning patient's care with patient's permission.        Discharge planning:Anticipate likely discharge in next  48 hours.     Care time: >55 minutes              Dictation performed with assistance of voice recognition device therefore transcription errors are possible.

## 2024-02-20 NOTE — CARE PLAN
Problem: Safety - Adult  Goal: Free from fall injury  Outcome: Progressing   The patient's goals for the shift include  to remain safe    The clinical goals for the shift include Patient will have decreased SOB throughout shift

## 2024-02-20 NOTE — PROGRESS NOTES
"Occupational Therapy    Evaluation    Patient Name: Aristeo Barriga  MRN: [id number]  Today's Date: 2/20/2024  Time Calculation  Start Time: 0930  Stop Time: 0950  Time Calculation (min): 20 min        Assessment:  OT Assessment: pt. very weak, limited apollo. on feet  Prognosis: Good  Barriers to Discharge: Decreased caregiver support  Medical Staff Made Aware: Yes  End of Session Communication: Bedside nurse  End of Session Patient Position: Up in chair, Alarm on  Prognosis: Good  Barriers to Discharge: Decreased caregiver support  Medical Staff Made Aware: Yes  Plan:  Treatment Interventions: ADL retraining, Functional transfer training, UE strengthening/ROM, Endurance training  OT Frequency: 4 times per week  OT Discharge Recommendations: Moderate intensity level of continued care  OT Recommended Transfer Status: Assist of 1  OT - OK to Discharge: Yes ((when medically approp.))  Treatment Interventions: ADL retraining, Functional transfer training, UE strengthening/ROM, Endurance training    Subjective   Current Problem:  1. COPD exacerbation (CMS/HCC)        2. Lobar pneumonia, unspecified organism (CMS/HCC)          General:  General  Reason for Referral: RLL pna., weakness , S.O.B.  Referred By: Jaylen  Past Medical History Relevant to Rehab: Hx. of COPD, tobacco, leukemia, RLS  Co-Treatment: PT  Co-Treatment Reason: to maximize mobility safety  Patient Position Received: Up in chair, Alarm on  General Comment: pt. on 2 liters, states \"I'm very weak\", R prosthetic on  Precautions:  Medical Precautions: Fall precautions, Oxygen therapy device and L/min  Vital Signs: see nurses notes      Pain:  Pain Assessment  Pain Assessment: 0-10  Pain Score: 0 - No pain    Objective   Cognition:  Overall Cognitive Status: Within Functional Limits           Home Living:  Type of Home: House  Lives With: Significant other (states girlfriend is \"sick too\" and legally blind , unable to help pt. much)  Home Adaptive Equipment: " None  Home Layout: One level  Prior Function:  Level of [place]: Independent with ADLs and functional transfers (indep. amb. without device)  ADL Assistance: Independent (sponge-bathes)  IADL History:  Homemaking Responsibilities: Yes  Meal Prep Responsibility:  (receives states they are looking into getting some cleaning help   Mode of Transportation:  (pt. drives , wife unable)  ADL:  LE Dressing Assistance: Minimal  Toileting Assistance with Device:  (Min.A to/from [facility] with FWW , bathroom too far to amb.)  Activity Tolerance:  Endurance: Decreased tolerance for upright activites  Bed Mobility/Transfers:      Transfers  Transfer: Yes (MIn.A with FWW)      Ambulation/Gait Training:  Ambulation/Gait Training  Ambulation/Gait Training Performed: Yes (Min.A amb. with FWW 8 feet only)     Standing Balance: CGA,limited apollo.          Sensation:  Light Touch: No apparent deficits  Strength:  Strength Comments: UEs fair+  Perception:  Inattention/Neglect: Appears intact  Coordination:  Movements are Fluid and Coordinated: Yes          Outcome Measures:[facility] Daily Activity  Putting on and taking off regular lower body clothing: A little  Bathing (including washing, rinsing, drying): A little  Putting on and taking off regular upper body clothing: A little  Toileting, which includes using toilet, bedpan or urinal: A lot  Taking care of personal grooming such as brushing teeth: A little  Eating Meals: A little  Daily Activity - Total Score: 17        Education Documentation  Precautions, taught by Mariza Laguerre OT at 2/20/2024  1:11 PM.  Learner: Patient  Readiness: Acceptance  Method: Explanation  Response: Demonstrated Understanding  Comment: mobility safety with FWW    Education Comments  No comments found.             Goals:  Encounter Problems       Encounter Problems (Active)       ADLs       Demo. SBA bathing, dressing with min. fatigue.  (Progressing)       Start:  02/20/24    Expected End:  02/27/24                MOBILITY       Demo. SBA func. mobility with FWW  (Progressing)       Start:  02/20/24    Expected End:  02/27/24

## 2024-02-20 NOTE — PROGRESS NOTES
"Vancomycin Dosing by Pharmacy- FOLLOW UP    Aristeo Barriga is a 78 y.o. year old male who Pharmacy has been consulted for vancomycin dosing for Vancomycin Indications: Pneumonia. Based on the patient's indication and renal status this patient will be dosed based on a goal trough/random level of 10-15.     Renal function is currently stable, but elevated.    Current vancomycin dose: Dose by level    Most recent random level: 15.2 mcg/mL; approximately 30 hour level from previous dose    Visit Vitals  /65 (BP Location: Left arm, Patient Position: Lying)   Pulse 77   Temp 36.2 °C (97.1 °F) (Temporal)   Resp 18           Lab Results   Component Value Date    CREATININE 1.89 (H) 02/20/2024    CREATININE 1.90 (H) 02/19/2024    CREATININE 2.02 (H) 02/19/2024    CREATININE 1.76 (H) 02/18/2024       Patient weight is No results found for: \"PTWEIGHT\"    No results found for: \"CULTURE\"    I/O last 3 completed shifts:  In: 1100 (17.3 mL/kg) [P.O.:610; I.V.:90 (1.4 mL/kg); IV Piggyback:400]  Out: 1350 (21.3 mL/kg) [Urine:1350 (0.6 mL/kg/hr)]  Weight: 63.5 kg     Lab Results   Component Value Date    PATIENTTEMP 37.0 02/17/2024    PATIENTTEMP 37.0 02/14/2024    PATIENTTEMP 37.0 02/14/2024          Assessment/Plan     Serum creatinine remains elevated from baseline. Will continue dosing by level.    Vancomycin  mg once today on 2/20/24    The next level will be obtained on 2/21 at 0500. May be obtained sooner if clinically indicated.   Will continue to monitor renal function daily while on vancomycin and order serum creatinine at least every 48 hours if not already ordered.  Follow for continued vancomycin needs, clinical response, and signs/symptoms of toxicity.     Jaison Lacey PharmD, BCPS      "
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
## 2024-02-21 ENCOUNTER — CARE COORDINATION (OUTPATIENT)
Dept: CARE COORDINATION | Age: 79
End: 2024-02-21

## 2024-02-21 DIAGNOSIS — J32.9 CHRONIC SINUSITIS, UNSPECIFIED LOCATION: ICD-10-CM

## 2024-02-21 DIAGNOSIS — M10.9 ACUTE GOUT, UNSPECIFIED CAUSE, UNSPECIFIED SITE: ICD-10-CM

## 2024-02-21 LAB
ANION GAP SERPL CALC-SCNC: 10 MMOL/L (ref 10–20)
BUN SERPL-MCNC: 23 MG/DL (ref 6–23)
CALCIUM SERPL-MCNC: 8.4 MG/DL (ref 8.6–10.3)
CHLORIDE SERPL-SCNC: 102 MMOL/L (ref 98–107)
CO2 SERPL-SCNC: 32 MMOL/L (ref 21–32)
CREAT SERPL-MCNC: 1.63 MG/DL (ref 0.5–1.3)
EGFRCR SERPLBLD CKD-EPI 2021: 43 ML/MIN/1.73M*2
ERYTHROCYTE [DISTWIDTH] IN BLOOD BY AUTOMATED COUNT: 18.7 % (ref 11.5–14.5)
GLUCOSE SERPL-MCNC: 81 MG/DL (ref 74–99)
HCT VFR BLD AUTO: 27.5 % (ref 41–52)
HGB BLD-MCNC: 8.9 G/DL (ref 13.5–17.5)
MCH RBC QN AUTO: 30.7 PG (ref 26–34)
MCHC RBC AUTO-ENTMCNC: 32.4 G/DL (ref 32–36)
MCV RBC AUTO: 95 FL (ref 80–100)
NRBC BLD-RTO: 0 /100 WBCS (ref 0–0)
PLATELET # BLD AUTO: 307 X10*3/UL (ref 150–450)
POTASSIUM SERPL-SCNC: 3.6 MMOL/L (ref 3.5–5.3)
RBC # BLD AUTO: 2.9 X10*6/UL (ref 4.5–5.9)
SODIUM SERPL-SCNC: 140 MMOL/L (ref 136–145)
VANCOMYCIN SERPL-MCNC: 14.3 UG/ML (ref 5–20)
WBC # BLD AUTO: 9.4 X10*3/UL (ref 4.4–11.3)

## 2024-02-21 PROCEDURE — 80048 BASIC METABOLIC PNL TOTAL CA: CPT | Performed by: HOSPITALIST

## 2024-02-21 PROCEDURE — 2500000002 HC RX 250 W HCPCS SELF ADMINISTERED DRUGS (ALT 637 FOR MEDICARE OP, ALT 636 FOR OP/ED): Performed by: INTERNAL MEDICINE

## 2024-02-21 PROCEDURE — 36415 COLL VENOUS BLD VENIPUNCTURE: CPT | Performed by: HOSPITALIST

## 2024-02-21 PROCEDURE — 97530 THERAPEUTIC ACTIVITIES: CPT | Mod: GO,CO

## 2024-02-21 PROCEDURE — 97116 GAIT TRAINING THERAPY: CPT | Mod: GP,CQ

## 2024-02-21 PROCEDURE — 94640 AIRWAY INHALATION TREATMENT: CPT

## 2024-02-21 PROCEDURE — 99233 SBSQ HOSP IP/OBS HIGH 50: CPT | Performed by: HOSPITALIST

## 2024-02-21 PROCEDURE — 2500000001 HC RX 250 WO HCPCS SELF ADMINISTERED DRUGS (ALT 637 FOR MEDICARE OP): Performed by: INTERNAL MEDICINE

## 2024-02-21 PROCEDURE — 2500000004 HC RX 250 GENERAL PHARMACY W/ HCPCS (ALT 636 FOR OP/ED): Performed by: INTERNAL MEDICINE

## 2024-02-21 PROCEDURE — 2500000005 HC RX 250 GENERAL PHARMACY W/O HCPCS: Performed by: INTERNAL MEDICINE

## 2024-02-21 PROCEDURE — 80202 ASSAY OF VANCOMYCIN: CPT

## 2024-02-21 PROCEDURE — 1200000002 HC GENERAL ROOM WITH TELEMETRY DAILY

## 2024-02-21 PROCEDURE — 85027 COMPLETE CBC AUTOMATED: CPT | Performed by: HOSPITALIST

## 2024-02-21 PROCEDURE — 97110 THERAPEUTIC EXERCISES: CPT | Mod: GP,CQ

## 2024-02-21 PROCEDURE — 2500000002 HC RX 250 W HCPCS SELF ADMINISTERED DRUGS (ALT 637 FOR MEDICARE OP, ALT 636 FOR OP/ED): Performed by: FAMILY MEDICINE

## 2024-02-21 PROCEDURE — S4991 NICOTINE PATCH NONLEGEND: HCPCS | Performed by: INTERNAL MEDICINE

## 2024-02-21 PROCEDURE — 2500000002 HC RX 250 W HCPCS SELF ADMINISTERED DRUGS (ALT 637 FOR MEDICARE OP, ALT 636 FOR OP/ED): Performed by: HOSPITALIST

## 2024-02-21 RX ORDER — VANCOMYCIN HYDROCHLORIDE 500 MG/100ML
500 INJECTION, SOLUTION INTRAVENOUS ONCE
Status: COMPLETED | OUTPATIENT
Start: 2024-02-21 | End: 2024-02-21

## 2024-02-21 RX ORDER — ALLOPURINOL 100 MG/1
100 TABLET ORAL DAILY
Qty: 30 TABLET | Refills: 5 | Status: SHIPPED | OUTPATIENT
Start: 2024-02-21

## 2024-02-21 RX ORDER — MONTELUKAST SODIUM 10 MG/1
10 TABLET ORAL DAILY
Qty: 30 TABLET | Refills: 3 | Status: SHIPPED | OUTPATIENT
Start: 2024-02-21

## 2024-02-21 RX ORDER — BUSPIRONE HYDROCHLORIDE 5 MG/1
5 TABLET ORAL 2 TIMES DAILY PRN
Qty: 60 TABLET | Refills: 0 | Status: SHIPPED | OUTPATIENT
Start: 2024-02-21 | End: 2024-03-22

## 2024-02-21 RX ADMIN — OXYBUTYNIN CHLORIDE 5 MG: 5 TABLET ORAL at 09:16

## 2024-02-21 RX ADMIN — RUXOLITINIB 20 MG: 20 TABLET ORAL at 09:18

## 2024-02-21 RX ADMIN — NICOTINE 1 PATCH: 14 PATCH, EXTENDED RELEASE TRANSDERMAL at 09:17

## 2024-02-21 RX ADMIN — TAMSULOSIN HYDROCHLORIDE 0.4 MG: 0.4 CAPSULE ORAL at 09:16

## 2024-02-21 RX ADMIN — ISOSORBIDE MONONITRATE 30 MG: 30 TABLET, EXTENDED RELEASE ORAL at 09:16

## 2024-02-21 RX ADMIN — PIPERACILLIN SODIUM AND TAZOBACTAM SODIUM 4.5 G: 4; .5 INJECTION, SOLUTION INTRAVENOUS at 12:51

## 2024-02-21 RX ADMIN — POTASSIUM CHLORIDE 10 MEQ: 750 TABLET, FILM COATED, EXTENDED RELEASE ORAL at 09:16

## 2024-02-21 RX ADMIN — FLUTICASONE FUROATE AND VILANTEROL TRIFENATATE 1 PUFF: 200; 25 POWDER RESPIRATORY (INHALATION) at 06:00

## 2024-02-21 RX ADMIN — LISINOPRIL 10 MG: 10 TABLET ORAL at 12:51

## 2024-02-21 RX ADMIN — Medication 2000 UNITS: at 09:25

## 2024-02-21 RX ADMIN — PIPERACILLIN SODIUM AND TAZOBACTAM SODIUM 4.5 G: 4; .5 INJECTION, SOLUTION INTRAVENOUS at 00:08

## 2024-02-21 RX ADMIN — IPRATROPIUM BROMIDE AND ALBUTEROL SULFATE 3 ML: 2.5; .5 SOLUTION RESPIRATORY (INHALATION) at 07:48

## 2024-02-21 RX ADMIN — ENOXAPARIN SODIUM 40 MG: 40 INJECTION SUBCUTANEOUS at 23:58

## 2024-02-21 RX ADMIN — RUXOLITINIB 20 MG: 20 TABLET ORAL at 21:09

## 2024-02-21 RX ADMIN — GUAIFENESIN 1200 MG: 600 TABLET ORAL at 00:08

## 2024-02-21 RX ADMIN — NIFEDIPINE 30 MG: 30 TABLET, FILM COATED, EXTENDED RELEASE ORAL at 09:16

## 2024-02-21 RX ADMIN — VANCOMYCIN HYDROCHLORIDE 500 MG: 500 INJECTION, SOLUTION INTRAVENOUS at 11:19

## 2024-02-21 RX ADMIN — Medication 2 L/MIN: at 07:52

## 2024-02-21 RX ADMIN — ICOSAPENT ETHYL 2 G: 1 CAPSULE ORAL at 21:08

## 2024-02-21 RX ADMIN — ASPIRIN 81 MG: 81 TABLET, COATED ORAL at 21:09

## 2024-02-21 RX ADMIN — ALLOPURINOL 100 MG: 100 TABLET ORAL at 09:16

## 2024-02-21 RX ADMIN — ACETAMINOPHEN 650 MG: 325 TABLET ORAL at 21:08

## 2024-02-21 RX ADMIN — PIPERACILLIN SODIUM AND TAZOBACTAM SODIUM 4.5 G: 4; .5 INJECTION, SOLUTION INTRAVENOUS at 05:33

## 2024-02-21 RX ADMIN — ATORVASTATIN CALCIUM 40 MG: 40 TABLET, FILM COATED ORAL at 09:16

## 2024-02-21 RX ADMIN — METOPROLOL SUCCINATE 25 MG: 25 TABLET, EXTENDED RELEASE ORAL at 21:09

## 2024-02-21 RX ADMIN — SERTRALINE HYDROCHLORIDE 50 MG: 50 TABLET ORAL at 09:16

## 2024-02-21 RX ADMIN — IPRATROPIUM BROMIDE AND ALBUTEROL SULFATE 3 ML: 2.5; .5 SOLUTION RESPIRATORY (INHALATION) at 22:38

## 2024-02-21 RX ADMIN — GUAIFENESIN 1200 MG: 600 TABLET ORAL at 23:58

## 2024-02-21 RX ADMIN — OXYBUTYNIN CHLORIDE 5 MG: 5 TABLET ORAL at 16:08

## 2024-02-21 RX ADMIN — MONTELUKAST 10 MG: 10 TABLET, FILM COATED ORAL at 09:16

## 2024-02-21 RX ADMIN — TIOTROPIUM BROMIDE INHALATION SPRAY 2 PUFF: 3.12 SPRAY, METERED RESPIRATORY (INHALATION) at 09:36

## 2024-02-21 RX ADMIN — FUROSEMIDE 20 MG: 20 TABLET ORAL at 09:16

## 2024-02-21 RX ADMIN — FAMOTIDINE 20 MG: 20 TABLET ORAL at 21:09

## 2024-02-21 RX ADMIN — PIPERACILLIN SODIUM AND TAZOBACTAM SODIUM 4.5 G: 4; .5 INJECTION, SOLUTION INTRAVENOUS at 17:15

## 2024-02-21 RX ADMIN — GUAIFENESIN 1200 MG: 600 TABLET ORAL at 12:53

## 2024-02-21 RX ADMIN — CLOPIDOGREL 75 MG: 75 TABLET ORAL at 09:16

## 2024-02-21 RX ADMIN — ICOSAPENT ETHYL 2 G: 1 CAPSULE ORAL at 09:17

## 2024-02-21 RX ADMIN — IPRATROPIUM BROMIDE AND ALBUTEROL SULFATE 3 ML: 2.5; .5 SOLUTION RESPIRATORY (INHALATION) at 11:27

## 2024-02-21 RX ADMIN — PIPERACILLIN SODIUM AND TAZOBACTAM SODIUM 4.5 G: 4; .5 INJECTION, SOLUTION INTRAVENOUS at 23:58

## 2024-02-21 RX ADMIN — Medication 2 L/MIN: at 11:29

## 2024-02-21 RX ADMIN — ENOXAPARIN SODIUM 40 MG: 40 INJECTION SUBCUTANEOUS at 00:08

## 2024-02-21 ASSESSMENT — COGNITIVE AND FUNCTIONAL STATUS - GENERAL
HELP NEEDED FOR BATHING: A LOT
PERSONAL GROOMING: A LITTLE
DRESSING REGULAR UPPER BODY CLOTHING: A LOT
TURNING FROM BACK TO SIDE WHILE IN FLAT BAD: A LITTLE
MOVING FROM LYING ON BACK TO SITTING ON SIDE OF FLAT BED WITH BEDRAILS: A LITTLE
MOVING FROM LYING ON BACK TO SITTING ON SIDE OF FLAT BED WITH BEDRAILS: A LITTLE
TOILETING: A LITTLE
STANDING UP FROM CHAIR USING ARMS: A LOT
WALKING IN HOSPITAL ROOM: A LOT
MOBILITY SCORE: 14
DRESSING REGULAR LOWER BODY CLOTHING: A LOT
DAILY ACTIVITIY SCORE: 16
DRESSING REGULAR UPPER BODY CLOTHING: A LOT
STANDING UP FROM CHAIR USING ARMS: A LOT
TURNING FROM BACK TO SIDE WHILE IN FLAT BAD: A LITTLE
CLIMB 3 TO 5 STEPS WITH RAILING: TOTAL
DRESSING REGULAR LOWER BODY CLOTHING: A LOT
WALKING IN HOSPITAL ROOM: A LOT
MOVING TO AND FROM BED TO CHAIR: A LITTLE
TOILETING: A LITTLE
TURNING FROM BACK TO SIDE WHILE IN FLAT BAD: A LITTLE
STANDING UP FROM CHAIR USING ARMS: A LITTLE
TOILETING: A LITTLE
WALKING IN HOSPITAL ROOM: A LOT
MOVING FROM LYING ON BACK TO SITTING ON SIDE OF FLAT BED WITH BEDRAILS: A LITTLE
DRESSING REGULAR LOWER BODY CLOTHING: A LOT
DRESSING REGULAR UPPER BODY CLOTHING: A LITTLE
MOVING TO AND FROM BED TO CHAIR: A LITTLE
HELP NEEDED FOR BATHING: A LOT
TURNING FROM BACK TO SIDE WHILE IN FLAT BAD: A LITTLE
CLIMB 3 TO 5 STEPS WITH RAILING: TOTAL
DRESSING REGULAR LOWER BODY CLOTHING: A LITTLE
MOBILITY SCORE: 16
MOBILITY SCORE: 14
CLIMB 3 TO 5 STEPS WITH RAILING: TOTAL
MOVING TO AND FROM BED TO CHAIR: A LITTLE
DAILY ACTIVITIY SCORE: 16
DAILY ACTIVITIY SCORE: 19
PERSONAL GROOMING: A LITTLE
HELP NEEDED FOR BATHING: A LITTLE
HELP NEEDED FOR BATHING: A LOT
STANDING UP FROM CHAIR USING ARMS: A LOT
CLIMB 3 TO 5 STEPS WITH RAILING: A LOT
PERSONAL GROOMING: A LITTLE
WALKING IN HOSPITAL ROOM: A LOT
MOVING FROM LYING ON BACK TO SITTING ON SIDE OF FLAT BED WITH BEDRAILS: A LITTLE
MOVING TO AND FROM BED TO CHAIR: A LITTLE
PERSONAL GROOMING: A LITTLE
TOILETING: A LITTLE
DRESSING REGULAR UPPER BODY CLOTHING: A LOT

## 2024-02-21 ASSESSMENT — PAIN SCALES - GENERAL
PAINLEVEL_OUTOF10: 0 - NO PAIN

## 2024-02-21 ASSESSMENT — PAIN - FUNCTIONAL ASSESSMENT
PAIN_FUNCTIONAL_ASSESSMENT: 0-10
PAIN_FUNCTIONAL_ASSESSMENT: 0-10

## 2024-02-21 NOTE — PROGRESS NOTES
Physical Therapy    Physical Therapy Treatment    Patient Name: Aristeo Barriga  MRN: [id number]  Today's Date: 2/21/2024  Time Calculation  Start Time: 1046  Stop Time: 1109  Time Calculation (min): 23 min       Assessment/Plan   PT Assessment  PT Assessment Results: Decreased strength, Decreased range of motion, Decreased endurance, Decreased mobility    PT Plan  Treatment/Interventions: Bed mobility, Transfer training, Gait training, Balance training, Stair training, Strengthening, Endurance training, Therapeutic exercise, Therapeutic activity, Home exercise program  PT Plan: Skilled PT  PT Frequency: 4 times per week  PT Discharge Recommendations: Moderate intensity level of continued care (IF patient refuses, will need additional assist at home and low level therapy)  PT - OK to Discharge: Yes (when medically cleared)      General Visit Information:   PT  Visit  PT Received On: 02/21/24  Response to Previous Treatment: Other (Comment) (patient initiallydelciningdu to fatigue  weakness and sOB,but able to persuade)  General  Prior to Session Communication: Bedside nurse  Patient Position Received: Bed, 3 rail up, Alarm on    Subjective   Precautions:  Precautions  Precautions Comment: falls, O2  Vital Signs:       Objective   Pain:  Pain Assessment  Pain Assessment: 0-10  Pain Score: 0 - No pain (no complaints of pain)  Cognition:  Cognition  Overall Cognitive Status: Within Functional Limits  Postural Control:     Extremity/Trunk Assessments:    Activity Tolerance:  Activity Tolerance  Endurance: Tolerates 10 - 20 min exercise with multiple rests  Treatments:  Therapeutic Exercise  Therapeutic Exercise Performed: Yes  Therapeutic Exercise Activity 1: 2 sets of 10 ankle pumps, marches , knee extension with rest between for active recovery    Bed Mobility  Bed Mobility: Yes  Bed Mobility 1  Bed Mobility 1: Supine to sitting  Level of Assistance 1: Minimum assistance    Ambulation/Gait Training  Ambulation/Gait  Training Performed: Yes  Ambulation/Gait Training 1  Comments/Distance (ft) 1: gait training with FWW 10 feet with min assist and verbal cues, followed by seated active recovery  Transfers  Transfer: Yes  Transfer 1  Transfer From 1: Sit to  Transfer to 1: Stand  Transfer Device 1: Walker  Transfer Level of Assistance 1: Minimum assistance    Outcome Measures:  [facility] Basic Mobility  Turning from your back to your side while in a flat bed without using bedrails: A little  Moving from lying on your back to sitting on the side of a flat bed without using bedrails: A little  Moving to and from bed to chair (including a wheelchair): A little  Standing up from a chair using your arms (e.g. wheelchair or bedside chair): A lot  To walk in hospital room: A lot  Climbing 3-5 steps with railing: Total  Basic Mobility - Total Score: 14    Education Documentation  Mobility Training, taught by Sierra Tran PTA at 2/21/2024 11:14 AM.  Learner: Patient  Readiness: Acceptance  Method: Explanation  Response: Verbalizes Understanding    Precautions, taught by Sierra Tran PTA at 2/21/2024 11:14 AM.  Learner: Patient  Readiness: Acceptance  Method: Explanation  Response: Verbalizes Understanding    Education Comments  No comments found.        OP EDUCATION:       Encounter Problems       Encounter Problems (Active)       PT Problem       transfers (Progressing)       Start:  02/20/24    Expected End:  03/05/24       Patient will perform all transfers with SBA x1          gait (Progressing)       Start:  02/20/24    Expected End:  03/05/24       Patient will amb 50+ feet with CGA x1 and rest breaks prn for energy conservation           strengthening (Progressing)       Start:  02/20/24    Expected End:  03/05/24       Patient will perform 20+ reps of AROM/RROM for RICKEY LE's to improve safety and functional independence

## 2024-02-21 NOTE — DOCUMENTATION CLARIFICATION NOTE
"    PATIENT:               AMITA BELTRAN  ACCT #:                  [id number]  MRN:                       [id number]  :                       1945  ADMIT DATE:       2024 6:51 PM  DISCH DATE:  RESPONDING PROVIDER #:        [id number]          PROVIDER RESPONSE TEXT:    Gram Negative PNA    CDI QUERY TEXT:    [facility]_Pneumonia Specificity        Instruction:    Based on your assessment of the patient and the clinical information, please provide the requested documentation by clicking on the appropriate radio button and enter any additional information if prompted.    Question: Please further specify the type of pneumonia being treated    When answering this query, please exercise your independent professional judgment. The fact that a question is being asked, does not imply that any particular answer is desired or expected.    The patient's clinical indicators include:  Clinical Information: 79 yo male admitted with RLL PNA, COPDe.  PMH includes COPD on 2- 4 L at baseline, HTN, TAA, CML, CKD IIIb, CHF, GERD and smoking.    Clinical Indicators:   DR York: \" RLL pneumonia. Will treat as HAP due to recent hospitalization\"    - Urinary antigens: Strep pneumoniae and Legionella neg.  - Micro: Flu A/B, MRSA, Covid all negative.  - CXR  : Increased airspace opacity in the right lung base medially is new from prior exam and may relate to atelectasis versus developing airspace disease. Clinical correlation continued short-term follow-up to resolution is advised.  Persistent blunting of the left costophrenic angle likely relates to chronic pleural thickening and/or scarring.    Treatment: Vancomycin and Zosyn, Doxycycline, Oxygen, Aerosolized bronchodilator, Pulmonary toileting, lab monitoring.    Risk Factors: COPDe, recent hospital stay, GERD and smoking.  Options provided:  -- Aspiration PNA  -- Gram Negative PNA  -- Pseudomonas PNA  -- Viral PNA  -- MRSA PNA  -- Other - I will add my own diagnosis  -- Refer " to Clinical Documentation Reviewer    Query created by: Belen Schaeffer on 2/20/2024 2:45 PM      Electronically signed by:  KRYSTAL PIERCE DO 2/20/2024 7:28 PM

## 2024-02-21 NOTE — CARE PLAN
The patient's goals for the shift include      The clinical goals for the shift include Patient will remain safe and his VS will remain stable throughout the shift.      Problem: Safety - Adult  Goal: Free from fall injury  Outcome: Progressing     Problem: Discharge Planning  Goal: Discharge to home or other facility with appropriate resources  Outcome: Progressing     Problem: Chronic Conditions and Co-morbidities  Goal: Patient's chronic conditions and co-morbidity symptoms are monitored and maintained or improved  Outcome: Progressing     Problem: Fall/Injury  Goal: Not fall by end of shift  Outcome: Progressing  Goal: Be free from injury by end of the shift  Outcome: Progressing  Goal: Verbalize understanding of personal risk factors for fall in the hospital  Outcome: Progressing  Goal: Verbalize understanding of risk factor reduction measures to prevent injury from fall in the home  Outcome: Progressing  Goal: Use assistive devices by end of the shift  Outcome: Progressing  Goal: Pace activities to prevent fatigue by end of the shift  Outcome: Progressing     Problem: Pain  Goal: Takes deep breaths with improved pain control throughout the shift  Outcome: Progressing  Goal: Turns in bed with improved pain control throughout the shift  Outcome: Progressing  Goal: Walks with improved pain control throughout the shift  Outcome: Progressing  Goal: Performs ADL's with improved pain control throughout shift  Outcome: Progressing  Goal: Participates in PT with improved pain control throughout the shift  Outcome: Progressing  Goal: Free from opioid side effects throughout the shift  Outcome: Progressing  Goal: Free from acute confusion related to pain meds throughout the shift  Outcome: Progressing

## 2024-02-21 NOTE — PROGRESS NOTES
Aristeo Barriga  [id number]   Service: Internal Medicine / Hospitalist Date of service: 2/21/2024                                  Full Code         Aristeo Barriga is a 78 y.o. male presenting with COPD exacerbation               Subjective  78 y.o. male with history of COPD, tobacco use disorder (still smoking) heart failure, and leukemia presenting with SOB.  He reports being in his usual state of health until last week when he developed increasing shortness of breath associated with a moist cough, wheezing, malaise, chills, and rigors. No chest pain, fever, nausea or vomiting.  He states that he increased his oxygen from 2 L to 4 L over the last couple of days but did not see any improvement.  Additionally, he saw a provider a few days ago and was put on prednisone for presumed COPD exacerbation but he could not tolerate the oral steroid because it made him shaky and jittery.  [facility] and his chest x-ray was read as showing increased airspace opacity in the right lung base.        2/18:                Today the patient is awake alert and interactive appropriately.  Admits he really is not feeling any different than admission and not that well.  Does not feel any worse however.  Continues on 2 L which is his home oxygen level at this point.  Continues on doxycycline Zosyn and vancomycin empirically.  Otherwise denies interval new symptoms or complaints including chest pain palpitations pleuritic type pain worsening shortness of breath cough sputum, nausea abdominal pain flank pain dysuria diarrhea fever or chills.     2/19...............No reported: Fevers, chills, chest pains, headaches, nausea or vomiting.Patient voiced no acute complaints at the time of evaluation.      2/20................No reported: Chest pains, palpitations, dyspnea at rest, nausea, vomiting, syncope or presyncope.    2/21...................No reported: chest pains,  palpitations, dyspnea at rest, fevers, chills, chest pains, headaches, nausea or vomiting.Overall patient felt that he is improving but voiced trepidation about early discharge.        Patient seen and examined, lab data and diagnostics reviewed.  No reported: acute symptomatology at the time of evaluation including but not limited to :chest pain, dyspnea, constipation, abdominal pain, dysuria , nausea, vomiting ,headache, new focal weakness, diaphoresis,fever, chills, syncope,presyncope or palpitations.         Review of Systems:   Review of system otherwise negative if not aforementioned above in subjective.     Objective     Physical Exam      Constitutional:       Appearance: Patient appeared in no acute cardiopulmonary distress.     Comments: Patient alert and oriented to person place time and situation.Patient was able to speak in full sentence without difficulties.  HEENT:      Head: Normocephalic and atraumatic.Trachea midline      Nose:No observed congestion or rhinorrhea.     Mouth/Throat: Mucous membranes Moist, Trachea appeared  midline.  Eyes:      Extraocular Movements: Extraocular movements intact.      Pupils: Pupils are equal, round, and reactive to light.      Comments: No scleral icterus or conjunctival injection appreciated.   Cardiovascular:      Rate and Rhythm: Normal rate and regular rhythm. No clicks rubs or gallops, normal S1 and S2.No peripheral stigmata of endocarditis appreciated.     Pulmonary:      No appreciation of adventitious sounds but the lung bases remained diminished.  Abdominal:      General: Abdomen soft, nontender, active bowel sounds, no involuntary guarding or rebound tenderness appreciated.     Comments: None   Musculoskeletal:       Patient appeared to have full active range of motion for upper  no acute apparent new  joint deformity appreciated on examination.   No pitting edema or cyanosis appreciated.       Lymphadenopathy:      No appreciable palpable  lymphadenopathy  Skin:     General: Skin is warm.      Coloration:  No jaundice     Findings: No abnormal appearing skin rashes or lesions that appeared acute noted on unclothed area of the skin..   Neurological:      General: No focal sensory or motor deficits appreciated, no meningeal signs or dysmetria noted.      Cranial Nerves: Cranial nerves II to XII appearing grossly intact.     Genitals:  Deferred  Psychiatric:         The patient appears to be displaying normal mood and affect at the time of evaluation.     Labs:               Lab Results              Lab Results   Component Value Date     GLUCOSE 89 02/20/2024     CALCIUM 8.3 (L) 02/20/2024      02/20/2024     K 3.3 (L) 02/20/2024     CO2 31 02/20/2024      02/20/2024     BUN 35 (H) 02/20/2024     CREATININE 1.89 (H) 02/20/2024            Lab Results   Component Value Date     WBC 9.6 02/20/2024     HGB 8.3 (L) 02/20/2024     HCT 26.2 (L) 02/20/2024     MCV 96 02/20/2024      02/20/2024      Lab Results   Component Value Date    GLUCOSE 81 02/21/2024    CALCIUM 8.4 (L) 02/21/2024     02/21/2024    K 3.6 02/21/2024    CO2 32 02/21/2024     02/21/2024    BUN 23 02/21/2024    CREATININE 1.63 (H) 02/21/2024      [unfilled]   [unfilled]   No results found for the last 90 days.                  X-rays/ Images     [unfilled]   Radiology Results (last 21 days)     Procedure Component Value Units Date/Time   XR chest 2 views [[id number]] Collected: 02/17/24 2026   Order Status: Completed Updated: 02/17/24 2026   Narrative:     Interpreted By:  Raghavendra Estrada,  STUDY:  XR CHEST 2 VIEWS;  2/17/2024 8:04 pm      INDICATION:  Signs/Symptoms:SOB.      COMPARISON:  Chest x-ray 02/14/2024      ACCESSION NUMBER(S):  [id number]      ORDERING CLINICIAN:  MIGUEL LOPEZ      FINDINGS:          CARDIOMEDIASTINAL SILHOUETTE:  Cardiomediastinal silhouette is stable in size and configuration.  Atherosclerotic calcification of the  aorta.      LUNGS:  There is persistent left basilar opacity with blunting of the left  costophrenic angle which may relate to chronic pleural thickening  and/or scarring. There is subtle increased right basilar airspace  opacity which may relate to atelectasis versus developing airspace  disease. No pneumothorax.      ABDOMEN:  No remarkable upper abdominal findings.      BONES:  Multilevel degenerative changes of the spine generalized diffuse  osteopenia.       Impression:     Increased airspace opacity in the right lung base medially is new  from prior exam and may relate to atelectasis versus developing  airspace disease. Clinical correlation continued short-term follow-up  to resolution is advised.      Persistent blunting of the left costophrenic angle likely relates to  chronic pleural thickening and/or scarring.      MACRO:  None      Signed by: Raghavendra Estrada 2/17/2024 8:25 PM  Dictation workstation:   [id number]                  Medical Problems         Problem List         * (Principal) COPD exacerbation (CMS/HCC)     Pneumonia due to gram-negative bacteria     Acute exacerbation of chronic obstructive pulmonary disease (COPD) (CMS/HCC)     Chronic hypoxic respiratory failure (CMS/HCC)     Tobacco use disorder     Primary hypertension (Chronic)     Coronary artery disease involving native coronary artery of native heart without angina pectoris (Chronic)     Mixed hyperlipidemia     CHF (congestive heart failure) (CMS/HCC) (Chronic)     Chronic renal disease, stage III (CMS/HCC)     Pneumonitis     Angina pectoris, unspecified (CMS/HCC)     Bronchitis     Pulmonary emphysema (CMS/HCC)     Weight loss     Thoracic aortic aneurysm without rupture (CMS/HCC)     Overview Signed 11/30/2023 12:01 AM by Nikolai Garcia MD       Formatting of this note might be different from the original. ..           Status post below-knee amputation of right lower extremity (CMS/HCC)     Overview Signed 11/30/2023 12:01 AM  by Nikolai Garcia MD       Formatting of this note might be different from the original. ...           S/P AAA (abdominal aortic aneurysm) repair     Protein-calorie malnutrition (CMS/HCC)     Primary insomnia     Pneumonia due to infectious organism     Pain of left lower extremity     Oral thrush     Myeloproliferative neoplasm (CMS/HCC)     Mixed stress and urge urinary incontinence     Lymphopenia     Leukocytosis     Leukemia in remission (CMS/HCC)     Left cataract     IFG (impaired fasting glucose)     H/O ST elevation myocardial infarction     Fluid retention     Fatigue     Epigastric pain     Easy bruising     Chronic sinusitis     Cellulitis of right lower extremity     Arm lesion     Anxiety     Age-related cataract of both eyes     Adrenal nodule (CMS/HCC)     COVID-19                  Above medical problems may be reflective of historical medical problems that may have resolved and may not related to acute clinical condition/medical problems.     Clinical impression/plan:           Principal Problem:    COPD exacerbation (CMS/HCC)     RLL pneumonia ( with Immunosuppressant use)  Will treat as HAP due to recent hospitalization  Vancomycin and Zosyn, doxycycline  Oxygen  Aerosolized bronchodilator  Pulmonary toileting  Urinary antigens     COPD Exacerbation  Smoking cessation strongly emphasized  Duoneb  Oxygen  IV solumedrol. He says he can tolerate IV steroids but not oral form  Pulmonary rehab  2/18: Appears at his home oxygen level today.     Tobacco use disorder  As in #2  Transdermal nicotine     Leukemia  Currently in remission      HTN  Resume home BP med and monitor      Chronic urinary incontinence  Vibegron     Hypokalemia  Additional supplementation of potassium     Malnutrition    I agree with the dietitian's malnutrition diagnosis.           The patient was informed of differential diagnosis , work up , plan of care and possible sequelae of clinical disposition.Patient in agreement with  plan of care. Further recommendations forthcoming in accordance with patient's clinical disposition and response to care.     Disposition/additional interventions: 2/21/2024     Continue empirical antibiotic therapy for pneumonia     Improve renal function, worsening hypertension, resume ACE inhibitor.    Monitor blood pressure    Vibegron or inpatient pharmacological substitute.    Bladder scan.................. patient did reported that he felt a bit that he is not emptying his bladder completely.    Continue antibiotic therapy     Active infectious process,NAOMI inhibitor immunosuppressants on hold ( [place])     Recommend repeating chest radiograph in 3 to 4 weeks for clearance of pneumonia.          Discharge planning:Anticipate likely discharge in next  48 hours.     Care time: >55 minutes              Dictation performed with assistance of voice recognition device therefore transcription errors are possible.

## 2024-02-21 NOTE — NURSING NOTE
Patient complained of feeling like he was not fully emptying bladder. Dr. York ordered bladder scan, which showed 1,134mL. Order placed to straight cath patient. Patient straight cathed and 1250mL was removed from bladder. Notified Dr. York.

## 2024-02-21 NOTE — PROGRESS NOTES
Occupational Therapy    OT Treatment    Patient Name: Aristeo Barriga  MRN: [id number]  Today's Date: 2/21/2024  Time Calculation  Start Time: 1438  Stop Time: 1455  Time Calculation (min): 17 min         Assessment:  End of Session Communication: Bedside nurse  End of Session Patient Position: Bed, 2 rail up (nursing at bedside)     Plan:  Treatment Interventions: ADL retraining, Functional transfer training, Endurance training      Subjective   Previous Visit Info:  OT Last Visit  OT Received On: 02/21/24  General:  General  Prior to Session Communication: Bedside nurse (ok to treat)  Patient Position Received: Bed, 3 rail up, Alarm on (pt. with nursing.)  General Comment: pt having issues shortness of breath limited to transferring from bed to around other side of bed with MIN A cueing for safety. He completed  1x10  breathing exercises EOB with UE AROM.       Pain:  Pain Assessment  Pain Score: 0 - No pain    Objective    Cognition:  Cognition  Overall Cognitive Status: Within Functional Limits  Coordination:     Activities of Daily Living:   Functional Standing Tolerance:     Bed Mobility/Transfers: Bed Mobility  Bed Mobility: Yes  Bed Mobility 1  Bed Mobility 1: Supine to sitting, Sitting to supine  Level of Assistance 1: Minimum assistance    Transfer 1  Transfer From 1: Sit to  Transfer to 1: Stand  Technique 1: Sit to stand, Stand to sit  Transfer Device 1: Walker  Transfer Level of Assistance 1: Minimum assistance  Transfers 2  Transfer From 2: Bed to  Transfer to 2: Bed (opposite sie of bed)  Technique 2:  (ambulated with FWW)  Transfer Device 2: Walker  Transfer Level of Assistance 2: Minimum assistance       Therapy/Activity: Therapeutic Exercise  Therapeutic Exercise Activity 1: (P) 1x10 reps shoulder flexion ex. cueing for purse lip breathing using count out loud tech. for improved breathing.              Outcome Measures:[facility] Daily Activity  Putting on and taking off regular lower body clothing: A  lot  Bathing (including washing, rinsing, drying): A lot  Putting on and taking off regular upper body clothing: A lot  Toileting, which includes using toilet, bedpan or urinal: A little  Taking care of personal grooming such as brushing teeth: A little  Eating Meals: None  Daily Activity - Total Score: 16        Education Documentation  Precautions, taught by MICHELA Luo at 2/21/2024  3:20 PM.  Learner: Patient  Readiness: Acceptance  Method: Explanation  Response: Verbalizes Understanding, Needs Reinforcement    ADL Training, taught by MICHELA Luo at 2/21/2024  3:20 PM.  Learner: Patient  Readiness: Acceptance  Method: Explanation  Response: Verbalizes Understanding, Needs Reinforcement    Education Comments  No comments found.        OP EDUCATION:       Goals:  Encounter Problems       Encounter Problems (Active)       ADLs       Demo. SBA bathing, dressing with min. fatigue.  (Progressing)       Start:  02/20/24    Expected End:  02/27/24               MOBILITY       Demo. SBA func. mobility with FWW  (Progressing)       Start:  02/20/24    Expected End:  02/27/24

## 2024-02-21 NOTE — CARE COORDINATION
[facility] made call to pt, to initiate SW referral, pt answered and reports he is still in the hospital/currently admitted.    Gill Whitten MSW, LSW   Care Coordinator  [phone]

## 2024-02-21 NOTE — DOCUMENTATION CLARIFICATION NOTE
PATIENT:               AMITA BELTRAN  ACCT #:                  [id number]  MRN:                       [id number]  :                       1945  ADMIT DATE:       2024 6:51 PM  DISCH DATE:  RESPONDING PROVIDER #:        [id number]          PROVIDER RESPONSE TEXT:    Acute Kidney Injury on CKD 3b    CDI QUERY TEXT:    [facility]_AKI        Instruction:    Based on your assessment of the patient and the clinical information, please provide the requested documentation by clicking on the appropriate radio button and enter any additional information if prompted.    Question: Please clarify a diagnosis for the patient renal status    When answering this query, please exercise your independent professional judgment. The fact that a question is being asked, does not imply that any particular answer is desired or expected.    The patient's clinical indicators include:  Clinical Information: 79 yo male admitted with RLL PNA, COPDe.  PMH includes COPD on 2- 4 L at baseline, HTN, TAA, CML, CKD IIIb, CHF, GERD and smoking.    Clinical Indicators: Labs:  24 19:18  Blood Urea Nitrogen: 30  Creatinine: 1.25  EGFR: 59    24 19:46  Blood Urea Nitrogen: 31  Creatinine: 1.76  EGFR: 39    24 04:50  Blood Urea Nitrogen: 38  Creatinine: 2.02  EGFR: 33    24 12:53  Blood Urea Nitrogen: 35 (H)  Creatinine: 1.90 (H)  EGFR: 36 (L)    24 05:00  Blood Urea Nitrogen: 35  Creatinine: 1.89  EGFR: 36    Treatment:  Lab monitoring, 500 ml LR bolus.    Risk Factors: Lasix, Vancomycin.  Options provided:  -- Acute Kidney Injury on CKD 3b  -- Chronic Kidney Disease 3b  -- JAYESH on CKD, Please specify stage below  -- Other - I will add my own diagnosis  -- Refer to Clinical Documentation Reviewer    Query created by: Belen Schaeffer on 2024 2:29 PM      Electronically signed by:  KRYSTAL PIERCE DO 2024 7:28 PM

## 2024-02-21 NOTE — TELEPHONE ENCOUNTER
Last seen 1/24/2024  Next appt 4/3/2024    Electronically signed by PAM CHAVEZ MA on 2/21/24 at 10:38 AM EST

## 2024-02-22 LAB
ANION GAP SERPL CALC-SCNC: 11 MMOL/L (ref 10–20)
BUN SERPL-MCNC: 17 MG/DL (ref 6–23)
C DIF TOX TCDA+TCDB STL QL NAA+PROBE: NOT DETECTED
CALCIUM SERPL-MCNC: 8.5 MG/DL (ref 8.6–10.3)
CHLORIDE SERPL-SCNC: 101 MMOL/L (ref 98–107)
CO2 SERPL-SCNC: 33 MMOL/L (ref 21–32)
CREAT SERPL-MCNC: 1.53 MG/DL (ref 0.5–1.3)
EGFRCR SERPLBLD CKD-EPI 2021: 46 ML/MIN/1.73M*2
GLUCOSE SERPL-MCNC: 85 MG/DL (ref 74–99)
MAGNESIUM SERPL-MCNC: 2.11 MG/DL (ref 1.6–2.4)
POTASSIUM SERPL-SCNC: 3.1 MMOL/L (ref 3.5–5.3)
SODIUM SERPL-SCNC: 142 MMOL/L (ref 136–145)
VANCOMYCIN SERPL-MCNC: 13.7 UG/ML (ref 5–20)

## 2024-02-22 PROCEDURE — 80048 BASIC METABOLIC PNL TOTAL CA: CPT | Performed by: HOSPITALIST

## 2024-02-22 PROCEDURE — 2500000004 HC RX 250 GENERAL PHARMACY W/ HCPCS (ALT 636 FOR OP/ED): Performed by: INTERNAL MEDICINE

## 2024-02-22 PROCEDURE — 2500000001 HC RX 250 WO HCPCS SELF ADMINISTERED DRUGS (ALT 637 FOR MEDICARE OP): Performed by: INTERNAL MEDICINE

## 2024-02-22 PROCEDURE — 83735 ASSAY OF MAGNESIUM: CPT | Performed by: HOSPITALIST

## 2024-02-22 PROCEDURE — 94640 AIRWAY INHALATION TREATMENT: CPT

## 2024-02-22 PROCEDURE — 99233 SBSQ HOSP IP/OBS HIGH 50: CPT | Performed by: HOSPITALIST

## 2024-02-22 PROCEDURE — 99233 SBSQ HOSP IP/OBS HIGH 50: CPT | Performed by: UROLOGY

## 2024-02-22 PROCEDURE — 2500000002 HC RX 250 W HCPCS SELF ADMINISTERED DRUGS (ALT 637 FOR MEDICARE OP, ALT 636 FOR OP/ED): Performed by: HOSPITALIST

## 2024-02-22 PROCEDURE — 87506 IADNA-DNA/RNA PROBE TQ 6-11: CPT | Mod: PORLAB | Performed by: HOSPITALIST

## 2024-02-22 PROCEDURE — 80202 ASSAY OF VANCOMYCIN: CPT | Performed by: INTERNAL MEDICINE

## 2024-02-22 PROCEDURE — 97110 THERAPEUTIC EXERCISES: CPT | Mod: GP,CQ

## 2024-02-22 PROCEDURE — 1200000002 HC GENERAL ROOM WITH TELEMETRY DAILY

## 2024-02-22 PROCEDURE — 2500000002 HC RX 250 W HCPCS SELF ADMINISTERED DRUGS (ALT 637 FOR MEDICARE OP, ALT 636 FOR OP/ED): Performed by: INTERNAL MEDICINE

## 2024-02-22 PROCEDURE — S4991 NICOTINE PATCH NONLEGEND: HCPCS | Performed by: INTERNAL MEDICINE

## 2024-02-22 PROCEDURE — 51702 INSERT TEMP BLADDER CATH: CPT

## 2024-02-22 PROCEDURE — 2500000002 HC RX 250 W HCPCS SELF ADMINISTERED DRUGS (ALT 637 FOR MEDICARE OP, ALT 636 FOR OP/ED): Performed by: FAMILY MEDICINE

## 2024-02-22 PROCEDURE — 87493 C DIFF AMPLIFIED PROBE: CPT | Performed by: HOSPITALIST

## 2024-02-22 PROCEDURE — 97530 THERAPEUTIC ACTIVITIES: CPT | Mod: GP,CQ

## 2024-02-22 RX ORDER — VANCOMYCIN HYDROCHLORIDE 750 MG/150ML
750 INJECTION, SOLUTION INTRAVENOUS ONCE
Status: COMPLETED | OUTPATIENT
Start: 2024-02-22 | End: 2024-02-22

## 2024-02-22 RX ORDER — POTASSIUM CHLORIDE 750 MG/1
20 TABLET, FILM COATED, EXTENDED RELEASE ORAL ONCE
Status: COMPLETED | OUTPATIENT
Start: 2024-02-22 | End: 2024-02-22

## 2024-02-22 RX ORDER — POTASSIUM CHLORIDE 20 MEQ/1
40 TABLET, EXTENDED RELEASE ORAL ONCE
Status: DISCONTINUED | OUTPATIENT
Start: 2024-02-22 | End: 2024-02-22

## 2024-02-22 RX ADMIN — PIPERACILLIN SODIUM AND TAZOBACTAM SODIUM 4.5 G: 4; .5 INJECTION, SOLUTION INTRAVENOUS at 17:54

## 2024-02-22 RX ADMIN — IPRATROPIUM BROMIDE AND ALBUTEROL SULFATE 3 ML: 2.5; .5 SOLUTION RESPIRATORY (INHALATION) at 07:46

## 2024-02-22 RX ADMIN — METOPROLOL SUCCINATE 25 MG: 25 TABLET, EXTENDED RELEASE ORAL at 21:06

## 2024-02-22 RX ADMIN — ASPIRIN 81 MG: 81 TABLET, COATED ORAL at 21:06

## 2024-02-22 RX ADMIN — IPRATROPIUM BROMIDE AND ALBUTEROL SULFATE 3 ML: 2.5; .5 SOLUTION RESPIRATORY (INHALATION) at 11:10

## 2024-02-22 RX ADMIN — CLOPIDOGREL 75 MG: 75 TABLET ORAL at 09:03

## 2024-02-22 RX ADMIN — ALLOPURINOL 100 MG: 100 TABLET ORAL at 09:03

## 2024-02-22 RX ADMIN — Medication 2000 UNITS: at 09:01

## 2024-02-22 RX ADMIN — NIFEDIPINE 30 MG: 30 TABLET, FILM COATED, EXTENDED RELEASE ORAL at 09:01

## 2024-02-22 RX ADMIN — IPRATROPIUM BROMIDE AND ALBUTEROL SULFATE 3 ML: 2.5; .5 SOLUTION RESPIRATORY (INHALATION) at 21:10

## 2024-02-22 RX ADMIN — FUROSEMIDE 20 MG: 20 TABLET ORAL at 09:01

## 2024-02-22 RX ADMIN — ISOSORBIDE MONONITRATE 30 MG: 30 TABLET, EXTENDED RELEASE ORAL at 09:02

## 2024-02-22 RX ADMIN — RUXOLITINIB 20 MG: 20 TABLET ORAL at 09:04

## 2024-02-22 RX ADMIN — VANCOMYCIN HYDROCHLORIDE 750 MG: 750 INJECTION, SOLUTION INTRAVENOUS at 09:03

## 2024-02-22 RX ADMIN — TIOTROPIUM BROMIDE INHALATION SPRAY 2 PUFF: 3.12 SPRAY, METERED RESPIRATORY (INHALATION) at 09:04

## 2024-02-22 RX ADMIN — ATORVASTATIN CALCIUM 40 MG: 40 TABLET, FILM COATED ORAL at 09:01

## 2024-02-22 RX ADMIN — TAMSULOSIN HYDROCHLORIDE 0.4 MG: 0.4 CAPSULE ORAL at 09:02

## 2024-02-22 RX ADMIN — MONTELUKAST 10 MG: 10 TABLET, FILM COATED ORAL at 09:02

## 2024-02-22 RX ADMIN — FLUTICASONE FUROATE AND VILANTEROL TRIFENATATE 1 PUFF: 200; 25 POWDER RESPIRATORY (INHALATION) at 05:14

## 2024-02-22 RX ADMIN — SERTRALINE HYDROCHLORIDE 50 MG: 50 TABLET ORAL at 09:01

## 2024-02-22 RX ADMIN — POTASSIUM CHLORIDE 20 MEQ: 750 TABLET, FILM COATED, EXTENDED RELEASE ORAL at 11:37

## 2024-02-22 RX ADMIN — PIPERACILLIN SODIUM AND TAZOBACTAM SODIUM 4.5 G: 4; .5 INJECTION, SOLUTION INTRAVENOUS at 11:36

## 2024-02-22 RX ADMIN — GUAIFENESIN 1200 MG: 600 TABLET ORAL at 11:41

## 2024-02-22 RX ADMIN — POTASSIUM CHLORIDE 10 MEQ: 750 TABLET, FILM COATED, EXTENDED RELEASE ORAL at 09:01

## 2024-02-22 RX ADMIN — ICOSAPENT ETHYL 2 G: 1 CAPSULE ORAL at 09:03

## 2024-02-22 RX ADMIN — PIPERACILLIN SODIUM AND TAZOBACTAM SODIUM 4.5 G: 4; .5 INJECTION, SOLUTION INTRAVENOUS at 05:15

## 2024-02-22 RX ADMIN — NICOTINE 1 PATCH: 14 PATCH, EXTENDED RELEASE TRANSDERMAL at 09:03

## 2024-02-22 RX ADMIN — ICOSAPENT ETHYL 2 G: 1 CAPSULE ORAL at 21:06

## 2024-02-22 RX ADMIN — FAMOTIDINE 20 MG: 20 TABLET ORAL at 21:06

## 2024-02-22 RX ADMIN — LISINOPRIL 10 MG: 10 TABLET ORAL at 09:01

## 2024-02-22 ASSESSMENT — COGNITIVE AND FUNCTIONAL STATUS - GENERAL
DRESSING REGULAR LOWER BODY CLOTHING: A LOT
STANDING UP FROM CHAIR USING ARMS: A LOT
TOILETING: A LITTLE
HELP NEEDED FOR BATHING: A LOT
MOVING FROM LYING ON BACK TO SITTING ON SIDE OF FLAT BED WITH BEDRAILS: A LITTLE
TURNING FROM BACK TO SIDE WHILE IN FLAT BAD: A LITTLE
CLIMB 3 TO 5 STEPS WITH RAILING: A LOT
HELP NEEDED FOR BATHING: A LITTLE
MOBILITY SCORE: 14
MOVING TO AND FROM BED TO CHAIR: A LITTLE
WALKING IN HOSPITAL ROOM: A LOT
MOVING FROM LYING ON BACK TO SITTING ON SIDE OF FLAT BED WITH BEDRAILS: A LITTLE
WALKING IN HOSPITAL ROOM: A LITTLE
PERSONAL GROOMING: A LITTLE
HELP NEEDED FOR BATHING: A LITTLE
TOILETING: A LITTLE
TURNING FROM BACK TO SIDE WHILE IN FLAT BAD: A LITTLE
PERSONAL GROOMING: A LITTLE
MOVING FROM LYING ON BACK TO SITTING ON SIDE OF FLAT BED WITH BEDRAILS: A LITTLE
DAILY ACTIVITIY SCORE: 19
MOVING TO AND FROM BED TO CHAIR: A LITTLE
STANDING UP FROM CHAIR USING ARMS: A LOT
DRESSING REGULAR UPPER BODY CLOTHING: A LITTLE
STANDING UP FROM CHAIR USING ARMS: A LITTLE
CLIMB 3 TO 5 STEPS WITH RAILING: TOTAL
DRESSING REGULAR LOWER BODY CLOTHING: A LITTLE
CLIMB 3 TO 5 STEPS WITH RAILING: TOTAL
WALKING IN HOSPITAL ROOM: A LOT
MOBILITY SCORE: 13
TURNING FROM BACK TO SIDE WHILE IN FLAT BAD: A LITTLE
PERSONAL GROOMING: A LITTLE
CLIMB 3 TO 5 STEPS WITH RAILING: TOTAL
DRESSING REGULAR UPPER BODY CLOTHING: A LOT
MOVING TO AND FROM BED TO CHAIR: A LOT
DAILY ACTIVITIY SCORE: 19
DRESSING REGULAR UPPER BODY CLOTHING: A LITTLE
WALKING IN HOSPITAL ROOM: A LOT
DRESSING REGULAR LOWER BODY CLOTHING: A LITTLE
TOILETING: A LITTLE
MOVING TO AND FROM BED TO CHAIR: A LITTLE
TURNING FROM BACK TO SIDE WHILE IN FLAT BAD: A LITTLE
STANDING UP FROM CHAIR USING ARMS: A LOT
MOBILITY SCORE: 18

## 2024-02-22 ASSESSMENT — PAIN - FUNCTIONAL ASSESSMENT
PAIN_FUNCTIONAL_ASSESSMENT: 0-10
PAIN_FUNCTIONAL_ASSESSMENT: 0-10

## 2024-02-22 ASSESSMENT — PAIN SCALES - GENERAL
PAINLEVEL_OUTOF10: 0 - NO PAIN
PAINLEVEL_OUTOF10: 0 - NO PAIN

## 2024-02-22 NOTE — PROGRESS NOTES
"Occupational Therapy                 Therapy Communication Note    Patient Name: Aristeo Barriga  MRN: [id number]  Today's Date: 2/22/2024     Discipline: Occupational Therapy    Missed Visit Reason: Missed Visit Reason: Patient refused (pt. asleep on arrival but easily aroused He stated \" No, Im too tired today.\"  notified RN)    Missed Time: Attempt    Comment:  "

## 2024-02-22 NOTE — PROGRESS NOTES
Physical Therapy    Physical Therapy Treatment    Patient Name: Aristeo Barriga  MRN: [id number]  Today's Date: 2/22/2024  Time Calculation  Start Time: 0830  Stop Time: 0855  Time Calculation (min): 25 min       Assessment/Plan   PT Assessment  PT Assessment Results: Decreased strength, Decreased range of motion, Decreased endurance, Impaired balance, Decreased mobility  End of Session Communication: Bedside nurse  Assessment Comment: patient increased endurance with standing,  still limited gait due to fatigue  and fear of needing to use bathroom  End of Session Patient Position: Up in chair, Alarm on (caregiver present)  PT Plan  Inpatient/Swing Bed or Outpatient: Inpatient  PT Plan  Treatment/Interventions: Bed mobility, Transfer training, Gait training, Balance training, Strengthening  PT Plan: Skilled PT  PT Frequency: 4 times per week  PT Discharge Recommendations: Moderate intensity level of continued care (IF patient refuses, will need additional assist at home and low level therapy)  PT - OK to Discharge: Yes (when medically cleared)      General Visit Information:   PT  Visit  PT Received On: 02/22/24  Response to Previous Treatment: Patient reporting fatigue but able to participate.  General  Prior to Session Communication: Bedside nurse  Patient Position Received: Bed, 3 rail up, Alarm on  General Comment: patient reporting he has been trying to clean up for an hour. assisted patient  and called for help to clean patient and bed    Subjective   Precautions:  Precautions  Precautions Comment: falls, O2  Vital Signs:       Objective   Pain:  Pain Assessment  Pain Assessment: 0-10  Pain Score: 0 - No pain (no complaints)  Cognition:  Cognition  Overall Cognitive Status: Within Functional Limits  Postural Control:     Extremity/Trunk Assessments:    Activity Tolerance:  Activity Tolerance  Endurance: Tolerates 30 min exercise with multiple rests  Treatments:  Therapeutic Exercise  Therapeutic Exercise  Performed: Yes  Therapeutic Exercise Activity 1: ankle pumps, heel slides, hip abduction 15 reps    Bed Mobility  Bed Mobility: Yes  Bed Mobility 1  Bed Mobility 1: Supine to sitting  Level of Assistance 1: Minimum assistance    Ambulation/Gait Training  Ambulation/Gait Training Performed: Yes  Ambulation/Gait Training 1  Comments/Distance (ft) 1: gait training 5 feet with min assist  and verbal cues  Transfers  Transfer: Yes  Transfer 1  Transfer From 1: Sit to  Transfer to 1: Stand (x 4 then to chair x 1 all with min assist)  Transfer Device 1: Walker  Transfer Level of Assistance 1: Minimum assistance    Outcome Measures:  [facility] Basic Mobility  Turning from your back to your side while in a flat bed without using bedrails: A little  Moving from lying on your back to sitting on the side of a flat bed without using bedrails: A little  Moving to and from bed to chair (including a wheelchair): A lot  Standing up from a chair using your arms (e.g. wheelchair or bedside chair): A lot  To walk in hospital room: A lot  Climbing 3-5 steps with railing: Total  Basic Mobility - Total Score: 13    Education Documentation  Mobility Training, taught by Sierra Tran PTA at 2/22/2024  9:42 AM.  Learner: Patient  Readiness: Acceptance  Method: Explanation  Response: Verbalizes Understanding    Precautions, taught by Sierra Tran PTA at 2/22/2024  9:42 AM.  Learner: Patient  Readiness: Acceptance  Method: Explanation  Response: Verbalizes Understanding    Education Comments  No comments found.        OP EDUCATION:       Encounter Problems       Encounter Problems (Active)       PT Problem       transfers (Progressing)       Start:  02/20/24    Expected End:  03/05/24       Patient will perform all transfers with SBA x1          gait (Progressing)       Start:  02/20/24    Expected End:  03/05/24       Patient will amb 50+ feet with CGA x1 and rest breaks prn for energy conservation           strengthening (Progressing)        Start:  02/20/24    Expected End:  03/05/24       Patient will perform 20+ reps of AROM/RROM for RICKEY LE's to improve safety and functional independence

## 2024-02-22 NOTE — PROGRESS NOTES
02/22/24 0943   Discharge Planning   Patient expects to be discharged to: SNF   Does the patient need discharge transport arranged? Yes   RoundTrip coordination needed? Yes   Has discharge transport been arranged? No   Patient Choice   Provider Choice list and CMS website (https://medicare.gov/care-compare#search) for post-acute Quality and Resource Measure Data were provided and reviewed with: Patient     Patient with new urinary retention overnight. He required weaver placement. Urology is consulted. PT/OT are recommending MOD intensity therapy for patient. I met with patient and discussed. He is in agreement but needs to discuss with his significant other and family. Patient will try to contact them today, but states they are working so he is not sure he will get a hold of anyone immediately. Patient was provided with a CAREPORT list of skilled nursing facilities that are in network with his insurance, displays the CMS star ratings, and within patient's preferred geographic area.     1410: Patient has not been able to connect with family to discuss SNF yet. We will follow up tomorrow for choices.

## 2024-02-22 NOTE — PROGRESS NOTES
Aristeo Barriga  [id number]   Service: Internal Medicine / Hospitalist Date of service: 2/22/2024                                  Full Code         Aristeo Barriga is a 78 y.o. male presenting with COPD exacerbation               Subjective  78 y.o. male with history of COPD, tobacco use disorder (still smoking) heart failure, and leukemia presenting with SOB.  He reports being in his usual state of health until last week when he developed increasing shortness of breath associated with a moist cough, wheezing, malaise, chills, and rigors. No chest pain, fever, nausea or vomiting.  He states that he increased his oxygen from 2 L to 4 L over the last couple of days but did not see any improvement.  Additionally, he saw a provider a few days ago and was put on prednisone for presumed COPD exacerbation but he could not tolerate the oral steroid because it made him shaky and jittery.  [facility] and his chest x-ray was read as showing increased airspace opacity in the right lung base.        2/18:                Today the patient is awake alert and interactive appropriately.  Admits he really is not feeling any different than admission and not that well.  Does not feel any worse however.  Continues on 2 L which is his home oxygen level at this point.  Continues on doxycycline Zosyn and vancomycin empirically.  Otherwise denies interval new symptoms or complaints including chest pain palpitations pleuritic type pain worsening shortness of breath cough sputum, nausea abdominal pain flank pain dysuria diarrhea fever or chills.     2/19...............No reported: Fevers, chills, chest pains, headaches, nausea or vomiting.Patient voiced no acute complaints at the time of evaluation.      2/20................No reported: Chest pains, palpitations, dyspnea at rest, nausea, vomiting, syncope or presyncope.     2/21...................No reported: chest pains,  palpitations, dyspnea at rest, fevers, chills, chest pains, headaches, nausea or vomiting.Overall patient felt that he is improving but voiced trepidation about early discharge.    2/22....................No reported: Palpitations, headaches, fevers, chills, nausea or vomiting.  Urinary retention appreciated developed yesterday.  Walton catheter placed, oxybutynin held.Patient reported diarrhea without asociated abdominal pain.        Patient seen and examined, lab data and diagnostics reviewed.  No reported: acute symptomatology at the time of evaluation including but not limited to :chest pain, dyspnea, constipation, abdominal pain, dysuria , nausea, vomiting ,headache, new focal weakness, diaphoresis,fever, chills, syncope,presyncope or palpitations.         Review of Systems:   Review of system otherwise negative if not aforementioned above in subjective.     Objective     Physical Exam      Constitutional:       Appearance: Patient appeared in no acute cardiopulmonary distress.     Comments: Patient alert and oriented to person place time and situation.Patient was able to speak in full sentence without difficulties.  HEENT:      Head: Normocephalic and atraumatic.Trachea midline      Nose:No observed congestion or rhinorrhea.     Mouth/Throat: Mucous membranes Moist, Trachea appeared  midline.  Eyes:      Extraocular Movements: Extraocular movements intact.      Pupils: Pupils are equal, round, and reactive to light.      Comments: No scleral icterus or conjunctival injection appreciated.   Cardiovascular:      Rate and Rhythm: Normal rate and regular rhythm. No clicks rubs or gallops, normal S1 and S2.No peripheral stigmata of endocarditis appreciated.     Pulmonary:      No appreciation of adventitious sounds but the lung bases remained diminished.  Abdominal:      General: Abdomen soft, nontender, active bowel sounds, no involuntary guarding or rebound tenderness appreciated.     Comments: None    Musculoskeletal:       Patient appeared to have full active range of motion for upper  no acute apparent new  joint deformity appreciated on examination.   No pitting edema or cyanosis appreciated.       Lymphadenopathy:      No appreciable palpable lymphadenopathy  Skin:     General: Skin is warm.      Coloration:  No jaundice     Findings: No abnormal appearing skin rashes or lesions that appeared acute noted on unclothed area of the skin..   Neurological:      General: No focal sensory or motor deficits appreciated, no meningeal signs or dysmetria noted.      Cranial Nerves: Cranial nerves II to XII appearing grossly intact.     Genitals:  Deferred  Psychiatric:         The patient appears to be displaying normal mood and affect at the time of evaluation.     Labs:               Lab Results                   Lab Results   Component Value Date     GLUCOSE 89 02/20/2024     CALCIUM 8.3 (L) 02/20/2024      02/20/2024     K 3.3 (L) 02/20/2024     CO2 31 02/20/2024      02/20/2024     BUN 35 (H) 02/20/2024     CREATININE 1.89 (H) 02/20/2024                Lab Results   Component Value Date     WBC 9.6 02/20/2024     HGB 8.3 (L) 02/20/2024     HCT 26.2 (L) 02/20/2024     MCV 96 02/20/2024      02/20/2024            Lab Results   Component Value Date     GLUCOSE 81 02/21/2024     CALCIUM 8.4 (L) 02/21/2024      02/21/2024     K 3.6 02/21/2024     CO2 32 02/21/2024      02/21/2024     BUN 23 02/21/2024     CREATININE 1.63 (H) 02/21/2024      [unfilled]   [unfilled]   No results found for the last 90 days.                  X-rays/ Images     [unfilled]   Radiology Results (last 21 days)     Procedure Component Value Units Date/Time   XR chest 2 views [[id number]] Collected: 02/17/24 2026   Order Status: Completed Updated: 02/17/24 2026   Narrative:     Interpreted By:  Raghavendra Estrada,  STUDY:  XR CHEST 2 VIEWS;  2/17/2024 8:04 pm      INDICATION:  Signs/Symptoms:SOB.       COMPARISON:  Chest x-ray 02/14/2024      ACCESSION NUMBER(S):  [id number]      ORDERING CLINICIAN:  MIGUEL LOPEZ      FINDINGS:          CARDIOMEDIASTINAL SILHOUETTE:  Cardiomediastinal silhouette is stable in size and configuration.  Atherosclerotic calcification of the aorta.      LUNGS:  There is persistent left basilar opacity with blunting of the left  costophrenic angle which may relate to chronic pleural thickening  and/or scarring. There is subtle increased right basilar airspace  opacity which may relate to atelectasis versus developing airspace  disease. No pneumothorax.      ABDOMEN:  No remarkable upper abdominal findings.      BONES:  Multilevel degenerative changes of the spine generalized diffuse  osteopenia.       Impression:     Increased airspace opacity in the right lung base medially is new  from prior exam and may relate to atelectasis versus developing  airspace disease. Clinical correlation continued short-term follow-up  to resolution is advised.      Persistent blunting of the left costophrenic angle likely relates to  chronic pleural thickening and/or scarring.      MACRO:  None      Signed by: Raghavendra Estrada 2/17/2024 8:25 PM  Dictation workstation:   [id number]                  Medical Problems         Problem List         * (Principal) COPD exacerbation (CMS/HCC)     Pneumonia due to gram-negative bacteria     Acute exacerbation of chronic obstructive pulmonary disease (COPD) (CMS/HCC)     Chronic hypoxic respiratory failure (CMS/HCC)     Tobacco use disorder     Primary hypertension (Chronic)     Coronary artery disease involving native coronary artery of native heart without angina pectoris (Chronic)     Mixed hyperlipidemia     CHF (congestive heart failure) (CMS/HCC) (Chronic)     Chronic renal disease, stage III (CMS/HCC)     Pneumonitis     Angina pectoris, unspecified (CMS/HCC)     Bronchitis     Pulmonary emphysema (CMS/HCC)     Weight loss     Thoracic aortic aneurysm  without rupture (CMS/[facility])     Overview Signed 11/30/2023 12:01 AM by Nikolai Garcia MD       Formatting of this note might be different from the original. ..           Status post below-knee amputation of right lower extremity (CMS/[facility])     Overview Signed 11/30/2023 12:01 AM by Nikolai Garcia MD       Formatting of this note might be different from the original. ...           S/P AAA (abdominal aortic aneurysm) repair     Protein-calorie malnutrition (CMS/[facility])     Primary insomnia     Pneumonia due to infectious organism     Pain of left lower extremity     Oral thrush     Myeloproliferative neoplasm (CMS/HCC)     Mixed stress and urge urinary incontinence     Lymphopenia     Leukocytosis     Leukemia in remission (CMS/HCC)     Left cataract     IFG (impaired fasting glucose)     H/O ST elevation myocardial infarction     Fluid retention     Fatigue     Epigastric pain     Easy bruising     Chronic sinusitis     Cellulitis of right lower extremity     Arm lesion     Anxiety     Age-related cataract of both eyes     Adrenal nodule (CMS/[facility])     COVID-19                  Above medical problems may be reflective of historical medical problems that may have resolved and may not related to acute clinical condition/medical problems.     Clinical impression/plan:           Principal Problem:    COPD exacerbation (CMS/[facility])     RLL pneumonia ( with Immunosuppressant use)  Will treat as HAP due to recent hospitalization  Vancomycin and Zosyn, doxycycline  Oxygen  Aerosolized bronchodilator  Pulmonary toileting  Urinary antigens     COPD Exacerbation  Smoking cessation strongly emphasized  Duoneb  Oxygen  IV solumedrol. He says he can tolerate IV steroids but not oral form  Pulmonary rehab  2/18: Appears at his home oxygen level today.     Tobacco use disorder  As in #2  Transdermal nicotine     Leukemia  Currently in remission      HTN  Resume home BP med and monitor      Chronic urinary incontinence  Vibegron      Hypokalemia  Additional supplementation of potassium     Malnutrition    I agree with the dietitian's malnutrition diagnosis.    Urinary retention     Consult to urology  Walton catheter placement  Flomax  Stop oxybutynin      Diarrhea    Suspected secondary to antibiotics, patient  on immunosuppressants, check stool for C. difficile, stool PCR    Hypokalemia    Supplement potassium     The patient was informed of differential diagnosis , work up , plan of care and possible sequelae of clinical disposition.Patient in agreement with plan of care. Further recommendations forthcoming in accordance with patient's clinical disposition and response to care.     Disposition/additional interventions: 2/21/2024     Continue empirical antibiotic therapy for pneumonia     Improve renal function, worsening hypertension, resume ACE inhibitor.     Monitor blood pressure     Vibegron or inpatient pharmacological substitute.     Bladder scan.................. patient did reported that he felt a bit that he is not emptying his bladder completely.     Continue antibiotic therapy     Active infectious process,NAOMI inhibitor immunosuppressants on hold ( [place])     Recommend repeating chest radiograph in 3 to 4 weeks for clearance of pneumonia.  Disposition/additional interventions: 2/22/2024      Consult to  urology    Continue empirical antibiotics    Walton catheter placement, voiding trial as per urology.    Continue empirical antibiotics.    Hypokalemia, supplement potassium    Check surrogate magnesium levels.        Discharge planning:Anticipate likely discharge in next 24- 48 hours.     Care time: >55 minutes              Dictation performed with assistance of voice recognition device therefore transcription errors are possible.

## 2024-02-22 NOTE — CARE PLAN
The patient's goals for the shift include      The clinical goals for the shift include Patient will remain safe and free of falls this shift.      Problem: Safety - Adult  Goal: Free from fall injury  2/22/2024 0728 by Rosa Garcia RN  Outcome: Progressing  2/22/2024 0727 by Rosa Garcia RN  Outcome: Progressing     Problem: Discharge Planning  Goal: Discharge to home or other facility with appropriate resources  2/22/2024 0728 by Rosa Garcia RN  Outcome: Progressing  2/22/2024 0727 by Rosa Garcia RN  Outcome: Progressing     Problem: Chronic Conditions and Co-morbidities  Goal: Patient's chronic conditions and co-morbidity symptoms are monitored and maintained or improved  2/22/2024 0728 by Rosa Garcia RN  Outcome: Progressing  2/22/2024 0727 by Rosa Garcia RN  Outcome: Progressing     Problem: Fall/Injury  Goal: Not fall by end of shift  2/22/2024 0728 by Rosa Garcia RN  Outcome: Progressing  2/22/2024 0727 by Rosa Garcia RN  Outcome: Progressing  Goal: Be free from injury by end of the shift  2/22/2024 0728 by Rosa Garcia RN  Outcome: Progressing  2/22/2024 0727 by Rosa Garcia RN  Outcome: Progressing  Goal: Verbalize understanding of personal risk factors for fall in the hospital  2/22/2024 0728 by Rosa Garcia RN  Outcome: Progressing  2/22/2024 0727 by Rosa Garcia RN  Outcome: Progressing  Goal: Verbalize understanding of risk factor reduction measures to prevent injury from fall in the home  2/22/2024 0728 by Rosa Garcia RN  Outcome: Progressing  2/22/2024 0727 by Rosa Garcia RN  Outcome: Progressing  Goal: Use assistive devices by end of the shift  2/22/2024 0728 by Rosa Garcia RN  Outcome: Progressing  2/22/2024 0727 by Rosa Garcia RN  Outcome: Progressing  Goal: Pace activities to prevent fatigue by end of the shift  2/22/2024 0728 by Rosa Garcia RN  Outcome:  Progressing  2/22/2024 0727 by Rosa Garcia RN  Outcome: Progressing     Problem: Pain  Goal: Takes deep breaths with improved pain control throughout the shift  2/22/2024 0728 by Rosa Garcia RN  Outcome: Progressing  2/22/2024 0727 by Rosa Garcia RN  Outcome: Progressing  Goal: Turns in bed with improved pain control throughout the shift  2/22/2024 0728 by Rosa Garcia RN  Outcome: Progressing  2/22/2024 0727 by Rosa Garcia RN  Outcome: Progressing  Goal: Walks with improved pain control throughout the shift  2/22/2024 0728 by Rosa Garcia RN  Outcome: Progressing  2/22/2024 0727 by Rosa Garcia RN  Outcome: Progressing  Goal: Performs ADL's with improved pain control throughout shift  2/22/2024 0728 by Rosa Garcia RN  Outcome: Progressing  2/22/2024 0727 by Rosa Garcia RN  Outcome: Progressing  Goal: Participates in PT with improved pain control throughout the shift  2/22/2024 0728 by Rosa Garcia RN  Outcome: Progressing  2/22/2024 0727 by Rosa Garcia RN  Outcome: Progressing  Goal: Free from opioid side effects throughout the shift  2/22/2024 0728 by Rosa Garcia RN  Outcome: Progressing  2/22/2024 0727 by Rosa Garcia RN  Outcome: Progressing  Goal: Free from acute confusion related to pain meds throughout the shift  2/22/2024 0728 by Rosa Garcia RN  Outcome: Progressing  2/22/2024 0727 by Rosa Garcia RN  Outcome: Progressing     Problem: Skin  Goal: Decreased wound size/increased tissue granulation at next dressing change  2/22/2024 0728 by Rosa Garcia RN  Outcome: Progressing  Flowsheets (Taken 2/22/2024 0728)  Decreased wound size/increased tissue granulation at next dressing change:   Promote sleep for wound healing   Protective dressings over bony prominences  2/22/2024 0727 by Rosa Garcia RN  Outcome: Progressing  Goal: Participates in plan/prevention/treatment  measures  2/22/2024 0728 by Rosa Garcia RN  Outcome: Progressing  Flowsheets (Taken 2/22/2024 0728)  Participates in plan/prevention/treatment measures:   Discuss with provider PT/OT consult   Elevate heels   Increase activity/out of bed for meals  2/22/2024 0727 by Rosa Garcia RN  Outcome: Progressing  Goal: Prevent/manage excess moisture  2/22/2024 0728 by Rosa Garcia RN  Outcome: Progressing  Flowsheets (Taken 2/22/2024 0728)  Prevent/manage excess moisture:   Cleanse incontinence/protect with barrier cream   Follow provider orders for dressing changes   Moisturize dry skin  2/22/2024 0727 by Rosa Garcia RN  Outcome: Progressing  Goal: Prevent/minimize sheer/friction injuries  2/22/2024 0728 by Rosa Garcia RN  Outcome: Progressing  Flowsheets (Taken 2/22/2024 0728)  Prevent/minimize sheer/friction injuries:   Use pull sheet   Increase activity/out of bed for meals   Turn/reposition every 2 hours/use positioning/transfer devices  2/22/2024 0727 by Rosa Garcia RN  Outcome: Progressing  Goal: Promote/optimize nutrition  2/22/2024 0728 by Rosa Garcia RN  Outcome: Progressing  Flowsheets (Taken 2/22/2024 0728)  Promote/optimize nutrition:   Assist with feeding   Monitor/record intake including meals   Consume > 50% meals/supplements  2/22/2024 0727 by Rosa Garcia RN  Outcome: Progressing  Goal: Promote skin healing  2/22/2024 0728 by Rosa Garcia RN  Outcome: Progressing  Flowsheets (Taken 2/22/2024 0728)  Promote skin healing:   Turn/reposition every 2 hours/use positioning/transfer devices   Protective dressings over bony prominences   Ensure correct size (line/device) and apply per  instructions  2/22/2024 0727 by Rosa Garcia RN  Outcome: Progressing

## 2024-02-22 NOTE — CONSULTS
"Reason For Consult  BPH, urinary retention    History Of Present Illness  Aristeo Barriga is a 78 y.o. male presenting with small bowel obstruction and he developed urinary retention.  Walton catheter was inserted.    Tmax 98.8, vital signs stable    Walton catheter 3150 cc clear yellow    White count 9.4, creatinine 1.53, hemoglobin 8.9     Past Medical History  He has a past medical history of CHF (congestive heart failure) (CMS/[facility]), Chronic kidney disease, COPD (chronic obstructive pulmonary disease) (CMS/[facility]), Emphysema of lung (CMS/[facility]), GERD (gastroesophageal reflux disease), and Hypertension.    Surgical History  He has no past surgical history on file.     Social History  He reports that he has been smoking cigarettes. He has a 60.00 pack-year smoking history. He has never used smokeless tobacco. He reports that he does not currently use alcohol. He reports that he does not use drugs.    Family History  No family history on file.     Allergies  Codeine    Review of Systems       Physical Exam       Last Recorded Vitals  Blood pressure 177/86, pulse 62, temperature 36.8 °C (98.2 °F), temperature source Temporal, resp. rate 18, height 1.803 m (5' 11\"), weight 63.5 kg (140 lb), SpO2 96 %.    Relevant Results           Assessment/Plan     Impression  Urinary retention  BPH    Plan  Continue Walton catheter for now  Voiding trial when medically stable, or 1 week after discharge as outpatient  Flomax 0.4 mg daily    I spent 25 minutes in the professional and overall care of this patient.      Carson Morales MD    "

## 2024-02-23 ENCOUNTER — APPOINTMENT (OUTPATIENT)
Dept: RADIOLOGY | Facility: HOSPITAL | Age: 79
DRG: 177 | End: 2024-02-23
Payer: MEDICARE

## 2024-02-23 ENCOUNTER — CARE COORDINATION (OUTPATIENT)
Dept: CARE COORDINATION | Age: 79
End: 2024-02-23

## 2024-02-23 LAB
ANION GAP SERPL CALC-SCNC: 8 MMOL/L (ref 10–20)
BUN SERPL-MCNC: 17 MG/DL (ref 6–23)
C COLI+JEJ+UPSA DNA STL QL NAA+PROBE: NOT DETECTED
CALCIUM SERPL-MCNC: 8.4 MG/DL (ref 8.6–10.3)
CHLORIDE SERPL-SCNC: 101 MMOL/L (ref 98–107)
CO2 SERPL-SCNC: 34 MMOL/L (ref 21–32)
CREAT SERPL-MCNC: 1.54 MG/DL (ref 0.5–1.3)
EC STX1 GENE STL QL NAA+PROBE: NOT DETECTED
EC STX2 GENE STL QL NAA+PROBE: NOT DETECTED
EGFRCR SERPLBLD CKD-EPI 2021: 46 ML/MIN/1.73M*2
ERYTHROCYTE [DISTWIDTH] IN BLOOD BY AUTOMATED COUNT: 18.8 % (ref 11.5–14.5)
GLUCOSE SERPL-MCNC: 73 MG/DL (ref 74–99)
HCT VFR BLD AUTO: 25.9 % (ref 41–52)
HGB BLD-MCNC: 8.7 G/DL (ref 13.5–17.5)
MCH RBC QN AUTO: 31.6 PG (ref 26–34)
MCHC RBC AUTO-ENTMCNC: 33.6 G/DL (ref 32–36)
MCV RBC AUTO: 94 FL (ref 80–100)
NOROVIRUS GI + GII RNA STL NAA+PROBE: NOT DETECTED
NRBC BLD-RTO: 0 /100 WBCS (ref 0–0)
PLATELET # BLD AUTO: 277 X10*3/UL (ref 150–450)
POTASSIUM SERPL-SCNC: 3 MMOL/L (ref 3.5–5.3)
RBC # BLD AUTO: 2.75 X10*6/UL (ref 4.5–5.9)
RV RNA STL NAA+PROBE: NOT DETECTED
SALMONELLA DNA STL QL NAA+PROBE: NOT DETECTED
SHIGELLA DNA SPEC QL NAA+PROBE: NOT DETECTED
SODIUM SERPL-SCNC: 140 MMOL/L (ref 136–145)
V CHOLERAE DNA STL QL NAA+PROBE: NOT DETECTED
VANCOMYCIN SERPL-MCNC: 14.9 UG/ML (ref 5–20)
WBC # BLD AUTO: 11.4 X10*3/UL (ref 4.4–11.3)
Y ENTEROCOL DNA STL QL NAA+PROBE: NOT DETECTED

## 2024-02-23 PROCEDURE — 97110 THERAPEUTIC EXERCISES: CPT | Mod: GP,CQ

## 2024-02-23 PROCEDURE — 94640 AIRWAY INHALATION TREATMENT: CPT

## 2024-02-23 PROCEDURE — 99233 SBSQ HOSP IP/OBS HIGH 50: CPT | Performed by: HOSPITALIST

## 2024-02-23 PROCEDURE — 74230 X-RAY XM SWLNG FUNCJ C+: CPT | Performed by: RADIOLOGY

## 2024-02-23 PROCEDURE — 92611 MOTION FLUOROSCOPY/SWALLOW: CPT | Mod: GN | Performed by: SPEECH-LANGUAGE PATHOLOGIST

## 2024-02-23 PROCEDURE — 1200000002 HC GENERAL ROOM WITH TELEMETRY DAILY

## 2024-02-23 PROCEDURE — 2500000002 HC RX 250 W HCPCS SELF ADMINISTERED DRUGS (ALT 637 FOR MEDICARE OP, ALT 636 FOR OP/ED): Performed by: HOSPITALIST

## 2024-02-23 PROCEDURE — S4991 NICOTINE PATCH NONLEGEND: HCPCS | Performed by: INTERNAL MEDICINE

## 2024-02-23 PROCEDURE — 97535 SELF CARE MNGMENT TRAINING: CPT | Mod: GO,CO

## 2024-02-23 PROCEDURE — 3430000001 HC RX 343 DIAGNOSTIC RADIOPHARMACEUTICALS: Performed by: HOSPITALIST

## 2024-02-23 PROCEDURE — 97116 GAIT TRAINING THERAPY: CPT | Mod: GP,CQ

## 2024-02-23 PROCEDURE — 2500000001 HC RX 250 WO HCPCS SELF ADMINISTERED DRUGS (ALT 637 FOR MEDICARE OP): Performed by: INTERNAL MEDICINE

## 2024-02-23 PROCEDURE — 2500000002 HC RX 250 W HCPCS SELF ADMINISTERED DRUGS (ALT 637 FOR MEDICARE OP, ALT 636 FOR OP/ED): Performed by: INTERNAL MEDICINE

## 2024-02-23 PROCEDURE — 2500000001 HC RX 250 WO HCPCS SELF ADMINISTERED DRUGS (ALT 637 FOR MEDICARE OP): Performed by: HOSPITALIST

## 2024-02-23 PROCEDURE — 85027 COMPLETE CBC AUTOMATED: CPT | Performed by: HOSPITALIST

## 2024-02-23 PROCEDURE — 2500000002 HC RX 250 W HCPCS SELF ADMINISTERED DRUGS (ALT 637 FOR MEDICARE OP, ALT 636 FOR OP/ED): Performed by: FAMILY MEDICINE

## 2024-02-23 PROCEDURE — 80048 BASIC METABOLIC PNL TOTAL CA: CPT | Performed by: HOSPITALIST

## 2024-02-23 PROCEDURE — 36415 COLL VENOUS BLD VENIPUNCTURE: CPT | Performed by: HOSPITALIST

## 2024-02-23 PROCEDURE — 2500000004 HC RX 250 GENERAL PHARMACY W/ HCPCS (ALT 636 FOR OP/ED): Performed by: INTERNAL MEDICINE

## 2024-02-23 PROCEDURE — 80202 ASSAY OF VANCOMYCIN: CPT | Performed by: INTERNAL MEDICINE

## 2024-02-23 PROCEDURE — 74230 X-RAY XM SWLNG FUNCJ C+: CPT

## 2024-02-23 RX ORDER — POTASSIUM CHLORIDE 20 MEQ/1
40 TABLET, EXTENDED RELEASE ORAL ONCE
Status: COMPLETED | OUTPATIENT
Start: 2024-02-23 | End: 2024-02-23

## 2024-02-23 RX ORDER — VANCOMYCIN HYDROCHLORIDE 750 MG/150ML
750 INJECTION, SOLUTION INTRAVENOUS EVERY 24 HOURS
Status: DISCONTINUED | OUTPATIENT
Start: 2024-02-23 | End: 2024-02-23

## 2024-02-23 RX ADMIN — MONTELUKAST 10 MG: 10 TABLET, FILM COATED ORAL at 08:43

## 2024-02-23 RX ADMIN — IPRATROPIUM BROMIDE AND ALBUTEROL SULFATE 3 ML: 2.5; .5 SOLUTION RESPIRATORY (INHALATION) at 21:11

## 2024-02-23 RX ADMIN — PIPERACILLIN SODIUM AND TAZOBACTAM SODIUM 4.5 G: 4; .5 INJECTION, SOLUTION INTRAVENOUS at 17:15

## 2024-02-23 RX ADMIN — FLUTICASONE FUROATE AND VILANTEROL TRIFENATATE 1 PUFF: 200; 25 POWDER RESPIRATORY (INHALATION) at 05:04

## 2024-02-23 RX ADMIN — SERTRALINE HYDROCHLORIDE 50 MG: 50 TABLET ORAL at 08:42

## 2024-02-23 RX ADMIN — IPRATROPIUM BROMIDE AND ALBUTEROL SULFATE 3 ML: 2.5; .5 SOLUTION RESPIRATORY (INHALATION) at 07:34

## 2024-02-23 RX ADMIN — IPRATROPIUM BROMIDE AND ALBUTEROL SULFATE 3 ML: 2.5; .5 SOLUTION RESPIRATORY (INHALATION) at 11:14

## 2024-02-23 RX ADMIN — POTASSIUM CHLORIDE 10 MEQ: 750 TABLET, FILM COATED, EXTENDED RELEASE ORAL at 08:43

## 2024-02-23 RX ADMIN — GUAIFENESIN 1200 MG: 600 TABLET ORAL at 23:49

## 2024-02-23 RX ADMIN — TIOTROPIUM BROMIDE INHALATION SPRAY 2 PUFF: 3.12 SPRAY, METERED RESPIRATORY (INHALATION) at 08:49

## 2024-02-23 RX ADMIN — ALLOPURINOL 100 MG: 100 TABLET ORAL at 08:42

## 2024-02-23 RX ADMIN — CLOPIDOGREL 75 MG: 75 TABLET ORAL at 08:42

## 2024-02-23 RX ADMIN — FAMOTIDINE 20 MG: 20 TABLET ORAL at 21:12

## 2024-02-23 RX ADMIN — POTASSIUM CHLORIDE 40 MEQ: 1500 TABLET, EXTENDED RELEASE ORAL at 11:00

## 2024-02-23 RX ADMIN — BARIUM SULFATE 10 ML: 400 SUSPENSION ORAL at 14:45

## 2024-02-23 RX ADMIN — TAMSULOSIN HYDROCHLORIDE 0.4 MG: 0.4 CAPSULE ORAL at 08:42

## 2024-02-23 RX ADMIN — ENOXAPARIN SODIUM 40 MG: 40 INJECTION SUBCUTANEOUS at 01:15

## 2024-02-23 RX ADMIN — METOPROLOL SUCCINATE 25 MG: 25 TABLET, EXTENDED RELEASE ORAL at 21:12

## 2024-02-23 RX ADMIN — VANCOMYCIN HYDROCHLORIDE 750 MG: 750 INJECTION, SOLUTION INTRAVENOUS at 15:02

## 2024-02-23 RX ADMIN — Medication 2000 UNITS: at 08:43

## 2024-02-23 RX ADMIN — BARIUM SULFATE 10 ML: 400 PASTE ORAL at 14:45

## 2024-02-23 RX ADMIN — LISINOPRIL 10 MG: 10 TABLET ORAL at 08:43

## 2024-02-23 RX ADMIN — ISOSORBIDE MONONITRATE 30 MG: 30 TABLET, EXTENDED RELEASE ORAL at 08:43

## 2024-02-23 RX ADMIN — FUROSEMIDE 20 MG: 20 TABLET ORAL at 08:43

## 2024-02-23 RX ADMIN — GUAIFENESIN 1200 MG: 600 TABLET ORAL at 12:15

## 2024-02-23 RX ADMIN — PIPERACILLIN SODIUM AND TAZOBACTAM SODIUM 4.5 G: 4; .5 INJECTION, SOLUTION INTRAVENOUS at 05:05

## 2024-02-23 RX ADMIN — NICOTINE 1 PATCH: 14 PATCH, EXTENDED RELEASE TRANSDERMAL at 08:43

## 2024-02-23 RX ADMIN — ICOSAPENT ETHYL 2 G: 1 CAPSULE ORAL at 08:43

## 2024-02-23 RX ADMIN — ICOSAPENT ETHYL 2 G: 1 CAPSULE ORAL at 21:12

## 2024-02-23 RX ADMIN — PIPERACILLIN SODIUM AND TAZOBACTAM SODIUM 4.5 G: 4; .5 INJECTION, SOLUTION INTRAVENOUS at 01:15

## 2024-02-23 RX ADMIN — PIPERACILLIN SODIUM AND TAZOBACTAM SODIUM 4.5 G: 4; .5 INJECTION, SOLUTION INTRAVENOUS at 12:15

## 2024-02-23 RX ADMIN — BARIUM SULFATE 90 ML: 0.81 POWDER, FOR SUSPENSION ORAL at 14:44

## 2024-02-23 RX ADMIN — ATORVASTATIN CALCIUM 40 MG: 40 TABLET, FILM COATED ORAL at 08:43

## 2024-02-23 RX ADMIN — BARIUM SULFATE 95 ML: 400 SUSPENSION ORAL at 14:45

## 2024-02-23 RX ADMIN — ASPIRIN 81 MG: 81 TABLET, COATED ORAL at 21:12

## 2024-02-23 RX ADMIN — GUAIFENESIN 1200 MG: 600 TABLET ORAL at 01:15

## 2024-02-23 RX ADMIN — PIPERACILLIN SODIUM AND TAZOBACTAM SODIUM 4.5 G: 4; .5 INJECTION, SOLUTION INTRAVENOUS at 23:49

## 2024-02-23 RX ADMIN — NIFEDIPINE 30 MG: 30 TABLET, FILM COATED, EXTENDED RELEASE ORAL at 08:42

## 2024-02-23 RX ADMIN — ENOXAPARIN SODIUM 40 MG: 40 INJECTION SUBCUTANEOUS at 23:49

## 2024-02-23 ASSESSMENT — COGNITIVE AND FUNCTIONAL STATUS - GENERAL
DAILY ACTIVITIY SCORE: 16
MOVING FROM LYING ON BACK TO SITTING ON SIDE OF FLAT BED WITH BEDRAILS: A LITTLE
DRESSING REGULAR UPPER BODY CLOTHING: A LOT
CLIMB 3 TO 5 STEPS WITH RAILING: TOTAL
WALKING IN HOSPITAL ROOM: A LOT
STANDING UP FROM CHAIR USING ARMS: A LOT
MOVING FROM LYING ON BACK TO SITTING ON SIDE OF FLAT BED WITH BEDRAILS: A LITTLE
MOVING FROM LYING ON BACK TO SITTING ON SIDE OF FLAT BED WITH BEDRAILS: A LITTLE
PERSONAL GROOMING: A LITTLE
CLIMB 3 TO 5 STEPS WITH RAILING: TOTAL
DRESSING REGULAR LOWER BODY CLOTHING: A LOT
HELP NEEDED FOR BATHING: A LOT
TURNING FROM BACK TO SIDE WHILE IN FLAT BAD: A LITTLE
PERSONAL GROOMING: A LITTLE
CLIMB 3 TO 5 STEPS WITH RAILING: TOTAL
HELP NEEDED FOR BATHING: A LOT
STANDING UP FROM CHAIR USING ARMS: A LOT
MOBILITY SCORE: 13
WALKING IN HOSPITAL ROOM: A LOT
HELP NEEDED FOR BATHING: A LOT
MOVING TO AND FROM BED TO CHAIR: A LOT
DRESSING REGULAR UPPER BODY CLOTHING: A LOT
MOBILITY SCORE: 14
PERSONAL GROOMING: A LITTLE
WALKING IN HOSPITAL ROOM: A LOT
STANDING UP FROM CHAIR USING ARMS: A LOT
TOILETING: A LITTLE
PERSONAL GROOMING: A LITTLE
DRESSING REGULAR UPPER BODY CLOTHING: A LOT
CLIMB 3 TO 5 STEPS WITH RAILING: TOTAL
MOVING FROM LYING ON BACK TO SITTING ON SIDE OF FLAT BED WITH BEDRAILS: A LITTLE
DRESSING REGULAR LOWER BODY CLOTHING: A LOT
TOILETING: A LITTLE
MOVING TO AND FROM BED TO CHAIR: A LOT
DRESSING REGULAR LOWER BODY CLOTHING: A LOT
DAILY ACTIVITIY SCORE: 16
DRESSING REGULAR UPPER BODY CLOTHING: A LOT
WALKING IN HOSPITAL ROOM: A LOT
TOILETING: A LITTLE
MOBILITY SCORE: 14
TURNING FROM BACK TO SIDE WHILE IN FLAT BAD: A LITTLE
TOILETING: A LITTLE
DRESSING REGULAR LOWER BODY CLOTHING: A LOT
MOVING TO AND FROM BED TO CHAIR: A LITTLE
MOVING TO AND FROM BED TO CHAIR: A LITTLE
DAILY ACTIVITIY SCORE: 16
DAILY ACTIVITIY SCORE: 16
HELP NEEDED FOR BATHING: A LOT
MOBILITY SCORE: 13
STANDING UP FROM CHAIR USING ARMS: A LOT

## 2024-02-23 ASSESSMENT — PAIN - FUNCTIONAL ASSESSMENT
PAIN_FUNCTIONAL_ASSESSMENT: 0-10

## 2024-02-23 ASSESSMENT — PAIN SCALES - GENERAL
PAINLEVEL_OUTOF10: 0 - NO PAIN

## 2024-02-23 ASSESSMENT — ACTIVITIES OF DAILY LIVING (ADL): HOME_MANAGEMENT_TIME_ENTRY: 23

## 2024-02-23 NOTE — PROGRESS NOTES
Aristeo Barriga  [id number]   Service: Internal Medicine / Hospitalist Date of service: 2/23/2024                                  Full Code         Aristeo Barriga is a 78 y.o. male presenting with COPD exacerbation               Subjective  78 y.o. male with history of COPD, tobacco use disorder (still smoking) heart failure, and leukemia presenting with SOB.  He reports being in his usual state of health until last week when he developed increasing shortness of breath associated with a moist cough, wheezing, malaise, chills, and rigors. No chest pain, fever, nausea or vomiting.  He states that he increased his oxygen from 2 L to 4 L over the last couple of days but did not see any improvement.  Additionally, he saw a provider a few days ago and was put on prednisone for presumed COPD exacerbation but he could not tolerate the oral steroid because it made him shaky and jittery.  [facility] respiratory distress and his chest x-ray was read as showing increased airspace opacity in the right lung base.        2/18:                Today the patient is awake alert and interactive appropriately.  Admits he really is not feeling any different than admission and not that well.  Does not feel any worse however.  Continues on 2 L which is his home oxygen level at this point.  Continues on doxycycline Zosyn and vancomycin empirically.  Otherwise denies interval new symptoms or complaints including chest pain palpitations pleuritic type pain worsening shortness of breath cough sputum, nausea abdominal pain flank pain dysuria diarrhea fever or chills.     2/19...............No reported: Fevers, chills, chest pains, headaches, nausea or vomiting.Patient voiced no acute complaints at the time of evaluation.      2/20................No reported: Chest pains, palpitations, dyspnea at rest, nausea, vomiting, syncope or presyncope.     2/21...................No reported: chest pains,  palpitations, dyspnea at rest, fevers, chills, chest pains, headaches, nausea or vomiting.Overall patient felt that he is improving but voiced trepidation about early discharge.     2/22....................No reported: Palpitations, headaches, fevers, chills, nausea or vomiting.  Urinary retention appreciated developed yesterday.  Walton catheter placed, oxybutynin held.Patient reported diarrhea without asociated abdominal pain.       2/23.................Patient voiced overall improvement.  However he stated that his girlfriend is not doing well and therefore does not want to go to a skilled nursing facility will like to be discharged to home.  He also requested that his hematologist be aware of his Jakafi use.  Discussed yesterday with patient concerning holding this medication in light of acute infectious process and resuming after antibiotic treatment completed.  Patient's hematology oncology office was contacted and I did speak with physician on-call on 2/23/2024/hematologist recommended continue to hold Jakafi and resume once antibiotic therapy is completed.  In addition she recommends close follow-up with Dr. Parra on discharge.     Patient seen and examined, lab data and diagnostics reviewed.  No reported: acute symptomatology at the time of evaluation including but not limited to :chest pain, dyspnea, constipation, abdominal pain, dysuria , nausea, vomiting ,headache, new focal weakness, diaphoresis,fever, chills, syncope,presyncope or palpitations.         Review of Systems:   Review of system otherwise negative if not aforementioned above in subjective.     Objective     Physical Exam      Constitutional:       Appearance: Patient appeared in no acute cardiopulmonary distress.     Comments: Patient alert and oriented to person place time and situation.Patient was able to speak in full sentence without difficulties.  HEENT:      Head: Normocephalic and atraumatic.Trachea midline      Nose:No observed  congestion or rhinorrhea.     Mouth/Throat: Mucous membranes Moist, Trachea appeared  midline.  Eyes:      Extraocular Movements: Extraocular movements intact.      Pupils: Pupils are equal, round, and reactive to light.      Comments: No scleral icterus or conjunctival injection appreciated.   Cardiovascular:      Rate and Rhythm: Normal rate and regular rhythm. No clicks rubs or gallops, normal S1 and S2.No peripheral stigmata of endocarditis appreciated.     Pulmonary:      No appreciation of adventitious sounds but the lung bases remained diminished.  Abdominal:      General: Abdomen soft, nontender, active bowel sounds, no involuntary guarding or rebound tenderness appreciated.     Comments: None   Musculoskeletal:       Patient appeared to have full active range of motion for upper  no acute apparent new  joint deformity appreciated on examination.   No pitting edema or cyanosis appreciated.       Lymphadenopathy:      No appreciable palpable lymphadenopathy  Skin:     General: Skin is warm.      Coloration:  No jaundice     Findings: No abnormal appearing skin rashes or lesions that appeared acute noted on unclothed area of the skin..   Neurological:      General: No focal sensory or motor deficits appreciated, no meningeal signs or dysmetria noted.      Cranial Nerves: Cranial nerves II to XII appearing grossly intact.     Genitals:  Deferred  Psychiatric:         The patient appears to be displaying normal mood and affect at the time of evaluation.     Labs:               Lab Results                        Lab Results   Component Value Date     GLUCOSE 81 02/21/2024     CALCIUM 8.4 (L) 02/21/2024      02/21/2024     K 3.6 02/21/2024     CO2 32 02/21/2024      02/21/2024     BUN 23 02/21/2024     CREATININE 1.63 (H) 02/21/2024      Lab Results   Component Value Date    GLUCOSE 73 (L) 02/23/2024    CALCIUM 8.4 (L) 02/23/2024     02/23/2024    K 3.0 (L) 02/23/2024    CO2 34 (H) 02/23/2024      02/23/2024    BUN 17 02/23/2024    CREATININE 1.54 (H) 02/23/2024      Lab Results   Component Value Date    WBC 11.4 (H) 02/23/2024    HGB 8.7 (L) 02/23/2024    HCT 25.9 (L) 02/23/2024    MCV 94 02/23/2024     02/23/2024      [unfilled]   [unfilled]   No results found for the last 90 days.                  X-rays/ Images     [unfilled]   Radiology Results (last 21 days)     Procedure Component Value Units Date/Time   XR chest 2 views [[id number]] Collected: 02/17/24 2026   Order Status: Completed Updated: 02/17/24 2026   Narrative:     Interpreted By:  Raghavendra Estrada,  STUDY:  XR CHEST 2 VIEWS;  2/17/2024 8:04 pm      INDICATION:  Signs/Symptoms:SOB.      COMPARISON:  Chest x-ray 02/14/2024      ACCESSION NUMBER(S):  [id number]      ORDERING CLINICIAN:  MIGUEL LOPEZ      FINDINGS:          CARDIOMEDIASTINAL SILHOUETTE:  Cardiomediastinal silhouette is stable in size and configuration.  Atherosclerotic calcification of the aorta.      LUNGS:  There is persistent left basilar opacity with blunting of the left  costophrenic angle which may relate to chronic pleural thickening  and/or scarring. There is subtle increased right basilar airspace  opacity which may relate to atelectasis versus developing airspace  disease. No pneumothorax.      ABDOMEN:  No remarkable upper abdominal findings.      BONES:  Multilevel degenerative changes of the spine generalized diffuse  osteopenia.       Impression:     Increased airspace opacity in the right lung base medially is new  from prior exam and may relate to atelectasis versus developing  airspace disease. Clinical correlation continued short-term follow-up  to resolution is advised.      Persistent blunting of the left costophrenic angle likely relates to  chronic pleural thickening and/or scarring.      MACRO:  None      Signed by: Raghavendra Estrada 2/17/2024 8:25 PM  Dictation workstation:   [id number]                  Medical Problems          Problem List         * (Principal) COPD exacerbation (CMS/HCC)     Pneumonia due to gram-negative bacteria     Acute exacerbation of chronic obstructive pulmonary disease (COPD) (CMS/HCC)     Chronic hypoxic respiratory failure (CMS/HCC)     Tobacco use disorder     Primary hypertension (Chronic)     Coronary artery disease involving native coronary artery of native heart without angina pectoris (Chronic)     Mixed hyperlipidemia     CHF (congestive heart failure) (CMS/HCC) (Chronic)     Chronic renal disease, stage III (CMS/HCC)     Pneumonitis     Angina pectoris, unspecified (CMS/HCC)     Bronchitis     Pulmonary emphysema (CMS/HCC)     Weight loss     Thoracic aortic aneurysm without rupture (CMS/[facility])     Overview Signed 11/30/2023 12:01 AM by Nikolai Garcia MD       Formatting of this note might be different from the original. ..           Status post below-knee amputation of right lower extremity (CMS/[facility])     Overview Signed 11/30/2023 12:01 AM by Nikolai Garcia MD       Formatting of this note might be different from the original. ...           S/P AAA (abdominal aortic aneurysm) repair     Protein-calorie malnutrition (CMS/[facility])     Primary insomnia     Pneumonia due to infectious organism     Pain of left lower extremity     Oral thrush     Myeloproliferative neoplasm (CMS/HCC)     Mixed stress and urge urinary incontinence     Lymphopenia     Leukocytosis     Leukemia in remission (CMS/[facility])     Left cataract     IFG (impaired fasting glucose)     H/O ST elevation myocardial infarction     Fluid retention     Fatigue     Epigastric pain     Easy bruising     Chronic sinusitis     Cellulitis of right lower extremity     Arm lesion     Anxiety     Age-related cataract of both eyes     Adrenal nodule (CMS/[facility])     COVID-19                  Above medical problems may be reflective of historical medical problems that may have resolved and may not related to acute clinical condition/medical problems.      Clinical impression/plan:           Principal Problem:    COPD exacerbation (CMS/[facility])     RLL pneumonia ( with Immunosuppressant use)  Will treat as HAP due to recent hospitalization  Vancomycin and Zosyn, doxycycline  Oxygen  Aerosolized bronchodilator  Pulmonary toileting  Urinary antigens     COPD Exacerbation  Smoking cessation strongly emphasized  Duoneb  Oxygen  IV solumedrol. He says he can tolerate IV steroids but not oral form  Pulmonary rehab  2/18: Appears at his home oxygen level today.     Tobacco use disorder  As in #2  Transdermal nicotine     Leukemia  Currently in remission      HTN  Resume home BP med and monitor      Chronic urinary incontinence  Vibegron     Hypokalemia  Additional supplementation of potassium 2/23/2024     Malnutrition    I agree with the dietitian's malnutrition diagnosis.     Urinary retention     Consult to urology  Walton catheter placement  Flomax  Stop oxybutynin        Diarrhea     Suspected secondary to antibiotics, patient  on immunosuppressants, check stool for C. difficile, stool PCR     Hypokalemia     Supplement potassium     The patient was informed of differential diagnosis , work up , plan of care and possible sequelae of clinical disposition.Patient in agreement with plan of care. Further recommendations forthcoming in accordance with patient's clinical disposition and response to care.     Disposition/additional interventions: 2/21/2024     Continue empirical antibiotic therapy for pneumonia     Improve renal function, worsening hypertension, resume ACE inhibitor.     Monitor blood pressure     Vibegron or inpatient pharmacological substitute.     Bladder scan.................. patient did reported that he felt a bit that he is not emptying his bladder completely.     Continue antibiotic therapy     Active infectious process,NAOMI inhibitor immunosuppressants on hold ( Jakafi)     Recommend repeating chest radiograph in 3 to 4 weeks for clearance of  pneumonia.  Disposition/additional interventions: 2/22/2024        Consult to  urology     Continue empirical antibiotics     Walton catheter placement, voiding trial as per urology.     Continue empirical antibiotics.     Hypokalemia, supplement potassium     Check surrogate magnesium levels.      Disposition/additional interventions: 2/23/2024    Additional potassium supplementation.    De-escalate antibiotics, monitor off vancomycin MRSA PCR negative,    Clinically doubt bacteremia at this time.    Blood cultures at this juncture will likely not healed positive results given antibiotic therapy.    Stool PCR negative    C. difficile negative    Diarrhea improved, iatrogenic related to antibiotic therapy.    Continue to hold Jakafi, resume after antibiotic therapy completed, anticipate a 7-day course of antibiotics.    Outpatient chest radiograph follow-up recommended for clearance as well.    Discussed bedside swallow evaluation with our speech therapist plan MDS today.    Possible voiding trial prior to discharge will monitor closely    Discharge planning:Anticipate likely discharge in next 24- 48 hours.     Care time: >55 minutes              Dictation performed with assistance of voice recognition device therefore transcription errors are possible.

## 2024-02-23 NOTE — SIGNIFICANT EVENT
Patient nurse reported that patient reported dysphagia for at least 2 weeks however apparently had no signs of swallowing difficulties at the bedside observed by patient's nurse.  Will have a formal swallow evaluation.  No provisional report of strokelike symptoms per patient during my prior assessments.

## 2024-02-23 NOTE — PROGRESS NOTES
Speech-Language Pathology Clinical Swallow Evaluation    Patient Name: Aristeo Barriga  MRN: [id number]  : 1945  Today's Date: 24  Start time: Start Time: 915  Stop time: Stop Time: 938  Time calculation (min) : Time Calculation (min): 23 min    ASSESSMENT  Impressions:  Pt exhibited intermittent coughing with trials of thin liquids.  No issues with solids.     No coughing with single sips via straw.  Pt coughed on single sips via cup 3/5 final sips out of 4 oz of thin water.       Prognosis: Good      PLAN  Recommendations:  MBSS recommended: Yes; MBSS is recommended in order to objectively assess for aspiration risk, safest/least restrictive diet, and any effective compensatory strategies.  Solid consistency: Regular (IDDSI level 7)  Liquid consistency: Thin (IDDSI 0)  Medication administration: Whole and in thin liquid  Compensatory swallow strategies: Upright positioning for all PO intake, Remain upright for >30 min after meals, Slow rate of intake, Small bites, Small sips, and Alternate bites and sips    Recommended frequency/duration:  Skilled SLP services recommended: Yes  Frequency: 2x/week  Duration: 2 weeks  Discharge recommendation: Unable to determine at this time; please see follow-up notes for DC recommendation.  Strengths: Cognition, Motivation, and Anticipated fair or good medical prognosis  Barriers to participation in tx: Age and Baseline impaired functional status      Goals (start date 2024):  Pt will participate in MBSS to assess for safest/least restrictive diet and any effective compensatory strategies.  Further goals to be determined after MBSS.   Status: Goal initiated this date   Progress this date: N/A       REASON FOR ADMISSION:  CHIEF COMPLAINT: presenting with SOB     PMHx relevant to rehab: COPD, tobacco use disorder (still smoking) heart failure, and leukemia     Relevant imaging results: Chest X-Ray IMPRESSION:  Increased airspace opacity in the right lung base  medially is new  from prior exam and may relate to atelectasis versus developing  airspace disease.    Persistent blunting of the left costophrenic angle likely relates to  chronic pleural thickening and/or scarring.      SUBJECTIVE  SLP Received On: 02/23/24  Patient Class: Inpatient  Living Environment: Live with __ (significant other)  Ordering Physician: Dr. York  Reason for Referral: suspect oropharygneal dysphagia  Prior to Session Communication: Bedside nurse    RN cleared pt to participate in session and reported that she noted no issues this am.    However, per nursing notes from last evening pt was c/o dysphagia d/t phlegm build up.    Pt reported that there feels like a film in his mouth and throat.  He states his mouth is dry.  He requested humidification be attached to his oxygen.  Baseline O2 requirement is 2lt at home.    He states he's lost wt since his dx of leukemia from 165 to 140 now.  He states he's been at 140 for about 2 months.  He does endorse no appetite and that his taste buds aren't working.      Pt is receiving tx for Leukemia (pill 2x/day), but RN states it is on hold d/t him having an infection.    Nutritional status: Signs of possible malnutrition          BaseLine Diet: Regular diet and Thin Liquids  Current Diet : Regular diet and THIN Liquids    Pain Assessment  Pain Assessment: 0-10  Pain Score: 0 - No pain  Pain Type: Chronic pain  Pain Location: Other (Comment) (joint pain)  Pain Descriptors: Aching, Discomfort, Sore  Pain Frequency: Constant/continuous  Pain Onset: Ongoing  Clinical Progression: Gradually improving  Patient's Stated Pain Goal: 2  Pain Interventions: Declines  Response to Interventions: Patient declined further interventions needed.    Behavior/Cognition: Alert, Cooperative, Pleasant mood  Orientation: Ox4  Ability to follow functional commands: WFL  Respiratory Status: Oxygen via nasal cannula (2 lt)     Volitional Cough: Strong  Volitional Swallow: Within  Functional Limits  Patient positioning: Upright in chair      OBJECTIVE  Clinical swallow evaluation completed and consisted of interview, oral motor assessment, and PO trials (5 bites of applesauce, 6 bites cubed pears in liquid and one whole gaston craker - also, sips of water via straw, controlled small sips of water via straw and single controlled sips of thin water via cup).    ORAL MOTOR: Dentition: Dentures upper and lower. The lower ones don't fit well.  Oral Hygiene: Oral mucosa appears dry. Unable to hold air in cheeks and breath through his nose.  Lingual strength and ROM were WFL. Labial strength/ROM were slight decrease in rt sided retraction. Labial seal was adequate. Palatal elevation: WFL    ORAL PHASE: Mastication of regular solids was WFL.  A/P transit was WFL.  No significant oral residuals were seen.    PHARYNGEAL PHASE: Laryngeal elevation was visualized or palpated with all trials, however adequacy of hyolaryngeal elevation/excursion cannot be determined at bedside. Delayed coughing with straw sips of thin, controlled sips of thin via straw - no coughing.  Controlled sips via cup. Initially no coughing, but he did cough on 3 of the final 5 sips when getting to the end of the cup (4oz total).    Recommend MBS study to assess swallow function since chest x-ray shows increased opacities.  Pt is in agreement. RN and physician aware and in agreement.     Treatment/Education:  Results and recommendations were relayed to: Patient, Bedside nurse, and Physician  Education provided: Yes   Learner: Patient   Barriers to learning: None   Method of teaching: Verbal   Topic: Role of ST, results of assessment, risk for aspiration, recommended diet modifications, recommendation for MBSS, recommended safe swallow strategies, and recommendation for dysphagia follow-up   Outcome of teaching: Pt verbalized understanding  Treatment provided: No    Next Treatment Priority: MBS study

## 2024-02-23 NOTE — PROGRESS NOTES
Physical Therapy                 Therapy Communication Note    Patient Name: Aristeo Barriga  MRN: [id number]  Today's Date: 2/23/2024     Discipline: Physical Therapy    Missed Visit Reason: Missed Visit Reason:  (speech with patient. patient up in chair)    Missed Time: Attempt    Comment:

## 2024-02-23 NOTE — CARE PLAN
Problem: Safety - Adult  Goal: Free from fall injury  Outcome: Progressing     Problem: Discharge Planning  Goal: Discharge to home or other facility with appropriate resources  Outcome: Progressing     Problem: Chronic Conditions and Co-morbidities  Goal: Patient's chronic conditions and co-morbidity symptoms are monitored and maintained or improved  Outcome: Progressing     Problem: Fall/Injury  Goal: Not fall by end of shift  Outcome: Progressing  Goal: Be free from injury by end of the shift  Outcome: Progressing  Goal: Verbalize understanding of personal risk factors for fall in the hospital  Outcome: Progressing  Goal: Verbalize understanding of risk factor reduction measures to prevent injury from fall in the home  Outcome: Progressing  Goal: Use assistive devices by end of the shift  Outcome: Progressing  Goal: Pace activities to prevent fatigue by end of the shift  Outcome: Progressing     Problem: Pain  Goal: Takes deep breaths with improved pain control throughout the shift  Outcome: Progressing  Goal: Turns in bed with improved pain control throughout the shift  Outcome: Progressing  Goal: Walks with improved pain control throughout the shift  Outcome: Progressing  Goal: Performs ADL's with improved pain control throughout shift  Outcome: Progressing  Goal: Participates in PT with improved pain control throughout the shift  Outcome: Progressing  Goal: Free from opioid side effects throughout the shift  Outcome: Progressing  Goal: Free from acute confusion related to pain meds throughout the shift  Outcome: Progressing     Problem: Skin  Goal: Decreased wound size/increased tissue granulation at next dressing change  Outcome: Progressing  Goal: Participates in plan/prevention/treatment measures  Outcome: Progressing  Goal: Prevent/manage excess moisture  Outcome: Progressing  Goal: Prevent/minimize sheer/friction injuries  Outcome: Progressing  Goal: Promote/optimize nutrition  Outcome:  Progressing  Goal: Promote skin healing  Outcome: Progressing     The patient's goals for the shift include      The clinical goals for the shift include Remain free from falls and injury during shift    Over the shift, the patient did not make progress toward the following goals.
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
## 2024-02-23 NOTE — PROGRESS NOTES
2/23/24 1024   02/23/24 1253   Discharge Planning   Home or Post Acute Services Post acute facilities (Rehab/SNF/etc)   Type of Post Acute Facility Services Skilled nursing   Patient expects to be discharged to: SNF   Patient Choice   Provider Choice list and CMS website (https://medicare.gov/care-compare#search) for post-acute Quality and Resource Measure Data were provided and reviewed with: Patient;Family   Patient / Family choosing to utilize agency / facility established prior to hospitalization No     Spoke with pt about getting SNF choices. Pt unsure and did not make a choice yet. Pt wants to speak with MD and daughter before giving choices. Will follow up with pt later today.   Debbie Stringer RN, BSN, Faye/ Eduardo FRAGOSO Supervisor     2/23/24 2415  Met with pt to get SNF choices. Pt states awaiting to speak with MD. Pt informed to review and TCC will follow up tomorrow for choices. Pt expresses feeling overwhelmed with information and is hoping the MD can assist.   Debbie Stringer RN, BSN, Faye/ Eduardo FRAGOSO Supervisor

## 2024-02-23 NOTE — PROGRESS NOTES
Vancomycin Dosing by Pharmacy- FOLLOW UP    Aristeo Barriga is a 78 y.o. year old male who Pharmacy has been consulted for vancomycin dosing for pneumonia. Pt is also on Zosyn.  Based on the patient's indication and renal status this patient is being dosed based on a goal AUC of 400-600. Will move to AUC dosing since RF is improving.    Renal function is currently improving.    Current vancomycin dose: 750 mg given 2/22 @09:03    Most recent random level: 14.9 mcg/mL 2/23@05:15    Visit Vitals  /70 (BP Location: Left arm, Patient Position: Lying)   Pulse 73   Temp 36.8 °C (98.2 °F) (Temporal)   Resp 16        Lab Results   Component Value Date    CREATININE 1.54 (H) 02/23/2024    CREATININE 1.53 (H) 02/22/2024    CREATININE 1.63 (H) 02/21/2024    CREATININE 1.89 (H) 02/20/2024        Patient weight is 63.5 kg        I/O last 3 completed shifts:  In: 550 (8.7 mL/kg) [IV Piggyback:550]  Out: 3250 (51.2 mL/kg) [Urine:3250 (1.4 mL/kg/hr)]  Weight: 63.5 kg   [unfilled]    Lab Results   Component Value Date    PATIENTTEMP 37.0 02/17/2024    PATIENTTEMP 37.0 02/14/2024    PATIENTTEMP 37.0 02/14/2024        Assessment/Plan      This dosing regimen is predicted by [employer] to result in the following pharmacokinetic parameters:  Regimen: 750 mg IV every 24 hours.  Start time: 12:00 on 02/23/2024  Exposure target: AUC24 (range)400-600 mg/L.hr   AUC24,ss: 505 mg/L.hr  Probability of AUC24 > 400: 100 %  Ctrough,ss: 15.6 mg/L  Probability of Ctrough,ss > 20: 1 %  Probability of nephrotoxicity (Lodise MILVIA 2009): 11 %        The next level will be obtained on 2/25 with AM labs. May be obtained sooner if clinically indicated.   Will continue to monitor renal function daily while on vancomycin and order serum creatinine at least every 48 hours if not already ordered.  Follow for continued vancomycin needs, clinical response, and signs/symptoms of toxicity.       Ana Jordan, PharmD

## 2024-02-23 NOTE — PROGRESS NOTES
Physical Therapy    Physical Therapy Treatment    Patient Name: Aristeo Barriga  MRN: [id number]  Today's Date: 2/23/2024  Time Calculation  Start Time: 1211  Stop Time: 1235  Time Calculation (min): 24 min       Assessment/Plan   PT Assessment  PT Assessment Results: Decreased strength, Decreased range of motion, Decreased endurance, Impaired balance, Decreased mobility, Decreased safety awareness  End of Session Communication: Bedside nurse  Assessment Comment: patient's breathing sounds better required fwewer rest breaks , able to iincrease gait by 12 feet. continue to increase strength and mobility  End of Session Patient Position: Bed, 3 rail up, Alarm on  PT Plan  Inpatient/Swing Bed or Outpatient: Inpatient  PT Plan  Treatment/Interventions: Bed mobility, Transfer training, Gait training, Strengthening, Endurance training, Range of motion, Therapeutic exercise  PT Plan: Skilled PT  PT Frequency: 4 times per week  PT Discharge Recommendations: Moderate intensity level of continued care (IF patient refuses, will need additional assist at home and low level therapy)  PT - OK to Discharge: Yes (when medically cleared)      General Visit Information:   PT  Visit  PT Received On: 02/23/24  Response to Previous Treatment: Patient reporting fatigue but able to participate.  General  Reason for Referral: Dx: SOB, PNA, COPD exacerbation  Missed Visit: Yes  Missed Visit Reason:  (speech with patient. patient up in chair)  Prior to Session Communication: Bedside nurse  Patient Position Received: Up in chair (alarm on chair but not turned on)  General Comment: patient agreeable to therapy with some persuasion  and wants to get back to bed post therapy.    Subjective   Precautions:  Precautions  Precautions Comment: falls, O2  Vital Signs:       Objective   Pain:  Pain Assessment  Pain Assessment: 0-10  Pain Score: 0 - No pain (no complaints of pain)  Cognition:  Cognition  Overall Cognitive Status: Within Functional  Limits  Postural Control:     Extremity/Trunk Assessments:    Activity Tolerance:  Activity Tolerance  Endurance: Tolerates 30 min exercise with multiple rests  Treatments:  Therapeutic Exercise  Therapeutic Exercise Performed: Yes  Therapeutic Exercise Activity 1: ankle pumps, heel slides, hip abduction 20 reps    Bed Mobility  Bed Mobility: Yes  Bed Mobility 1  Bed Mobility 1: Sitting to supine  Level of Assistance 1: Minimum assistance    Ambulation/Gait Training  Ambulation/Gait Training Performed: Yes  Ambulation/Gait Training 1  Comments/Distance (ft) 1: gait training with FWW 19 feet  around room with  min assist and  verbal cues  Transfers  Transfer: Yes  Transfer 1  Transfer From 1: Sit to  Transfer to 1: Stand  Transfer Device 1: Walker  Transfer Level of Assistance 1: Minimum assistance    Outcome Measures:  [facility] Basic Mobility  Turning from your back to your side while in a flat bed without using bedrails: A little  Moving from lying on your back to sitting on the side of a flat bed without using bedrails: A little  Moving to and from bed to chair (including a wheelchair): A lot  Standing up from a chair using your arms (e.g. wheelchair or bedside chair): A lot  To walk in hospital room: A lot  Climbing 3-5 steps with railing: Total  Basic Mobility - Total Score: 13    Education Documentation  Mobility Training, taught by Sierra Tran PTA at 2/23/2024  1:02 PM.  Learner: Patient  Readiness: Acceptance  Method: Explanation  Response: Verbalizes Understanding    Precautions, taught by Sierra Tran PTA at 2/23/2024  1:02 PM.  Learner: Patient  Readiness: Acceptance  Method: Explanation  Response: Verbalizes Understanding    Education Comments  No comments found.        OP EDUCATION:       Encounter Problems       Encounter Problems (Active)       PT Problem       transfers (Progressing)       Start:  02/20/24    Expected End:  03/05/24       Patient will perform all transfers with SBA x1          gait  (Progressing)       Start:  02/20/24    Expected End:  03/05/24       Patient will amb 50+ feet with CGA x1 and rest breaks prn for energy conservation           strengthening (Progressing)       Start:  02/20/24    Expected End:  03/05/24       Patient will perform 20+ reps of AROM/RROM for RICKEY LE's to improve safety and functional independence

## 2024-02-23 NOTE — PROCEDURES
"    Modified Barium Swallow Study     Patient Name: Aristeo Barriga  MRN: [id number]  : 1945  Today's Date: 24  Time Calculation  Start Time: 1345  Stop Time: 1430  Time Calculation (min): 45 min       Recommendations:  Regular texture diet and NECTAR THICK Liquids. No dual consistencies. Small bites, single small sips - hold for 1 to 2 seconds than swallow. Alternate solids with liquids. Alternate solids with warm liquids. Slow rate (only one bite/sips at a time). Stay up for 30 to 60 min after meals and wait at least 3 hrs after the last meal before going to bed.     Assessment/Impression:    Full detailed SLP/Radiologist Modified Barium Swallow study report can be found under Chart Review tab, Imaging tab and  titled \"[place] Modified Barium Swallow Study\"      Pt exhibited a min to moderate oropharyngeal phase dysphagia.   Oral Characteristics include: Decreased oral coordination, strength and control.  Pharyngeal Characteristics include: Decreased pharyngeal sensation, decreased closure time of the laryngeal vestibule and delayed closure of the laryngeal vestibule. Decreased intermittent epiglottic inversion. Nearly no pharyngeal movement on a dry swallow.  There was penetration and trace aspiration noted with thin.  Penetration noted with nectar with trace amount remaining on the underside of the epiglottis.  Esophageal: Retention of barium noted for up to 6 min.    Plan:  Treatment/Interventions: Pharyngeal exercises, Oral motor exercises, Patient/family education, Bolus trials  SLP Plan: Skilled SLP warranted  SLP Frequency: 2x per week  Duration: 44 days    Discussed POC: Patient, Nursing and Physician   Discussed Risks/Benefits: Yes  Patient/Caregiver Agreeable: Yes    Pain:   Rating 0-10: 0  Location: N/A       GOALS:  1. Pt will consume a modified diet of (Regular texture diet and NECTAR Thick Liquids ) with reduced risk of penetration and or aspiration >90% of meals.  2. Pt will utilize " compensatory strategies to reduce risk of penetration and or aspiration >90% of meals.  3. Pt will participate in oral, pharyngeal and laryngeal exercises to improve swallow function for possible diet advancement when safe and appropriate as well as to reduce the risk of penetration and or aspiration that may occur with oral intake.   4. Pt will participate in diet texture analysis to determine if safe to advance diet and liquid consistencies.  5. Pt/family will demonstrate understanding of all education related to dysphagia.  6. Pt will participate in repeat MBS study within 4 to 6 weeks after the initiation of dysphagia therapy.    Education:   SLP d/w pt results and recommendations of the MBS study while utilizing the MBS study video. In addition, SLP educated pt on diet, diet modifications, compensatory strategies, anatomy and physiology of the swallow mechanism as well as risk for penetration and or aspiration. Pt asked appropriate questions and SLP answered them. Pt acknowledged understanding, but would benefit from additional reinforcement.

## 2024-02-24 LAB
ANION GAP SERPL CALC-SCNC: 9 MMOL/L (ref 10–20)
ATRIAL RATE: 89 BPM
BUN SERPL-MCNC: 14 MG/DL (ref 6–23)
CALCIUM SERPL-MCNC: 8.4 MG/DL (ref 8.6–10.3)
CHLORIDE SERPL-SCNC: 103 MMOL/L (ref 98–107)
CO2 SERPL-SCNC: 32 MMOL/L (ref 21–32)
CREAT SERPL-MCNC: 1.49 MG/DL (ref 0.5–1.3)
EGFRCR SERPLBLD CKD-EPI 2021: 48 ML/MIN/1.73M*2
ERYTHROCYTE [DISTWIDTH] IN BLOOD BY AUTOMATED COUNT: 18.7 % (ref 11.5–14.5)
GLUCOSE SERPL-MCNC: 93 MG/DL (ref 74–99)
HCT VFR BLD AUTO: 27.1 % (ref 41–52)
HGB BLD-MCNC: 8.7 G/DL (ref 13.5–17.5)
MCH RBC QN AUTO: 30.5 PG (ref 26–34)
MCHC RBC AUTO-ENTMCNC: 32.1 G/DL (ref 32–36)
MCV RBC AUTO: 95 FL (ref 80–100)
NRBC BLD-RTO: 0.2 /100 WBCS (ref 0–0)
P AXIS: 81 DEGREES
PLATELET # BLD AUTO: 258 X10*3/UL (ref 150–450)
POTASSIUM SERPL-SCNC: 3.5 MMOL/L (ref 3.5–5.3)
PR INTERVAL: 145 MS
Q ONSET: 253 MS
QRS COUNT: 14 BEATS
QRS DURATION: 84 MS
QT INTERVAL: 361 MS
QTC CALCULATION(BAZETT): 457 MS
QTC FREDERICIA: 422 MS
R AXIS: 50 DEGREES
RBC # BLD AUTO: 2.85 X10*6/UL (ref 4.5–5.9)
SODIUM SERPL-SCNC: 140 MMOL/L (ref 136–145)
T AXIS: 73 DEGREES
T OFFSET: 433 MS
VENTRICULAR RATE: 96 BPM
WBC # BLD AUTO: 10.4 X10*3/UL (ref 4.4–11.3)

## 2024-02-24 PROCEDURE — 2500000001 HC RX 250 WO HCPCS SELF ADMINISTERED DRUGS (ALT 637 FOR MEDICARE OP): Performed by: INTERNAL MEDICINE

## 2024-02-24 PROCEDURE — 36415 COLL VENOUS BLD VENIPUNCTURE: CPT | Performed by: HOSPITALIST

## 2024-02-24 PROCEDURE — 99233 SBSQ HOSP IP/OBS HIGH 50: CPT | Performed by: HOSPITALIST

## 2024-02-24 PROCEDURE — S4991 NICOTINE PATCH NONLEGEND: HCPCS | Performed by: INTERNAL MEDICINE

## 2024-02-24 PROCEDURE — 1200000002 HC GENERAL ROOM WITH TELEMETRY DAILY

## 2024-02-24 PROCEDURE — 2500000002 HC RX 250 W HCPCS SELF ADMINISTERED DRUGS (ALT 637 FOR MEDICARE OP, ALT 636 FOR OP/ED): Performed by: INTERNAL MEDICINE

## 2024-02-24 PROCEDURE — 80048 BASIC METABOLIC PNL TOTAL CA: CPT | Performed by: HOSPITALIST

## 2024-02-24 PROCEDURE — 85027 COMPLETE CBC AUTOMATED: CPT | Performed by: HOSPITALIST

## 2024-02-24 PROCEDURE — 2500000002 HC RX 250 W HCPCS SELF ADMINISTERED DRUGS (ALT 637 FOR MEDICARE OP, ALT 636 FOR OP/ED): Performed by: FAMILY MEDICINE

## 2024-02-24 PROCEDURE — 97535 SELF CARE MNGMENT TRAINING: CPT | Mod: GO,CO

## 2024-02-24 PROCEDURE — 2500000004 HC RX 250 GENERAL PHARMACY W/ HCPCS (ALT 636 FOR OP/ED)

## 2024-02-24 PROCEDURE — 94640 AIRWAY INHALATION TREATMENT: CPT

## 2024-02-24 PROCEDURE — 2500000004 HC RX 250 GENERAL PHARMACY W/ HCPCS (ALT 636 FOR OP/ED): Performed by: INTERNAL MEDICINE

## 2024-02-24 RX ORDER — VANCOMYCIN HYDROCHLORIDE 1 G/20ML
INJECTION, POWDER, LYOPHILIZED, FOR SOLUTION INTRAVENOUS DAILY PRN
Status: DISCONTINUED | OUTPATIENT
Start: 2024-02-24 | End: 2024-02-24 | Stop reason: ALTCHOICE

## 2024-02-24 RX ADMIN — FLUTICASONE FUROATE AND VILANTEROL TRIFENATATE 1 PUFF: 200; 25 POWDER RESPIRATORY (INHALATION) at 05:28

## 2024-02-24 RX ADMIN — NICOTINE 1 PATCH: 14 PATCH, EXTENDED RELEASE TRANSDERMAL at 09:46

## 2024-02-24 RX ADMIN — SERTRALINE HYDROCHLORIDE 50 MG: 50 TABLET ORAL at 09:43

## 2024-02-24 RX ADMIN — ATORVASTATIN CALCIUM 40 MG: 40 TABLET, FILM COATED ORAL at 09:41

## 2024-02-24 RX ADMIN — FUROSEMIDE 20 MG: 20 TABLET ORAL at 09:43

## 2024-02-24 RX ADMIN — FAMOTIDINE 20 MG: 20 TABLET ORAL at 20:52

## 2024-02-24 RX ADMIN — LISINOPRIL 10 MG: 10 TABLET ORAL at 09:42

## 2024-02-24 RX ADMIN — POTASSIUM CHLORIDE 10 MEQ: 750 TABLET, FILM COATED, EXTENDED RELEASE ORAL at 09:42

## 2024-02-24 RX ADMIN — CLOPIDOGREL 75 MG: 75 TABLET ORAL at 09:41

## 2024-02-24 RX ADMIN — ENOXAPARIN SODIUM 40 MG: 40 INJECTION SUBCUTANEOUS at 23:32

## 2024-02-24 RX ADMIN — IPRATROPIUM BROMIDE AND ALBUTEROL SULFATE 3 ML: 2.5; .5 SOLUTION RESPIRATORY (INHALATION) at 11:22

## 2024-02-24 RX ADMIN — GUAIFENESIN 1200 MG: 600 TABLET ORAL at 11:25

## 2024-02-24 RX ADMIN — MONTELUKAST 10 MG: 10 TABLET, FILM COATED ORAL at 09:37

## 2024-02-24 RX ADMIN — METOPROLOL SUCCINATE 25 MG: 25 TABLET, EXTENDED RELEASE ORAL at 20:52

## 2024-02-24 RX ADMIN — TAMSULOSIN HYDROCHLORIDE 0.4 MG: 0.4 CAPSULE ORAL at 09:39

## 2024-02-24 RX ADMIN — GUAIFENESIN 1200 MG: 600 TABLET ORAL at 23:32

## 2024-02-24 RX ADMIN — IPRATROPIUM BROMIDE AND ALBUTEROL SULFATE 3 ML: 2.5; .5 SOLUTION RESPIRATORY (INHALATION) at 07:52

## 2024-02-24 RX ADMIN — ISOSORBIDE MONONITRATE 30 MG: 30 TABLET, EXTENDED RELEASE ORAL at 09:39

## 2024-02-24 RX ADMIN — PIPERACILLIN SODIUM AND TAZOBACTAM SODIUM 3.38 G: 3; .375 INJECTION, SOLUTION INTRAVENOUS at 23:32

## 2024-02-24 RX ADMIN — IPRATROPIUM BROMIDE AND ALBUTEROL SULFATE 3 ML: 2.5; .5 SOLUTION RESPIRATORY (INHALATION) at 20:51

## 2024-02-24 RX ADMIN — PIPERACILLIN SODIUM AND TAZOBACTAM SODIUM 3.38 G: 3; .375 INJECTION, SOLUTION INTRAVENOUS at 16:54

## 2024-02-24 RX ADMIN — NIFEDIPINE 30 MG: 30 TABLET, FILM COATED, EXTENDED RELEASE ORAL at 09:38

## 2024-02-24 RX ADMIN — PIPERACILLIN SODIUM AND TAZOBACTAM SODIUM 4.5 G: 4; .5 INJECTION, SOLUTION INTRAVENOUS at 05:27

## 2024-02-24 RX ADMIN — PIPERACILLIN SODIUM AND TAZOBACTAM SODIUM 3.38 G: 3; .375 INJECTION, SOLUTION INTRAVENOUS at 11:30

## 2024-02-24 RX ADMIN — Medication 2000 UNITS: at 09:41

## 2024-02-24 RX ADMIN — ALLOPURINOL 100 MG: 100 TABLET ORAL at 09:39

## 2024-02-24 RX ADMIN — ICOSAPENT ETHYL 2 G: 1 CAPSULE ORAL at 20:51

## 2024-02-24 RX ADMIN — ICOSAPENT ETHYL 2 G: 1 CAPSULE ORAL at 09:45

## 2024-02-24 RX ADMIN — ASPIRIN 81 MG: 81 TABLET, COATED ORAL at 20:51

## 2024-02-24 ASSESSMENT — PAIN SCALES - GENERAL
PAINLEVEL_OUTOF10: 0 - NO PAIN

## 2024-02-24 ASSESSMENT — COGNITIVE AND FUNCTIONAL STATUS - GENERAL
DRESSING REGULAR LOWER BODY CLOTHING: A LOT
CLIMB 3 TO 5 STEPS WITH RAILING: TOTAL
DAILY ACTIVITIY SCORE: 16
STANDING UP FROM CHAIR USING ARMS: A LOT
PERSONAL GROOMING: A LITTLE
MOVING FROM LYING ON BACK TO SITTING ON SIDE OF FLAT BED WITH BEDRAILS: A LITTLE
TOILETING: A LITTLE
TURNING FROM BACK TO SIDE WHILE IN FLAT BAD: A LITTLE
STANDING UP FROM CHAIR USING ARMS: A LOT
TURNING FROM BACK TO SIDE WHILE IN FLAT BAD: A LITTLE
WALKING IN HOSPITAL ROOM: A LOT
MOBILITY SCORE: 13
MOBILITY SCORE: 13
TOILETING: A LITTLE
HELP NEEDED FOR BATHING: A LOT
DRESSING REGULAR LOWER BODY CLOTHING: A LOT
DAILY ACTIVITIY SCORE: 16
WALKING IN HOSPITAL ROOM: A LOT
MOVING FROM LYING ON BACK TO SITTING ON SIDE OF FLAT BED WITH BEDRAILS: A LITTLE
DAILY ACTIVITIY SCORE: 16
DRESSING REGULAR UPPER BODY CLOTHING: A LOT
PERSONAL GROOMING: A LITTLE
HELP NEEDED FOR BATHING: A LOT
PERSONAL GROOMING: A LITTLE
DRESSING REGULAR UPPER BODY CLOTHING: A LOT
MOVING TO AND FROM BED TO CHAIR: A LOT
MOVING TO AND FROM BED TO CHAIR: A LOT
DRESSING REGULAR LOWER BODY CLOTHING: A LOT
CLIMB 3 TO 5 STEPS WITH RAILING: TOTAL
HELP NEEDED FOR BATHING: A LOT
TOILETING: A LITTLE
DRESSING REGULAR UPPER BODY CLOTHING: A LOT

## 2024-02-24 ASSESSMENT — ACTIVITIES OF DAILY LIVING (ADL): HOME_MANAGEMENT_TIME_ENTRY: 19

## 2024-02-24 ASSESSMENT — PAIN - FUNCTIONAL ASSESSMENT: PAIN_FUNCTIONAL_ASSESSMENT: 0-10

## 2024-02-24 NOTE — PROGRESS NOTES
02/24/24 1147   Patient Choice   Provider Choice list and CMS website (https://medicare.gov/care-compare#search) for post-acute Quality and Resource Measure Data were provided and reviewed with: Patient     I met with pt to follow up on SNF choices.  He said that he really needs to be home to help his girlfriend who is not well herself.  He was agreeable to [facility] however.  He will review list.  He will need FWW on discharge.

## 2024-02-24 NOTE — PROGRESS NOTES
Pharmacy - Antimicrobial dosing adjustment    Per System Dosing Policy    Dose adjustment from Piperacillin-tazobactam: 4.5 g every 6 hours to Piperacillin-tazobactam: 3.375 g every 6 hours for Estimated Creatinine Clearance: 36.7 mL/min (A) (by C-G formula based on SCr of 1.49 mg/dL (H)).      Linden Grayson, PharmD

## 2024-02-24 NOTE — CARE PLAN
The patient's goals for the shift include      The clinical goals for the shift include remain free from falls and injury thisshift    Over the shift, the patient did not make progress toward the following goals. Barriers to progression include n/a. Recommendations to address these barriers include n/a.

## 2024-02-24 NOTE — PROGRESS NOTES
Aristeo Barriga  [id number]   Service: Internal Medicine / Hospitalist Date of service: 2/24/2024                                  Full Code         Aristeo Barriga is a 78 y.o. male presenting with COPD exacerbation               Subjective  78 y.o. male with history of COPD, tobacco use disorder (still smoking) heart failure, and leukemia presenting with SOB.  He reports being in his usual state of health until last week when he developed increasing shortness of breath associated with a moist cough, wheezing, malaise, chills, and rigors. No chest pain, fever, nausea or vomiting.  He states that he increased his oxygen from 2 L to 4 L over the last couple of days but did not see any improvement.  Additionally, he saw a provider a few days ago and was put on prednisone for presumed COPD exacerbation but he could not tolerate the oral steroid because it made him shaky and jittery.  [facility] and his chest x-ray was read as showing increased airspace opacity in the right lung base.        2/18:                Today the patient is awake alert and interactive appropriately.  Admits he really is not feeling any different than admission and not that well.  Does not feel any worse however.  Continues on 2 L which is his home oxygen level at this point.  Continues on doxycycline Zosyn and vancomycin empirically.  Otherwise denies interval new symptoms or complaints including chest pain palpitations pleuritic type pain worsening shortness of breath cough sputum, nausea abdominal pain flank pain dysuria diarrhea fever or chills.     2/19...............No reported: Fevers, chills, chest pains, headaches, nausea or vomiting.Patient voiced no acute complaints at the time of evaluation.      2/20................No reported: Chest pains, palpitations, dyspnea at rest, nausea, vomiting, syncope or presyncope.     2/21...................No reported: chest pains,  palpitations, dyspnea at rest, fevers, chills, chest pains, headaches, nausea or vomiting.Overall patient felt that he is improving but voiced trepidation about early discharge.     2/22....................No reported: Palpitations, headaches, fevers, chills, nausea or vomiting.  Urinary retention appreciated developed yesterday.  Walton catheter placed, oxybutynin held.Patient reported diarrhea without asociated abdominal pain.        2/23.................Patient voiced overall improvement.  However he stated that his girlfriend is not doing well and therefore does not want to go to a skilled nursing facility will like to be discharged to home.  He also requested that his hematologist be aware of his Jakafi use.  Discussed yesterday with patient concerning holding this medication in light of acute infectious process and resuming after antibiotic treatment completed.  Patient's hematology oncology office was contacted and I did speak with physician on-call on 2/23/2024/hematologist recommended continue to hold Jakafi and resume once antibiotic therapy is completed.  In addition she recommends close follow-up with Dr. Grullon on discharge.    2/24............No new complaints voiced by patient at the time of evaluation this morning.  However he did report that he is not sure about thickening his liquids, apparently ruined his coffee.  No reported fevers, chills, headaches, chest pains, nausea or vomiting.     Patient seen and examined, lab data and diagnostics reviewed.  No reported: acute symptomatology at the time of evaluation including but not limited to :chest pain, dyspnea, constipation, abdominal pain, dysuria , nausea, vomiting ,headache, new focal weakness, diaphoresis,fever, chills, syncope,presyncope or palpitations.         Review of Systems:   Review of system otherwise negative if not aforementioned above in subjective.     Objective     Physical Exam      Constitutional:       Appearance: Patient appeared  in no acute cardiopulmonary distress.     Comments: Patient alert and oriented to person place time and situation.Patient was able to speak in full sentence without difficulties.The patient appeared nontoxic.  HEENT:      Head: Normocephalic and atraumatic.Trachea midline      Nose:No observed congestion or rhinorrhea.     Mouth/Throat: Mucous membranes Moist, Trachea appeared  midline.  Eyes:      Extraocular Movements: Extraocular movements intact.      Pupils: Pupils are equal, round, and reactive to light.      Comments: No scleral icterus or conjunctival injection appreciated.   Cardiovascular:      Rate and Rhythm: Normal rate and regular rhythm. No clicks rubs or gallops, normal S1 and S2.No peripheral stigmata of endocarditis appreciated.     Pulmonary:      No appreciation of adventitious sounds but the lung bases remained diminished.  Abdominal:      General: Abdomen soft, nontender, active bowel sounds, no involuntary guarding or rebound tenderness appreciated.     Comments: None   Musculoskeletal:       Patient appeared to have full active range of motion for upper  no acute apparent new  joint deformity appreciated on examination.   No pitting edema or cyanosis appreciated.       Lymphadenopathy:      No appreciable palpable lymphadenopathy  Skin:     General: Skin is warm.      Coloration:  No jaundice     Findings: No abnormal appearing skin rashes or lesions that appeared acute noted on unclothed area of the skin..   Neurological:      General: No focal sensory or motor deficits appreciated, no meningeal signs or dysmetria noted.      Cranial Nerves: Cranial nerves II to XII appearing grossly intact.     Genitals:  Deferred  Psychiatric:         The patient appears to be displaying normal mood and affect at the time of evaluation.     Labs:               Lab Results                            Date       02/21/2024 02/21/2024 02/21/2024 02/21/2024 02/21/2024 02/21/2024        02/21/2024 02/21/2024            Lab Results   Component Value Date     GLUCOSE 73 (L) 02/23/2024     CALCIUM 8.4 (L) 02/23/2024      02/23/2024     K 3.0 (L) 02/23/2024     CO2 34 (H) 02/23/2024      02/23/2024     BUN 17 02/23/2024     CREATININE 1.54 (H) 02/23/2024            Lab Results   Component Value Date     WBC 11.4 (H) 02/23/2024     HGB 8.7 (L) 02/23/2024     HCT 25.9 (L) 02/23/2024     MCV 94 02/23/2024      02/23/2024      Lab Results   Component Value Date    GLUCOSE 93 02/24/2024    CALCIUM 8.4 (L) 02/24/2024     02/24/2024    K 3.5 02/24/2024    CO2 32 02/24/2024     02/24/2024    BUN 14 02/24/2024    CREATININE 1.49 (H) 02/24/2024      Lab Results   Component Value Date    WBC 10.4 02/24/2024    HGB 8.7 (L) 02/24/2024    HCT 27.1 (L) 02/24/2024    MCV 95 02/24/2024     02/24/2024      [unfilled]   [unfilled]   No results found for the last 90 days.                  X-rays/ Images     [unfilled]   Radiology Results (last 21 days)     Procedure Component Value Units Date/Time   XR chest 2 views [[id number]] Collected: 02/17/24 2026   Order Status: Completed Updated: 02/17/24 2026   Narrative:     Interpreted By:  Raghavendra Estrada,  STUDY:  XR CHEST 2 VIEWS;  2/17/2024 8:04 pm      INDICATION:  Signs/Symptoms:SOB.      COMPARISON:  Chest x-ray 02/14/2024      ACCESSION NUMBER(S):  [id number]      ORDERING CLINICIAN:  MIGUEL LOPEZ      FINDINGS:          CARDIOMEDIASTINAL SILHOUETTE:  Cardiomediastinal silhouette is stable in size and configuration.  Atherosclerotic calcification of the aorta.      LUNGS:  There is persistent left basilar opacity with blunting of the left  costophrenic angle which may relate to chronic pleural thickening  and/or scarring. There is subtle increased right basilar airspace  opacity which may relate to atelectasis versus developing airspace  disease. No pneumothorax.      ABDOMEN:  No remarkable upper abdominal  findings.      BONES:  Multilevel degenerative changes of the spine generalized diffuse  osteopenia.       Impression:     Increased airspace opacity in the right lung base medially is new  from prior exam and may relate to atelectasis versus developing  airspace disease. Clinical correlation continued short-term follow-up  to resolution is advised.      Persistent blunting of the left costophrenic angle likely relates to  chronic pleural thickening and/or scarring.      MACRO:  None      Signed by: Raghavendra Estrada 2/17/2024 8:25 PM  Dictation workstation:   [id number]                  Medical Problems         Problem List         * (Principal) COPD exacerbation (CMS/HCC)     Pneumonia due to gram-negative bacteria     Acute exacerbation of chronic obstructive pulmonary disease (COPD) (CMS/HCC)     Chronic hypoxic respiratory failure (CMS/HCC)     Tobacco use disorder     Primary hypertension (Chronic)     Coronary artery disease involving native coronary artery of native heart without angina pectoris (Chronic)     Mixed hyperlipidemia     CHF (congestive heart failure) (CMS/HCC) (Chronic)     Chronic renal disease, stage III (CMS/HCC)     Pneumonitis     Angina pectoris, unspecified (CMS/HCC)     Bronchitis     Pulmonary emphysema (CMS/HCC)     Weight loss     Thoracic aortic aneurysm without rupture (CMS/HCC)     Overview Signed 11/30/2023 12:01 AM by Nikolai Garcia MD       Formatting of this note might be different from the original. ..           Status post below-knee amputation of right lower extremity (CMS/HCC)     Overview Signed 11/30/2023 12:01 AM by Nikolai Garcia MD       Formatting of this note might be different from the original. ...           S/P AAA (abdominal aortic aneurysm) repair     Protein-calorie malnutrition (CMS/HCC)     Primary insomnia     Pneumonia due to infectious organism     Pain of left lower extremity     Oral thrush     Myeloproliferative neoplasm (CMS/HCC)     Mixed stress  and urge urinary incontinence     Lymphopenia     Leukocytosis     Leukemia in remission (CMS/HCC)     Left cataract     IFG (impaired fasting glucose)     H/O ST elevation myocardial infarction     Fluid retention     Fatigue     Epigastric pain     Easy bruising     Chronic sinusitis     Cellulitis of right lower extremity     Arm lesion     Anxiety     Age-related cataract of both eyes     Adrenal nodule (CMS/HCC)     COVID-19                  Above medical problems may be reflective of historical medical problems that may have resolved and may not related to acute clinical condition/medical problems.     Clinical impression/plan:           Principal Problem:    COPD exacerbation (CMS/HCC)     RLL pneumonia ( with Immunosuppressant use)  Will treat as HAP due to recent hospitalization  Vancomycin and Zosyn, doxycycline  Oxygen  Aerosolized bronchodilator  Pulmonary toileting  Urinary antigens     COPD Exacerbation  Smoking cessation strongly emphasized  Duoneb  Oxygen  IV solumedrol. He says he can tolerate IV steroids but not oral form  Pulmonary rehab  2/18: Appears at his home oxygen level today.     Tobacco use disorder  As in #2  Transdermal nicotine     Leukemia  Currently in remission      HTN  Resume home BP med and monitor      Chronic urinary incontinence  Vibegron     Hypokalemia  Additional supplementation of potassium 2/23/2024     Malnutrition    I agree with the dietitian's malnutrition diagnosis.     Urinary retention     Consult to urology  Walton catheter placement  Flomax  Stop oxybutynin        Diarrhea     Suspected secondary to antibiotics, patient  on immunosuppressants, check stool for C. difficile, stool PCR     Hypokalemia     Supplement potassium     The patient was informed of differential diagnosis , work up , plan of care and possible sequelae of clinical disposition.Patient in agreement with plan of care. Further recommendations forthcoming in accordance with patient's clinical  disposition and response to care.     Disposition/additional interventions: 2/21/2024     Continue empirical antibiotic therapy for pneumonia     Improve renal function, worsening hypertension, resume ACE inhibitor.     Monitor blood pressure     Vibegron or inpatient pharmacological substitute.     Bladder scan.................. patient did reported that he felt a bit that he is not emptying his bladder completely.     Continue antibiotic therapy     Active infectious process,NAOMI inhibitor immunosuppressants on hold ( Jakafi)     Recommend repeating chest radiograph in 3 to 4 weeks for clearance of pneumonia.  Disposition/additional interventions: 2/22/2024        Consult to  urology     Continue empirical antibiotics     Walton catheter placement, voiding trial as per urology.     Continue empirical antibiotics.     Hypokalemia, supplement potassium     Check surrogate magnesium levels.      Disposition/additional interventions: 2/24/2024     Hypokalemia resolved, continue to monitor     Monitor off vancomycin MRSA PCR negative,     Clinically doubt bacteremia at this time.     Blood cultures at this juncture will likely not healed positive results given antibiotic therapy.     Stool PCR negative     C. difficile negative     Diarrhea improved, iatrogenic related to antibiotic therapy.     Continue to hold Jakafi, resume after antibiotic therapy completed, anticipate a 7-day course of antibiotics.     Outpatient chest radiograph follow-up recommended for clearance as well.     Patient at this juncture does not wish to go to a skilled nursing facility reports that he wants to go home to help his girlfriend.  He is hoping for discharge likely this coming Monday.     Possible voiding trial prior to discharge will monitor closely     Discharge planning:Anticipate likely discharge in next 24- 48 hours.     Care time: >55 minutes              Dictation performed with assistance of voice recognition device therefore  transcription errors are possible.
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
## 2024-02-25 LAB
ANION GAP SERPL CALC-SCNC: 10 MMOL/L (ref 10–20)
BUN SERPL-MCNC: 13 MG/DL (ref 6–23)
CALCIUM SERPL-MCNC: 8.6 MG/DL (ref 8.6–10.3)
CHLORIDE SERPL-SCNC: 105 MMOL/L (ref 98–107)
CO2 SERPL-SCNC: 29 MMOL/L (ref 21–32)
CREAT SERPL-MCNC: 1.31 MG/DL (ref 0.5–1.3)
EGFRCR SERPLBLD CKD-EPI 2021: 56 ML/MIN/1.73M*2
GLUCOSE SERPL-MCNC: 91 MG/DL (ref 74–99)
POTASSIUM SERPL-SCNC: 3.2 MMOL/L (ref 3.5–5.3)
SODIUM SERPL-SCNC: 141 MMOL/L (ref 136–145)

## 2024-02-25 PROCEDURE — 2500000002 HC RX 250 W HCPCS SELF ADMINISTERED DRUGS (ALT 637 FOR MEDICARE OP, ALT 636 FOR OP/ED): Performed by: HOSPITALIST

## 2024-02-25 PROCEDURE — 2500000004 HC RX 250 GENERAL PHARMACY W/ HCPCS (ALT 636 FOR OP/ED): Performed by: INTERNAL MEDICINE

## 2024-02-25 PROCEDURE — 36415 COLL VENOUS BLD VENIPUNCTURE: CPT | Performed by: HOSPITALIST

## 2024-02-25 PROCEDURE — 99233 SBSQ HOSP IP/OBS HIGH 50: CPT | Performed by: HOSPITALIST

## 2024-02-25 PROCEDURE — 2500000005 HC RX 250 GENERAL PHARMACY W/O HCPCS: Performed by: INTERNAL MEDICINE

## 2024-02-25 PROCEDURE — 2500000001 HC RX 250 WO HCPCS SELF ADMINISTERED DRUGS (ALT 637 FOR MEDICARE OP): Performed by: INTERNAL MEDICINE

## 2024-02-25 PROCEDURE — 80048 BASIC METABOLIC PNL TOTAL CA: CPT | Performed by: HOSPITALIST

## 2024-02-25 PROCEDURE — 2500000004 HC RX 250 GENERAL PHARMACY W/ HCPCS (ALT 636 FOR OP/ED)

## 2024-02-25 PROCEDURE — 2500000002 HC RX 250 W HCPCS SELF ADMINISTERED DRUGS (ALT 637 FOR MEDICARE OP, ALT 636 FOR OP/ED): Performed by: INTERNAL MEDICINE

## 2024-02-25 PROCEDURE — 94640 AIRWAY INHALATION TREATMENT: CPT

## 2024-02-25 PROCEDURE — 2500000002 HC RX 250 W HCPCS SELF ADMINISTERED DRUGS (ALT 637 FOR MEDICARE OP, ALT 636 FOR OP/ED): Performed by: FAMILY MEDICINE

## 2024-02-25 PROCEDURE — 1200000002 HC GENERAL ROOM WITH TELEMETRY DAILY

## 2024-02-25 PROCEDURE — S4991 NICOTINE PATCH NONLEGEND: HCPCS | Performed by: INTERNAL MEDICINE

## 2024-02-25 RX ORDER — POTASSIUM CHLORIDE 20 MEQ/1
20 TABLET, EXTENDED RELEASE ORAL ONCE
Status: COMPLETED | OUTPATIENT
Start: 2024-02-25 | End: 2024-02-25

## 2024-02-25 RX ADMIN — FUROSEMIDE 20 MG: 20 TABLET ORAL at 08:50

## 2024-02-25 RX ADMIN — ICOSAPENT ETHYL 2 G: 1 CAPSULE ORAL at 21:14

## 2024-02-25 RX ADMIN — GUAIFENESIN 1200 MG: 600 TABLET ORAL at 23:30

## 2024-02-25 RX ADMIN — IPRATROPIUM BROMIDE AND ALBUTEROL SULFATE 3 ML: 2.5; .5 SOLUTION RESPIRATORY (INHALATION) at 07:33

## 2024-02-25 RX ADMIN — Medication 2 L/MIN: at 21:08

## 2024-02-25 RX ADMIN — MONTELUKAST 10 MG: 10 TABLET, FILM COATED ORAL at 08:48

## 2024-02-25 RX ADMIN — ATORVASTATIN CALCIUM 40 MG: 40 TABLET, FILM COATED ORAL at 09:20

## 2024-02-25 RX ADMIN — NICOTINE 1 PATCH: 14 PATCH, EXTENDED RELEASE TRANSDERMAL at 08:46

## 2024-02-25 RX ADMIN — RUXOLITINIB 20 MG: 20 TABLET ORAL at 21:13

## 2024-02-25 RX ADMIN — LISINOPRIL 10 MG: 10 TABLET ORAL at 09:21

## 2024-02-25 RX ADMIN — METOPROLOL SUCCINATE 25 MG: 25 TABLET, EXTENDED RELEASE ORAL at 21:14

## 2024-02-25 RX ADMIN — POTASSIUM CHLORIDE 10 MEQ: 750 TABLET, FILM COATED, EXTENDED RELEASE ORAL at 08:49

## 2024-02-25 RX ADMIN — FLUTICASONE FUROATE AND VILANTEROL TRIFENATATE 1 PUFF: 200; 25 POWDER RESPIRATORY (INHALATION) at 06:43

## 2024-02-25 RX ADMIN — POTASSIUM CHLORIDE 20 MEQ: 1500 TABLET, EXTENDED RELEASE ORAL at 19:00

## 2024-02-25 RX ADMIN — SERTRALINE HYDROCHLORIDE 50 MG: 50 TABLET ORAL at 09:20

## 2024-02-25 RX ADMIN — ENOXAPARIN SODIUM 40 MG: 40 INJECTION SUBCUTANEOUS at 23:30

## 2024-02-25 RX ADMIN — IPRATROPIUM BROMIDE AND ALBUTEROL SULFATE 3 ML: 2.5; .5 SOLUTION RESPIRATORY (INHALATION) at 21:08

## 2024-02-25 RX ADMIN — GUAIFENESIN 1200 MG: 600 TABLET ORAL at 11:08

## 2024-02-25 RX ADMIN — PIPERACILLIN SODIUM AND TAZOBACTAM SODIUM 3.38 G: 3; .375 INJECTION, SOLUTION INTRAVENOUS at 17:40

## 2024-02-25 RX ADMIN — FAMOTIDINE 20 MG: 20 TABLET ORAL at 21:14

## 2024-02-25 RX ADMIN — TAMSULOSIN HYDROCHLORIDE 0.4 MG: 0.4 CAPSULE ORAL at 08:50

## 2024-02-25 RX ADMIN — ASPIRIN 81 MG: 81 TABLET, COATED ORAL at 21:14

## 2024-02-25 RX ADMIN — CLOPIDOGREL 75 MG: 75 TABLET ORAL at 08:50

## 2024-02-25 RX ADMIN — IPRATROPIUM BROMIDE AND ALBUTEROL SULFATE 3 ML: 2.5; .5 SOLUTION RESPIRATORY (INHALATION) at 11:12

## 2024-02-25 RX ADMIN — PIPERACILLIN SODIUM AND TAZOBACTAM SODIUM 3.38 G: 3; .375 INJECTION, SOLUTION INTRAVENOUS at 10:57

## 2024-02-25 RX ADMIN — NIFEDIPINE 30 MG: 30 TABLET, FILM COATED, EXTENDED RELEASE ORAL at 09:21

## 2024-02-25 RX ADMIN — ISOSORBIDE MONONITRATE 30 MG: 30 TABLET, EXTENDED RELEASE ORAL at 08:49

## 2024-02-25 RX ADMIN — ICOSAPENT ETHYL 2 G: 1 CAPSULE ORAL at 08:47

## 2024-02-25 RX ADMIN — Medication 2000 UNITS: at 08:51

## 2024-02-25 RX ADMIN — ALLOPURINOL 100 MG: 100 TABLET ORAL at 08:48

## 2024-02-25 RX ADMIN — PIPERACILLIN SODIUM AND TAZOBACTAM SODIUM 3.38 G: 3; .375 INJECTION, SOLUTION INTRAVENOUS at 05:40

## 2024-02-25 ASSESSMENT — COGNITIVE AND FUNCTIONAL STATUS - GENERAL
TOILETING: A LITTLE
DAILY ACTIVITIY SCORE: 16
TURNING FROM BACK TO SIDE WHILE IN FLAT BAD: A LITTLE
MOVING TO AND FROM BED TO CHAIR: A LOT
CLIMB 3 TO 5 STEPS WITH RAILING: TOTAL
MOBILITY SCORE: 13
STANDING UP FROM CHAIR USING ARMS: A LOT
MOBILITY SCORE: 13
MOVING FROM LYING ON BACK TO SITTING ON SIDE OF FLAT BED WITH BEDRAILS: A LITTLE
DRESSING REGULAR UPPER BODY CLOTHING: A LOT
DRESSING REGULAR LOWER BODY CLOTHING: A LOT
CLIMB 3 TO 5 STEPS WITH RAILING: TOTAL
DAILY ACTIVITIY SCORE: 16
WALKING IN HOSPITAL ROOM: A LOT
PERSONAL GROOMING: A LITTLE
DRESSING REGULAR UPPER BODY CLOTHING: A LOT
PERSONAL GROOMING: A LITTLE
TURNING FROM BACK TO SIDE WHILE IN FLAT BAD: A LITTLE
STANDING UP FROM CHAIR USING ARMS: A LOT
MOVING TO AND FROM BED TO CHAIR: A LOT
MOVING FROM LYING ON BACK TO SITTING ON SIDE OF FLAT BED WITH BEDRAILS: A LITTLE
WALKING IN HOSPITAL ROOM: A LOT
TOILETING: A LITTLE
HELP NEEDED FOR BATHING: A LOT
DRESSING REGULAR LOWER BODY CLOTHING: A LOT
HELP NEEDED FOR BATHING: A LOT

## 2024-02-25 ASSESSMENT — PAIN SCALES - GENERAL
PAINLEVEL_OUTOF10: 0 - NO PAIN
PAINLEVEL_OUTOF10: 0 - NO PAIN

## 2024-02-25 NOTE — PROGRESS NOTES
Aristeo Barriga  [id number]   Service: Internal Medicine / Hospitalist Date of service: 2/25/2024                                  Full Code         Aristeo Barriga is a 78 y.o. male presenting with COPD exacerbation               Subjective  78 y.o. male with history of COPD, tobacco use disorder (still smoking) heart failure, and leukemia presenting with SOB.  He reports being in his usual state of health until last week when he developed increasing shortness of breath associated with a moist cough, wheezing, malaise, chills, and rigors. No chest pain, fever, nausea or vomiting.  He states that he increased his oxygen from 2 L to 4 L over the last couple of days but did not see any improvement.  Additionally, he saw a provider a few days ago and was put on prednisone for presumed COPD exacerbation but he could not tolerate the oral steroid because it made him shaky and jittery.  [facility] and his chest x-ray was read as showing increased airspace opacity in the right lung base.        2/18:                Today the patient is awake alert and interactive appropriately.  Admits he really is not feeling any different than admission and not that well.  Does not feel any worse however.  Continues on 2 L which is his home oxygen level at this point.  Continues on doxycycline Zosyn and vancomycin empirically.  Otherwise denies interval new symptoms or complaints including chest pain palpitations pleuritic type pain worsening shortness of breath cough sputum, nausea abdominal pain flank pain dysuria diarrhea fever or chills.     2/19...............No reported: Fevers, chills, chest pains, headaches, nausea or vomiting.Patient voiced no acute complaints at the time of evaluation.      2/20................No reported: Chest pains, palpitations, dyspnea at rest, nausea, vomiting, syncope or presyncope.     2/21...................No reported: chest pains,  palpitations, dyspnea at rest, fevers, chills, chest pains, headaches, nausea or vomiting.Overall patient felt that he is improving but voiced trepidation about early discharge.     2/22....................No reported: Palpitations, headaches, fevers, chills, nausea or vomiting.  Urinary retention appreciated developed yesterday.  Walton catheter placed, oxybutynin held.Patient reported diarrhea without asociated abdominal pain.        2/23.................Patient voiced overall improvement.  However he stated that his girlfriend is not doing well and therefore does not want to go to a skilled nursing facility will like to be discharged to home.  He also requested that his hematologist be aware of his Jakafi use.  Discussed yesterday with patient concerning holding this medication in light of acute infectious process and resuming after antibiotic treatment completed.  Patient's hematology oncology office was contacted and I did speak with physician on-call on 2/23/2024/hematologist recommended continue to hold Jakafi and resume once antibiotic therapy is completed.  In addition she recommends close follow-up with Dr. Grullon on discharge.     2/24............No new complaints voiced by patient at the time of evaluation this morning.  However he did report that he is not sure about thickening his liquids, apparently ruined his coffee.  No reported fevers, chills, headaches, chest pains, nausea or vomiting.    2/25................Patient refuses option of skilled nursing facility requesting another day of inpatient care.  Will stop antibiotics today.  No reported :abdominal pain, chest pains, fevers or chills.  Compliance with speech therapy given recent modified barium swallow findings recommended.     Patient seen and examined, lab data and diagnostics reviewed.  No reported: acute symptomatology at the time of evaluation including but not limited to :chest pain, constipation, abdominal pain, dysuria , nausea, vomiting  ,headache, new focal weakness, diaphoresis,fever, chills, syncope,presyncope or palpitations.         Review of Systems:   Review of system otherwise negative if not aforementioned above in subjective.     Objective     Physical Exam      Constitutional:       Appearance: Patient appeared in no acute cardiopulmonary distress.     Comments: Patient alert and oriented to person place time and situation.Patient was able to speak in full sentence without difficulties.The patient appeared nontoxic.  HEENT:      Head: Normocephalic and atraumatic.Trachea midline      Nose:No observed congestion or rhinorrhea.     Mouth/Throat: Mucous membranes Moist, Trachea appeared  midline.  Eyes:      Extraocular Movements: Extraocular movements intact.      Pupils: Pupils are equal, round, and reactive to light.      Comments: No scleral icterus or conjunctival injection appreciated.   Cardiovascular:      Rate and Rhythm: Normal rate and regular rhythm. No clicks rubs or gallops, normal S1 and S2.No peripheral stigmata of endocarditis appreciated.     Pulmonary:      No appreciation of adventitious sounds but the lung bases remained diminished.  Abdominal:      General: Abdomen soft, nontender, active bowel sounds, no involuntary guarding or rebound tenderness appreciated.     Comments: None   Musculoskeletal:       Patient appeared to have full active range of motion for upper  no acute apparent new  joint deformity appreciated on examination.   No pitting edema or cyanosis appreciated.       Lymphadenopathy:      No appreciable palpable lymphadenopathy  Skin:     General: Skin is warm.      Coloration:  No jaundice     Findings: No abnormal appearing skin rashes or lesions that appeared acute noted on unclothed area of the skin..   Neurological:      General: No focal sensory or motor deficits appreciated, no meningeal signs or dysmetria noted.      Cranial Nerves: Cranial nerves II to XII appearing grossly intact.      Genitals:  Deferred  Psychiatric:         The patient appears to be displaying normal mood and affect at the time of evaluation.     Labs:               Lab Results                                 Date         02/21/2024 02/21/2024 02/21/2024 02/21/2024 02/21/2024 02/21/2024 02/21/2024 02/21/2024                Lab Results   Component Value Date     GLUCOSE 73 (L) 02/23/2024     CALCIUM 8.4 (L) 02/23/2024      02/23/2024     K 3.0 (L) 02/23/2024     CO2 34 (H) 02/23/2024      02/23/2024     BUN 17 02/23/2024     CREATININE 1.54 (H) 02/23/2024                Lab Results   Component Value Date     WBC 11.4 (H) 02/23/2024     HGB 8.7 (L) 02/23/2024     HCT 25.9 (L) 02/23/2024     MCV 94 02/23/2024      02/23/2024            Lab Results   Component Value Date     GLUCOSE 93 02/24/2024     CALCIUM 8.4 (L) 02/24/2024      02/24/2024     K 3.5 02/24/2024     CO2 32 02/24/2024      02/24/2024     BUN 14 02/24/2024     CREATININE 1.49 (H) 02/24/2024            Lab Results   Component Value Date     WBC 10.4 02/24/2024     HGB 8.7 (L) 02/24/2024     HCT 27.1 (L) 02/24/2024     MCV 95 02/24/2024      02/24/2024      [unfilled]   [unfilled]   No results found for the last 90 days.                  X-rays/ Images     [unfilled]   Radiology Results (last 21 days)     Procedure Component Value Units Date/Time   XR chest 2 views [[id number]] Collected: 02/17/24 2026   Order Status: Completed Updated: 02/17/24 2026   Narrative:     Interpreted By:  Raghavendra Estrada,  STUDY:  XR CHEST 2 VIEWS;  2/17/2024 8:04 pm      INDICATION:  Signs/Symptoms:SOB.      COMPARISON:  Chest x-ray 02/14/2024      ACCESSION NUMBER(S):  [id number]      ORDERING CLINICIAN:  MIGUEL LOPEZ      FINDINGS:          CARDIOMEDIASTINAL SILHOUETTE:  Cardiomediastinal silhouette is stable in size and configuration.  Atherosclerotic calcification of the  aorta.      LUNGS:  There is persistent left basilar opacity with blunting of the left  costophrenic angle which may relate to chronic pleural thickening  and/or scarring. There is subtle increased right basilar airspace  opacity which may relate to atelectasis versus developing airspace  disease. No pneumothorax.      ABDOMEN:  No remarkable upper abdominal findings.      BONES:  Multilevel degenerative changes of the spine generalized diffuse  osteopenia.       Impression:     Increased airspace opacity in the right lung base medially is new  from prior exam and may relate to atelectasis versus developing  airspace disease. Clinical correlation continued short-term follow-up  to resolution is advised.      Persistent blunting of the left costophrenic angle likely relates to  chronic pleural thickening and/or scarring.      MACRO:  None      Signed by: Raghavendra Estrada 2/17/2024 8:25 PM  Dictation workstation:   [id number]                  Medical Problems         Problem List         * (Principal) COPD exacerbation (CMS/HCC)     Pneumonia due to gram-negative bacteria     Acute exacerbation of chronic obstructive pulmonary disease (COPD) (CMS/HCC)     Chronic hypoxic respiratory failure (CMS/HCC)     Tobacco use disorder     Primary hypertension (Chronic)     Coronary artery disease involving native coronary artery of native heart without angina pectoris (Chronic)     Mixed hyperlipidemia     CHF (congestive heart failure) (CMS/HCC) (Chronic)     Chronic renal disease, stage III (CMS/HCC)     Pneumonitis     Angina pectoris, unspecified (CMS/HCC)     Bronchitis     Pulmonary emphysema (CMS/HCC)     Weight loss     Thoracic aortic aneurysm without rupture (CMS/HCC)     Overview Signed 11/30/2023 12:01 AM by Nikolai Garcia MD       Formatting of this note might be different from the original. ..           Status post below-knee amputation of right lower extremity (CMS/HCC)     Overview Signed 11/30/2023 12:01 AM  by Nikolai Garcia MD       Formatting of this note might be different from the original. ...           S/P AAA (abdominal aortic aneurysm) repair     Protein-calorie malnutrition (CMS/HCC)     Primary insomnia     Pneumonia due to infectious organism     Pain of left lower extremity     Oral thrush     Myeloproliferative neoplasm (CMS/HCC)     Mixed stress and urge urinary incontinence     Lymphopenia     Leukocytosis     Leukemia in remission (CMS/HCC)     Left cataract     IFG (impaired fasting glucose)     H/O ST elevation myocardial infarction     Fluid retention     Fatigue     Epigastric pain     Easy bruising     Chronic sinusitis     Cellulitis of right lower extremity     Arm lesion     Anxiety     Age-related cataract of both eyes     Adrenal nodule (CMS/HCC)     COVID-19                  Above medical problems may be reflective of historical medical problems that may have resolved and may not related to acute clinical condition/medical problems.     Clinical impression/plan:           Principal Problem:    COPD exacerbation (CMS/HCC)     RLL pneumonia ( with Immunosuppressant use)  Will treat as HAP due to recent hospitalization  Vancomycin and Zosyn, doxycycline  Oxygen  Aerosolized bronchodilator  Pulmonary toileting  Urinary antigens     COPD Exacerbation  Smoking cessation strongly emphasized  Duoneb  Oxygen  IV solumedrol. He says he can tolerate IV steroids but not oral form  Pulmonary rehab  2/18: Appears at his home oxygen level today.     Tobacco use disorder  As in #2  Transdermal nicotine     Leukemia  Currently in remission      HTN  Resume home BP med and monitor      Chronic urinary incontinence  Vibegron     Hypokalemia  Additional supplementation of potassium 2/25/2024     Malnutrition    I agree with the dietitian's malnutrition diagnosis.     Urinary retention     Consult to urology  Walton catheter placement  Flomax  Stop oxybutynin        Diarrhea     Suspected secondary to  antibiotics, patient  on immunosuppressants, check stool for C. difficile, stool PCR          The patient was informed of differential diagnosis , work up , plan of care and possible sequelae of clinical disposition.Patient in agreement with plan of care. Further recommendations forthcoming in accordance with patient's clinical disposition and response to care.                   Disposition/additional interventions: 2/25/2024     Hypokalemia ...............Additional potassium supplementation today.       Stool PCR negative     C. difficile negative     Diarrhea improved, iatrogenic related to antibiotic therapy.     Jakafi held  in the setting of acute infectious process, resume with stoppage of antibiotics and completion of care for pneumonia.     Outpatient chest radiograph follow-up recommended for clearance of pneumonia.     Patient at this juncture does not wish to go to a skilled nursing facility reports that he wants to go home to help his girlfriend.  He is hoping for discharge l this coming Monday.     Possible voiding trial prior to discharge before discharge tomorrow.  However patient was informed of the possibility that he may need to be discharged with Walton catheter with follow-up outpatient in urologist office.    Patient is interested in having  home health care       Discharge planning:Anticipate likely discharge in next 24 hours.     Care time: >55 minutes              Dictation performed with assistance of voice recognition device therefore transcription errors are possible.

## 2024-02-25 NOTE — CARE PLAN
The patient's goals for the shift include      The clinical goals for the shift include Patient will remain free from falls this shift.    Over the shift, the patient remains free from falls.

## 2024-02-26 ENCOUNTER — PHARMACY VISIT (OUTPATIENT)
Dept: PHARMACY | Facility: CLINIC | Age: 79
End: 2024-02-26
Payer: COMMERCIAL

## 2024-02-26 VITALS
TEMPERATURE: 97.3 F | RESPIRATION RATE: 16 BRPM | DIASTOLIC BLOOD PRESSURE: 55 MMHG | BODY MASS INDEX: 19.6 KG/M2 | OXYGEN SATURATION: 94 % | WEIGHT: 140 LBS | HEART RATE: 87 BPM | HEIGHT: 71 IN | SYSTOLIC BLOOD PRESSURE: 102 MMHG

## 2024-02-26 PROBLEM — J44.1 COPD EXACERBATION (MULTI): Status: RESOLVED | Noted: 2024-02-17 | Resolved: 2024-02-26

## 2024-02-26 LAB
ANION GAP SERPL CALC-SCNC: 10 MMOL/L (ref 10–20)
BUN SERPL-MCNC: 15 MG/DL (ref 6–23)
CALCIUM SERPL-MCNC: 8.9 MG/DL (ref 8.6–10.3)
CHLORIDE SERPL-SCNC: 105 MMOL/L (ref 98–107)
CO2 SERPL-SCNC: 31 MMOL/L (ref 21–32)
CREAT SERPL-MCNC: 1.34 MG/DL (ref 0.5–1.3)
EGFRCR SERPLBLD CKD-EPI 2021: 54 ML/MIN/1.73M*2
ERYTHROCYTE [DISTWIDTH] IN BLOOD BY AUTOMATED COUNT: 19 % (ref 11.5–14.5)
GLUCOSE SERPL-MCNC: 99 MG/DL (ref 74–99)
HCT VFR BLD AUTO: 28.6 % (ref 41–52)
HGB BLD-MCNC: 9.2 G/DL (ref 13.5–17.5)
MCH RBC QN AUTO: 31.1 PG (ref 26–34)
MCHC RBC AUTO-ENTMCNC: 32.2 G/DL (ref 32–36)
MCV RBC AUTO: 97 FL (ref 80–100)
NRBC BLD-RTO: 0 /100 WBCS (ref 0–0)
PLATELET # BLD AUTO: 257 X10*3/UL (ref 150–450)
POTASSIUM SERPL-SCNC: 3.3 MMOL/L (ref 3.5–5.3)
RBC # BLD AUTO: 2.96 X10*6/UL (ref 4.5–5.9)
SODIUM SERPL-SCNC: 143 MMOL/L (ref 136–145)
WBC # BLD AUTO: 9 X10*3/UL (ref 4.4–11.3)

## 2024-02-26 PROCEDURE — 80048 BASIC METABOLIC PNL TOTAL CA: CPT | Performed by: HOSPITALIST

## 2024-02-26 PROCEDURE — 94761 N-INVAS EAR/PLS OXIMETRY MLT: CPT

## 2024-02-26 PROCEDURE — 85027 COMPLETE CBC AUTOMATED: CPT | Performed by: HOSPITALIST

## 2024-02-26 PROCEDURE — 94664 DEMO&/EVAL PT USE INHALER: CPT

## 2024-02-26 PROCEDURE — 92526 ORAL FUNCTION THERAPY: CPT | Mod: GN | Performed by: SPEECH-LANGUAGE PATHOLOGIST

## 2024-02-26 PROCEDURE — 97535 SELF CARE MNGMENT TRAINING: CPT | Mod: GO,CO

## 2024-02-26 PROCEDURE — 2500000002 HC RX 250 W HCPCS SELF ADMINISTERED DRUGS (ALT 637 FOR MEDICARE OP, ALT 636 FOR OP/ED): Performed by: FAMILY MEDICINE

## 2024-02-26 PROCEDURE — 2500000001 HC RX 250 WO HCPCS SELF ADMINISTERED DRUGS (ALT 637 FOR MEDICARE OP): Performed by: INTERNAL MEDICINE

## 2024-02-26 PROCEDURE — RXMED WILLOW AMBULATORY MEDICATION CHARGE

## 2024-02-26 PROCEDURE — 36415 COLL VENOUS BLD VENIPUNCTURE: CPT | Performed by: HOSPITALIST

## 2024-02-26 PROCEDURE — 2500000002 HC RX 250 W HCPCS SELF ADMINISTERED DRUGS (ALT 637 FOR MEDICARE OP, ALT 636 FOR OP/ED): Performed by: INTERNAL MEDICINE

## 2024-02-26 PROCEDURE — 2500000004 HC RX 250 GENERAL PHARMACY W/ HCPCS (ALT 636 FOR OP/ED): Performed by: INTERNAL MEDICINE

## 2024-02-26 PROCEDURE — S4991 NICOTINE PATCH NONLEGEND: HCPCS | Performed by: INTERNAL MEDICINE

## 2024-02-26 PROCEDURE — 94640 AIRWAY INHALATION TREATMENT: CPT

## 2024-02-26 PROCEDURE — 99239 HOSP IP/OBS DSCHRG MGMT >30: CPT | Performed by: FAMILY MEDICINE

## 2024-02-26 RX ORDER — IBUPROFEN 200 MG
1 TABLET ORAL DAILY
Qty: 28 PATCH | Refills: 0 | Status: SHIPPED | OUTPATIENT
Start: 2024-02-27 | End: 2024-03-26

## 2024-02-26 RX ORDER — IPRATROPIUM BROMIDE AND ALBUTEROL SULFATE 2.5; .5 MG/3ML; MG/3ML
3 SOLUTION RESPIRATORY (INHALATION) EVERY 6 HOURS PRN
Qty: 180 ML | Refills: 11 | Status: SHIPPED | OUTPATIENT
Start: 2024-02-26

## 2024-02-26 RX ORDER — GUAIFENESIN 1200 MG/1
1200 TABLET, EXTENDED RELEASE ORAL EVERY 12 HOURS
Qty: 60 TABLET | Refills: 0 | Status: SHIPPED | OUTPATIENT
Start: 2024-02-27 | End: 2024-03-28

## 2024-02-26 RX ORDER — FLUTICASONE FUROATE AND VILANTEROL 200; 25 UG/1; UG/1
1 POWDER RESPIRATORY (INHALATION)
Qty: 60 EACH | Refills: 0 | Status: SHIPPED | OUTPATIENT
Start: 2024-02-27

## 2024-02-26 RX ORDER — POTASSIUM CHLORIDE 20 MEQ/1
40 TABLET, EXTENDED RELEASE ORAL ONCE
Status: DISCONTINUED | OUTPATIENT
Start: 2024-02-26 | End: 2024-02-26 | Stop reason: HOSPADM

## 2024-02-26 RX ADMIN — FLUTICASONE FUROATE AND VILANTEROL TRIFENATATE 1 PUFF: 200; 25 POWDER RESPIRATORY (INHALATION) at 06:00

## 2024-02-26 RX ADMIN — ISOSORBIDE MONONITRATE 30 MG: 30 TABLET, EXTENDED RELEASE ORAL at 08:38

## 2024-02-26 RX ADMIN — RUXOLITINIB 20 MG: 20 TABLET ORAL at 08:54

## 2024-02-26 RX ADMIN — GUAIFENESIN 1200 MG: 600 TABLET ORAL at 12:10

## 2024-02-26 RX ADMIN — LISINOPRIL 10 MG: 10 TABLET ORAL at 08:38

## 2024-02-26 RX ADMIN — NIFEDIPINE 30 MG: 30 TABLET, FILM COATED, EXTENDED RELEASE ORAL at 08:38

## 2024-02-26 RX ADMIN — ALLOPURINOL 100 MG: 100 TABLET ORAL at 08:38

## 2024-02-26 RX ADMIN — IPRATROPIUM BROMIDE AND ALBUTEROL SULFATE 3 ML: 2.5; .5 SOLUTION RESPIRATORY (INHALATION) at 11:41

## 2024-02-26 RX ADMIN — Medication 2000 UNITS: at 08:38

## 2024-02-26 RX ADMIN — CLOPIDOGREL 75 MG: 75 TABLET ORAL at 08:38

## 2024-02-26 RX ADMIN — FUROSEMIDE 20 MG: 20 TABLET ORAL at 08:38

## 2024-02-26 RX ADMIN — MONTELUKAST 10 MG: 10 TABLET, FILM COATED ORAL at 08:38

## 2024-02-26 RX ADMIN — TAMSULOSIN HYDROCHLORIDE 0.4 MG: 0.4 CAPSULE ORAL at 08:38

## 2024-02-26 RX ADMIN — ATORVASTATIN CALCIUM 40 MG: 40 TABLET, FILM COATED ORAL at 08:38

## 2024-02-26 RX ADMIN — TIOTROPIUM BROMIDE INHALATION SPRAY 2 PUFF: 3.12 SPRAY, METERED RESPIRATORY (INHALATION) at 08:50

## 2024-02-26 RX ADMIN — SERTRALINE HYDROCHLORIDE 50 MG: 50 TABLET ORAL at 08:38

## 2024-02-26 RX ADMIN — POTASSIUM CHLORIDE 10 MEQ: 750 TABLET, FILM COATED, EXTENDED RELEASE ORAL at 08:38

## 2024-02-26 RX ADMIN — ICOSAPENT ETHYL 2 G: 1 CAPSULE ORAL at 08:38

## 2024-02-26 RX ADMIN — NICOTINE 1 PATCH: 14 PATCH, EXTENDED RELEASE TRANSDERMAL at 08:48

## 2024-02-26 RX ADMIN — IPRATROPIUM BROMIDE AND ALBUTEROL SULFATE 3 ML: 2.5; .5 SOLUTION RESPIRATORY (INHALATION) at 07:41

## 2024-02-26 ASSESSMENT — COGNITIVE AND FUNCTIONAL STATUS - GENERAL
TOILETING: A LITTLE
MOVING TO AND FROM BED TO CHAIR: A LITTLE
WALKING IN HOSPITAL ROOM: A LOT
MOVING FROM LYING ON BACK TO SITTING ON SIDE OF FLAT BED WITH BEDRAILS: A LITTLE
PERSONAL GROOMING: A LITTLE
MOBILITY SCORE: 17
DRESSING REGULAR LOWER BODY CLOTHING: A LITTLE
STANDING UP FROM CHAIR USING ARMS: A LITTLE
DAILY ACTIVITIY SCORE: 18
DRESSING REGULAR UPPER BODY CLOTHING: A LOT
WALKING IN HOSPITAL ROOM: A LOT
WALKING IN HOSPITAL ROOM: A LOT
TOILETING: A LOT
DAILY ACTIVITIY SCORE: 15
HELP NEEDED FOR BATHING: A LOT
MOVING TO AND FROM BED TO CHAIR: A LOT
DAILY ACTIVITIY SCORE: 16
CLIMB 3 TO 5 STEPS WITH RAILING: TOTAL
DRESSING REGULAR LOWER BODY CLOTHING: A LOT
DRESSING REGULAR UPPER BODY CLOTHING: A LITTLE
MOBILITY SCORE: 13
TOILETING: A LOT
PERSONAL GROOMING: A LITTLE
TOILETING: A LITTLE
TURNING FROM BACK TO SIDE WHILE IN FLAT BAD: A LITTLE
DRESSING REGULAR LOWER BODY CLOTHING: A LOT
TURNING FROM BACK TO SIDE WHILE IN FLAT BAD: A LITTLE
DAILY ACTIVITIY SCORE: 16
STANDING UP FROM CHAIR USING ARMS: A LOT
PERSONAL GROOMING: A LITTLE
PERSONAL GROOMING: A LITTLE
HELP NEEDED FOR BATHING: A LOT
TURNING FROM BACK TO SIDE WHILE IN FLAT BAD: A LITTLE
MOVING FROM LYING ON BACK TO SITTING ON SIDE OF FLAT BED WITH BEDRAILS: A LITTLE
CLIMB 3 TO 5 STEPS WITH RAILING: TOTAL
MOBILITY SCORE: 13
HELP NEEDED FOR BATHING: A LITTLE
MOVING TO AND FROM BED TO CHAIR: A LOT
DRESSING REGULAR LOWER BODY CLOTHING: A LOT
CLIMB 3 TO 5 STEPS WITH RAILING: A LOT
DRESSING REGULAR UPPER BODY CLOTHING: A LOT
HELP NEEDED FOR BATHING: A LOT
STANDING UP FROM CHAIR USING ARMS: A LOT
DRESSING REGULAR UPPER BODY CLOTHING: A LOT

## 2024-02-26 ASSESSMENT — PAIN SCALES - GENERAL
PAINLEVEL_OUTOF10: 0 - NO PAIN

## 2024-02-26 ASSESSMENT — PAIN - FUNCTIONAL ASSESSMENT
PAIN_FUNCTIONAL_ASSESSMENT: 0-10

## 2024-02-26 ASSESSMENT — ACTIVITIES OF DAILY LIVING (ADL): HOME_MANAGEMENT_TIME_ENTRY: 18

## 2024-02-26 NOTE — PROGRESS NOTES
Nutrition Initial Assessment:   Nutrition Assessment       Medical history per chart:   78 y.o. male with history of COPD, tobacco use disorder (still smoking) heart failure, and leukemia (currently in remission) presenting with SOB. h/o s/p right BKA.  He reports being in his usual state of health until last week when he developed increasing shortness of breath associated with a moist cough, wheezing, malaise, chills, and rigors. No chest pain, fever, nausea or vomiting.  He states that he increased his oxygen from 2 L to 4 L over the last couple of days but did not see any improvement.  Additionally, he saw a provider a few days ago and was put on prednisone for presumed COPD exacerbation but he could not tolerate the oral steroid because it made him shaky and jittery.  He presented to the ER [facility], by EMS, in obvious respiratory distress and his chest x-ray was read as showing increased airspace opacity in the right lung base. +pneumonia-abx stopped.   MBS 2/23 noted-Speech recommended Regular, nectar thick  Patient continues to decline SNF placement.      2/26: Patient seen in AM. Reports he is eating fair. Flowsheet documentation noted to be 25-50% of meals. He does not like being on the thickened liquids and reports he will not follow this at discharge. Patient was agreeable to ONS appropriate for current diet. Prior to admission, he reports his appetite was good, consuming three meals per day. He also drinks 1-2 cartons of ensure per day. He is unsure of weight loss, but noted to be stable the past 3-4 months, however patient has had significant weight loss the past 10 months; 175-180 lbs->140 lbs. NFPE with noted severe muscle/fat losses. Skin intact. Encouraged frequent meals with protein and consumption of ONS.     Current Diet: Adult diet Regular; Mild thick 2  Supplement(s): No  Average meal Intake during admission: <50%     Nutrition Related Findings:   Oral Symptoms: swallowing Teeth: Dentures upper,  "Dentures lower   GI symptoms: no GI issues at this time.   BM: Last BM Date: 02/25/24  Wound Type: none (nursing/wound notes provide further details)  Edema: No  Food allergies: NKFA.   Meds/Labs reviewed.    Nutrition Significant Labs:  Results from last 7 days   Lab Units 02/26/24  0444 02/25/24  0506 02/24/24  0341 02/23/24  0515 02/22/24  0545   GLUCOSE mg/dL 99 91 93   < > 85   SODIUM mmol/L 143 141 140   < > 142   POTASSIUM mmol/L 3.3* 3.2* 3.5   < > 3.1*   CHLORIDE mmol/L 105 105 103   < > 101   CO2 mmol/L 31 29 32   < > 33*   BUN mg/dL 15 13 14   < > 17   CREATININE mg/dL 1.34* 1.31* 1.49*   < > 1.53*   EGFR mL/min/1.73m*2 54* 56* 48*   < > 46*   CALCIUM mg/dL 8.9 8.6 8.4*   < > 8.5*   MAGNESIUM mg/dL  --   --   --   --  2.11    < > = values in this interval not displayed.     Lab Results   Component Value Date    HGBA1C 5.5 11/30/2022    HGBA1C 5.6 05/25/2022           Anthropometrics:  Height: 180.3 cm (5' 10.98\")   Weight: 63.5 kg (140 lb) (2/17/24)   BMI (Calculated): 19.53             Weight History:   Wt Readings from Last 10 Encounters:   02/26/24 63.5 kg (140 lb)   02/14/24 69.4 kg (153 lb)   11/29/23 65.3 kg (144 lb)   10/04/23 63.5 kg (139 lb 15.9 oz)   Pt reports UBW: 175-180 lbs 3-5/2023,   Decrease of at least 20% the past 10 months significant    Weight Change %:  Weight History / % Weight Change: Decrease of  35-40 lbs the past 10 months, 20% change which is significant  Significant Weight Loss: Yes  Interpretation of Weight Loss: >10% in 6 months       Nutrition Focused Physical Exam Findings:  Subcutaneous Fat Loss:   Orbital Fat Pads: Mild-Moderate (slight dark circles and slight hollowing)  Buccal Fat Pads: Mild-Moderate (flat cheeks, minimal bounce)  Triceps: Severe (negligible fat tissue)  Muscle Wasting:  Temporalis: Severe (hollowed scooping depression)  Pectoralis (Clavicular Region): Severe (protruding prominent clavicle)  Deltoid/Trapezius: Severe (squared shoulders, acromion " process prominent)  Interosseous: Severe (depressed area between thumb and forefinger)  Edema:  Edema: none  Physical Findings:  Hair: Negative  Eyes: Negative  Mouth: Negative  Nails: Negative  Skin: Negative    Estimated Needs:   Total Energy Estimated Needs (kCal):  (9891-6357)  Method for Estimating Needs: 30-35 kcal/kg CBW 63.5 kg  Total Protein Estimated Needs (g): 95 g  Method for Estimating Needs: 1.5 g/kg CBW: 63.5 kg  Total Fluid Estimated Needs (mL): 2000 mL  Method for Estimating Needs: 1 mL/kcal        Nutrition Diagnosis   Nutrition Diagnosis:  Malnutrition Diagnosis  Patient has Malnutrition Diagnosis: Yes  Diagnosis Status: New  Malnutrition Diagnosis: Severe malnutrition related to chronic disease or condition  As Evidenced by: severe muscle and fat losses, significant weight loss x 10 months (175-180 lbs to 140 lbs)    Nutrition Diagnosis  Patient has Nutrition Diagnosis: Yes  Diagnosis Status (1): New  Nutrition Diagnosis 1: Increased nutrient needs  Related to (1): catabolic illness  As Evidenced by (1): COPD, HF, leukemia, muscle and fat losses, significant weight loss x 10 monhts       Nutrition Interventions/Recommendations   Nutrition Interventions and Recommendations:        Nutrition Prescription:  Individualized Nutrition Prescription Provided for : Continue Regular diet, consistency per speech. Added magic cup 1x/day, NSA mighty shake 1x/day. Obtain standing weight        Nutrition Interventions:   Food and/or Nutrient Delivery Interventions  Interventions: Meals and snacks, Medical food supplement  Meals and Snacks: Texture-modified diet, General healthful diet  Goal: consume >50%  Medical Food Supplement: Commercial food  Goal: consume at least one ONS daily         Nutrition Education:   Education Documentation  No documentation found.      Nutrition Counseling  Counseling Theoretical Approach: Nutrition counseling based on health belief model  Goal: encouraged protein intakes at meal,  thickened liquid compliance at discharge       Nutrition Monitoring and Evaluation   Monitoring/Evaluation:   Food/Nutrient Related History Monitoring  Monitoring and Evaluation Plan: Energy intake  Energy Intake: Estimated energy intake  Criteria: meet >75%    Body Composition/Growth/Weight History  Monitoring and Evaluation Plan: Weight  Weight: Estimated dry weight, Measured weight  Criteria: stable weight    Biochemical Data, Medical Tests and Procedures  Monitoring and Evaluation Plan: Electrolyte/renal panel  Electrolyte and Renal Panel:  (nutrient pertinent labs)  Criteria: wnl                 Time Spent/Follow-up Reminder:   Follow Up  Time Spent (min): 60 minutes  Last Date of Nutrition Visit: 02/26/24  Nutrition Follow-Up Needed?: Dietitian to reassess per policy  Follow up Comment: 3/1-3/3

## 2024-02-26 NOTE — PROGRESS NOTES
Spoke to patient to obtain HHC choices, [facility]. Referral sent. Patient is also requesting [facility] and [facility], will notify Dr. Patient continues to decline SNF placement.     Patient states that [facility] will provide him with [facility] and [facility].     [facility] can accept patient.

## 2024-02-26 NOTE — PROGRESS NOTES
"Physical Therapy                 Therapy Communication Note    Patient Name: Aristeo Barriga  MRN: [id number]  Today's Date: 2/26/2024     Discipline: Physical Therapy    Missed Visit Reason: Patient refused    Missed Time: Attempt    Comment: pt sitting up in chair, adamantly declining therapy stating he was waiting for a doctor \"to give the okay to get out of here\". Pt becoming agitated with attempted persuasion   "
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
## 2024-02-26 NOTE — PROGRESS NOTES
Speech-Language Pathology Clinical Swallow Treatment    Patient Name: Aristeo Barriga  MRN: [id number]  : 1945  Today's Date: 24  Start time: Start Time: 1600  Stop time: Stop Time: 1625  Time calculation (min) : Time Calculation (min): 25 min    ASSESSMENT  SLP TX Intervention Outcome: Making Progress Towards Goals     Treatment Tolerance: Patient tolerated treatment well    IMPRESSIONS: Pt is unhappy about the thickened liquids despite relaying during the MBS study that the thickened liquids         PLAN  Recommended solid consistency: Regular (IDDSI level 7)  Recommended liquid consistency: Mildly thick (IDDSI 2) / [place]-thick  Recommended medication administration: Whole, in thick liquid, and in puree  Safe swallow strategies: No dual consistencies. Small bites, single small sips - hold for 1 to 2 seconds than swallow. Alternate solids with liquids. Alternate solids with warm liquids. Slow rate (only one bite/sips at a time). Stay up for 30 to 60 min after meals and wait at least 3 hrs after the last meal before going to bed.    Discharge recommendation: Recommend LOW intensity ST services to ___improve the swallow function for safety with least restricted diet.   Inpatient/Swing Bed or Outpatient: Inpatient  SLP TX Plan: Continue Plan of Care  SLP Plan: Skilled SLP  SLP Frequency: 2x per week  Duration: 2 weeks  Next Treatment Priority: Continue to assess safety with current diet and implement dysphagia tx to improve swallow function.  Discussed POC: Patient, Nursing, Physician  Patient/Caregiver Agreeable: Yes      Goals: (start date 2024):  1. Pt will consume a modified diet of (Regular texture diet and NECTAR Thick Liquids ) with reduced risk of penetration and or aspiration >90% of meals.  Status: No progress made   Progress this date: Pt refusing the nectar thickened liquids.     2. Pt will utilize compensatory strategies to reduce risk of penetration and or aspiration >90% of  meals.  Status: N/A   Progress this date: N/A     3. Pt will participate in oral, pharyngeal and laryngeal exercises to improve swallow function for possible diet advancement when safe and appropriate as well as to reduce the risk of penetration and or aspiration that may occur with oral intake.   Status: No progress made   Progress this date: N/A     4. Pt will participate in diet texture analysis to determine if safe to advance diet and liquid consistencies.  Status: N/A   Progress this date: N/A     5. Pt/family will demonstrate understanding of all education related to dysphagia.  Status: Progressing   Progress this date: SLP d/w pt and dtr regarding the results and recommendations of the MBS study and pt's risk for aspiration and recurrent pneumonia.      6. Pt will participate in repeat MBS study within 4 to 6 weeks after the initiation of dysphagia therapy.  Status: N/A   Progress this date: N/A    7. Pt/Family will demonstrate understanding of the [place] Protocol and that there is still a risk for aspiration.   Status: Goal initiated this date   Progress this date: Pt and dtr demonstrated understanding of the Colin water protocol and understate that it still comes with a risk of aspiration.      SUBJECTIVE  Prior to Session Communication: Bedside nurse  RN cleared pt to participate in session and reported That the pt is refusing the thickened liquids.  Respiratory status: Supplemental oxygen via NC - 2lt baseline  Positioning: Upright in chair  Pt was alert, pleasant, and cooperative for session.  Pt noted he doesn't like the thickened liquids and he is refusing them.  His dtr is present and I had him explain why he needed the thickened liquids.  SLP provided guidance in helping him explain.  The dtr asked appropriate questions and she is in agreement with him utilizing the thickened liquids for safety and to reduce his risk for repeat pneumonia.      Pain Assessment: 0-10  Pain Score: 0 - No  "pain  Pain Type: Chronic pain  Pain Location: Other (Comment) (joint pain)  Pain Interventions: Declines    Ox4    OBJECTIVE  Therapeutic swallow intervention:  Therapeutic Swallow  Therapeutic Swallow Intervention : PO Trials  Solid Diet Recommendations: Regular (IDDSI Level 7)  Liquid Diet Recommendations: Nectar thick/mildly thick (IDDS Level 2)  Swallow Comments:   Pt and family education regarding the [place] Protocol.  SLP provided pt two handouts of the [place] Protocol.  SLP explained the steps and how important oral care is prior to consuming thin liquids and to continue to do oral care frequently.    SLP explained that the oral care is needed to clean the oral bacterial and to eliminate any food particles from the meal from the mouth so that the bacteria or the food particles do not get aspirated into the lungs.  Both pt and dtr demonstrated understanding on how important oral care is if he's going to participate in the [place] Protocol.    SLP expressed that if he is going to have any liquids present during a meal that the liquids need to be thickened.  Pt and dtr expressed understanding.     Pt reported that the [place] Protocol was \"doable\".     SLP explained once pt is set up with home health then a SLP should be out to the house to work with pt on improving the swallow trigger timing to try to eliminate the penetration and or aspiration.     Pt agreeable to SLP therapy to improve his swallow safety.    Treatment/Education:  Results and recommendations were relayed to: Patient, Family, Bedside nurse, PCA, and Physician  Education provided: Yes   Learner: Patient, Family, and Child   Barriers to learning: None   Method of teaching: Verbal, Written, and Demonstration   Topic: Role of ST, results of assessment, risk for aspiration, recommended diet modifications, results and recommendations of the MBSS, recommended safe swallow strategies (handout), recommendation for dysphagia " follow-up and the [place] Protocol (handout).  GERD handout also provided.    Outcome of teaching: Pt/family demonstrated good understanding

## 2024-02-26 NOTE — DISCHARGE SUMMARY
Discharge Diagnosis  COPD exacerbation (CMS/[facility])    Issues Requiring Follow-Up  Exacerbation COPD, right lower lobe pneumonia, tobacco abuse, urinary retention, dysphagia, malnutrition    This discharge took greater than 35 minutes.    Test Results Pending At Discharge  Pending Labs       No current pending labs.            Hospital Course   78 y.o. male with history of COPD, tobacco use disorder (still smoking) heart failure, and leukemia presenting with SOB.  He reports being in his usual state of health until last week when he developed increasing shortness of breath associated with a moist cough, wheezing, malaise, chills, and rigors. No chest pain, fever, nausea or vomiting.  He states that he increased his oxygen from 2 L to 4 L over the last couple of days but did not see any improvement.  Additionally, he saw a provider a few days ago and was put on prednisone for presumed COPD exacerbation but he could not tolerate the oral steroid because it made him shaky and jittery.  He presented to the ER [facility], by EMS, in obvious respiratory distress and his chest x-ray was read as showing increased airspace opacity in the right lung base.        2/18:                Today the patient is awake alert and interactive appropriately.  Admits he really is not feeling any different than admission and not that well.  Does not feel any worse however.  Continues on 2 L which is his home oxygen level at this point.  Continues on doxycycline Zosyn and vancomycin empirically.  Otherwise denies interval new symptoms or complaints including chest pain palpitations pleuritic type pain worsening shortness of breath cough sputum, nausea abdominal pain flank pain dysuria diarrhea fever or chills.     2/19...............No reported: Fevers, chills, chest pains, headaches, nausea or vomiting.Patient voiced no acute complaints at the time of evaluation.      2/20................No reported: Chest pains, palpitations, dyspnea at rest,  nausea, vomiting, syncope or presyncope.     2/21...................No reported: chest pains, palpitations, dyspnea at rest, fevers, chills, chest pains, headaches, nausea or vomiting.Overall patient felt that he is improving but voiced trepidation about early discharge.     2/22....................No reported: Palpitations, headaches, fevers, chills, nausea or vomiting.  Urinary retention appreciated developed yesterday.  Walton catheter placed, oxybutynin held.Patient reported diarrhea without asociated abdominal pain.        2/23.................Patient voiced overall improvement.  However he stated that his girlfriend is not doing well and therefore does not want to go to a skilled nursing facility will like to be discharged to home.  He also requested that his hematologist be aware of his Jakafi use.  Discussed yesterday with patient concerning holding this medication in light of acute infectious process and resuming after antibiotic treatment completed.  Patient's hematology oncology office was contacted and I did speak with physician on-call on 2/23/2024/hematologist recommended continue to hold Jakafi and resume once antibiotic therapy is completed.  In addition she recommends close follow-up with Dr. Grullon on discharge.     2/24............No new complaints voiced by patient at the time of evaluation this morning.  However he did report that he is not sure about thickening his liquids, apparently ruined his coffee.  No reported fevers, chills, headaches, chest pains, nausea or vomiting.     2/25................Patient refuses option of skilled nursing facility requesting another day of inpatient care.  Will stop antibiotics today.  No reported :abdominal pain, chest pains, fevers or chills.  Compliance with speech therapy given recent modified barium swallow findings recommended.    2/26:            Today patient is seen in the bedside chair.  He is awake alert and interactive appropriately.  Voices no  specific complaints and does request discharge.  Creatinine appears to be trending down.  Mild hypokalemia persists which will be replaced.  At this time continues Walton catheter and urology feels he can be discharged and will follow-up in the office in 1 week for voiding trial.  Patient according to nurse is refusing thickened liquids but we we will continue to encourage nectar thick liquids recommended from MBS study.  Otherwise denies interval new symptoms or complaints including chest pain palpitations pleuritic type pain unusual shortness of breath cough sputum nausea abdominal pain flank pain diarrhea fever or chills.    Review of Systems:   Review of system otherwise negative if not aforementioned above in subjective.      RLL pneumonia ( with Immunosuppressant use)  Will treat as HAP due to recent hospitalization  Vancomycin and Zosyn, doxycycline  Oxygen  Aerosolized bronchodilator  Pulmonary toileting  Urinary antigens  2/26: Has completed course of antibiotics.     COPD Exacerbation  Smoking cessation strongly emphasized  Duoneb  Oxygen  IV solumedrol. He says he can tolerate IV steroids but not oral form  Pulmonary rehab  2/18: Appears at his home oxygen level today.     Tobacco use disorder  As in #2  Transdermal nicotine     Leukemia  Currently in remission      HTN  Resume home BP med and monitor      Chronic urinary incontinence  Vibegron     Hypokalemia  Additional supplementation of potassium 2/25/2024 2/26: Once again mild hypokalemia and will supplement.  Should be followed again on discharge.     Malnutrition    I agree with the dietitian's malnutrition diagnosis.  2/26: Patient refusing nectar thick liquids as recommended by MBS study.     Urinary retention     Consult to urology  Walton catheter placement  Flomax  Stop oxybutynin  2/26: Urology feels he can be discharged with Walton catheter in place and will follow-up 1 week in the office.        Diarrhea     Suspected secondary to  antibiotics, patient  on immunosuppressants, check stool for C. difficile, stool PCR  2/26: C. difficile and stool PCR panels as well as Campylobacter negative.           Pertinent Physical Exam At Time of Discharge  Physical Exam  Constitutional:       Appearance: Patient appeared in no acute cardiopulmonary distress.     Comments: Patient alert and oriented to person place time and situation.Patient was able to speak in full sentence without difficulties.The patient appeared nontoxic.  HEENT:      Head: Normocephalic and atraumatic.Trachea midline      Nose:No observed congestion or rhinorrhea.     Mouth/Throat: Mucous membranes Moist, Trachea appeared  midline.  Eyes:      Extraocular Movements: Extraocular movements intact.      Pupils: Pupils are equal, round, and reactive to light.      Comments: No scleral icterus or conjunctival injection appreciated.   Cardiovascular:      Rate and Rhythm: Normal rate and regular rhythm. No clicks rubs or gallops, normal S1 and S2.No peripheral stigmata of endocarditis appreciated.     Pulmonary:      No appreciation of adventitious sounds but the lung bases remained diminished.  Abdominal:      General: Abdomen soft, nontender, active bowel sounds, no involuntary guarding or rebound tenderness appreciated.     Comments: None   Musculoskeletal:       Patient appeared to have full active range of motion for upper  no acute apparent new  joint deformity appreciated on examination.   No pitting edema or cyanosis appreciated.       Lymphadenopathy:      No appreciable palpable lymphadenopathy  Skin:     General: Skin is warm.      Coloration:  No jaundice     Findings: No abnormal appearing skin rashes or lesions that appeared acute noted on unclothed area of the skin..   Neurological:      General: No focal sensory or motor deficits appreciated, no meningeal signs or dysmetria noted.      Cranial Nerves: Cranial nerves II to XII appearing grossly intact.      Genitals:  Deferred  Psychiatric:         The patient appears to be displaying normal mood and affect at the time of evaluation.     Home Medications     Medication List      START taking these medications     ipratropium-albuteroL 0.5-2.5 mg/3 mL nebulizer solution; Commonly known   as: Duo-Neb; Take 3 mL by nebulization every 6 hours if needed for   wheezing.   nicotine 14 mg/24 hr patch; Commonly known as: Nicoderm CQ; Place 1   patch over 24 hours on the skin once daily. Do not start before February 27, 2024.; Start taking on: February 27, 2024     CHANGE how you take these medications     * fluticasone furoate-vilanteroL 100-25 mcg/dose inhaler; Commonly known   as: Breo Ellipta; What changed: Another medication with the same name was   added. Make sure you understand how and when to take each.   * fluticasone furoate-vilanteroL 200-25 mcg/dose inhaler; Commonly known   as: Breo Ellipta; Inhale 1 puff once daily. Do not start before February 27, 2024.; Start taking on: February 27, 2024; What changed: You were   already taking a medication with the same name, and this prescription was   added. Make sure you understand how and when to take each.; Replaces:   budesonide-formoteroL 160-4.5 mcg/actuation inhaler   guaiFENesin 1,200 mg tablet extended release 12hr; Commonly known as:   Mucinex; Take 1 tablet (1,200 mg) by mouth every 12 hours. Do not crush,   chew, or split. Do not start before February 27, 2024.; Start taking on:   February 27, 2024; What changed: medication strength, additional   instructions  * This list has 2 medication(s) that are the same as other medications   prescribed for you. Read the directions carefully, and ask your doctor or   other care provider to review them with you.     CONTINUE taking these medications     albuterol 90 mcg/actuation inhaler   allopurinol 100 mg tablet; Commonly known as: Zyloprim   aspirin 81 mg EC tablet   atorvastatin 40 mg tablet; Commonly known as:  Lipitor   busPIRone 5 mg tablet; Commonly known as: Buspar   cholecalciferol 50 mcg (2,000 unit) capsule; Commonly known as: Vitamin   D-3   famotidine 40 mg tablet; Commonly known as: Pepcid   Gemtesa 75 mg tablet; Generic drug: vibegron   Jakafi 20 mg tablet; Generic drug: ruxolitinib   montelukast 10 mg tablet; Commonly known as: Singulair   NIFEdipine ER 30 mg 24 hr tablet; Commonly known as: Adalat CC   nitroglycerin 0.4 mg SL tablet; Commonly known as: Nitrostat   NON FORMULARY   potassium chloride CR 10 mEq ER tablet; Commonly known as: Klor-Con   tamsulosin 0.4 mg 24 hr capsule; Commonly known as: Flomax   tiotropium 2.5 mcg/actuation inhaler; Commonly known as: Spiriva   Respimat     STOP taking these medications     budesonide-formoteroL 160-4.5 mcg/actuation inhaler; Commonly known as:   Symbicort; Replaced by: fluticasone furoate-vilanteroL 200-25 mcg/dose   inhaler   clopidogrel 75 mg tablet; Commonly known as: Plavix   dexAMETHasone 6 mg tablet; Commonly known as: Decadron   diclofenac sodium 1 % gel; Commonly known as: Voltaren   furosemide 20 mg tablet; Commonly known as: Lasix   icosapent ethyL 1 gram capsule; Commonly known as: Vascepa   isosorbide mononitrate ER 30 mg 24 hr tablet; Commonly known as: Imdur   lisinopril 10 mg tablet   metoprolol succinate XL 25 mg 24 hr tablet; Commonly known as: Toprol-XL   predniSONE 10 mg tablet; Commonly known as: Deltasone   sertraline 50 mg tablet; Commonly known as: Zoloft       Outpatient Follow-Up  Dr. Morales, urology.  PCP.  Home health care for PT/OT Walton catheter care and speech therapy    Nikolai Hazel MD

## 2024-02-27 ENCOUNTER — CARE COORDINATION (OUTPATIENT)
Dept: CARE COORDINATION | Age: 79
End: 2024-02-27

## 2024-02-27 ASSESSMENT — ENCOUNTER SYMPTOMS: DYSPNEA ASSOCIATED WITH: EXERTION

## 2024-02-27 NOTE — CARE COORDINATION
Ambulatory Care Coordination Note  2024    Patient Current Location:  Home: [address] [place] [address] [place] [address]     ACM contacted the patient and spouse/partner by telephone. Verified name and  with patient and spouse/partner as identifiers. Provided introduction to self, and explanation of the ACM role.     Challenges to be reviewed by the provider   Additional needs identified to be addressed with provider: Yes  See routed message               Method of communication with provider: chart routing.    ACM: Janina Vance RN      Offered patient enrollment in the Remote Patient Monitoring (RPM) program for in-home monitoring: Yes, patient already enrolled.    ACM outreached patient today after updated Care Everywhere for .  States patient D/C home on 24.  Patient did not notify PCP or ACM.  RPM CC notified to unpause RPM.    Patient answered today with wife/partner talking in background.  Patient confirmed [facility] agency arriving today shortly but unaware of agency name.  Patient strongly urged with wife in background to have nurse who arrives, review his medications one by one with him and ideally filling the pill minder.  Patient verbalized understanding.  Patient unable and unaware of medications.  States, \"there were a lot of changes and I was on too many medications\".  Patient reminded that medications are changed frequently when there is a condition change and/or need to change medications. ACM strongly encouraged patient to have open relationship with his PCP/ACM regarding his needs and medications.  Patient educated if he is unable to follow through his medications or feels as though he is unable to manage in the future, to reach out for clarification.  Verbalized understanding.  (Prior to hospitalization, patient wife/partner loudly spoke in the background to ACM stating, \"Abebe has no idea what pills he's taking or any idea whats going on and he has medications everywhere. He needs a nurse

## 2024-02-28 ENCOUNTER — CARE COORDINATION (OUTPATIENT)
Dept: CASE MANAGEMENT | Age: 79
End: 2024-02-28

## 2024-02-28 ENCOUNTER — CARE COORDINATION (OUTPATIENT)
Dept: PRIMARY CARE CLINIC | Age: 79
End: 2024-02-28

## 2024-02-28 DIAGNOSIS — I10 ESSENTIAL HYPERTENSION: Primary | ICD-10-CM

## 2024-02-28 DIAGNOSIS — J43.9 PULMONARY EMPHYSEMA, UNSPECIFIED EMPHYSEMA TYPE (HCC): ICD-10-CM

## 2024-02-28 DIAGNOSIS — I50.9 CONGESTIVE HEART FAILURE, UNSPECIFIED HF CHRONICITY, UNSPECIFIED HEART FAILURE TYPE (HCC): ICD-10-CM

## 2024-02-28 NOTE — PROGRESS NOTES
Remote Patient Order Discontinued    Received request from Janina Vance RN  to discontinue order for remote patient monitoring of CHF, COPD, and HTN and order completed.

## 2024-02-28 NOTE — CARE COORDINATION
ACM outreached patient.  Patient voiced he is very busy and declining RPM.  Patient encouraged to reach out to [facility] when he felt he needed RPM.  ACM strongly encouraged patient to continue RPM but he declined. Patient upset regarding insurance not offering transportation.  Patient lives in [place].  Patient has no support system for transportation and since he's been ill, cannot drive.  [facility].  Patient reminded to await call from  this week as she will help guide him in the direction of his needs.  Verbalized understanding.  Patient also inquiring of a needed nursing assistant for bathing and some self care needs.  Patient has HHC coming SNV twice weekly (he thinks) as per patient.  Patient voices he gets therapy PT but was unsure of OT when discussing with ACM.  Patient is overwhelmed. Patient calmed and reassured that this will settle down as time goes on and patient verbalized understanding.  Reminded patient to outreach AC when he has further needs and to await  call.  Patient verbalized understanding. Patient still upset regarding hospitalization, thickened liquids, coming home without a walker and no transportation.  Patient reminded that [facility] notified PCP that he is unable to return to the office for F/U until he gets transportation set up.    Patient still declining to review medications thoroughly stating, \"the nurse already did that.\"  Patient informed of need to update [facility] records/EMR and patient declined.  Patient unable at this time and nurse arriving at home for his wife, as well.      [facility] continues to encourage patient to continue smoking cessation and encouragement of him doing well attempting to manage his care as best as he can right now.  Active listening provided to patient throughout conversation.      PLAN  Outreach as planned.  Review medications completely        Remote Patient Monitoring Disenrollment      Date/Time:

## 2024-02-28 NOTE — CARE COORDINATION
[facility] placed call to patient to arrange RPM kit  through [employer]. Left detailed message.    Reviewed with patient how to pack equipment in original packing.     Verified patient's availability to schedule [employer]  time.     [employer]  time requested. Anticipated  date range 2 to 4 business days.

## 2024-02-28 NOTE — CARE COORDINATION
Remote Patient Monitoring Note  Date/Time:  2024 11:11 AM  Patient Current Location: Home: [address] [place] [address] [place] [address]  LPN contacted patient by telephone regarding  RPM   - no metrics x 2 days. Verified patients name and  as identifiers.  Background: enrolled in RPM for Kidney disease HTN COPD.   Clinical Interventions: Reviewed and followed up on alerts and treatments-Spoke to Abebe regarding RPM ALERT for no metrics x 2 days. Pt states \" I have a lot going in right now\" \" I don't think it is necessary\" .  Requests to discontinue RPM.Instructed pt to pack equipment in original box and he will be notified of  date.   Rpm PAUSED at this time.   Update to ACM and .   Plan/Follow Up: Will continue to review, monitor and address alerts with follow up based on severity of symptoms and risk factors.

## 2024-02-29 ENCOUNTER — CARE COORDINATION (OUTPATIENT)
Dept: CARE COORDINATION | Age: 79
End: 2024-02-29

## 2024-02-29 NOTE — CARE COORDINATION
Initial Contact Social Work Note - Ambulatory  2/29/2024      Date of referral: 2/19/2024  Referral received from: DEANDRA Roa  Reason for referral: resources    Previous SW referral: No  If yes, brief summary of outcome: N/A    Two Identifiers Verified: Yes    Insurance Provider: [employer]    Support System:  Adult Child/Children and girlfriend Teetee    [place] Status:     Community Providers: Home Health    ADL Assistance Needed: Bathing and Dressing    Housing/Living Concerns or Home Modification Needs: N/A    Transportation Concern: Yes,    Medication Cost Concern: NO    Medication Adherence Concern: Sometimes, girlfriend helps remind him to take medications    Financial Concern(s): No    Income (only if applicable): Yes, receives SS and half-way from [employer]    Ability to Read/Write: Yes    Advance Care Plan:   Pt reports he has completed ACP. [facility] encouraged pt to take copies of POA forms to next PCP office visit    Other: N/A    Identified Needs:  transportation  Local aging dept referral    Social Work Plan:  Made 3way call to [facility], pt was able to talk with them directly and applied/signed up over the phone for transportation service  Made referral to [facility]  Next Steps: CC to f/u  Method of Communication With Provider (if appropriate): Chart Routing       Goals Addressed    None     [facility] made call to pt, introduced self and completed referral, spoke with pt and his girlfriend Teetee. Pt is in need of transportation resources.   Made 3way call to [facility]. Pt spoke with them directly and was able to apply over the phone. Pt qualifies for reduced rate if he provides proof of age. Pt will be charged $6/each way, unless proof of ID, he will get discount rate of $3/each way. Pt was provided phone number [phone], he would call to schedule, between 8am-4:30pm.  Pt can be transported only on Tuesdays and Thursdays. Pt

## 2024-03-01 ENCOUNTER — TELEPHONE (OUTPATIENT)
Dept: FAMILY MEDICINE CLINIC | Age: 79
End: 2024-03-01

## 2024-03-01 ENCOUNTER — CARE COORDINATION (OUTPATIENT)
Dept: CARE COORDINATION | Age: 79
End: 2024-03-01

## 2024-03-01 NOTE — CARE COORDINATION
Received email update from Hank Shaffer stating that Juan Jose was referred to [facility] already on 2/16 and has an assessment already scheduled.

## 2024-03-01 NOTE — TELEPHONE ENCOUNTER
Home ot with home care needs verbal or orders for ot visits twice a week for two weeks and then once a week for a week

## 2024-03-11 ENCOUNTER — CARE COORDINATION (OUTPATIENT)
Dept: CARE COORDINATION | Age: 79
End: 2024-03-11

## 2024-03-11 NOTE — CARE COORDINATION
ACM attempted to reach patient to follow up for Care Coordination with no answer. ACM phoned wife/partner Teetee at number provided.  She voiced that Juan Jose was at a  Office karlene and he would return calls when he was done. Patient voicemail box was full and unable to reach patient.    Plan:   If no return call, ACM will attempt outreach again at a later time.

## 2024-03-12 ENCOUNTER — CARE COORDINATION (OUTPATIENT)
Dept: CARE COORDINATION | Age: 79
End: 2024-03-12

## 2024-03-12 NOTE — CARE COORDINATION
CC made f/u call to pt, unable to reach or leave message d/t voicemail box being full.    Received missed call back from pt's number.  Returned call to pt, unable to reach, unable to or leave message d/t voicemail being full.    Received call back from pt, reviewed that [facility] Hungwali Oliver already had a referral on pt. Pt reports he thinks they tried to visit yesterday when he was at Dr wilson. Provided pt with phone number for him and Teetee to call [facility] to reschedule. Pt has transportation info for [employer]. No other questions reported. [facility] to make final outreach in a few weeks.    Gill Whitten MSW, LSW   Care Coordinator  [phone]

## 2024-03-13 ENCOUNTER — OFFICE VISIT (OUTPATIENT)
Dept: FAMILY MEDICINE CLINIC | Age: 79
End: 2024-03-13
Payer: MEDICARE

## 2024-03-13 VITALS
TEMPERATURE: 97.6 F | SYSTOLIC BLOOD PRESSURE: 130 MMHG | WEIGHT: 137 LBS | HEART RATE: 105 BPM | BODY MASS INDEX: 19.18 KG/M2 | OXYGEN SATURATION: 91 % | DIASTOLIC BLOOD PRESSURE: 62 MMHG | HEIGHT: 71 IN | RESPIRATION RATE: 20 BRPM

## 2024-03-13 DIAGNOSIS — F41.9 ANXIETY: ICD-10-CM

## 2024-03-13 DIAGNOSIS — I21.02 ST ELEVATION MYOCARDIAL INFARCTION INVOLVING LEFT ANTERIOR DESCENDING (LAD) CORONARY ARTERY (HCC): ICD-10-CM

## 2024-03-13 DIAGNOSIS — I10 ESSENTIAL HYPERTENSION: ICD-10-CM

## 2024-03-13 DIAGNOSIS — J43.9 PULMONARY EMPHYSEMA, UNSPECIFIED EMPHYSEMA TYPE (HCC): ICD-10-CM

## 2024-03-13 DIAGNOSIS — I25.10 CAD IN NATIVE ARTERY: ICD-10-CM

## 2024-03-13 DIAGNOSIS — J18.9 PNEUMONIA DUE TO INFECTIOUS ORGANISM, UNSPECIFIED LATERALITY, UNSPECIFIED PART OF LUNG: Primary | ICD-10-CM

## 2024-03-13 DIAGNOSIS — C95.91 LEUKEMIA IN REMISSION, UNSPECIFIED LEUKEMIA TYPE (HCC): ICD-10-CM

## 2024-03-13 PROCEDURE — 4004F PT TOBACCO SCREEN RCVD TLK: CPT | Performed by: FAMILY MEDICINE

## 2024-03-13 PROCEDURE — 99214 OFFICE O/P EST MOD 30 MIN: CPT | Performed by: FAMILY MEDICINE

## 2024-03-13 PROCEDURE — 3075F SYST BP GE 130 - 139MM HG: CPT | Performed by: FAMILY MEDICINE

## 2024-03-13 PROCEDURE — 3023F SPIROM DOC REV: CPT | Performed by: FAMILY MEDICINE

## 2024-03-13 PROCEDURE — G8427 DOCREV CUR MEDS BY ELIG CLIN: HCPCS | Performed by: FAMILY MEDICINE

## 2024-03-13 PROCEDURE — 3078F DIAST BP <80 MM HG: CPT | Performed by: FAMILY MEDICINE

## 2024-03-13 PROCEDURE — 1123F ACP DISCUSS/DSCN MKR DOCD: CPT | Performed by: FAMILY MEDICINE

## 2024-03-13 PROCEDURE — G8420 CALC BMI NORM PARAMETERS: HCPCS | Performed by: FAMILY MEDICINE

## 2024-03-13 PROCEDURE — G8484 FLU IMMUNIZE NO ADMIN: HCPCS | Performed by: FAMILY MEDICINE

## 2024-03-13 RX ORDER — CLOPIDOGREL BISULFATE 75 MG/1
75 TABLET ORAL DAILY
Qty: 90 TABLET | Refills: 1 | Status: SHIPPED | OUTPATIENT
Start: 2024-03-13 | End: 2024-09-09

## 2024-03-13 RX ORDER — VIBEGRON 75 MG/1
TABLET, FILM COATED ORAL DAILY
COMMUNITY

## 2024-03-13 RX ORDER — NIFEDIPINE 30 MG
30 TABLET, EXTENDED RELEASE ORAL DAILY
COMMUNITY

## 2024-03-13 RX ORDER — IPRATROPIUM BROMIDE AND ALBUTEROL SULFATE 2.5; .5 MG/3ML; MG/3ML
3 SOLUTION RESPIRATORY (INHALATION) EVERY 6 HOURS PRN
COMMUNITY
Start: 2024-02-26

## 2024-03-13 RX ORDER — DOXYCYCLINE HYCLATE 100 MG
100 TABLET ORAL 2 TIMES DAILY
Qty: 14 TABLET | Refills: 0 | Status: SHIPPED | OUTPATIENT
Start: 2024-03-13 | End: 2024-03-20

## 2024-03-13 RX ORDER — METOPROLOL SUCCINATE 25 MG/1
TABLET, EXTENDED RELEASE ORAL
Qty: 30 TABLET | Refills: 1 | Status: SHIPPED
Start: 2024-03-13 | End: 2024-03-15 | Stop reason: SDUPTHER

## 2024-03-14 ENCOUNTER — TELEPHONE (OUTPATIENT)
Dept: FAMILY MEDICINE CLINIC | Age: 79
End: 2024-03-14

## 2024-03-14 NOTE — TELEPHONE ENCOUNTER
Patient called and states that he was taken off  medication (metoprolol) while in the hospital and discarded them now the insurance will not fill them for him until they are due per the last fill.  Please advise.

## 2024-03-15 RX ORDER — METOPROLOL SUCCINATE 25 MG/1
12.5 TABLET, EXTENDED RELEASE ORAL DAILY
Qty: 45 TABLET | Refills: 1 | Status: SHIPPED | OUTPATIENT
Start: 2024-03-15 | End: 2024-09-11

## 2024-03-18 ENCOUNTER — CARE COORDINATION (OUTPATIENT)
Dept: CARE COORDINATION | Age: 79
End: 2024-03-18

## 2024-03-18 ENCOUNTER — TELEPHONE (OUTPATIENT)
Dept: FAMILY MEDICINE CLINIC | Age: 79
End: 2024-03-18

## 2024-03-18 DIAGNOSIS — J43.9 PULMONARY EMPHYSEMA, UNSPECIFIED EMPHYSEMA TYPE (HCC): Primary | ICD-10-CM

## 2024-03-18 PROBLEM — I21.02 ST ELEVATION MYOCARDIAL INFARCTION INVOLVING LEFT ANTERIOR DESCENDING (LAD) CORONARY ARTERY (HCC): Status: ACTIVE | Noted: 2024-03-18

## 2024-03-18 ASSESSMENT — ENCOUNTER SYMPTOMS
SORE THROAT: 0
EYE REDNESS: 0
VOICE CHANGE: 0
PHOTOPHOBIA: 0
EYE PAIN: 0
CONSTIPATION: 0
COLOR CHANGE: 0
SINUS PAIN: 0
ABDOMINAL PAIN: 0
GASTROINTESTINAL NEGATIVE: 1
TROUBLE SWALLOWING: 0
RECTAL PAIN: 0
SINUS PRESSURE: 0
NAUSEA: 0
BLOOD IN STOOL: 0
ANAL BLEEDING: 0
VOMITING: 0
ABDOMINAL DISTENTION: 0
CHEST TIGHTNESS: 0
RHINORRHEA: 0
DIARRHEA: 0
EYE DISCHARGE: 0
WHEEZING: 0
CHOKING: 0
SHORTNESS OF BREATH: 1
EYE ITCHING: 0
STRIDOR: 0
BACK PAIN: 0
COUGH: 0
FACIAL SWELLING: 0

## 2024-03-18 NOTE — TELEPHONE ENCOUNTER
Pt states he was supposed to get an order for a chest x-ray from his appt. Order placed.     Electronically signed by PAM CHAVEZ MA on 3/18/24 at 11:22 AM EDT

## 2024-03-18 NOTE — CARE COORDINATION
Patient feels he is able to manage his health care needs without further assistance from ACM, and feels he can graduate from Care Coordination. Patient requests no further calls.  ACM educated patient that if he needs additional help in managing his health care in the future, he can call AC to re-enroll in Care Coordination. Patient and wife have no further questions and decline the need for additional help through Care Coordination.      PLAN  Graduate patient  Notify PCP

## 2024-03-18 NOTE — PROGRESS NOTES
Onset    Heart Disease Mother     Diabetes Mother     Cancer Father         leukemia    Heart Attack Brother        Social Hx:  Reviewed with patient  Social History     Tobacco Use    Smoking status: Every Day     Current packs/day: 1.00     Average packs/day: 1 pack/day for 64.2 years (64.2 ttl pk-yrs)     Types: Cigarettes     Start date: 1/1/1960    Smokeless tobacco: Never    Tobacco comments:     Pt currently at 0.5 PPD- Hx of 2 PPD   Substance Use Topics    Alcohol use: No       OBJECTIVE  /62 (Site: Right Upper Arm, Position: Sitting)   Pulse (!) 105   Temp 97.6 °F (36.4 °C) (Temporal)   Resp 20   Ht 1.803 m (5' 10.98\")   Wt 62.1 kg (137 lb)   SpO2 91%   BMI 19.12 kg/m²     Problem List:  Juan Jose does not have any pertinent problems on file.    PHYS EX:  Physical Exam  Vitals and nursing note reviewed.   Constitutional:       General: He is not in acute distress.     Appearance: Normal appearance. He is well-developed and normal weight. He is not ill-appearing, toxic-appearing or diaphoretic.      Comments: Loss of muscle.    HENT:      Head: Normocephalic and atraumatic.      Nose: Congestion present. No rhinorrhea.      Mouth/Throat:      Pharynx: No oropharyngeal exudate or posterior oropharyngeal erythema.   Eyes:      General: No scleral icterus.        Right eye: No discharge.         Left eye: No discharge.      Conjunctiva/sclera: Conjunctivae normal.      Pupils: Pupils are equal, round, and reactive to light.   Neck:      Thyroid: No thyromegaly.      Vascular: No carotid bruit or JVD.      Trachea: No tracheal deviation.   Cardiovascular:      Rate and Rhythm: Normal rate and regular rhythm.      Pulses: Normal pulses.      Heart sounds: Normal heart sounds. No murmur heard.     No friction rub. No gallop.   Pulmonary:      Effort: Pulmonary effort is normal. No respiratory distress.      Breath sounds: No stridor. No wheezing, rhonchi or rales.      Comments: Pt has decreased air

## 2024-03-21 ENCOUNTER — HOSPITAL ENCOUNTER (OUTPATIENT)
Dept: INFUSION THERAPY | Age: 79
Discharge: HOME | End: 2024-03-21

## 2024-03-21 ENCOUNTER — OFFICE VISIT (OUTPATIENT)
Dept: ONCOLOGY | Age: 79
End: 2024-03-21

## 2024-03-21 VITALS
WEIGHT: 138 LBS | SYSTOLIC BLOOD PRESSURE: 112 MMHG | BODY MASS INDEX: 19.76 KG/M2 | HEIGHT: 70 IN | TEMPERATURE: 98 F | DIASTOLIC BLOOD PRESSURE: 56 MMHG | OXYGEN SATURATION: 92 % | HEART RATE: 85 BPM

## 2024-03-21 DIAGNOSIS — D47.1 MPN (MYELOPROLIFERATIVE NEOPLASM) (HCC): Primary | ICD-10-CM

## 2024-03-21 DIAGNOSIS — D72.829 LEUKOCYTOSIS, UNSPECIFIED TYPE: ICD-10-CM

## 2024-03-22 DIAGNOSIS — J40 BRONCHITIS: ICD-10-CM

## 2024-03-25 ENCOUNTER — TELEPHONE (OUTPATIENT)
Dept: FAMILY MEDICINE CLINIC | Age: 79
End: 2024-03-25

## 2024-03-25 RX ORDER — DOXYCYCLINE HYCLATE 100 MG
100 TABLET ORAL 2 TIMES DAILY
Qty: 14 TABLET | Refills: 0 | Status: SHIPPED | OUTPATIENT
Start: 2024-03-25 | End: 2024-04-01

## 2024-03-25 RX ORDER — BUSPIRONE HYDROCHLORIDE 5 MG/1
5 TABLET ORAL 2 TIMES DAILY PRN
Qty: 180 TABLET | Refills: 1 | Status: SHIPPED | OUTPATIENT
Start: 2024-03-25 | End: 2024-09-21

## 2024-03-25 NOTE — TELEPHONE ENCOUNTER
Pt called in again asking for the refill of doxycycline, states you told him if he needed more to call.     Electronically signed by PAM CHAVEZ MA on 3/25/24 at 12:29 PM EDT

## 2024-03-25 NOTE — TELEPHONE ENCOUNTER
----- Message from Aranza Back DO sent at 3/25/2024  2:43 PM EDT -----  Pt needs CXR in middle of April    set up and let pt know

## 2024-03-25 NOTE — TELEPHONE ENCOUNTER
Pt has appt on 4/3/24  at 1:30. Pt will be notified at that time.     Electronically signed by PAM CHAVEZ MA on 3/25/24 at 3:08 PM EDT

## 2024-03-26 ENCOUNTER — CARE COORDINATION (OUTPATIENT)
Dept: CARE COORDINATION | Age: 79
End: 2024-03-26

## 2024-03-26 DIAGNOSIS — D47.1 MPN (MYELOPROLIFERATIVE NEOPLASM) (HCC): ICD-10-CM

## 2024-03-26 RX ORDER — RUXOLITINIB 20 MG/1
TABLET ORAL
Qty: 60 TABLET | Refills: 2 | Status: ACTIVE | OUTPATIENT
Start: 2024-03-26

## 2024-03-26 NOTE — CARE COORDINATION
[facility] made f/u call to pt. Teetee answered and reports pt is currently being visited by [facility] but [facility]. Teetee reports she will have pt call this [facility].  Received call back from pt. He reports 2 different people visited him today and thinks one is [facility]. Pt did not have any questions or needs. Pt has [employer] transportation info. [facility] to sign off.    Gill Whitten MSW, LSW   Care Coordinator  [phone]

## 2024-03-29 ENCOUNTER — TELEPHONE (OUTPATIENT)
Dept: FAMILY MEDICINE CLINIC | Age: 79
End: 2024-03-29

## 2024-03-29 NOTE — TELEPHONE ENCOUNTER
Lashawn from home health called and states the pt's vitals were \"off\" and wanted to let you know.  Temp 99.1  Res 22-28  HR 80  O2 98 (on 2 liters of O2)  If needed please call Lashawn back at [phone].     Electronically signed by PAM CHAVEZ MA on 3/29/24 at 3:22 PM EDT

## 2024-04-12 ENCOUNTER — OFFICE VISIT (OUTPATIENT)
Dept: FAMILY MEDICINE CLINIC | Age: 79
End: 2024-04-12
Payer: MEDICARE

## 2024-04-12 VITALS
TEMPERATURE: 97.4 F | HEART RATE: 82 BPM | DIASTOLIC BLOOD PRESSURE: 54 MMHG | HEIGHT: 70 IN | SYSTOLIC BLOOD PRESSURE: 110 MMHG | RESPIRATION RATE: 20 BRPM | BODY MASS INDEX: 19.44 KG/M2 | OXYGEN SATURATION: 91 % | WEIGHT: 135.8 LBS

## 2024-04-12 DIAGNOSIS — C93.10 CHRONIC MYELOMONOCYTIC LEUKEMIA NOT HAVING ACHIEVED REMISSION (HCC): ICD-10-CM

## 2024-04-12 DIAGNOSIS — E43 SEVERE PROTEIN-CALORIE MALNUTRITION (HCC): ICD-10-CM

## 2024-04-12 DIAGNOSIS — I25.119 ATHEROSCLEROSIS OF NATIVE CORONARY ARTERY OF NATIVE HEART WITH ANGINA PECTORIS (HCC): ICD-10-CM

## 2024-04-12 DIAGNOSIS — J18.9 PNEUMONIA DUE TO INFECTIOUS ORGANISM, UNSPECIFIED LATERALITY, UNSPECIFIED PART OF LUNG: Primary | ICD-10-CM

## 2024-04-12 DIAGNOSIS — C95.91 LEUKEMIA IN REMISSION, UNSPECIFIED LEUKEMIA TYPE (HCC): ICD-10-CM

## 2024-04-12 DIAGNOSIS — J43.9 PULMONARY EMPHYSEMA, UNSPECIFIED EMPHYSEMA TYPE (HCC): ICD-10-CM

## 2024-04-12 DIAGNOSIS — N18.32 STAGE 3B CHRONIC KIDNEY DISEASE (HCC): ICD-10-CM

## 2024-04-12 DIAGNOSIS — I21.02 ST ELEVATION MYOCARDIAL INFARCTION INVOLVING LEFT ANTERIOR DESCENDING (LAD) CORONARY ARTERY (HCC): ICD-10-CM

## 2024-04-12 DIAGNOSIS — I71.20 THORACIC AORTIC ANEURYSM WITHOUT RUPTURE, UNSPECIFIED PART (HCC): ICD-10-CM

## 2024-04-12 DIAGNOSIS — D47.1 MPN (MYELOPROLIFERATIVE NEOPLASM) (HCC): ICD-10-CM

## 2024-04-12 PROCEDURE — 3078F DIAST BP <80 MM HG: CPT | Performed by: FAMILY MEDICINE

## 2024-04-12 PROCEDURE — 99214 OFFICE O/P EST MOD 30 MIN: CPT | Performed by: FAMILY MEDICINE

## 2024-04-12 PROCEDURE — 3023F SPIROM DOC REV: CPT | Performed by: FAMILY MEDICINE

## 2024-04-12 PROCEDURE — G8420 CALC BMI NORM PARAMETERS: HCPCS | Performed by: FAMILY MEDICINE

## 2024-04-12 PROCEDURE — 1123F ACP DISCUSS/DSCN MKR DOCD: CPT | Performed by: FAMILY MEDICINE

## 2024-04-12 PROCEDURE — G8427 DOCREV CUR MEDS BY ELIG CLIN: HCPCS | Performed by: FAMILY MEDICINE

## 2024-04-12 PROCEDURE — 1036F TOBACCO NON-USER: CPT | Performed by: FAMILY MEDICINE

## 2024-04-12 PROCEDURE — 3074F SYST BP LT 130 MM HG: CPT | Performed by: FAMILY MEDICINE

## 2024-04-12 RX ORDER — DOXYCYCLINE HYCLATE 100 MG
100 TABLET ORAL 2 TIMES DAILY
Qty: 14 TABLET | Refills: 1 | Status: SHIPPED | OUTPATIENT
Start: 2024-04-12 | End: 2024-04-26

## 2024-04-12 RX ORDER — GUAIFENESIN 600 MG/1
600 TABLET, EXTENDED RELEASE ORAL 2 TIMES DAILY
Qty: 30 TABLET | Refills: 0 | Status: SHIPPED | OUTPATIENT
Start: 2024-04-12 | End: 2024-04-27

## 2024-04-16 ASSESSMENT — ENCOUNTER SYMPTOMS
FACIAL SWELLING: 0
VOICE CHANGE: 0
TROUBLE SWALLOWING: 0
BLOOD IN STOOL: 0
GASTROINTESTINAL NEGATIVE: 1
EYE DISCHARGE: 0
SORE THROAT: 0
EYE ITCHING: 0
COUGH: 0
WHEEZING: 0
STRIDOR: 0
COLOR CHANGE: 0
PHOTOPHOBIA: 0
CONSTIPATION: 0
ABDOMINAL DISTENTION: 0
BACK PAIN: 0
CHEST TIGHTNESS: 0
ANAL BLEEDING: 0
SHORTNESS OF BREATH: 1
RHINORRHEA: 0
ABDOMINAL PAIN: 0
DIARRHEA: 0
RECTAL PAIN: 0
VOMITING: 0
NAUSEA: 0
SINUS PRESSURE: 0
CHOKING: 0
EYE REDNESS: 0
SINUS PAIN: 0
EYE PAIN: 0

## 2024-04-16 NOTE — PROGRESS NOTES
0.4 MG capsule Take 1 capsule by mouth daily      Icosapent Ethyl (VASCEPA) 1 g CAPS capsule Take 2 capsules by mouth 2 times daily 360 capsule 1    Cholecalciferol (VITAMIN D3) 2000 units CAPS Take 1 capsule by mouth daily      isosorbide mononitrate (IMDUR) 30 MG extended release tablet Take 1 tablet by mouth daily 30 tablet 3    aspirin 81 MG tablet Take 1 tablet by mouth at bedtime      montelukast (SINGULAIR) 10 MG tablet Take 1 tablet by mouth daily (Patient not taking: Reported on 4/12/2024) 30 tablet 3    [DISCONTINUED] guaiFENesin (MUCINEX) 600 MG extended release tablet Take 2 tablets by mouth in the morning and 2 tablets in the evening. (Patient not taking: Reported on 4/12/2024)      predniSONE (DELTASONE) 10 MG tablet  (Patient not taking: Reported on 3/13/2024)      famotidine (PEPCID) 40 MG tablet Take 1 tablet by mouth at bedtime (Patient not taking: Reported on 3/13/2024)      diclofenac sodium (VOLTAREN) 1 % GEL Apply topically 2 times daily (Patient not taking: Reported on 3/13/2024) 50 g 0     No facility-administered encounter medications on file as of 4/12/2024.       Return in about 3 months (around 7/12/2024).        Reviewed recent labs related to Juan Jose's current problems      Discussed importance of regular Health Maintenance follow up  Health Maintenance   Topic    Shingles vaccine (1 of 2)    Respiratory Syncytial Virus (RSV) Pregnant or age 60 yrs+ (1 - 1-dose 60+ series)    Low dose CT lung screening &/or counseling     COVID-19 Vaccine (5 - 2023-24 season)    Lipids     Annual Wellness Visit (Medicare Advantage)     Flu vaccine (Season Ended)    Prostate Specific Antigen (PSA) Screening or Monitoring     Depression Screen     DTaP/Tdap/Td vaccine (2 - Td or Tdap)    Pneumococcal 65+ years Vaccine     Hepatitis C screen     Hepatitis A vaccine     Hepatitis B vaccine     Hib vaccine     Polio vaccine     Meningococcal (ACWY) vaccine     A1C test (Diabetic or Prediabetic)

## 2024-04-25 ENCOUNTER — HOSPITAL ENCOUNTER (OUTPATIENT)
Dept: INFUSION THERAPY | Age: 79
End: 2024-04-25

## 2024-04-28 NOTE — NURSING NOTE
Pt being discharged, educated pt and daughter, IV removed, weaver bag changed to leg bag and educated on discharge instructions as well as weaver catheter care. No needs at this time. Picking up medications at the pharmacy.   Rell Nieves - [id number]  [P]            [id number]       [id number]  [P]            [id number]       RODRIGO LERMA RN by [facility] 04/27/RODRIGO LERMA RN by [facility] 04/27/[id number]  [P]            [id number]       [id number]  [P]            [id number]       Kaushik Gonzalez MD  Pediatric Hematology/Oncology  [facility]

## 2024-04-29 ENCOUNTER — HOSPITAL ENCOUNTER (OUTPATIENT)
Dept: CT IMAGING | Age: 79
Discharge: HOME | End: 2024-05-01
Payer: MEDICARE

## 2024-04-29 DIAGNOSIS — J43.9 PULMONARY EMPHYSEMA, UNSPECIFIED EMPHYSEMA TYPE (HCC): ICD-10-CM

## 2024-04-29 DIAGNOSIS — J18.9 PNEUMONIA DUE TO INFECTIOUS ORGANISM, UNSPECIFIED LATERALITY, UNSPECIFIED PART OF LUNG: ICD-10-CM

## 2024-04-29 PROCEDURE — 71250 CT THORAX DX C-: CPT

## 2024-05-01 ENCOUNTER — TELEPHONE (OUTPATIENT)
Dept: FAMILY MEDICINE CLINIC | Age: 79
End: 2024-05-01

## 2024-05-01 NOTE — TELEPHONE ENCOUNTER
Call pt and let him know CT chest shows severe emphysema, and bronchitis   needs recheck CT chest in 1 year

## 2024-05-03 NOTE — TELEPHONE ENCOUNTER
Per Dr Aranza Back, Patient was called and given results of the CT Scan and states understanding that he should have a repeat CT Scan of lungs done in a year.

## 2024-05-08 NOTE — TELEPHONE ENCOUNTER
Patient called about refills again - I informed patient that I did send Dr. Tiffanie Weldon a msg about it on 8/14/23 and that doctor is out of office until next week and I cannot guarantee when she will respond to messages. Patient informed that he only has one dose of Allopurinol left. Please advise.      Last seen 7/26/2023  Next appt 9/29/2023    [address] [place] [address] Subjective   Patient ID: Deepthi Thompson dilatation, low thyroid, CHF,  HTN, afib    thought to be induced to hepatitis a, excised SCC of left leg,     ; prednisone did not help patient's symptoms.  She has been referred to hepatology.  She is currently seeing Dr. Scottie Crowder Derm.    History, patient symptoms started around 2022 when she started developing a rash on her legs.Her feet are swollen and red. Occurred 2 weeks after covid vacicine    Dr Crowder has tried topicals; it does not help. She has tried azathioprine, dapsone, clobetasol, vitamin E.  He was going to try mycophenolate, but patient has not started it yet as she could not swallow the pill as she needed an esophageal dilatation.   She has tried colchicine.     Has knee pain, has swelling and AM stiffness in mornings,     Labs:  2023/2024: CRP 11.71  WBC normal, Hgb 8 (L), platelets normal  EZEQUIEL neg, neg KASSANDRA including dsdna, Sm, RNP, Sm/RNP, SSA, SSB, Scl-70, centromere, Heike-1, chromatin, ribosomal p   (H)  ANCA neg   MPO neg, PR3 neg  Neg EZEQUIEL and neg KASSANDRA including dsdna, Sm, RNP, Sm/RNP, SSA, SSB, Scl-70, centromere, Heike-1, chromatin, ribosomal p  Cryo positive, cryocrit 2.5%  UA no protein, no blood, no RBC    9/2023 Ctap: No acute process in the abdomen or pelvis.    2022: SPEP normal  Hep B S Ab neg, Hep B Core Ab IgM neg, Hep B S Ag neg, Hep B Ab neg  Hep A Ab IgM and total Ab reactive      Rheumatology specific review of systems  joint pain, morning stiffness, fevers , chills, unintentional weight loss, rashes, alopecia, mouth sores, nasal ulcers, photosensitivity, Raynauds, morning stiffness in back, dry eyes, dry mouth, blood clots, miscarriages, rheum fam hx    Chronic pain specific review of systems  Denies widespread pain ,  widespread tenderness, brain fog, depression/anxiety, migraines or tension headaches, IBS or heartburn symptoms, irritable or overactive bladder, pelvic pain ,TMJ pain,poor sleep, fatigue    Objective   There were no vitals taken for this visit.      Physical Exam  Constitutional: Alert and in no acute distress. Well developed, well nourished  Head and Face: Head and face: Normal.    Cardiovascular: Heart rate and rhythm were normal, normal S1 and S2. No peripheral edema.   Pulmonary: No respiratory distress. Clear bilateral breath sounds.  Musculoskeletal: pitting edema of shins with palpable purpura distributed on the legs  .   Media Information             Lab Results   Component Value Date    WBC 5.3 04/05/2024    HGB 8.0 (L) 04/05/2024    HCT 25.6 (L) 04/05/2024     04/05/2024    ALT 10 04/05/2024    AST 13 04/05/2024    CREATININE 1.17 (H) 04/05/2024          Lab Results   Component Value Date    EZEQUIEL NEGATIVE 10/25/2022    ASSB <0.2 10/25/2022    UTPCR 0.09 07/06/2023    ACEN <0.2 10/25/2022    SEDRATE 29 02/15/2024    CRP 11.71 (H) 02/15/2024     (H) 06/14/2023   \\            There is currently no information documented on the homunculus. Go to the Rheumatology activity and complete the homunculus joint exam.        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  Her cryo crit is2.5%  -Her manifestations are anemia, palpable purpura-she has failed Imuran, topical steroids  -Given patient's age and comorbidity of CHF as well as chronic process since 2022, I will try to avoid high-dose oral steroids, and we will trial rituximab without concomitant high-dose steroids (although she will get 100 mg IV solumedrol with each rituximab infusion)  -1000 mg day 1 day 15 rituximab requested to be ordered today by [facility]  pharmacy.  Patient will go to [facility] to get this done  -Risks of rituximab discussed including but not limited to increased risk of infection, allergic reaction, infusion site reaction.  Patient understanding and aware of risks.  ACR handout given.  -T spot needs to be done today  -She has a positive hepatitis a IgM and hepatitis a total.  Hepatitis A IgM has been positive on multiple occasions since 2022.  This is more likely a false positive.  She sees hepatologist/GI Dr. Tamir Davenport who she states has told her that she does not have Hep A; I have called his office and requested that he call me to confirm this.  I have also requested his records  -Will also send APLS labs, C3, C4  -[place] UA in 2023 so does not seem to have kidney involvement at this time  -Addendum: I spoke with Dr Davenport; he will review chart, and review labs from today, and touch base with me tomorrow about patient's hepatitis status  -Addendum: I spoke with Dr. Davenport; he reviewed her updated labs.  Because hepatitis A total is positive, patient is immune to hepatitis A.  From his perspective, there is no contraindication to rituximab.    #Anemia  -Will send iron panel and ferritin    Patient counseled to seek medical care if any new or worsening symptoms, urgently if needed.      Note will be sent to primary care doctor.    Return to clinic in 2 mo, sooner if needed. Lab today    Dragon dictation software was used to dictate this note. Errors may have occurred during dictation that was not intended by the user.

## 2024-05-09 ENCOUNTER — HOSPITAL ENCOUNTER (OUTPATIENT)
Dept: INFUSION THERAPY | Age: 79
End: 2024-05-09

## 2024-05-13 DIAGNOSIS — J43.9 PULMONARY EMPHYSEMA, UNSPECIFIED EMPHYSEMA TYPE (HCC): ICD-10-CM

## 2024-05-13 RX ORDER — ALBUTEROL SULFATE 90 UG/1
2 AEROSOL, METERED RESPIRATORY (INHALATION) EVERY 6 HOURS PRN
Qty: 1 EACH | Refills: 3 | Status: SHIPPED | OUTPATIENT
Start: 2024-05-13

## 2024-05-13 NOTE — TELEPHONE ENCOUNTER
Last seen 4/12/2024  Next appt 5/15/2024    Electronically signed by PAM CHAVEZ MA on 5/13/24 at 12:07 PM EDT

## 2024-05-15 ENCOUNTER — OFFICE VISIT (OUTPATIENT)
Dept: FAMILY MEDICINE CLINIC | Age: 79
End: 2024-05-15

## 2024-05-15 VITALS
WEIGHT: 132.6 LBS | BODY MASS INDEX: 18.98 KG/M2 | SYSTOLIC BLOOD PRESSURE: 108 MMHG | OXYGEN SATURATION: 95 % | HEIGHT: 70 IN | DIASTOLIC BLOOD PRESSURE: 64 MMHG | RESPIRATION RATE: 20 BRPM | TEMPERATURE: 97.3 F | HEART RATE: 73 BPM

## 2024-05-15 DIAGNOSIS — N18.32 STAGE 3B CHRONIC KIDNEY DISEASE (HCC): ICD-10-CM

## 2024-05-15 DIAGNOSIS — F32.A DEPRESSION, UNSPECIFIED DEPRESSION TYPE: ICD-10-CM

## 2024-05-15 DIAGNOSIS — I25.119 ATHEROSCLEROSIS OF NATIVE CORONARY ARTERY OF NATIVE HEART WITH ANGINA PECTORIS (HCC): ICD-10-CM

## 2024-05-15 DIAGNOSIS — D47.1 MYELOPROLIFERATIVE NEOPLASM (HCC): ICD-10-CM

## 2024-05-15 DIAGNOSIS — I21.02 ST ELEVATION MYOCARDIAL INFARCTION INVOLVING LEFT ANTERIOR DESCENDING (LAD) CORONARY ARTERY (HCC): ICD-10-CM

## 2024-05-15 DIAGNOSIS — D47.1 MPN (MYELOPROLIFERATIVE NEOPLASM) (HCC): ICD-10-CM

## 2024-05-15 DIAGNOSIS — Z00.00 MEDICARE ANNUAL WELLNESS VISIT, SUBSEQUENT: Primary | ICD-10-CM

## 2024-05-15 DIAGNOSIS — C93.10 CHRONIC MYELOMONOCYTIC LEUKEMIA NOT HAVING ACHIEVED REMISSION (HCC): ICD-10-CM

## 2024-05-15 DIAGNOSIS — J43.9 PULMONARY EMPHYSEMA, UNSPECIFIED EMPHYSEMA TYPE (HCC): ICD-10-CM

## 2024-05-15 DIAGNOSIS — I71.20 THORACIC AORTIC ANEURYSM WITHOUT RUPTURE, UNSPECIFIED PART (HCC): ICD-10-CM

## 2024-05-15 DIAGNOSIS — Z87.891 PERSONAL HISTORY OF TOBACCO USE: ICD-10-CM

## 2024-05-15 DIAGNOSIS — E43 SEVERE PROTEIN-CALORIE MALNUTRITION (HCC): ICD-10-CM

## 2024-05-15 DIAGNOSIS — C95.91 LEUKEMIA IN REMISSION, UNSPECIFIED LEUKEMIA TYPE (HCC): ICD-10-CM

## 2024-05-15 DIAGNOSIS — E27.8 ADRENAL NODULE (HCC): ICD-10-CM

## 2024-05-15 RX ORDER — VARENICLINE TARTRATE 0.5 MG/1
0.5 TABLET, FILM COATED ORAL 2 TIMES DAILY
COMMUNITY

## 2024-05-15 RX ORDER — SERTRALINE HYDROCHLORIDE 25 MG/1
25 TABLET, FILM COATED ORAL DAILY
Qty: 30 TABLET | Refills: 1 | Status: SHIPPED | OUTPATIENT
Start: 2024-05-15

## 2024-05-15 RX ORDER — IPRATROPIUM BROMIDE AND ALBUTEROL SULFATE 2.5; .5 MG/3ML; MG/3ML
3 SOLUTION RESPIRATORY (INHALATION) EVERY 6 HOURS PRN
Qty: 360 ML | Refills: 1 | Status: SHIPPED | OUTPATIENT
Start: 2024-05-15

## 2024-05-15 SDOH — ECONOMIC STABILITY: FOOD INSECURITY: WITHIN THE PAST 12 MONTHS, YOU WORRIED THAT YOUR FOOD WOULD RUN OUT BEFORE YOU GOT MONEY TO BUY MORE.: NEVER TRUE

## 2024-05-15 SDOH — ECONOMIC STABILITY: FOOD INSECURITY: WITHIN THE PAST 12 MONTHS, THE FOOD YOU BOUGHT JUST DIDN'T LAST AND YOU DIDN'T HAVE MONEY TO GET MORE.: NEVER TRUE

## 2024-05-15 SDOH — ECONOMIC STABILITY: INCOME INSECURITY: HOW HARD IS IT FOR YOU TO PAY FOR THE VERY BASICS LIKE FOOD, HOUSING, MEDICAL CARE, AND HEATING?: PATIENT DECLINED

## 2024-05-15 ASSESSMENT — PATIENT HEALTH QUESTIONNAIRE - PHQ9
1. LITTLE INTEREST OR PLEASURE IN DOING THINGS: NEARLY EVERY DAY
4. FEELING TIRED OR HAVING LITTLE ENERGY: NEARLY EVERY DAY
9. THOUGHTS THAT YOU WOULD BE BETTER OFF DEAD, OR OF HURTING YOURSELF: NOT AT ALL
2. FEELING DOWN, DEPRESSED OR HOPELESS: NEARLY EVERY DAY
8. MOVING OR SPEAKING SO SLOWLY THAT OTHER PEOPLE COULD HAVE NOTICED. OR THE OPPOSITE, BEING SO FIGETY OR RESTLESS THAT YOU HAVE BEEN MOVING AROUND A LOT MORE THAN USUAL: NOT AT ALL
3. TROUBLE FALLING OR STAYING ASLEEP: NEARLY EVERY DAY
SUM OF ALL RESPONSES TO PHQ QUESTIONS 1-9: 15
7. TROUBLE CONCENTRATING ON THINGS, SUCH AS READING THE NEWSPAPER OR WATCHING TELEVISION: NOT AT ALL
6. FEELING BAD ABOUT YOURSELF - OR THAT YOU ARE A FAILURE OR HAVE LET YOURSELF OR YOUR FAMILY DOWN: NOT AT ALL
5. POOR APPETITE OR OVEREATING: NEARLY EVERY DAY
SUM OF ALL RESPONSES TO PHQ QUESTIONS 1-9: 15
SUM OF ALL RESPONSES TO PHQ9 QUESTIONS 1 & 2: 6
SUM OF ALL RESPONSES TO PHQ QUESTIONS 1-9: 15
SUM OF ALL RESPONSES TO PHQ QUESTIONS 1-9: 15
10. IF YOU CHECKED OFF ANY PROBLEMS, HOW DIFFICULT HAVE THESE PROBLEMS MADE IT FOR YOU TO DO YOUR WORK, TAKE CARE OF THINGS AT HOME, OR GET ALONG WITH OTHER PEOPLE: SOMEWHAT DIFFICULT

## 2024-05-19 PROBLEM — Z00.00 MEDICARE ANNUAL WELLNESS VISIT, SUBSEQUENT: Status: ACTIVE | Noted: 2024-05-19

## 2024-05-19 PROBLEM — F32.A DEPRESSION: Status: ACTIVE | Noted: 2024-05-19

## 2024-05-20 DIAGNOSIS — R60.9 FLUID RETENTION: ICD-10-CM

## 2024-05-20 RX ORDER — POTASSIUM CHLORIDE 750 MG/1
10 TABLET, EXTENDED RELEASE ORAL DAILY
Qty: 90 TABLET | Refills: 1 | Status: SHIPPED | OUTPATIENT
Start: 2024-05-20

## 2024-05-20 NOTE — TELEPHONE ENCOUNTER
Last seen 5/15/2024  Next appt 7/12/2024    Electronically signed by PAM CHAVEZ MA on 5/20/24 at 11:07 AM EDT

## 2024-05-30 ENCOUNTER — HOSPITAL ENCOUNTER (OUTPATIENT)
Dept: INFUSION THERAPY | Age: 79
Discharge: HOME | End: 2024-05-30
Payer: MEDICARE

## 2024-05-30 ENCOUNTER — OFFICE VISIT (OUTPATIENT)
Dept: ONCOLOGY | Age: 79
End: 2024-05-30
Payer: MEDICARE

## 2024-05-30 VITALS
DIASTOLIC BLOOD PRESSURE: 60 MMHG | TEMPERATURE: 97.2 F | HEIGHT: 70 IN | HEART RATE: 75 BPM | BODY MASS INDEX: 19.33 KG/M2 | OXYGEN SATURATION: 98 % | WEIGHT: 135 LBS | SYSTOLIC BLOOD PRESSURE: 127 MMHG

## 2024-05-30 DIAGNOSIS — D72.829 LEUKOCYTOSIS, UNSPECIFIED TYPE: ICD-10-CM

## 2024-05-30 DIAGNOSIS — D47.1 MPN (MYELOPROLIFERATIVE NEOPLASM) (HCC): Primary | ICD-10-CM

## 2024-05-30 DIAGNOSIS — D64.9 NORMOCYTIC ANEMIA: ICD-10-CM

## 2024-05-30 LAB
ALBUMIN SERPL-MCNC: 4.1 G/DL (ref 3.5–5.2)
ALP SERPL-CCNC: 102 U/L (ref 40–129)
ALT SERPL-CCNC: 27 U/L (ref 0–40)
ANION GAP SERPL CALCULATED.3IONS-SCNC: 12 MMOL/L (ref 7–16)
AST SERPL-CCNC: 29 U/L (ref 0–39)
BASOPHILS # BLD: 0.04 K/UL (ref 0–0.2)
BASOPHILS NFR BLD: 1 % (ref 0–2)
BILIRUB SERPL-MCNC: 0.3 MG/DL (ref 0–1.2)
BUN SERPL-MCNC: 22 MG/DL (ref 6–23)
CALCIUM SERPL-MCNC: 8.9 MG/DL (ref 8.6–10.2)
CHLORIDE SERPL-SCNC: 103 MMOL/L (ref 98–107)
CO2 SERPL-SCNC: 25 MMOL/L (ref 22–29)
CREAT SERPL-MCNC: 1.2 MG/DL (ref 0.7–1.2)
EOSINOPHIL # BLD: 0.03 K/UL (ref 0.05–0.5)
EOSINOPHILS RELATIVE PERCENT: 0 % (ref 0–6)
ERYTHROCYTE [DISTWIDTH] IN BLOOD BY AUTOMATED COUNT: 17.2 % (ref 11.5–15)
FERRITIN SERPL-MCNC: 519 NG/ML
GFR, ESTIMATED: 60 ML/MIN/1.73M2
GLUCOSE SERPL-MCNC: 103 MG/DL (ref 74–99)
HCT VFR BLD AUTO: 28.8 % (ref 37–54)
HGB BLD-MCNC: 9.2 G/DL (ref 12.5–16.5)
IMM GRANULOCYTES # BLD AUTO: 0.16 K/UL (ref 0–0.58)
IMM GRANULOCYTES NFR BLD: 2 % (ref 0–5)
IRON SATN MFR SERPL: 26 % (ref 20–55)
IRON SERPL-MCNC: 64 UG/DL (ref 59–158)
LYMPHOCYTES NFR BLD: 1.09 K/UL (ref 1.5–4)
LYMPHOCYTES RELATIVE PERCENT: 15 % (ref 20–42)
MCH RBC QN AUTO: 31.1 PG (ref 26–35)
MCHC RBC AUTO-ENTMCNC: 31.9 G/DL (ref 32–34.5)
MCV RBC AUTO: 97.3 FL (ref 80–99.9)
MONOCYTES NFR BLD: 0.9 K/UL (ref 0.1–0.95)
MONOCYTES NFR BLD: 12 % (ref 2–12)
NEUTROPHILS NFR BLD: 70 % (ref 43–80)
NEUTS SEG NFR BLD: 5.14 K/UL (ref 1.8–7.3)
PLATELET # BLD AUTO: 277 K/UL (ref 130–450)
PMV BLD AUTO: 10.7 FL (ref 7–12)
POTASSIUM SERPL-SCNC: 3.7 MMOL/L (ref 3.5–5)
PROT SERPL-MCNC: 6.6 G/DL (ref 6.4–8.3)
RBC # BLD AUTO: 2.96 M/UL (ref 3.8–5.8)
SODIUM SERPL-SCNC: 140 MMOL/L (ref 132–146)
TIBC SERPL-MCNC: 247 UG/DL (ref 250–450)
WBC OTHER # BLD: 7.4 K/UL (ref 4.5–11.5)

## 2024-05-30 PROCEDURE — 3074F SYST BP LT 130 MM HG: CPT | Performed by: STUDENT IN AN ORGANIZED HEALTH CARE EDUCATION/TRAINING PROGRAM

## 2024-05-30 PROCEDURE — 1123F ACP DISCUSS/DSCN MKR DOCD: CPT | Performed by: STUDENT IN AN ORGANIZED HEALTH CARE EDUCATION/TRAINING PROGRAM

## 2024-05-30 PROCEDURE — G8428 CUR MEDS NOT DOCUMENT: HCPCS | Performed by: STUDENT IN AN ORGANIZED HEALTH CARE EDUCATION/TRAINING PROGRAM

## 2024-05-30 PROCEDURE — 82728 ASSAY OF FERRITIN: CPT

## 2024-05-30 PROCEDURE — 80053 COMPREHEN METABOLIC PANEL: CPT

## 2024-05-30 PROCEDURE — 83540 ASSAY OF IRON: CPT

## 2024-05-30 PROCEDURE — 36415 COLL VENOUS BLD VENIPUNCTURE: CPT

## 2024-05-30 PROCEDURE — 1036F TOBACCO NON-USER: CPT | Performed by: STUDENT IN AN ORGANIZED HEALTH CARE EDUCATION/TRAINING PROGRAM

## 2024-05-30 PROCEDURE — G8420 CALC BMI NORM PARAMETERS: HCPCS | Performed by: STUDENT IN AN ORGANIZED HEALTH CARE EDUCATION/TRAINING PROGRAM

## 2024-05-30 PROCEDURE — 3078F DIAST BP <80 MM HG: CPT | Performed by: STUDENT IN AN ORGANIZED HEALTH CARE EDUCATION/TRAINING PROGRAM

## 2024-05-30 PROCEDURE — 83550 IRON BINDING TEST: CPT

## 2024-05-30 PROCEDURE — 85025 COMPLETE CBC W/AUTO DIFF WBC: CPT

## 2024-05-30 PROCEDURE — 99213 OFFICE O/P EST LOW 20 MIN: CPT | Performed by: STUDENT IN AN ORGANIZED HEALTH CARE EDUCATION/TRAINING PROGRAM

## 2024-05-30 NOTE — PROGRESS NOTES
Patient provided with discharge instructions, received printed AVS.  All questions answered.  Patient understands follow up plan of care.     
100-25 MCG/ACT inhaler Inhale 1 puff into the lungs daily 1 each 5    furosemide (LASIX) 20 MG tablet Take 1 tablet by mouth daily 90 tablet 1    NONFORMULARY Oxygen therapy 2L N/C PRN      budesonide-formoterol (SYMBICORT) 160-4.5 MCG/ACT AERO Inhale 2 puffs into the lungs 2 times daily 10.2 g 5    atorvastatin (LIPITOR) 40 MG tablet Take 1 tablet by mouth daily      famotidine (PEPCID) 40 MG tablet Take 1 tablet by mouth at bedtime      tamsulosin (FLOMAX) 0.4 MG capsule Take 1 capsule by mouth daily      Icosapent Ethyl (VASCEPA) 1 g CAPS capsule Take 2 capsules by mouth 2 times daily 360 capsule 1    Cholecalciferol (VITAMIN D3) 2000 units CAPS Take 1 capsule by mouth daily      isosorbide mononitrate (IMDUR) 30 MG extended release tablet Take 1 tablet by mouth daily 30 tablet 3    aspirin 81 MG tablet Take 1 tablet by mouth at bedtime      Incontinence Supply Disposable (DEPEND REAL FIT/BRIEF/MEN/S-M) MISC 4 boxes by Does not apply route as needed (Change as needed) 56 each 5    predniSONE (DELTASONE) 10 MG tablet  (Patient not taking: Reported on 3/13/2024)      Incontinence Supply Disposable (DEPEND PANT SM/MED) MISC 1 Package by Does not apply route 5 times daily 250 each 1    diclofenac sodium (VOLTAREN) 1 % GEL Apply topically 2 times daily (Patient not taking: Reported on 3/13/2024) 50 g 0     No current facility-administered medications on file prior to visit.   Reviewed and reconciled.    Allergies:  Allergies   Allergen Reactions    Codeine      MAKES PT VERY HIGH     Physical Exam:  /60   Pulse 75   Temp 97.2 °F (36.2 °C)   Ht 1.778 m (5' 10\")   Wt 61.2 kg (135 lb)   SpO2 98%   BMI 19.37 kg/m²    Physical Exam  Constitutional:       General: He is not in acute distress.     Appearance: He is not toxic-appearing or diaphoretic.      Comments: Appears weak   HENT:      Head: Normocephalic.      Nose: Nose normal.      Mouth/Throat:      Mouth: Mucous membranes are moist.      Pharynx: No

## 2024-05-31 ASSESSMENT — ENCOUNTER SYMPTOMS
GASTROINTESTINAL NEGATIVE: 1
SHORTNESS OF BREATH: 1

## 2024-06-12 ENCOUNTER — TELEPHONE (OUTPATIENT)
Dept: FAMILY MEDICINE CLINIC | Age: 79
End: 2024-06-12

## 2024-06-12 RX ORDER — DOXYCYCLINE HYCLATE 100 MG
100 TABLET ORAL 2 TIMES DAILY
Qty: 14 TABLET | Refills: 0 | Status: SHIPPED | OUTPATIENT
Start: 2024-06-12 | End: 2024-06-19

## 2024-06-12 NOTE — TELEPHONE ENCOUNTER
Patient calling and asking for another course of antibiotics for his pneumonia to be sent to [facility]

## 2024-06-16 ENCOUNTER — APPOINTMENT (OUTPATIENT)
Dept: CARDIOLOGY | Facility: HOSPITAL | Age: 79
End: 2024-06-16
Payer: MEDICARE

## 2024-06-16 ENCOUNTER — HOSPITAL ENCOUNTER (OUTPATIENT)
Dept: CARDIOLOGY | Facility: HOSPITAL | Age: 79
Discharge: HOME | End: 2024-06-16
Payer: MEDICARE

## 2024-06-16 ENCOUNTER — APPOINTMENT (OUTPATIENT)
Dept: RADIOLOGY | Facility: HOSPITAL | Age: 79
End: 2024-06-16
Payer: MEDICARE

## 2024-06-16 ENCOUNTER — HOSPITAL ENCOUNTER (EMERGENCY)
Facility: HOSPITAL | Age: 79
Discharge: HOME | End: 2024-06-16
Attending: EMERGENCY MEDICINE
Payer: MEDICARE

## 2024-06-16 VITALS
WEIGHT: 132 LBS | OXYGEN SATURATION: 97 % | HEART RATE: 84 BPM | TEMPERATURE: 99.3 F | HEIGHT: 71 IN | BODY MASS INDEX: 18.48 KG/M2 | SYSTOLIC BLOOD PRESSURE: 167 MMHG | RESPIRATION RATE: 14 BRPM | DIASTOLIC BLOOD PRESSURE: 91 MMHG

## 2024-06-16 DIAGNOSIS — J44.1 COPD EXACERBATION (MULTI): Primary | ICD-10-CM

## 2024-06-16 LAB
ALBUMIN SERPL BCP-MCNC: 4 G/DL (ref 3.4–5)
ALP SERPL-CCNC: 101 U/L (ref 33–136)
ALT SERPL W P-5'-P-CCNC: 21 U/L (ref 10–52)
ANION GAP BLDV CALCULATED.4IONS-SCNC: 10 MMOL/L (ref 10–25)
ANION GAP SERPL CALC-SCNC: 11 MMOL/L (ref 10–20)
AST SERPL W P-5'-P-CCNC: 24 U/L (ref 9–39)
BASE EXCESS BLDV CALC-SCNC: 3 MMOL/L (ref -2–3)
BASOPHILS # BLD AUTO: 0.01 X10*3/UL (ref 0–0.1)
BASOPHILS NFR BLD AUTO: 0.1 %
BILIRUB SERPL-MCNC: 0.4 MG/DL (ref 0–1.2)
BNP SERPL-MCNC: 88 PG/ML (ref 0–99)
BODY TEMPERATURE: 37 DEGREES CELSIUS
BUN SERPL-MCNC: 28 MG/DL (ref 6–23)
CA-I BLDV-SCNC: 1.26 MMOL/L (ref 1.1–1.33)
CALCIUM SERPL-MCNC: 9.5 MG/DL (ref 8.6–10.3)
CARDIAC TROPONIN I PNL SERPL HS: 20 NG/L (ref 0–20)
CARDIAC TROPONIN I PNL SERPL HS: 22 NG/L (ref 0–20)
CHLORIDE BLDV-SCNC: 106 MMOL/L (ref 98–107)
CHLORIDE SERPL-SCNC: 105 MMOL/L (ref 98–107)
CO2 SERPL-SCNC: 30 MMOL/L (ref 21–32)
CREAT SERPL-MCNC: 1.2 MG/DL (ref 0.5–1.3)
EGFRCR SERPLBLD CKD-EPI 2021: 62 ML/MIN/1.73M*2
EOSINOPHIL # BLD AUTO: 0.02 X10*3/UL (ref 0–0.4)
EOSINOPHIL NFR BLD AUTO: 0.2 %
ERYTHROCYTE [DISTWIDTH] IN BLOOD BY AUTOMATED COUNT: 17.4 % (ref 11.5–14.5)
GLUCOSE BLDV-MCNC: 131 MG/DL (ref 74–99)
GLUCOSE SERPL-MCNC: 129 MG/DL (ref 74–99)
HCO3 BLDV-SCNC: 28.9 MMOL/L (ref 22–26)
HCT VFR BLD AUTO: 27.8 % (ref 41–52)
HCT VFR BLD EST: 29 % (ref 41–52)
HGB BLD-MCNC: 9.1 G/DL (ref 13.5–17.5)
HGB BLDV-MCNC: 9.5 G/DL (ref 13.5–17.5)
IMM GRANULOCYTES # BLD AUTO: 0.08 X10*3/UL (ref 0–0.5)
IMM GRANULOCYTES NFR BLD AUTO: 0.9 % (ref 0–0.9)
INHALED O2 CONCENTRATION: 36 %
INR PPP: 1 (ref 0.9–1.1)
LACTATE BLDV-SCNC: 1.9 MMOL/L (ref 0.4–2)
LACTATE SERPL-SCNC: 0.5 MMOL/L (ref 0.4–2)
LYMPHOCYTES # BLD AUTO: 0.66 X10*3/UL (ref 0.8–3)
LYMPHOCYTES NFR BLD AUTO: 7.8 %
MCH RBC QN AUTO: 31 PG (ref 26–34)
MCHC RBC AUTO-ENTMCNC: 32.7 G/DL (ref 32–36)
MCV RBC AUTO: 95 FL (ref 80–100)
MONOCYTES # BLD AUTO: 1.1 X10*3/UL (ref 0.05–0.8)
MONOCYTES NFR BLD AUTO: 13 %
NEUTROPHILS # BLD AUTO: 6.59 X10*3/UL (ref 1.6–5.5)
NEUTROPHILS NFR BLD AUTO: 78 %
NRBC BLD-RTO: 0.2 /100 WBCS (ref 0–0)
OXYHGB MFR BLDV: 92.5 % (ref 45–75)
PCO2 BLDV: 50 MM HG (ref 41–51)
PH BLDV: 7.37 PH (ref 7.33–7.43)
PLATELET # BLD AUTO: 293 X10*3/UL (ref 150–450)
PO2 BLDV: 76 MM HG (ref 35–45)
POTASSIUM BLDV-SCNC: 3.9 MMOL/L (ref 3.5–5.3)
POTASSIUM SERPL-SCNC: 3.8 MMOL/L (ref 3.5–5.3)
PROT SERPL-MCNC: 6.6 G/DL (ref 6.4–8.2)
PROTHROMBIN TIME: 11.6 SECONDS (ref 9.8–12.8)
RBC # BLD AUTO: 2.94 X10*6/UL (ref 4.5–5.9)
SAO2 % BLDV: 96 % (ref 45–75)
SARS-COV-2 RNA RESP QL NAA+PROBE: NOT DETECTED
SODIUM BLDV-SCNC: 141 MMOL/L (ref 136–145)
SODIUM SERPL-SCNC: 142 MMOL/L (ref 136–145)
WBC # BLD AUTO: 8.5 X10*3/UL (ref 4.4–11.3)

## 2024-06-16 PROCEDURE — 2500000002 HC RX 250 W HCPCS SELF ADMINISTERED DRUGS (ALT 637 FOR MEDICARE OP, ALT 636 FOR OP/ED): Mod: MUE | Performed by: EMERGENCY MEDICINE

## 2024-06-16 PROCEDURE — 85610 PROTHROMBIN TIME: CPT | Performed by: EMERGENCY MEDICINE

## 2024-06-16 PROCEDURE — 71045 X-RAY EXAM CHEST 1 VIEW: CPT | Performed by: RADIOLOGY

## 2024-06-16 PROCEDURE — 82435 ASSAY OF BLOOD CHLORIDE: CPT | Performed by: EMERGENCY MEDICINE

## 2024-06-16 PROCEDURE — 84484 ASSAY OF TROPONIN QUANT: CPT | Performed by: EMERGENCY MEDICINE

## 2024-06-16 PROCEDURE — 85025 COMPLETE CBC W/AUTO DIFF WBC: CPT | Performed by: EMERGENCY MEDICINE

## 2024-06-16 PROCEDURE — 36415 COLL VENOUS BLD VENIPUNCTURE: CPT | Performed by: EMERGENCY MEDICINE

## 2024-06-16 PROCEDURE — 94640 AIRWAY INHALATION TREATMENT: CPT

## 2024-06-16 PROCEDURE — 87040 BLOOD CULTURE FOR BACTERIA: CPT | Mod: 91,PORLAB | Performed by: EMERGENCY MEDICINE

## 2024-06-16 PROCEDURE — 71045 X-RAY EXAM CHEST 1 VIEW: CPT

## 2024-06-16 PROCEDURE — 84484 ASSAY OF TROPONIN QUANT: CPT | Mod: 91 | Performed by: EMERGENCY MEDICINE

## 2024-06-16 PROCEDURE — 93005 ELECTROCARDIOGRAM TRACING: CPT

## 2024-06-16 PROCEDURE — 83605 ASSAY OF LACTIC ACID: CPT | Mod: 91 | Performed by: EMERGENCY MEDICINE

## 2024-06-16 PROCEDURE — 99283 EMERGENCY DEPT VISIT LOW MDM: CPT | Mod: 25

## 2024-06-16 PROCEDURE — 87075 CULTR BACTERIA EXCEPT BLOOD: CPT | Mod: 59,PORLAB | Performed by: EMERGENCY MEDICINE

## 2024-06-16 PROCEDURE — 84132 ASSAY OF SERUM POTASSIUM: CPT | Mod: 59 | Performed by: EMERGENCY MEDICINE

## 2024-06-16 PROCEDURE — 87635 SARS-COV-2 COVID-19 AMP PRB: CPT | Performed by: EMERGENCY MEDICINE

## 2024-06-16 PROCEDURE — 84132 ASSAY OF SERUM POTASSIUM: CPT | Performed by: EMERGENCY MEDICINE

## 2024-06-16 PROCEDURE — 83880 ASSAY OF NATRIURETIC PEPTIDE: CPT | Performed by: EMERGENCY MEDICINE

## 2024-06-16 RX ORDER — IPRATROPIUM BROMIDE AND ALBUTEROL SULFATE 2.5; .5 MG/3ML; MG/3ML
3 SOLUTION RESPIRATORY (INHALATION) ONCE
Status: COMPLETED | OUTPATIENT
Start: 2024-06-16 | End: 2024-06-16

## 2024-06-16 RX ADMIN — IPRATROPIUM BROMIDE AND ALBUTEROL SULFATE 3 ML: 2.5; .5 SOLUTION RESPIRATORY (INHALATION) at 03:07

## 2024-06-16 ASSESSMENT — COLUMBIA-SUICIDE SEVERITY RATING SCALE - C-SSRS
6. HAVE YOU EVER DONE ANYTHING, STARTED TO DO ANYTHING, OR PREPARED TO DO ANYTHING TO END YOUR LIFE?: NO
2. HAVE YOU ACTUALLY HAD ANY THOUGHTS OF KILLING YOURSELF?: NO
1. IN THE PAST MONTH, HAVE YOU WISHED YOU WERE DEAD OR WISHED YOU COULD GO TO SLEEP AND NOT WAKE UP?: NO

## 2024-06-16 ASSESSMENT — PAIN - FUNCTIONAL ASSESSMENT: PAIN_FUNCTIONAL_ASSESSMENT: 0-10

## 2024-06-16 ASSESSMENT — PAIN SCALES - GENERAL: PAINLEVEL_OUTOF10: 0 - NO PAIN

## 2024-06-16 NOTE — ED TRIAGE NOTES
"Pt to ED via EMS from home co SOB. Pt states started about 3 days ago, worse today. Pt states \"gurgling\" noise began today, crackles bilaterally. Pt denies cough. Pt states wears 2L  NC at home, placed on 4L for comfort. Pt denies being around anyone sick, denies CP. Pt abdominally breathing on arrival, able to speak in full sentences. Pt endorses multiple chronic conditions, is attempting to quit smoking, but still currently smokes.  "

## 2024-06-18 DIAGNOSIS — D47.1 MPN (MYELOPROLIFERATIVE NEOPLASM) (HCC): ICD-10-CM

## 2024-06-18 PROBLEM — Z00.00 MEDICARE ANNUAL WELLNESS VISIT, SUBSEQUENT: Status: RESOLVED | Noted: 2024-05-19 | Resolved: 2024-06-18

## 2024-06-18 RX ORDER — RUXOLITINIB 20 MG/1
20 TABLET ORAL 2 TIMES DAILY
Qty: 60 TABLET | Refills: 2 | Status: ACTIVE | OUTPATIENT
Start: 2024-06-18

## 2024-06-18 NOTE — ED PROVIDER NOTES
Aristeo Barriga is a 78 y.o. patient presenting to the ED for shortness of breath.  The patient states that he has COPD and is attempting to quit smoking.  Over the last 3 days he has had increased shortness of breath.  At times he feels like he has a gurgling sensation in his chest.  He denies any cough.  He has been able to eat and drink okay but not normal.  He denies the pain.  No fever.    Additional History Obtained from: None  Limitations to History: None  ------------------------------------------------------------------------------------------------------------------------------------------  Physical Exam:  Appearance: Alert, cooperative,   Skin: Warm, dry, appropriate color for ethnicity.  Eyes: Cornea clear. No scleral icterus or injection.   ENT: Mucous membranes moist.  Pulmonary: No accessory muscle use or stridor.  Diffuse wheezing with fair air movement.  Cardiac: Heart sounds regular without murmur. B/L radial pulses full and symmetric.   Abdomen: Soft, not tender.  No rebound or guarding.   Musculoskeletal: No gross deformities.   Neurological: Face symmetrical. Voice clear. Appropriately conversant.   Psychiatric: Appropriate mood and affect.    Medical Decision Making:  Chronic Medical Conditions Significantly Affecting Care:  has a past medical history of CHF (congestive heart failure) (Multi), Chronic kidney disease, COPD (chronic obstructive pulmonary disease) (Multi), Emphysema of lung (Multi), GERD (gastroesophageal reflux disease), Hypertension, and Leukemia (Multi).    Social Determinants of Health Significantly Affecting Care: None identified    Differential Diagnosis Considered but not limited to: COPD exacerbation, pneumonia, CHF/pulmonary edema.      External Records Reviewed:   I reviewed recent and relevant outside records including:     Independent Interpretation of Studies: The following studies were ordered as part of the emergency department work up and independently  interpreted by me. See ED Course for details.    Venous full panel shows normal pH, pCO2, lactate.  Coags are normal.  CMP is within normal limits.  CBC shows anemia with hemoglobin of 9.1 and is otherwise normal.  BNP is normal.  Troponin is minimally elevated at 22 with a repeat of 20.  No ischemic changes on EKG.  COVID is negative.    Chest x-ray shows possible retrocardiac opacity and blunting of the costophrenic angles.    The patient was treated with DuoNeb while in the department.  He reports that he feels back to normal after this.  His wheezing did improve.  He was just started on a course of antibiotics and steroids by his primary care doctor.  He states he is taking these as prescribed.    Given that he is already on antibiotics and steroids and presenting with worsening symptoms I did offer admission.  The patient declines this stating that he feels that he will be able to manage this at home.  After the DuoNeb treatment here he feels back to normal.  Follow-up and return precautions were discussed.    ED Course as of 06/18/24 1302   [place] Jun 16, 2024   0106 EKG performed at 0104 and interpreted by me shows sinus rhythm at a rate of 87.  Intervals with mildly prolonged QT and interval at 515.  Intervals are otherwise normal.  The axis is normal.  There are no significant ST elevations or depressions.  Mildly flattened T waves without overt T wave inversions.  No STEMI. [SP]      ED Course User Index  [SP] Amy Bass DO         Diagnoses as of 06/18/24 1302   COPD exacerbation (Multi)          Amy Bass DO  06/18/24 1306

## 2024-06-20 LAB
ATRIAL RATE: 87 BPM
BACTERIA BLD CULT: NORMAL
BACTERIA BLD CULT: NORMAL
P AXIS: 74 DEGREES
PR INTERVAL: 148 MS
Q ONSET: 251 MS
QRS COUNT: 14 BEATS
QRS DURATION: 82 MS
QT INTERVAL: 428 MS
QTC CALCULATION(BAZETT): 515 MS
QTC FREDERICIA: 484 MS
R AXIS: 38 DEGREES
T AXIS: 86 DEGREES
T OFFSET: 465 MS
VENTRICULAR RATE: 87 BPM

## 2024-06-21 ENCOUNTER — TELEPHONE (OUTPATIENT)
Dept: INFUSION THERAPY | Age: 79
End: 2024-06-21

## 2024-06-21 NOTE — TELEPHONE ENCOUNTER
PT'S DAUGHTER CALLED TO CANCEL 6/27/24 AS HE IS IN THE [facility]. UPON DISCHARGE HE WILL GO TO A YET TO BE DETERMINED REHAB FACILITY FOR 3-4 WEEKS AND THEN ASSISTED LIVING. SHE WILL KEEP US INFORMED AS TO WHAT FACILITY AND IF THEY WILL TRANSPORT TO [facility].
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
## 2024-08-28 ENCOUNTER — TELEPHONE (OUTPATIENT)
Dept: FAMILY MEDICINE CLINIC | Age: 79
End: 2024-08-28

## 2024-08-28 DIAGNOSIS — J43.9 PULMONARY EMPHYSEMA, UNSPECIFIED EMPHYSEMA TYPE (HCC): Primary | ICD-10-CM

## 2024-08-30 ENCOUNTER — TELEPHONE (OUTPATIENT)
Dept: ONCOLOGY | Age: 79
End: 2024-08-30

## 2024-08-30 NOTE — TELEPHONE ENCOUNTER
Patient called and left message to reschedule missed June 2024 follow up appointment with Dr. Bahena due to being [facility].      Called patient, not able to connect with patient, his phone message keeps stating mail box is full and cannot accept messages at this time.    Will look to see if he has a patient communication monserrat of contacts we are allowed to call and will attempt to call someone on that list.

## 2024-09-12 DIAGNOSIS — D47.1 MPN (MYELOPROLIFERATIVE NEOPLASM) (HCC): ICD-10-CM

## 2024-09-12 RX ORDER — RUXOLITINIB 20 MG/1
20 TABLET ORAL 2 TIMES DAILY
Qty: 60 TABLET | Refills: 2 | Status: ACTIVE | OUTPATIENT
Start: 2024-09-12

## 2024-09-12 NOTE — PROGRESS NOTES
"Subjective   Patient ID: Cyndi Staples is a 67 y.o. female who presents for Skin Tag and left lower extremity weakness.    2 moles anterior chest . 1 in last couple weeks   No drainage or pain.   No family hx of skin cancer     Difficulty walking : started about 1 year ago  \"It does not hurt\"   Feels weak, has to lift leg to get in the care   Sitting exacerbates the feeling   Difficulty describing sensation   Denies numbness and tingling   Denies any recent falls   Denies any hx of traumatic accidents   A couple of months ago she noticed tingling in left hand. Now she only noticed when she \"grasps\"          Review of Systems   Constitutional: Negative.  Negative for activity change, chills and fever.   HENT:  Negative for congestion, postnasal drip, sneezing and sore throat.    Respiratory:  Positive for cough. Negative for apnea, shortness of breath and wheezing.    Cardiovascular:  Negative for chest pain and palpitations.   Gastrointestinal:  Negative for abdominal pain, constipation, diarrhea, nausea and vomiting.   Musculoskeletal:  Negative for arthralgias and myalgias.   Skin:         mole   Neurological:  Positive for weakness. Negative for dizziness, syncope, speech difficulty and headaches.   Psychiatric/Behavioral:  Negative for confusion, self-injury, sleep disturbance and suicidal ideas. The patient is not nervous/anxious.        Objective   /68 (BP Location: Left arm, Patient Position: Sitting)   Pulse 110   Temp 36.8 °C (98.3 °F)   Wt 60.9 kg (134 lb 3.2 oz)   SpO2 93%   BMI 24.55 kg/m²     Physical Exam  Vitals reviewed.   Constitutional:       Appearance: Normal appearance.   Cardiovascular:      Rate and Rhythm: Normal rate and regular rhythm.      Pulses: Normal pulses.      Heart sounds: Normal heart sounds.   Pulmonary:      Effort: Pulmonary effort is normal.      Breath sounds: Normal breath sounds.   Musculoskeletal:      Left lower leg: No tenderness.   Neurological:      Mental " Patient provided with discharge instructions, received printed AVS.  All questions answered. Patient understands follow up plan of care. Status: She is alert and oriented to person, place, and time.      Motor: Weakness present.      Gait: Gait normal.         Assessment/Plan   Problem List Items Addressed This Visit             ICD-10-CM    Screening mammogram for breast cancer - Primary Z12.31     Screening mammogram ordered         Relevant Orders    BI mammo bilateral screening tomosynthesis    Encounter for osteoporosis screening in asymptomatic postmenopausal patient Z13.820, Z78.0     DEXA scan ordered         Relevant Orders    XR DEXA bone density    Actinic keratosis L57.0     Referral to dermatology for evaluation         Relevant Orders    Referral to Dermatology    Weakness of left lower extremity R29.898     Physical therapy to evaluate and treat as indicated  Will discuss CT of the head at upcoming appointment.  No neurological deficits noted         Relevant Orders    Referral to Physical Therapy

## 2024-10-03 ENCOUNTER — HOSPITAL ENCOUNTER (OUTPATIENT)
Dept: INFUSION THERAPY | Age: 79
Discharge: HOME | End: 2024-10-03
Payer: MEDICARE

## 2024-10-03 ENCOUNTER — OFFICE VISIT (OUTPATIENT)
Dept: ONCOLOGY | Age: 79
End: 2024-10-03
Payer: MEDICARE

## 2024-10-03 VITALS
HEIGHT: 70 IN | BODY MASS INDEX: 20.62 KG/M2 | OXYGEN SATURATION: 94 % | TEMPERATURE: 97.4 F | WEIGHT: 144 LBS | DIASTOLIC BLOOD PRESSURE: 51 MMHG | HEART RATE: 68 BPM | SYSTOLIC BLOOD PRESSURE: 97 MMHG

## 2024-10-03 DIAGNOSIS — D64.9 NORMOCYTIC ANEMIA: ICD-10-CM

## 2024-10-03 DIAGNOSIS — D47.1 MPN (MYELOPROLIFERATIVE NEOPLASM) (HCC): Primary | ICD-10-CM

## 2024-10-03 DIAGNOSIS — D47.1 MPN (MYELOPROLIFERATIVE NEOPLASM) (HCC): ICD-10-CM

## 2024-10-03 LAB
ALBUMIN SERPL-MCNC: 3.7 G/DL (ref 3.5–5.2)
ALP SERPL-CCNC: 98 U/L (ref 40–129)
ALT SERPL-CCNC: 16 U/L (ref 0–40)
ANION GAP SERPL CALCULATED.3IONS-SCNC: 12 MMOL/L (ref 7–16)
AST SERPL-CCNC: 22 U/L (ref 0–39)
BASOPHILS # BLD: 0.04 K/UL (ref 0–0.2)
BASOPHILS NFR BLD: 0 % (ref 0–2)
BILIRUB SERPL-MCNC: 0.2 MG/DL (ref 0–1.2)
BUN SERPL-MCNC: 25 MG/DL (ref 6–23)
CALCIUM SERPL-MCNC: 9.6 MG/DL (ref 8.6–10.2)
CHLORIDE SERPL-SCNC: 108 MMOL/L (ref 98–107)
CO2 SERPL-SCNC: 23 MMOL/L (ref 22–29)
CREAT SERPL-MCNC: 1.5 MG/DL (ref 0.7–1.2)
EOSINOPHIL # BLD: 0.1 K/UL (ref 0.05–0.5)
EOSINOPHILS RELATIVE PERCENT: 1 % (ref 0–6)
ERYTHROCYTE [DISTWIDTH] IN BLOOD BY AUTOMATED COUNT: 20.3 % (ref 11.5–15)
FERRITIN SERPL-MCNC: 485 NG/ML
FOLATE SERPL-MCNC: 12.7 NG/ML (ref 4.8–24.2)
GFR, ESTIMATED: 47 ML/MIN/1.73M2
GLUCOSE SERPL-MCNC: 90 MG/DL (ref 74–99)
HCT VFR BLD AUTO: 24 % (ref 37–54)
HGB BLD-MCNC: 7.2 G/DL (ref 12.5–16.5)
IMM GRANULOCYTES # BLD AUTO: 0.24 K/UL (ref 0–0.58)
IMM GRANULOCYTES NFR BLD: 2 % (ref 0–5)
IRON SATN MFR SERPL: 33 % (ref 20–55)
IRON SERPL-MCNC: 81 UG/DL (ref 59–158)
LYMPHOCYTES NFR BLD: 0.95 K/UL (ref 1.5–4)
LYMPHOCYTES RELATIVE PERCENT: 10 % (ref 20–42)
MCH RBC QN AUTO: 28.5 PG (ref 26–35)
MCHC RBC AUTO-ENTMCNC: 30 G/DL (ref 32–34.5)
MCV RBC AUTO: 94.9 FL (ref 80–99.9)
MONOCYTES NFR BLD: 1.01 K/UL (ref 0.1–0.95)
MONOCYTES NFR BLD: 10 % (ref 2–12)
NEUTROPHILS NFR BLD: 77 % (ref 43–80)
NEUTS SEG NFR BLD: 7.68 K/UL (ref 1.8–7.3)
PLATELET # BLD AUTO: 344 K/UL (ref 130–450)
PMV BLD AUTO: 10.7 FL (ref 7–12)
POTASSIUM SERPL-SCNC: 5 MMOL/L (ref 3.5–5)
PROT SERPL-MCNC: 6.7 G/DL (ref 6.4–8.3)
RBC # BLD AUTO: 2.53 M/UL (ref 3.8–5.8)
RBC # BLD: ABNORMAL 10*6/UL
SODIUM SERPL-SCNC: 143 MMOL/L (ref 132–146)
TIBC SERPL-MCNC: 249 UG/DL (ref 250–450)
VIT B12 SERPL-MCNC: 570 PG/ML (ref 211–946)
WBC OTHER # BLD: 10 K/UL (ref 4.5–11.5)

## 2024-10-03 PROCEDURE — 82728 ASSAY OF FERRITIN: CPT

## 2024-10-03 PROCEDURE — 3078F DIAST BP <80 MM HG: CPT | Performed by: STUDENT IN AN ORGANIZED HEALTH CARE EDUCATION/TRAINING PROGRAM

## 2024-10-03 PROCEDURE — G8427 DOCREV CUR MEDS BY ELIG CLIN: HCPCS | Performed by: STUDENT IN AN ORGANIZED HEALTH CARE EDUCATION/TRAINING PROGRAM

## 2024-10-03 PROCEDURE — G8420 CALC BMI NORM PARAMETERS: HCPCS | Performed by: STUDENT IN AN ORGANIZED HEALTH CARE EDUCATION/TRAINING PROGRAM

## 2024-10-03 PROCEDURE — 1036F TOBACCO NON-USER: CPT | Performed by: STUDENT IN AN ORGANIZED HEALTH CARE EDUCATION/TRAINING PROGRAM

## 2024-10-03 PROCEDURE — 1123F ACP DISCUSS/DSCN MKR DOCD: CPT | Performed by: STUDENT IN AN ORGANIZED HEALTH CARE EDUCATION/TRAINING PROGRAM

## 2024-10-03 PROCEDURE — 82746 ASSAY OF FOLIC ACID SERUM: CPT

## 2024-10-03 PROCEDURE — 85025 COMPLETE CBC W/AUTO DIFF WBC: CPT

## 2024-10-03 PROCEDURE — 36415 COLL VENOUS BLD VENIPUNCTURE: CPT

## 2024-10-03 PROCEDURE — 83540 ASSAY OF IRON: CPT

## 2024-10-03 PROCEDURE — 82607 VITAMIN B-12: CPT

## 2024-10-03 PROCEDURE — 80053 COMPREHEN METABOLIC PANEL: CPT

## 2024-10-03 PROCEDURE — 99213 OFFICE O/P EST LOW 20 MIN: CPT | Performed by: STUDENT IN AN ORGANIZED HEALTH CARE EDUCATION/TRAINING PROGRAM

## 2024-10-03 PROCEDURE — 3074F SYST BP LT 130 MM HG: CPT | Performed by: STUDENT IN AN ORGANIZED HEALTH CARE EDUCATION/TRAINING PROGRAM

## 2024-10-03 PROCEDURE — G8484 FLU IMMUNIZE NO ADMIN: HCPCS | Performed by: STUDENT IN AN ORGANIZED HEALTH CARE EDUCATION/TRAINING PROGRAM

## 2024-10-03 PROCEDURE — 83550 IRON BINDING TEST: CPT

## 2024-10-03 NOTE — PROGRESS NOTES
Patient provided with discharge instructions.  All questions answered.  Patient understands follow up plan of care.     
capsule by mouth daily      Icosapent Ethyl (VASCEPA) 1 g CAPS capsule Take 2 capsules by mouth 2 times daily 360 capsule 1    Cholecalciferol (VITAMIN D3) 2000 units CAPS Take 1 capsule by mouth daily      isosorbide mononitrate (IMDUR) 30 MG extended release tablet Take 1 tablet by mouth daily 30 tablet 3    aspirin 81 MG tablet Take 1 tablet by mouth at bedtime      metoprolol succinate (TOPROL XL) 25 MG extended release tablet Take 0.5 tablets by mouth daily Take 1/2 tablet daily 45 tablet 1    sertraline (ZOLOFT) 50 MG tablet Take 1 tablet by mouth daily 90 tablet 0    clopidogrel (PLAVIX) 75 MG tablet Take 1 tablet by mouth daily 90 tablet 1    tiotropium (SPIRIVA RESPIMAT) 2.5 MCG/ACT AERS inhaler Inhale 2 puffs into the lungs daily 3 each 1    Incontinence Supply Disposable (DEPEND REAL FIT/BRIEF/MEN/S-M) MISC 4 boxes by Does not apply route as needed (Change as needed) 56 each 5    predniSONE (DELTASONE) 10 MG tablet  (Patient not taking: Reported on 3/13/2024)      Incontinence Supply Disposable (DEPEND PANT SM/MED) MISC 1 Package by Does not apply route 5 times daily 250 each 1    diclofenac sodium (VOLTAREN) 1 % GEL Apply topically 2 times daily (Patient not taking: Reported on 3/13/2024) 50 g 0     No current facility-administered medications on file prior to visit.   Reviewed and reconciled.    Allergies:  Allergies   Allergen Reactions    Codeine      MAKES PT VERY HIGH         REVIEW OF SYSTEMS:  CONSTITUTIONAL :  No fever, chills, fatigue, night sweats, or unintended weight loss  EYES: No discharge, itchiness, pain, redness  ENMT: No mouth sores, thrush, sore throat,  dysphagia, hoarseness of voice   RESPIRATORY: No shortness of breath, or cough  CARDIOVASCULAR: No chest pain, palpitations  GASTROINTESTINAL: No nausea, vomiting, abdominal pain   GENITOURINARY:+dysuria  ENDOCRINE: No polydipsia, polyuria, cold or heat intolerance.  MUSCULOSKELETAL: No arthralgias, myalgias, or bony pain  INTEGUMENT.:

## 2024-10-25 DIAGNOSIS — D72.829 LEUKOCYTOSIS, UNSPECIFIED TYPE: Primary | ICD-10-CM

## 2024-10-31 ENCOUNTER — HOSPITAL ENCOUNTER (OUTPATIENT)
Dept: INFUSION THERAPY | Age: 79
Discharge: HOME | End: 2024-10-31
Payer: MEDICARE

## 2024-10-31 ENCOUNTER — OFFICE VISIT (OUTPATIENT)
Dept: ONCOLOGY | Age: 79
End: 2024-10-31
Payer: MEDICARE

## 2024-10-31 VITALS
WEIGHT: 149.7 LBS | DIASTOLIC BLOOD PRESSURE: 64 MMHG | SYSTOLIC BLOOD PRESSURE: 140 MMHG | BODY MASS INDEX: 21.43 KG/M2 | TEMPERATURE: 98.2 F | HEART RATE: 68 BPM | HEIGHT: 70 IN | OXYGEN SATURATION: 94 %

## 2024-10-31 DIAGNOSIS — D72.829 LEUKOCYTOSIS, UNSPECIFIED TYPE: ICD-10-CM

## 2024-10-31 DIAGNOSIS — D47.1 MPN (MYELOPROLIFERATIVE NEOPLASM) (HCC): Primary | ICD-10-CM

## 2024-10-31 LAB
ALBUMIN SERPL-MCNC: 3.9 G/DL (ref 3.5–5.2)
ALP SERPL-CCNC: 98 U/L (ref 40–129)
ALT SERPL-CCNC: 20 U/L (ref 0–40)
ANION GAP SERPL CALCULATED.3IONS-SCNC: 8 MMOL/L (ref 7–16)
AST SERPL-CCNC: 26 U/L (ref 0–39)
BASOPHILS # BLD: 0.03 K/UL (ref 0–0.2)
BASOPHILS NFR BLD: 0 % (ref 0–2)
BILIRUB SERPL-MCNC: 0.2 MG/DL (ref 0–1.2)
BUN SERPL-MCNC: 26 MG/DL (ref 6–23)
CALCIUM SERPL-MCNC: 8.6 MG/DL (ref 8.6–10.2)
CHLORIDE SERPL-SCNC: 108 MMOL/L (ref 98–107)
CO2 SERPL-SCNC: 24 MMOL/L (ref 22–29)
CREAT SERPL-MCNC: 1.5 MG/DL (ref 0.7–1.2)
EOSINOPHIL # BLD: 0.07 K/UL (ref 0.05–0.5)
EOSINOPHILS RELATIVE PERCENT: 1 % (ref 0–6)
ERYTHROCYTE [DISTWIDTH] IN BLOOD BY AUTOMATED COUNT: 20.9 % (ref 11.5–15)
GFR, ESTIMATED: 48 ML/MIN/1.73M2
GLUCOSE SERPL-MCNC: 87 MG/DL (ref 74–99)
HCT VFR BLD AUTO: 25.2 % (ref 37–54)
HGB BLD-MCNC: 7.7 G/DL (ref 12.5–16.5)
IMM GRANULOCYTES # BLD AUTO: 0.29 K/UL (ref 0–0.58)
IMM GRANULOCYTES NFR BLD: 4 % (ref 0–5)
LYMPHOCYTES NFR BLD: 0.96 K/UL (ref 1.5–4)
LYMPHOCYTES RELATIVE PERCENT: 14 % (ref 20–42)
MCH RBC QN AUTO: 28.4 PG (ref 26–35)
MCHC RBC AUTO-ENTMCNC: 30.6 G/DL (ref 32–34.5)
MCV RBC AUTO: 93 FL (ref 80–99.9)
MONOCYTES NFR BLD: 0.76 K/UL (ref 0.1–0.95)
MONOCYTES NFR BLD: 11 % (ref 2–12)
NEUTROPHILS NFR BLD: 70 % (ref 43–80)
NEUTS SEG NFR BLD: 4.83 K/UL (ref 1.8–7.3)
PLATELET # BLD AUTO: 293 K/UL (ref 130–450)
PMV BLD AUTO: 10.7 FL (ref 7–12)
POTASSIUM SERPL-SCNC: 4.2 MMOL/L (ref 3.5–5)
PROT SERPL-MCNC: 6.9 G/DL (ref 6.4–8.3)
RBC # BLD AUTO: 2.71 M/UL (ref 3.8–5.8)
RBC # BLD: ABNORMAL 10*6/UL
SODIUM SERPL-SCNC: 140 MMOL/L (ref 132–146)
WBC OTHER # BLD: 6.9 K/UL (ref 4.5–11.5)

## 2024-10-31 PROCEDURE — 99212 OFFICE O/P EST SF 10 MIN: CPT

## 2024-10-31 PROCEDURE — 80053 COMPREHEN METABOLIC PANEL: CPT

## 2024-10-31 PROCEDURE — 36415 COLL VENOUS BLD VENIPUNCTURE: CPT

## 2024-10-31 PROCEDURE — 85025 COMPLETE CBC W/AUTO DIFF WBC: CPT

## 2024-10-31 NOTE — PROGRESS NOTES
[facility] MED ONCOLOGY  [address]  [facility] [address]  Dept: [phone]  Loc: [phone]    Attending Clinic Note    Chief Complaint   Patient presents with    Other     MPN (myeloproliferative neoplasm) (HCC)       Date of Encounter: 10/31/2024    Reason for Visit: Follow-up on a patient with pre-PMF     PCP:  Aranza Back, DO      Subjective  Pt feeling much better as of late.   No major events. Significant other present with us today.       HEMATOLOGIC HISTORY:  [Patient had received initial care with Dr. Osorio, who eventually moved on from our practice. The patient subsequently transitioned his care with Dr. Jewel Bahena, as of 8/17/2023].    79 y.o. male with history of CAD, hypertension, hyperlipidemia, CKD stage III, restless leg syndrome, emphysema, smoker who was noted to have leukocytosis/neutrophilia on routine CBC  On 12/15/2022: Hb 13.6  Hct 41.1  MCV 91.1    WBC 13.8  ANC 10.97 ALC 1.13 Abs mono 1.28  BUN 30 Creat 1.6     RPR Non-Reactive  Hep C Ab Non-Reactive  HIV Non-reactive  No systemic symptoms.  He continues to smoke.  No history of recurrent infections.   No known history of autoimmune disorders.  No history of neoplasm  Referred to our clinic for further evaluation    Extensive blood work ordered to evaluate leukocytosis/neutrophilia on 01/19/2023  Hb 13.1  Hct 39.2  MCV 92.9    WBC 10.8  ANC 8.48  ALC 1.00 Abs mono 1.01  BUN 33 Creat 1.7  LFTs wnl    Examination of the peripheral smear and blood counts/indices reveals mild absolute granulocytosis/monocytosis, and mild absolute lymphocytopenia.    The red cell population appears essentially normal.   Circulating granulocytes show no dysplastic changes or hypersegmentation.    There are no circulating blast forms.  The remaining leukocytes appear morphologically normal.  No platelet abnormalities are detected.     ESR 8  CRP <0.3    RF <10  IMELDA

## 2024-11-07 ENCOUNTER — TELEPHONE (OUTPATIENT)
Dept: INFUSION THERAPY | Age: 79
End: 2024-11-07

## 2024-11-07 NOTE — TELEPHONE ENCOUNTER
Blessing from [facility] called asking for our fax number to send over the patient's recent lab results. Blessing reports that the patient's HGB is 7.6. Fax number provided.

## 2024-12-05 ENCOUNTER — HOSPITAL ENCOUNTER (OUTPATIENT)
Dept: INFUSION THERAPY | Age: 79
End: 2024-12-05

## 2024-12-05 DIAGNOSIS — D47.1 MPN (MYELOPROLIFERATIVE NEOPLASM) (HCC): ICD-10-CM

## 2024-12-05 RX ORDER — RUXOLITINIB 20 MG/1
20 TABLET ORAL 2 TIMES DAILY
Qty: 60 TABLET | Refills: 2 | Status: ACTIVE | OUTPATIENT
Start: 2024-12-05

## 2025-01-01 ENCOUNTER — APPOINTMENT (OUTPATIENT)
Dept: RADIOLOGY | Facility: HOSPITAL | Age: 80
DRG: 871 | End: 2025-01-01
Payer: MEDICARE

## 2025-01-01 ENCOUNTER — APPOINTMENT (OUTPATIENT)
Dept: CARDIOLOGY | Facility: HOSPITAL | Age: 80
DRG: 871 | End: 2025-01-01
Payer: MEDICARE

## 2025-01-01 PROCEDURE — 74018 RADEX ABDOMEN 1 VIEW: CPT

## 2025-01-01 PROCEDURE — 71045 X-RAY EXAM CHEST 1 VIEW: CPT

## 2025-01-01 PROCEDURE — 93005 ELECTROCARDIOGRAM TRACING: CPT

## 2025-01-01 PROCEDURE — 71275 CT ANGIOGRAPHY CHEST: CPT

## 2025-01-17 ENCOUNTER — APPOINTMENT (OUTPATIENT)
Dept: RADIOLOGY | Facility: HOSPITAL | Age: 80
End: 2025-01-17
Payer: OTHER GOVERNMENT

## 2025-01-17 ENCOUNTER — APPOINTMENT (OUTPATIENT)
Dept: CARDIOLOGY | Facility: HOSPITAL | Age: 80
End: 2025-01-17
Payer: OTHER GOVERNMENT

## 2025-01-17 ENCOUNTER — HOSPITAL ENCOUNTER (INPATIENT)
Facility: HOSPITAL | Age: 80
End: 2025-01-17
Attending: EMERGENCY MEDICINE | Admitting: INTERNAL MEDICINE
Payer: OTHER GOVERNMENT

## 2025-01-17 DIAGNOSIS — J44.9 CHRONIC OBSTRUCTIVE PULMONARY DISEASE, UNSPECIFIED COPD TYPE (MULTI): Primary | ICD-10-CM

## 2025-01-17 DIAGNOSIS — R06.09 DYSPNEA ON EXERTION: ICD-10-CM

## 2025-01-17 DIAGNOSIS — I13.0 HYPERTENSIVE HEART AND CHRONIC KIDNEY DISEASE WITH HEART FAILURE AND STAGE 1 THROUGH STAGE 4 CHRONIC KIDNEY DISEASE, OR UNSPECIFIED CHRONIC KIDNEY DISEASE: ICD-10-CM

## 2025-01-17 DIAGNOSIS — I50.9 ACUTE ON CHRONIC CONGESTIVE HEART FAILURE, UNSPECIFIED HEART FAILURE TYPE: Chronic | ICD-10-CM

## 2025-01-17 DIAGNOSIS — I25.2 H/O ST ELEVATION MYOCARDIAL INFARCTION: ICD-10-CM

## 2025-01-17 DIAGNOSIS — R06.02 SHORTNESS OF BREATH: ICD-10-CM

## 2025-01-17 DIAGNOSIS — R79.89 ELEVATED TROPONIN: ICD-10-CM

## 2025-01-17 DIAGNOSIS — R06.00 DYSPNEA, UNSPECIFIED: ICD-10-CM

## 2025-01-17 PROBLEM — F41.1 GENERALIZED ANXIETY DISORDER: Status: ACTIVE | Noted: 2025-01-17

## 2025-01-17 PROBLEM — Z89.511 ACQUIRED ABSENCE OF RIGHT LEG BELOW KNEE (MULTI): Status: ACTIVE | Noted: 2025-01-17

## 2025-01-17 PROBLEM — R13.12 DYSPHAGIA, OROPHARYNGEAL PHASE: Status: ACTIVE | Noted: 2025-01-17

## 2025-01-17 PROBLEM — F17.201 TOBACCO DEPENDENCE IN REMISSION: Status: ACTIVE | Noted: 2025-01-17

## 2025-01-17 PROBLEM — F34.9 PERSISTENT MOOD (AFFECTIVE) DISORDER, UNSPECIFIED: Status: ACTIVE | Noted: 2025-01-17

## 2025-01-17 PROBLEM — S88.111A BELOW-KNEE AMPUTATION OF RIGHT LOWER EXTREMITY (MULTI): Status: ACTIVE | Noted: 2025-01-17

## 2025-01-17 PROBLEM — C93.10 CHRONIC MYELOMONOCYTIC LEUKEMIA NOT HAVING ACHIEVED REMISSION (MULTI): Status: ACTIVE | Noted: 2024-01-03

## 2025-01-17 PROBLEM — R53.81 PHYSICAL DECONDITIONING: Status: ACTIVE | Noted: 2024-01-29

## 2025-01-17 LAB
ALBUMIN SERPL BCP-MCNC: 3.9 G/DL (ref 3.4–5)
ALP SERPL-CCNC: 60 U/L (ref 33–136)
ALT SERPL W P-5'-P-CCNC: 19 U/L (ref 10–52)
ANION GAP BLDV CALCULATED.4IONS-SCNC: 12 MMOL/L (ref 10–25)
ANION GAP SERPL CALC-SCNC: 13 MMOL/L (ref 10–20)
AST SERPL W P-5'-P-CCNC: 26 U/L (ref 9–39)
BASE EXCESS BLDV CALC-SCNC: -2.7 MMOL/L (ref -2–3)
BASOPHILS # BLD MANUAL: 0 X10*3/UL (ref 0–0.1)
BASOPHILS NFR BLD MANUAL: 0 %
BILIRUB SERPL-MCNC: 0.5 MG/DL (ref 0–1.2)
BNP SERPL-MCNC: 273 PG/ML (ref 0–99)
BODY TEMPERATURE: 37 DEGREES CELSIUS
BUN SERPL-MCNC: 39 MG/DL (ref 6–23)
CA-I BLDV-SCNC: 1.19 MMOL/L (ref 1.1–1.33)
CALCIUM SERPL-MCNC: 8.6 MG/DL (ref 8.6–10.3)
CARDIAC TROPONIN I PNL SERPL HS: 46 NG/L (ref 0–20)
CARDIAC TROPONIN I PNL SERPL HS: 49 NG/L (ref 0–20)
CARDIAC TROPONIN I PNL SERPL HS: 51 NG/L (ref 0–20)
CHLORIDE BLDV-SCNC: 106 MMOL/L (ref 98–107)
CHLORIDE SERPL-SCNC: 104 MMOL/L (ref 98–107)
CO2 SERPL-SCNC: 21 MMOL/L (ref 21–32)
CREAT SERPL-MCNC: 1.89 MG/DL (ref 0.5–1.3)
DACRYOCYTES BLD QL SMEAR: ABNORMAL
EGFRCR SERPLBLD CKD-EPI 2021: 36 ML/MIN/1.73M*2
EOSINOPHIL # BLD MANUAL: 0.04 X10*3/UL (ref 0–0.4)
EOSINOPHIL NFR BLD MANUAL: 1 %
ERYTHROCYTE [DISTWIDTH] IN BLOOD BY AUTOMATED COUNT: 20.2 % (ref 11.5–14.5)
FLUAV RNA RESP QL NAA+PROBE: NOT DETECTED
FLUBV RNA RESP QL NAA+PROBE: NOT DETECTED
GLUCOSE BLDV-MCNC: 259 MG/DL (ref 74–99)
GLUCOSE SERPL-MCNC: 238 MG/DL (ref 74–99)
HCO3 BLDV-SCNC: 21.7 MMOL/L (ref 22–26)
HCT VFR BLD AUTO: 27.6 % (ref 41–52)
HCT VFR BLD EST: 27 % (ref 41–52)
HGB BLD-MCNC: 8.9 G/DL (ref 13.5–17.5)
HGB BLDV-MCNC: 8.9 G/DL (ref 13.5–17.5)
HYPOCHROMIA BLD QL SMEAR: ABNORMAL
IMM GRANULOCYTES # BLD AUTO: 0.01 X10*3/UL (ref 0–0.5)
IMM GRANULOCYTES NFR BLD AUTO: 0.2 % (ref 0–0.9)
INHALED O2 CONCENTRATION: 36 %
INR PPP: 1.2 (ref 0.9–1.1)
LACTATE BLDV-SCNC: 3.6 MMOL/L (ref 0.4–2)
LYMPHOCYTES # BLD MANUAL: 0.46 X10*3/UL (ref 0.8–3)
LYMPHOCYTES NFR BLD MANUAL: 11 %
MAGNESIUM SERPL-MCNC: 1.79 MG/DL (ref 1.6–2.4)
MCH RBC QN AUTO: 29.7 PG (ref 26–34)
MCHC RBC AUTO-ENTMCNC: 32.2 G/DL (ref 32–36)
MCV RBC AUTO: 92 FL (ref 80–100)
METAMYELOCYTES # BLD MANUAL: 0.13 X10*3/UL
METAMYELOCYTES NFR BLD MANUAL: 3 %
MONOCYTES # BLD MANUAL: 0.17 X10*3/UL (ref 0.05–0.8)
MONOCYTES NFR BLD MANUAL: 4 %
MYELOCYTES # BLD MANUAL: 0.08 X10*3/UL
MYELOCYTES NFR BLD MANUAL: 2 %
NEUTROPHILS # BLD MANUAL: 3.32 X10*3/UL (ref 1.6–5.5)
NEUTS BAND # BLD MANUAL: 0.55 X10*3/UL (ref 0–0.5)
NEUTS BAND NFR BLD MANUAL: 13 %
NEUTS SEG # BLD MANUAL: 2.77 X10*3/UL (ref 1.6–5)
NEUTS SEG NFR BLD MANUAL: 66 %
NRBC BLD-RTO: 0 /100 WBCS (ref 0–0)
OVALOCYTES BLD QL SMEAR: ABNORMAL
OXYHGB MFR BLDV: 58.9 % (ref 45–75)
PCO2 BLDV: 35 MM HG (ref 41–51)
PH BLDV: 7.4 PH (ref 7.33–7.43)
PLATELET # BLD AUTO: 219 X10*3/UL (ref 150–450)
PO2 BLDV: 39 MM HG (ref 35–45)
POLYCHROMASIA BLD QL SMEAR: ABNORMAL
POTASSIUM BLDV-SCNC: 4.2 MMOL/L (ref 3.5–5.3)
POTASSIUM SERPL-SCNC: 4.1 MMOL/L (ref 3.5–5.3)
PROT SERPL-MCNC: 6.6 G/DL (ref 6.4–8.2)
PROTHROMBIN TIME: 13.7 SECONDS (ref 9.8–12.8)
RBC # BLD AUTO: 3 X10*6/UL (ref 4.5–5.9)
RBC MORPH BLD: ABNORMAL
RSV RNA RESP QL NAA+PROBE: NOT DETECTED
SAO2 % BLDV: 60 % (ref 45–75)
SARS-COV-2 RNA RESP QL NAA+PROBE: NOT DETECTED
SODIUM BLDV-SCNC: 135 MMOL/L (ref 136–145)
SODIUM SERPL-SCNC: 134 MMOL/L (ref 136–145)
TOTAL CELLS COUNTED BLD: 100
WBC # BLD AUTO: 4.2 X10*3/UL (ref 4.4–11.3)

## 2025-01-17 PROCEDURE — 84132 ASSAY OF SERUM POTASSIUM: CPT | Performed by: EMERGENCY MEDICINE

## 2025-01-17 PROCEDURE — 2500000004 HC RX 250 GENERAL PHARMACY W/ HCPCS (ALT 636 FOR OP/ED): Performed by: STUDENT IN AN ORGANIZED HEALTH CARE EDUCATION/TRAINING PROGRAM

## 2025-01-17 PROCEDURE — 71045 X-RAY EXAM CHEST 1 VIEW: CPT

## 2025-01-17 PROCEDURE — 99223 1ST HOSP IP/OBS HIGH 75: CPT | Performed by: STUDENT IN AN ORGANIZED HEALTH CARE EDUCATION/TRAINING PROGRAM

## 2025-01-17 PROCEDURE — 84484 ASSAY OF TROPONIN QUANT: CPT | Performed by: EMERGENCY MEDICINE

## 2025-01-17 PROCEDURE — 36415 COLL VENOUS BLD VENIPUNCTURE: CPT | Performed by: EMERGENCY MEDICINE

## 2025-01-17 PROCEDURE — 93005 ELECTROCARDIOGRAM TRACING: CPT

## 2025-01-17 PROCEDURE — 2500000002 HC RX 250 W HCPCS SELF ADMINISTERED DRUGS (ALT 637 FOR MEDICARE OP, ALT 636 FOR OP/ED): Performed by: STUDENT IN AN ORGANIZED HEALTH CARE EDUCATION/TRAINING PROGRAM

## 2025-01-17 PROCEDURE — 96375 TX/PRO/DX INJ NEW DRUG ADDON: CPT

## 2025-01-17 PROCEDURE — 96374 THER/PROPH/DIAG INJ IV PUSH: CPT | Mod: 59

## 2025-01-17 PROCEDURE — 85610 PROTHROMBIN TIME: CPT | Performed by: EMERGENCY MEDICINE

## 2025-01-17 PROCEDURE — 94640 AIRWAY INHALATION TREATMENT: CPT | Mod: 59

## 2025-01-17 PROCEDURE — 83735 ASSAY OF MAGNESIUM: CPT | Performed by: EMERGENCY MEDICINE

## 2025-01-17 PROCEDURE — 2500000005 HC RX 250 GENERAL PHARMACY W/O HCPCS: Performed by: STUDENT IN AN ORGANIZED HEALTH CARE EDUCATION/TRAINING PROGRAM

## 2025-01-17 PROCEDURE — 87637 SARSCOV2&INF A&B&RSV AMP PRB: CPT | Performed by: EMERGENCY MEDICINE

## 2025-01-17 PROCEDURE — 2500000004 HC RX 250 GENERAL PHARMACY W/ HCPCS (ALT 636 FOR OP/ED): Performed by: EMERGENCY MEDICINE

## 2025-01-17 PROCEDURE — 2500000001 HC RX 250 WO HCPCS SELF ADMINISTERED DRUGS (ALT 637 FOR MEDICARE OP): Performed by: EMERGENCY MEDICINE

## 2025-01-17 PROCEDURE — 83880 ASSAY OF NATRIURETIC PEPTIDE: CPT | Performed by: EMERGENCY MEDICINE

## 2025-01-17 PROCEDURE — 85027 COMPLETE CBC AUTOMATED: CPT | Performed by: EMERGENCY MEDICINE

## 2025-01-17 PROCEDURE — 84484 ASSAY OF TROPONIN QUANT: CPT | Performed by: STUDENT IN AN ORGANIZED HEALTH CARE EDUCATION/TRAINING PROGRAM

## 2025-01-17 PROCEDURE — 36415 COLL VENOUS BLD VENIPUNCTURE: CPT | Performed by: STUDENT IN AN ORGANIZED HEALTH CARE EDUCATION/TRAINING PROGRAM

## 2025-01-17 PROCEDURE — 2500000001 HC RX 250 WO HCPCS SELF ADMINISTERED DRUGS (ALT 637 FOR MEDICARE OP): Performed by: STUDENT IN AN ORGANIZED HEALTH CARE EDUCATION/TRAINING PROGRAM

## 2025-01-17 PROCEDURE — 2500000002 HC RX 250 W HCPCS SELF ADMINISTERED DRUGS (ALT 637 FOR MEDICARE OP, ALT 636 FOR OP/ED): Performed by: EMERGENCY MEDICINE

## 2025-01-17 PROCEDURE — 85007 BL SMEAR W/DIFF WBC COUNT: CPT | Performed by: EMERGENCY MEDICINE

## 2025-01-17 PROCEDURE — 99285 EMERGENCY DEPT VISIT HI MDM: CPT | Performed by: EMERGENCY MEDICINE

## 2025-01-17 PROCEDURE — 96372 THER/PROPH/DIAG INJ SC/IM: CPT | Performed by: STUDENT IN AN ORGANIZED HEALTH CARE EDUCATION/TRAINING PROGRAM

## 2025-01-17 PROCEDURE — 71045 X-RAY EXAM CHEST 1 VIEW: CPT | Mod: FOREIGN READ | Performed by: RADIOLOGY

## 2025-01-17 RX ORDER — ACETAMINOPHEN 325 MG/1
650 TABLET ORAL EVERY 4 HOURS PRN
Status: DISCONTINUED | OUTPATIENT
Start: 2025-01-17 | End: 2025-01-21 | Stop reason: HOSPADM

## 2025-01-17 RX ORDER — CLOPIDOGREL BISULFATE 75 MG/1
75 TABLET ORAL DAILY
Status: DISCONTINUED | OUTPATIENT
Start: 2025-01-17 | End: 2025-01-21 | Stop reason: HOSPADM

## 2025-01-17 RX ORDER — ISOSORBIDE MONONITRATE 30 MG/1
30 TABLET, EXTENDED RELEASE ORAL DAILY
Status: DISCONTINUED | OUTPATIENT
Start: 2025-01-18 | End: 2025-01-17

## 2025-01-17 RX ORDER — CLOPIDOGREL BISULFATE 75 MG/1
75 TABLET ORAL DAILY
COMMUNITY

## 2025-01-17 RX ORDER — NAPROXEN SODIUM 220 MG/1
324 TABLET, FILM COATED ORAL ONCE
Status: COMPLETED | OUTPATIENT
Start: 2025-01-17 | End: 2025-01-17

## 2025-01-17 RX ORDER — METOPROLOL SUCCINATE 25 MG/1
12.5 TABLET, EXTENDED RELEASE ORAL DAILY
COMMUNITY
End: 2025-01-21 | Stop reason: HOSPADM

## 2025-01-17 RX ORDER — PANTOPRAZOLE SODIUM 40 MG/10ML
40 INJECTION, POWDER, LYOPHILIZED, FOR SOLUTION INTRAVENOUS
Status: DISCONTINUED | OUTPATIENT
Start: 2025-01-18 | End: 2025-01-21 | Stop reason: HOSPADM

## 2025-01-17 RX ORDER — TAMSULOSIN HYDROCHLORIDE 0.4 MG/1
0.4 CAPSULE ORAL DAILY
Status: DISCONTINUED | OUTPATIENT
Start: 2025-01-18 | End: 2025-01-21 | Stop reason: HOSPADM

## 2025-01-17 RX ORDER — ONDANSETRON HYDROCHLORIDE 2 MG/ML
4 INJECTION, SOLUTION INTRAVENOUS EVERY 8 HOURS PRN
Status: DISCONTINUED | OUTPATIENT
Start: 2025-01-17 | End: 2025-01-21 | Stop reason: HOSPADM

## 2025-01-17 RX ORDER — ASPIRIN 81 MG/1
81 TABLET ORAL DAILY
Status: DISCONTINUED | OUTPATIENT
Start: 2025-01-18 | End: 2025-01-21 | Stop reason: HOSPADM

## 2025-01-17 RX ORDER — TALC
3 POWDER (GRAM) TOPICAL NIGHTLY PRN
Status: DISCONTINUED | OUTPATIENT
Start: 2025-01-17 | End: 2025-01-21 | Stop reason: HOSPADM

## 2025-01-17 RX ORDER — ATORVASTATIN CALCIUM 40 MG/1
80 TABLET, FILM COATED ORAL NIGHTLY
Status: DISCONTINUED | OUTPATIENT
Start: 2025-01-17 | End: 2025-01-21 | Stop reason: HOSPADM

## 2025-01-17 RX ORDER — IPRATROPIUM BROMIDE AND ALBUTEROL SULFATE 2.5; .5 MG/3ML; MG/3ML
3 SOLUTION RESPIRATORY (INHALATION)
Status: DISCONTINUED | OUTPATIENT
Start: 2025-01-17 | End: 2025-01-18

## 2025-01-17 RX ORDER — FUROSEMIDE 20 MG/1
20 TABLET ORAL DAILY
COMMUNITY
End: 2025-01-17 | Stop reason: ENTERED-IN-ERROR

## 2025-01-17 RX ORDER — ONDANSETRON 4 MG/1
4 TABLET, FILM COATED ORAL EVERY 8 HOURS PRN
Status: DISCONTINUED | OUTPATIENT
Start: 2025-01-17 | End: 2025-01-21 | Stop reason: HOSPADM

## 2025-01-17 RX ORDER — GUAIFENESIN 600 MG/1
600 TABLET, EXTENDED RELEASE ORAL EVERY 12 HOURS PRN
Status: DISCONTINUED | OUTPATIENT
Start: 2025-01-17 | End: 2025-01-21 | Stop reason: HOSPADM

## 2025-01-17 RX ORDER — IPRATROPIUM BROMIDE AND ALBUTEROL SULFATE 2.5; .5 MG/3ML; MG/3ML
3 SOLUTION RESPIRATORY (INHALATION) ONCE
Status: COMPLETED | OUTPATIENT
Start: 2025-01-17 | End: 2025-01-17

## 2025-01-17 RX ORDER — BUSPIRONE HYDROCHLORIDE 5 MG/1
5 TABLET ORAL 2 TIMES DAILY PRN
Status: DISCONTINUED | OUTPATIENT
Start: 2025-01-17 | End: 2025-01-21 | Stop reason: HOSPADM

## 2025-01-17 RX ORDER — AZITHROMYCIN 250 MG/1
500 TABLET, FILM COATED ORAL
Status: DISCONTINUED | OUTPATIENT
Start: 2025-01-17 | End: 2025-01-21 | Stop reason: HOSPADM

## 2025-01-17 RX ORDER — HEPARIN SODIUM 5000 [USP'U]/ML
5000 INJECTION, SOLUTION INTRAVENOUS; SUBCUTANEOUS EVERY 8 HOURS
Status: DISCONTINUED | OUTPATIENT
Start: 2025-01-17 | End: 2025-01-21 | Stop reason: HOSPADM

## 2025-01-17 RX ORDER — SERTRALINE HYDROCHLORIDE 100 MG/1
100 TABLET, FILM COATED ORAL DAILY
COMMUNITY

## 2025-01-17 RX ORDER — ISOSORBIDE MONONITRATE 30 MG/1
30 TABLET, EXTENDED RELEASE ORAL DAILY
COMMUNITY
End: 2025-01-17 | Stop reason: ENTERED-IN-ERROR

## 2025-01-17 RX ORDER — FUROSEMIDE 10 MG/ML
40 INJECTION INTRAMUSCULAR; INTRAVENOUS EVERY 12 HOURS
Status: DISCONTINUED | OUTPATIENT
Start: 2025-01-18 | End: 2025-01-18

## 2025-01-17 RX ORDER — BISACODYL 10 MG/1
10 SUPPOSITORY RECTAL DAILY PRN
Status: DISCONTINUED | OUTPATIENT
Start: 2025-01-17 | End: 2025-01-21 | Stop reason: HOSPADM

## 2025-01-17 RX ORDER — MONTELUKAST SODIUM 10 MG/1
10 TABLET ORAL DAILY
Status: DISCONTINUED | OUTPATIENT
Start: 2025-01-17 | End: 2025-01-21 | Stop reason: HOSPADM

## 2025-01-17 RX ORDER — SERTRALINE HYDROCHLORIDE 100 MG/1
100 TABLET, FILM COATED ORAL DAILY
Status: DISCONTINUED | OUTPATIENT
Start: 2025-01-17 | End: 2025-01-21 | Stop reason: HOSPADM

## 2025-01-17 RX ORDER — METOPROLOL SUCCINATE 25 MG/1
12.5 TABLET, EXTENDED RELEASE ORAL DAILY
Status: DISCONTINUED | OUTPATIENT
Start: 2025-01-18 | End: 2025-01-19

## 2025-01-17 RX ORDER — BISACODYL 5 MG
10 TABLET, DELAYED RELEASE (ENTERIC COATED) ORAL DAILY PRN
Status: DISCONTINUED | OUTPATIENT
Start: 2025-01-17 | End: 2025-01-21 | Stop reason: HOSPADM

## 2025-01-17 RX ORDER — FLUTICASONE PROPIONATE AND SALMETEROL 250; 50 UG/1; UG/1
1 POWDER RESPIRATORY (INHALATION)
COMMUNITY
End: 2025-01-21 | Stop reason: HOSPADM

## 2025-01-17 RX ORDER — FUROSEMIDE 10 MG/ML
20 INJECTION INTRAMUSCULAR; INTRAVENOUS ONCE
Status: COMPLETED | OUTPATIENT
Start: 2025-01-17 | End: 2025-01-17

## 2025-01-17 RX ORDER — POLYETHYLENE GLYCOL 3350 17 G/17G
17 POWDER, FOR SOLUTION ORAL DAILY
Status: DISCONTINUED | OUTPATIENT
Start: 2025-01-17 | End: 2025-01-21 | Stop reason: HOSPADM

## 2025-01-17 RX ORDER — PANTOPRAZOLE SODIUM 40 MG/1
40 TABLET, DELAYED RELEASE ORAL
Status: DISCONTINUED | OUTPATIENT
Start: 2025-01-18 | End: 2025-01-21 | Stop reason: HOSPADM

## 2025-01-17 RX ADMIN — AZITHROMYCIN DIHYDRATE 500 MG: 250 TABLET ORAL at 21:11

## 2025-01-17 RX ADMIN — Medication 5 L/MIN: at 18:45

## 2025-01-17 RX ADMIN — SERTRALINE HYDROCHLORIDE 100 MG: 50 TABLET ORAL at 19:17

## 2025-01-17 RX ADMIN — ATORVASTATIN CALCIUM 80 MG: 40 TABLET, FILM COATED ORAL at 21:11

## 2025-01-17 RX ADMIN — IPRATROPIUM BROMIDE AND ALBUTEROL SULFATE 3 ML: 2.5; .5 SOLUTION RESPIRATORY (INHALATION) at 16:28

## 2025-01-17 RX ADMIN — METHYLPREDNISOLONE SODIUM SUCCINATE 40 MG: 40 INJECTION, POWDER, FOR SOLUTION INTRAMUSCULAR; INTRAVENOUS at 21:11

## 2025-01-17 RX ADMIN — FUROSEMIDE 20 MG: 10 INJECTION, SOLUTION INTRAMUSCULAR; INTRAVENOUS at 18:19

## 2025-01-17 RX ADMIN — MONTELUKAST 10 MG: 10 TABLET, FILM COATED ORAL at 19:17

## 2025-01-17 RX ADMIN — Medication 5 L/MIN: at 19:20

## 2025-01-17 RX ADMIN — HEPARIN SODIUM 5000 UNITS: 5000 INJECTION, SOLUTION INTRAVENOUS; SUBCUTANEOUS at 19:17

## 2025-01-17 RX ADMIN — CLOPIDOGREL 75 MG: 75 TABLET ORAL at 19:16

## 2025-01-17 RX ADMIN — IPRATROPIUM BROMIDE AND ALBUTEROL SULFATE 3 ML: 2.5; .5 SOLUTION RESPIRATORY (INHALATION) at 19:17

## 2025-01-17 RX ADMIN — ASPIRIN 324 MG: 81 TABLET, CHEWABLE ORAL at 17:33

## 2025-01-17 SDOH — SOCIAL STABILITY: SOCIAL INSECURITY: WERE YOU ABLE TO COMPLETE ALL THE BEHAVIORAL HEALTH SCREENINGS?: YES

## 2025-01-17 SDOH — SOCIAL STABILITY: SOCIAL INSECURITY: HAVE YOU HAD THOUGHTS OF HARMING ANYONE ELSE?: NO

## 2025-01-17 SDOH — SOCIAL STABILITY: SOCIAL INSECURITY: ABUSE: ADULT

## 2025-01-17 ASSESSMENT — COGNITIVE AND FUNCTIONAL STATUS - GENERAL
MOBILITY SCORE: 24
DAILY ACTIVITIY SCORE: 24
PATIENT BASELINE BEDBOUND: NO

## 2025-01-17 ASSESSMENT — ENCOUNTER SYMPTOMS
FEVER: 0
SLEEP DISTURBANCE: 0
POLYDIPSIA: 0
ABDOMINAL PAIN: 0
CHEST TIGHTNESS: 1
SHORTNESS OF BREATH: 1
DYSURIA: 0
WEAKNESS: 0
PALPITATIONS: 0
ACTIVITY CHANGE: 1
CHILLS: 1
COLOR CHANGE: 0
HEMATURIA: 0
BLOOD IN STOOL: 0
PHOTOPHOBIA: 0
TREMORS: 0
CONFUSION: 0
NAUSEA: 0
VOMITING: 0
LIGHT-HEADEDNESS: 0
DIZZINESS: 0
COUGH: 1

## 2025-01-17 ASSESSMENT — LIFESTYLE VARIABLES
HOW MANY STANDARD DRINKS CONTAINING ALCOHOL DO YOU HAVE ON A TYPICAL DAY: PATIENT DOES NOT DRINK
SKIP TO QUESTIONS 9-10: 1
AUDIT-C TOTAL SCORE: 0
AUDIT-C TOTAL SCORE: 0
HOW OFTEN DO YOU HAVE 6 OR MORE DRINKS ON ONE OCCASION: NEVER
HOW OFTEN DO YOU HAVE A DRINK CONTAINING ALCOHOL: NEVER

## 2025-01-17 ASSESSMENT — ACTIVITIES OF DAILY LIVING (ADL)
TOILETING: INDEPENDENT
PATIENT'S MEMORY ADEQUATE TO SAFELY COMPLETE DAILY ACTIVITIES?: YES
WALKS IN HOME: INDEPENDENT
FEEDING YOURSELF: INDEPENDENT
BATHING: INDEPENDENT
JUDGMENT_ADEQUATE_SAFELY_COMPLETE_DAILY_ACTIVITIES: YES
GROOMING: INDEPENDENT
HEARING - LEFT EAR: FUNCTIONAL
HEARING - RIGHT EAR: FUNCTIONAL
ADEQUATE_TO_COMPLETE_ADL: YES
DRESSING YOURSELF: INDEPENDENT

## 2025-01-17 ASSESSMENT — PAIN DESCRIPTION - PAIN TYPE: TYPE: ACUTE PAIN

## 2025-01-17 ASSESSMENT — COLUMBIA-SUICIDE SEVERITY RATING SCALE - C-SSRS
1. IN THE PAST MONTH, HAVE YOU WISHED YOU WERE DEAD OR WISHED YOU COULD GO TO SLEEP AND NOT WAKE UP?: NO
2. HAVE YOU ACTUALLY HAD ANY THOUGHTS OF KILLING YOURSELF?: NO
6. HAVE YOU EVER DONE ANYTHING, STARTED TO DO ANYTHING, OR PREPARED TO DO ANYTHING TO END YOUR LIFE?: NO

## 2025-01-17 ASSESSMENT — PAIN - FUNCTIONAL ASSESSMENT: PAIN_FUNCTIONAL_ASSESSMENT: 0-10

## 2025-01-17 ASSESSMENT — PAIN SCALES - GENERAL: PAINLEVEL_OUTOF10: 0 - NO PAIN

## 2025-01-17 NOTE — ED PROVIDER NOTES
HPI   Chief Complaint   Patient presents with    Shortness of Breath       HPI    HISTORY OF PRESENT ILLNESS:  Patient is a 79-year-old male with history of COPD on 4 L baseline O2, CAD status postcardiac stent, hypertension, lipidemia, leukemia currently in remission presenting to the emergency department with shortness of breath.  Patient started to feel short of breath this morning at about 9 AM.  States that this feels similar to his prior COPD exacerbation.  Has not had any change in chronic cough.  He did not take any breathing treatments at home.  No new leg swelling.  Denied any fever, chills.  He states that he is having some chest tightness but states that his overall breathing had improved with the DuoNeb treatments.  EMS did give him 2 DuoNeb treatments and 125 mg of Solu-Medrol prior to ED arrival.    Past Medical History: COPD on 4 L baseline oxygen, CAD status post cardiac stent, hypertension, hyperlipidemia, CKD stage III, restless leg syndrome, emphysema, smoker, leukemia in remission  Past Surgical History: Colonoscopy, cardiac stents, shoulder surgery, right-sided BKA  Family History: family history not pertinent to presenting problem or chief complaint  Social History: Has had history of asbestos but asked this exposure.  Former smoker    __________________________________________________________  PHYSICAL EXAM:    Appearance: Alert, oriented , cooperative   Skin: Intact,  dry skin, no lesions, rash, petechiae or purpura.   Eyes: PERRLA, EOMs intact,  Conjunctiva pink with no redness or exudates.    HENT: Normocephalic, atraumatic. Nares patent   Neck: Supple. Trachea at midline.   Pulmonary: Lung sounds are clear bilaterally.  There is no rales, rhonchi, or wheezing.  Cardiac: Regular rate and rhythm, no rubs, murmurs, or gallops. No JVD,   Abdomen: Abdomen is soft, nontender, and nondistended.  No palpable organomegaly.  No rebound or guarding.  No CVA tenderness. Nonsurgical  abdomen  Genitourinary: Exam deferred.  Musculoskeletal: Patient right lower extremity has a prosthetic in place , status post BKA no edema, pain, cyanosis, or deformity in extremities. Pulses full and equal.   Neurological:  Cranial nerves are grossly intact, grossly normal sensation, no weakness, no focal findings identified.    __________________________________________________________  MEDICAL DECISION MAKING:    Patient was seen and examined. Differential diagnosis for Shortness of breath includes COPD exacerbation, viral illness, pneumonia, heart failure.  The patient has been having more shortness of breath since 9 AM.  The patient has no new cough.  Patient is currently on baseline 4 L.  Patient shortness of breath has started today.  EMS had given him Solu-Medrol in addition to DuoNeb treatments.  Feels better right now.  Patient continues to have some wheezing and was ordered additional DuoNeb treatment.  Is at his baseline O2.  Labs were ordered.  Patient chest x-ray showed evidence of interstitial markings concerning for edema.  BNP was also rising concerning for new heart failure.  Patient troponins were also elevated at 46 followed by 49.  Patient is only complaining of chest tightness.  The patient's remaining labs are overall unremarkable.  Viral swabs negative.  [facility]  [facility].  Was recommended to admit locally.  Case discussed with [facility].  Requested third troponin and plan to admit to telemetry.  If troponins raise above 50, will change to stepdown level of care.      Chronic Medical Conditions Significantly Affecting Care: CAD, hypertension, hyperlipidemia, CKD stage III, restless leg syndrome, emphysema, smoker    External Records Reviewed: I reviewed recent and relevant outside records including: Primary care physician note from October 31, 2024    Preston Patel  Emergency Medicine    Patient History   Past Medical History:   Diagnosis Date    CHF  (congestive heart failure) (Multi)     Chronic kidney disease     COPD (chronic obstructive pulmonary disease) (Multi)     Emphysema of lung (Multi)     GERD (gastroesophageal reflux disease)     Hypertension     Leukemia (Multi)      No past surgical history on file.  No family history on file.  Social History     Tobacco Use    Smoking status: Every Day     Current packs/day: 1.00     Average packs/day: 1 pack/day for 60.0 years (60.0 ttl pk-yrs)     Types: Cigarettes    Smokeless tobacco: Never   Substance Use Topics    Alcohol use: Not Currently    Drug use: Never     Comment: His girlfriend smokes marijuana in their home       Physical Exam   ED Triage Vitals [01/17/25 1611]   Temperature Heart Rate Respirations BP   36.9 °C (98.5 °F) 89 20 124/61      Pulse Ox Temp Source Heart Rate Source Patient Position   (!) 91 % Tympanic Monitor --      BP Location FiO2 (%)     -- --       Physical Exam      ED Course & MDM   ED Course as of 01/17/25 1836 Fri Jan 17, 2025   1647 POCT Lactate, Venous(!): 3.6 [WJ]   1707 XR chest 1 view  Independently reviewed the patient's chest x-ray.  Multifocal patchy opacities bilaterally the radiology read is currently pending. [WJ]      ED Course User Index  [WJ] Preston Patel DO         Diagnoses as of 01/17/25 1836   Chronic obstructive pulmonary disease, unspecified COPD type (Multi)                 No data recorded     [place] Coma Scale Score: 15 (01/17/25 1618 : Twila Hutchinson RN)                           Medical Decision Making      Procedure  Procedures     Preston Patel DO  01/17/25 1839

## 2025-01-17 NOTE — PROGRESS NOTES
Aristeo Barriga is a 79 y.o. male admitted for No Principal Problem: There is no principal problem currently on the Problem List. Please update the Problem List and refresh.. Pharmacy reviewed the patient's [id number] medications and allergies for accuracy.    The list below reflects the PTA list prior to pharmacy medication history. A summary a changes to the PTA medication list has been listed below. Please review each medication in order reconciliation for additional clarification and justification.    [facility]    Medications added:  Sertraline 100mg every day   Metoprolol succ 25mg 0.5t every day   Advair 250/50mcg 1p bid  Plavix 75mg every day     Medications modified:  Atorvastatin 40mg hs --> 80mg hs  Tamsulosin 0.4mg bid --> 0.4mg every day   Medications to be removed:  Albuterol HFA 90mcg  Asa 81mg  Vitamin D3 50mcg  Famotidine 40mg  Breo 100mcg  Breo 200mcg  Duoneb 0.5-2.5mg/3ml  Nicotine 14mg  Gemtesa 75mg  Nitroglycerin 0.4mg  Furosemide 20mg   Imdur er 30mg  Medications of concern:      Prior to Admission Medications   Prescriptions Last Dose Informant Patient Reported? Taking?   NIFEdipine ER (NIFEdipine CC) 30 mg 24 hr tablet   Yes No   Sig: Take 1 tablet (30 mg) by mouth once daily.   NON FORMULARY   Yes No   Sig: Oxygen therapy 2L N/C PRN   albuterol 90 mcg/actuation inhaler   Yes No   Sig: Inhale 2 puffs every 6 hours if needed for wheezing or shortness of breath.   allopurinol (Zyloprim) 100 mg tablet   Yes No   Sig: Take 1 tablet (100 mg) by mouth once daily.   aspirin 81 mg EC tablet   Yes No   Sig: Take 1 tablet (81 mg) by mouth once daily at bedtime.   atorvastatin (Lipitor) 40 mg tablet   Yes No   Sig: Take 1 tablet (40 mg) by mouth once daily.   busPIRone (Buspar) 5 mg tablet   Yes No   Sig: Take 1 tablet (5 mg) by mouth 2 times a day as needed (Anxiety).   cholecalciferol (Vitamin D-3) 50 mcg (2,000 unit) capsule   Yes No   Sig: Take 1 capsule (50 mcg) by mouth  once daily.   famotidine (Pepcid) 40 mg tablet   Yes No   Sig: Take 0.5 tablets (20 mg) by mouth once daily at bedtime.   fluticasone furoate-vilanteroL (Breo Ellipta) 100-25 mcg/dose inhaler   Yes No   Sig: Inhale 1 puff once daily.   fluticasone furoate-vilanteroL (Breo Ellipta) 200-25 mcg/dose inhaler   No No   Sig: Inhale 1 puff by mouth into the lungs once daily. Do not start before February 27, 2024.   ipratropium-albuteroL (Duo-Neb) 0.5-2.5 mg/3 mL nebulizer solution   No No   Sig: Use 3 mL by nebulization every 6 hours if needed for wheezing.   montelukast (Singulair) 10 mg tablet   Yes No   Sig: Take 1 tablet (10 mg) by mouth once daily.   nicotine (Nicoderm CQ) 14 mg/24 hr patch   No No   Sig: Place 1 patch over 24 hours on the skin once daily for 28 days. Do not start before February 27, 2024.   nitroglycerin (Nitrostat) 0.4 mg SL tablet   Yes No   Sig: Place 1 tablet (0.4 mg) under the tongue every 5 minutes if needed for chest pain.   potassium chloride CR 10 mEq ER tablet   Yes No   Sig: Take 1 tablet (10 mEq) by mouth once daily.   ruxolitinib (Jakafi) 20 mg tablet   Yes No   Sig: Take 1 tablet (20 mg total) by mouth twice a day.   tamsulosin (Flomax) 0.4 mg 24 hr capsule   Yes No   Sig: Take 1 capsule (0.4 mg) by mouth twice a day.   tiotropium (Spiriva Respimat) 2.5 mcg/actuation inhaler   Yes No   Sig: Inhale 2 puffs once daily.   vibegron (Gemtesa) 75 mg tablet   Yes No   Sig: Take 1 tablet (75 mg) by mouth once daily.      Facility-Administered Medications: None       Shruthi Blunt
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
## 2025-01-17 NOTE — ED TRIAGE NOTES
Pt to ED via EMS c/o increased SOB since 1100. Pt from home and has history of COPD on 4L NC O2 at all times.pt A&Ox4, GCS 15.pt has R BKA. States O2 sits 90-91% at all times. Pt received 125mg solumedrol and 2 duonebs by EMS.

## 2025-01-18 ENCOUNTER — APPOINTMENT (OUTPATIENT)
Dept: CARDIOLOGY | Facility: HOSPITAL | Age: 80
DRG: 291 | End: 2025-01-18
Payer: OTHER GOVERNMENT

## 2025-01-18 LAB
ALBUMIN SERPL BCP-MCNC: 3.5 G/DL (ref 3.4–5)
ALP SERPL-CCNC: 52 U/L (ref 33–136)
ALT SERPL W P-5'-P-CCNC: 18 U/L (ref 10–52)
ANION GAP SERPL CALC-SCNC: 15 MMOL/L (ref 10–20)
AORTIC VALVE MEAN GRADIENT: 4 MMHG
AORTIC VALVE PEAK VELOCITY: 1.44 M/S
AST SERPL W P-5'-P-CCNC: 27 U/L (ref 9–39)
AV PEAK GRADIENT: 8 MMHG
AVA (PEAK VEL): 1.94 CM2
AVA (VTI): 2.33 CM2
BASOPHILS # BLD MANUAL: 0 X10*3/UL (ref 0–0.1)
BASOPHILS NFR BLD MANUAL: 0 %
BILIRUB SERPL-MCNC: 0.5 MG/DL (ref 0–1.2)
BUN SERPL-MCNC: 43 MG/DL (ref 6–23)
CALCIUM SERPL-MCNC: 8.9 MG/DL (ref 8.6–10.3)
CARDIAC TROPONIN I PNL SERPL HS: 40 NG/L (ref 0–20)
CARDIAC TROPONIN I PNL SERPL HS: 42 NG/L (ref 0–20)
CARDIAC TROPONIN I PNL SERPL HS: 44 NG/L (ref 0–20)
CHLORIDE SERPL-SCNC: 103 MMOL/L (ref 98–107)
CHOLEST SERPL-MCNC: 100 MG/DL (ref 0–199)
CHOLESTEROL/HDL RATIO: 1.7
CO2 SERPL-SCNC: 23 MMOL/L (ref 21–32)
CREAT SERPL-MCNC: 1.78 MG/DL (ref 0.5–1.3)
DACRYOCYTES BLD QL SMEAR: ABNORMAL
EGFRCR SERPLBLD CKD-EPI 2021: 38 ML/MIN/1.73M*2
EJECTION FRACTION APICAL 4 CHAMBER: 48.2
EJECTION FRACTION: 50 %
EOSINOPHIL # BLD MANUAL: 0 X10*3/UL (ref 0–0.4)
EOSINOPHIL NFR BLD MANUAL: 0 %
ERYTHROCYTE [DISTWIDTH] IN BLOOD BY AUTOMATED COUNT: 20 % (ref 11.5–14.5)
EST. AVERAGE GLUCOSE BLD GHB EST-MCNC: 120 MG/DL
GLUCOSE BLD MANUAL STRIP-MCNC: 154 MG/DL (ref 74–99)
GLUCOSE SERPL-MCNC: 166 MG/DL (ref 74–99)
HBA1C MFR BLD: 5.8 %
HCT VFR BLD AUTO: 24.9 % (ref 41–52)
HDLC SERPL-MCNC: 59 MG/DL
HGB BLD-MCNC: 8 G/DL (ref 13.5–17.5)
HYPOCHROMIA BLD QL SMEAR: ABNORMAL
IMM GRANULOCYTES # BLD AUTO: 0.01 X10*3/UL (ref 0–0.5)
IMM GRANULOCYTES NFR BLD AUTO: 0.4 % (ref 0–0.9)
LDLC SERPL CALC-MCNC: 34 MG/DL
LEFT ATRIUM VOLUME AREA LENGTH INDEX BSA: 26.2 ML/M2
LEFT VENTRICLE INTERNAL DIMENSION DIASTOLE: 4.09 CM (ref 3.5–6)
LEFT VENTRICULAR OUTFLOW TRACT DIAMETER: 2 CM
LV EJECTION FRACTION BIPLANE: 50 %
LYMPHOCYTES # BLD MANUAL: 0.18 X10*3/UL (ref 0.8–3)
LYMPHOCYTES NFR BLD MANUAL: 7 %
MAGNESIUM SERPL-MCNC: 1.94 MG/DL (ref 1.6–2.4)
MCH RBC QN AUTO: 28.9 PG (ref 26–34)
MCHC RBC AUTO-ENTMCNC: 32.1 G/DL (ref 32–36)
MCV RBC AUTO: 90 FL (ref 80–100)
METAMYELOCYTES # BLD MANUAL: 0.05 X10*3/UL
METAMYELOCYTES NFR BLD MANUAL: 2 %
MITRAL VALVE E/A RATIO: 0.68
MONOCYTES # BLD MANUAL: 0.29 X10*3/UL (ref 0.05–0.8)
MONOCYTES NFR BLD MANUAL: 11 %
MYELOCYTES # BLD MANUAL: 0.03 X10*3/UL
MYELOCYTES NFR BLD MANUAL: 1 %
NEUTROPHILS # BLD MANUAL: 2.05 X10*3/UL (ref 1.6–5.5)
NEUTS BAND # BLD MANUAL: 0.26 X10*3/UL (ref 0–0.5)
NEUTS BAND NFR BLD MANUAL: 10 %
NEUTS SEG # BLD MANUAL: 1.79 X10*3/UL (ref 1.6–5)
NEUTS SEG NFR BLD MANUAL: 69 %
NON HDL CHOLESTEROL: 41 MG/DL (ref 0–149)
NRBC BLD-RTO: 0 /100 WBCS (ref 0–0)
OVALOCYTES BLD QL SMEAR: ABNORMAL
PLATELET # BLD AUTO: 182 X10*3/UL (ref 150–450)
POLYCHROMASIA BLD QL SMEAR: ABNORMAL
POTASSIUM SERPL-SCNC: 3.6 MMOL/L (ref 3.5–5.3)
PROT SERPL-MCNC: 6.6 G/DL (ref 6.4–8.2)
RBC # BLD AUTO: 2.77 X10*6/UL (ref 4.5–5.9)
RBC MORPH BLD: ABNORMAL
RIGHT VENTRICLE FREE WALL PEAK S': 13.6 CM/S
RIGHT VENTRICLE PEAK SYSTOLIC PRESSURE: 32.6 MMHG
SODIUM SERPL-SCNC: 137 MMOL/L (ref 136–145)
TOTAL CELLS COUNTED BLD: 100
TRICUSPID ANNULAR PLANE SYSTOLIC EXCURSION: 2.6 CM
TRIGL SERPL-MCNC: 35 MG/DL (ref 0–149)
VLDL: 7 MG/DL (ref 0–40)
WBC # BLD AUTO: 2.6 X10*3/UL (ref 4.4–11.3)

## 2025-01-18 PROCEDURE — 97166 OT EVAL MOD COMPLEX 45 MIN: CPT | Mod: GO | Performed by: OCCUPATIONAL THERAPIST

## 2025-01-18 PROCEDURE — 84484 ASSAY OF TROPONIN QUANT: CPT | Performed by: STUDENT IN AN ORGANIZED HEALTH CARE EDUCATION/TRAINING PROGRAM

## 2025-01-18 PROCEDURE — 96372 THER/PROPH/DIAG INJ SC/IM: CPT | Performed by: STUDENT IN AN ORGANIZED HEALTH CARE EDUCATION/TRAINING PROGRAM

## 2025-01-18 PROCEDURE — 2060000001 HC INTERMEDIATE ICU ROOM DAILY

## 2025-01-18 PROCEDURE — 97162 PT EVAL MOD COMPLEX 30 MIN: CPT | Mod: GP | Performed by: PHYSICAL THERAPIST

## 2025-01-18 PROCEDURE — 80061 LIPID PANEL: CPT | Performed by: STUDENT IN AN ORGANIZED HEALTH CARE EDUCATION/TRAINING PROGRAM

## 2025-01-18 PROCEDURE — 83036 HEMOGLOBIN GLYCOSYLATED A1C: CPT | Mod: PORLAB | Performed by: STUDENT IN AN ORGANIZED HEALTH CARE EDUCATION/TRAINING PROGRAM

## 2025-01-18 PROCEDURE — 82040 ASSAY OF SERUM ALBUMIN: CPT | Performed by: STUDENT IN AN ORGANIZED HEALTH CARE EDUCATION/TRAINING PROGRAM

## 2025-01-18 PROCEDURE — 85007 BL SMEAR W/DIFF WBC COUNT: CPT | Performed by: STUDENT IN AN ORGANIZED HEALTH CARE EDUCATION/TRAINING PROGRAM

## 2025-01-18 PROCEDURE — C8929 TTE W OR WO FOL WCON,DOPPLER: HCPCS

## 2025-01-18 PROCEDURE — 36415 COLL VENOUS BLD VENIPUNCTURE: CPT | Performed by: STUDENT IN AN ORGANIZED HEALTH CARE EDUCATION/TRAINING PROGRAM

## 2025-01-18 PROCEDURE — 94640 AIRWAY INHALATION TREATMENT: CPT

## 2025-01-18 PROCEDURE — 85027 COMPLETE CBC AUTOMATED: CPT | Performed by: STUDENT IN AN ORGANIZED HEALTH CARE EDUCATION/TRAINING PROGRAM

## 2025-01-18 PROCEDURE — 83735 ASSAY OF MAGNESIUM: CPT | Performed by: STUDENT IN AN ORGANIZED HEALTH CARE EDUCATION/TRAINING PROGRAM

## 2025-01-18 PROCEDURE — 2500000001 HC RX 250 WO HCPCS SELF ADMINISTERED DRUGS (ALT 637 FOR MEDICARE OP): Performed by: STUDENT IN AN ORGANIZED HEALTH CARE EDUCATION/TRAINING PROGRAM

## 2025-01-18 PROCEDURE — 2500000002 HC RX 250 W HCPCS SELF ADMINISTERED DRUGS (ALT 637 FOR MEDICARE OP, ALT 636 FOR OP/ED): Performed by: STUDENT IN AN ORGANIZED HEALTH CARE EDUCATION/TRAINING PROGRAM

## 2025-01-18 PROCEDURE — 82947 ASSAY GLUCOSE BLOOD QUANT: CPT

## 2025-01-18 PROCEDURE — 2500000005 HC RX 250 GENERAL PHARMACY W/O HCPCS: Performed by: STUDENT IN AN ORGANIZED HEALTH CARE EDUCATION/TRAINING PROGRAM

## 2025-01-18 PROCEDURE — 93306 TTE W/DOPPLER COMPLETE: CPT | Performed by: INTERNAL MEDICINE

## 2025-01-18 PROCEDURE — 99233 SBSQ HOSP IP/OBS HIGH 50: CPT | Performed by: INTERNAL MEDICINE

## 2025-01-18 PROCEDURE — 2500000004 HC RX 250 GENERAL PHARMACY W/ HCPCS (ALT 636 FOR OP/ED): Performed by: INTERNAL MEDICINE

## 2025-01-18 PROCEDURE — 96376 TX/PRO/DX INJ SAME DRUG ADON: CPT

## 2025-01-18 PROCEDURE — 94664 DEMO&/EVAL PT USE INHALER: CPT

## 2025-01-18 PROCEDURE — 2500000004 HC RX 250 GENERAL PHARMACY W/ HCPCS (ALT 636 FOR OP/ED): Mod: JZ | Performed by: STUDENT IN AN ORGANIZED HEALTH CARE EDUCATION/TRAINING PROGRAM

## 2025-01-18 RX ORDER — FUROSEMIDE 10 MG/ML
40 INJECTION INTRAMUSCULAR; INTRAVENOUS DAILY
Status: DISCONTINUED | OUTPATIENT
Start: 2025-01-18 | End: 2025-01-18

## 2025-01-18 RX ORDER — IPRATROPIUM BROMIDE AND ALBUTEROL SULFATE 2.5; .5 MG/3ML; MG/3ML
3 SOLUTION RESPIRATORY (INHALATION) EVERY 2 HOUR PRN
Status: DISCONTINUED | OUTPATIENT
Start: 2025-01-18 | End: 2025-01-21 | Stop reason: HOSPADM

## 2025-01-18 RX ORDER — ASPIRIN 81 MG/1
81 TABLET ORAL DAILY
COMMUNITY

## 2025-01-18 RX ORDER — FUROSEMIDE 10 MG/ML
40 INJECTION INTRAMUSCULAR; INTRAVENOUS DAILY
Status: DISCONTINUED | OUTPATIENT
Start: 2025-01-18 | End: 2025-01-19

## 2025-01-18 RX ORDER — IPRATROPIUM BROMIDE AND ALBUTEROL SULFATE 2.5; .5 MG/3ML; MG/3ML
3 SOLUTION RESPIRATORY (INHALATION)
Status: DISCONTINUED | OUTPATIENT
Start: 2025-01-19 | End: 2025-01-20

## 2025-01-18 RX ADMIN — METHYLPREDNISOLONE SODIUM SUCCINATE 40 MG: 40 INJECTION, POWDER, FOR SOLUTION INTRAMUSCULAR; INTRAVENOUS at 05:16

## 2025-01-18 RX ADMIN — Medication 5 L/MIN: at 11:06

## 2025-01-18 RX ADMIN — METHYLPREDNISOLONE SODIUM SUCCINATE 40 MG: 40 INJECTION, POWDER, FOR SOLUTION INTRAMUSCULAR; INTRAVENOUS at 21:01

## 2025-01-18 RX ADMIN — CLOPIDOGREL 75 MG: 75 TABLET ORAL at 10:18

## 2025-01-18 RX ADMIN — AZITHROMYCIN DIHYDRATE 500 MG: 250 TABLET ORAL at 10:17

## 2025-01-18 RX ADMIN — ASPIRIN 81 MG: 81 TABLET, COATED ORAL at 10:18

## 2025-01-18 RX ADMIN — TAMSULOSIN HYDROCHLORIDE 0.4 MG: 0.4 CAPSULE ORAL at 10:17

## 2025-01-18 RX ADMIN — ACETAMINOPHEN 650 MG: 325 TABLET ORAL at 03:55

## 2025-01-18 RX ADMIN — SERTRALINE HYDROCHLORIDE 100 MG: 50 TABLET ORAL at 10:18

## 2025-01-18 RX ADMIN — METHYLPREDNISOLONE SODIUM SUCCINATE 40 MG: 40 INJECTION, POWDER, FOR SOLUTION INTRAMUSCULAR; INTRAVENOUS at 13:09

## 2025-01-18 RX ADMIN — HEPARIN SODIUM 5000 UNITS: 5000 INJECTION, SOLUTION INTRAVENOUS; SUBCUTANEOUS at 10:20

## 2025-01-18 RX ADMIN — PANTOPRAZOLE SODIUM 40 MG: 40 TABLET, DELAYED RELEASE ORAL at 06:19

## 2025-01-18 RX ADMIN — IPRATROPIUM BROMIDE AND ALBUTEROL SULFATE 3 ML: 2.5; .5 SOLUTION RESPIRATORY (INHALATION) at 13:10

## 2025-01-18 RX ADMIN — ACETAMINOPHEN 650 MG: 325 TABLET ORAL at 20:23

## 2025-01-18 RX ADMIN — ATORVASTATIN CALCIUM 80 MG: 40 TABLET, FILM COATED ORAL at 20:23

## 2025-01-18 RX ADMIN — MONTELUKAST 10 MG: 10 TABLET, FILM COATED ORAL at 10:18

## 2025-01-18 RX ADMIN — Medication 5 L/MIN: at 20:44

## 2025-01-18 RX ADMIN — HEPARIN SODIUM 5000 UNITS: 5000 INJECTION, SOLUTION INTRAVENOUS; SUBCUTANEOUS at 18:30

## 2025-01-18 RX ADMIN — METOPROLOL SUCCINATE 12.5 MG: 25 TABLET, EXTENDED RELEASE ORAL at 10:19

## 2025-01-18 RX ADMIN — IPRATROPIUM BROMIDE AND ALBUTEROL SULFATE 3 ML: 2.5; .5 SOLUTION RESPIRATORY (INHALATION) at 06:19

## 2025-01-18 RX ADMIN — HEPARIN SODIUM 5000 UNITS: 5000 INJECTION, SOLUTION INTRAVENOUS; SUBCUTANEOUS at 03:01

## 2025-01-18 RX ADMIN — FUROSEMIDE 40 MG: 10 INJECTION, SOLUTION INTRAMUSCULAR; INTRAVENOUS at 16:07

## 2025-01-18 RX ADMIN — IPRATROPIUM BROMIDE AND ALBUTEROL SULFATE 3 ML: 2.5; .5 SOLUTION RESPIRATORY (INHALATION) at 01:07

## 2025-01-18 RX ADMIN — IPRATROPIUM BROMIDE AND ALBUTEROL SULFATE 3 ML: 2.5; .5 SOLUTION RESPIRATORY (INHALATION) at 20:44

## 2025-01-18 RX ADMIN — HUMAN ALBUMIN MICROSPHERES AND PERFLUTREN 0.5 ML: 10; .22 INJECTION, SOLUTION INTRAVENOUS at 08:21

## 2025-01-18 SDOH — SOCIAL STABILITY: SOCIAL INSECURITY: ARE YOU OR HAVE YOU BEEN THREATENED OR ABUSED PHYSICALLY, EMOTIONALLY, OR SEXUALLY BY ANYONE?: NO

## 2025-01-18 SDOH — SOCIAL STABILITY: SOCIAL INSECURITY: WITHIN THE LAST YEAR, HAVE YOU BEEN AFRAID OF YOUR PARTNER OR EX-PARTNER?: NO

## 2025-01-18 SDOH — ECONOMIC STABILITY: FOOD INSECURITY: WITHIN THE PAST 12 MONTHS, THE FOOD YOU BOUGHT JUST DIDN'T LAST AND YOU DIDN'T HAVE MONEY TO GET MORE.: NEVER TRUE

## 2025-01-18 SDOH — SOCIAL STABILITY: SOCIAL INSECURITY: DO YOU FEEL UNSAFE GOING BACK TO THE PLACE WHERE YOU ARE LIVING?: NO

## 2025-01-18 SDOH — SOCIAL STABILITY: SOCIAL INSECURITY: DOES ANYONE TRY TO KEEP YOU FROM HAVING/CONTACTING OTHER FRIENDS OR DOING THINGS OUTSIDE YOUR HOME?: NO

## 2025-01-18 SDOH — ECONOMIC STABILITY: HOUSING INSECURITY: AT ANY TIME IN THE PAST 12 MONTHS, WERE YOU HOMELESS OR LIVING IN A SHELTER (INCLUDING NOW)?: NO

## 2025-01-18 SDOH — ECONOMIC STABILITY: FOOD INSECURITY: WITHIN THE PAST 12 MONTHS, YOU WORRIED THAT YOUR FOOD WOULD RUN OUT BEFORE YOU GOT THE MONEY TO BUY MORE.: NEVER TRUE

## 2025-01-18 SDOH — ECONOMIC STABILITY: INCOME INSECURITY: IN THE PAST 12 MONTHS HAS THE ELECTRIC, GAS, OIL, OR WATER COMPANY THREATENED TO SHUT OFF SERVICES IN YOUR HOME?: NO

## 2025-01-18 SDOH — ECONOMIC STABILITY: HOUSING INSECURITY: IN THE LAST 12 MONTHS, WAS THERE A TIME WHEN YOU WERE NOT ABLE TO PAY THE MORTGAGE OR RENT ON TIME?: NO

## 2025-01-18 SDOH — SOCIAL STABILITY: SOCIAL INSECURITY
WITHIN THE LAST YEAR, HAVE YOU BEEN KICKED, HIT, SLAPPED, OR OTHERWISE PHYSICALLY HURT BY YOUR PARTNER OR EX-PARTNER?: NO

## 2025-01-18 SDOH — SOCIAL STABILITY: SOCIAL INSECURITY: HAS ANYONE EVER THREATENED TO HURT YOUR FAMILY OR YOUR PETS?: NO

## 2025-01-18 SDOH — ECONOMIC STABILITY: HOUSING INSECURITY: IN THE PAST 12 MONTHS, HOW MANY TIMES HAVE YOU MOVED WHERE YOU WERE LIVING?: 0

## 2025-01-18 SDOH — SOCIAL STABILITY: SOCIAL INSECURITY: WITHIN THE LAST YEAR, HAVE YOU BEEN HUMILIATED OR EMOTIONALLY ABUSED IN OTHER WAYS BY YOUR PARTNER OR EX-PARTNER?: NO

## 2025-01-18 SDOH — SOCIAL STABILITY: SOCIAL INSECURITY: HAVE YOU HAD THOUGHTS OF HARMING ANYONE ELSE?: NO

## 2025-01-18 SDOH — SOCIAL STABILITY: SOCIAL INSECURITY: ABUSE: ADULT

## 2025-01-18 SDOH — SOCIAL STABILITY: SOCIAL INSECURITY: ARE THERE ANY APPARENT SIGNS OF INJURIES/BEHAVIORS THAT COULD BE RELATED TO ABUSE/NEGLECT?: NO

## 2025-01-18 SDOH — SOCIAL STABILITY: SOCIAL INSECURITY
WITHIN THE LAST YEAR, HAVE YOU BEEN RAPED OR FORCED TO HAVE ANY KIND OF SEXUAL ACTIVITY BY YOUR PARTNER OR EX-PARTNER?: NO

## 2025-01-18 SDOH — ECONOMIC STABILITY: TRANSPORTATION INSECURITY: IN THE PAST 12 MONTHS, HAS LACK OF TRANSPORTATION KEPT YOU FROM MEDICAL APPOINTMENTS OR FROM GETTING MEDICATIONS?: NO

## 2025-01-18 SDOH — SOCIAL STABILITY: SOCIAL INSECURITY: WERE YOU ABLE TO COMPLETE ALL THE BEHAVIORAL HEALTH SCREENINGS?: YES

## 2025-01-18 SDOH — ECONOMIC STABILITY: FOOD INSECURITY: HOW HARD IS IT FOR YOU TO PAY FOR THE VERY BASICS LIKE FOOD, HOUSING, MEDICAL CARE, AND HEATING?: NOT HARD AT ALL

## 2025-01-18 SDOH — SOCIAL STABILITY: SOCIAL INSECURITY: HAVE YOU HAD ANY THOUGHTS OF HARMING ANYONE ELSE?: NO

## 2025-01-18 SDOH — SOCIAL STABILITY: SOCIAL INSECURITY: DO YOU FEEL ANYONE HAS EXPLOITED OR TAKEN ADVANTAGE OF YOU FINANCIALLY OR OF YOUR PERSONAL PROPERTY?: NO

## 2025-01-18 ASSESSMENT — PATIENT HEALTH QUESTIONNAIRE - PHQ9
1. LITTLE INTEREST OR PLEASURE IN DOING THINGS: NOT AT ALL
2. FEELING DOWN, DEPRESSED OR HOPELESS: NOT AT ALL
SUM OF ALL RESPONSES TO PHQ9 QUESTIONS 1 & 2: 0

## 2025-01-18 ASSESSMENT — COGNITIVE AND FUNCTIONAL STATUS - GENERAL
HELP NEEDED FOR BATHING: A LOT
CLIMB 3 TO 5 STEPS WITH RAILING: A LOT
DRESSING REGULAR LOWER BODY CLOTHING: A LOT
TOILETING: A LOT
DAILY ACTIVITIY SCORE: 18
MOBILITY SCORE: 24
MOBILITY SCORE: 16
WALKING IN HOSPITAL ROOM: A LOT
STANDING UP FROM CHAIR USING ARMS: A LOT
MOVING TO AND FROM BED TO CHAIR: A LOT
DAILY ACTIVITIY SCORE: 24

## 2025-01-18 ASSESSMENT — PAIN SCALES - GENERAL
PAINLEVEL_OUTOF10: 0 - NO PAIN
PAINLEVEL_OUTOF10: 0 - NO PAIN
PAINLEVEL_OUTOF10: 4
PAINLEVEL_OUTOF10: 4
PAINLEVEL_OUTOF10: 0 - NO PAIN
PAINLEVEL_OUTOF10: 3

## 2025-01-18 ASSESSMENT — PAIN - FUNCTIONAL ASSESSMENT
PAIN_FUNCTIONAL_ASSESSMENT: 0-10

## 2025-01-18 ASSESSMENT — LIFESTYLE VARIABLES
AUDIT-C TOTAL SCORE: 0
HOW MANY STANDARD DRINKS CONTAINING ALCOHOL DO YOU HAVE ON A TYPICAL DAY: PATIENT DOES NOT DRINK
HOW OFTEN DO YOU HAVE A DRINK CONTAINING ALCOHOL: NEVER
SKIP TO QUESTIONS 9-10: 1
HOW OFTEN DO YOU HAVE 6 OR MORE DRINKS ON ONE OCCASION: NEVER
AUDIT-C TOTAL SCORE: 0

## 2025-01-18 ASSESSMENT — ACTIVITIES OF DAILY LIVING (ADL)
ADL_ASSISTANCE: INDEPENDENT
LACK_OF_TRANSPORTATION: NO

## 2025-01-18 ASSESSMENT — PAIN DESCRIPTION - LOCATION: LOCATION: HEAD

## 2025-01-18 NOTE — PROGRESS NOTES
Aristeo Barriga is a 79 y.o. male admitted for Chronic obstructive pulmonary disease, unspecified COPD type (Multi). Pharmacy reviewed the patient's [id number] medications and allergies for accuracy.    The list below reflects the PTA list prior to pharmacy medication history. A summary a changes to the PTA medication list has been listed below. Please review each medication in order reconciliation for additional clarification and justification.    Source of information:  Patient  va  Medications added:  Aspirin 81mg 1 qd    Medications modified:    Medications to be removed:  Advair 250/50    Medications of concern:      Prior to Admission Medications   Prescriptions Last Dose Informant Patient Reported? Taking?   NIFEdipine ER (NIFEdipine CC) 30 mg 24 hr tablet   Yes No   Sig: Take 1 tablet (30 mg) by mouth once daily.   NON FORMULARY   Yes No   Sig: Oxygen therapy 2L N/C PRN   allopurinol (Zyloprim) 100 mg tablet   Yes No   Sig: Take 1 tablet (100 mg) by mouth once daily.   atorvastatin (Lipitor) 80 mg tablet   Yes No   Sig: Take 1 tablet (80 mg) by mouth once daily at bedtime.   busPIRone (Buspar) 5 mg tablet   Yes No   Sig: Take 1 tablet (5 mg) by mouth 2 times a day as needed (Anxiety).   clopidogrel (Plavix) 75 mg tablet   Yes No   Sig: Take 1 tablet (75 mg) by mouth once daily.   fluticasone furoate-vilanteroL (Breo Ellipta) 200-25 mcg/dose inhaler Not Taking  No No   Sig: Inhale 1 puff by mouth into the lungs once daily. Do not start before February 27, 2024.   Patient not taking: Reported on 1/17/2025   fluticasone propion-salmeteroL (Advair Diskus) 250-50 mcg/dose diskus inhaler   Yes No   Sig: Inhale 1 puff 2 times a day. Rinse mouth with water after use to reduce aftertaste and incidence of candidiasis. Do not swallow.   ipratropium-albuteroL (Duo-Neb) 0.5-2.5 mg/3 mL nebulizer solution Not Taking  No No   Sig: Use 3 mL by nebulization every 6 hours if needed for wheezing.   Patient not  taking: Reported on 1/17/2025   metoprolol succinate XL (Toprol-XL) 25 mg 24 hr tablet   Yes No   Sig: Take 0.5 tablets (12.5 mg) by mouth once daily. Do not crush or chew.   montelukast (Singulair) 10 mg tablet   Yes No   Sig: Take 1 tablet (10 mg) by mouth once daily.   nicotine (Nicoderm CQ) 14 mg/24 hr patch Not Taking  No No   Sig: Place 1 patch over 24 hours on the skin once daily for 28 days. Do not start before February 27, 2024.   Patient not taking: Reported on 1/17/2025   potassium chloride CR 10 mEq ER tablet   Yes No   Sig: Take 1 tablet (10 mEq) by mouth once daily.   ruxolitinib (Jakafi) 20 mg tablet   Yes No   Sig: Take 1 tablet (20 mg total) by mouth twice a day.   sertraline (Zoloft) 100 mg tablet   Yes No   Sig: Take 1 tablet (100 mg) by mouth once daily.   tamsulosin (Flomax) 0.4 mg 24 hr capsule   Yes No   Sig: Take 1 capsule (0.4 mg) by mouth once daily.   tiotropium (Spiriva Respimat) 2.5 mcg/actuation inhaler   Yes No   Sig: Inhale 2 puffs once daily.      Facility-Administered Medications: None       Fallon Medina
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
## 2025-01-18 NOTE — PROGRESS NOTES
Emergency Medicine Transition of Care Note.    I received Aristeo Barriga in signout from Dr. Bass.  Please see the previous ED provider note for all HPI, PE and MDM up to the time of signout. This is in addition to the primary record.    In brief Aristeo Barriga is an 79 y.o. male presenting for   Chief Complaint   Patient presents with    Shortness of Breath        ED Course as of 01/18/25 0825 Fri Jan 17, 2025   1647 POCT Lactate, Venous(!): 3.6 [WJ]   1707 XR chest 1 view  Independently reviewed the patient's chest x-ray.  Multifocal patchy opacities bilaterally the radiology read is currently pending. [WJ]      ED Course User Index  [WJ] Preston Patel DO         Diagnoses as of 01/18/25 0825   Chronic obstructive pulmonary disease, unspecified COPD type (Multi)       Medical Decision Making  Received patient in signout at this time. Patient is admitted to [facility] for COPD versus CHF exacerbation, on home oxygen. Boarding in the emergency department at this time pending bed availability secondary to high patient volumes.  No acute concerns were vocalized and no significant events occurred while under my care.\    Final diagnoses:   [J44.9] Chronic obstructive pulmonary disease, unspecified COPD type (Multi)       Procedure  Procedures    Berkley Davis MD

## 2025-01-18 NOTE — PROGRESS NOTES
Physical Therapy    Physical Therapy Evaluation    Patient Name: Aristeo Barriga  MRN: [id number]  Today's Date: 1/18/2025   Time Calculation  Start Time: 0937  Stop Time: 0948  Time Calculation (min): 11 min  AC05/AC05    Assessment/Plan   PT Assessment  PT Assessment Results: Decreased strength, Decreased endurance, Impaired balance, Decreased mobility (increased O2 dependence)  Rehab Prognosis: Good  Barriers to Discharge Home: No anticipated barriers  Evaluation/Treatment Tolerance: Patient limited by fatigue (SOB/decreased O2 sats)  Medical Staff Made Aware: Yes  End of Session Communication: Bedside nurse  Assessment Comment: Patient requires minimal assist x 1-2 for OOB mobility due to decreased endurance and mild decreased balance. Anticipate good improvement over course of admit.  End of Session Patient Position: Bed, 2 rail up, Alarm off, not on at start of session (ED cart bed)  IP OR SWING BED PT PLAN  Inpatient or Swing Bed: Inpatient  PT Plan  Treatment/Interventions: Bed mobility, Transfer training, Gait training, Stair training, Balance training, Strengthening, Endurance training, Therapeutic exercise, Therapeutic activity, Home exercise program  PT Plan: Ongoing PT  PT Frequency: 4 times per week  PT Discharge Recommendations: Low intensity level of continued care  PT - OK to Discharge: Yes (when medically cleared)      General Visit Information:  General  Reason for Referral: Dx: SOB, COPD exacerbation  Referred By: Carissa  Past Medical History Relevant to Rehab: HTN, CAD, CHF, COPD/chronic respiratory failure on 4L/min O2, tobacco use in past, CKD, AAA, R BKA  Co-Treatment:  (with OT for safety and activity tolerance)  Prior to Session Communication: Bedside nurse  Patient Position Received: Bed, 2 rail up, Alarm off, not on at start of session (ED cart bed)  General Comment: Seen in ED room 5; tele, O2 on 6L/min    Home Living:  Home Living  Type of Home:  (Lives with family/wife in 1 level  home, 2 steps to enter. Patient wears prosthesis, amb without AD normally, does have FWW if needed, fairly independent.)    Prior Level of Function:       Precautions:  Precautions  Precautions Comment: falls, O2    Vital Signs:  Vital Signs  Pulse Ox:  (dropped to 83-84% with minimal activity; recovered to 89-90% after 2 min rest)  Objective     Pain:  Pain Assessment  0-10 (Numeric) Pain Score: 0 - No pain    Cognition:  Cognition  Overall Cognitive Status: Within Functional Limits    General Assessments:  General Observation  General Observation: Activity limited by O2 sats/poor endurancce   Activity Tolerance  Endurance: Tolerates less than 10 min exercise with changes in vital signs     Strength  Strength Comments: RICKEY LE quads grossly 4+/5        Postural Control  Postural Control:  (Sitting balance fair/fair+; standing balance fair/fair- with hand held assist and mild retro lean)          Functional Assessments:     Bed Mobility  Bed Mobility:  (Supine to/from sit with SBA x1)  Transfers  Transfer:  (Sit to stand with Min assist x 1-2 hand held; cues for sequencing, safety, balance, posture, wt shifting)  Ambulation/Gait Training  Ambulation/Gait Training Performed:  (Amb few side steps with Min assist x 1-2 hand held; cues for sequencing, safety, balance, posture, wt shifting)          Extremity/Trunk Assessments:                Outcome Measures:     [facility] Basic Mobility  Turning from your back to your side while in a flat bed without using bedrails: None  Moving from lying on your back to sitting on the side of a flat bed without using bedrails: None  Moving to and from bed to chair (including a wheelchair): A lot  Standing up from a chair using your arms (e.g. wheelchair or bedside chair): A lot  To walk in hospital room: A lot  Climbing 3-5 steps with railing: A lot  Basic Mobility - Total Score: 16                                                             Goals:  Encounter Problems       Encounter  Problems (Active)       PT Problem       transfers       Start:  01/18/25    Expected End:  02/01/25       Patient will perform all transfers with SBA x1          gait       Start:  01/18/25    Expected End:  02/01/25       Patient will amb 50+ feet with SBA x1 and FWW vs cane prn, rest breaks prn          endurance       Start:  01/18/25    Expected End:  02/01/25       Increase endurance to tolerate >/= 25+ mins EOB/OOB activity              Education Documentation  Mobility Training, taught by Bernie Velásquez, PT at 1/18/2025 10:28 AM.  Learner: Patient  Readiness: Acceptance  Method: Explanation  Response: Needs Reinforcement    Precautions, taught by Bernie Velásquez, PT at 1/18/2025 10:28 AM.  Learner: Patient  Readiness: Acceptance  Method: Explanation  Response: Needs Reinforcement    Education Comments  No comments found.

## 2025-01-18 NOTE — PROGRESS NOTES
Occupational Therapy    Evaluation    Patient Name: Aristeo Barriga  MRN: [id number]  Today's Date: 1/18/2025  Time Calculation  Start Time: 0938  Stop Time: 0948  Time Calculation (min): 10 min  AC05/AC05    Assessment  IP OT Assessment  OT Assessment: MIn.A X 2 trfrs. with 6 liters O2, O2 sats decreased to 83, improved to 89  Prognosis: Good  End of Session Patient Position: Bed, 2 rail up       01/18/25 0938   Inpatient Plan   Equipment Recommended upon Discharge Wheeled walker   Treatment Interventions Functional transfer training;Endurance training;UE strengthening/ROM   OT Frequency 4 times per week   OT - OK to Discharge Yes  ((when medically approp.))   OT Discharge Recommendations Low intensity level of continued care   OT Recommended Transfer Status Assist of 1     Subjective     Current Problem:  1. Chronic obstructive pulmonary disease, unspecified COPD type (Multi)        2. Dyspnea on exertion  Transthoracic Echo (TTE) Complete    Transthoracic Echo (TTE) Complete      3. Elevated troponin  Transthoracic Echo (TTE) Complete    Transthoracic Echo (TTE) Complete      4. H/O ST elevation myocardial infarction  Transthoracic Echo (TTE) Complete    Transthoracic Echo (TTE) Complete          General:  General  Reason for Referral: COPD, S.O.B.  Referred By: Carissa  Past Medical History Relevant to Rehab: Hx. of R BKA, COPD, home O2 4 liters, CAD, CHF  Co-Treatment: PT  Co-Treatment Reason: to maximize safe mobility  Prior to Session Communication: Bedside nurse  Patient Position Received: Bed, 2 rail up (in ER cot)  General Comment: 6 liters O2, prosthesis on, agreeable to OOB, states feels better, wants to go home    Precautions:  Medical Precautions: Fall precautions, Oxygen therapy device and L/min    Vital Signs:pulsox low 80s with min. Activity at bedside, recovered to high 80s with 6 liters O2        Pain:  Pain Assessment  Pain Assessment: 0-10  0-10 (Numeric) Pain Score: 0 - No pain    Objective      Cognition:  Overall Cognitive Status: Within Functional Limits         Home Living:  Type of Home: House  Lives With: Spouse  Home Adaptive Equipment: Walker rolling or standard (doesn't normally use)  Home Layout: One level  Home Access:  (2 SERENE)     Prior Function:  Level of [place]: Independent with ADLs and functional transfers, Independent with homemaking with ambulation  ADL Assistance: Independent  Homemaking Assistance: Independent  Ambulatory Assistance: Independent    IADL History:  Homemaking Responsibilities: Yes  Meal Prep Responsibility: Secondary  Laundry Responsibility: Secondary  Cleaning Responsibility: Secondary    ADL:  LE Dressing Assistance: Moderate (to don shoes , prosthesis already on)  Toileting Assistance with Device:  (MIn.A X 2 to[place] [facility] [place]y and easily S.O.B. with 6 liters O2)    Activity Tolerance:  Endurance: Tolerates less than 10 min exercise with changes in vital signs    Bed Mobility/Transfers:   Bed Mobility  Bed Mobility:  (SBA in/out of cot)  Transfers  Transfer:  (MIn.A X 2 sit-to-stand at bedside with back-of-chair support)       Sitting Balance:WNL        Standing Balance:  Static Standing Balance  Static Standing-Balance Support:  (Min.A X 2 with back-of-chair as support, 6 liters O2)    Vision: Vision - Basic Assessment  Current Vision: No visual deficits       Sensation:  Light Touch: No apparent deficits    Strength:  Strength Comments: UEs WNL    Perception:  Inattention/Neglect: Appears intact    Coordination:  Movements are Fluid and Coordinated: Yes     Hand Function:  Hand Function  Gross Grasp: Functional      Outcome Measures: [facility] Daily Activity  Putting on and taking off regular lower body clothing: A lot  Bathing (including washing, rinsing, drying): A lot  Putting on and taking off regular upper body clothing: None  Toileting, which includes using toilet, bedpan or urinal: A lot  Taking care of personal grooming such as brushing teeth:  None  Eating Meals: None  Daily Activity - Total Score: 18                       EDUCATION:     Education Documentation  ADL Training, taught by Mariza Laguerre OT at 1/18/2025 10:29 AM.  Learner: Patient  Readiness: Acceptance  Method: Demonstration  Response: Demonstrated Understanding  Comment: encouragement to sit up in bed as much as possible    Education Comments  No comments found.        Goals:   Encounter Problems       Encounter Problems (Active)       ADLs       Demo. SBA LB dressing  (Progressing)       Start:  01/18/25    Expected End:  01/25/25               EXERCISE/STRENGTHENING       Candy. UE ex. all planes 15 reps without S.O.B. and with O2  (Progressing)       Start:  01/18/25    Expected End:  01/25/25               MOBILITY       SBA func. trfrs. and mobility with FWW and O2  (Progressing)       Start:  01/18/25    Expected End:  01/25/25

## 2025-01-19 ENCOUNTER — APPOINTMENT (OUTPATIENT)
Dept: RADIOLOGY | Facility: HOSPITAL | Age: 80
End: 2025-01-19
Payer: OTHER GOVERNMENT

## 2025-01-19 VITALS
WEIGHT: 145.5 LBS | DIASTOLIC BLOOD PRESSURE: 64 MMHG | BODY MASS INDEX: 20.37 KG/M2 | SYSTOLIC BLOOD PRESSURE: 123 MMHG | TEMPERATURE: 98.2 F | HEIGHT: 71 IN | RESPIRATION RATE: 18 BRPM | HEART RATE: 84 BPM | OXYGEN SATURATION: 90 %

## 2025-01-19 LAB
ANION GAP SERPL CALC-SCNC: 12 MMOL/L (ref 10–20)
BASOPHILS # BLD MANUAL: 0 X10*3/UL (ref 0–0.1)
BASOPHILS NFR BLD MANUAL: 0 %
BUN SERPL-MCNC: 50 MG/DL (ref 6–23)
CALCIUM SERPL-MCNC: 8.6 MG/DL (ref 8.6–10.3)
CHLORIDE SERPL-SCNC: 104 MMOL/L (ref 98–107)
CO2 SERPL-SCNC: 25 MMOL/L (ref 21–32)
CREAT SERPL-MCNC: 1.77 MG/DL (ref 0.5–1.3)
DACRYOCYTES BLD QL SMEAR: ABNORMAL
EGFRCR SERPLBLD CKD-EPI 2021: 39 ML/MIN/1.73M*2
EOSINOPHIL # BLD MANUAL: 0.02 X10*3/UL (ref 0–0.4)
EOSINOPHIL NFR BLD MANUAL: 1 %
ERYTHROCYTE [DISTWIDTH] IN BLOOD BY AUTOMATED COUNT: 19.7 % (ref 11.5–14.5)
GLUCOSE SERPL-MCNC: 135 MG/DL (ref 74–99)
HCT VFR BLD AUTO: 25.2 % (ref 41–52)
HGB BLD-MCNC: 8.3 G/DL (ref 13.5–17.5)
HYPOCHROMIA BLD QL SMEAR: ABNORMAL
IMM GRANULOCYTES # BLD AUTO: 0.03 X10*3/UL (ref 0–0.5)
IMM GRANULOCYTES NFR BLD AUTO: 1.3 % (ref 0–0.9)
LYMPHOCYTES # BLD MANUAL: 0.36 X10*3/UL (ref 0.8–3)
LYMPHOCYTES NFR BLD MANUAL: 15 %
MCH RBC QN AUTO: 29.4 PG (ref 26–34)
MCHC RBC AUTO-ENTMCNC: 32.9 G/DL (ref 32–36)
MCV RBC AUTO: 89 FL (ref 80–100)
METAMYELOCYTES # BLD MANUAL: 0.02 X10*3/UL
METAMYELOCYTES NFR BLD MANUAL: 1 %
MONOCYTES # BLD MANUAL: 0.12 X10*3/UL (ref 0.05–0.8)
MONOCYTES NFR BLD MANUAL: 5 %
MYELOCYTES # BLD MANUAL: 0.02 X10*3/UL
MYELOCYTES NFR BLD MANUAL: 1 %
NEUTROPHILS # BLD MANUAL: 1.85 X10*3/UL (ref 1.6–5.5)
NEUTS BAND # BLD MANUAL: 0.22 X10*3/UL (ref 0–0.5)
NEUTS BAND NFR BLD MANUAL: 9 %
NEUTS SEG # BLD MANUAL: 1.63 X10*3/UL (ref 1.6–5)
NEUTS SEG NFR BLD MANUAL: 68 %
NRBC BLD-RTO: 1.3 /100 WBCS (ref 0–0)
OVALOCYTES BLD QL SMEAR: ABNORMAL
PLATELET # BLD AUTO: 197 X10*3/UL (ref 150–450)
POLYCHROMASIA BLD QL SMEAR: ABNORMAL
POTASSIUM SERPL-SCNC: 3.4 MMOL/L (ref 3.5–5.3)
RBC # BLD AUTO: 2.82 X10*6/UL (ref 4.5–5.9)
RBC MORPH BLD: ABNORMAL
SODIUM SERPL-SCNC: 138 MMOL/L (ref 136–145)
TOTAL CELLS COUNTED BLD: 100
WBC # BLD AUTO: 2.4 X10*3/UL (ref 4.4–11.3)

## 2025-01-19 PROCEDURE — 99233 SBSQ HOSP IP/OBS HIGH 50: CPT | Performed by: INTERNAL MEDICINE

## 2025-01-19 PROCEDURE — 36415 COLL VENOUS BLD VENIPUNCTURE: CPT | Performed by: INTERNAL MEDICINE

## 2025-01-19 PROCEDURE — 99222 1ST HOSP IP/OBS MODERATE 55: CPT | Performed by: INTERNAL MEDICINE

## 2025-01-19 PROCEDURE — 2060000001 HC INTERMEDIATE ICU ROOM DAILY

## 2025-01-19 PROCEDURE — 2500000002 HC RX 250 W HCPCS SELF ADMINISTERED DRUGS (ALT 637 FOR MEDICARE OP, ALT 636 FOR OP/ED): Performed by: STUDENT IN AN ORGANIZED HEALTH CARE EDUCATION/TRAINING PROGRAM

## 2025-01-19 PROCEDURE — 94640 AIRWAY INHALATION TREATMENT: CPT

## 2025-01-19 PROCEDURE — 80051 ELECTROLYTE PANEL: CPT | Performed by: INTERNAL MEDICINE

## 2025-01-19 PROCEDURE — 2500000001 HC RX 250 WO HCPCS SELF ADMINISTERED DRUGS (ALT 637 FOR MEDICARE OP): Performed by: INTERNAL MEDICINE

## 2025-01-19 PROCEDURE — 2500000001 HC RX 250 WO HCPCS SELF ADMINISTERED DRUGS (ALT 637 FOR MEDICARE OP): Performed by: STUDENT IN AN ORGANIZED HEALTH CARE EDUCATION/TRAINING PROGRAM

## 2025-01-19 PROCEDURE — 85027 COMPLETE CBC AUTOMATED: CPT | Performed by: INTERNAL MEDICINE

## 2025-01-19 PROCEDURE — 2500000002 HC RX 250 W HCPCS SELF ADMINISTERED DRUGS (ALT 637 FOR MEDICARE OP, ALT 636 FOR OP/ED): Performed by: INTERNAL MEDICINE

## 2025-01-19 PROCEDURE — 85007 BL SMEAR W/DIFF WBC COUNT: CPT | Performed by: INTERNAL MEDICINE

## 2025-01-19 PROCEDURE — 2500000005 HC RX 250 GENERAL PHARMACY W/O HCPCS: Performed by: STUDENT IN AN ORGANIZED HEALTH CARE EDUCATION/TRAINING PROGRAM

## 2025-01-19 PROCEDURE — 71250 CT THORAX DX C-: CPT

## 2025-01-19 PROCEDURE — 71250 CT THORAX DX C-: CPT | Performed by: RADIOLOGY

## 2025-01-19 PROCEDURE — 2500000004 HC RX 250 GENERAL PHARMACY W/ HCPCS (ALT 636 FOR OP/ED): Mod: JZ | Performed by: STUDENT IN AN ORGANIZED HEALTH CARE EDUCATION/TRAINING PROGRAM

## 2025-01-19 RX ORDER — HYDRALAZINE HYDROCHLORIDE 10 MG/1
10 TABLET, FILM COATED ORAL 3 TIMES DAILY
Status: DISCONTINUED | OUTPATIENT
Start: 2025-01-19 | End: 2025-01-21 | Stop reason: HOSPADM

## 2025-01-19 RX ORDER — FUROSEMIDE 10 MG/ML
40 INJECTION INTRAMUSCULAR; INTRAVENOUS DAILY
Status: DISCONTINUED | OUTPATIENT
Start: 2025-01-20 | End: 2025-01-20

## 2025-01-19 RX ORDER — ISOSORBIDE MONONITRATE 30 MG/1
30 TABLET, EXTENDED RELEASE ORAL DAILY
Status: DISCONTINUED | OUTPATIENT
Start: 2025-01-19 | End: 2025-01-21 | Stop reason: HOSPADM

## 2025-01-19 RX ORDER — FUROSEMIDE 10 MG/ML
20 INJECTION INTRAMUSCULAR; INTRAVENOUS DAILY
Status: DISCONTINUED | OUTPATIENT
Start: 2025-01-19 | End: 2025-01-19

## 2025-01-19 RX ORDER — AMINOPHYLLINE 25 MG/ML
125 INJECTION, SOLUTION INTRAVENOUS AS NEEDED
Status: CANCELLED | OUTPATIENT
Start: 2025-01-19

## 2025-01-19 RX ORDER — CARVEDILOL 3.12 MG/1
3.12 TABLET ORAL 2 TIMES DAILY
Status: DISCONTINUED | OUTPATIENT
Start: 2025-01-19 | End: 2025-01-21 | Stop reason: HOSPADM

## 2025-01-19 RX ORDER — POTASSIUM CHLORIDE 750 MG/1
40 TABLET, FILM COATED, EXTENDED RELEASE ORAL ONCE
Status: COMPLETED | OUTPATIENT
Start: 2025-01-19 | End: 2025-01-19

## 2025-01-19 RX ADMIN — IPRATROPIUM BROMIDE AND ALBUTEROL SULFATE 3 ML: 2.5; .5 SOLUTION RESPIRATORY (INHALATION) at 12:15

## 2025-01-19 RX ADMIN — TAMSULOSIN HYDROCHLORIDE 0.4 MG: 0.4 CAPSULE ORAL at 11:37

## 2025-01-19 RX ADMIN — MONTELUKAST 10 MG: 10 TABLET, FILM COATED ORAL at 11:38

## 2025-01-19 RX ADMIN — CLOPIDOGREL 75 MG: 75 TABLET ORAL at 11:38

## 2025-01-19 RX ADMIN — HEPARIN SODIUM 5000 UNITS: 5000 INJECTION, SOLUTION INTRAVENOUS; SUBCUTANEOUS at 03:30

## 2025-01-19 RX ADMIN — HYDRALAZINE HYDROCHLORIDE 10 MG: 10 TABLET ORAL at 15:22

## 2025-01-19 RX ADMIN — POTASSIUM CHLORIDE 40 MEQ: 750 TABLET, FILM COATED, EXTENDED RELEASE ORAL at 11:38

## 2025-01-19 RX ADMIN — RUXOLITINIB 20 MG: 20 TABLET ORAL at 11:37

## 2025-01-19 RX ADMIN — Medication 6 L/MIN: at 20:00

## 2025-01-19 RX ADMIN — IPRATROPIUM BROMIDE AND ALBUTEROL SULFATE 3 ML: 2.5; .5 SOLUTION RESPIRATORY (INHALATION) at 21:14

## 2025-01-19 RX ADMIN — CARVEDILOL 3.12 MG: 3.12 TABLET, FILM COATED ORAL at 21:52

## 2025-01-19 RX ADMIN — ASPIRIN 81 MG: 81 TABLET, COATED ORAL at 11:37

## 2025-01-19 RX ADMIN — AZITHROMYCIN DIHYDRATE 500 MG: 250 TABLET ORAL at 11:37

## 2025-01-19 RX ADMIN — PANTOPRAZOLE SODIUM 40 MG: 40 TABLET, DELAYED RELEASE ORAL at 05:31

## 2025-01-19 RX ADMIN — HEPARIN SODIUM 5000 UNITS: 5000 INJECTION, SOLUTION INTRAVENOUS; SUBCUTANEOUS at 17:51

## 2025-01-19 RX ADMIN — ISOSORBIDE MONONITRATE 30 MG: 30 TABLET, EXTENDED RELEASE ORAL at 11:38

## 2025-01-19 RX ADMIN — Medication 6 L/MIN: at 08:56

## 2025-01-19 RX ADMIN — SERTRALINE HYDROCHLORIDE 100 MG: 50 TABLET ORAL at 11:38

## 2025-01-19 RX ADMIN — HYDRALAZINE HYDROCHLORIDE 10 MG: 10 TABLET ORAL at 21:52

## 2025-01-19 RX ADMIN — METHYLPREDNISOLONE SODIUM SUCCINATE 40 MG: 40 INJECTION, POWDER, FOR SOLUTION INTRAMUSCULAR; INTRAVENOUS at 15:22

## 2025-01-19 RX ADMIN — CARVEDILOL 3.12 MG: 3.12 TABLET, FILM COATED ORAL at 11:38

## 2025-01-19 RX ADMIN — IPRATROPIUM BROMIDE AND ALBUTEROL SULFATE 3 ML: 2.5; .5 SOLUTION RESPIRATORY (INHALATION) at 08:52

## 2025-01-19 RX ADMIN — HEPARIN SODIUM 5000 UNITS: 5000 INJECTION, SOLUTION INTRAVENOUS; SUBCUTANEOUS at 11:38

## 2025-01-19 RX ADMIN — RUXOLITINIB 20 MG: 20 TABLET ORAL at 21:52

## 2025-01-19 RX ADMIN — METHYLPREDNISOLONE SODIUM SUCCINATE 40 MG: 40 INJECTION, POWDER, FOR SOLUTION INTRAMUSCULAR; INTRAVENOUS at 21:52

## 2025-01-19 RX ADMIN — Medication 6 L/MIN: at 12:15

## 2025-01-19 RX ADMIN — ATORVASTATIN CALCIUM 80 MG: 40 TABLET, FILM COATED ORAL at 21:52

## 2025-01-19 RX ADMIN — METHYLPREDNISOLONE SODIUM SUCCINATE 40 MG: 40 INJECTION, POWDER, FOR SOLUTION INTRAMUSCULAR; INTRAVENOUS at 05:31

## 2025-01-19 RX ADMIN — HYDRALAZINE HYDROCHLORIDE 10 MG: 10 TABLET ORAL at 11:38

## 2025-01-19 ASSESSMENT — COGNITIVE AND FUNCTIONAL STATUS - GENERAL
STANDING UP FROM CHAIR USING ARMS: A LITTLE
DAILY ACTIVITIY SCORE: 22
WALKING IN HOSPITAL ROOM: A LITTLE
TOILETING: A LITTLE
MOBILITY SCORE: 20
DAILY ACTIVITIY SCORE: 22
MOVING TO AND FROM BED TO CHAIR: A LITTLE
WALKING IN HOSPITAL ROOM: A LITTLE
MOVING TO AND FROM BED TO CHAIR: A LITTLE
CLIMB 3 TO 5 STEPS WITH RAILING: A LITTLE
DRESSING REGULAR LOWER BODY CLOTHING: A LITTLE
MOBILITY SCORE: 20
TOILETING: A LITTLE
CLIMB 3 TO 5 STEPS WITH RAILING: A LITTLE
STANDING UP FROM CHAIR USING ARMS: A LITTLE
DRESSING REGULAR LOWER BODY CLOTHING: A LITTLE

## 2025-01-19 ASSESSMENT — PAIN - FUNCTIONAL ASSESSMENT
PAIN_FUNCTIONAL_ASSESSMENT: 0-10

## 2025-01-19 ASSESSMENT — PAIN SCALES - GENERAL
PAINLEVEL_OUTOF10: 0 - NO PAIN
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
## 2025-01-19 NOTE — CARE PLAN
The patient's goals for the shift include    Problem: Pain - Adult  Goal: Verbalizes/displays adequate comfort level or baseline comfort level  Outcome: Progressing     Problem: Safety - Adult  Goal: Free from fall injury  Outcome: Progressing     Problem: Discharge Planning  Goal: Discharge to home or other facility with appropriate resources  Outcome: Progressing     Problem: Chronic Conditions and Co-morbidities  Goal: Patient's chronic conditions and co-morbidity symptoms are monitored and maintained or improved  Outcome: Progressing     Problem: Fall/Injury  Goal: Not fall by end of shift  Outcome: Progressing  Goal: Be free from injury by end of the shift  Outcome: Progressing  Goal: Verbalize understanding of personal risk factors for fall in the hospital  Outcome: Progressing  Goal: Verbalize understanding of risk factor reduction measures to prevent injury from fall in the home  Outcome: Progressing  Goal: Use assistive devices by end of the shift  Outcome: Progressing  Goal: Pace activities to prevent fatigue by end of the shift  Outcome: Progressing       The clinical goals for the shift include wean oxygen to basesline by end of shift.    See assessment and mar. Remains on increased oxygen from baseline. Morning labs ordered. Stress test and ct chest pending. Potassium replaced. Home PO chemo ordered.

## 2025-01-19 NOTE — CARE PLAN
Problem: Pain - Adult  Goal: Verbalizes/displays adequate comfort level or baseline comfort level  Outcome: Progressing     Problem: Safety - Adult  Goal: Free from fall injury  Outcome: Progressing     Problem: Chronic Conditions and Co-morbidities  Goal: Patient's chronic conditions and co-morbidity symptoms are monitored and maintained or improved  Outcome: Progressing     Problem: Discharge Planning  Goal: Discharge to home or other facility with appropriate resources  Outcome: Progressing   The patient's goals for the shift include      The clinical goals for the shift include Maintain pt safety/comfort; monitor labs/vitals and respiratory status

## 2025-01-20 ENCOUNTER — APPOINTMENT (OUTPATIENT)
Dept: RADIOLOGY | Facility: HOSPITAL | Age: 80
End: 2025-01-20
Payer: OTHER GOVERNMENT

## 2025-01-20 ENCOUNTER — APPOINTMENT (OUTPATIENT)
Dept: CARDIOLOGY | Facility: HOSPITAL | Age: 80
End: 2025-01-20
Payer: OTHER GOVERNMENT

## 2025-01-20 LAB
ANION GAP SERPL CALC-SCNC: 12 MMOL/L (ref 10–20)
ATRIAL RATE: 88 BPM
BASOPHILS # BLD MANUAL: 0 X10*3/UL (ref 0–0.1)
BASOPHILS NFR BLD MANUAL: 0 %
BUN SERPL-MCNC: 62 MG/DL (ref 6–23)
CALCIUM SERPL-MCNC: 8.4 MG/DL (ref 8.6–10.3)
CHLORIDE SERPL-SCNC: 103 MMOL/L (ref 98–107)
CO2 SERPL-SCNC: 25 MMOL/L (ref 21–32)
CREAT SERPL-MCNC: 1.84 MG/DL (ref 0.5–1.3)
DACRYOCYTES BLD QL SMEAR: ABNORMAL
EGFRCR SERPLBLD CKD-EPI 2021: 37 ML/MIN/1.73M*2
EOSINOPHIL # BLD MANUAL: 0.08 X10*3/UL (ref 0–0.4)
EOSINOPHIL NFR BLD MANUAL: 2 %
ERYTHROCYTE [DISTWIDTH] IN BLOOD BY AUTOMATED COUNT: 19.7 % (ref 11.5–14.5)
GLUCOSE SERPL-MCNC: 135 MG/DL (ref 74–99)
HCT VFR BLD AUTO: 23.4 % (ref 41–52)
HGB BLD-MCNC: 7.6 G/DL (ref 13.5–17.5)
HYPOCHROMIA BLD QL SMEAR: ABNORMAL
IMM GRANULOCYTES # BLD AUTO: 0.29 X10*3/UL (ref 0–0.5)
IMM GRANULOCYTES NFR BLD AUTO: 7.3 % (ref 0–0.9)
LYMPHOCYTES # BLD MANUAL: 0.72 X10*3/UL (ref 0.8–3)
LYMPHOCYTES NFR BLD MANUAL: 18 %
MCH RBC QN AUTO: 29.1 PG (ref 26–34)
MCHC RBC AUTO-ENTMCNC: 32.5 G/DL (ref 32–36)
MCV RBC AUTO: 90 FL (ref 80–100)
METAMYELOCYTES # BLD MANUAL: 0.08 X10*3/UL
METAMYELOCYTES NFR BLD MANUAL: 2 %
MONOCYTES # BLD MANUAL: 0.28 X10*3/UL (ref 0.05–0.8)
MONOCYTES NFR BLD MANUAL: 7 %
MYELOCYTES # BLD MANUAL: 0.04 X10*3/UL
MYELOCYTES NFR BLD MANUAL: 1 %
NEUTROPHILS # BLD MANUAL: 2.8 X10*3/UL (ref 1.6–5.5)
NEUTS BAND # BLD MANUAL: 1 X10*3/UL (ref 0–0.5)
NEUTS BAND NFR BLD MANUAL: 25 %
NEUTS SEG # BLD MANUAL: 1.8 X10*3/UL (ref 1.6–5)
NEUTS SEG NFR BLD MANUAL: 45 %
NRBC BLD-RTO: 0.5 /100 WBCS (ref 0–0)
OVALOCYTES BLD QL SMEAR: ABNORMAL
P AXIS: 44 DEGREES
PLATELET # BLD AUTO: 214 X10*3/UL (ref 150–450)
POLYCHROMASIA BLD QL SMEAR: ABNORMAL
POTASSIUM SERPL-SCNC: 3.7 MMOL/L (ref 3.5–5.3)
PR INTERVAL: 168 MS
Q ONSET: 251 MS
QRS COUNT: 14 BEATS
QRS DURATION: 103 MS
QT INTERVAL: 371 MS
QTC CALCULATION(BAZETT): 449 MS
QTC FREDERICIA: 421 MS
R AXIS: 20 DEGREES
RBC # BLD AUTO: 2.61 X10*6/UL (ref 4.5–5.9)
RBC MORPH BLD: ABNORMAL
SODIUM SERPL-SCNC: 136 MMOL/L (ref 136–145)
T AXIS: 110 DEGREES
T OFFSET: 436 MS
TOTAL CELLS COUNTED BLD: 100
VENTRICULAR RATE: 88 BPM
WBC # BLD AUTO: 4 X10*3/UL (ref 4.4–11.3)

## 2025-01-20 PROCEDURE — 2500000002 HC RX 250 W HCPCS SELF ADMINISTERED DRUGS (ALT 637 FOR MEDICARE OP, ALT 636 FOR OP/ED): Performed by: INTERNAL MEDICINE

## 2025-01-20 PROCEDURE — 2500000005 HC RX 250 GENERAL PHARMACY W/O HCPCS: Performed by: STUDENT IN AN ORGANIZED HEALTH CARE EDUCATION/TRAINING PROGRAM

## 2025-01-20 PROCEDURE — 2500000001 HC RX 250 WO HCPCS SELF ADMINISTERED DRUGS (ALT 637 FOR MEDICARE OP): Performed by: STUDENT IN AN ORGANIZED HEALTH CARE EDUCATION/TRAINING PROGRAM

## 2025-01-20 PROCEDURE — 2500000002 HC RX 250 W HCPCS SELF ADMINISTERED DRUGS (ALT 637 FOR MEDICARE OP, ALT 636 FOR OP/ED): Performed by: STUDENT IN AN ORGANIZED HEALTH CARE EDUCATION/TRAINING PROGRAM

## 2025-01-20 PROCEDURE — 2060000001 HC INTERMEDIATE ICU ROOM DAILY

## 2025-01-20 PROCEDURE — 2500000004 HC RX 250 GENERAL PHARMACY W/ HCPCS (ALT 636 FOR OP/ED): Mod: JZ | Performed by: STUDENT IN AN ORGANIZED HEALTH CARE EDUCATION/TRAINING PROGRAM

## 2025-01-20 PROCEDURE — 2500000001 HC RX 250 WO HCPCS SELF ADMINISTERED DRUGS (ALT 637 FOR MEDICARE OP): Performed by: PHYSICIAN ASSISTANT

## 2025-01-20 PROCEDURE — 2500000001 HC RX 250 WO HCPCS SELF ADMINISTERED DRUGS (ALT 637 FOR MEDICARE OP): Performed by: INTERNAL MEDICINE

## 2025-01-20 PROCEDURE — 93017 CV STRESS TEST TRACING ONLY: CPT

## 2025-01-20 PROCEDURE — 78452 HT MUSCLE IMAGE SPECT MULT: CPT

## 2025-01-20 PROCEDURE — 2500000004 HC RX 250 GENERAL PHARMACY W/ HCPCS (ALT 636 FOR OP/ED): Performed by: INTERNAL MEDICINE

## 2025-01-20 PROCEDURE — 99233 SBSQ HOSP IP/OBS HIGH 50: CPT | Performed by: INTERNAL MEDICINE

## 2025-01-20 PROCEDURE — A9502 TC99M TETROFOSMIN: HCPCS | Performed by: INTERNAL MEDICINE

## 2025-01-20 PROCEDURE — 99232 SBSQ HOSP IP/OBS MODERATE 35: CPT | Performed by: PHYSICIAN ASSISTANT

## 2025-01-20 PROCEDURE — 3430000001 HC RX 343 DIAGNOSTIC RADIOPHARMACEUTICALS: Performed by: INTERNAL MEDICINE

## 2025-01-20 PROCEDURE — 85007 BL SMEAR W/DIFF WBC COUNT: CPT | Performed by: INTERNAL MEDICINE

## 2025-01-20 PROCEDURE — 36415 COLL VENOUS BLD VENIPUNCTURE: CPT | Performed by: INTERNAL MEDICINE

## 2025-01-20 PROCEDURE — 94640 AIRWAY INHALATION TREATMENT: CPT

## 2025-01-20 PROCEDURE — 93016 CV STRESS TEST SUPVJ ONLY: CPT | Performed by: INTERNAL MEDICINE

## 2025-01-20 PROCEDURE — 85027 COMPLETE CBC AUTOMATED: CPT | Performed by: INTERNAL MEDICINE

## 2025-01-20 PROCEDURE — 93018 CV STRESS TEST I&R ONLY: CPT | Performed by: INTERNAL MEDICINE

## 2025-01-20 PROCEDURE — 80048 BASIC METABOLIC PNL TOTAL CA: CPT | Performed by: INTERNAL MEDICINE

## 2025-01-20 RX ORDER — FUROSEMIDE 20 MG/1
20 TABLET ORAL DAILY
Status: DISCONTINUED | OUTPATIENT
Start: 2025-01-20 | End: 2025-01-21 | Stop reason: HOSPADM

## 2025-01-20 RX ORDER — REGADENOSON 0.08 MG/ML
0.4 INJECTION, SOLUTION INTRAVENOUS
Status: COMPLETED | OUTPATIENT
Start: 2025-01-20 | End: 2025-01-20

## 2025-01-20 RX ORDER — IPRATROPIUM BROMIDE AND ALBUTEROL SULFATE 2.5; .5 MG/3ML; MG/3ML
3 SOLUTION RESPIRATORY (INHALATION)
Status: DISCONTINUED | OUTPATIENT
Start: 2025-01-20 | End: 2025-01-21 | Stop reason: HOSPADM

## 2025-01-20 RX ADMIN — REGADENOSON 0.4 MG: 0.08 INJECTION, SOLUTION INTRAVENOUS at 13:52

## 2025-01-20 RX ADMIN — SERTRALINE HYDROCHLORIDE 100 MG: 50 TABLET ORAL at 09:07

## 2025-01-20 RX ADMIN — HYDRALAZINE HYDROCHLORIDE 10 MG: 10 TABLET ORAL at 15:09

## 2025-01-20 RX ADMIN — Medication 6 L/MIN: at 07:59

## 2025-01-20 RX ADMIN — ATORVASTATIN CALCIUM 80 MG: 40 TABLET, FILM COATED ORAL at 20:48

## 2025-01-20 RX ADMIN — HEPARIN SODIUM 5000 UNITS: 5000 INJECTION, SOLUTION INTRAVENOUS; SUBCUTANEOUS at 18:16

## 2025-01-20 RX ADMIN — HEPARIN SODIUM 5000 UNITS: 5000 INJECTION, SOLUTION INTRAVENOUS; SUBCUTANEOUS at 10:54

## 2025-01-20 RX ADMIN — TETROFOSMIN 10 MILLICURIE: 0.23 INJECTION, POWDER, LYOPHILIZED, FOR SOLUTION INTRAVENOUS at 12:25

## 2025-01-20 RX ADMIN — FUROSEMIDE 20 MG: 20 TABLET ORAL at 15:08

## 2025-01-20 RX ADMIN — Medication 3 MG: at 20:48

## 2025-01-20 RX ADMIN — RUXOLITINIB 20 MG: 20 TABLET ORAL at 21:25

## 2025-01-20 RX ADMIN — CLOPIDOGREL 75 MG: 75 TABLET ORAL at 08:55

## 2025-01-20 RX ADMIN — HEPARIN SODIUM 5000 UNITS: 5000 INJECTION, SOLUTION INTRAVENOUS; SUBCUTANEOUS at 00:56

## 2025-01-20 RX ADMIN — AZITHROMYCIN DIHYDRATE 500 MG: 250 TABLET ORAL at 08:53

## 2025-01-20 RX ADMIN — TAMSULOSIN HYDROCHLORIDE 0.4 MG: 0.4 CAPSULE ORAL at 08:53

## 2025-01-20 RX ADMIN — ISOSORBIDE MONONITRATE 30 MG: 30 TABLET, EXTENDED RELEASE ORAL at 15:08

## 2025-01-20 RX ADMIN — ASPIRIN 81 MG: 81 TABLET, COATED ORAL at 08:54

## 2025-01-20 RX ADMIN — IPRATROPIUM BROMIDE AND ALBUTEROL SULFATE 3 ML: 2.5; .5 SOLUTION RESPIRATORY (INHALATION) at 11:42

## 2025-01-20 RX ADMIN — IPRATROPIUM BROMIDE AND ALBUTEROL SULFATE 3 ML: 2.5; .5 SOLUTION RESPIRATORY (INHALATION) at 20:42

## 2025-01-20 RX ADMIN — METHYLPREDNISOLONE SODIUM SUCCINATE 40 MG: 40 INJECTION, POWDER, FOR SOLUTION INTRAMUSCULAR; INTRAVENOUS at 06:37

## 2025-01-20 RX ADMIN — CARVEDILOL 3.12 MG: 3.12 TABLET, FILM COATED ORAL at 08:54

## 2025-01-20 RX ADMIN — HYDRALAZINE HYDROCHLORIDE 10 MG: 10 TABLET ORAL at 08:55

## 2025-01-20 RX ADMIN — CARVEDILOL 3.12 MG: 3.12 TABLET, FILM COATED ORAL at 20:48

## 2025-01-20 RX ADMIN — IPRATROPIUM BROMIDE AND ALBUTEROL SULFATE 3 ML: 2.5; .5 SOLUTION RESPIRATORY (INHALATION) at 08:14

## 2025-01-20 RX ADMIN — RUXOLITINIB 20 MG: 20 TABLET ORAL at 08:56

## 2025-01-20 RX ADMIN — METHYLPREDNISOLONE SODIUM SUCCINATE 40 MG: 40 INJECTION, POWDER, FOR SOLUTION INTRAMUSCULAR; INTRAVENOUS at 15:08

## 2025-01-20 RX ADMIN — Medication 4 L/MIN: at 20:53

## 2025-01-20 RX ADMIN — METHYLPREDNISOLONE SODIUM SUCCINATE 40 MG: 40 INJECTION, POWDER, FOR SOLUTION INTRAMUSCULAR; INTRAVENOUS at 20:49

## 2025-01-20 RX ADMIN — MONTELUKAST 10 MG: 10 TABLET, FILM COATED ORAL at 08:55

## 2025-01-20 RX ADMIN — PANTOPRAZOLE SODIUM 40 MG: 40 TABLET, DELAYED RELEASE ORAL at 06:37

## 2025-01-20 RX ADMIN — HYDRALAZINE HYDROCHLORIDE 10 MG: 10 TABLET ORAL at 20:48

## 2025-01-20 RX ADMIN — TETROFOSMIN 30 MILLICURIE: 0.23 INJECTION, POWDER, LYOPHILIZED, FOR SOLUTION INTRAVENOUS at 13:45

## 2025-01-20 ASSESSMENT — COGNITIVE AND FUNCTIONAL STATUS - GENERAL
DRESSING REGULAR LOWER BODY CLOTHING: A LITTLE
STANDING UP FROM CHAIR USING ARMS: A LITTLE
MOVING TO AND FROM BED TO CHAIR: A LITTLE
DAILY ACTIVITIY SCORE: 22
CLIMB 3 TO 5 STEPS WITH RAILING: A LITTLE
MOBILITY SCORE: 20
TOILETING: A LITTLE
WALKING IN HOSPITAL ROOM: A LITTLE

## 2025-01-20 ASSESSMENT — ENCOUNTER SYMPTOMS
ABDOMINAL PAIN: 0
DYSPNEA ON EXERTION: 1
WEAKNESS: 1
SHORTNESS OF BREATH: 1
WHEEZING: 0
FEVER: 0
DYSURIA: 0
DIARRHEA: 0
PALPITATIONS: 0
NAUSEA: 0
VOMITING: 0
ORTHOPNEA: 0

## 2025-01-20 ASSESSMENT — PAIN SCALES - GENERAL
PAINLEVEL_OUTOF10: 0 - NO PAIN

## 2025-01-20 ASSESSMENT — PAIN - FUNCTIONAL ASSESSMENT
PAIN_FUNCTIONAL_ASSESSMENT: 0-10

## 2025-01-20 ASSESSMENT — ACTIVITIES OF DAILY LIVING (ADL): LACK_OF_TRANSPORTATION: NO
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
## 2025-01-20 NOTE — CARE PLAN
The patient's goals for the shift include      The clinical goals for the shift include Maintain patient's safety and manage patient's pain throughout the shift    Over the shift, the patient did  make progress toward the following goals. Barriers to progression include weak from lack of movement. Recommendations to address these barriers include increasing activity as tolerated.

## 2025-01-20 NOTE — CARE PLAN
The patient's goals for the shift include      The clinical goals for the shift include wean oxygen to basesline by end of shift.    Over the shift, the patient did make progress toward the following goals.

## 2025-01-20 NOTE — PROGRESS NOTES
Occupational Therapy                 Therapy Communication Note    Patient Name: Aristeo Barriga  MRN: [id number]  Department: [facility]  Room: 2026/2026-A  Today's Date: 1/20/2025     Discipline: Occupational Therapy    OT Missed Visit: Yes     Missed Visit Reason: Missed Visit Reason: Patient in a medical procedure    Missed Time: Attempt    Comment:

## 2025-01-20 NOTE — PROGRESS NOTES
"Physical Therapy                 Therapy Communication Note    Patient Name: Aristeo Barriga  MRN: [id number]  Department: [facility]  Room: 2026/2026-A  Today's Date: 1/20/2025     Discipline: Physical Therapy    PT Missed Visit: Yes     Missed Visit Reason: Missed Visit Reason: Other (Comment)    Missed Time: Attempt    Comment: 14:05- pt out of the room in nuclear med         15:20- pt adamantly declining therapy stating \"I'm fine\"   "
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
## 2025-01-20 NOTE — PROGRESS NOTES
01/20/25 1142   Discharge Planning   Living Arrangements Spouse/significant other   Support Systems Spouse/significant other;Children   Assistance Needed some IADLs   Type of Residence Private residence   Number of Stairs to Enter Residence 2   Number of Stairs Within Residence 0   Home or Post Acute Services None   Expected Discharge Disposition Home   Housing Stability   At any time in the past 12 months, were you homeless or living in a shelter (including now)? N   Transportation Needs   In the past 12 months, has lack of transportation kept you from meetings, work, or from getting things needed for daily living? No   Intensity of Service   Intensity of Service 0-30 min     I spoke with patient to introduce discharge planning and explain role of TCC. Pt states he lives with his SO in a single story home.  He has a LE prosthetic but does not use any mobility aides.  He does have a couple of walkers and a cane though.  He uses a shower chair, hospital bed and has an emergency button.  He wears 4 L nc (Colin) and has port concentrator and emergency tank.  He denies any falls. [facility] comes to his home twice a week for 4 hours and assists with 'anything I need'.    [facility] Dr[facility] Romero).  He also tells me that he has [facility] as well.  I inquired about home therapy but he said that he does not need this.  His daughter will be taking him home when he discharges.

## 2025-01-20 NOTE — PROGRESS NOTES
Social work consult placed for discharge planning. SW reviewed pt's chart and communicated with TCC. No SW needs foreseen at this time. SW signing off; available upon request.    ESHA Mejia ([id number])   Care Transitions

## 2025-01-21 ENCOUNTER — PHARMACY VISIT (OUTPATIENT)
Dept: PHARMACY | Facility: CLINIC | Age: 80
End: 2025-01-21
Payer: COMMERCIAL

## 2025-01-21 VITALS
WEIGHT: 144.18 LBS | TEMPERATURE: 97.4 F | SYSTOLIC BLOOD PRESSURE: 122 MMHG | HEIGHT: 71 IN | RESPIRATION RATE: 16 BRPM | OXYGEN SATURATION: 92 % | DIASTOLIC BLOOD PRESSURE: 65 MMHG | HEART RATE: 67 BPM | BODY MASS INDEX: 20.19 KG/M2

## 2025-01-21 LAB
ANION GAP SERPL CALC-SCNC: 12 MMOL/L (ref 10–20)
BUN SERPL-MCNC: 73 MG/DL (ref 6–23)
CALCIUM SERPL-MCNC: 8.2 MG/DL (ref 8.6–10.3)
CHLORIDE SERPL-SCNC: 105 MMOL/L (ref 98–107)
CO2 SERPL-SCNC: 24 MMOL/L (ref 21–32)
CREAT SERPL-MCNC: 2.14 MG/DL (ref 0.5–1.3)
EGFRCR SERPLBLD CKD-EPI 2021: 31 ML/MIN/1.73M*2
GLUCOSE SERPL-MCNC: 115 MG/DL (ref 74–99)
HCT VFR BLD AUTO: 23.3 % (ref 41–52)
HGB BLD-MCNC: 7.6 G/DL (ref 13.5–17.5)
MAGNESIUM SERPL-MCNC: 2.44 MG/DL (ref 1.6–2.4)
POTASSIUM SERPL-SCNC: 3.7 MMOL/L (ref 3.5–5.3)
SODIUM SERPL-SCNC: 137 MMOL/L (ref 136–145)

## 2025-01-21 PROCEDURE — 85014 HEMATOCRIT: CPT | Performed by: PHYSICIAN ASSISTANT

## 2025-01-21 PROCEDURE — RXMED WILLOW AMBULATORY MEDICATION CHARGE

## 2025-01-21 PROCEDURE — 2500000004 HC RX 250 GENERAL PHARMACY W/ HCPCS (ALT 636 FOR OP/ED): Performed by: STUDENT IN AN ORGANIZED HEALTH CARE EDUCATION/TRAINING PROGRAM

## 2025-01-21 PROCEDURE — 94640 AIRWAY INHALATION TREATMENT: CPT

## 2025-01-21 PROCEDURE — 2500000001 HC RX 250 WO HCPCS SELF ADMINISTERED DRUGS (ALT 637 FOR MEDICARE OP): Performed by: STUDENT IN AN ORGANIZED HEALTH CARE EDUCATION/TRAINING PROGRAM

## 2025-01-21 PROCEDURE — 2500000002 HC RX 250 W HCPCS SELF ADMINISTERED DRUGS (ALT 637 FOR MEDICARE OP, ALT 636 FOR OP/ED): Performed by: INTERNAL MEDICINE

## 2025-01-21 PROCEDURE — 2500000002 HC RX 250 W HCPCS SELF ADMINISTERED DRUGS (ALT 637 FOR MEDICARE OP, ALT 636 FOR OP/ED): Performed by: STUDENT IN AN ORGANIZED HEALTH CARE EDUCATION/TRAINING PROGRAM

## 2025-01-21 PROCEDURE — 99232 SBSQ HOSP IP/OBS MODERATE 35: CPT | Performed by: PHYSICIAN ASSISTANT

## 2025-01-21 PROCEDURE — 80048 BASIC METABOLIC PNL TOTAL CA: CPT | Performed by: INTERNAL MEDICINE

## 2025-01-21 PROCEDURE — 2500000001 HC RX 250 WO HCPCS SELF ADMINISTERED DRUGS (ALT 637 FOR MEDICARE OP): Performed by: INTERNAL MEDICINE

## 2025-01-21 PROCEDURE — 2500000005 HC RX 250 GENERAL PHARMACY W/O HCPCS: Performed by: STUDENT IN AN ORGANIZED HEALTH CARE EDUCATION/TRAINING PROGRAM

## 2025-01-21 PROCEDURE — 97530 THERAPEUTIC ACTIVITIES: CPT | Mod: GO,CO

## 2025-01-21 PROCEDURE — 99239 HOSP IP/OBS DSCHRG MGMT >30: CPT | Performed by: INTERNAL MEDICINE

## 2025-01-21 PROCEDURE — 83735 ASSAY OF MAGNESIUM: CPT | Performed by: PHYSICIAN ASSISTANT

## 2025-01-21 PROCEDURE — 36415 COLL VENOUS BLD VENIPUNCTURE: CPT | Performed by: INTERNAL MEDICINE

## 2025-01-21 RX ORDER — ISOSORBIDE MONONITRATE 30 MG/1
30 TABLET, EXTENDED RELEASE ORAL DAILY
Qty: 30 TABLET | Refills: 0 | Status: SHIPPED | OUTPATIENT
Start: 2025-01-22 | End: 2025-02-21

## 2025-01-21 RX ORDER — CARVEDILOL 3.12 MG/1
3.12 TABLET ORAL 2 TIMES DAILY
Qty: 60 TABLET | Refills: 0 | Status: SHIPPED | OUTPATIENT
Start: 2025-01-21 | End: 2025-02-20

## 2025-01-21 RX ORDER — HYDRALAZINE HYDROCHLORIDE 10 MG/1
10 TABLET, FILM COATED ORAL 3 TIMES DAILY
Qty: 90 TABLET | Refills: 0 | Status: SHIPPED | OUTPATIENT
Start: 2025-01-21 | End: 2025-02-20

## 2025-01-21 RX ORDER — PREDNISONE 20 MG/1
40 TABLET ORAL DAILY
Qty: 8 TABLET | Refills: 0 | Status: SHIPPED | OUTPATIENT
Start: 2025-01-21 | End: 2025-01-25

## 2025-01-21 RX ADMIN — ISOSORBIDE MONONITRATE 30 MG: 30 TABLET, EXTENDED RELEASE ORAL at 08:49

## 2025-01-21 RX ADMIN — CARVEDILOL 3.12 MG: 3.12 TABLET, FILM COATED ORAL at 08:47

## 2025-01-21 RX ADMIN — IPRATROPIUM BROMIDE AND ALBUTEROL SULFATE 3 ML: 2.5; .5 SOLUTION RESPIRATORY (INHALATION) at 07:57

## 2025-01-21 RX ADMIN — HYDRALAZINE HYDROCHLORIDE 10 MG: 10 TABLET ORAL at 08:41

## 2025-01-21 RX ADMIN — HYDRALAZINE HYDROCHLORIDE 10 MG: 10 TABLET ORAL at 16:23

## 2025-01-21 RX ADMIN — METHYLPREDNISOLONE SODIUM SUCCINATE 40 MG: 40 INJECTION, POWDER, FOR SOLUTION INTRAMUSCULAR; INTRAVENOUS at 06:51

## 2025-01-21 RX ADMIN — ASPIRIN 81 MG: 81 TABLET, COATED ORAL at 08:41

## 2025-01-21 RX ADMIN — Medication 4 L/MIN: at 08:00

## 2025-01-21 RX ADMIN — PANTOPRAZOLE SODIUM 40 MG: 40 INJECTION, POWDER, FOR SOLUTION INTRAVENOUS at 06:52

## 2025-01-21 RX ADMIN — HEPARIN SODIUM 5000 UNITS: 5000 INJECTION, SOLUTION INTRAVENOUS; SUBCUTANEOUS at 04:08

## 2025-01-21 RX ADMIN — IPRATROPIUM BROMIDE AND ALBUTEROL SULFATE 3 ML: 2.5; .5 SOLUTION RESPIRATORY (INHALATION) at 11:25

## 2025-01-21 RX ADMIN — METHYLPREDNISOLONE SODIUM SUCCINATE 40 MG: 40 INJECTION, POWDER, FOR SOLUTION INTRAMUSCULAR; INTRAVENOUS at 16:24

## 2025-01-21 RX ADMIN — RUXOLITINIB 20 MG: 20 TABLET ORAL at 08:40

## 2025-01-21 RX ADMIN — CLOPIDOGREL 75 MG: 75 TABLET ORAL at 08:49

## 2025-01-21 RX ADMIN — HEPARIN SODIUM 5000 UNITS: 5000 INJECTION, SOLUTION INTRAVENOUS; SUBCUTANEOUS at 12:40

## 2025-01-21 RX ADMIN — MONTELUKAST 10 MG: 10 TABLET, FILM COATED ORAL at 08:48

## 2025-01-21 RX ADMIN — SERTRALINE HYDROCHLORIDE 100 MG: 50 TABLET ORAL at 08:48

## 2025-01-21 RX ADMIN — AZITHROMYCIN DIHYDRATE 500 MG: 250 TABLET ORAL at 08:47

## 2025-01-21 RX ADMIN — TAMSULOSIN HYDROCHLORIDE 0.4 MG: 0.4 CAPSULE ORAL at 08:48

## 2025-01-21 ASSESSMENT — COGNITIVE AND FUNCTIONAL STATUS - GENERAL
CLIMB 3 TO 5 STEPS WITH RAILING: A LITTLE
TOILETING: A LOT
MOVING TO AND FROM BED TO CHAIR: A LITTLE
DRESSING REGULAR UPPER BODY CLOTHING: A LITTLE
PERSONAL GROOMING: A LITTLE
STANDING UP FROM CHAIR USING ARMS: A LITTLE
DRESSING REGULAR LOWER BODY CLOTHING: A LITTLE
WALKING IN HOSPITAL ROOM: A LITTLE
MOBILITY SCORE: 20
HELP NEEDED FOR BATHING: A LOT
DAILY ACTIVITIY SCORE: 17
DRESSING REGULAR LOWER BODY CLOTHING: A LITTLE
DAILY ACTIVITIY SCORE: 22
TOILETING: A LITTLE

## 2025-01-21 ASSESSMENT — PAIN SCALES - GENERAL: PAINLEVEL_OUTOF10: 0 - NO PAIN

## 2025-01-21 ASSESSMENT — ENCOUNTER SYMPTOMS
DYSPNEA ON EXERTION: 1
DIARRHEA: 0
FEVER: 0
PALPITATIONS: 0
WHEEZING: 0
NAUSEA: 0
VOMITING: 0
ABDOMINAL PAIN: 0
SHORTNESS OF BREATH: 1
ORTHOPNEA: 0
WEAKNESS: 1
DYSURIA: 0

## 2025-01-21 ASSESSMENT — PAIN - FUNCTIONAL ASSESSMENT: PAIN_FUNCTIONAL_ASSESSMENT: 0-10

## 2025-01-21 NOTE — PROGRESS NOTES
Physical Therapy                 Therapy Communication Note    Patient Name: Aristeo Barriga  MRN: [id number]  Department: [facility] 2 W  Room: 2026/2026-A  Today's Date: 1/21/2025     Discipline: Physical Therapy    PT Missed Visit: Yes     Missed Visit Reason: Missed Visit Reason: Patient refused (stating he just worked with OT and declined further activity)    Missed Time: Attempt    Comment:

## 2025-01-21 NOTE — PROGRESS NOTES
Occupational Therapy    OT Treatment    Patient Name: Aristeo Barriga  MRN: [id number]  Department: [address]  Room: 2026/2026-A  Today's Date: 1/21/2025  Time Calculation  Start Time: 1411  Stop Time: 1435  Time Calculation (min): 24 min        Assessment:  OT Assessment: Pt progressed to participating in functional transfers with min A using FWW. Pt fatigues and becomes SOB with activity, recovers with seated rest break.  End of Session Communication: Bedside nurse  End of Session Patient Position: Up in chair, Alarm on     Plan:  Treatment Interventions: Functional transfer training, Endurance training, UE strengthening/ROM  OT Frequency: 4 times per week  OT Discharge Recommendations: Low intensity level of continued care  Equipment Recommended upon Discharge: Wheeled walker  OT Recommended Transfer Status: Assist of 1  OT - OK to Discharge: Yes ((when medically approp.))  Treatment Interventions: Functional transfer training, Endurance training, UE strengthening/ROM    Subjective   Previous Visit Info:  OT Last Visit  OT Received On: 01/21/25  General:  General  Prior to Session Communication: Bedside nurse  Patient Position Received: Bed, 3 rail up, Alarm on  General Comment: Pt agreeable and cooperative to therapy.      Pain:  Pain Assessment  Pain Assessment: 0-10  0-10 (Numeric) Pain Score: 0 - No pain    Objective      Functional Standing Tolerance:  Activity: Pt tolerated x2 static stands tolerating for 1-2 minutes with min A using FWW for stability.  Bed Mobility/Transfers: Bed Mobility  Bed Mobility: Yes  Bed Mobility 1  Bed Mobility 1: Supine to sitting  Level of Assistance 1: Minimum assistance    Transfers  Transfer: Yes  Transfer 1  Technique 1: Sit to stand, Stand to sit  Transfer Device 1: Walker  Transfer Level of Assistance 1: Minimum assistance, +2  Transfers 2  Transfer From 2: Bed to  Transfer to 2: Chair with arms  Technique 2: Sit to stand, Stand to sit  Transfer Device 2: Walker  Transfer  Level of Assistance 2: Minimum assistance, +2, Moderate verbal cues                    Therapy/Activity:      Therapeutic Activity  Therapeutic Activity Performed: Yes  Therapeutic Activity 1: Pt tolerated sitting EOB for 5 minutes  with CGA.      Outcome Measures:[facility] Daily Activity  Putting on and taking off regular lower body clothing: A little  Bathing (including washing, rinsing, drying): A lot  Putting on and taking off regular upper body clothing: A little  Toileting, which includes using toilet, bedpan or urinal: A lot  Taking care of personal grooming such as brushing teeth: A little  Eating Meals: None  Daily Activity - Total Score: 17        Education Documentation  ADL Training, taught by MICHELA Rojas at 1/21/2025  3:01 PM.  Learner: Patient  Readiness: Acceptance  Method: Explanation  Response: Needs Reinforcement    Education Comments  No comments found.          Goals:  Encounter Problems       Encounter Problems (Active)       ADLs       Demo. SBA LB dressing  (Progressing)       Start:  01/18/25    Expected End:  01/25/25               EXERCISE/STRENGTHENING       Candy. UE ex. all planes 15 reps without S.O.B. and with O2  (Progressing)       Start:  01/18/25    Expected End:  01/25/25               MOBILITY       SBA func. trfrs. and mobility with FWW and O2  (Progressing)       Start:  01/18/25    Expected End:  01/25/25

## 2025-01-21 NOTE — CARE PLAN
The patient's goals for the shift include Pt. will have a safe, restful and uneventful evening    The clinical goals for the shift include Pt. will remain free of falls and injury this shift      Problem: Pain - Adult  Goal: Verbalizes/displays adequate comfort level or baseline comfort level  Outcome: Progressing     Problem: Safety - Adult  Goal: Free from fall injury  Outcome: Progressing     Problem: Discharge Planning  Goal: Discharge to home or other facility with appropriate resources  Outcome: Progressing     Problem: Chronic Conditions and Co-morbidities  Goal: Patient's chronic conditions and co-morbidity symptoms are monitored and maintained or improved  Outcome: Progressing     Problem: Fall/Injury  Goal: Not fall by end of shift  Outcome: Progressing  Goal: Be free from injury by end of the shift  Outcome: Progressing  Goal: Verbalize understanding of personal risk factors for fall in the hospital  Outcome: Progressing  Goal: Verbalize understanding of risk factor reduction measures to prevent injury from fall in the home  Outcome: Progressing  Goal: Use assistive devices by end of the shift  Outcome: Progressing  Goal: Pace activities to prevent fatigue by end of the shift  Outcome: Progressing

## 2025-01-21 NOTE — DISCHARGE SUMMARY
Discharge Diagnosis  Chronic obstructive pulmonary disease, unspecified COPD type (Multi).  Patient has an acute exacerbation of COPD.  Patient appears to be near his baseline.  Will discharge home on remainder of steroid burst.  CT scan of chest with multifocal infiltrates with patient is much improved with azithromycin.  He is completing his treatment at the time of discharge.  May want to follow with pulmonary near where he lives.  Recommend follow-up with PCP and pulmonary as outpatient.  As acute systolic and diastolic heart failure.  Patient received additional Lasix but is no longer required at time of discharge.  Stress test done as part of cardiac workup, patient has what appears to be baseline myocardial scar with a little bit of tiffany-infarct ischemia.  Per cardiology patient is okay to follow-up with his own cardiologist as outpatient.  Medications have been adjusted.  Myeloproliferative disorder.  Patient is to follow-up with his hematologist on discharge.  Hyperglycemia which appears to medication induced.  A1c is 5.8 indicating patient does not have type 2 diabetes.  CKD.  Creatinine appears to be baseline at the time of discharge.  History of BKA.      Issues Requiring Follow-Up  Okay to discharge to home.  [facility]  Follow-up with PCP in possibly pulmonary and hematology.    Discharge Meds     Medication List      ASK your doctor about these medications     allopurinol 100 mg tablet; Commonly known as: Zyloprim   aspirin 81 mg EC tablet; Ask about: Which instructions should I use?   atorvastatin 80 mg tablet; Commonly known as: Lipitor   Breo Ellipta 200-25 mcg/dose inhaler; Generic drug: fluticasone   furoate-vilanteroL; Inhale 1 puff by mouth into the lungs once daily. Do   not start before February 27, 2024.; Ask about: Which instructions should   I use?   busPIRone 5 mg tablet; Commonly known as: Buspar   clopidogrel 75 mg tablet; Commonly known as:  Plavix   fluticasone propion-salmeteroL 250-50 mcg/dose diskus inhaler; Commonly   known as: Advair Diskus   ipratropium-albuteroL 0.5-2.5 mg/3 mL nebulizer solution; Commonly known   as: Duo-Neb; Use 3 mL by nebulization every 6 hours if needed for   wheezing.   Jakafi 20 mg tablet; Generic drug: ruxolitinib   metoprolol succinate XL 25 mg 24 hr tablet; Commonly known as: Toprol-XL   montelukast 10 mg tablet; Commonly known as: Singulair   nicotine 14 mg/24 hr patch; Commonly known as: Nicoderm CQ; Place 1   patch over 24 hours on the skin once daily for 28 days. Do not start   before February 27, 2024.   NIFEdipine ER 30 mg 24 hr tablet; Commonly known as: Adalat CC   NON FORMULARY   potassium chloride CR 10 mEq ER tablet; Commonly known as: Klor-Con   sertraline 100 mg tablet; Commonly known as: Zoloft   tamsulosin 0.4 mg 24 hr capsule; Commonly known as: Flomax   tiotropium 2.5 mcg/actuation inhaler; Commonly known as: Spiriva   Respimat       Test Results Pending At Discharge  Pending Labs       No current pending labs.            Hospital Course   The pt is seen and evaluated today. He is noted to be on 6 Liter of oxygen and saturations in high 80s. He states he is better but has significant HOOVER. Oxygen to be titrated and monitored closely. The pt otherwise denies any further chest pain. Diuresing well.      1/20: Awaiting results of stress test.  Notified by CTA of abnormal results.  On review appears the patient has a multifocal pneumonia.  Clinically patient has been improving with azithromycin.  Discussed adding additional medication but will hold since he appears to be improving.  Reassess in AM.  No fevers or chills.  No chest pain or shortness of breath.    1/21: Okay to discharge to home per cardiology.  Stress test results seem to be similar to what was expected.  Cardiology recommends he [facility].  Will discharge on current medications.  Basically  completed azithromycin.  Will discharge on remainder of steroid taper.  Recommend follow-up with PCP.  Could also follow-up with pulmonary and is hematology after discharge.  Okay to discharge to home.    This discharge took greater than 35 minutes to arrange.    Pertinent Physical Exam At Time of Discharge  Physical Exam  Constitutional:       Appearance: He is ill-appearing.   HENT:      Head: Normocephalic and atraumatic.      Nose: Nose normal.      Mouth/Throat:      Mouth: Mucous membranes are dry.   Eyes:      Extraocular Movements: Extraocular movements intact.      Conjunctiva/sclera: Conjunctivae normal.   Cardiovascular:      Rate and Rhythm: Normal rate and regular rhythm.      Pulses: Normal pulses.      Heart sounds: Normal heart sounds.   Pulmonary:      Effort: Pulmonary effort is normal.      Breath sounds: No rales.      Comments: Diminished   Abdominal:      General: Bowel sounds are normal.      Palpations: Abdomen is soft.   Musculoskeletal:         General: Normal range of motion.      Cervical back: Normal range of motion and neck supple.   Skin:     General: Skin is warm and dry.   Neurological:      General: No focal deficit present.      Mental Status: He is alert. Mental status is at baseline.   Psychiatric:         Mood and Affect: Mood normal.         Outpatient Follow-Up  No future appointments.      Carson Galindo MD
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
## 2025-01-21 NOTE — DISCHARGE INSTRUCTIONS
Follow up with PCP in 1-2 weeks. Call to schedule. Bring all your discharge paperwork and medications when you go to your follow up appointment. Return to ED if your symptoms come back.    [facility]  May want to follow-up with pulmonary and hematology.

## 2025-01-21 NOTE — PROGRESS NOTES
Pt anticipating DC to home today. He states he had stress test yesterday and is waiting for results.   Medicare message explained to pt, signed, given to pt, copy placed in chart, and entered in careport.

## 2025-01-21 NOTE — DOCUMENTATION CLARIFICATION NOTE
"    PATIENT:               AMITA BELTRAN  ACCT #:                  [id number]  MRN:                       [id number]  :                       1945  ADMIT DATE:       2025 4:11 PM  DISCH DATE:  RESPONDING PROVIDER #:        [id number]          PROVIDER RESPONSE TEXT:    Possible pneumonia    CDI QUERY TEXT:    Clarification    Instruction:    Based on your assessment of the patient and the clinical information, please provide the requested documentation by clicking on the appropriate radio button and enter any additional information if prompted.    Question: Is there a diagnosis indicative of the image study    When answering this query, please exercise your independent professional judgment. The fact that a question is being asked, does not imply that any particular answer is desired or expected.    The patient's clinical indicators include:  Clinical Information: Patient with COPD exacerbation, acute CHF, and acute on chronic respiratory failure    Clinical Indicators: Per  chest CT, \"Interval appearance of inferior lingular consolidation/airspace disease. Interval worsening in left lower lobe  consolidation/atelectasis. Interval worsening in right basilar airspace disease/infiltrate/pneumonia.\"    Treatment: PO zithromax, chest CT    Risk Factors: 79yom with COPD exacerbation, acute CHF, and acute on chronic respiratory failure  Options provided:  -- Pneumonia is clinically significant and required treatment/monitoring  -- Patient does not have pneumonia  -- Other - I will add my own diagnosis  -- Refer to Clinical Documentation Reviewer    Query created by: Daisy Cordova on 2025 3:51 PM      Electronically signed by:  RENZO MUNGUIA MD 2025 8:43 AM          "

## 2025-01-23 ENCOUNTER — HOSPITAL ENCOUNTER (OUTPATIENT)
Dept: INFUSION THERAPY | Age: 80
End: 2025-01-23

## 2025-01-23 ENCOUNTER — HOSPITAL ENCOUNTER (INPATIENT)
Facility: HOSPITAL | Age: 80
DRG: 871 | End: 2025-01-23
Attending: STUDENT IN AN ORGANIZED HEALTH CARE EDUCATION/TRAINING PROGRAM | Admitting: INTERNAL MEDICINE
Payer: MEDICARE

## 2025-01-23 DIAGNOSIS — J96.01 ACUTE HYPOXIC RESPIRATORY FAILURE: ICD-10-CM

## 2025-01-23 DIAGNOSIS — J96.21 ACUTE ON CHRONIC RESPIRATORY FAILURE WITH HYPOXIA: Primary | ICD-10-CM

## 2025-01-23 LAB
ACANTHOCYTES BLD QL SMEAR: ABNORMAL
ALBUMIN SERPL BCP-MCNC: 3.1 G/DL (ref 3.4–5)
ALP SERPL-CCNC: 61 U/L (ref 33–136)
ALT SERPL W P-5'-P-CCNC: 36 U/L (ref 10–52)
ANION GAP BLDA CALCULATED.4IONS-SCNC: 13 MMO/L (ref 10–25)
ANION GAP BLDV CALCULATED.4IONS-SCNC: 12 MMOL/L (ref 10–25)
ANION GAP BLDV CALCULATED.4IONS-SCNC: 13 MMOL/L (ref 10–25)
ANION GAP SERPL CALC-SCNC: 15 MMOL/L (ref 10–20)
APPEARANCE UR: CLEAR
AST SERPL W P-5'-P-CCNC: 56 U/L (ref 9–39)
BASE EXCESS BLDA CALC-SCNC: -4.6 MMOL/L (ref -2–3)
BASE EXCESS BLDV CALC-SCNC: -3.9 MMOL/L (ref -2–3)
BASE EXCESS BLDV CALC-SCNC: -7.6 MMOL/L (ref -2–3)
BASOPHILS # BLD MANUAL: 0 X10*3/UL (ref 0–0.1)
BASOPHILS NFR BLD MANUAL: 0 %
BILIRUB SERPL-MCNC: 0.7 MG/DL (ref 0–1.2)
BILIRUB UR STRIP.AUTO-MCNC: NEGATIVE MG/DL
BNP SERPL-MCNC: 275 PG/ML (ref 0–99)
BODY TEMPERATURE: 37 DEGREES CELSIUS
BUN SERPL-MCNC: 67 MG/DL (ref 6–23)
BURR CELLS BLD QL SMEAR: ABNORMAL
CA-I BLDA-SCNC: 1.17 MMOL/L (ref 1.1–1.33)
CA-I BLDV-SCNC: 1.01 MMOL/L (ref 1.1–1.33)
CA-I BLDV-SCNC: 1.17 MMOL/L (ref 1.1–1.33)
CALCIUM SERPL-MCNC: 8.2 MG/DL (ref 8.6–10.3)
CARDIAC TROPONIN I PNL SERPL HS: 35 NG/L (ref 0–20)
CARDIAC TROPONIN I PNL SERPL HS: 39 NG/L (ref 0–20)
CHLORIDE BLDA-SCNC: 107 MMOL/L (ref 98–107)
CHLORIDE BLDV-SCNC: 108 MMOL/L (ref 98–107)
CHLORIDE BLDV-SCNC: 111 MMOL/L (ref 98–107)
CHLORIDE SERPL-SCNC: 106 MMOL/L (ref 98–107)
CO2 SERPL-SCNC: 23 MMOL/L (ref 21–32)
COLOR UR: ABNORMAL
CREAT SERPL-MCNC: 2.03 MG/DL (ref 0.5–1.3)
DACRYOCYTES BLD QL SMEAR: ABNORMAL
EGFRCR SERPLBLD CKD-EPI 2021: 33 ML/MIN/1.73M*2
EOSINOPHIL # BLD MANUAL: 0 X10*3/UL (ref 0–0.4)
EOSINOPHIL NFR BLD MANUAL: 0 %
ERYTHROCYTE [DISTWIDTH] IN BLOOD BY AUTOMATED COUNT: 20.4 % (ref 11.5–14.5)
FLUAV RNA RESP QL NAA+PROBE: DETECTED
FLUBV RNA RESP QL NAA+PROBE: NOT DETECTED
GLUCOSE BLD MANUAL STRIP-MCNC: 170 MG/DL (ref 74–99)
GLUCOSE BLDA-MCNC: 204 MG/DL (ref 74–99)
GLUCOSE BLDV-MCNC: 149 MG/DL (ref 74–99)
GLUCOSE BLDV-MCNC: 181 MG/DL (ref 74–99)
GLUCOSE SERPL-MCNC: 192 MG/DL (ref 74–99)
GLUCOSE UR STRIP.AUTO-MCNC: NORMAL MG/DL
GRAN CASTS #/AREA UR COMP ASSIST: ABNORMAL /LPF
HCO3 BLDA-SCNC: 21.6 MMOL/L (ref 22–26)
HCO3 BLDV-SCNC: 18.8 MMOL/L (ref 22–26)
HCO3 BLDV-SCNC: 21.6 MMOL/L (ref 22–26)
HCT VFR BLD AUTO: 36.4 % (ref 41–52)
HCT VFR BLD EST: 20 % (ref 41–52)
HCT VFR BLD EST: 25 % (ref 41–52)
HCT VFR BLD EST: 27 % (ref 41–52)
HGB BLD-MCNC: 11.7 G/DL (ref 13.5–17.5)
HGB BLDA-MCNC: 8.3 G/DL (ref 13.5–17.5)
HGB BLDV-MCNC: 6.8 G/DL (ref 13.5–17.5)
HGB BLDV-MCNC: 9 G/DL (ref 13.5–17.5)
HYALINE CASTS #/AREA URNS AUTO: ABNORMAL /LPF
IMM GRANULOCYTES # BLD AUTO: 0.04 X10*3/UL (ref 0–0.5)
IMM GRANULOCYTES NFR BLD AUTO: 1.3 % (ref 0–0.9)
INHALED O2 CONCENTRATION: 100 %
INHALED O2 CONCENTRATION: 100 %
INHALED O2 CONCENTRATION: 70 %
KETONES UR STRIP.AUTO-MCNC: NEGATIVE MG/DL
LACTATE BLDA-SCNC: 1.6 MMOL/L (ref 0.4–2)
LACTATE BLDV-SCNC: 1.3 MMOL/L (ref 0.4–2)
LACTATE BLDV-SCNC: 2.4 MMOL/L (ref 0.4–2)
LACTATE SERPL-SCNC: 1.9 MMOL/L (ref 0.4–2)
LACTATE SERPL-SCNC: 2.5 MMOL/L (ref 0.4–2)
LEUKOCYTE ESTERASE UR QL STRIP.AUTO: NEGATIVE
LYMPHOCYTES # BLD MANUAL: 0.06 X10*3/UL (ref 0.8–3)
LYMPHOCYTES NFR BLD MANUAL: 2 %
MCH RBC QN AUTO: 29.3 PG (ref 26–34)
MCHC RBC AUTO-ENTMCNC: 32.1 G/DL (ref 32–36)
MCV RBC AUTO: 91 FL (ref 80–100)
METAMYELOCYTES # BLD MANUAL: 0.03 X10*3/UL
METAMYELOCYTES NFR BLD MANUAL: 1 %
MONOCYTES # BLD MANUAL: 0 X10*3/UL (ref 0.05–0.8)
MONOCYTES NFR BLD MANUAL: 0 %
MRSA DNA SPEC QL NAA+PROBE: NOT DETECTED
MUCOUS THREADS #/AREA URNS AUTO: ABNORMAL /LPF
NEUTROPHILS # BLD MANUAL: 3.01 X10*3/UL (ref 1.6–5.5)
NEUTS BAND # BLD MANUAL: 0.65 X10*3/UL (ref 0–0.5)
NEUTS BAND NFR BLD MANUAL: 21 %
NEUTS SEG # BLD MANUAL: 2.36 X10*3/UL (ref 1.6–5)
NEUTS SEG NFR BLD MANUAL: 76 %
NITRITE UR QL STRIP.AUTO: NEGATIVE
NRBC BLD-RTO: 1 /100 WBCS (ref 0–0)
OVALOCYTES BLD QL SMEAR: ABNORMAL
OXYHGB MFR BLDA: 87.5 % (ref 94–98)
OXYHGB MFR BLDV: 43.1 % (ref 45–75)
OXYHGB MFR BLDV: 57.8 % (ref 45–75)
PCO2 BLDA: 44 MM HG (ref 38–42)
PCO2 BLDV: 40 MM HG (ref 41–51)
PCO2 BLDV: 42 MM HG (ref 41–51)
PH BLDA: 7.3 PH (ref 7.38–7.42)
PH BLDV: 7.26 PH (ref 7.33–7.43)
PH BLDV: 7.34 PH (ref 7.33–7.43)
PH UR STRIP.AUTO: 5.5 [PH]
PLATELET # BLD AUTO: 198 X10*3/UL (ref 150–450)
PO2 BLDA: 63 MM HG (ref 85–95)
PO2 BLDV: 32 MM HG (ref 35–45)
PO2 BLDV: 39 MM HG (ref 35–45)
POLYCHROMASIA BLD QL SMEAR: ABNORMAL
POTASSIUM BLDA-SCNC: 3.9 MMOL/L (ref 3.5–5.3)
POTASSIUM BLDV-SCNC: 3.2 MMOL/L (ref 3.5–5.3)
POTASSIUM BLDV-SCNC: 3.8 MMOL/L (ref 3.5–5.3)
POTASSIUM SERPL-SCNC: 3.7 MMOL/L (ref 3.5–5.3)
PROT SERPL-MCNC: 6.4 G/DL (ref 6.4–8.2)
PROT UR STRIP.AUTO-MCNC: ABNORMAL MG/DL
RBC # BLD AUTO: 4 X10*6/UL (ref 4.5–5.9)
RBC # UR STRIP.AUTO: ABNORMAL /UL
RBC #/AREA URNS AUTO: ABNORMAL /HPF
RBC MORPH BLD: ABNORMAL
RSV RNA RESP QL NAA+PROBE: NOT DETECTED
SAO2 % BLDA: 89 % (ref 94–100)
SAO2 % BLDV: 44 % (ref 45–75)
SAO2 % BLDV: 59 % (ref 45–75)
SARS-COV-2 RNA RESP QL NAA+PROBE: NOT DETECTED
SODIUM BLDA-SCNC: 138 MMOL/L (ref 136–145)
SODIUM BLDV-SCNC: 139 MMOL/L (ref 136–145)
SODIUM BLDV-SCNC: 139 MMOL/L (ref 136–145)
SODIUM SERPL-SCNC: 140 MMOL/L (ref 136–145)
SP GR UR STRIP.AUTO: 1.04
SPECIMEN DRAWN FROM PATIENT: ABNORMAL
TOTAL CELLS COUNTED BLD: 100
UROBILINOGEN UR STRIP.AUTO-MCNC: NORMAL MG/DL
WBC # BLD AUTO: 3.1 X10*3/UL (ref 4.4–11.3)
WBC #/AREA URNS AUTO: ABNORMAL /HPF

## 2025-01-23 PROCEDURE — 2500000005 HC RX 250 GENERAL PHARMACY W/O HCPCS: Performed by: INTERNAL MEDICINE

## 2025-01-23 PROCEDURE — 3E033XZ INTRODUCTION OF VASOPRESSOR INTO PERIPHERAL VEIN, PERCUTANEOUS APPROACH: ICD-10-PCS | Performed by: INTERNAL MEDICINE

## 2025-01-23 PROCEDURE — 87899 AGENT NOS ASSAY W/OPTIC: CPT | Mod: PORLAB | Performed by: NURSE PRACTITIONER

## 2025-01-23 PROCEDURE — 31500 INSERT EMERGENCY AIRWAY: CPT | Performed by: STUDENT IN AN ORGANIZED HEALTH CARE EDUCATION/TRAINING PROGRAM

## 2025-01-23 PROCEDURE — 99292 CRITICAL CARE ADDL 30 MIN: CPT | Performed by: INTERNAL MEDICINE

## 2025-01-23 PROCEDURE — 2500000002 HC RX 250 W HCPCS SELF ADMINISTERED DRUGS (ALT 637 FOR MEDICARE OP, ALT 636 FOR OP/ED): Performed by: NURSE PRACTITIONER

## 2025-01-23 PROCEDURE — 2500000005 HC RX 250 GENERAL PHARMACY W/O HCPCS

## 2025-01-23 PROCEDURE — 74174 CTA ABD&PLVS W/CONTRAST: CPT | Performed by: RADIOLOGY

## 2025-01-23 PROCEDURE — 94002 VENT MGMT INPAT INIT DAY: CPT | Mod: CCI

## 2025-01-23 PROCEDURE — 82330 ASSAY OF CALCIUM: CPT | Performed by: STUDENT IN AN ORGANIZED HEALTH CARE EDUCATION/TRAINING PROGRAM

## 2025-01-23 PROCEDURE — 2500000005 HC RX 250 GENERAL PHARMACY W/O HCPCS: Performed by: STUDENT IN AN ORGANIZED HEALTH CARE EDUCATION/TRAINING PROGRAM

## 2025-01-23 PROCEDURE — 36415 COLL VENOUS BLD VENIPUNCTURE: CPT | Performed by: STUDENT IN AN ORGANIZED HEALTH CARE EDUCATION/TRAINING PROGRAM

## 2025-01-23 PROCEDURE — 87641 MR-STAPH DNA AMP PROBE: CPT | Performed by: NURSE PRACTITIONER

## 2025-01-23 PROCEDURE — 2500000004 HC RX 250 GENERAL PHARMACY W/ HCPCS (ALT 636 FOR OP/ED)

## 2025-01-23 PROCEDURE — 74018 RADEX ABDOMEN 1 VIEW: CPT | Performed by: RADIOLOGY

## 2025-01-23 PROCEDURE — 96375 TX/PRO/DX INJ NEW DRUG ADDON: CPT | Mod: 59

## 2025-01-23 PROCEDURE — 36415 COLL VENOUS BLD VENIPUNCTURE: CPT | Performed by: INTERNAL MEDICINE

## 2025-01-23 PROCEDURE — 2500000002 HC RX 250 W HCPCS SELF ADMINISTERED DRUGS (ALT 637 FOR MEDICARE OP, ALT 636 FOR OP/ED): Performed by: INTERNAL MEDICINE

## 2025-01-23 PROCEDURE — 2500000004 HC RX 250 GENERAL PHARMACY W/ HCPCS (ALT 636 FOR OP/ED): Performed by: NURSE PRACTITIONER

## 2025-01-23 PROCEDURE — 0BH17EZ INSERTION OF ENDOTRACHEAL AIRWAY INTO TRACHEA, VIA NATURAL OR ARTIFICIAL OPENING: ICD-10-PCS | Performed by: INTERNAL MEDICINE

## 2025-01-23 PROCEDURE — 96365 THER/PROPH/DIAG IV INF INIT: CPT | Mod: 59

## 2025-01-23 PROCEDURE — 83880 ASSAY OF NATRIURETIC PEPTIDE: CPT | Performed by: STUDENT IN AN ORGANIZED HEALTH CARE EDUCATION/TRAINING PROGRAM

## 2025-01-23 PROCEDURE — 81001 URINALYSIS AUTO W/SCOPE: CPT | Performed by: STUDENT IN AN ORGANIZED HEALTH CARE EDUCATION/TRAINING PROGRAM

## 2025-01-23 PROCEDURE — 99285 EMERGENCY DEPT VISIT HI MDM: CPT | Mod: 25 | Performed by: STUDENT IN AN ORGANIZED HEALTH CARE EDUCATION/TRAINING PROGRAM

## 2025-01-23 PROCEDURE — 2020000001 HC ICU ROOM DAILY

## 2025-01-23 PROCEDURE — 2500000004 HC RX 250 GENERAL PHARMACY W/ HCPCS (ALT 636 FOR OP/ED): Performed by: INTERNAL MEDICINE

## 2025-01-23 PROCEDURE — 82947 ASSAY GLUCOSE BLOOD QUANT: CPT | Performed by: STUDENT IN AN ORGANIZED HEALTH CARE EDUCATION/TRAINING PROGRAM

## 2025-01-23 PROCEDURE — 87040 BLOOD CULTURE FOR BACTERIA: CPT | Mod: PORLAB | Performed by: NURSE PRACTITIONER

## 2025-01-23 PROCEDURE — 2500000004 HC RX 250 GENERAL PHARMACY W/ HCPCS (ALT 636 FOR OP/ED): Performed by: STUDENT IN AN ORGANIZED HEALTH CARE EDUCATION/TRAINING PROGRAM

## 2025-01-23 PROCEDURE — 2550000001 HC RX 255 CONTRASTS: Performed by: STUDENT IN AN ORGANIZED HEALTH CARE EDUCATION/TRAINING PROGRAM

## 2025-01-23 PROCEDURE — 99291 CRITICAL CARE FIRST HOUR: CPT | Mod: 25 | Performed by: STUDENT IN AN ORGANIZED HEALTH CARE EDUCATION/TRAINING PROGRAM

## 2025-01-23 PROCEDURE — 87449 NOS EACH ORGANISM AG IA: CPT | Mod: PORLAB | Performed by: NURSE PRACTITIONER

## 2025-01-23 PROCEDURE — 99291 CRITICAL CARE FIRST HOUR: CPT | Performed by: INTERNAL MEDICINE

## 2025-01-23 PROCEDURE — 84484 ASSAY OF TROPONIN QUANT: CPT | Performed by: STUDENT IN AN ORGANIZED HEALTH CARE EDUCATION/TRAINING PROGRAM

## 2025-01-23 PROCEDURE — 82435 ASSAY OF BLOOD CHLORIDE: CPT | Performed by: INTERNAL MEDICINE

## 2025-01-23 PROCEDURE — 82947 ASSAY GLUCOSE BLOOD QUANT: CPT

## 2025-01-23 PROCEDURE — 85027 COMPLETE CBC AUTOMATED: CPT | Performed by: STUDENT IN AN ORGANIZED HEALTH CARE EDUCATION/TRAINING PROGRAM

## 2025-01-23 PROCEDURE — 71275 CT ANGIOGRAPHY CHEST: CPT | Performed by: RADIOLOGY

## 2025-01-23 PROCEDURE — 5A0945A ASSISTANCE WITH RESPIRATORY VENTILATION, 24-96 CONSECUTIVE HOURS, HIGH NASAL FLOW/VELOCITY: ICD-10-PCS | Performed by: INTERNAL MEDICINE

## 2025-01-23 PROCEDURE — 5A1935Z RESPIRATORY VENTILATION, LESS THAN 24 CONSECUTIVE HOURS: ICD-10-PCS | Performed by: INTERNAL MEDICINE

## 2025-01-23 PROCEDURE — 71045 X-RAY EXAM CHEST 1 VIEW: CPT | Performed by: RADIOLOGY

## 2025-01-23 PROCEDURE — 84075 ASSAY ALKALINE PHOSPHATASE: CPT | Performed by: STUDENT IN AN ORGANIZED HEALTH CARE EDUCATION/TRAINING PROGRAM

## 2025-01-23 PROCEDURE — 99223 1ST HOSP IP/OBS HIGH 75: CPT | Performed by: NURSE PRACTITIONER

## 2025-01-23 PROCEDURE — 5A09457 ASSISTANCE WITH RESPIRATORY VENTILATION, 24-96 CONSECUTIVE HOURS, CONTINUOUS POSITIVE AIRWAY PRESSURE: ICD-10-PCS | Performed by: INTERNAL MEDICINE

## 2025-01-23 PROCEDURE — 2500000001 HC RX 250 WO HCPCS SELF ADMINISTERED DRUGS (ALT 637 FOR MEDICARE OP): Performed by: NURSE PRACTITIONER

## 2025-01-23 PROCEDURE — 87075 CULTR BACTERIA EXCEPT BLOOD: CPT | Mod: PORLAB | Performed by: NURSE PRACTITIONER

## 2025-01-23 PROCEDURE — 83605 ASSAY OF LACTIC ACID: CPT | Performed by: STUDENT IN AN ORGANIZED HEALTH CARE EDUCATION/TRAINING PROGRAM

## 2025-01-23 PROCEDURE — 85007 BL SMEAR W/DIFF WBC COUNT: CPT | Performed by: STUDENT IN AN ORGANIZED HEALTH CARE EDUCATION/TRAINING PROGRAM

## 2025-01-23 PROCEDURE — 87637 SARSCOV2&INF A&B&RSV AMP PRB: CPT | Performed by: STUDENT IN AN ORGANIZED HEALTH CARE EDUCATION/TRAINING PROGRAM

## 2025-01-23 PROCEDURE — 94640 AIRWAY INHALATION TREATMENT: CPT

## 2025-01-23 PROCEDURE — 94799 UNLISTED PULMONARY SVC/PX: CPT

## 2025-01-23 RX ORDER — IPRATROPIUM BROMIDE AND ALBUTEROL SULFATE 2.5; .5 MG/3ML; MG/3ML
3 SOLUTION RESPIRATORY (INHALATION) EVERY 2 HOUR PRN
Status: DISCONTINUED | OUTPATIENT
Start: 2025-01-23 | End: 2025-01-28 | Stop reason: HOSPADM

## 2025-01-23 RX ORDER — CEFTRIAXONE 2 G/50ML
2 INJECTION, SOLUTION INTRAVENOUS ONCE
Status: COMPLETED | OUTPATIENT
Start: 2025-01-23 | End: 2025-01-23

## 2025-01-23 RX ORDER — ETOMIDATE 2 MG/ML
INJECTION INTRAVENOUS
Status: COMPLETED
Start: 2025-01-23 | End: 2025-01-23

## 2025-01-23 RX ORDER — ASPIRIN 81 MG/1
81 TABLET ORAL DAILY
Status: DISCONTINUED | OUTPATIENT
Start: 2025-01-23 | End: 2025-01-27

## 2025-01-23 RX ORDER — SUCCINYLCHOLINE CHLORIDE 20 MG/ML
INJECTION INTRAMUSCULAR; INTRAVENOUS
Status: COMPLETED
Start: 2025-01-23 | End: 2025-01-23

## 2025-01-23 RX ORDER — TAMSULOSIN HYDROCHLORIDE 0.4 MG/1
0.4 CAPSULE ORAL DAILY
Status: DISCONTINUED | OUTPATIENT
Start: 2025-01-23 | End: 2025-01-28 | Stop reason: HOSPADM

## 2025-01-23 RX ORDER — MIDAZOLAM HYDROCHLORIDE 5 MG/ML
4 INJECTION INTRAMUSCULAR; INTRAVENOUS ONCE
Status: COMPLETED | OUTPATIENT
Start: 2025-01-23 | End: 2025-01-23

## 2025-01-23 RX ORDER — MAGNESIUM SULFATE HEPTAHYDRATE 40 MG/ML
INJECTION, SOLUTION INTRAVENOUS
Status: COMPLETED
Start: 2025-01-23 | End: 2025-01-23

## 2025-01-23 RX ORDER — FENTANYL CITRATE 50 UG/ML
50 INJECTION, SOLUTION INTRAMUSCULAR; INTRAVENOUS ONCE
Status: COMPLETED | OUTPATIENT
Start: 2025-01-23 | End: 2025-01-23

## 2025-01-23 RX ORDER — ATORVASTATIN CALCIUM 40 MG/1
80 TABLET, FILM COATED ORAL NIGHTLY
Status: DISCONTINUED | OUTPATIENT
Start: 2025-01-23 | End: 2025-01-28 | Stop reason: HOSPADM

## 2025-01-23 RX ORDER — ETOMIDATE 2 MG/ML
20 INJECTION INTRAVENOUS ONCE
Status: COMPLETED | OUTPATIENT
Start: 2025-01-23 | End: 2025-01-23

## 2025-01-23 RX ORDER — ISOSORBIDE MONONITRATE 30 MG/1
30 TABLET, EXTENDED RELEASE ORAL DAILY
Status: DISCONTINUED | OUTPATIENT
Start: 2025-01-23 | End: 2025-01-28 | Stop reason: HOSPADM

## 2025-01-23 RX ORDER — VANCOMYCIN HYDROCHLORIDE 1 G/20ML
INJECTION, POWDER, LYOPHILIZED, FOR SOLUTION INTRAVENOUS DAILY PRN
Status: DISCONTINUED | OUTPATIENT
Start: 2025-01-23 | End: 2025-01-23

## 2025-01-23 RX ORDER — OSELTAMIVIR PHOSPHATE 75 MG/1
75 CAPSULE ORAL 2 TIMES DAILY
Status: DISCONTINUED | OUTPATIENT
Start: 2025-01-23 | End: 2025-01-23

## 2025-01-23 RX ORDER — PROPOFOL 10 MG/ML
INJECTION, EMULSION INTRAVENOUS
Status: COMPLETED
Start: 2025-01-23 | End: 2025-01-23

## 2025-01-23 RX ORDER — FENTANYL CITRATE-0.9 % NACL/PF 10 MCG/ML
25-200 PLASTIC BAG, INJECTION (ML) INTRAVENOUS CONTINUOUS
Status: DISCONTINUED | OUTPATIENT
Start: 2025-01-23 | End: 2025-01-25

## 2025-01-23 RX ORDER — CLOPIDOGREL BISULFATE 75 MG/1
75 TABLET ORAL DAILY
Status: DISCONTINUED | OUTPATIENT
Start: 2025-01-23 | End: 2025-01-28 | Stop reason: HOSPADM

## 2025-01-23 RX ORDER — PROPOFOL 10 MG/ML
0-50 INJECTION, EMULSION INTRAVENOUS CONTINUOUS
Status: DISCONTINUED | OUTPATIENT
Start: 2025-01-23 | End: 2025-01-23

## 2025-01-23 RX ORDER — MONTELUKAST SODIUM 10 MG/1
10 TABLET ORAL DAILY
Status: DISCONTINUED | OUTPATIENT
Start: 2025-01-23 | End: 2025-01-28 | Stop reason: HOSPADM

## 2025-01-23 RX ORDER — NOREPINEPHRINE BITARTRATE/D5W 8 MG/250ML
0-.2 PLASTIC BAG, INJECTION (ML) INTRAVENOUS CONTINUOUS
Status: DISCONTINUED | OUTPATIENT
Start: 2025-01-23 | End: 2025-01-23

## 2025-01-23 RX ORDER — NOREPINEPHRINE BITARTRATE/D5W 8 MG/250ML
PLASTIC BAG, INJECTION (ML) INTRAVENOUS
Status: DISPENSED
Start: 2025-01-23 | End: 2025-01-24

## 2025-01-23 RX ORDER — SUCCINYLCHOLINE CHLORIDE 20 MG/ML
100 INJECTION INTRAMUSCULAR; INTRAVENOUS ONCE
Status: COMPLETED | OUTPATIENT
Start: 2025-01-23 | End: 2025-01-23

## 2025-01-23 RX ORDER — CARVEDILOL 3.12 MG/1
3.12 TABLET ORAL 2 TIMES DAILY
Status: DISCONTINUED | OUTPATIENT
Start: 2025-01-23 | End: 2025-01-28 | Stop reason: HOSPADM

## 2025-01-23 RX ORDER — IPRATROPIUM BROMIDE AND ALBUTEROL SULFATE 2.5; .5 MG/3ML; MG/3ML
3 SOLUTION RESPIRATORY (INHALATION)
Status: DISCONTINUED | OUTPATIENT
Start: 2025-01-23 | End: 2025-01-23

## 2025-01-23 RX ORDER — MIDAZOLAM HYDROCHLORIDE 1 MG/ML
.5-2 INJECTION, SOLUTION INTRAVENOUS CONTINUOUS
Status: DISCONTINUED | OUTPATIENT
Start: 2025-01-23 | End: 2025-01-25

## 2025-01-23 RX ORDER — HYDRALAZINE HYDROCHLORIDE 10 MG/1
10 TABLET, FILM COATED ORAL 3 TIMES DAILY
Status: DISCONTINUED | OUTPATIENT
Start: 2025-01-23 | End: 2025-01-28 | Stop reason: HOSPADM

## 2025-01-23 RX ORDER — IPRATROPIUM BROMIDE AND ALBUTEROL SULFATE 2.5; .5 MG/3ML; MG/3ML
3 SOLUTION RESPIRATORY (INHALATION) 4 TIMES DAILY
Status: DISCONTINUED | OUTPATIENT
Start: 2025-01-23 | End: 2025-01-23

## 2025-01-23 RX ORDER — OSELTAMIVIR PHOSPHATE 30 MG/1
30 CAPSULE ORAL EVERY 24 HOURS
Status: DISCONTINUED | OUTPATIENT
Start: 2025-01-23 | End: 2025-01-28 | Stop reason: HOSPADM

## 2025-01-23 RX ORDER — IPRATROPIUM BROMIDE AND ALBUTEROL SULFATE 2.5; .5 MG/3ML; MG/3ML
3 SOLUTION RESPIRATORY (INHALATION)
Status: DISCONTINUED | OUTPATIENT
Start: 2025-01-24 | End: 2025-01-27

## 2025-01-23 RX ORDER — PANTOPRAZOLE SODIUM 40 MG/10ML
40 INJECTION, POWDER, LYOPHILIZED, FOR SOLUTION INTRAVENOUS
Status: DISCONTINUED | OUTPATIENT
Start: 2025-01-24 | End: 2025-01-27

## 2025-01-23 RX ORDER — MIDAZOLAM HYDROCHLORIDE 1 MG/ML
4 INJECTION, SOLUTION INTRAMUSCULAR; INTRAVENOUS ONCE
Status: COMPLETED | OUTPATIENT
Start: 2025-01-23 | End: 2025-01-23

## 2025-01-23 RX ORDER — MAGNESIUM SULFATE HEPTAHYDRATE 40 MG/ML
2 INJECTION, SOLUTION INTRAVENOUS ONCE
Status: COMPLETED | OUTPATIENT
Start: 2025-01-23 | End: 2025-01-23

## 2025-01-23 RX ORDER — PANTOPRAZOLE SODIUM 40 MG/1
40 TABLET, DELAYED RELEASE ORAL
Status: DISCONTINUED | OUTPATIENT
Start: 2025-01-24 | End: 2025-01-27

## 2025-01-23 RX ORDER — FLUTICASONE FUROATE AND VILANTEROL 200; 25 UG/1; UG/1
1 POWDER RESPIRATORY (INHALATION)
Status: DISCONTINUED | OUTPATIENT
Start: 2025-01-23 | End: 2025-01-25

## 2025-01-23 RX ADMIN — MAGNESIUM SULFATE HEPTAHYDRATE 2 G: 40 INJECTION, SOLUTION INTRAVENOUS at 12:52

## 2025-01-23 RX ADMIN — CEFTRIAXONE SODIUM 2 G: 2 INJECTION, SOLUTION INTRAVENOUS at 14:19

## 2025-01-23 RX ADMIN — ATORVASTATIN CALCIUM 80 MG: 40 TABLET, FILM COATED ORAL at 21:32

## 2025-01-23 RX ADMIN — PIPERACILLIN SODIUM AND TAZOBACTAM SODIUM 3.38 G: 3; .375 INJECTION, SOLUTION INTRAVENOUS at 17:06

## 2025-01-23 RX ADMIN — IPRATROPIUM BROMIDE AND ALBUTEROL SULFATE 3 ML: 2.5; .5 SOLUTION RESPIRATORY (INHALATION) at 16:19

## 2025-01-23 RX ADMIN — VANCOMYCIN HYDROCHLORIDE 1500 MG: 1.5 INJECTION, POWDER, LYOPHILIZED, FOR SOLUTION INTRAVENOUS at 15:06

## 2025-01-23 RX ADMIN — MIDAZOLAM IN SODIUM CHLORIDE 4 MG/HR: 1 INJECTION INTRAVENOUS at 13:41

## 2025-01-23 RX ADMIN — ETOMIDATE 20 MG: 2 INJECTION INTRAVENOUS at 12:58

## 2025-01-23 RX ADMIN — MONTELUKAST 10 MG: 10 TABLET, FILM COATED ORAL at 17:06

## 2025-01-23 RX ADMIN — Medication 100 PERCENT: at 17:00

## 2025-01-23 RX ADMIN — MIDAZOLAM 4 MG: 1 INJECTION INTRAMUSCULAR; INTRAVENOUS at 15:16

## 2025-01-23 RX ADMIN — Medication 70 PERCENT: at 21:00

## 2025-01-23 RX ADMIN — CLOPIDOGREL 75 MG: 75 TABLET ORAL at 17:06

## 2025-01-23 RX ADMIN — METHYLPREDNISOLONE SODIUM SUCCINATE 40 MG: 40 INJECTION, POWDER, FOR SOLUTION INTRAMUSCULAR; INTRAVENOUS at 21:31

## 2025-01-23 RX ADMIN — PROPOFOL 30 MCG/KG/MIN: 10 INJECTION, EMULSION INTRAVENOUS at 13:07

## 2025-01-23 RX ADMIN — FENTANYL CITRATE 50 MCG/HR: 0.05 INJECTION, SOLUTION INTRAMUSCULAR; INTRAVENOUS at 13:43

## 2025-01-23 RX ADMIN — FENTANYL CITRATE 50 MCG: 50 INJECTION INTRAMUSCULAR; INTRAVENOUS at 15:16

## 2025-01-23 RX ADMIN — NOREPINEPHRINE BITARTRATE 0.01 MCG/KG/MIN: 8 INJECTION, SOLUTION INTRAVENOUS at 14:18

## 2025-01-23 RX ADMIN — ASPIRIN 81 MG: 81 TABLET, COATED ORAL at 17:06

## 2025-01-23 RX ADMIN — SUCCINYLCHOLINE CHLORIDE 100 MG: 20 INJECTION INTRAMUSCULAR; INTRAVENOUS at 12:59

## 2025-01-23 RX ADMIN — SUCCINYLCHOLINE CHLORIDE 100 MG: 20 INJECTION, SOLUTION INTRAMUSCULAR; INTRAVENOUS at 12:59

## 2025-01-23 RX ADMIN — MIDAZOLAM 4 MG: 5 INJECTION INTRAMUSCULAR; INTRAVENOUS at 13:28

## 2025-01-23 RX ADMIN — FENTANYL CITRATE 50 MCG: 50 INJECTION INTRAMUSCULAR; INTRAVENOUS at 13:28

## 2025-01-23 RX ADMIN — RUXOLITINIB 20 MG: 20 TABLET ORAL at 21:32

## 2025-01-23 RX ADMIN — ETOMIDATE INJECTION 20 MG: 2 SOLUTION INTRAVENOUS at 12:58

## 2025-01-23 RX ADMIN — IOHEXOL 100 ML: 350 INJECTION, SOLUTION INTRAVENOUS at 15:22

## 2025-01-23 RX ADMIN — PIPERACILLIN SODIUM AND TAZOBACTAM SODIUM 3.38 G: 3; .375 INJECTION, SOLUTION INTRAVENOUS at 23:01

## 2025-01-23 RX ADMIN — FENTANYL CITRATE 100 MCG/HR: 0.05 INJECTION, SOLUTION INTRAMUSCULAR; INTRAVENOUS at 23:01

## 2025-01-23 RX ADMIN — IPRATROPIUM BROMIDE AND ALBUTEROL SULFATE 3 ML: 2.5; .5 SOLUTION RESPIRATORY (INHALATION) at 19:46

## 2025-01-23 SDOH — SOCIAL STABILITY: SOCIAL INSECURITY: DOES ANYONE TRY TO KEEP YOU FROM HAVING/CONTACTING OTHER FRIENDS OR DOING THINGS OUTSIDE YOUR HOME?: UNABLE TO ASSESS

## 2025-01-23 SDOH — SOCIAL STABILITY: SOCIAL INSECURITY
WITHIN THE LAST YEAR, HAVE YOU BEEN HUMILIATED OR EMOTIONALLY ABUSED IN OTHER WAYS BY YOUR PARTNER OR EX-PARTNER?: PATIENT UNABLE TO ANSWER

## 2025-01-23 SDOH — SOCIAL STABILITY: SOCIAL INSECURITY: HAVE YOU HAD ANY THOUGHTS OF HARMING ANYONE ELSE?: UNABLE TO ASSESS

## 2025-01-23 SDOH — ECONOMIC STABILITY: FOOD INSECURITY
HOW HARD IS IT FOR YOU TO PAY FOR THE VERY BASICS LIKE FOOD, HOUSING, MEDICAL CARE, AND HEATING?: PATIENT UNABLE TO ANSWER

## 2025-01-23 SDOH — SOCIAL STABILITY: SOCIAL INSECURITY: DO YOU FEEL UNSAFE GOING BACK TO THE PLACE WHERE YOU ARE LIVING?: UNABLE TO ASSESS

## 2025-01-23 SDOH — SOCIAL STABILITY: SOCIAL INSECURITY: ARE YOU OR HAVE YOU BEEN THREATENED OR ABUSED PHYSICALLY, EMOTIONALLY, OR SEXUALLY BY ANYONE?: UNABLE TO ASSESS

## 2025-01-23 SDOH — ECONOMIC STABILITY: TRANSPORTATION INSECURITY
IN THE PAST 12 MONTHS, HAS LACK OF TRANSPORTATION KEPT YOU FROM MEDICAL APPOINTMENTS OR FROM GETTING MEDICATIONS?: PATIENT UNABLE TO ANSWER

## 2025-01-23 SDOH — SOCIAL STABILITY: SOCIAL INSECURITY: ABUSE: ADULT

## 2025-01-23 SDOH — SOCIAL STABILITY: SOCIAL INSECURITY: HAVE YOU HAD THOUGHTS OF HARMING ANYONE ELSE?: UNABLE TO ASSESS

## 2025-01-23 SDOH — ECONOMIC STABILITY: HOUSING INSECURITY: IN THE PAST 12 MONTHS, HOW MANY TIMES HAVE YOU MOVED WHERE YOU WERE LIVING?: 0

## 2025-01-23 SDOH — SOCIAL STABILITY: SOCIAL INSECURITY: WITHIN THE LAST YEAR, HAVE YOU BEEN AFRAID OF YOUR PARTNER OR EX-PARTNER?: PATIENT UNABLE TO ANSWER

## 2025-01-23 SDOH — ECONOMIC STABILITY: FOOD INSECURITY
WITHIN THE PAST 12 MONTHS, YOU WORRIED THAT YOUR FOOD WOULD RUN OUT BEFORE YOU GOT THE MONEY TO BUY MORE.: PATIENT UNABLE TO ANSWER

## 2025-01-23 SDOH — SOCIAL STABILITY: SOCIAL INSECURITY
WITHIN THE LAST YEAR, HAVE YOU BEEN KICKED, HIT, SLAPPED, OR OTHERWISE PHYSICALLY HURT BY YOUR PARTNER OR EX-PARTNER?: PATIENT UNABLE TO ANSWER

## 2025-01-23 SDOH — ECONOMIC STABILITY: HOUSING INSECURITY
IN THE LAST 12 MONTHS, WAS THERE A TIME WHEN YOU WERE NOT ABLE TO PAY THE MORTGAGE OR RENT ON TIME?: PATIENT UNABLE TO ANSWER

## 2025-01-23 SDOH — SOCIAL STABILITY: SOCIAL INSECURITY: DO YOU FEEL ANYONE HAS EXPLOITED OR TAKEN ADVANTAGE OF YOU FINANCIALLY OR OF YOUR PERSONAL PROPERTY?: UNABLE TO ASSESS

## 2025-01-23 SDOH — ECONOMIC STABILITY: INCOME INSECURITY
IN THE PAST 12 MONTHS HAS THE ELECTRIC, GAS, OIL, OR WATER COMPANY THREATENED TO SHUT OFF SERVICES IN YOUR HOME?: PATIENT UNABLE TO ANSWER

## 2025-01-23 SDOH — ECONOMIC STABILITY: HOUSING INSECURITY: AT ANY TIME IN THE PAST 12 MONTHS, WERE YOU HOMELESS OR LIVING IN A SHELTER (INCLUDING NOW)?: PATIENT UNABLE TO ANSWER

## 2025-01-23 SDOH — ECONOMIC STABILITY: FOOD INSECURITY
WITHIN THE PAST 12 MONTHS, THE FOOD YOU BOUGHT JUST DIDN'T LAST AND YOU DIDN'T HAVE MONEY TO GET MORE.: PATIENT UNABLE TO ANSWER

## 2025-01-23 SDOH — SOCIAL STABILITY: SOCIAL INSECURITY
WITHIN THE LAST YEAR, HAVE YOU BEEN RAPED OR FORCED TO HAVE ANY KIND OF SEXUAL ACTIVITY BY YOUR PARTNER OR EX-PARTNER?: PATIENT UNABLE TO ANSWER

## 2025-01-23 SDOH — SOCIAL STABILITY: SOCIAL INSECURITY: ARE THERE ANY APPARENT SIGNS OF INJURIES/BEHAVIORS THAT COULD BE RELATED TO ABUSE/NEGLECT?: UNABLE TO ASSESS

## 2025-01-23 SDOH — SOCIAL STABILITY: SOCIAL INSECURITY: HAS ANYONE EVER THREATENED TO HURT YOUR FAMILY OR YOUR PETS?: UNABLE TO ASSESS

## 2025-01-23 SDOH — SOCIAL STABILITY: SOCIAL INSECURITY: WERE YOU ABLE TO COMPLETE ALL THE BEHAVIORAL HEALTH SCREENINGS?: NO

## 2025-01-23 ASSESSMENT — COGNITIVE AND FUNCTIONAL STATUS - GENERAL
TURNING FROM BACK TO SIDE WHILE IN FLAT BAD: TOTAL
PERSONAL GROOMING: TOTAL
TURNING FROM BACK TO SIDE WHILE IN FLAT BAD: TOTAL
HELP NEEDED FOR BATHING: TOTAL
EATING MEALS: TOTAL
WALKING IN HOSPITAL ROOM: TOTAL
HELP NEEDED FOR BATHING: TOTAL
PATIENT BASELINE BEDBOUND: NO
DRESSING REGULAR LOWER BODY CLOTHING: TOTAL
CLIMB 3 TO 5 STEPS WITH RAILING: TOTAL
STANDING UP FROM CHAIR USING ARMS: TOTAL
MOVING TO AND FROM BED TO CHAIR: TOTAL
DRESSING REGULAR LOWER BODY CLOTHING: TOTAL
MOVING TO AND FROM BED TO CHAIR: TOTAL
CLIMB 3 TO 5 STEPS WITH RAILING: TOTAL
DRESSING REGULAR UPPER BODY CLOTHING: TOTAL
MOBILITY SCORE: 6
EATING MEALS: TOTAL
PERSONAL GROOMING: TOTAL
TOILETING: TOTAL
MOVING FROM LYING ON BACK TO SITTING ON SIDE OF FLAT BED WITH BEDRAILS: TOTAL
MOVING FROM LYING ON BACK TO SITTING ON SIDE OF FLAT BED WITH BEDRAILS: TOTAL
DAILY ACTIVITIY SCORE: 6
TOILETING: TOTAL
WALKING IN HOSPITAL ROOM: TOTAL
STANDING UP FROM CHAIR USING ARMS: TOTAL
DRESSING REGULAR UPPER BODY CLOTHING: TOTAL
DAILY ACTIVITIY SCORE: 6
MOBILITY SCORE: 6

## 2025-01-23 ASSESSMENT — ACTIVITIES OF DAILY LIVING (ADL)
LACK_OF_TRANSPORTATION: PATIENT UNABLE TO ANSWER
DRESSING YOURSELF: UNABLE TO ASSESS
ADEQUATE_TO_COMPLETE_ADL: UNABLE TO ASSESS
FEEDING YOURSELF: UNABLE TO ASSESS
JUDGMENT_ADEQUATE_SAFELY_COMPLETE_DAILY_ACTIVITIES: UNABLE TO ASSESS
GROOMING: UNABLE TO ASSESS
WALKS IN HOME: UNABLE TO ASSESS
HEARING - RIGHT EAR: UNABLE TO ASSESS
PATIENT'S MEMORY ADEQUATE TO SAFELY COMPLETE DAILY ACTIVITIES?: UNABLE TO ASSESS
TOILETING: UNABLE TO ASSESS
BATHING: UNABLE TO ASSESS
HEARING - LEFT EAR: UNABLE TO ASSESS

## 2025-01-23 ASSESSMENT — LIFESTYLE VARIABLES
SKIP TO QUESTIONS 9-10: 0
HOW MANY STANDARD DRINKS CONTAINING ALCOHOL DO YOU HAVE ON A TYPICAL DAY: PATIENT UNABLE TO ANSWER
AUDIT-C TOTAL SCORE: -1
HOW OFTEN DO YOU HAVE A DRINK CONTAINING ALCOHOL: PATIENT UNABLE TO ANSWER
AUDIT-C TOTAL SCORE: -1
HOW OFTEN DO YOU HAVE 6 OR MORE DRINKS ON ONE OCCASION: PATIENT UNABLE TO ANSWER

## 2025-01-23 ASSESSMENT — PAIN - FUNCTIONAL ASSESSMENT
PAIN_FUNCTIONAL_ASSESSMENT: CPOT (CRITICAL CARE PAIN OBSERVATION TOOL)
PAIN_FUNCTIONAL_ASSESSMENT: 0-10

## 2025-01-23 ASSESSMENT — PAIN SCALES - GENERAL: PAINLEVEL_OUTOF10: 0 - NO PAIN

## 2025-01-23 NOTE — PROGRESS NOTES
Medication Adjustment    The following medication(s) was/were adjusted for Aristeo Barriga per protocol/policy due to altered renal function.    Medication(s) adjusted:   Oseltamivir adjusted to 30 mg every 24 hours for influenza treatment with CrCl of 27.3 mL/min    Jamal Centeno [facility]

## 2025-01-23 NOTE — H&P
History Obtained From: Chart    History Of Present Illness:  Aristeo Barriga is a 79 y.o. male with PMHx s/f COPD with baseline oxygen requirement of 4 L chronic systolic diastolic heart failure last EF in January 2020 for 50% history of myeloproliferative disorder coronary artery disease with recent stress test showing no reversible ischemia benign prostatic hypertrophy abdominal aortic aneurysm with repair.  Presenting with shortness of breath.  Patient had recently been hospitalized and discharged about 3 days ago.  At that time the patient was being treated for COPD exacerbation and CHF exacerbation.  The patient did have some CT findings suggestive of pneumonia but was improving on Zithromax therapy alone.  The patient subsequently been discharged to home about 3 days ago on oral Zithromax.  Evidently the patient progressively became more short of breath had arrived to the emergency department via squad on CPAP subsequently changed over to BiPAP patient was not saturating very well and subsequently intubated by the ED provider. Pt felt to have worsening pneumonia and possibley some CHF. Pt given IV rocephin and zithromax, no lasix given due to relatively low blood pressure  The patient was discussed with the ED provider and will be admitted to the ICU for acute on chronic respiratory failure LOS will exceed 2 midnights.     ED Course (Summary):   Vitals on presentation: Temperature is 97.5 heart rate 118 respiratory rate 30 blood pressure 130/62 SpO2 71% on BiPAP  Labs: Significant for glucose 192 creatinine 2.03 close to baseline, AST 56 initial lactate 2.5  troponin 39 CBC significant for leukopenia with white blood cell count 3.1 hemoglobin 11.7 hematocrit 36.4 platelets 198  Imaging: Chest x-ray showing left lower lobe consolidation and effusion  Interventions: Patient intubated started on Zithromax and Rocephin      ED Course:  Diagnoses as of 01/23/25 1409   Acute on chronic respiratory failure  with hypoxia (Multi)     Relevant Results  Results for orders placed or performed during the hospital encounter of 01/23/25 (from the past 24 hours)   BLOOD GAS VENOUS FULL PANEL   Result Value Ref Range    POCT pH, Venous 7.34 7.33 - 7.43 pH    POCT pCO2, Venous 40 (L) 41 - 51 mm Hg    POCT pO2, Venous 39 35 - 45 mm Hg    POCT SO2, Venous 59 45 - 75 %    POCT Oxy Hemoglobin, Venous 57.8 45.0 - 75.0 %    POCT Hematocrit Calculated, Venous 27.0 (L) 41.0 - 52.0 %    POCT Sodium, Venous 139 136 - 145 mmol/L    POCT Potassium, Venous 3.8 3.5 - 5.3 mmol/L    POCT Chloride, Venous 108 (H) 98 - 107 mmol/L    POCT Ionized Calicum, Venous 1.17 1.10 - 1.33 mmol/L    POCT Glucose, Venous 181 (H) 74 - 99 mg/dL    POCT Lactate, Venous 2.4 (H) 0.4 - 2.0 mmol/L    POCT Base Excess, Venous -3.9 (L) -2.0 - 3.0 mmol/L    POCT HCO3 Calculated, Venous 21.6 (L) 22.0 - 26.0 mmol/L    POCT Hemoglobin, Venous 9.0 (L) 13.5 - 17.5 g/dL    POCT Anion Gap, Venous 13.0 10.0 - 25.0 mmol/L    Patient Temperature 37.0 degrees Celsius    FiO2 100 %   CBC and Auto Differential   Result Value Ref Range    WBC 3.1 (L) 4.4 - 11.3 x10*3/uL    nRBC 1.0 (H) 0.0 - 0.0 /100 WBCs    RBC 4.00 (L) 4.50 - 5.90 x10*6/uL    Hemoglobin 11.7 (L) 13.5 - 17.5 g/dL    Hematocrit 36.4 (L) 41.0 - 52.0 %    MCV 91 80 - 100 fL    MCH 29.3 26.0 - 34.0 pg    MCHC 32.1 32.0 - 36.0 g/dL    RDW 20.4 (H) 11.5 - 14.5 %    Platelets 198 150 - 450 x10*3/uL    Immature Granulocytes %, Automated 1.3 (H) 0.0 - 0.9 %    Immature Granulocytes Absolute, Automated 0.04 0.00 - 0.50 x10*3/uL   Comprehensive metabolic panel   Result Value Ref Range    Glucose 192 (H) 74 - 99 mg/dL    Sodium 140 136 - 145 mmol/L    Potassium 3.7 3.5 - 5.3 mmol/L    Chloride 106 98 - 107 mmol/L    Bicarbonate 23 21 - 32 mmol/L    Anion Gap 15 10 - 20 mmol/L    Urea Nitrogen 67 (H) 6 - 23 mg/dL    Creatinine 2.03 (H) 0.50 - 1.30 mg/dL    eGFR 33 (L) >60 mL/min/1.73m*2    Calcium 8.2 (L) 8.6 - 10.3 mg/dL     Albumin 3.1 (L) 3.4 - 5.0 g/dL    Alkaline Phosphatase 61 33 - 136 U/L    Total Protein 6.4 6.4 - 8.2 g/dL    AST 56 (H) 9 - 39 U/L    Bilirubin, Total 0.7 0.0 - 1.2 mg/dL    ALT 36 10 - 52 U/L   B-type natriuretic peptide   Result Value Ref Range     (H) 0 - 99 pg/mL   Troponin I, High Sensitivity, Initial   Result Value Ref Range    Troponin I, High Sensitivity 39 (H) 0 - 20 ng/L   Manual Differential   Result Value Ref Range    Neutrophils %, Manual 76.0 40.0 - 80.0 %    Bands %, Manual 21.0 0.0 - 5.0 %    Lymphocytes %, Manual 2.0 13.0 - 44.0 %    Monocytes %, Manual 0.0 2.0 - 10.0 %    Eosinophils %, Manual 0.0 0.0 - 6.0 %    Basophils %, Manual 0.0 0.0 - 2.0 %    Metamyelocytes %, Manual 1.0 0.0 - 0.0 %    Seg Neutrophils Absolute, Manual 2.36 1.60 - 5.00 x10*3/uL    Bands Absolute, Manual 0.65 (H) 0.00 - 0.50 x10*3/uL    Lymphocytes Absolute, Manual 0.06 (L) 0.80 - 3.00 x10*3/uL    Monocytes Absolute, Manual 0.00 (L) 0.05 - 0.80 x10*3/uL    Eosinophils Absolute, Manual 0.00 0.00 - 0.40 x10*3/uL    Basophils Absolute, Manual 0.00 0.00 - 0.10 x10*3/uL    Metamyelocytes Absolute, Manual 0.03 0.00 - 0.00 x10*3/uL    Total Cells Counted 100     Neutrophils Absolute, Manual 3.01 1.60 - 5.50 x10*3/uL    RBC Morphology See Below     Polychromasia Mild     Ovalocytes Few     Teardrop Cells Few     Leblanc Cells Few     Acanthocytes Few       XR chest abdomen for OG NG placement    Result Date: 1/23/2025  Interpreted By:  Stacie Parker, STUDY: XR CHEST ABDOMEN FOR OG NG PLACEMENT; ;  1/23/2025 1:36 pm   INDICATION: Signs/Symptoms:ETT placement, OG placement.     COMPARISON: None.   ACCESSION NUMBER(S): [id number]   ORDERING CLINICIAN: BILL SCHULZ   FINDINGS: AP portable view of the chest is obtained.  Limited exam due to portable nature. Magnified cardiac silhouette. Endotracheal tube in place and terminates above the jayy. Left perihilar and left basilar infiltrates and effusions. Mild interstitial  prominence diffusely.       Left perihilar and left basilar infiltrates and effusions for which follow-up is recommended.   MACRO: None   Signed by: Stacie Parker 1/23/2025 2:07 PM Dictation workstation:   [id number]    Nuclear Stress Test    Result Date: 1/20/2025  Interpreted By:  Ric See, STUDY: NUCLEAR STRESS TEST;  1/20/2025 2:38 pm   INDICATION: Signs/Symptoms:CHF.   COMPARISON: None.   ACCESSION NUMBER(S): [id number]   ORDERING CLINICIAN: KEN ROBIN   TECHNIQUE: DIVISION OF NUCLEAR MEDICINE PHARMACOLOGIC STRESS MYOCARDIAL PERFUSION SCAN, ONE DAY PROTOCOL   The patient received an intravenous dose of  10.7 mCi of Tc-99m tetrofosmin and resting emission tomographic (SPECT) images of the myocardium were acquired. The patient then received an intravenous infusion of 0.4mg regadenoson (Lexiscan) followed by an additional dose of  34.3 mCi of Tc-99m tetrofosmin. Stress phase SPECT images of the myocardium were then acquired. These included ECG-gated images to assess and quantify ventricular function.   FINDINGS: There is a moderate-to-large primarily fixed but partially reversible perfusion defect in the inferior, inferolateral, and lateral walls with associated wall motion abnormality suggesting prior infarct with a small area of tiffayn-infarct ischemia.   Calculated ejection fraction of 46% with hypokinesis of the inferior, inferolateral, and lateral walls   Attenuation correction CT images demonstrate coronary artery calcification, aortic atherosclerotic calcification, lung consolidation/infiltrates bilaterally       1. There is a moderate-to-large primarily fixed but partially reversible perfusion defect in the inferior, inferolateral, and lateral walls with associated wall motion abnormality suggesting prior infarct with a small area of tiffany-infarct ischemia.   2. Calculated ejection fraction of 46% with hypokinesis of the inferior, inferolateral, and lateral calvillo     Signed by: Ric See 1/20/2025 4:52  PM Dictation workstation:   [id number]    Cardiology Interpretation Of Nuclear Stress - See Other Report For Nuclear Portion    Result Date: 1/20/2025              [place] [address]      Phone [phone] Fax [phone] Nuclear Pharmacologic Stress Test Patient Name:      AMITA BELTRAN  Ordering Provider:    [id number] KEN ROBIN Study Date:        1/20/2025           Reading Physician:    [id number] Ric See DO MRN/PID:           [id number]            Supervising           [id number] Ric See DO                                        Physician: Accession#:        [id number]        Fellow: Date of Birth/Age: 1945 / 79      Fellow:                    years Gender:            M                   Nurse:                Erin Ellison Admission Status:  Inpatient           Sonographer:          RADHA Height:            180.34 cm           Technologist: Weight:            66.23 kg            Additional Staff: BSA:               1.84 m2             Encounter#:           [id number] BMI:               20.36 kg/m2         Patient Location:     [facility] Study Type:    CARDIOLOGY INTERPRETATION OF NUCLEAR STRESS Diagnosis/ICD: Dyspnea, unspecified-R06.00 Indication:    Dyspnea CPT Codes:     Stress Test Interpretation-32284; Stress Test Supervision-78530 Falls Risk: Low: Patient has low risk for sustaining a fall; environmental safety interventions in place.  Study Details: Correct procedure and correct patient verified verbally and with                ID Band checked.  Patient History: Hypertension, coronary artery disease, dyslipidemia, congestive heart failure, peripheral vascular disease, hx of thoracic aneurysm repair, chronic obstructive pulmonary disease and hyperlipidemia. aortic aneursym, CKD, PCI, right BKA. Allergies: Codeine. Smoker:    Current.  Medications: Lasix, jakafi, albuterol, potassium, carvedilol, hydralazine, isosorbide, protonix, ASA. atorvastatin.  The patient took medications as prescribed.  Patient Performance: Patient received a total of 0.4 mg of Regadenoson at 8:19:41 AM. The peak heart rate achieved was 89 bpm, which was 63 % of the age predicted target heart rate of 141 bpm. The resting blood pressure was 121/61 mmHg with a heart rate of 78 bpm. The patient developed no symptoms during the stress exam. The blood pressure response was normal. The test was terminated due to: completed lab protocol. Patient has met the discharge criteria and is discharged to their floor.  Baseline ECG: Resting ECG showed Sinus Arrhythmia with prior inferior infarct and prior lateral infarct.  Stress ECG: Stress ECG showed sinus arrhythmia, with occ PVC. No ST changes.  Stress Stage Data: +----------------+--+------+-------+                 HRSys BPDias BP +----------------+--+------+-------+ Baseline [id number]   61      +----------------+--+------+-------+ Stage I         25114   48      +----------------+--+------+-------+ Stage II        20847   54      +----------------+--+------+-------+ Stage III       95941   54      +----------------+--+------+-------+ Stage IV        67905   58      +----------------+--+------+-------+ Stage V         79584   57      +----------------+--+------+-------+  Summary:  1. Correlate with myocardial perfusion imaging results.  2. No ECG changes from baseline.  3. Nuclear image results are reported separately. [id number] Ric See DO Electronically signed on 1/20/2025 at 1:56:32 PM   ** Final **     ECG 12 Lead    Result Date: 1/20/2025  Sinus rhythm Probable left atrial enlargement RSR' in V1 or V2, right VCD or RVH Abnormal T, consider ischemia, lateral leads Borderline ST elevation, anterior leads Baseline wander in lead(s) V2    CT chest high resolution    Result Date: 1/20/2025  Interpreted By:  Pietro Adams, STUDY: CT CHEST HIGH RESOLUTION;  1/19/2025 1:55 pm   INDICATION: Signs/Symptoms:SOB/R/O  Pulmonary fibrosis.     COMPARISON: 10/03/2023   ACCESSION NUMBER(S): [id number]   ORDERING CLINICIAN: KEN ROBIN   TECHNIQUE: Using helical multidetector technique, volumetric data acquisition of the chest was obtained without intravenous administration of contrast material under the high resolution chest CT protocol. Examination includes contiguous slices through the chest in supine positioning during inspiration, noncontiguous axial slices in supine positioning during expiration and prone positioning during inspiration.   FINDINGS: LUNGS AND AIRWAYS: The trachea and central airways are patent. No endobronchial lesion is seen.   There are severe centrilobular emphysematous changes. Interval appearance of inferior lingular consolidation/airspace disease. Interval worsening in left lower lobe consolidation/atelectasis. Interval worsening in right basilar airspace disease/infiltrate/pneumonia.   Expiratory imaging demonstrates diffuse air trapping..     MEDIASTINUM AND NICO, LOWER NECK AND AXILLA: The visualized thyroid gland is within normal limits. There are scattered mediastinal lymph nodes are felt to be reactive/inflammatory in nature. Esophagus appears within normal limits as seen.   HEART AND VESSELS: Severe atherosclerotic calcifications of the thoracic aorta. Main pulmonary artery and its branches are normal in caliber. Severe coronary artery calcifications. The cardiac chambers are not enlarged. There is no pericardial effusion seen.   UPPER ABDOMEN: Extensive atherosclerotic calcification and infrarenal aortic stent placement. Indeterminate high attenuation within the infrarenal stent may be artifactual but correlate with any concern for renal stenosis/thrombus.     CHEST WALL AND OSSEOUS STRUCTURES: Chest wall is within normal limits. No acute osseous pathology.There are no suspicious osseous lesions.There is interval compression fracture with bony sclerosis of the T6 vertebral body.       1. There is  severe emphysematous changes with multifocal parenchymal infiltrates concerning for pneumonia/aspiration. Correlate with symptoms of fever/sputum production. Recommend follow-up radiographs to document resolution. 2. Severe atherosclerotic disease of the aorta with coronary artery calcifications and correlate with coronary artery disease risk factors. 3. Interval compression fracture of the T6 vertebral body with bony sclerosis. This is most likely secondary to fracture healing but correlate with any concern for pathologic fracture.     MACRO: Critical Finding:  See findings. Notification was initiated on 1/20/2025 at 9:39 am by  Pietro Adams.  (**-YCF-**) Instructions:   Signed by: Pietro Adams 1/20/2025 9:39 AM Dictation workstation:   [id number]    Transthoracic Echo (TTE) Complete    Result Date: 1/18/2025              [place] [address]      Phone [phone] Fax [phone] TRANSTHORACIC ECHOCARDIOGRAM REPORT Patient Name:       AMITA FRAGA DEVIN   Reading Physician:    [id number] Rigo Sim MD Study Date:         1/18/2025            Ordering Provider:    [id number] JOCELYNN OSWALD MRN/PID:            [id number]             Fellow: Accession#:         [id number]         Nurse:                ER nurse Date of Birth/Age:  1945 / 79 years Sonographer:          Amalia Fernandez RDCS Gender Assigned at  M                    Additional Staff: Birth: Height:             180.34 cm            Admit Date:           1/17/2025 Weight:             70.76 kg             Admission Status:     Inpatient -                                                                Routine BSA / BMI:          1.90 m2 / 21.76      Department Location:  [facility]                     kg/m2 Blood  Pressure: 138 /82 mmHg Study Type:    TRANSTHORACIC ECHO (TTE) COMPLETE Diagnosis/ICD: Other forms of dyspnea-R06.09; Elevated Troponin-R79.89 Indication:    Dyspnea, Elevated troponin CPT Codes:     Echo Complete w Full Doppler-[id number] Patient History: Pertinent History: CAD, HTN and CHF. Study Detail: The following Echo studies were performed: 2D, M-Mode, Doppler and               color flow. Optison used as a contrast agent for endocardial               border definition. Total contrast used for this procedure was 3 mL               via IV push.  PHYSICIAN INTERPRETATION: Left Ventricle: The left ventricular systolic function is mildly decreased, with a Randhawa's biplane calculated ejection fraction of 50%. There is global hypokinesis of the left ventricle with minor regional variations. The left ventricular cavity size is normal. There is normal septal and normal posterior left ventricular wall thickness. There is left ventricular concentric remodeling. Spectral Doppler shows a Grade I (impaired relaxation pattern) of left ventricular diastolic filling with normal left atrial filling pressure. Left Atrium: The left atrium is normal in size. Right Ventricle: The right ventricle is normal in size. There is normal right ventricular global systolic function. Right Atrium: The right atrium is normal in size. Aortic Valve: The aortic valve appears structurally normal. There is mild aortic valve cusp calcification. The aortic valve dimensionless index is 0.74. There is trace aortic valve regurgitation. The peak instantaneous gradient of the aortic valve is 8 mmHg. The mean gradient of the aortic valve is 4 mmHg. Mitral Valve: The mitral valve is mildly thickened. There is trace mitral valve regurgitation. Tricuspid Valve: The tricuspid valve is structurally normal. There is trace tricuspid regurgitation. Pulmonic Valve: The pulmonic valve is not well visualized. There is no indication of pulmonic valve regurgitation.  Pericardium: No pericardial effusion noted. Aorta: The aortic root is normal. There is moderate dilatation of the ascending aorta.  CONCLUSIONS:  1. The left ventricular systolic function is mildly decreased, with a Randhawa's biplane calculated ejection fraction of 50%.  2. There is global hypokinesis of the left ventricle with minor regional variations.  3. Spectral Doppler shows a Grade I (impaired relaxation pattern) of left ventricular diastolic filling with normal left atrial filling pressure.  4. There is normal right ventricular global systolic function.  5. There is moderate dilatation of the ascending aorta. QUANTITATIVE DATA SUMMARY:  2D MEASUREMENTS:           Normal Ranges: Ao Root d:       3.60 cm   (2.0-3.7cm) LAs:             3.70 cm   (2.7-4.0cm) IVSd:            1.04 cm   (0.6-1.1cm) LVPWd:           1.00 cm   (0.6-1.1cm) LVIDd:           4.09 cm   (3.9-5.9cm) LVIDs:           2.90 cm LV Mass Index:   71.3 g/m2 LV % FS          29.1 %  LA VOLUME:                    Normal Ranges: LA Vol A4C:        49.6 ml    (22+/-6mL/m2) LA Vol A2C:        44.9 ml LA Vol BP:         49.7 ml LA Vol Index A4C:  26.2ml/m2 LA Vol Index A2C:  23.7 ml/m2 LA Vol Index BP:   26.2 ml/m2 LA Area A4C:       19.6 cm2 LA Area A2C:       17.7 cm2 LA Major Axis A4C: 6.6 cm LA Major Axis A2C: 5.9 cm  RA VOLUME BY A/L METHOD:          Normal Ranges: RA Area A4C:             14.5 cm2  AORTA MEASUREMENTS:         Normal Ranges: Ao Sinus, d:        3.60 cm (2.1-3.5cm) Ao STJ, d:          2.22 cm (1.7-3.4cm) Asc Ao, d:          4.40 cm (2.1-3.4cm)  LV SYSTOLIC FUNCTION BY 2D PLANIMETRY (MOD):                      Normal Ranges: EF-A4C View:    48 % (>=55%) EF-A2C View:    53 % EF-Biplane:     50 % LV EF Reported: 50 %  LV DIASTOLIC FUNCTION:            Normal Ranges: MV Peak E:             0.80 m/s   (0.7-1.2 m/s) MV Peak A:             1.17 m/s   (0.42-0.7 m/s) E/A Ratio:             0.68       (1.0-2.2) MV e'                  0.054  m/s  (>8.0) MV lateral e'          0.06 m/s MV medial e'           0.05 m/s MV A Dur:              92.00 msec E/e' Ratio:            14.65      (<8.0)  MITRAL VALVE:          Normal Ranges: MV DT:        168 msec (150-240msec)  AORTIC VALVE:                     Normal Ranges: AoV Vmax:                1.44 m/s (<=1.7m/s) AoV Peak P.3 mmHg (<20mmHg) AoV Mean P.0 mmHg (1.7-11.5mmHg) LVOT Max Cristofer:            0.89 m/s (<=1.1m/s) AoV VTI:                 25.30 cm (18-25cm) LVOT VTI:                18.80 cm LVOT Diameter:           2.00 cm  (1.8-2.4cm) AoV Area, VTI:           2.33 cm2 (2.5-5.5cm2) AoV Area,Vmax:           1.94 cm2 (2.5-4.5cm2) AoV Dimensionless Index: 0.74  AORTIC INSUFFICIENCY: AI Vmax:       3.79 m/s AI Half-time:  498 msec AI Decel Rate: 223.00 cm/s2  RIGHT VENTRICLE: RV Basal 3.76 cm RV Mid   2.42 cm RV Major 8.4 cm TAPSE:   26.4 mm RV s'    0.14 m/s  TRICUSPID VALVE/RVSP:          Normal Ranges: Peak TR Velocity:     2.72 m/s RV Syst Pressure:     33 mmHg  (< 30mmHg) IVC Diam:             1.83 cm  [id number] Rigo Sim MD Electronically signed on 2025 at 10:45:48 AM  ** Final **     XR chest 1 view    Result Date: 2025  STUDY: Chest Radiograph;  2025 at 16:40 hours. INDICATION: Shortness of breath. COMPARISON: Chest, single portable view obtained on 2024 at 02:19 hours.  CT Chest 10/03/2023. ACCESSION NUMBER(S): [id number] ORDERING CLINICIAN: RANDALL CARSON TECHNIQUE:  Frontal chest was obtained at 16:40 hours. FINDINGS: There is a left pleural effusion.  There are increased interstitial markings at the lung bases, suspicious for edema.  Heart size is top normal.  No pneumothorax is noted.    1.  Left pleural effusion. 2.  Increased interstitial markings at the lung bases, suspicious for edema. Signed by Arden Otero MD     Scheduled medications:  azithromycin, 500 mg, intravenous, Once  cefTRIAXone, 2 g, intravenous, Once      Continuous  medications:  fentaNYL,  mcg/hr, Last Rate: 100 mcg/hr (01/23/25 1408)  midazolam, 0.5-20 mg/hr, Last Rate: 4 mg/hr (01/23/25 1341)  norepinephrine, 0-0.2 mcg/kg/min  propofol, 0-50 mcg/kg/min, Last Rate: 10 mcg/kg/min (01/23/25 1344)      PRN medications:        Past Medical History  He has a past medical history of CHF (congestive heart failure), Chronic kidney disease, COPD (chronic obstructive pulmonary disease) (Multi), Emphysema of lung (Multi), GERD (gastroesophageal reflux disease), Hypertension, and Leukemia (Multi).    Surgical History  He has no past surgical history on file.     Social History  He reports that he has been smoking cigarettes. He has a 60 pack-year smoking history. He has never used smokeless tobacco. He reports that he does not currently use alcohol. He reports that he does not use drugs.    Family History  No family history on file.     Allergies  Codeine    Code Status  Full Code     Review of Systems   Reason unable to perform ROS: pt intubated.       Last Recorded Vitals  BP 92/60   Pulse 84   Temp 36.4 °C (97.5 °F) (Tympanic)   Resp 18   Wt 65.3 kg (144 lb)   SpO2 (!) 83%      Physical Exam  Vitals reviewed.   Constitutional:       General: He is not in acute distress.     Comments: Sedated and intubated   HENT:      Head: Normocephalic and atraumatic.      Right Ear: External ear normal.      Left Ear: External ear normal.      Nose: Nose normal.      Mouth/Throat:      Mouth: Mucous membranes are moist.      Pharynx: Oropharynx is clear.   Eyes:      General: No scleral icterus.     Extraocular Movements: Extraocular movements intact.      Conjunctiva/sclera: Conjunctivae normal.      Pupils: Pupils are equal, round, and reactive to light.   Neck:      Vascular: No carotid bruit.   Cardiovascular:      Rate and Rhythm: Regular rhythm. Tachycardia present.      Pulses: Normal pulses.      Heart sounds: Normal heart sounds.   Pulmonary:      Effort: Pulmonary effort is  normal. No respiratory distress.      Breath sounds: Normal breath sounds. No wheezing, rhonchi or rales.      Comments: Diminished in bilateral bases  Chest:      Chest wall: No tenderness.   Abdominal:      General: Bowel sounds are normal. There is no distension.      Palpations: Abdomen is soft. There is no mass.      Tenderness: There is no abdominal tenderness. There is no rebound.   Musculoskeletal:         General: Deformity present. No swelling. Normal range of motion.      Cervical back: Normal range of motion.      Comments: B/L bka and prosthesis   Skin:     General: Skin is warm and dry.      Capillary Refill: Capillary refill takes less than 2 seconds.   Neurological:      Comments: Pt sedated and intubated         Assessment/Plan   Assessment & Plan    Acute on chronic hypoxic respiratory failure likely due to worsening pneumonia on chronic chf, copd  Pt is septic with respiratory failure, elevated lactate level started on pressor support  CTA of chest pending to rule out PE  Pt on vent and will be admitted to the ICU  Pt failed treatment on Zithromax/steroids  IV Vanc/Zosyn for now given recent hospitalization and failure on zithromax  IV solumedrol, otc duoneb  Resume home treatments    ACUTE ON Chronic systolic and diastolic CHF  Recent echocardiogram [id number]  Reconcile pt home meds, diuresis as tolerated    CAD hx PCI/Stent elevated troponin due to hypoxia  Stress test earlier this month negative for ischemia  Continue statin asa/plavix betablocker if tolerated    Myeloproliferative disorder  Continue Ruxolitinib     CKD 3  Pt creatinine at recent level not acutely worsened      No new Assessment & Plan notes have been filed under this hospital service since the last note was generated.  Service: Internal Medicine          Raad Brandon, APRN-CNP    Dragon dictation software was used to dictate this note and thus there may be minor errors in translation/transcription including garbled speech or  misspellings. Please contact for clarification if needed.

## 2025-01-23 NOTE — CONSULTS
Critical Care Medicine Consult      Reason For Consult  Acute hypoxic respiratory failure requiring  intubation    History Of Present Illness  Aristeo Barriga is a 79 y.o. male with PMHx s/f COPD with baseline oxygen requirement of 4 L chronic systolic diastolic heart failure last EF in January 2020 for 50% history of myeloproliferative disorder coronary artery disease with recent stress test showing no reversible ischemia benign prostatic hypertrophy abdominal aortic aneurysm with repair was admitted to ICU for Acute hypoxic respiratory failure requiring  intubation.  On presentation to the ER his oxygen saturation was 50s on CPAP.   On admission 1/23/2025: He presented to the ER with shortness of breath.  Patient had recently been hospitalized and discharged about 3 days ago.  At that time the patient was being treated for COPD exacerbation and CHF exacerbation.  The patient did have some CT findings suggestive of pneumonia but was improving on Zithromax therapy alone.  The patient subsequently been discharged to home about 3 days ago on oral Zithromax.  Evidently the patient progressively became more short of breath had arrived to the emergency department via squad on CPAP subsequently changed over to BiPAP patient was not saturating very well and subsequently intubated by the ED provider. Pt felt to have worsening pneumonia and possibley some CHF. Pt given IV rocephin and zithromax, no lasix given due to relatively low blood pressure  The patient was discussed with the ED provider and will be admitted to the ICU for acute on chronic respiratory failure LOS will exceed 2 midnights. .    Past Medical History:   Diagnosis Date    CHF (congestive heart failure)     Chronic kidney disease     COPD (chronic obstructive pulmonary disease) (Multi)     Emphysema of lung (Multi)     GERD (gastroesophageal reflux disease)     Hypertension     Leukemia (Multi)      History reviewed. No pertinent surgical  history.  Medications Prior to Admission   Medication Sig Dispense Refill Last Dose/Taking    allopurinol (Zyloprim) 100 mg tablet Take 1 tablet (100 mg) by mouth once daily.       aspirin 81 mg EC tablet Take 1 tablet (81 mg) by mouth once daily.       atorvastatin (Lipitor) 80 mg tablet Take 1 tablet (80 mg) by mouth once daily at bedtime.       busPIRone (Buspar) 5 mg tablet Take 1 tablet (5 mg) by mouth 2 times a day as needed (Anxiety).       carvedilol (Coreg) 3.125 mg tablet Take 1 tablet (3.125 mg) by mouth 2 times a day. 60 tablet 0     clopidogrel (Plavix) 75 mg tablet Take 1 tablet (75 mg) by mouth once daily.       fluticasone furoate-vilanteroL (Breo Ellipta) 200-25 mcg/dose inhaler Inhale 1 puff by mouth into the lungs once daily. Do not start before February 27, 2024. 60 each 0     hydrALAZINE (Apresoline) 10 mg tablet Take 1 tablet (10 mg) by mouth 3 times a day. 90 tablet 0     ipratropium-albuteroL (Duo-Neb) 0.5-2.5 mg/3 mL nebulizer solution Use 3 mL by nebulization every 6 hours if needed for wheezing. 180 mL 11     isosorbide mononitrate ER (Imdur) 30 mg 24 hr tablet Take 1 tablet (30 mg) by mouth once daily. Do not crush or chew. 30 tablet 0     montelukast (Singulair) 10 mg tablet Take 1 tablet (10 mg) by mouth once daily.       NON FORMULARY Oxygen therapy 2L N/C PRN       potassium chloride CR 10 mEq ER tablet Take 1 tablet (10 mEq) by mouth once daily.       predniSONE (Deltasone) 20 mg tablet Take 2 tablets (40 mg) by mouth once daily for 4 days. 8 tablet 0     ruxolitinib (Jakafi) 20 mg tablet Take 1 tablet (20 mg total) by mouth twice a day.       sertraline (Zoloft) 100 mg tablet Take 1 tablet (100 mg) by mouth once daily.       tamsulosin (Flomax) 0.4 mg 24 hr capsule Take 1 capsule (0.4 mg) by mouth once daily.       tiotropium (Spiriva Respimat) 2.5 mcg/actuation inhaler Inhale 2 puffs once daily.        Codeine  Social History     Tobacco Use    Smoking status: Every Day     Current  packs/day: 1.00     Average packs/day: 1 pack/day for 60.0 years (60.0 ttl pk-yrs)     Types: Cigarettes    Smokeless tobacco: Never   Substance Use Topics    Alcohol use: Not Currently    Drug use: Never     Comment: His girlfriend smokes marijuana in their home     No family history on file.    Scheduled Medications:   aspirin, 81 mg, oral, Daily  atorvastatin, 80 mg, oral, Nightly  [Held by provider] carvedilol, 3.125 mg, oral, BID  clopidogrel, 75 mg, oral, Daily  [Held by provider] fluticasone furoate-vilanteroL, 1 puff, inhalation, Daily  [Held by provider] hydrALAZINE, 10 mg, oral, TID  ipratropium-albuteroL, 3 mL, nebulization, q4h  [Held by provider] isosorbide mononitrate ER, 30 mg, oral, Daily  methylPREDNISolone sodium succinate (PF), 40 mg, intravenous, q6h  montelukast, 10 mg, oral, Daily  oseltamivir, 30 mg, oral, q24h  oxygen, , inhalation, Continuous - Inhalation  [START ON 1/24/2025] pantoprazole, 40 mg, oral, Daily before breakfast   Or  [START ON 1/24/2025] pantoprazole, 40 mg, intravenous, Daily before breakfast  piperacillin-tazobactam, 3.375 g, intravenous, q6h  ruxolitinib, 20 mg, oral, BID  [Held by provider] tamsulosin, 0.4 mg, oral, Daily  [Held by provider] tiotropium, 2 puff, inhalation, Daily         Continuous Medications:   fentaNYL,  mcg/hr, Last Rate: 100 mcg/hr (01/23/25 1408)  midazolam, 0.5-20 mg/hr, Last Rate: 6 mg/hr (01/23/25 1409)         PRN Medications:   PRN medications: vancomycin    Review of Systems:  Review of Systems   Reason unable to perform ROS: Intubated and sedated.        Objective   Vitals:  Most Recent:  Vitals:    01/23/25 1600   BP: 111/68   Pulse: 85   Resp: 11   Temp:    SpO2: 92%       24hr Min/Max:  Temp  Min: 36.4 °C (97.5 °F)  Max: 36.4 °C (97.5 °F)  Pulse  Min: 76  Max: 118  BP  Min: 88/61  Max: 153/67  Resp  Min: 11  Max: 30  SpO2  Min: 71 %  Max: 94 %    LDA:   ETT  7.5 mm (Active)   Placement Date/Time: 01/23/25 1350   Mask Ventilation: Vent  by mask  Technique: Tube changer;Stylet  ETT Type: ETT - single  Single Lumen Tube Size: 7.5 mm  Cuffed: Yes  Location: Oral  Placement Verification: Auscultation;Capnometry  Placed By: ED ph...   Number of days: 0       NG/OG/Feeding Tube OG - Page sump 16 Fr Center mouth (Active)   Placement Date/Time: 01/23/25 1443   Placed by: Twila SYED RN  Hand Hygiene Completed: Yes  Type of Tube: NG/OG Tube  Tube Length: 50 cm  Tube Type: OG - Page sump  NG/OG Tube Size: 16 Fr  Tube Location: Center mouth   Number of days: 0         Vent settings:  Vent Mode: Assist control/Volume control plus  S RR:  [14] 14  S VT:  [500 mL] 500 mL  PEEP/CPAP (cm H2O):  [8 cm H20] 8 cm H20  MAP (cm H2O):  [9.5] 9.5    Hemodynamic parameters for last 24 hours:         Intake/Output Summary (Last 24 hours) at 1/23/2025 1656  Last data filed at 1/23/2025 1449  Gross per 24 hour   Intake 100 ml   Output --   Net 100 ml           Physical exam:    Physical Exam   Vitals reviewed.   Constitutional:       General: He is not in acute distress.     Comments: Sedated and intubated   HENT:      Head: Normocephalic and atraumatic.      Right Ear: External ear normal.      Left Ear: External ear normal.      Nose: Nose normal.      Mouth/Throat:      Mouth: Mucous membranes are moist.      Pharynx: Oropharynx is clear.   Eyes:      General: No scleral icterus.     Extraocular Movements: Extraocular movements intact.      Conjunctiva/sclera: Conjunctivae normal.      Pupils: Pupils are equal, round, and reactive to light.   Neck:      Vascular: No carotid bruit.   Cardiovascular:      Rate and Rhythm: Regular rhythm. Tachycardia present.      Pulses: Normal pulses.      Heart sounds: Normal heart sounds.   Pulmonary:      Effort: Pulmonary effort is normal. No respiratory distress.      Comments: Diminished in bilateral bases  Chest:      Chest wall: No tenderness.   Abdominal:      General: Bowel sounds are normal. There is no distension.       Palpations: Abdomen is soft. There is no mass.      Tenderness: There is no abdominal tenderness. There is no rebound.   Musculoskeletal:         General: Deformity present. No swelling. Normal range of motion.      Cervical back: Normal range of motion.      Comments: B/L bka and prosthesis   Skin:     General: Skin is warm and dry.      Capillary Refill: Capillary refill takes less than 2 seconds.   Neurological:      Comments: Pt sedated and intubated           Lab/Radiology/Diagnostic Review:  Results for orders placed or performed during the hospital encounter of 01/23/25 (from the past 24 hours)   BLOOD GAS VENOUS FULL PANEL   Result Value Ref Range    POCT pH, Venous 7.34 7.33 - 7.43 pH    POCT pCO2, Venous 40 (L) 41 - 51 mm Hg    POCT pO2, Venous 39 35 - 45 mm Hg    POCT SO2, Venous 59 45 - 75 %    POCT Oxy Hemoglobin, Venous 57.8 45.0 - 75.0 %    POCT Hematocrit Calculated, Venous 27.0 (L) 41.0 - 52.0 %    POCT Sodium, Venous 139 136 - 145 mmol/L    POCT Potassium, Venous 3.8 3.5 - 5.3 mmol/L    POCT Chloride, Venous 108 (H) 98 - 107 mmol/L    POCT Ionized Calicum, Venous 1.17 1.10 - 1.33 mmol/L    POCT Glucose, Venous 181 (H) 74 - 99 mg/dL    POCT Lactate, Venous 2.4 (H) 0.4 - 2.0 mmol/L    POCT Base Excess, Venous -3.9 (L) -2.0 - 3.0 mmol/L    POCT HCO3 Calculated, Venous 21.6 (L) 22.0 - 26.0 mmol/L    POCT Hemoglobin, Venous 9.0 (L) 13.5 - 17.5 g/dL    POCT Anion Gap, Venous 13.0 10.0 - 25.0 mmol/L    Patient Temperature 37.0 degrees Celsius    FiO2 100 %   CBC and Auto Differential   Result Value Ref Range    WBC 3.1 (L) 4.4 - 11.3 x10*3/uL    nRBC 1.0 (H) 0.0 - 0.0 /100 WBCs    RBC 4.00 (L) 4.50 - 5.90 x10*6/uL    Hemoglobin 11.7 (L) 13.5 - 17.5 g/dL    Hematocrit 36.4 (L) 41.0 - 52.0 %    MCV 91 80 - 100 fL    MCH 29.3 26.0 - 34.0 pg    MCHC 32.1 32.0 - 36.0 g/dL    RDW 20.4 (H) 11.5 - 14.5 %    Platelets 198 150 - 450 x10*3/uL    Immature Granulocytes %, Automated 1.3 (H) 0.0 - 0.9 %    Immature  Granulocytes Absolute, Automated 0.04 0.00 - 0.50 x10*3/uL   Comprehensive metabolic panel   Result Value Ref Range    Glucose 192 (H) 74 - 99 mg/dL    Sodium 140 136 - 145 mmol/L    Potassium 3.7 3.5 - 5.3 mmol/L    Chloride 106 98 - 107 mmol/L    Bicarbonate 23 21 - 32 mmol/L    Anion Gap 15 10 - 20 mmol/L    Urea Nitrogen 67 (H) 6 - 23 mg/dL    Creatinine 2.03 (H) 0.50 - 1.30 mg/dL    eGFR 33 (L) >60 mL/min/1.73m*2    Calcium 8.2 (L) 8.6 - 10.3 mg/dL    Albumin 3.1 (L) 3.4 - 5.0 g/dL    Alkaline Phosphatase 61 33 - 136 U/L    Total Protein 6.4 6.4 - 8.2 g/dL    AST 56 (H) 9 - 39 U/L    Bilirubin, Total 0.7 0.0 - 1.2 mg/dL    ALT 36 10 - 52 U/L   B-type natriuretic peptide   Result Value Ref Range     (H) 0 - 99 pg/mL   Troponin I, High Sensitivity, Initial   Result Value Ref Range    Troponin I, High Sensitivity 39 (H) 0 - 20 ng/L   Manual Differential   Result Value Ref Range    Neutrophils %, Manual 76.0 40.0 - 80.0 %    Bands %, Manual 21.0 0.0 - 5.0 %    Lymphocytes %, Manual 2.0 13.0 - 44.0 %    Monocytes %, Manual 0.0 2.0 - 10.0 %    Eosinophils %, Manual 0.0 0.0 - 6.0 %    Basophils %, Manual 0.0 0.0 - 2.0 %    Metamyelocytes %, Manual 1.0 0.0 - 0.0 %    Seg Neutrophils Absolute, Manual 2.36 1.60 - 5.00 x10*3/uL    Bands Absolute, Manual 0.65 (H) 0.00 - 0.50 x10*3/uL    Lymphocytes Absolute, Manual 0.06 (L) 0.80 - 3.00 x10*3/uL    Monocytes Absolute, Manual 0.00 (L) 0.05 - 0.80 x10*3/uL    Eosinophils Absolute, Manual 0.00 0.00 - 0.40 x10*3/uL    Basophils Absolute, Manual 0.00 0.00 - 0.10 x10*3/uL    Metamyelocytes Absolute, Manual 0.03 0.00 - 0.00 x10*3/uL    Total Cells Counted 100     Neutrophils Absolute, Manual 3.01 1.60 - 5.50 x10*3/uL    RBC Morphology See Below     Polychromasia Mild     Ovalocytes Few     Teardrop Cells Few     Noemí Cells Few     Acanthocytes Few    Lactate   Result Value Ref Range    Lactate 2.5 (H) 0.4 - 2.0 mmol/L   BLOOD GAS ARTERIAL FULL PANEL   Result Value Ref Range     POCT pH, Arterial 7.30 (L) 7.38 - 7.42 pH    POCT pCO2, Arterial 44 (H) 38 - 42 mm Hg    POCT pO2, Arterial 63 (L) 85 - 95 mm Hg    POCT SO2, Arterial 89 (L) 94 - 100 %    POCT Oxy Hemoglobin, Arterial 87.5 (L) 94.0 - 98.0 %    POCT Hematocrit Calculated, Arterial 25.0 (L) 41.0 - 52.0 %    POCT Sodium, Arterial 138 136 - 145 mmol/L    POCT Potassium, Arterial 3.9 3.5 - 5.3 mmol/L    POCT Chloride, Arterial 107 98 - 107 mmol/L    POCT Ionized Calcium, Arterial 1.17 1.10 - 1.33 mmol/L    POCT Glucose, Arterial 204 (H) 74 - 99 mg/dL    POCT Lactate, Arterial 1.6 0.4 - 2.0 mmol/L    POCT Base Excess, Arterial -4.6 (L) -2.0 - 3.0 mmol/L    POCT HCO3 Calculated, Arterial 21.6 (L) 22.0 - 26.0 mmol/L    POCT Hemoglobin, Arterial 8.3 (L) 13.5 - 17.5 g/dL    POCT Anion Gap, Arterial 13 10 - 25 mmo/L    Patient Temperature 37.0 degrees Celsius    FiO2 100 %    Site of Arterial Puncture Radial Right    Sars-CoV-2 and Influenza A/B PCR   Result Value Ref Range    Flu A Result Detected (A) Not Detected    Flu B Result Not Detected Not Detected    Coronavirus 2019, PCR Not Detected Not Detected   RSV PCR   Result Value Ref Range    RSV PCR Not Detected Not Detected     CT angio chest abdomen pelvis    Result Date: 1/23/2025  Interpreted By:  Vadim Reed, STUDY: CT ANGIO CHEST ABDOMEN PELVIS;  1/23/2025 3:54 pm   INDICATION: Signs/Symptoms:respiratory failure with hypoxia, sepsis, now intubated, hx AAA repair, r/o AAA rupture.   COMPARISON: 01/23/2025   ACCESSION NUMBER(S): [id number]   ORDERING CLINICIAN: BILL SCHULZ   TECHNIQUE: Axial non-contrast images of the chest abdomen, and pelvis.   Axial CT images of the chest, abdomen and pelvis were obtained after the intravenous administration of iodinated contrast using angiographic technique with coronal and sagittal reformatted images. MIP images and 3D reconstructions were created on an independent workstation and reviewed.   FINDINGS: VASCULATURE:   Moderate  atherosclerotic calcification of the aortic arch and thoracic and abdominal aorta. The ascending aorta is borderline ectatic measuring 4 cm in diameter.   There is a infrarenal abdominal aortic aneurysm previously treated with endograft with aneurysm sac measuring 3.8 cm in diameter.   There is severe disease involving the bilateral external iliac arteries with areas of multifocal moderate-to-severe stenosis suspected.   There is probable severe stenosis origin of the celiac artery. SMA appears to be widely patent. At least moderate disease origin of both renal arteries, right-greater-than-left.   CT CHEST:   There is underlying severe emphysema. There is consolidation seen in the lung bases, left-greater-than-right consistent with underlying pneumonia. The patient is intubated. NG tube appears to terminate near the GE junction.   There is mild cardiomegaly with severe coronary artery calcification.   No significant adenopathy.   No significant effusions.     CT ABDOMEN/PELVIS:   Arterial phase imaging of the liver, gallbladder, adrenals pancreas spleen grossly unremarkable.   The kidneys are somewhat atrophic.   No bowel obstruction or significant colitis. Mild diverticulosis.   Bladder is within normal limits   No free fluid or significant adenopathy.   There is a chronic compression fracture of T6 with sclerotic appearance. Multilevel moderate to advanced degenerative disc disease noted.       Infrarenal abdominal aortic aneurysm status post endograft treatment. Areas of multifocal moderate-to-severe stenosis bilateral external iliac arteries. Additional atherosclerotic disease as above.   Extensive consolidation in the lung bases consistent with probable underlying pneumonia, left-greater-than-right. This is superimposed on severe emphysema.   Additional nonemergent/senescent changes as above.   MACRO: None.   Signed by: Vadim Reed 1/23/2025 4:32 PM Dictation workstation:   [id number]    XR chest abdomen for  OG NG placement    Result Date: 1/23/2025  Interpreted By:  Stacie Parker, STUDY: XR CHEST ABDOMEN FOR OG NG PLACEMENT; ;  1/23/2025 1:36 pm   INDICATION: Signs/Symptoms:ETT placement, OG placement.     COMPARISON: None.   ACCESSION NUMBER(S): [id number]   ORDERING CLINICIAN: BILL SCHULZ   FINDINGS: AP portable view of the chest is obtained.  Limited exam due to portable nature. Magnified cardiac silhouette. Endotracheal tube in place and terminates above the jayy. Left perihilar and left basilar infiltrates and effusions. Mild interstitial prominence diffusely.       Left perihilar and left basilar infiltrates and effusions for which follow-up is recommended.   MACRO: None   Signed by: Stacie Parker 1/23/2025 2:07 PM Dictation workstation:   [id number]    Nuclear Stress Test    Result Date: 1/20/2025  Interpreted By:  Ric See, STUDY: NUCLEAR STRESS TEST;  1/20/2025 2:38 pm   INDICATION: Signs/Symptoms:CHF.   COMPARISON: None.   ACCESSION NUMBER(S): [id number]   ORDERING CLINICIAN: KEN ROBIN   TECHNIQUE: DIVISION OF NUCLEAR MEDICINE PHARMACOLOGIC STRESS MYOCARDIAL PERFUSION SCAN, ONE DAY PROTOCOL   The patient received an intravenous dose of  10.7 mCi of Tc-99m tetrofosmin and resting emission tomographic (SPECT) images of the myocardium were acquired. The patient then received an intravenous infusion of 0.4mg regadenoson (Lexiscan) followed by an additional dose of  34.3 mCi of Tc-99m tetrofosmin. Stress phase SPECT images of the myocardium were then acquired. These included ECG-gated images to assess and quantify ventricular function.   FINDINGS: There is a moderate-to-large primarily fixed but partially reversible perfusion defect in the inferior, inferolateral, and lateral walls with associated wall motion abnormality suggesting prior infarct with a small area of tiffany-infarct ischemia.   Calculated ejection fraction of 46% with hypokinesis of the inferior, inferolateral, and lateral walls    Attenuation correction CT images demonstrate coronary artery calcification, aortic atherosclerotic calcification, lung consolidation/infiltrates bilaterally       1. There is a moderate-to-large primarily fixed but partially reversible perfusion defect in the inferior, inferolateral, and lateral walls with associated wall motion abnormality suggesting prior infarct with a small area of tiffany-infarct ischemia.   2. Calculated ejection fraction of 46% with hypokinesis of the inferior, inferolateral, and lateral calvillo     Signed by: Ric See 1/20/2025 4:52 PM Dictation workstation:   [id number]    Cardiology Interpretation Of Nuclear Stress - See Other Report For Nuclear Portion    Result Date: 1/20/2025              [place] [address]      Phone [phone] Fax [phone] Nuclear Pharmacologic Stress Test Patient Name:      AMITA BELTRAN  Ordering Provider:    [id number] ROBIN Study Date:        1/20/2025           Reading Physician:    [id number] Ric See DO MRN/PID:           [id number]            Supervising           [id number] Ric See DO                                        Physician: Accession#:        [id number]        Fellow: Date of Birth/Age: 1945 / 79      Fellow:                    years Gender:            ILSA                   Nurse:                Erin Ellison Admission Status:  Inpatient           Sonographer:          NA Height:            180.34 cm           Technologist: Weight:            66.23 kg            Additional Staff: BSA:               1.84 m2             Encounter#:           [id number] BMI:               20.36 kg/m2         Patient Location:     [facility] Study Type:    CARDIOLOGY INTERPRETATION OF NUCLEAR STRESS Diagnosis/ICD: Dyspnea, unspecified-R06.00 Indication:    Dyspnea CPT Codes:     Stress Test Interpretation-22098; Stress Test Supervision-[id number] Falls Risk: Low: Patient has low risk for sustaining a fall;  environmental safety interventions in place.  Study Details: Correct procedure and correct patient verified verbally and with                ID Band checked.  Patient History: Hypertension, coronary artery disease, dyslipidemia, congestive heart failure, peripheral vascular disease, hx of thoracic aneurysm repair, chronic obstructive pulmonary disease and hyperlipidemia. aortic aneursym, CKD, PCI, right BKA. Allergies: Codeine. Smoker:    Current.  Medications: Lasix, jakafi, albuterol, potassium, carvedilol, hydralazine, isosorbide, protonix, ASA. atorvastatin. The patient took medications as prescribed.  Patient Performance: Patient received a total of 0.4 mg of Regadenoson at 8:19:41 AM. The peak heart rate achieved was 89 bpm, which was 63 % of the age predicted target heart rate of 141 bpm. The resting blood pressure was 121/61 mmHg with a heart rate of 78 bpm. The patient developed no symptoms during the stress exam. The blood pressure response was normal. The test was terminated due to: completed lab protocol. Patient has met the discharge criteria and is discharged to their floor.  Baseline ECG: Resting ECG showed Sinus Arrhythmia with prior inferior infarct and prior lateral infarct.  Stress ECG: Stress ECG showed sinus arrhythmia, with occ PVC. No ST changes.  Stress Stage Data: +----------------+--+------+-------+                 HRSys BPDias BP +----------------+--+------+-------+ Baseline [id number]   61      +----------------+--+------+-------+ Stage I         07532   48      +----------------+--+------+-------+ Stage II        70052   54      +----------------+--+------+-------+ Stage III       16346   54      +----------------+--+------+-------+ Stage IV        00960   58      +----------------+--+------+-------+ Stage V         85836   57      +----------------+--+------+-------+  Summary:  1. Correlate with myocardial perfusion imaging results.  2. No ECG  changes from baseline.  3. Nuclear image results are reported separately. [id number] Ric See DO Electronically signed on 1/20/2025 at 1:56:32 PM   ** Final **     ECG 12 Lead    Result Date: 1/20/2025  Sinus rhythm Probable left atrial enlargement RSR' in V1 or V2, right VCD or RVH Abnormal T, consider ischemia, lateral leads Borderline ST elevation, anterior leads Baseline wander in lead(s) V2    CT chest high resolution    Result Date: 1/20/2025  Interpreted By:  Pietro Adams, STUDY: CT CHEST HIGH RESOLUTION;  1/19/2025 1:55 pm   INDICATION: Signs/Symptoms:SOB/R/O Pulmonary fibrosis.     COMPARISON: 10/03/2023   ACCESSION NUMBER(S): [id number]   ORDERING CLINICIAN: KEN ROBIN   TECHNIQUE: Using helical multidetector technique, volumetric data acquisition of the chest was obtained without intravenous administration of contrast material under the high resolution chest CT protocol. Examination includes contiguous slices through the chest in supine positioning during inspiration, noncontiguous axial slices in supine positioning during expiration and prone positioning during inspiration.   FINDINGS: LUNGS AND AIRWAYS: The trachea and central airways are patent. No endobronchial lesion is seen.   There are severe centrilobular emphysematous changes. Interval appearance of inferior lingular consolidation/airspace disease. Interval worsening in left lower lobe consolidation/atelectasis. Interval worsening in right basilar airspace disease/infiltrate/pneumonia.   Expiratory imaging demonstrates diffuse air trapping..     MEDIASTINUM AND NICO, LOWER NECK AND AXILLA: The visualized thyroid gland is within normal limits. There are scattered mediastinal lymph nodes are felt to be reactive/inflammatory in nature. Esophagus appears within normal limits as seen.   HEART AND VESSELS: Severe atherosclerotic calcifications of the thoracic aorta. Main pulmonary artery and its branches are normal in caliber. Severe coronary  artery calcifications. The cardiac chambers are not enlarged. There is no pericardial effusion seen.   UPPER ABDOMEN: Extensive atherosclerotic calcification and infrarenal aortic stent placement. Indeterminate high attenuation within the infrarenal stent may be artifactual but correlate with any concern for renal stenosis/thrombus.     CHEST WALL AND OSSEOUS STRUCTURES: Chest wall is within normal limits. No acute osseous pathology.There are no suspicious osseous lesions.There is interval compression fracture with bony sclerosis of the T6 vertebral body.       1. There is severe emphysematous changes with multifocal parenchymal infiltrates concerning for pneumonia/aspiration. Correlate with symptoms of fever/sputum production. Recommend follow-up radiographs to document resolution. 2. Severe atherosclerotic disease of the aorta with coronary artery calcifications and correlate with coronary artery disease risk factors. 3. Interval compression fracture of the T6 vertebral body with bony sclerosis. This is most likely secondary to fracture healing but correlate with any concern for pathologic fracture.     MACRO: Critical Finding:  See findings. Notification was initiated on 1/20/2025 at 9:39 am by  Pietro Adams.  (**-YCF-**) Instructions:   Signed by: Pietro Adams 1/20/2025 9:39 AM Dictation workstation:   [id number]    Transthoracic Echo (TTE) Complete    Result Date: 1/18/2025              [place] [id number]      Phone [phone] Fax [phone] TRANSTHORACIC ECHOCARDIOGRAM REPORT Patient Name:       AMITA FLAKITO Mercedes Physician:    [id number] Rigo Sim MD Study Date:         1/18/2025            Ordering Provider:    [id number] JOCELYNN OSWALD MRN/PID:            [id number]             Fellow: Accession#:         [id number]          Nurse:                ER nurse Date of Birth/Age:  1945 / 79 years Sonographer:          Amalia Fernandez                                                                [id number] Gender Assigned at  M                    Additional Staff: Birth: Height:             180.34 cm            Admit Date:           1/17/2025 Weight:             70.76 kg             Admission Status:     Inpatient -                                                                Routine BSA / BMI:          1.90 m2 / 21.76      Department Location:  [facility]                     kg/m2 Blood Pressure: 138 /82 mmHg Study Type:    TRANSTHORACIC ECHO (TTE) COMPLETE Diagnosis/ICD: Other forms of dyspnea-R06.09; Elevated Troponin-R79.89 Indication:    Dyspnea, Elevated troponin CPT Codes:     Echo Complete w Full [id number] Patient History: Pertinent History: CAD, HTN and CHF. Study Detail: The following Echo studies were performed: 2D, M-Mode, Doppler and               color flow. Optison used as a contrast agent for endocardial               border definition. Total contrast used for this procedure was 3 mL               via IV push.  PHYSICIAN INTERPRETATION: Left Ventricle: The left ventricular systolic function is mildly decreased, with a Randhawa's biplane calculated ejection fraction of 50%. There is global hypokinesis of the left ventricle with minor regional variations. The left ventricular cavity size is normal. There is normal septal and normal posterior left ventricular wall thickness. There is left ventricular concentric remodeling. Spectral Doppler shows a Grade I (impaired relaxation pattern) of left ventricular diastolic filling with normal left atrial filling pressure. Left Atrium: The left atrium is normal in size. Right Ventricle: The right ventricle is normal in size. There is normal right ventricular global systolic function. Right Atrium: The right atrium is normal in size. Aortic Valve: The aortic valve appears  structurally normal. There is mild aortic valve cusp calcification. The aortic valve dimensionless index is 0.74. There is trace aortic valve regurgitation. The peak instantaneous gradient of the aortic valve is 8 mmHg. The mean gradient of the aortic valve is 4 mmHg. Mitral Valve: The mitral valve is mildly thickened. There is trace mitral valve regurgitation. Tricuspid Valve: The tricuspid valve is structurally normal. There is trace tricuspid regurgitation. Pulmonic Valve: The pulmonic valve is not well visualized. There is no indication of pulmonic valve regurgitation. Pericardium: No pericardial effusion noted. Aorta: The aortic root is normal. There is moderate dilatation of the ascending aorta.  CONCLUSIONS:  1. The left ventricular systolic function is mildly decreased, with a Randhawa's biplane calculated ejection fraction of 50%.  2. There is global hypokinesis of the left ventricle with minor regional variations.  3. Spectral Doppler shows a Grade I (impaired relaxation pattern) of left ventricular diastolic filling with normal left atrial filling pressure.  4. There is normal right ventricular global systolic function.  5. There is moderate dilatation of the ascending aorta. QUANTITATIVE DATA SUMMARY:  2D MEASUREMENTS:           Normal Ranges: Ao Root d:       3.60 cm   (2.0-3.7cm) LAs:             3.70 cm   (2.7-4.0cm) IVSd:            1.04 cm   (0.6-1.1cm) LVPWd:           1.00 cm   (0.6-1.1cm) LVIDd:           4.09 cm   (3.9-5.9cm) LVIDs:           2.90 cm LV Mass Index:   71.3 g/m2 LV % FS          29.1 %  LA VOLUME:                    Normal Ranges: LA Vol A4C:        49.6 ml    (22+/-6mL/m2) LA Vol A2C:        44.9 ml LA Vol BP:         49.7 ml LA Vol Index A4C:  26.2ml/m2 LA Vol Index A2C:  23.7 ml/m2 LA Vol Index BP:   26.2 ml/m2 LA Area A4C:       19.6 cm2 LA Area A2C:       17.7 cm2 LA Major Axis A4C: 6.6 cm LA Major Axis A2C: 5.9 cm  RA VOLUME BY A/L METHOD:          Normal Ranges: RA Area A4C:              14.5 cm2  AORTA MEASUREMENTS:         Normal Ranges: Ao Sinus, d:        3.60 cm (2.1-3.5cm) Ao STJ, d:          2.22 cm (1.7-3.4cm) Asc Ao, d:          4.40 cm (2.1-3.4cm)  LV SYSTOLIC FUNCTION BY 2D PLANIMETRY (MOD):                      Normal Ranges: EF-A4C View:    48 % (>=55%) EF-A2C View:    53 % EF-Biplane:     50 % LV EF Reported: 50 %  LV DIASTOLIC FUNCTION:            Normal Ranges: MV Peak E:             0.80 m/s   (0.7-1.2 m/s) MV Peak A:             1.17 m/s   (0.42-0.7 m/s) E/A Ratio:             0.68       (1.0-2.2) MV e'                  0.054 m/s  (>8.0) MV lateral e'          0.06 m/s MV medial e'           0.05 m/s MV A Dur:              92.00 msec E/e' Ratio:            14.65      (<8.0)  MITRAL VALVE:          Normal Ranges: MV DT:        168 msec (150-240msec)  AORTIC VALVE:                     Normal Ranges: AoV Vmax:                1.44 m/s (<=1.7m/s) AoV Peak P.3 mmHg (<20mmHg) AoV Mean P.0 mmHg (1.7-11.5mmHg) LVOT Max Cristofer:            0.89 m/s (<=1.1m/s) AoV VTI:                 25.30 cm (18-25cm) LVOT VTI:                18.80 cm LVOT Diameter:           2.00 cm  (1.8-2.4cm) AoV Area, VTI:           2.33 cm2 (2.5-5.5cm2) AoV Area,Vmax:           1.94 cm2 (2.5-4.5cm2) AoV Dimensionless Index: 0.74  AORTIC INSUFFICIENCY: AI Vmax:       3.79 m/s AI Half-time:  498 msec AI Decel Rate: 223.00 cm/s2  RIGHT VENTRICLE: RV Basal 3.76 cm RV Mid   2.42 cm RV Major 8.4 cm TAPSE:   26.4 mm RV s'    0.14 m/s  TRICUSPID VALVE/RVSP:          Normal Ranges: Peak TR Velocity:     2.72 m/s RV Syst Pressure:     33 mmHg  (< 30mmHg) IVC Diam:             1.83 cm  [id number] Rigo Sim MD Electronically signed on 2025 at 10:45:48 AM  ** Final **     XR chest 1 view    Result Date: 2025  STUDY: Chest Radiograph;  2025 at 16:40 hours. INDICATION: Shortness of breath. COMPARISON: Chest, single portable view obtained on 2024 at 02:19 hours.  CT Chest  10/03/2023. ACCESSION NUMBER(S): [id number] ORDERING CLINICIAN: RANDALL CARSON TECHNIQUE:  Frontal chest was obtained at 16:40 hours. FINDINGS: There is a left pleural effusion.  There are increased interstitial markings at the lung bases, suspicious for edema.  Heart size is top normal.  No pneumothorax is noted.    1.  Left pleural effusion. 2.  Increased interstitial markings at the lung bases, suspicious for edema. Signed by Arden Otero MD      Assessment/Plan   Assessment & Plan  Acute on chronic respiratory failure with hypoxia (Multi)    Aristeo Barriga is a 79 y.o. male with PMHx s/f COPD with baseline oxygen requirement of 4 L chronic systolic diastolic heart failure last EF in January 2020 for 50% history of myeloproliferative disorder coronary artery disease with recent stress test showing no reversible ischemia benign prostatic hypertrophy abdominal aortic aneurysm with repair was admitted to ICU for Acute hypoxic respiratory failure requiring  intubation.  On presentation to the ER his oxygen saturation was 50s on CPAP.     Acute hypoxic respite failure secondary to influenza pneumonia and COPD exacerbation   1/23/2025:  Admitted to ICU for Acute hypoxic respiratory failure requiring  intubation.    On presentation to the ER his oxygen saturation was 50s on CPAP, intubated for persistent hypoxia  I adjusted her ventilator settings to AC/VC with tidal volume of 450 cc, respiratory rate 20, FiO2 of 90% and PEEP of 8  Okay to maintain oxygen saturation 90% or more because of chronic lung disease  Try to titrate down PEEP to 5  Ordered VBG at 8 PM    2.  Influenza A positive  Started on Tamiflu  Placed on droplet precautions    3.  Healthcare associated pneumonia  1/23/2025: Patient was discharged from the hospital 3 days back  Broaden antibiotics from ceftriaxone to vancomycin and Zosyn  Continue azithromycin  Ordered panculture, urine strep, urine Legionella and MRSA screen    4.  Acute COPD  exacerbation  1/23/2025: Patient has history of severe COPD with 100-pack-year smoking history and quit smoking 6 months back  Ordered DuoNeb every 4 hours along with as needed breathing treatments  Increase the frequency of IV Solu-Medrol to 40 mg every 6 hours    5.  Hypotension secondary to propofol  Propofol was switched to Versed the ER  Patient is off of Levophed    6.  Hypertension with history of heart failure and coronary artery disease  Held antihypertensive and heart failure medications because of hypotension    7.  Counseling regarding advance care directives and goals of care  1/23/2025: I had extensive discussion with the patient daughter who is at bedside next of kin  Explained about overall condition of the patient  Explained difference between full code versus DNR  Daughter confirmed that the patient does not have any DNR on file and want to discuss with her sister  Patient will remain full code for now    I spent 105 minutes of cumulative critical care time with the patient.  Greater than 50% of that time was spent in the direct collaboration and or coordination of care of the patient.     Dragon dictation software was used to dictate this note and thus there may be minor errors in translation/transcription including garbled speech or misspellings. Please contact for clarification if needed.       Luis Landon MD

## 2025-01-23 NOTE — ED PROVIDER NOTES
HPI   Chief Complaint   Patient presents with    Shortness of Breath       HPI  79-year-old male with history of COPD, CHF hypertension, AAA s/p repair, right BKA, leukemia, myeloproliferative disorder and recent admission until 1/21 for COPD exacerbation and CHF exacerbation presents with shortness of breath.  Per EMS, patient was hypoxic with an SpO2 of 40% on their arrival, they placed him under CPAP with SpO2 50-60% during transport.  On arrival, patient is hypoxic but alert and oriented.  He states he has felt short of breath since last night and of the shortness of breath has been progressively worsening since that time.  He reports some intermittent midsternal chest pain that is nonradiating and worsens with deep inspiration.  He states he has a cough productive of some mucus.  He denies fevers, chills, palpitations, abdominal pain, nausea, vomiting, diarrhea, known sick contacts.      Patient History   Past Medical History:   Diagnosis Date    CHF (congestive heart failure)     Chronic kidney disease     COPD (chronic obstructive pulmonary disease) (Multi)     Emphysema of lung (Multi)     GERD (gastroesophageal reflux disease)     Hypertension     Leukemia (Multi)      History reviewed. No pertinent surgical history.  No family history on file.  Social History     Tobacco Use    Smoking status: Every Day     Current packs/day: 1.00     Average packs/day: 1 pack/day for 60.0 years (60.0 ttl pk-yrs)     Types: Cigarettes    Smokeless tobacco: Never   Substance Use Topics    Alcohol use: Not Currently    Drug use: Never     Comment: His girlfriend smokes marijuana in their home       Physical Exam   ED Triage Vitals [01/23/25 1242]   Temperature Heart Rate Respirations BP   36.4 °C (97.5 °F) (!) 118 (!) 30 130/62      Pulse Ox Temp Source Heart Rate Source Patient Position   (!) 71 % Tympanic Monitor --      BP Location FiO2 (%)     -- --       Physical Exam  Vitals reviewed.   Constitutional:        Appearance: He is toxic-appearing.   Eyes:      Pupils: Pupils are equal, round, and reactive to light.   Cardiovascular:      Rate and Rhythm: Normal rate and regular rhythm.   Pulmonary:      Effort: Tachypnea, accessory muscle usage and respiratory distress present.      Breath sounds: Decreased breath sounds present.   Abdominal:      Palpations: Abdomen is soft.      Tenderness: There is no abdominal tenderness. There is no guarding or rebound.   Musculoskeletal:         General: Normal range of motion.      Cervical back: Normal range of motion.      Left lower leg: No edema.      Comments: Right BKA   Skin:     General: Skin is warm and dry.   Neurological:      General: No focal deficit present.      Mental Status: He is alert. He is disoriented.           ED Course & MDM   ED Course as of 01/23/25 2135   Thu Jan 23, 2025   1508 RR and iTime adjusted with improvement in SpO2. ABG showing 7.3/44/21 showing no clinically significant change from initial VBG that had been 7.34/40/21, although RR was subsequently increased after ABG to help with hypercapnia. Patient taken to CTA and then transported to ICU. [JG]      ED Course User Index  [JG] Berkley Davis MD         Diagnoses as of 01/23/25 2135   Acute on chronic respiratory failure with hypoxia (Multi)                 No data recorded     Post Coma Scale Score: 10 (01/23/25 1600 : Zoey Ann RN)                   EKG, interpreted by me: Sinus rhythm, rate 97 bpm.  Normal axis.  Significant wandering baseline contributing to difficult interpretation.  Within this limitations, no evident ST elevation or depression.  No obvious STEMI at this time.      Medical Decision Making      Procedure  Procedures   arrival patient alert and oriented, reports progressive worsening SOB since last night with intermittent midsternal chest pain and productive cough. Patient placed on BiPap, setting maximized with initial improvement to SpO2 low 90's, however with continued monitoring despite steroids, magnesium, antibiotics and optimization of BiPap settings, patient work of breathing increasing and patient fatiguing. Patient will require intubation for airway protection at this time. Patient alert and oriented, discussed procedure, patient is agreeable to intubation and is full code at this time. Patient preoxygenated and intubated at bedside with 7.5 cm ETT. Patient with both upper and lower dentures in place. Shortly thereafter, despite improvement in saturation, I identified concern for a tear in the balloon of the ETT given lack of degree of expected improvement after intubation. ETT was exchanged over a bougie successfully with improvement in oxygenation. Patient found to be positive for influenza A. CTA chest abdomen pelvis ordered to rule out involvement of AAA repair, imaging showing no acute change in AAA s/p endograft. Imaging does show consolidating pneumonia. Patient had been initiated on propofol gtt for sedation but did require higher levels of sedation for patient comfort and ventilator compliance, switched to versed and fentanyl. Patient briefly started on peripheral levophed, I feel this is primarily secondary to propofol boluses during intubation as he was not initially hypotensive on arrival and BP less likely to be secondary to septic shock in this case. Levophed initiated peripherally as I feel patient should be able to be weaned off pressors within the day. Patient admitted to ICU for further management of respiratory failure, consolidating pneumonia and influenza A.      Procedure  Critical Care    Performed by: Berkley Davis MD  Authorized by: Berkley Davis MD    Critical care provider  statement:     Critical care time (minutes):  60    Critical care time was exclusive of:  Separately billable procedures and treating other patients    Critical care was necessary to treat or prevent imminent or life-threatening deterioration of the following conditions:  Sepsis and respiratory failure    Critical care was time spent personally by me on the following activities:  Ventilator management, ordering and performing treatments and interventions, ordering and review of laboratory studies, ordering and review of radiographic studies, pulse oximetry, re-evaluation of patient's condition, evaluation of patient's response to treatment and examination of patient    Care discussed with: admitting provider    Comments:      Extensive bedside ventilator management with complex intubation  Intubation    Performed by: Berkley Davis MD  Authorized by: Berkley Davis MD    Consent:     Consent obtained:  Verbal    Consent given by:  Patient  Universal protocol:     Procedure explained and questions answered to patient or proxy's satisfaction: yes      Patient identity confirmed:  Verbally with patient  Pre-procedure details:     Indications: airway protection and respiratory failure      Patient status:  Awake    Pharmacologic strategy: RSI      Induction agents:  Etomidate    Paralytics:  Succinylcholine  Procedure details:     Preoxygenation:  BiLevel    CPR in progress: no      Number of attempts:  1 (Intubated with single attempt. Patient subsequently noted to have damage to balloon of ETT and ETT was exchanged using bougie at bedside.)  Successful intubation attempt details:     Intubation method:  Oral    Intubation technique: video assisted      Laryngoscope blade:  Hypercurved    Tube size (mm):  7.5    Tube type:  Cuffed    Tube visualized through cords: yes    Placement assessment:     ETT at teeth/gumline (cm):  25    Tube secured with:  ETT quinteros    Breath sounds:  Equal    Placement  verification: colorimetric ETCO2, CXR verification and direct visualization      CXR findings:  Appropriate position       Berkley Davis MD  01/26/25 7756

## 2025-01-23 NOTE — PROGRESS NOTES
Vancomycin Dosing by Pharmacy- INITIAL    Aristeo Barriga is a 79 y.o. year old male who Pharmacy has been consulted for vancomycin dosing for pneumonia. Based on the patient's indication and renal status this patient will be dosed based on a goal trough/random level of 15-20.     Renal function is currently declining.    Visit Vitals  /68   Pulse 85   Temp 36.4 °C (97.5 °F) (Tympanic)   Resp 11        Lab Results   Component Value Date    CREATININE 2.03 (H) 2025    CREATININE 2.14 (H) 2025    CREATININE 1.84 (H) 2025    CREATININE 1.77 (H) 2025        Patient weight is as follows:   Vitals:    25 1242   Weight: 65.3 kg (144 lb)       Cultures:  No results found for the encounter in last 14 days.        No intake/output data recorded.  I/O during current shift:  I/O this shift:  In: 100 [I.V.:50; IV Piggyback:50]  Out: -     Temp (24hrs), Av.4 °C (97.5 °F), Min:36.4 °C (97.5 °F), Max:36.4 °C (97.5 °F)         Assessment/Plan     Patient will be given a loading dose of 1500 mg.  Will initiate vancomycin maintenance dosing by levels.   Follow-up level will be ordered on  at 1500 unless clinically indicated sooner.  Will continue to monitor renal function daily while on vancomycin and order serum creatinine at least every 48 hours if not already ordered.  Follow for continued vancomycin needs, clinical response, and signs/symptoms of toxicity.       Priscilla Ramirez, PharmD

## 2025-01-24 PROBLEM — J44.1 CHRONIC OBSTRUCTIVE PULMONARY DISEASE WITH ACUTE EXACERBATION (MULTI): Status: ACTIVE | Noted: 2025-01-01

## 2025-01-24 PROBLEM — I50.43 ACUTE ON CHRONIC COMBINED SYSTOLIC AND DIASTOLIC CONGESTIVE HEART FAILURE: Status: ACTIVE | Noted: 2023-10-04

## 2025-01-24 PROBLEM — J10.1 INFLUENZA A: Status: ACTIVE | Noted: 2025-01-01

## 2025-01-24 LAB
ALBUMIN SERPL BCP-MCNC: 2.6 G/DL (ref 3.4–5)
ALP SERPL-CCNC: 46 U/L (ref 33–136)
ALT SERPL W P-5'-P-CCNC: 25 U/L (ref 10–52)
ANION GAP BLDV CALCULATED.4IONS-SCNC: 13 MMOL/L (ref 10–25)
ANION GAP SERPL CALC-SCNC: 11 MMOL/L (ref 10–20)
AST SERPL W P-5'-P-CCNC: 34 U/L (ref 9–39)
ATRIAL RATE: 86 BPM
BASE EXCESS BLDV CALC-SCNC: -1.7 MMOL/L (ref -2–3)
BASOPHILS # BLD MANUAL: 0 X10*3/UL (ref 0–0.1)
BASOPHILS NFR BLD MANUAL: 0 %
BILIRUB DIRECT SERPL-MCNC: 0.2 MG/DL (ref 0–0.3)
BILIRUB SERPL-MCNC: 0.4 MG/DL (ref 0–1.2)
BODY TEMPERATURE: 37 DEGREES CELSIUS
BUN SERPL-MCNC: 65 MG/DL (ref 6–23)
CA-I BLDV-SCNC: 1.17 MMOL/L (ref 1.1–1.33)
CALCIUM SERPL-MCNC: 6.7 MG/DL (ref 8.6–10.3)
CHLORIDE BLDV-SCNC: 109 MMOL/L (ref 98–107)
CHLORIDE SERPL-SCNC: 112 MMOL/L (ref 98–107)
CO2 SERPL-SCNC: 21 MMOL/L (ref 21–32)
CREAT SERPL-MCNC: 2.01 MG/DL (ref 0.5–1.3)
EGFRCR SERPLBLD CKD-EPI 2021: 33 ML/MIN/1.73M*2
EOSINOPHIL # BLD MANUAL: 0.05 X10*3/UL (ref 0–0.4)
EOSINOPHIL NFR BLD MANUAL: 1 %
ERYTHROCYTE [DISTWIDTH] IN BLOOD BY AUTOMATED COUNT: 20.8 % (ref 11.5–14.5)
GLUCOSE BLD MANUAL STRIP-MCNC: 154 MG/DL (ref 74–99)
GLUCOSE BLD MANUAL STRIP-MCNC: 180 MG/DL (ref 74–99)
GLUCOSE BLDV-MCNC: 227 MG/DL (ref 74–99)
GLUCOSE SERPL-MCNC: 142 MG/DL (ref 74–99)
HCO3 BLDV-SCNC: 23.7 MMOL/L (ref 22–26)
HCT VFR BLD AUTO: 24.7 % (ref 41–52)
HCT VFR BLD EST: 23 % (ref 41–52)
HGB BLD-MCNC: 7.7 G/DL (ref 13.5–17.5)
HGB BLDV-MCNC: 7.8 G/DL (ref 13.5–17.5)
HOLD SPECIMEN: NORMAL
HYPOCHROMIA BLD QL SMEAR: ABNORMAL
IMM GRANULOCYTES # BLD AUTO: 0.03 X10*3/UL (ref 0–0.5)
IMM GRANULOCYTES NFR BLD AUTO: 0.7 % (ref 0–0.9)
INHALED O2 CONCENTRATION: 45 %
LACTATE BLDV-SCNC: 1.3 MMOL/L (ref 0.4–2)
LEGIONELLA AG UR QL: NEGATIVE
LYMPHOCYTES # BLD MANUAL: 0.46 X10*3/UL (ref 0.8–3)
LYMPHOCYTES NFR BLD MANUAL: 10 %
MAGNESIUM SERPL-MCNC: 2.91 MG/DL (ref 1.6–2.4)
MCH RBC QN AUTO: 29.3 PG (ref 26–34)
MCHC RBC AUTO-ENTMCNC: 31.2 G/DL (ref 32–36)
MCV RBC AUTO: 94 FL (ref 80–100)
MONOCYTES # BLD MANUAL: 0.18 X10*3/UL (ref 0.05–0.8)
MONOCYTES NFR BLD MANUAL: 4 %
NEUTROPHILS # BLD MANUAL: 3.68 X10*3/UL (ref 1.6–5.5)
NEUTS BAND # BLD MANUAL: 1.15 X10*3/UL (ref 0–0.5)
NEUTS BAND NFR BLD MANUAL: 25 %
NEUTS SEG # BLD MANUAL: 2.53 X10*3/UL (ref 1.6–5)
NEUTS SEG NFR BLD MANUAL: 55 %
NRBC BLD-RTO: 0.9 /100 WBCS (ref 0–0)
OXYHGB MFR BLDV: 71 % (ref 45–75)
P AXIS: 49 DEGREES
PCO2 BLDV: 42 MM HG (ref 41–51)
PH BLDV: 7.36 PH (ref 7.33–7.43)
PLATELET # BLD AUTO: 190 X10*3/UL (ref 150–450)
PO2 BLDV: 44 MM HG (ref 35–45)
POLYCHROMASIA BLD QL SMEAR: ABNORMAL
POTASSIUM BLDV-SCNC: 3.4 MMOL/L (ref 3.5–5.3)
POTASSIUM SERPL-SCNC: 3.4 MMOL/L (ref 3.5–5.3)
PR INTERVAL: 184 MS
PROT SERPL-MCNC: 5.4 G/DL (ref 6.4–8.2)
Q ONSET: 251 MS
QRS COUNT: 15 BEATS
QRS DURATION: 162 MS
QT INTERVAL: 439 MS
QTC CALCULATION(BAZETT): 558 MS
QTC FREDERICIA: 515 MS
R AXIS: 0 DEGREES
RBC # BLD AUTO: 2.63 X10*6/UL (ref 4.5–5.9)
RBC MORPH BLD: ABNORMAL
S PNEUM AG UR QL: POSITIVE
SAO2 % BLDV: 72 % (ref 45–75)
SODIUM BLDV-SCNC: 142 MMOL/L (ref 136–145)
SODIUM SERPL-SCNC: 141 MMOL/L (ref 136–145)
T OFFSET: 471 MS
TARGETS BLD QL SMEAR: ABNORMAL
TOTAL CELLS COUNTED BLD: 100
VARIANT LYMPHS # BLD MANUAL: 0.23 X10*3/UL (ref 0–0.3)
VARIANT LYMPHS NFR BLD: 5 %
VENTRICULAR RATE: 97 BPM
WBC # BLD AUTO: 4.6 X10*3/UL (ref 4.4–11.3)

## 2025-01-24 PROCEDURE — 83735 ASSAY OF MAGNESIUM: CPT | Performed by: NURSE PRACTITIONER

## 2025-01-24 PROCEDURE — 82248 BILIRUBIN DIRECT: CPT | Performed by: INTERNAL MEDICINE

## 2025-01-24 PROCEDURE — 2500000004 HC RX 250 GENERAL PHARMACY W/ HCPCS (ALT 636 FOR OP/ED): Mod: JZ | Performed by: INTERNAL MEDICINE

## 2025-01-24 PROCEDURE — 99233 SBSQ HOSP IP/OBS HIGH 50: CPT | Performed by: INTERNAL MEDICINE

## 2025-01-24 PROCEDURE — 85007 BL SMEAR W/DIFF WBC COUNT: CPT | Performed by: NURSE PRACTITIONER

## 2025-01-24 PROCEDURE — 80053 COMPREHEN METABOLIC PANEL: CPT | Performed by: NURSE PRACTITIONER

## 2025-01-24 PROCEDURE — 36415 COLL VENOUS BLD VENIPUNCTURE: CPT | Performed by: INTERNAL MEDICINE

## 2025-01-24 PROCEDURE — 2500000001 HC RX 250 WO HCPCS SELF ADMINISTERED DRUGS (ALT 637 FOR MEDICARE OP): Performed by: NURSE PRACTITIONER

## 2025-01-24 PROCEDURE — 99291 CRITICAL CARE FIRST HOUR: CPT | Performed by: INTERNAL MEDICINE

## 2025-01-24 PROCEDURE — 2500000005 HC RX 250 GENERAL PHARMACY W/O HCPCS: Performed by: INTERNAL MEDICINE

## 2025-01-24 PROCEDURE — 36415 COLL VENOUS BLD VENIPUNCTURE: CPT | Performed by: NURSE PRACTITIONER

## 2025-01-24 PROCEDURE — 2500000004 HC RX 250 GENERAL PHARMACY W/ HCPCS (ALT 636 FOR OP/ED): Performed by: NURSE PRACTITIONER

## 2025-01-24 PROCEDURE — 2500000002 HC RX 250 W HCPCS SELF ADMINISTERED DRUGS (ALT 637 FOR MEDICARE OP, ALT 636 FOR OP/ED): Performed by: INTERNAL MEDICINE

## 2025-01-24 PROCEDURE — 82435 ASSAY OF BLOOD CHLORIDE: CPT | Performed by: INTERNAL MEDICINE

## 2025-01-24 PROCEDURE — 2020000001 HC ICU ROOM DAILY

## 2025-01-24 PROCEDURE — 85027 COMPLETE CBC AUTOMATED: CPT | Performed by: NURSE PRACTITIONER

## 2025-01-24 PROCEDURE — 82947 ASSAY GLUCOSE BLOOD QUANT: CPT

## 2025-01-24 PROCEDURE — 2500000002 HC RX 250 W HCPCS SELF ADMINISTERED DRUGS (ALT 637 FOR MEDICARE OP, ALT 636 FOR OP/ED): Performed by: NURSE PRACTITIONER

## 2025-01-24 PROCEDURE — 94640 AIRWAY INHALATION TREATMENT: CPT

## 2025-01-24 PROCEDURE — 94003 VENT MGMT INPAT SUBQ DAY: CPT

## 2025-01-24 PROCEDURE — 71045 X-RAY EXAM CHEST 1 VIEW: CPT

## 2025-01-24 PROCEDURE — 2500000002 HC RX 250 W HCPCS SELF ADMINISTERED DRUGS (ALT 637 FOR MEDICARE OP, ALT 636 FOR OP/ED): Performed by: PHARMACIST

## 2025-01-24 RX ADMIN — IPRATROPIUM BROMIDE AND ALBUTEROL SULFATE 3 ML: 2.5; .5 SOLUTION RESPIRATORY (INHALATION) at 07:50

## 2025-01-24 RX ADMIN — OSELTAMIVIR PHOSPHATE 30 MG: 30 CAPSULE ORAL at 17:00

## 2025-01-24 RX ADMIN — MONTELUKAST 10 MG: 10 TABLET, FILM COATED ORAL at 09:14

## 2025-01-24 RX ADMIN — METHYLPREDNISOLONE SODIUM SUCCINATE 40 MG: 40 INJECTION, POWDER, FOR SOLUTION INTRAMUSCULAR; INTRAVENOUS at 15:35

## 2025-01-24 RX ADMIN — PIPERACILLIN SODIUM AND TAZOBACTAM SODIUM 3.38 G: 3; .375 INJECTION, SOLUTION INTRAVENOUS at 10:35

## 2025-01-24 RX ADMIN — ATORVASTATIN CALCIUM 80 MG: 40 TABLET, FILM COATED ORAL at 21:09

## 2025-01-24 RX ADMIN — METHYLPREDNISOLONE SODIUM SUCCINATE 40 MG: 40 INJECTION, POWDER, FOR SOLUTION INTRAMUSCULAR; INTRAVENOUS at 21:09

## 2025-01-24 RX ADMIN — Medication 45 PERCENT: at 19:50

## 2025-01-24 RX ADMIN — IPRATROPIUM BROMIDE AND ALBUTEROL SULFATE 3 ML: 2.5; .5 SOLUTION RESPIRATORY (INHALATION) at 19:50

## 2025-01-24 RX ADMIN — PANTOPRAZOLE SODIUM 40 MG: 40 INJECTION, POWDER, FOR SOLUTION INTRAVENOUS at 06:28

## 2025-01-24 RX ADMIN — RUXOLITINIB 20 MG: 20 TABLET ORAL at 21:09

## 2025-01-24 RX ADMIN — METHYLPREDNISOLONE SODIUM SUCCINATE 40 MG: 40 INJECTION, POWDER, FOR SOLUTION INTRAMUSCULAR; INTRAVENOUS at 04:30

## 2025-01-24 RX ADMIN — PIPERACILLIN SODIUM AND TAZOBACTAM SODIUM 3.38 G: 3; .375 INJECTION, SOLUTION INTRAVENOUS at 17:00

## 2025-01-24 RX ADMIN — IPRATROPIUM BROMIDE AND ALBUTEROL SULFATE 3 ML: 2.5; .5 SOLUTION RESPIRATORY (INHALATION) at 11:54

## 2025-01-24 RX ADMIN — MIDAZOLAM IN SODIUM CHLORIDE 3.75 MG/HR: 1 INJECTION INTRAVENOUS at 06:47

## 2025-01-24 RX ADMIN — CLOPIDOGREL 75 MG: 75 TABLET ORAL at 09:14

## 2025-01-24 RX ADMIN — Medication 45 PERCENT: at 23:29

## 2025-01-24 RX ADMIN — ASPIRIN 81 MG: 81 TABLET, COATED ORAL at 09:14

## 2025-01-24 RX ADMIN — RUXOLITINIB 20 MG: 20 TABLET ORAL at 09:18

## 2025-01-24 RX ADMIN — Medication 45 PERCENT: at 16:00

## 2025-01-24 RX ADMIN — IPRATROPIUM BROMIDE AND ALBUTEROL SULFATE 3 ML: 2.5; .5 SOLUTION RESPIRATORY (INHALATION) at 15:59

## 2025-01-24 RX ADMIN — Medication 45 PERCENT: at 21:09

## 2025-01-24 RX ADMIN — PIPERACILLIN SODIUM AND TAZOBACTAM SODIUM 3.38 G: 3; .375 INJECTION, SOLUTION INTRAVENOUS at 04:30

## 2025-01-24 RX ADMIN — Medication 55 PERCENT: at 08:00

## 2025-01-24 RX ADMIN — METHYLPREDNISOLONE SODIUM SUCCINATE 40 MG: 40 INJECTION, POWDER, FOR SOLUTION INTRAMUSCULAR; INTRAVENOUS at 09:13

## 2025-01-24 RX ADMIN — PIPERACILLIN SODIUM AND TAZOBACTAM SODIUM 3.38 G: 3; .375 INJECTION, SOLUTION INTRAVENOUS at 22:04

## 2025-01-24 ASSESSMENT — PAIN - FUNCTIONAL ASSESSMENT
PAIN_FUNCTIONAL_ASSESSMENT: CPOT (CRITICAL CARE PAIN OBSERVATION TOOL)

## 2025-01-24 ASSESSMENT — PAIN SCALES - GENERAL: PAINLEVEL_OUTOF10: 0 - NO PAIN

## 2025-01-24 NOTE — PROGRESS NOTES
Pt is still intubated and sedated. Pt is Flu positive with copd exacerbation. TCC to complete discharge assessment once pt is medically stable.

## 2025-01-24 NOTE — PROGRESS NOTES
Physical Therapy                 Therapy Communication Note    Patient Name: Aristeo Barriga  MRN: [id number]  Department: [facility]  Room: 05/05-A  Today's Date: 1/24/2025     Discipline: Physical Therapy          Missed Visit Reason: Patient placed on medical hold (pt currently intubated and sedated. OT consulted with RN, who advises pt is not a candidate for therapy today. unable to participate.)    Missed Time: Attempt    Comment:

## 2025-01-24 NOTE — CONSULTS
"Nutrition Initial Assessment:   Nutrition Assessment    Reason for Assessment: Admission nursing screening, Enteral assessment/recommendation (TF)    Medical history per chart:     PMHx s/f COPD with baseline oxygen requirement of 4 L chronic systolic diastolic heart failure last EF in January 2020 for 50% history of myeloproliferative disorder coronary artery disease, Chronic kidney disease, Emphysema of lung (Multi), GERD, Hypertension, and Leukemia.   Hx of R BKA - 1973    1/24:  Pt reviewed in IDT rounds.  Pt intubated on vent, no propofol, and on breathing trial.  If unable to extubated okay to start TF today per physician.    Nutrition History:  Food and Nutrient History: Pt unable to report due to ventilation.  Noted last admit on 2/26/24 last year patient was on a Nectar thick diet.  Vitamin/Herbal Supplement Use: [facility] diet[facility]'s [facility] patient was taking 1-2 Ensure supplements at home.       Dietary Orders (From admission, onward)       Start     Ordered    01/23/25 2252  May Not Participate in Room Service  ( ROOM SERVICE MAY NOT PARTICIPATE)  Once        Question:  .  Answer:  Yes    01/23/25 2251                    Anthropometrics:  Height: 180.3 cm (5' 11\")   Weight: 65.3 kg (144 lb)   BMI (Calculated): 20.09  IBW/kg (Dietitian Calculated): 78 kg     Adjusted Body Weight (kg): 73 kg (Adj. IBW for R BKA)  Weight History:   Wt Readings from Last 10 Encounters:   01/23/25 65.3 kg (144 lb)   01/21/25 65.4 kg (144 lb 2.9 oz)   06/16/24 59.9 kg (132 lb)   02/26/24 63.5 kg (140 lb)   02/14/24 69.4 kg (153 lb)   11/29/23 65.3 kg (144 lb)   10/04/23 63.5 kg (139 lb 15.9 oz)       Weight Change %:  Weight History / % Weight Change: [facility] 69.2 kg on 11/4/24.  Pt with prosthetic on BKA so weights expected to flucutate if taken without.  Per records possible weight loss of 6% x 11 months  Significant Weight Loss: No    Nutrition Focused Physical Exam Findings:    Subcutaneous Fat Loss:   Orbital " Fat Pads: Mild-Moderate (slight dark circles and slight hollowing)  Buccal Fat Pads: Severe (hollow, sunken and narrow face)  Muscle Wasting:  Temporalis: Severe (hollowed scooping depression)  Pectoralis (Clavicular Region): Severe (protruding prominent clavicle)  Interosseous: Severe (depressed area between thumb and forefinger)  Edema:     Physical Findings:  Skin: Negative    Nutrition Significant Labs:    Results from last 7 days   Lab Units 01/24/25  0433 01/23/25  1303 01/21/25  0409 01/19/25  0530 01/18/25  0401   GLUCOSE mg/dL 142* 192* 115*   < > 166*   SODIUM mmol/L 141 140 137   < > 137   POTASSIUM mmol/L 3.4* 3.7 3.7   < > 3.6   CHLORIDE mmol/L 112* 106 105   < > 103   CO2 mmol/L 21 23 24   < > 23   BUN mg/dL 65* 67* 73*   < > 43*   CREATININE mg/dL 2.01* 2.03* 2.14*   < > 1.78*   EGFR mL/min/1.73m*2 33* 33* 31*   < > 38*   CALCIUM mg/dL 6.7* 8.2* 8.2*   < > 8.9   MAGNESIUM mg/dL 2.91*  --  2.44*  --  1.94    < > = values in this interval not displayed.     Lab Results   Component Value Date    HGBA1C 5.8 (H) 01/18/2025    HGBA1C 5.5 11/30/2022     Results from last 7 days   Lab Units 01/23/25  2101 01/18/25  1109   POCT GLUCOSE mg/dL 170* 154*       Nutrition Specific Medications:    fentaNYL,  mcg/hr, Last Rate: 25 mcg/hr (01/24/25 1039)  midazolam, 0.5-20 mg/hr, Last Rate: Stopped (01/24/25 0900)         I/O:    ; Stool Appearance: Unable to assess (01/24/25 0800)    Estimated Needs:      Method for Estimating Needs: 2202-0043 kcals (25-30 kcal/kg)  Total Protein Estimated Needs in 24 Hours (g): 98 g  Method for Estimating 24 Hour Protein Needs: 1.5 gm/kg     Method for Estimating 24 Hour Fluid Needs: 1ml/kcal        Nutrition Diagnosis   Malnutrition Diagnosis  Patient has Malnutrition Diagnosis: Yes  Diagnosis Status: New  Malnutrition Diagnosis: Severe malnutrition related to chronic disease or condition  Related to: chronic COPD, hx of leukemia, and hx of severe malnutrition  As Evidenced  by: severe muscle and fat loss per NFPE, and PO intake <75% for > 1month    Nutrition Diagnosis  Patient has Nutrition Diagnosis: Yes  Diagnosis Status (1): New  Nutrition Diagnosis 1: Inadequate oral intake  Related to (1): s/p intubation  As Evidenced by (1): NPO status with need of Enteral nutrition       Nutrition Interventions/Recommendations   Nutrition prescription for enteral nutrition    Nutrition Recommendations:  Individualized Nutrition Prescription Provided for : TF when able: Jevity 1.5 @ goal of 50 ml/hr: 1800 kcals, 76 gm protein, 912 ml free water.    Nutrition Interventions/Goals:   Interventions: Enteral intake  Goal: Add Prostat once daily to provide with TF: 1900 kcals, 91 gm protein, 912 ml free water.  Coordination of Care with Providers: Nursing, Provider  Goal: IDT rounds, Dr. Landon  Additional Interventions: Free water flush of 200 ml Q6H to provide with TF: 1712 ml      Education Documentation  No documentation found.     Patient intubated - N/A       Nutrition Monitoring and Evaluation   Food/Nutrient Related History Monitoring  Monitoring and Evaluation Plan: Enteral and parenteral nutrition intake determination  Enteral and Parenteral Nutrition Intake Determination: Enteral nutrition intake - Other  Criteria: Initiation of TF  Additional Plans: Diet advancement if able to extubate    Anthropometric Measurements  Monitoring and Evaluation Plan: Body weight  Body Weight: Body weight - Maintain stable weight    Biochemical Data, Medical Tests and Procedures  Monitoring and Evaluation Plan: Electrolyte/renal panel, Glucose/endocrine profile  Electrolyte and Renal Panel: Electrolytes within normal limits  Glucose/Endocrine Profile: Glucose within normal limits - ICU (140-180 mg/dL)    Physical Exam Findings  Monitoring and Evaluation Plan: Skin  Skin: Other (Comment)  Other: Intact skin         Time Spent (min): 45 minutes

## 2025-01-24 NOTE — PROGRESS NOTES
Occupational Therapy                 Therapy Communication Note    Patient Name: Aristeo Barriga  MRN: [id number]  Department: [facility]  Room: 05/05-A  Today's Date: 1/24/2025     Discipline: Occupational Therapy    OT Missed Visit: Yes     Missed Visit Reason: Missed Visit Reason: Patient placed on medical hold (OT eval attempted. pt currently intubated and sedated. consulted with RN, who advises pt is not a candidate for therapy today. unable to participate.)    Missed Time: Attempt    Comment:

## 2025-01-24 NOTE — PROGRESS NOTES
Aristeo Barriga is a 79 y.o. male admitted for Acute on chronic respiratory failure with hypoxia (Multi). Pharmacy reviewed the patient's [id number]-[id number] medications and allergies for accuracy.    The list below reflects the PTA list prior to pharmacy medication history. A summary a changes to the PTA medication list has been listed below. Please review each medication in order reconciliation for additional clarification and justification.    Source of information:  Discharge paperwork 01/21/25   No Changes!  Medications added:    Medications modified:    Medications to be removed:    Medications of concern:      Prior to Admission Medications   Prescriptions Last Dose Informant Patient Reported? Taking?   NON FORMULARY   Yes No   Sig: Oxygen therapy 2L N/C PRN   allopurinol (Zyloprim) 100 mg tablet   Yes No   Sig: Take 1 tablet (100 mg) by mouth once daily.   aspirin 81 mg EC tablet   Yes No   Sig: Take 1 tablet (81 mg) by mouth once daily.   atorvastatin (Lipitor) 80 mg tablet   Yes No   Sig: Take 1 tablet (80 mg) by mouth once daily at bedtime.   busPIRone (Buspar) 5 mg tablet   Yes No   Sig: Take 1 tablet (5 mg) by mouth 2 times a day as needed (Anxiety).   carvedilol (Coreg) 3.125 mg tablet   No No   Sig: Take 1 tablet (3.125 mg) by mouth 2 times a day.   clopidogrel (Plavix) 75 mg tablet   Yes No   Sig: Take 1 tablet (75 mg) by mouth once daily.   fluticasone furoate-vilanteroL (Breo Ellipta) 200-25 mcg/dose inhaler   No No   Sig: Inhale 1 puff by mouth into the lungs once daily. Do not start before February 27, 2024.   hydrALAZINE (Apresoline) 10 mg tablet   No No   Sig: Take 1 tablet (10 mg) by mouth 3 times a day.   ipratropium-albuteroL (Duo-Neb) 0.5-2.5 mg/3 mL nebulizer solution   No No   Sig: Use 3 mL by nebulization every 6 hours if needed for wheezing.   isosorbide mononitrate ER (Imdur) 30 mg 24 hr tablet   No No   Sig: Take 1 tablet (30 mg) by mouth once daily. Do not crush or chew.    montelukast (Singulair) 10 mg tablet   Yes No   Sig: Take 1 tablet (10 mg) by mouth once daily.   potassium chloride CR 10 mEq ER tablet   Yes No   Sig: Take 1 tablet (10 mEq) by mouth once daily.   predniSONE (Deltasone) 20 mg tablet   No No   Sig: Take 2 tablets (40 mg) by mouth once daily for 4 days.   ruxolitinib (Jakafi) 20 mg tablet   Yes No   Sig: Take 1 tablet (20 mg total) by mouth twice a day.   sertraline (Zoloft) 100 mg tablet   Yes No   Sig: Take 1 tablet (100 mg) by mouth once daily.   tamsulosin (Flomax) 0.4 mg 24 hr capsule   Yes No   Sig: Take 1 capsule (0.4 mg) by mouth once daily.   tiotropium (Spiriva Respimat) 2.5 mcg/actuation inhaler   Yes No   Sig: Inhale 2 puffs once daily.      Facility-Administered Medications: None       Shruthi Blunt

## 2025-01-24 NOTE — PROGRESS NOTES
Vancomycin Dosing by Pharmacy- Cessation of Therapy    Consult to pharmacy for vancomycin dosing has been discontinued by the prescriber, pharmacy will sign off at this time.    Please call pharmacy if there are further questions or re-enter a consult if vancomycin is resumed.     Ana Jordan, PharmD

## 2025-01-24 NOTE — PROGRESS NOTES
Critical Care Daily Progress Notes        Subjective   Patient is a 79 y.o. male admitted on 1/23/2025 12:40 PM with the following indication(s) for ICU care: Acute hypoxic respiratory failure requiring intubation.     Interval History:   Aristeo Barriga is a 79 y.o. male with PMHx s/f COPD with baseline oxygen requirement of 4 L chronic systolic diastolic heart failure last EF in January 2020 for 50% history of myeloproliferative disorder coronary artery disease with recent stress test showing no reversible ischemia benign prostatic hypertrophy abdominal aortic aneurysm with repair was admitted to ICU for Acute hypoxic respiratory failure requiring  intubation.  On presentation to the ER his oxygen saturation was 50s on CPAP.      1/24/2025: Patient was placed on a sedation vacation followed by spontaneous breathing trial.  Patient failed spontaneous breathing trial because of hypoxia and tachypnea.     Scheduled Medications:   aspirin, 81 mg, oral, Daily  atorvastatin, 80 mg, oral, Nightly  [Held by provider] carvedilol, 3.125 mg, oral, BID  clopidogrel, 75 mg, oral, Daily  [Held by provider] fluticasone furoate-vilanteroL, 1 puff, inhalation, Daily  [Held by provider] hydrALAZINE, 10 mg, oral, TID  ipratropium-albuteroL, 3 mL, nebulization, 4x daily  [Held by provider] isosorbide mononitrate ER, 30 mg, oral, Daily  methylPREDNISolone sodium succinate (PF), 40 mg, intravenous, q6h  montelukast, 10 mg, oral, Daily  oseltamivir, 30 mg, oral, q24h  oxygen, , inhalation, Continuous - Inhalation  pantoprazole, 40 mg, oral, Daily before breakfast   Or  pantoprazole, 40 mg, intravenous, Daily before breakfast  piperacillin-tazobactam, 3.375 g, intravenous, q6h  ruxolitinib, 20 mg, oral, BID  [Held by provider] tamsulosin, 0.4 mg, oral, Daily  [Held by provider] tiotropium, 2 puff, inhalation, Daily         Continuous Medications:   fentaNYL,  mcg/hr, Last Rate: 25 mcg/hr (01/24/25 1039)  midazolam, 0.5-20 mg/hr,  Last Rate: Stopped (01/24/25 0900)         PRN Medications:   PRN medications: ipratropium-albuteroL    Objective   Vitals:  Most Recent:  Vitals:    01/24/25 1700   BP: 123/64   Pulse: 73   Resp: 20   Temp:    SpO2: 93%       24hr Min/Max:  Temp  Min: 36 °C (96.8 °F)  Max: 37 °C (98.6 °F)  Pulse  Min: 70  Max: 96  BP  Min: 90/61  Max: 125/65  Resp  Min: 4  Max: 24  SpO2  Min: 90 %  Max: 100 %    LDA:  ETT  7.5 mm (Active)   Placement Date/Time: 01/23/25 1350   Mask Ventilation: Vent by mask  Technique: Tube changer;Stylet  ETT Type: ETT - single  Single Lumen Tube Size: 7.5 mm  Cuffed: Yes  Location: Oral  Placement Verification: Auscultation;Capnometry  Placed By: ED ph...   Number of days: 1       Urethral Catheter Coude 16 Fr. (Active)   Placement Date/Time: 01/23/25 1600   Placed by: lydia greenfield  Hand Hygiene Completed: Yes  Catheter Type: Coude  Tube Size (Fr.): 16 Fr.  Catheter Balloon Size: 10 mL  Urine Returned: Yes   Number of days: 1       NG/OG/Feeding Tube OG - [place] sump 16 Fr Center mouth (Active)   Placement Date/Time: 01/23/25 1443   Placed by: Twila SYED RN  Hand Hygiene Completed: Yes  Type of Tube: NG/OG Tube  Tube Length: 50 cm  Tube Type: OG - [place] sump  NG/OG Tube Size: 16 Fr  Tube Location: Center mouth   Number of days: 1         Vent settings:  Vent Mode: Assist control/Volume control plus  S RR:  [20] 20  S VT:  [450 mL-500 mL] 500 mL  PEEP/CPAP (cm H2O):  [5 cm H20-8 cm H20] 5 cm H20  MA SUP:  [10 cm H20] 10 cm H20  MAP (cm H2O):  [9-14] 9    Hemodynamic parameters for last 24 hours:       I/O:    Intake/Output Summary (Last 24 hours) at 1/24/2025 1746  Last data filed at 1/24/2025 1200  Gross per 24 hour   Intake 1160.14 ml   Output 990 ml   Net 170.14 ml       Physical Exam:   Physical Exam   Vitals reviewed.   Constitutional:       General: He is not in acute distress.     Comments: Sedated and intubated   HENT:      Head: Normocephalic and atraumatic.      Right Ear: External ear  normal.      Left Ear: External ear normal.      Nose: Nose normal.      Mouth/Throat:      Mouth: Mucous membranes are moist.      Pharynx: Oropharynx is clear.   Eyes:      General: No scleral icterus.     Extraocular Movements: Extraocular movements intact.      Conjunctiva/sclera: Conjunctivae normal.      Pupils: Pupils are equal, round, and reactive to light.   Neck:      Vascular: No carotid bruit.   Cardiovascular:      Rate and Rhythm: Regular rhythm. Tachycardia present.      Pulses: Normal pulses.      Heart sounds: Normal heart sounds.   Pulmonary:      Effort: Pulmonary effort is normal. No respiratory distress.      Comments: Diminished in bilateral bases  Chest:      Chest wall: No tenderness.   Abdominal:      General: Bowel sounds are normal. There is no distension.      Palpations: Abdomen is soft. There is no mass.      Tenderness: There is no abdominal tenderness. There is no rebound.   Musculoskeletal:         General: Deformity present. No swelling. Normal range of motion.      Cervical back: Normal range of motion.      Comments: B/L bka and prosthesis   Skin:     General: Skin is warm and dry.      Capillary Refill: Capillary refill takes less than 2 seconds.   Neurological:      Comments: Pt sedated and intubated     Lab/Radiology/Diagnostic Review:  Results for orders placed or performed during the hospital encounter of 01/23/25 (from the past 24 hours)   Blood Gas Venous Full Panel   Result Value Ref Range    POCT pH, Venous 7.26 (L) 7.33 - 7.43 pH    POCT pCO2, Venous 42 41 - 51 mm Hg    POCT pO2, Venous 32 (L) 35 - 45 mm Hg    POCT SO2, Venous 44 (L) 45 - 75 %    POCT Oxy Hemoglobin, Venous 43.1 (L) 45.0 - 75.0 %    POCT Hematocrit Calculated, Venous 20.0 (L) 41.0 - 52.0 %    POCT Sodium, Venous 139 136 - 145 mmol/L    POCT Potassium, Venous 3.2 (L) 3.5 - 5.3 mmol/L    POCT Chloride, Venous 111 (H) 98 - 107 mmol/L    POCT Ionized Calicum, Venous 1.01 (L) 1.10 - 1.33 mmol/L    POCT  Glucose, Venous 149 (H) 74 - 99 mg/dL    POCT Lactate, Venous 1.3 0.4 - 2.0 mmol/L    POCT Base Excess, Venous -7.6 (L) -2.0 - 3.0 mmol/L    POCT HCO3 Calculated, Venous 18.8 (L) 22.0 - 26.0 mmol/L    POCT Hemoglobin, Venous 6.8 (L) 13.5 - 17.5 g/dL    POCT Anion Gap, Venous 12.0 10.0 - 25.0 mmol/L    Patient Temperature 37.0 degrees Celsius    FiO2 70 %   POCT GLUCOSE   Result Value Ref Range    POCT Glucose 170 (H) 74 - 99 mg/dL   Basic Metabolic Panel   Result Value Ref Range    Glucose 142 (H) 74 - 99 mg/dL    Sodium 141 136 - 145 mmol/L    Potassium 3.4 (L) 3.5 - 5.3 mmol/L    Chloride 112 (H) 98 - 107 mmol/L    Bicarbonate 21 21 - 32 mmol/L    Anion Gap 11 10 - 20 mmol/L    Urea Nitrogen 65 (H) 6 - 23 mg/dL    Creatinine 2.01 (H) 0.50 - 1.30 mg/dL    eGFR 33 (L) >60 mL/min/1.73m*2    Calcium 6.7 (L) 8.6 - 10.3 mg/dL   CBC and Auto Differential   Result Value Ref Range    WBC 4.6 4.4 - 11.3 x10*3/uL    nRBC 0.9 (H) 0.0 - 0.0 /100 WBCs    RBC 2.63 (L) 4.50 - 5.90 x10*6/uL    Hemoglobin 7.7 (L) 13.5 - 17.5 g/dL    Hematocrit 24.7 (L) 41.0 - 52.0 %    MCV 94 80 - 100 fL    MCH 29.3 26.0 - 34.0 pg    MCHC 31.2 (L) 32.0 - 36.0 g/dL    RDW 20.8 (H) 11.5 - 14.5 %    Platelets 190 150 - 450 x10*3/uL    Immature Granulocytes %, Automated 0.7 0.0 - 0.9 %    Immature Granulocytes Absolute, Automated 0.03 0.00 - 0.50 x10*3/uL   Magnesium   Result Value Ref Range    Magnesium 2.91 (H) 1.60 - 2.40 mg/dL   Manual Differential   Result Value Ref Range    Neutrophils %, Manual 55.0 40.0 - 80.0 %    Bands %, Manual 25.0 0.0 - 5.0 %    Lymphocytes %, Manual 10.0 13.0 - 44.0 %    Monocytes %, Manual 4.0 2.0 - 10.0 %    Eosinophils %, Manual 1.0 0.0 - 6.0 %    Basophils %, Manual 0.0 0.0 - 2.0 %    Atypical Lymphocytes %, Manual 5.0 0.0 - 2.0 %    Seg Neutrophils Absolute, Manual 2.53 1.60 - 5.00 x10*3/uL    Bands Absolute, Manual 1.15 (H) 0.00 - 0.50 x10*3/uL    Lymphocytes Absolute, Manual 0.46 (L) 0.80 - 3.00 x10*3/uL     Monocytes Absolute, Manual 0.18 0.05 - 0.80 x10*3/uL    Eosinophils Absolute, Manual 0.05 0.00 - 0.40 x10*3/uL    Basophils Absolute, Manual 0.00 0.00 - 0.10 x10*3/uL    Atypical Lymphs Absolute, Manual 0.23 0.00 - 0.30 x10*3/uL    Total Cells Counted 100     Neutrophils Absolute, Manual 3.68 1.60 - 5.50 x10*3/uL    RBC Morphology See Below     Polychromasia Mild     Hypochromia Mild     Target Cells Few    Hepatic function panel   Result Value Ref Range    Albumin 2.6 (L) 3.4 - 5.0 g/dL    Bilirubin, Total 0.4 0.0 - 1.2 mg/dL    Bilirubin, Direct 0.2 0.0 - 0.3 mg/dL    Alkaline Phosphatase 46 33 - 136 U/L    ALT 25 10 - 52 U/L    AST 34 9 - 39 U/L    Total Protein 5.4 (L) 6.4 - 8.2 g/dL   POCT GLUCOSE   Result Value Ref Range    POCT Glucose 154 (H) 74 - 99 mg/dL     XR chest 1 view    Result Date: 1/24/2025  Interpreted By:  Daisy Beard, STUDY: XR CHEST 1 VIEW;  1/24/2025 9:33 am   INDICATION: Signs/Symptoms:hemoptysis, evaluate lung fields, lines/tubes.     COMPARISON: 01/23/2025 chest x-ray and chest CT   ACCESSION NUMBER(S): [id number]   ORDERING CLINICIAN: KORI MARSHALL   TECHNIQUE: Portable AP upright   FINDINGS: Endotracheal tube tip projects about 3 cm above the jayy. Nasogastric tube tip projects below the diaphragm and appears to terminate in the cardia region of the stomach. The side-hole of the NG tube is in the distal esophagus. Cardiac silhouette is partially obscured by pulmonary infiltrate. The consolidation through the mid and lower left lung is similar to the prior study and prior CT demonstrates that this is throughout the left lower lobe and involves the posterior portion of the left upper lobe and most of the lingula. There is a small infiltrate in the medial right lung base which correlates with right lower lobe findings of atelectasis on the prior CT. Right sided findings are not significantly changed. There is probably a small amount of left pleural fluid in addition to the  left-sided pneumonia. Mediastinal structures shift to the left slightly even allowing for the patient rotation, which also correlates with findings on prior CT with overall lesser volume in the left lung than right. Osseous structures show no change.       NG tube tip is barely within the stomach in the side-hole is in the distal esophagus   Left-sided pneumonia and right basilar atelectasis are similar to the prior study. There is mild diminished volume in the left lung compared to the right which is unchanged from the prior study   Probable small left pleural effusion in addition to the pneumonia   MACRO: None.   Signed by: Daisy Beard 1/24/2025 10:36 AM Dictation workstation:   [id number]    ECG 12 lead    Result Date: 1/24/2025  Sinus rhythm Paired ventricular premature complexes Aberrant conduction of SV complex(es) Probable left atrial enlargement Nonspecific intraventricular conduction delay Probable inferior infarct, age indeterminate Baseline wander in lead(s) I,III,aVR,aVL,aVF,V3,V6    XR abdomen 1 view    Result Date: 1/23/2025  Interpreted By:  Curt Shore, STUDY: Abdominal series dated 1/23/2025.   INDICATION: Enteric tube placement.   COMPARISON: None.   ACCESSION NUMBER(S): [id number]   ORDERING CLINICIAN: KORI MARSHALL   TECHNIQUE: Single AP radiographs of the abdomen.   FINDINGS: Exam is limited due to incomplete inclusion of the abdomen in the field of view including the hemidiaphragms and lower lung zones given enteric tube placement. There appears to be visualization of a tube projecting over the upper mid abdomen mostly just proximal to the presumed gastric air bubble. There is note of stent grafting in the vascular tree. Vascular calcifications are seen over the soft tissues.       Limited exam with probable visualization of enteric tube over the upper abdomen. Repeat radiography is recommended to include the thoracoabdominal region. Further recommendations may be given on the basis  of that exam.   Signed by: Curt Shore 1/23/2025 8:44 PM Dictation workstation:   [id number]    CT angio chest abdomen pelvis    Result Date: 1/23/2025  Interpreted By:  Vadim Reed, STUDY: CT ANGIO CHEST ABDOMEN PELVIS;  1/23/2025 3:54 pm   INDICATION: Signs/Symptoms:respiratory failure with hypoxia, sepsis, now intubated, hx AAA repair, r/o AAA rupture.   COMPARISON: 01/23/2025   ACCESSION NUMBER(S): [id number]   ORDERING CLINICIAN: BILL SCHULZ   TECHNIQUE: Axial non-contrast images of the chest abdomen, and pelvis.   Axial CT images of the chest, abdomen and pelvis were obtained after the intravenous administration of iodinated contrast using angiographic technique with coronal and sagittal reformatted images. MIP images and 3D reconstructions were created on an independent workstation and reviewed.   FINDINGS: VASCULATURE:   Moderate atherosclerotic calcification of the aortic arch and thoracic and abdominal aorta. The ascending aorta is borderline ectatic measuring 4 cm in diameter.   There is a infrarenal abdominal aortic aneurysm previously treated with endograft with aneurysm sac measuring 3.8 cm in diameter.   There is severe disease involving the bilateral external iliac arteries with areas of multifocal moderate-to-severe stenosis suspected.   There is probable severe stenosis origin of the celiac artery. SMA appears to be widely patent. At least moderate disease origin of both renal arteries, right-greater-than-left.   CT CHEST:   There is underlying severe emphysema. There is consolidation seen in the lung bases, left-greater-than-right consistent with underlying pneumonia. The patient is intubated. NG tube appears to terminate near the GE junction.   There is mild cardiomegaly with severe coronary artery calcification.   No significant adenopathy.   No significant effusions.     CT ABDOMEN/PELVIS:   Arterial phase imaging of the liver, gallbladder, adrenals pancreas spleen grossly  unremarkable.   The kidneys are somewhat atrophic.   No bowel obstruction or significant colitis. Mild diverticulosis.   Bladder is within normal limits   No free fluid or significant adenopathy.   There is a chronic compression fracture of T6 with sclerotic appearance. Multilevel moderate to advanced degenerative disc disease noted.       Infrarenal abdominal aortic aneurysm status post endograft treatment. Areas of multifocal moderate-to-severe stenosis bilateral external iliac arteries. Additional atherosclerotic disease as above.   Extensive consolidation in the lung bases consistent with probable underlying pneumonia, left-greater-than-right. This is superimposed on severe emphysema.   Additional nonemergent/senescent changes as above.   MACRO: None.   Signed by: Vadim Reed 1/23/2025 4:32 PM Dictation workstation:   [id number]    XR chest abdomen for OG NG placement    Result Date: 1/23/2025  Interpreted By:  Stacie Parker, STUDY: XR CHEST ABDOMEN FOR OG NG PLACEMENT; ;  1/23/2025 1:36 pm   INDICATION: Signs/Symptoms:ETT placement, OG placement.     COMPARISON: None.   ACCESSION NUMBER(S): [id number]   ORDERING CLINICIAN: BILL SCHULZ   FINDINGS: AP portable view of the chest is obtained.  Limited exam due to portable nature. Magnified cardiac silhouette. Endotracheal tube in place and terminates above the jayy. Left perihilar and left basilar infiltrates and effusions. Mild interstitial prominence diffusely.       Left perihilar and left basilar infiltrates and effusions for which follow-up is recommended.   MACRO: None   Signed by: Stacie Parker 1/23/2025 2:07 PM Dictation workstation:   [id number]    Nuclear Stress Test    Result Date: 1/20/2025  Interpreted By:  Ric See, STUDY: NUCLEAR STRESS TEST;  1/20/2025 2:38 pm   INDICATION: Signs/Symptoms:CHF.   COMPARISON: None.   ACCESSION NUMBER(S): [id number]   ORDERING CLINICIAN: KEN ROBIN   TECHNIQUE: DIVISION OF NUCLEAR MEDICINE  PHARMACOLOGIC STRESS MYOCARDIAL PERFUSION SCAN, ONE DAY PROTOCOL   The patient received an intravenous dose of  10.7 mCi of Tc-99m tetrofosmin and resting emission tomographic (SPECT) images of the myocardium were acquired. The patient then received an intravenous infusion of 0.4mg regadenoson (Lexiscan) followed by an additional dose of  34.3 mCi of Tc-99m tetrofosmin. Stress phase SPECT images of the myocardium were then acquired. These included ECG-gated images to assess and quantify ventricular function.   FINDINGS: There is a moderate-to-large primarily fixed but partially reversible perfusion defect in the inferior, inferolateral, and lateral walls with associated wall motion abnormality suggesting prior infarct with a small area of tiffany-infarct ischemia.   Calculated ejection fraction of 46% with hypokinesis of the inferior, inferolateral, and lateral walls   Attenuation correction CT images demonstrate coronary artery calcification, aortic atherosclerotic calcification, lung consolidation/infiltrates bilaterally       1. There is a moderate-to-large primarily fixed but partially reversible perfusion defect in the inferior, inferolateral, and lateral walls with associated wall motion abnormality suggesting prior infarct with a small area of tiffany-infarct ischemia.   2. Calculated ejection fraction of 46% with hypokinesis of the inferior, inferolateral, and lateral calvillo     Signed by: Ric See 1/20/2025 4:52 PM Dictation workstation:   [id number]    Cardiology Interpretation Of Nuclear Stress - See Other Report For Nuclear Portion    Result Date: 1/20/2025              [place] [address]      Phone [phone] Fax [phone] Nuclear Pharmacologic Stress Test Patient Name:      AMITAMILDRED BELTRAN  Ordering Provider:    [id number] KEN ROBIN Study Date:        1/20/2025           Reading Physician:    [id number] Ric See DO MRN/PID:           [id number]             Supervising           [id number] Ric See                                         Physician: Accession#:        [id number]        Fellow: Date of Birth/Age: 1945 / 79      Fellow:                    years Gender:            M                   Nurse:                Erin Ellison Admission Status:  Inpatient           Sonographer:          NA Height:            180.34 cm           Technologist: Weight:            66.23 kg            Additional Staff: BSA:               1.84 m2             Encounter#:           [id number] BMI:               20.36 kg/m2         Patient Location:     [facility] Study Type:    CARDIOLOGY INTERPRETATION OF NUCLEAR STRESS Diagnosis/ICD: Dyspnea, unspecified-R06.00 Indication:    Dyspnea CPT Codes:     Stress Test Interpretation-24319; Stress Test Supervision-49212 Falls Risk: Low: Patient has low risk for sustaining a fall; environmental safety interventions in place.  Study Details: Correct procedure and correct patient verified verbally and with                ID Band checked.  Patient History: Hypertension, coronary artery disease, dyslipidemia, congestive heart failure, peripheral vascular disease, hx of thoracic aneurysm repair, chronic obstructive pulmonary disease and hyperlipidemia. aortic aneursym, CKD, PCI, right BKA. Allergies: Codeine. Smoker:    Current.  Medications: Lasix, jakafi, albuterol, potassium, carvedilol, hydralazine, isosorbide, protonix, ASA. atorvastatin. The patient took medications as prescribed.  Patient Performance: Patient received a total of 0.4 mg of Regadenoson at 8:19:41 AM. The peak heart rate achieved was 89 bpm, which was 63 % of the age predicted target heart rate of 141 bpm. The resting blood pressure was 121/61 mmHg with a heart rate of 78 bpm. The patient developed no symptoms during the stress exam. The blood pressure response was normal. The test was terminated due to: completed lab protocol. Patient has met the discharge criteria and is  discharged to their floor.  Baseline ECG: Resting ECG showed Sinus Arrhythmia with prior inferior infarct and prior lateral infarct.  Stress ECG: Stress ECG showed sinus arrhythmia, with occ PVC. No ST changes.  Stress Stage Data: +----------------+--+------+-------+                 HRSys BPDias BP +----------------+--+------+-------+ Baseline [id number]   61      +----------------+--+------+-------+ Stage I         08069   48      +----------------+--+------+-------+ Stage II        58219   54      +----------------+--+------+-------+ Stage III       78251   54      +----------------+--+------+-------+ Stage IV        34935   58      +----------------+--+------+-------+ Stage V         27749   57      +----------------+--+------+-------+  Summary:  1. Correlate with myocardial perfusion imaging results.  2. No ECG changes from baseline.  3. Nuclear image results are reported separately. [id number] Ric See DO Electronically signed on 1/20/2025 at 1:56:32 PM   ** Final **     ECG 12 Lead    Result Date: 1/20/2025  Sinus rhythm Probable left atrial enlargement RSR' in V1 or V2, right VCD or RVH Abnormal T, consider ischemia, lateral leads Borderline ST elevation, anterior leads Baseline wander in lead(s) V2    CT chest high resolution    Result Date: 1/20/2025  Interpreted By:  Pietro Adams, STUDY: CT CHEST HIGH RESOLUTION;  1/19/2025 1:55 pm   INDICATION: Signs/Symptoms:SOB/R/O Pulmonary fibrosis.     COMPARISON: 10/03/2023   ACCESSION NUMBER(S): [id number]   ORDERING CLINICIAN: KEN ROBIN   TECHNIQUE: Using helical multidetector technique, volumetric data acquisition of the chest was obtained without intravenous administration of contrast material under the high resolution chest CT protocol. Examination includes contiguous slices through the chest in supine positioning during inspiration, noncontiguous axial slices in supine positioning during expiration and prone  positioning during inspiration.   FINDINGS: LUNGS AND AIRWAYS: The trachea and central airways are patent. No endobronchial lesion is seen.   There are severe centrilobular emphysematous changes. Interval appearance of inferior lingular consolidation/airspace disease. Interval worsening in left lower lobe consolidation/atelectasis. Interval worsening in right basilar airspace disease/infiltrate/pneumonia.   Expiratory imaging demonstrates diffuse air trapping..     MEDIASTINUM AND NICO, LOWER NECK AND AXILLA: The visualized thyroid gland is within normal limits. There are scattered mediastinal lymph nodes are felt to be reactive/inflammatory in nature. Esophagus appears within normal limits as seen.   HEART AND VESSELS: Severe atherosclerotic calcifications of the thoracic aorta. Main pulmonary artery and its branches are normal in caliber. Severe coronary artery calcifications. The cardiac chambers are not enlarged. There is no pericardial effusion seen.   UPPER ABDOMEN: Extensive atherosclerotic calcification and infrarenal aortic stent placement. Indeterminate high attenuation within the infrarenal stent may be artifactual but correlate with any concern for renal stenosis/thrombus.     CHEST WALL AND OSSEOUS STRUCTURES: Chest wall is within normal limits. No acute osseous pathology.There are no suspicious osseous lesions.There is interval compression fracture with bony sclerosis of the T6 vertebral body.       1. There is severe emphysematous changes with multifocal parenchymal infiltrates concerning for pneumonia/aspiration. Correlate with symptoms of fever/sputum production. Recommend follow-up radiographs to document resolution. 2. Severe atherosclerotic disease of the aorta with coronary artery calcifications and correlate with coronary artery disease risk factors. 3. Interval compression fracture of the T6 vertebral body with bony sclerosis. This is most likely secondary to fracture healing but correlate  with any concern for pathologic fracture.     MACRO: Critical Finding:  See findings. Notification was initiated on 1/20/2025 at 9:39 am by  Pietro Adams.  (**-YCF-**) Instructions:   Signed by: Pietro Adams 1/20/2025 9:39 AM Dictation workstation:   [id number]    Transthoracic Echo (TTE) Complete    Result Date: 1/18/2025              [place] [address]      Phone [phone] Fax [phone] TRANSTHORACIC ECHOCARDIOGRAM REPORT Patient Name:       AMITA FRAGA DEVIN   Reading Physician:    [id number] Rigo Sim MD Study Date:         1/18/2025            Ordering Provider:    [id number] JOCELYNN OSWALD MRN/PID:            [id number]             Fellow: Accession#:         [id number]         Nurse:                ER nurse Date of Birth/Age:  1945 / 79 years Sonographer:          Amalia Fernandez RDCS Gender Assigned at  M                    Additional Staff: Birth: Height:             180.34 cm            Admit Date:           1/17/2025 Weight:             70.76 kg             Admission Status:     Inpatient -                                                                Routine BSA / BMI:          1.90 m2 / 21.76      Department Location:  [facility]                     kg/m2 Blood Pressure: 138 /82 mmHg Study Type:    TRANSTHORACIC ECHO (TTE) COMPLETE Diagnosis/ICD: Other forms of dyspnea-R06.09; Elevated Troponin-R79.89 Indication:    Dyspnea, Elevated troponin CPT Codes:     Echo Complete w Full Doppler-[id number] Patient History: Pertinent History: CAD, HTN and CHF. Study Detail: The following Echo studies were performed: 2D, M-Mode, Doppler and               color flow. Optison used as a contrast agent for endocardial               border definition. Total contrast used for  this procedure was 3 mL               via IV push.  PHYSICIAN INTERPRETATION: Left Ventricle: The left ventricular systolic function is mildly decreased, with a Randhawa's biplane calculated ejection fraction of 50%. There is global hypokinesis of the left ventricle with minor regional variations. The left ventricular cavity size is normal. There is normal septal and normal posterior left ventricular wall thickness. There is left ventricular concentric remodeling. Spectral Doppler shows a Grade I (impaired relaxation pattern) of left ventricular diastolic filling with normal left atrial filling pressure. Left Atrium: The left atrium is normal in size. Right Ventricle: The right ventricle is normal in size. There is normal right ventricular global systolic function. Right Atrium: The right atrium is normal in size. Aortic Valve: The aortic valve appears structurally normal. There is mild aortic valve cusp calcification. The aortic valve dimensionless index is 0.74. There is trace aortic valve regurgitation. The peak instantaneous gradient of the aortic valve is 8 mmHg. The mean gradient of the aortic valve is 4 mmHg. Mitral Valve: The mitral valve is mildly thickened. There is trace mitral valve regurgitation. Tricuspid Valve: The tricuspid valve is structurally normal. There is trace tricuspid regurgitation. Pulmonic Valve: The pulmonic valve is not well visualized. There is no indication of pulmonic valve regurgitation. Pericardium: No pericardial effusion noted. Aorta: The aortic root is normal. There is moderate dilatation of the ascending aorta.  CONCLUSIONS:  1. The left ventricular systolic function is mildly decreased, with a Randhawa's biplane calculated ejection fraction of 50%.  2. There is global hypokinesis of the left ventricle with minor regional variations.  3. Spectral Doppler shows a Grade I (impaired relaxation pattern) of left ventricular diastolic filling with normal left atrial filling pressure.   4. There is normal right ventricular global systolic function.  5. There is moderate dilatation of the ascending aorta. QUANTITATIVE DATA SUMMARY:  2D MEASUREMENTS:           Normal Ranges: Ao Root d:       3.60 cm   (2.0-3.7cm) LAs:             3.70 cm   (2.7-4.0cm) IVSd:            1.04 cm   (0.6-1.1cm) LVPWd:           1.00 cm   (0.6-1.1cm) LVIDd:           4.09 cm   (3.9-5.9cm) LVIDs:           2.90 cm LV Mass Index:   71.3 g/m2 LV % FS          29.1 %  LA VOLUME:                    Normal Ranges: LA Vol A4C:        49.6 ml    (22+/-6mL/m2) LA Vol A2C:        44.9 ml LA Vol BP:         49.7 ml LA Vol Index A4C:  26.2ml/m2 LA Vol Index A2C:  23.7 ml/m2 LA Vol Index BP:   26.2 ml/m2 LA Area A4C:       19.6 cm2 LA Area A2C:       17.7 cm2 LA Major Axis A4C: 6.6 cm LA Major Axis A2C: 5.9 cm  RA VOLUME BY A/L METHOD:          Normal Ranges: RA Area A4C:             14.5 cm2  AORTA MEASUREMENTS:         Normal Ranges: Ao Sinus, d:        3.60 cm (2.1-3.5cm) Ao STJ, d:          2.22 cm (1.7-3.4cm) Asc Ao, d:          4.40 cm (2.1-3.4cm)  LV SYSTOLIC FUNCTION BY 2D PLANIMETRY (MOD):                      Normal Ranges: EF-A4C View:    48 % (>=55%) EF-A2C View:    53 % EF-Biplane:     50 % LV EF Reported: 50 %  LV DIASTOLIC FUNCTION:            Normal Ranges: MV Peak E:             0.80 m/s   (0.7-1.2 m/s) MV Peak A:             1.17 m/s   (0.42-0.7 m/s) E/A Ratio:             0.68       (1.0-2.2) MV e'                  0.054 m/s  (>8.0) MV lateral e'          0.06 m/s MV medial e'           0.05 m/s MV A Dur:              92.00 msec E/e' Ratio:            14.65      (<8.0)  MITRAL VALVE:          Normal Ranges: MV DT:        168 msec (150-240msec)  AORTIC VALVE:                     Normal Ranges: AoV Vmax:                1.44 m/s (<=1.7m/s) AoV Peak P.3 mmHg (<20mmHg) AoV Mean P.0 mmHg (1.7-11.5mmHg) LVOT Max Cristofer:            0.89 m/s (<=1.1m/s) AoV VTI:                 25.30 cm (18-25cm)  LVOT VTI:                18.80 cm LVOT Diameter:           2.00 cm  (1.8-2.4cm) AoV Area, VTI:           2.33 cm2 (2.5-5.5cm2) AoV Area,Vmax:           1.94 cm2 (2.5-4.5cm2) AoV Dimensionless Index: 0.74  AORTIC INSUFFICIENCY: AI Vmax:       3.79 m/s AI Half-time:  498 msec AI Decel Rate: 223.00 cm/s2  RIGHT VENTRICLE: RV Basal 3.76 cm RV Mid   2.42 cm RV Major 8.4 cm TAPSE:   26.4 mm RV s'    0.14 m/s  TRICUSPID VALVE/RVSP:          Normal Ranges: Peak TR Velocity:     2.72 m/s RV Syst Pressure:     33 mmHg  (< 30mmHg) IVC Diam:             1.83 cm  [id number] Rigo Sim MD Electronically signed on 1/18/2025 at 10:45:48 AM  ** Final **     XR chest 1 view    Result Date: 1/17/2025  STUDY: Chest Radiograph;  01/17/2025 at 16:40 hours. INDICATION: Shortness of breath. COMPARISON: Chest, single portable view obtained on 06/16/2024 at 02:19 hours.  CT Chest 10/03/2023. ACCESSION NUMBER(S): [id number] ORDERING CLINICIAN: RANDALL CARSON TECHNIQUE:  Frontal chest was obtained at 16:40 hours. FINDINGS: There is a left pleural effusion.  There are increased interstitial markings at the lung bases, suspicious for edema.  Heart size is top normal.  No pneumothorax is noted.    1.  Left pleural effusion. 2.  Increased interstitial markings at the lung bases, suspicious for edema. Signed by Arden Otero MD        This patient has a urinary catheter   Reason for the urinary catheter remaining today? critically ill patient who need accurate urinary output measurements    This patient is intubated   Reason for patient to remain intubated today? they have inadequate gas-exchange without positive pressure        Malnutrition Diagnosis Status: New  Malnutrition Diagnosis: Severe malnutrition related to chronic disease or condition  Related to: chronic COPD, hx of leukemia, and hx of severe malnutrition  As Evidenced by: severe muscle and fat loss per NFPE, and PO intake <75% for > 1month  I agree with the dietitian's malnutrition  diagnosis.      Assessment/Plan   Assessment & Plan  Acute on chronic respiratory failure with hypoxia (Multi)    Aristeo Barriga is a 79 y.o. male with PMHx s/f COPD with baseline oxygen requirement of 4 L chronic systolic diastolic heart failure last EF in January 2020 for 50% history of myeloproliferative disorder coronary artery disease with recent stress test showing no reversible ischemia benign prostatic hypertrophy abdominal aortic aneurysm with repair was admitted to ICU for Acute hypoxic respiratory failure requiring  intubation.  On presentation to the ER his oxygen saturation was 50s on CPAP.      Acute hypoxic respite failure secondary to influenza pneumonia and COPD exacerbation   1/23/2025:  Admitted to ICU for Acute hypoxic respiratory failure requiring  intubation.    On presentation to the ER his oxygen saturation was 50s on CPAP, intubated for persistent hypoxia  I adjusted her ventilator settings to AC/VC with tidal volume of 450 cc, respiratory rate 20, FiO2 of 90% and PEEP of 8  Try to titrate down PEEP to 5  1/24/2025: Patient was placed on a sedation vacation followed by spontaneous breathing trial.    Patient failed spontaneous breathing trial because of hypoxia and tachypnea.   Was able to titrate down the PEEP to 5  Increased tidal volume to 500 cc, continuous respiratory rate 20  Ordered VBG at 8 PM  Okay to maintain oxygen saturation 90% or more because of chronic lung disease     2.  Influenza A positive  Started on Tamiflu  Placed on droplet precautions     3.  Healthcare associated pneumonia  1/23/2025: Patient was discharged from the hospital 3 days back  Broaden antibiotics from ceftriaxone to vancomycin and Zosyn  Continue azithromycin  Ordered panculture, urine strep, urine Legionella and MRSA screen     4.  Acute COPD exacerbation  1/23/2025: Patient has history of severe COPD with 100-pack-year smoking history and quit smoking 6 months back  Ordered DuoNeb every 4 hours along  with as needed breathing treatments  Increase the frequency of IV Solu-Medrol to 40 mg every 6 hours     5.  Hypotension secondary to propofol  Propofol was switched to Versed the ER  Patient is off of Levophed     6.  Hypertension with history of heart failure and coronary artery disease  Held antihypertensive and heart failure medications because of hypotension     7.  Counseling regarding advance care directives and goals of care  1/23/2025: I had extensive discussion with the patient daughter who is at bedside next of kin  Explained about overall condition of the patient  Explained difference between full code versus DNR  Daughter confirmed that the patient does not have any DNR on file and want to discuss with her sister  Patient will remain full code for now    I spent 45 minutes of cumulative critical care time with the patient.  Greater than 50% of that time was spent in the direct collaboration and or coordination of care of the patient.     Dragon dictation software was used to dictate this note and thus there may be minor errors in translation/transcription including garbled speech or misspellings. Please contact for clarification if needed.

## 2025-01-25 LAB
ANION GAP BLDV CALCULATED.4IONS-SCNC: 12 MMOL/L (ref 10–25)
ANION GAP BLDV CALCULATED.4IONS-SCNC: 13 MMOL/L (ref 10–25)
ANION GAP SERPL CALC-SCNC: 14 MMOL/L (ref 10–20)
BASE EXCESS BLDV CALC-SCNC: -3.2 MMOL/L (ref -2–3)
BASE EXCESS BLDV CALC-SCNC: 0.2 MMOL/L (ref -2–3)
BODY TEMPERATURE: 37 DEGREES CELSIUS
BODY TEMPERATURE: 37 DEGREES CELSIUS
BUN SERPL-MCNC: 85 MG/DL (ref 6–23)
CA-I BLDV-SCNC: 1.19 MMOL/L (ref 1.1–1.33)
CA-I BLDV-SCNC: 1.22 MMOL/L (ref 1.1–1.33)
CALCIUM SERPL-MCNC: 8 MG/DL (ref 8.6–10.3)
CHLORIDE BLDV-SCNC: 113 MMOL/L (ref 98–107)
CHLORIDE BLDV-SCNC: 113 MMOL/L (ref 98–107)
CHLORIDE SERPL-SCNC: 108 MMOL/L (ref 98–107)
CO2 SERPL-SCNC: 25 MMOL/L (ref 21–32)
CREAT SERPL-MCNC: 2.38 MG/DL (ref 0.5–1.3)
EGFRCR SERPLBLD CKD-EPI 2021: 27 ML/MIN/1.73M*2
ERYTHROCYTE [DISTWIDTH] IN BLOOD BY AUTOMATED COUNT: 21 % (ref 11.5–14.5)
GLUCOSE BLD MANUAL STRIP-MCNC: 161 MG/DL (ref 74–99)
GLUCOSE BLD MANUAL STRIP-MCNC: 265 MG/DL (ref 74–99)
GLUCOSE BLDV-MCNC: 176 MG/DL (ref 74–99)
GLUCOSE BLDV-MCNC: 292 MG/DL (ref 74–99)
GLUCOSE SERPL-MCNC: 214 MG/DL (ref 74–99)
HCO3 BLDV-SCNC: 22 MMOL/L (ref 22–26)
HCO3 BLDV-SCNC: 25.4 MMOL/L (ref 22–26)
HCT VFR BLD AUTO: 24.2 % (ref 41–52)
HCT VFR BLD EST: 24 % (ref 41–52)
HCT VFR BLD EST: 25 % (ref 41–52)
HGB BLD-MCNC: 7.6 G/DL (ref 13.5–17.5)
HGB BLDV-MCNC: 8 G/DL (ref 13.5–17.5)
HGB BLDV-MCNC: 8.2 G/DL (ref 13.5–17.5)
INHALED O2 CONCENTRATION: 40 %
INHALED O2 CONCENTRATION: 50 %
LACTATE BLDV-SCNC: 1.3 MMOL/L (ref 0.4–2)
LACTATE BLDV-SCNC: 1.8 MMOL/L (ref 0.4–2)
MCH RBC QN AUTO: 29.1 PG (ref 26–34)
MCHC RBC AUTO-ENTMCNC: 31.4 G/DL (ref 32–36)
MCV RBC AUTO: 93 FL (ref 80–100)
NRBC BLD-RTO: 0.3 /100 WBCS (ref 0–0)
OXYHGB MFR BLDV: 62.9 % (ref 45–75)
OXYHGB MFR BLDV: 75 % (ref 45–75)
PCO2 BLDV: 39 MM HG (ref 41–51)
PCO2 BLDV: 43 MM HG (ref 41–51)
PH BLDV: 7.36 PH (ref 7.33–7.43)
PH BLDV: 7.38 PH (ref 7.33–7.43)
PLATELET # BLD AUTO: 198 X10*3/UL (ref 150–450)
PO2 BLDV: 41 MM HG (ref 35–45)
PO2 BLDV: 48 MM HG (ref 35–45)
POTASSIUM BLDV-SCNC: 4 MMOL/L (ref 3.5–5.3)
POTASSIUM BLDV-SCNC: 4.3 MMOL/L (ref 3.5–5.3)
POTASSIUM SERPL-SCNC: 3.2 MMOL/L (ref 3.5–5.3)
RBC # BLD AUTO: 2.61 X10*6/UL (ref 4.5–5.9)
SAO2 % BLDV: 64 % (ref 45–75)
SAO2 % BLDV: 77 % (ref 45–75)
SODIUM BLDV-SCNC: 143 MMOL/L (ref 136–145)
SODIUM BLDV-SCNC: 147 MMOL/L (ref 136–145)
SODIUM SERPL-SCNC: 144 MMOL/L (ref 136–145)
WBC # BLD AUTO: 6.3 X10*3/UL (ref 4.4–11.3)

## 2025-01-25 PROCEDURE — 2500000002 HC RX 250 W HCPCS SELF ADMINISTERED DRUGS (ALT 637 FOR MEDICARE OP, ALT 636 FOR OP/ED): Performed by: NURSE PRACTITIONER

## 2025-01-25 PROCEDURE — 2500000004 HC RX 250 GENERAL PHARMACY W/ HCPCS (ALT 636 FOR OP/ED)

## 2025-01-25 PROCEDURE — 94003 VENT MGMT INPAT SUBQ DAY: CPT

## 2025-01-25 PROCEDURE — 2500000004 HC RX 250 GENERAL PHARMACY W/ HCPCS (ALT 636 FOR OP/ED): Performed by: NURSE PRACTITIONER

## 2025-01-25 PROCEDURE — 85027 COMPLETE CBC AUTOMATED: CPT | Performed by: INTERNAL MEDICINE

## 2025-01-25 PROCEDURE — 94640 AIRWAY INHALATION TREATMENT: CPT

## 2025-01-25 PROCEDURE — 80048 BASIC METABOLIC PNL TOTAL CA: CPT | Performed by: INTERNAL MEDICINE

## 2025-01-25 PROCEDURE — 2500000001 HC RX 250 WO HCPCS SELF ADMINISTERED DRUGS (ALT 637 FOR MEDICARE OP): Performed by: INTERNAL MEDICINE

## 2025-01-25 PROCEDURE — 2500000004 HC RX 250 GENERAL PHARMACY W/ HCPCS (ALT 636 FOR OP/ED): Performed by: INTERNAL MEDICINE

## 2025-01-25 PROCEDURE — 2500000001 HC RX 250 WO HCPCS SELF ADMINISTERED DRUGS (ALT 637 FOR MEDICARE OP)

## 2025-01-25 PROCEDURE — 36415 COLL VENOUS BLD VENIPUNCTURE: CPT | Performed by: INTERNAL MEDICINE

## 2025-01-25 PROCEDURE — 99233 SBSQ HOSP IP/OBS HIGH 50: CPT | Performed by: INTERNAL MEDICINE

## 2025-01-25 PROCEDURE — 82947 ASSAY GLUCOSE BLOOD QUANT: CPT

## 2025-01-25 PROCEDURE — 2500000002 HC RX 250 W HCPCS SELF ADMINISTERED DRUGS (ALT 637 FOR MEDICARE OP, ALT 636 FOR OP/ED): Performed by: INTERNAL MEDICINE

## 2025-01-25 PROCEDURE — 83605 ASSAY OF LACTIC ACID: CPT | Performed by: INTERNAL MEDICINE

## 2025-01-25 PROCEDURE — 2020000001 HC ICU ROOM DAILY

## 2025-01-25 PROCEDURE — 82330 ASSAY OF CALCIUM: CPT | Performed by: INTERNAL MEDICINE

## 2025-01-25 PROCEDURE — 2500000001 HC RX 250 WO HCPCS SELF ADMINISTERED DRUGS (ALT 637 FOR MEDICARE OP): Performed by: NURSE PRACTITIONER

## 2025-01-25 PROCEDURE — 99291 CRITICAL CARE FIRST HOUR: CPT | Performed by: INTERNAL MEDICINE

## 2025-01-25 PROCEDURE — 2500000005 HC RX 250 GENERAL PHARMACY W/O HCPCS

## 2025-01-25 PROCEDURE — 2500000005 HC RX 250 GENERAL PHARMACY W/O HCPCS: Performed by: INTERNAL MEDICINE

## 2025-01-25 RX ORDER — HYDRALAZINE HYDROCHLORIDE 20 MG/ML
10 INJECTION INTRAMUSCULAR; INTRAVENOUS EVERY 6 HOURS PRN
Status: DISCONTINUED | OUTPATIENT
Start: 2025-01-25 | End: 2025-01-28 | Stop reason: HOSPADM

## 2025-01-25 RX ORDER — FUROSEMIDE 10 MG/ML
60 INJECTION INTRAMUSCULAR; INTRAVENOUS ONCE
Status: COMPLETED | OUTPATIENT
Start: 2025-01-25 | End: 2025-01-25

## 2025-01-25 RX ORDER — POTASSIUM CHLORIDE 14.9 MG/ML
20 INJECTION INTRAVENOUS ONCE
Status: COMPLETED | OUTPATIENT
Start: 2025-01-25 | End: 2025-01-25

## 2025-01-25 RX ORDER — POTASSIUM CHLORIDE 1.5 G/1.58G
40 POWDER, FOR SOLUTION ORAL ONCE
Status: COMPLETED | OUTPATIENT
Start: 2025-01-25 | End: 2025-01-25

## 2025-01-25 RX ORDER — VANCOMYCIN HYDROCHLORIDE 1 G/20ML
INJECTION, POWDER, LYOPHILIZED, FOR SOLUTION INTRAVENOUS DAILY PRN
Status: DISCONTINUED | OUTPATIENT
Start: 2025-01-25 | End: 2025-01-26

## 2025-01-25 RX ORDER — LABETALOL HYDROCHLORIDE 5 MG/ML
20 INJECTION, SOLUTION INTRAVENOUS EVERY 6 HOURS PRN
Status: DISCONTINUED | OUTPATIENT
Start: 2025-01-25 | End: 2025-01-28 | Stop reason: HOSPADM

## 2025-01-25 RX ORDER — FLUTICASONE FUROATE AND VILANTEROL 200; 25 UG/1; UG/1
1 POWDER RESPIRATORY (INHALATION)
Status: DISCONTINUED | OUTPATIENT
Start: 2025-01-25 | End: 2025-01-28 | Stop reason: HOSPADM

## 2025-01-25 RX ORDER — PROPOFOL 10 MG/ML
0-20 INJECTION, EMULSION INTRAVENOUS CONTINUOUS
Status: DISCONTINUED | OUTPATIENT
Start: 2025-01-25 | End: 2025-01-25

## 2025-01-25 RX ADMIN — FUROSEMIDE 60 MG: 10 INJECTION, SOLUTION INTRAMUSCULAR; INTRAVENOUS at 15:08

## 2025-01-25 RX ADMIN — IPRATROPIUM BROMIDE AND ALBUTEROL SULFATE 3 ML: 2.5; .5 SOLUTION RESPIRATORY (INHALATION) at 11:04

## 2025-01-25 RX ADMIN — PIPERACILLIN SODIUM AND TAZOBACTAM SODIUM 3.38 G: 3; .375 INJECTION, SOLUTION INTRAVENOUS at 22:38

## 2025-01-25 RX ADMIN — Medication 50 PERCENT: at 08:05

## 2025-01-25 RX ADMIN — IPRATROPIUM BROMIDE AND ALBUTEROL SULFATE 3 ML: 2.5; .5 SOLUTION RESPIRATORY (INHALATION) at 19:51

## 2025-01-25 RX ADMIN — Medication 60 L/MIN: at 19:51

## 2025-01-25 RX ADMIN — HYDRALAZINE HYDROCHLORIDE 10 MG: 10 TABLET ORAL at 19:48

## 2025-01-25 RX ADMIN — HYDRALAZINE HYDROCHLORIDE 10 MG: 10 TABLET ORAL at 08:57

## 2025-01-25 RX ADMIN — RUXOLITINIB 20 MG: 20 TABLET ORAL at 20:33

## 2025-01-25 RX ADMIN — METHYLPREDNISOLONE SODIUM SUCCINATE 40 MG: 40 INJECTION, POWDER, FOR SOLUTION INTRAMUSCULAR; INTRAVENOUS at 17:08

## 2025-01-25 RX ADMIN — Medication 60 L/MIN: at 19:48

## 2025-01-25 RX ADMIN — MONTELUKAST 10 MG: 10 TABLET, FILM COATED ORAL at 08:56

## 2025-01-25 RX ADMIN — ISOSORBIDE MONONITRATE 30 MG: 30 TABLET, EXTENDED RELEASE ORAL at 08:56

## 2025-01-25 RX ADMIN — METHYLPREDNISOLONE SODIUM SUCCINATE 40 MG: 40 INJECTION, POWDER, FOR SOLUTION INTRAMUSCULAR; INTRAVENOUS at 09:08

## 2025-01-25 RX ADMIN — PIPERACILLIN SODIUM AND TAZOBACTAM SODIUM 3.38 G: 3; .375 INJECTION, SOLUTION INTRAVENOUS at 10:06

## 2025-01-25 RX ADMIN — PIPERACILLIN SODIUM AND TAZOBACTAM SODIUM 3.38 G: 3; .375 INJECTION, SOLUTION INTRAVENOUS at 04:28

## 2025-01-25 RX ADMIN — RUXOLITINIB 20 MG: 20 TABLET ORAL at 09:08

## 2025-01-25 RX ADMIN — IPRATROPIUM BROMIDE AND ALBUTEROL SULFATE 3 ML: 2.5; .5 SOLUTION RESPIRATORY (INHALATION) at 07:52

## 2025-01-25 RX ADMIN — DOXYCYCLINE 100 MG: 100 INJECTION, POWDER, LYOPHILIZED, FOR SOLUTION INTRAVENOUS at 12:53

## 2025-01-25 RX ADMIN — POTASSIUM CHLORIDE 40 MEQ: 1.5 POWDER, FOR SOLUTION ORAL at 05:59

## 2025-01-25 RX ADMIN — DOXYCYCLINE 100 MG: 100 INJECTION, POWDER, LYOPHILIZED, FOR SOLUTION INTRAVENOUS at 23:48

## 2025-01-25 RX ADMIN — ATORVASTATIN CALCIUM 80 MG: 40 TABLET, FILM COATED ORAL at 19:48

## 2025-01-25 RX ADMIN — POTASSIUM CHLORIDE 20 MEQ: 14.9 INJECTION, SOLUTION INTRAVENOUS at 15:08

## 2025-01-25 RX ADMIN — PIPERACILLIN SODIUM AND TAZOBACTAM SODIUM 3.38 G: 3; .375 INJECTION, SOLUTION INTRAVENOUS at 17:08

## 2025-01-25 RX ADMIN — CLOPIDOGREL 75 MG: 75 TABLET ORAL at 08:57

## 2025-01-25 RX ADMIN — Medication 60 L/MIN: at 19:50

## 2025-01-25 RX ADMIN — METHYLPREDNISOLONE SODIUM SUCCINATE 40 MG: 40 INJECTION, POWDER, FOR SOLUTION INTRAMUSCULAR; INTRAVENOUS at 22:38

## 2025-01-25 RX ADMIN — PROPOFOL 5 MCG/KG/MIN: 10 INJECTION, EMULSION INTRAVENOUS at 02:15

## 2025-01-25 RX ADMIN — Medication 45 PERCENT: at 03:46

## 2025-01-25 RX ADMIN — PANTOPRAZOLE SODIUM 40 MG: 40 INJECTION, POWDER, FOR SOLUTION INTRAVENOUS at 06:06

## 2025-01-25 RX ADMIN — METHYLPREDNISOLONE SODIUM SUCCINATE 40 MG: 40 INJECTION, POWDER, FOR SOLUTION INTRAMUSCULAR; INTRAVENOUS at 04:28

## 2025-01-25 RX ADMIN — VANCOMYCIN HYDROCHLORIDE 1750 MG: 10 INJECTION, POWDER, LYOPHILIZED, FOR SOLUTION INTRAVENOUS at 18:45

## 2025-01-25 RX ADMIN — CARVEDILOL 3.12 MG: 6.25 TABLET, FILM COATED ORAL at 08:56

## 2025-01-25 RX ADMIN — ASPIRIN 81 MG: 81 TABLET, COATED ORAL at 08:56

## 2025-01-25 RX ADMIN — TAMSULOSIN HYDROCHLORIDE 0.4 MG: 0.4 CAPSULE ORAL at 08:56

## 2025-01-25 RX ADMIN — IPRATROPIUM BROMIDE AND ALBUTEROL SULFATE 3 ML: 2.5; .5 SOLUTION RESPIRATORY (INHALATION) at 15:30

## 2025-01-25 RX ADMIN — CARVEDILOL 3.12 MG: 6.25 TABLET, FILM COATED ORAL at 19:48

## 2025-01-25 ASSESSMENT — PAIN - FUNCTIONAL ASSESSMENT
PAIN_FUNCTIONAL_ASSESSMENT: CPOT (CRITICAL CARE PAIN OBSERVATION TOOL)
PAIN_FUNCTIONAL_ASSESSMENT: 0-10
PAIN_FUNCTIONAL_ASSESSMENT: CPOT (CRITICAL CARE PAIN OBSERVATION TOOL)

## 2025-01-25 ASSESSMENT — PAIN SCALES - GENERAL
PAINLEVEL_OUTOF10: 0 - NO PAIN

## 2025-01-25 NOTE — ASSESSMENT & PLAN NOTE
Recent hospitalization.  Treating as HCAP.  Covering with broad-spectrum antibiotic therapy.  1/25: Growing pneumococcus, pneumococcal pneumonia? Consider de-escalating antibiotics once more data is available.

## 2025-01-25 NOTE — ASSESSMENT & PLAN NOTE
CAD hx PCI/Stent elevated troponin due to hypoxia  Stress test earlier this month negative for ischemia  Continue statin asa/plavix betablocker if tolerated

## 2025-01-25 NOTE — PROGRESS NOTES
Physical Therapy                 Therapy Communication Note    Patient Name: Aristeo Barriga  MRN: [id number]  Department: [facility]  Room: 05/05-A  Today's Date: 1/25/2025     Discipline: Physical Therapy          Missed Visit Reason:   (Chart reviewed. RN reports patient is currently completing a breathing trial with some responsiveness. PT to reattempt pending medical progress as time permits.)    Missed Time: Attempt

## 2025-01-25 NOTE — CARE PLAN
Problem: Safety - Medical Restraint  Goal: Remains free of injury from restraints (Restraint for Interference with Medical Device)  1/25/2025 0044 by Rosa Hinds RN  Outcome: Progressing    Goal: Free from restraint(s) (Restraint for Interference with Medical Device)  1/25/2025 0044 by Rosa Hinds RN  Outcome: Progressing      The clinical goals for the shift include pt will become more alert throughout shift

## 2025-01-25 NOTE — CARE PLAN
Problem: Safety - Medical Restraint  Goal: Remains free of injury from restraints (Restraint for Interference with Medical Device)  Outcome: Progressing  Goal: Free from restraint(s) (Restraint for Interference with Medical Device)  Outcome: Progressing     Problem: Knowledge Deficit  Goal: Patient/family/caregiver demonstrates understanding of disease process, treatment plan, medications, and discharge instructions  Outcome: Progressing     Problem: Mechanical Ventilation  Goal: Patient Will Maintain Patent Airway  Outcome: Progressing  Goal: Oral health is maintained or improved  Outcome: Progressing  Goal: Tracheostomy will be managed safely  Outcome: Progressing  Goal: ET tube will be managed safely  Outcome: Progressing  Goal: Ability to express needs and understand communication  Outcome: Progressing  Goal: Mobility/activity is maintained at optimum level for patient  Outcome: Progressing     Problem: Heart Failure  Goal: Improved gas exchange this shift  Outcome: Progressing  Goal: Improved urinary output this shift  Outcome: Progressing  Goal: Reduction in peripheral edema within 24 hours  Outcome: Progressing  Goal: Report improvement of dyspnea/breathlessness this shift  Outcome: Progressing  Goal: Weight from fluid excess reduced over 2-3 days, then stabilize  Outcome: Progressing  Goal: Increase self care and/or family involvement in 24 hours  Outcome: Progressing     Problem: Pain - Adult  Goal: Verbalizes/displays adequate comfort level or baseline comfort level  Outcome: Progressing     Problem: Safety - Adult  Goal: Free from fall injury  Outcome: Progressing     Problem: Discharge Planning  Goal: Discharge to home or other facility with appropriate resources  Outcome: Progressing     Problem: Chronic Conditions and Co-morbidities  Goal: Patient's chronic conditions and co-morbidity symptoms are monitored and maintained or improved  Outcome: Progressing     Problem: Nutrition  Goal: Nutrient intake  appropriate for maintaining nutritional needs  Outcome: Progressing     Problem: Skin  Goal: Prevent/manage excess moisture  Outcome: Progressing  Goal: Prevent/minimize sheer/friction injuries  Outcome: Progressing  Flowsheets (Taken 1/25/2025 0043)  Prevent/minimize sheer/friction injuries: Turn/reposition every 2 hours/use positioning/transfer devices  Goal: Promote/optimize nutrition  Outcome: Progressing   The patient's goals for the shift include      The clinical goals for the shift include pt will become more alert throughout shift

## 2025-01-25 NOTE — PROGRESS NOTES
Occupational Therapy                 Therapy Communication Note    Patient Name: Aristeo Barriga  MRN: [id number]  Department: [facility]  Room: 05/05-A  Today's Date: 1/25/2025     Discipline: Occupational Therapy    OT Missed Visit: Yes     Missed Visit Reason: Missed Visit Reason: Other (Comment)    Missed Time: Attempted OT eval. Pt has been successfully extubated from the vent. RN cleared patient for therapy if pt able to maintain alertness. Pt has been lethargic. Pt groggy, minimally verbal and responsive. Woke up briefly to voice and tactile cues, quickly falls asleep. Not following commands. Defer therapy until pt more alert.        Comment: of note, pt's RLE prosthesis in room. Pt has R BKA.

## 2025-01-25 NOTE — PROGRESS NOTES
Aristeo Barriga is a 79 y.o. male on day 2 of admission presenting with Acute on chronic respiratory failure with hypoxia (Multi).    Review of Systems   Reason unable to perform ROS: Intubated.      Subjective   Aristeo Barriga is a 79 y.o. male with PMHx s/f COPD with baseline oxygen requirement of 4 L chronic systolic diastolic heart failure last EF in January 2020 for 50% history of myeloproliferative disorder coronary artery disease with recent stress test showing no reversible ischemia benign prostatic hypertrophy abdominal aortic aneurysm with repair.  Presenting with shortness of breath.  Patient had recently been hospitalized and discharged about 3 days ago.  At that time the patient was being treated for COPD exacerbation and CHF exacerbation.  The patient did have some CT findings suggestive of pneumonia but was improving on Zithromax therapy alone.  The patient subsequently been discharged to home about 3 days ago on oral Zithromax.  Evidently the patient progressively became more short of breath had arrived to the emergency department via squad on CPAP subsequently changed over to BiPAP patient was not saturating very well and subsequently intubated by the ED provider. Pt felt to have worsening pneumonia and possibley some CHF. Pt given IV rocephin and zithromax, no lasix given due to relatively low blood pressure  The patient was discussed with the ED provider and will be admitted to the ICU for acute on chronic respiratory failure LOS will exceed 2 midnights.      ED Course (Summary):   Vitals on presentation: Temperature is 97.5 heart rate 118 respiratory rate 30 blood pressure 130/62 SpO2 71% on BiPAP  Labs: Significant for glucose 192 creatinine 2.03 close to baseline, AST 56 initial lactate 2.5  troponin 39 CBC significant for leukopenia with white blood cell count 3.1 hemoglobin 11.7 hematocrit 36.4 platelets 198  Imaging: Chest x-ray showing left lower lobe consolidation and  effusion  Interventions: Patient intubated started on Zithromax and Rocephin    1/24: Patient seen.  Remains intubated.  Discussed with nursing, sedation is off but patient appears to be tolerating intubation.  Continue broad-spectrum antibiotic therapy, treating for gram-negative pneumonia given recent hospitalization.  Patient is positive for influenza A and is on Tamiflu.  Appreciate input from critical care.  Will continue treatment.  Requires ICU for mechanical ventilation.  Reassess in AM    1/25: Pt seen. Successfully extubated. Now positive for pneumococcal pneumonia, influenza A, and remains on empiric treatment for gram negative pneumonia. Stirs to exam, but seems to be tired. Will continue to monitor. Off pressors, remains somewhat hypotensive. Remains in ICU.       Objective     Last Recorded Vitals  BP (!) 166/108   Pulse 80   Temp 36.3 °C (97.3 °F) (Temporal)   Resp (!) 30   Wt 68.7 kg (151 lb 7.3 oz)   SpO2 99%   Intake/Output last 3 Shifts:    Intake/Output Summary (Last 24 hours) at 1/25/2025 1802  Last data filed at 1/25/2025 1600  Gross per 24 hour   Intake 714.59 ml   Output 2480 ml   Net -1765.41 ml       Admission Weight  Weight: 65.3 kg (144 lb) (01/23/25 1242)    Daily Weight  01/25/25 : 68.7 kg (151 lb 7.3 oz)      Physical Exam  Constitutional:       Interventions: He is sedated and intubated.   HENT:      Head: Normocephalic and atraumatic.      Nose: Nose normal. No congestion or rhinorrhea.      Mouth/Throat:      Mouth: Mucous membranes are dry.      Pharynx: Oropharynx is clear.   Eyes:      General: No scleral icterus.     Extraocular Movements: Extraocular movements intact.      Pupils: Pupils are equal, round, and reactive to light.   Cardiovascular:      Rate and Rhythm: Regular rhythm. Tachycardia present.      Heart sounds: Normal heart sounds. No murmur heard.     No friction rub. No gallop.   Pulmonary:      Effort: Pulmonary effort is normal. He is intubated.      Breath  sounds: Decreased breath sounds present. No wheezing, rhonchi or rales.   Chest:      Chest wall: No tenderness.   Abdominal:      General: There is no distension.      Palpations: Abdomen is soft.      Tenderness: There is no abdominal tenderness. There is no guarding or rebound.   Musculoskeletal:         General: No swelling, tenderness or signs of injury. Normal range of motion.      Cervical back: Normal range of motion.      Comments: Bilateral BKA   Skin:     General: Skin is warm and dry.      Coloration: Skin is not jaundiced.      Findings: No bruising, erythema or rash.   Neurological:      Comments: Sedation is off, unresponsive          Lab Results  Results for orders placed or performed during the hospital encounter of 01/23/25 (from the past 24 hours)   POCT GLUCOSE   Result Value Ref Range    POCT Glucose 180 (H) 74 - 99 mg/dL   Blood Gas Venous Full Panel   Result Value Ref Range    POCT pH, Venous 7.36 7.33 - 7.43 pH    POCT pCO2, Venous 42 41 - 51 mm Hg    POCT pO2, Venous 44 35 - 45 mm Hg    POCT SO2, Venous 72 45 - 75 %    POCT Oxy Hemoglobin, Venous 71.0 45.0 - 75.0 %    POCT Hematocrit Calculated, Venous 23.0 (L) 41.0 - 52.0 %    POCT Sodium, Venous 142 136 - 145 mmol/L    POCT Potassium, Venous 3.4 (L) 3.5 - 5.3 mmol/L    POCT Chloride, Venous 109 (H) 98 - 107 mmol/L    POCT Ionized Calicum, Venous 1.17 1.10 - 1.33 mmol/L    POCT Glucose, Venous 227 (H) 74 - 99 mg/dL    POCT Lactate, Venous 1.3 0.4 - 2.0 mmol/L    POCT Base Excess, Venous -1.7 -2.0 - 3.0 mmol/L    POCT HCO3 Calculated, Venous 23.7 22.0 - 26.0 mmol/L    POCT Hemoglobin, Venous 7.8 (L) 13.5 - 17.5 g/dL    POCT Anion Gap, Venous 13.0 10.0 - 25.0 mmol/L    Patient Temperature 37.0 degrees Celsius    FiO2 45 %   CBC   Result Value Ref Range    WBC 6.3 4.4 - 11.3 x10*3/uL    nRBC 0.3 (H) 0.0 - 0.0 /100 WBCs    RBC 2.61 (L) 4.50 - 5.90 x10*6/uL    Hemoglobin 7.6 (L) 13.5 - 17.5 g/dL    Hematocrit 24.2 (L) 41.0 - 52.0 %    MCV 93 80  - 100 fL    MCH 29.1 26.0 - 34.0 pg    MCHC 31.4 (L) 32.0 - 36.0 g/dL    RDW 21.0 (H) 11.5 - 14.5 %    Platelets 198 150 - 450 x10*3/uL   Basic Metabolic Panel   Result Value Ref Range    Glucose 214 (H) 74 - 99 mg/dL    Sodium 144 136 - 145 mmol/L    Potassium 3.2 (L) 3.5 - 5.3 mmol/L    Chloride 108 (H) 98 - 107 mmol/L    Bicarbonate 25 21 - 32 mmol/L    Anion Gap 14 10 - 20 mmol/L    Urea Nitrogen 85 (H) 6 - 23 mg/dL    Creatinine 2.38 (H) 0.50 - 1.30 mg/dL    eGFR 27 (L) >60 mL/min/1.73m*2    Calcium 8.0 (L) 8.6 - 10.3 mg/dL   Blood Gas Venous Full Panel   Result Value Ref Range    POCT pH, Venous 7.36 7.33 - 7.43 pH    POCT pCO2, Venous 39 (L) 41 - 51 mm Hg    POCT pO2, Venous 41 35 - 45 mm Hg    POCT SO2, Venous 64 45 - 75 %    POCT Oxy Hemoglobin, Venous 62.9 45.0 - 75.0 %    POCT Hematocrit Calculated, Venous 24.0 (L) 41.0 - 52.0 %    POCT Sodium, Venous 143 136 - 145 mmol/L    POCT Potassium, Venous 4.0 3.5 - 5.3 mmol/L    POCT Chloride, Venous 113 (H) 98 - 107 mmol/L    POCT Ionized Calicum, Venous 1.19 1.10 - 1.33 mmol/L    POCT Glucose, Venous 292 (H) 74 - 99 mg/dL    POCT Lactate, Venous 1.8 0.4 - 2.0 mmol/L    POCT Base Excess, Venous -3.2 (L) -2.0 - 3.0 mmol/L    POCT HCO3 Calculated, Venous 22.0 22.0 - 26.0 mmol/L    POCT Hemoglobin, Venous 8.0 (L) 13.5 - 17.5 g/dL    POCT Anion Gap, Venous 12.0 10.0 - 25.0 mmol/L    Patient Temperature 37.0 degrees Celsius    FiO2 50 %   POCT GLUCOSE   Result Value Ref Range    POCT Glucose 265 (H) 74 - 99 mg/dL   Blood Gas Venous Full Panel   Result Value Ref Range    POCT pH, Venous 7.38 7.33 - 7.43 pH    POCT pCO2, Venous 43 41 - 51 mm Hg    POCT pO2, Venous 48 (H) 35 - 45 mm Hg    POCT SO2, Venous 77 (H) 45 - 75 %    POCT Oxy Hemoglobin, Venous 75.0 45.0 - 75.0 %    POCT Hematocrit Calculated, Venous 25.0 (L) 41.0 - 52.0 %    POCT Sodium, Venous 147 (H) 136 - 145 mmol/L    POCT Potassium, Venous 4.3 3.5 - 5.3 mmol/L    POCT Chloride, Venous 113 (H) 98 - 107  mmol/L    POCT Ionized Calicum, Venous 1.22 1.10 - 1.33 mmol/L    POCT Glucose, Venous 176 (H) 74 - 99 mg/dL    POCT Lactate, Venous 1.3 0.4 - 2.0 mmol/L    POCT Base Excess, Venous 0.2 -2.0 - 3.0 mmol/L    POCT HCO3 Calculated, Venous 25.4 22.0 - 26.0 mmol/L    POCT Hemoglobin, Venous 8.2 (L) 13.5 - 17.5 g/dL    POCT Anion Gap, Venous 13.0 10.0 - 25.0 mmol/L    Patient Temperature 37.0 degrees Celsius    FiO2 40 %        Image Results  XR chest 1 view  Narrative: Interpreted By:  Daisy Beard,   STUDY:  XR CHEST 1 VIEW;  1/24/2025 9:33 am      INDICATION:  Signs/Symptoms:hemoptysis, evaluate lung fields, lines/tubes.          COMPARISON:  01/23/2025 chest x-ray and chest CT      ACCESSION NUMBER(S):  [id number]      ORDERING CLINICIAN:  KORI MARSHALL      TECHNIQUE:  Portable AP upright      FINDINGS:  Endotracheal tube tip projects about 3 cm above the jayy.  Nasogastric tube tip projects below the diaphragm and appears to  terminate in the cardia region of the stomach. The side-hole of the  NG tube is in the distal esophagus. Cardiac silhouette is partially  obscured by pulmonary infiltrate. The consolidation through the mid  and lower left lung is similar to the prior study and prior CT  demonstrates that this is throughout the left lower lobe and involves  the posterior portion of the left upper lobe and most of the lingula.  There is a small infiltrate in the medial right lung base which  correlates with right lower lobe findings of atelectasis on the prior  CT. Right sided findings are not significantly changed. There is  probably a small amount of left pleural fluid in addition to the  left-sided pneumonia. Mediastinal structures shift to the left  slightly even allowing for the patient rotation, which also  correlates with findings on prior CT with overall lesser volume in  the left lung than right. Osseous structures show no change.      Impression: NG tube tip is barely within the stomach in the  side-hole is in the  distal esophagus      Left-sided pneumonia and right basilar atelectasis are similar to the  prior study. There is mild diminished volume in the left lung  compared to the right which is unchanged from the prior study      Probable small left pleural effusion in addition to the pneumonia      MACRO:  None.      Signed by: Daisy Beard 1/24/2025 10:36 AM  Dictation workstation:   [id number]  ECG 12 lead  Sinus rhythm  Paired ventricular premature complexes  Aberrant conduction of SV complex(es)  Probable left atrial enlargement  Nonspecific intraventricular conduction delay  Probable inferior infarct, age indeterminate  Baseline wander in lead(s) I,III,aVR,aVL,aVF,V3,V6       Assessment/Plan     * Acute on chronic respiratory failure with hypoxia (Multi)  Assessment & Plan  Acute on chronic hypoxic respiratory failure likely due to worsening pneumonia on chronic chf, copd  Pt is septic with respiratory failure, elevated lactate level started on pressor support  CTA of chest pending to rule out PE  Pt on vent and will be admitted to the ICU  Pt failed treatment on Zithromax/steroids  IV Vanc/Zosyn for now given recent hospitalization and failure on zithromax  IV solumedrol, otc duoneb  Resume home treatments  1/24: Remains intubated on minimal sedation.  1/25: Successfully extubated today. Multifactorial failure with Influenza A and pneumonia (possibly pneumococcal).    Influenza A  Assessment & Plan  Started on Tamiflu.  Droplet precautions.    Chronic obstructive pulmonary disease with acute exacerbation (Multi)  Assessment & Plan  Continue steroids.  Quit smoking 6 months ago.  Continue bronchodilators.    Myeloproliferative neoplasm (Multi)  Assessment & Plan  Myeloproliferative disorder  Continue Ruxolitinib     Chronic renal disease, stage III (Multi)  Assessment & Plan  CKD 3  Pt creatinine at recent level not acutely worsened    Acute on chronic combined systolic and diastolic  congestive heart failure  Assessment & Plan  ACUTE ON Chronic systolic and diastolic CHF  Recent echocardiogram [id number]  Reconcile pt home meds, diuresis as tolerated    Coronary artery disease involving native coronary artery of native heart without angina pectoris  Assessment & Plan  CAD hx PCI/Stent elevated troponin due to hypoxia  Stress test earlier this month negative for ischemia  Continue statin asa/plavix betablocker if tolerated    Gram-negative pneumonia (Multi)  Assessment & Plan  Recent hospitalization.  Treating as HCAP.  Covering with broad-spectrum antibiotic therapy.  1/25: Growing pneumococcus, pneumococcal pneumonia? Consider de-escalating antibiotics once more data is available.                      Carson Galindo MD

## 2025-01-25 NOTE — PROGRESS NOTES
Aristeo Barriga is a 79 y.o. male on day 1 of admission presenting with Acute on chronic respiratory failure with hypoxia (Multi).    Review of Systems   Reason unable to perform ROS: Intubated.      Subjective   Aristeo Barriga is a 79 y.o. male with PMHx s/f COPD with baseline oxygen requirement of 4 L chronic systolic diastolic heart failure last EF in January 2020 for 50% history of myeloproliferative disorder coronary artery disease with recent stress test showing no reversible ischemia benign prostatic hypertrophy abdominal aortic aneurysm with repair.  Presenting with shortness of breath.  Patient had recently been hospitalized and discharged about 3 days ago.  At that time the patient was being treated for COPD exacerbation and CHF exacerbation.  The patient did have some CT findings suggestive of pneumonia but was improving on Zithromax therapy alone.  The patient subsequently been discharged to home about 3 days ago on oral Zithromax.  Evidently the patient progressively became more short of breath had arrived to the emergency department via squad on CPAP subsequently changed over to BiPAP patient was not saturating very well and subsequently intubated by the ED provider. Pt felt to have worsening pneumonia and possibley some CHF. Pt given IV rocephin and zithromax, no lasix given due to relatively low blood pressure  The patient was discussed with the ED provider and will be admitted to the ICU for acute on chronic respiratory failure LOS will exceed 2 midnights.      ED Course (Summary):   Vitals on presentation: Temperature is 97.5 heart rate 118 respiratory rate 30 blood pressure 130/62 SpO2 71% on BiPAP  Labs: Significant for glucose 192 creatinine 2.03 close to baseline, AST 56 initial lactate 2.5  troponin 39 CBC significant for leukopenia with white blood cell count 3.1 hemoglobin 11.7 hematocrit 36.4 platelets 198  Imaging: Chest x-ray showing left lower lobe consolidation and  effusion  Interventions: Patient intubated started on Zithromax and Rocephin    1/24: Patient seen.  Remains intubated.  Discussed with nursing, sedation is off but patient appears to be tolerating intubation.  Continue broad-spectrum antibiotic therapy, treating for gram-negative pneumonia given recent hospitalization.  Patient is positive for influenza A and is on Tamiflu.  Appreciate input from critical care.  Will continue treatment.  Requires ICU for mechanical ventilation.  Reassess in AM       Objective     Last Recorded Vitals  /65   Pulse 71   Temp 36.5 °C (97.7 °F)   Resp 12   Wt 65.3 kg (144 lb)   SpO2 94%   Intake/Output last 3 Shifts:    Intake/Output Summary (Last 24 hours) at 1/24/2025 1920  Last data filed at 1/24/2025 1200  Gross per 24 hour   Intake 466.89 ml   Output 790 ml   Net -323.11 ml       Admission Weight  Weight: 65.3 kg (144 lb) (01/23/25 1242)    Daily Weight  01/23/25 : 65.3 kg (144 lb)      Physical Exam  Constitutional:       Interventions: He is sedated and intubated.   HENT:      Head: Normocephalic and atraumatic.      Nose: Nose normal. No congestion or rhinorrhea.      Mouth/Throat:      Mouth: Mucous membranes are dry.      Pharynx: Oropharynx is clear.   Eyes:      General: No scleral icterus.     Extraocular Movements: Extraocular movements intact.      Pupils: Pupils are equal, round, and reactive to light.   Cardiovascular:      Rate and Rhythm: Regular rhythm. Tachycardia present.      Heart sounds: Normal heart sounds. No murmur heard.     No friction rub. No gallop.   Pulmonary:      Effort: Pulmonary effort is normal. He is intubated.      Breath sounds: Decreased breath sounds present. No wheezing, rhonchi or rales.   Chest:      Chest wall: No tenderness.   Abdominal:      General: There is no distension.      Palpations: Abdomen is soft.      Tenderness: There is no abdominal tenderness. There is no guarding or rebound.   Musculoskeletal:         General:  No swelling, tenderness or signs of injury. Normal range of motion.      Cervical back: Normal range of motion.      Comments: Bilateral BKA   Skin:     General: Skin is warm and dry.      Coloration: Skin is not jaundiced.      Findings: No bruising, erythema or rash.   Neurological:      Comments: Sedation is off, unresponsive          Lab Results  Results for orders placed or performed during the hospital encounter of 01/23/25 (from the past 24 hours)   Blood Gas Venous Full Panel   Result Value Ref Range    POCT pH, Venous 7.26 (L) 7.33 - 7.43 pH    POCT pCO2, Venous 42 41 - 51 mm Hg    POCT pO2, Venous 32 (L) 35 - 45 mm Hg    POCT SO2, Venous 44 (L) 45 - 75 %    POCT Oxy Hemoglobin, Venous 43.1 (L) 45.0 - 75.0 %    POCT Hematocrit Calculated, Venous 20.0 (L) 41.0 - 52.0 %    POCT Sodium, Venous 139 136 - 145 mmol/L    POCT Potassium, Venous 3.2 (L) 3.5 - 5.3 mmol/L    POCT Chloride, Venous 111 (H) 98 - 107 mmol/L    POCT Ionized Calicum, Venous 1.01 (L) 1.10 - 1.33 mmol/L    POCT Glucose, Venous 149 (H) 74 - 99 mg/dL    POCT Lactate, Venous 1.3 0.4 - 2.0 mmol/L    POCT Base Excess, Venous -7.6 (L) -2.0 - 3.0 mmol/L    POCT HCO3 Calculated, Venous 18.8 (L) 22.0 - 26.0 mmol/L    POCT Hemoglobin, Venous 6.8 (L) 13.5 - 17.5 g/dL    POCT Anion Gap, Venous 12.0 10.0 - 25.0 mmol/L    Patient Temperature 37.0 degrees Celsius    FiO2 70 %   POCT GLUCOSE   Result Value Ref Range    POCT Glucose 170 (H) 74 - 99 mg/dL   Basic Metabolic Panel   Result Value Ref Range    Glucose 142 (H) 74 - 99 mg/dL    Sodium 141 136 - 145 mmol/L    Potassium 3.4 (L) 3.5 - 5.3 mmol/L    Chloride 112 (H) 98 - 107 mmol/L    Bicarbonate 21 21 - 32 mmol/L    Anion Gap 11 10 - 20 mmol/L    Urea Nitrogen 65 (H) 6 - 23 mg/dL    Creatinine 2.01 (H) 0.50 - 1.30 mg/dL    eGFR 33 (L) >60 mL/min/1.73m*2    Calcium 6.7 (L) 8.6 - 10.3 mg/dL   CBC and Auto Differential   Result Value Ref Range    WBC 4.6 4.4 - 11.3 x10*3/uL    nRBC 0.9 (H) 0.0 - 0.0 /100  WBCs    RBC 2.63 (L) 4.50 - 5.90 x10*6/uL    Hemoglobin 7.7 (L) 13.5 - 17.5 g/dL    Hematocrit 24.7 (L) 41.0 - 52.0 %    MCV 94 80 - 100 fL    MCH 29.3 26.0 - 34.0 pg    MCHC 31.2 (L) 32.0 - 36.0 g/dL    RDW 20.8 (H) 11.5 - 14.5 %    Platelets 190 150 - 450 x10*3/uL    Immature Granulocytes %, Automated 0.7 0.0 - 0.9 %    Immature Granulocytes Absolute, Automated 0.03 0.00 - 0.50 x10*3/uL   Magnesium   Result Value Ref Range    Magnesium 2.91 (H) 1.60 - 2.40 mg/dL   Manual Differential   Result Value Ref Range    Neutrophils %, Manual 55.0 40.0 - 80.0 %    Bands %, Manual 25.0 0.0 - 5.0 %    Lymphocytes %, Manual 10.0 13.0 - 44.0 %    Monocytes %, Manual 4.0 2.0 - 10.0 %    Eosinophils %, Manual 1.0 0.0 - 6.0 %    Basophils %, Manual 0.0 0.0 - 2.0 %    Atypical Lymphocytes %, Manual 5.0 0.0 - 2.0 %    Seg Neutrophils Absolute, Manual 2.53 1.60 - 5.00 x10*3/uL    Bands Absolute, Manual 1.15 (H) 0.00 - 0.50 x10*3/uL    Lymphocytes Absolute, Manual 0.46 (L) 0.80 - 3.00 x10*3/uL    Monocytes Absolute, Manual 0.18 0.05 - 0.80 x10*3/uL    Eosinophils Absolute, Manual 0.05 0.00 - 0.40 x10*3/uL    Basophils Absolute, Manual 0.00 0.00 - 0.10 x10*3/uL    Atypical Lymphs Absolute, Manual 0.23 0.00 - 0.30 x10*3/uL    Total Cells Counted 100     Neutrophils Absolute, Manual 3.68 1.60 - 5.50 x10*3/uL    RBC Morphology See Below     Polychromasia Mild     Hypochromia Mild     Target Cells Few    Hepatic function panel   Result Value Ref Range    Albumin 2.6 (L) 3.4 - 5.0 g/dL    Bilirubin, Total 0.4 0.0 - 1.2 mg/dL    Bilirubin, Direct 0.2 0.0 - 0.3 mg/dL    Alkaline Phosphatase 46 33 - 136 U/L    ALT 25 10 - 52 U/L    AST 34 9 - 39 U/L    Total Protein 5.4 (L) 6.4 - 8.2 g/dL   POCT GLUCOSE   Result Value Ref Range    POCT Glucose 154 (H) 74 - 99 mg/dL        Image Results  XR chest 1 view  Narrative: Interpreted By:  Daisy Beard,   STUDY:  XR CHEST 1 VIEW;  1/24/2025 9:33 am      INDICATION:  Signs/Symptoms:hemoptysis,  evaluate lung fields, lines/tubes.          COMPARISON:  01/23/2025 chest x-ray and chest CT      ACCESSION NUMBER(S):  [id number]      ORDERING CLINICIAN:  KORI MARSHALL      TECHNIQUE:  Portable AP upright      FINDINGS:  Endotracheal tube tip projects about 3 cm above the jayy.  Nasogastric tube tip projects below the diaphragm and appears to  terminate in the cardia region of the stomach. The side-hole of the  NG tube is in the distal esophagus. Cardiac silhouette is partially  obscured by pulmonary infiltrate. The consolidation through the mid  and lower left lung is similar to the prior study and prior CT  demonstrates that this is throughout the left lower lobe and involves  the posterior portion of the left upper lobe and most of the lingula.  There is a small infiltrate in the medial right lung base which  correlates with right lower lobe findings of atelectasis on the prior  CT. Right sided findings are not significantly changed. There is  probably a small amount of left pleural fluid in addition to the  left-sided pneumonia. Mediastinal structures shift to the left  slightly even allowing for the patient rotation, which also  correlates with findings on prior CT with overall lesser volume in  the left lung than right. Osseous structures show no change.      Impression: NG tube tip is barely within the stomach in the side-hole is in the  distal esophagus      Left-sided pneumonia and right basilar atelectasis are similar to the  prior study. There is mild diminished volume in the left lung  compared to the right which is unchanged from the prior study      Probable small left pleural effusion in addition to the pneumonia      MACRO:  None.      Signed by: Daisy Beard 1/24/2025 10:36 AM  Dictation workstation:   [id number]  ECG 12 lead  Sinus rhythm  Paired ventricular premature complexes  Aberrant conduction of SV complex(es)  Probable left atrial enlargement  Nonspecific intraventricular  conduction delay  Probable inferior infarct, age indeterminate  Baseline wander in lead(s) I,III,aVR,aVL,aVF,V3,V6       Assessment/Plan     * Acute on chronic respiratory failure with hypoxia (Multi)  Assessment & Plan  Acute on chronic hypoxic respiratory failure likely due to worsening pneumonia on chronic chf, copd  Pt is septic with respiratory failure, elevated lactate level started on pressor support  CTA of chest pending to rule out PE  Pt on vent and will be admitted to the ICU  Pt failed treatment on Zithromax/steroids  IV Vanc/Zosyn for now given recent hospitalization and failure on zithromax  IV solumedrol, otc duoneb  Resume home treatments  1/24: Remains intubated on minimal sedation.    Influenza A  Assessment & Plan  Started on Tamiflu.  Droplet precautions.    Chronic obstructive pulmonary disease with acute exacerbation (Multi)  Assessment & Plan  Continue steroids.  Quit smoking 6 months ago.  Continue bronchodilators.    Myeloproliferative neoplasm (Multi)  Assessment & Plan  Myeloproliferative disorder  Continue Ruxolitinib     Chronic renal disease, stage III (Multi)  Assessment & Plan  CKD 3  Pt creatinine at recent level not acutely worsened    Acute on chronic combined systolic and diastolic congestive heart failure  Assessment & Plan  ACUTE ON Chronic systolic and diastolic CHF  Recent echocardiogram [id number]  Reconcile pt home meds, diuresis as tolerated    Coronary artery disease involving native coronary artery of native heart without angina pectoris  Assessment & Plan  CAD hx PCI/Stent elevated troponin due to hypoxia  Stress test earlier this month negative for ischemia  Continue statin asa/plavix betablocker if tolerated    Gram-negative pneumonia (Multi)  Assessment & Plan  Recent hospitalization.  Treating as HCAP.  Covering with broad-spectrum antibiotic therapy.                      Carson Galindo MD

## 2025-01-25 NOTE — PROGRESS NOTES
Critical Care Daily Progress Notes        Subjective   Patient is a 79 y.o. male admitted on 1/23/2025 12:40 PM with the following indication(s) for ICU care: Acute hypoxic respiratory failure requiring intubation.     Interval History:   Aristeo Barriga is a 79 y.o. male with PMHx s/f COPD with baseline oxygen requirement of 4 L chronic systolic diastolic heart failure last EF in January 2020 for 50% history of myeloproliferative disorder coronary artery disease with recent stress test showing no reversible ischemia benign prostatic hypertrophy abdominal aortic aneurysm with repair was admitted to ICU for Acute hypoxic respiratory failure requiring  intubation.  On presentation to the ER his oxygen saturation was 50s on CPAP.      1/24/2025: Patient was placed on a sedation vacation followed by spontaneous breathing trial.  Patient failed spontaneous breathing trial because of hypoxia and tachypnea.     1/25/2025: Patient is passing spontaneous breathing trial and blood gas during SBT showed pH of 7.36.  Patient has strep pneumonia bacteremia and urine strep antigen positive.     Scheduled Medications:   aspirin, 81 mg, oral, Daily  atorvastatin, 80 mg, oral, Nightly  carvedilol, 3.125 mg, oral, BID  clopidogrel, 75 mg, oral, Daily  [Held by provider] fluticasone furoate-vilanteroL, 1 puff, inhalation, Daily  hydrALAZINE, 10 mg, oral, TID  ipratropium-albuteroL, 3 mL, nebulization, 4x daily  isosorbide mononitrate ER, 30 mg, oral, Daily  methylPREDNISolone sodium succinate (PF), 40 mg, intravenous, q6h  montelukast, 10 mg, oral, Daily  oseltamivir, 30 mg, oral, q24h  oxygen, , inhalation, Continuous - Inhalation  pantoprazole, 40 mg, oral, Daily before breakfast   Or  pantoprazole, 40 mg, intravenous, Daily before breakfast  piperacillin-tazobactam, 3.375 g, intravenous, q6h  ruxolitinib, 20 mg, oral, BID  tamsulosin, 0.4 mg, oral, Daily  [Held by provider] tiotropium, 2 puff, inhalation, Daily          Continuous Medications:   fentaNYL,  mcg/hr, Last Rate: 25 mcg/hr (01/24/25 1039)  propofol, 0-20 mcg/kg/min, Last Rate: Stopped (01/25/25 0600)         PRN Medications:   PRN medications: ipratropium-albuteroL    Objective   Vitals:  Most Recent:  Vitals:    01/25/25 1000   BP: 122/65   Pulse: 74   Resp: 15   Temp:    SpO2: 91%       24hr Min/Max:  Temp  Min: 36.2 °C (97.2 °F)  Max: 36.5 °C (97.7 °F)  Pulse  Min: 66  Max: 99  BP  Min: 99/56  Max: 153/66  Resp  Min: 8  Max: 37  SpO2  Min: 90 %  Max: 99 %    LDA:  ETT  7.5 mm (Active)   Placement Date/Time: 01/23/25 1350   Mask Ventilation: Vent by mask  Technique: Tube changer;Stylet  ETT Type: ETT - single  Single Lumen Tube Size: 7.5 mm  Cuffed: Yes  Location: Oral  Placement Verification: Auscultation;Capnometry  Placed By: ED ph...   Number of days: 1       Urethral Catheter Coude 16 Fr. (Active)   Placement Date/Time: 01/23/25 1600   Placed by: lydai greenfield  Hand Hygiene Completed: Yes  Catheter Type: Coude  Tube Size (Fr.): 16 Fr.  Catheter Balloon Size: 10 mL  Urine Returned: Yes   Number of days: 1       NG/OG/Feeding Tube OG - [place] sump 16 Fr Center mouth (Active)   Placement Date/Time: 01/23/25 1443   Placed by: Twila SYED RN  Hand Hygiene Completed: Yes  Type of Tube: NG/OG Tube  Tube Length: 50 cm  Tube Type: OG - [place] sump  NG/OG Tube Size: 16 Fr  Tube Location: Center mouth   Number of days: 1         Vent settings:  Vent Mode: Pressure support  S RR:  [20] 20  S VT:  [500 mL] 500 mL  PEEP/CPAP (cm H2O):  [5 cm H20] 5 cm H20  VT SUP:  [10 cm H20] 10 cm H20  MAP (cm H2O):  [8.3-10] 9.3    Hemodynamic parameters for last 24 hours:       I/O:    Intake/Output Summary (Last 24 hours) at 1/25/2025 1012  Last data filed at 1/25/2025 0600  Gross per 24 hour   Intake 825.84 ml   Output 1460 ml   Net -634.16 ml       Physical Exam:   Physical Exam   Vitals reviewed.   Constitutional:       General: He is not in acute distress.     Comments: Sedated  and intubated   HENT:      Head: Normocephalic and atraumatic.      Right Ear: External ear normal.      Left Ear: External ear normal.      Nose: Nose normal.      Mouth/Throat:      Mouth: Mucous membranes are moist.      Pharynx: Oropharynx is clear.   Eyes:      General: No scleral icterus.     Extraocular Movements: Extraocular movements intact.      Conjunctiva/sclera: Conjunctivae normal.      Pupils: Pupils are equal, round, and reactive to light.   Neck:      Vascular: No carotid bruit.   Cardiovascular:      Rate and Rhythm: Regular rhythm. Tachycardia present.      Pulses: Normal pulses.      Heart sounds: Normal heart sounds.   Pulmonary:      Effort: Pulmonary effort is normal. No respiratory distress.      Comments: Diminished in bilateral bases  Chest:      Chest wall: No tenderness.   Abdominal:      General: Bowel sounds are normal. There is no distension.      Palpations: Abdomen is soft. There is no mass.      Tenderness: There is no abdominal tenderness. There is no rebound.   Musculoskeletal:         General: Deformity present. No swelling. Normal range of motion.      Cervical back: Normal range of motion.      Comments: B/L bka and prosthesis   Skin:     General: Skin is warm and dry.      Capillary Refill: Capillary refill takes less than 2 seconds.   Neurological:      Comments: Pt sedated and intubated     Lab/Radiology/Diagnostic Review:  Results for orders placed or performed during the hospital encounter of 01/23/25 (from the past 24 hours)   POCT GLUCOSE   Result Value Ref Range    POCT Glucose 154 (H) 74 - 99 mg/dL   POCT GLUCOSE   Result Value Ref Range    POCT Glucose 180 (H) 74 - 99 mg/dL   Blood Gas Venous Full Panel   Result Value Ref Range    POCT pH, Venous 7.36 7.33 - 7.43 pH    POCT pCO2, Venous 42 41 - 51 mm Hg    POCT pO2, Venous 44 35 - 45 mm Hg    POCT SO2, Venous 72 45 - 75 %    POCT Oxy Hemoglobin, Venous 71.0 45.0 - 75.0 %    POCT Hematocrit Calculated, Venous 23.0 (L)  41.0 - 52.0 %    POCT Sodium, Venous 142 136 - 145 mmol/L    POCT Potassium, Venous 3.4 (L) 3.5 - 5.3 mmol/L    POCT Chloride, Venous 109 (H) 98 - 107 mmol/L    POCT Ionized Calicum, Venous 1.17 1.10 - 1.33 mmol/L    POCT Glucose, Venous 227 (H) 74 - 99 mg/dL    POCT Lactate, Venous 1.3 0.4 - 2.0 mmol/L    POCT Base Excess, Venous -1.7 -2.0 - 3.0 mmol/L    POCT HCO3 Calculated, Venous 23.7 22.0 - 26.0 mmol/L    POCT Hemoglobin, Venous 7.8 (L) 13.5 - 17.5 g/dL    POCT Anion Gap, Venous 13.0 10.0 - 25.0 mmol/L    Patient Temperature 37.0 degrees Celsius    FiO2 45 %   CBC   Result Value Ref Range    WBC 6.3 4.4 - 11.3 x10*3/uL    nRBC 0.3 (H) 0.0 - 0.0 /100 WBCs    RBC 2.61 (L) 4.50 - 5.90 x10*6/uL    Hemoglobin 7.6 (L) 13.5 - 17.5 g/dL    Hematocrit 24.2 (L) 41.0 - 52.0 %    MCV 93 80 - 100 fL    MCH 29.1 26.0 - 34.0 pg    MCHC 31.4 (L) 32.0 - 36.0 g/dL    RDW 21.0 (H) 11.5 - 14.5 %    Platelets 198 150 - 450 x10*3/uL   Basic Metabolic Panel   Result Value Ref Range    Glucose 214 (H) 74 - 99 mg/dL    Sodium 144 136 - 145 mmol/L    Potassium 3.2 (L) 3.5 - 5.3 mmol/L    Chloride 108 (H) 98 - 107 mmol/L    Bicarbonate 25 21 - 32 mmol/L    Anion Gap 14 10 - 20 mmol/L    Urea Nitrogen 85 (H) 6 - 23 mg/dL    Creatinine 2.38 (H) 0.50 - 1.30 mg/dL    eGFR 27 (L) >60 mL/min/1.73m*2    Calcium 8.0 (L) 8.6 - 10.3 mg/dL   Blood Gas Venous Full Panel   Result Value Ref Range    POCT pH, Venous 7.36 7.33 - 7.43 pH    POCT pCO2, Venous 39 (L) 41 - 51 mm Hg    POCT pO2, Venous 41 35 - 45 mm Hg    POCT SO2, Venous 64 45 - 75 %    POCT Oxy Hemoglobin, Venous 62.9 45.0 - 75.0 %    POCT Hematocrit Calculated, Venous 24.0 (L) 41.0 - 52.0 %    POCT Sodium, Venous 143 136 - 145 mmol/L    POCT Potassium, Venous 4.0 3.5 - 5.3 mmol/L    POCT Chloride, Venous 113 (H) 98 - 107 mmol/L    POCT Ionized Calicum, Venous 1.19 1.10 - 1.33 mmol/L    POCT Glucose, Venous 292 (H) 74 - 99 mg/dL    POCT Lactate, Venous 1.8 0.4 - 2.0 mmol/L    POCT Base  Excess, Venous -3.2 (L) -2.0 - 3.0 mmol/L    POCT HCO3 Calculated, Venous 22.0 22.0 - 26.0 mmol/L    POCT Hemoglobin, Venous 8.0 (L) 13.5 - 17.5 g/dL    POCT Anion Gap, Venous 12.0 10.0 - 25.0 mmol/L    Patient Temperature 37.0 degrees Celsius    FiO2 50 %     XR chest 1 view    Result Date: 1/24/2025  Interpreted By:  Daisy Beard, STUDY: XR CHEST 1 VIEW;  1/24/2025 9:33 am   INDICATION: Signs/Symptoms:hemoptysis, evaluate lung fields, lines/tubes.     COMPARISON: 01/23/2025 chest x-ray and chest CT   ACCESSION NUMBER(S): [id number]   ORDERING CLINICIAN: KORI MARSHALL   TECHNIQUE: Portable AP upright   FINDINGS: Endotracheal tube tip projects about 3 cm above the jayy. Nasogastric tube tip projects below the diaphragm and appears to terminate in the cardia region of the stomach. The side-hole of the NG tube is in the distal esophagus. Cardiac silhouette is partially obscured by pulmonary infiltrate. The consolidation through the mid and lower left lung is similar to the prior study and prior CT demonstrates that this is throughout the left lower lobe and involves the posterior portion of the left upper lobe and most of the lingula. There is a small infiltrate in the medial right lung base which correlates with right lower lobe findings of atelectasis on the prior CT. Right sided findings are not significantly changed. There is probably a small amount of left pleural fluid in addition to the left-sided pneumonia. Mediastinal structures shift to the left slightly even allowing for the patient rotation, which also correlates with findings on prior CT with overall lesser volume in the left lung than right. Osseous structures show no change.       NG tube tip is barely within the stomach in the side-hole is in the distal esophagus   Left-sided pneumonia and right basilar atelectasis are similar to the prior study. There is mild diminished volume in the left lung compared to the right which is unchanged from the  prior study   Probable small left pleural effusion in addition to the pneumonia   MACRO: None.   Signed by: Daisy Beard 1/24/2025 10:36 AM Dictation workstation:   [id number]    ECG 12 lead    Result Date: 1/24/2025  Sinus rhythm Paired ventricular premature complexes Aberrant conduction of SV complex(es) Probable left atrial enlargement Nonspecific intraventricular conduction delay Probable inferior infarct, age indeterminate Baseline wander in lead(s) I,III,aVR,aVL,aVF,V3,V6    XR abdomen 1 view    Result Date: 1/23/2025  Interpreted By:  Curt Shore, STUDY: Abdominal series dated 1/23/2025.   INDICATION: Enteric tube placement.   COMPARISON: None.   ACCESSION NUMBER(S): [id number]   ORDERING CLINICIAN: KORI MARSHALL   TECHNIQUE: Single AP radiographs of the abdomen.   FINDINGS: Exam is limited due to incomplete inclusion of the abdomen in the field of view including the hemidiaphragms and lower lung zones given enteric tube placement. There appears to be visualization of a tube projecting over the upper mid abdomen mostly just proximal to the presumed gastric air bubble. There is note of stent grafting in the vascular tree. Vascular calcifications are seen over the soft tissues.       Limited exam with probable visualization of enteric tube over the upper abdomen. Repeat radiography is recommended to include the thoracoabdominal region. Further recommendations may be given on the basis of that exam.   Signed by: Curt Shore 1/23/2025 8:44 PM Dictation workstation:   [id number]    CT angio chest abdomen pelvis    Result Date: 1/23/2025  Interpreted By:  Vadim Reed, STUDY: CT ANGIO CHEST ABDOMEN PELVIS;  1/23/2025 3:54 pm   INDICATION: Signs/Symptoms:respiratory failure with hypoxia, sepsis, now intubated, hx AAA repair, r/o AAA rupture.   COMPARISON: 01/23/2025   ACCESSION NUMBER(S): [id number]   ORDERING CLINICIAN: BILL SCHULZ   TECHNIQUE: Axial non-contrast images of the chest  abdomen, and pelvis.   Axial CT images of the chest, abdomen and pelvis were obtained after the intravenous administration of iodinated contrast using angiographic technique with coronal and sagittal reformatted images. MIP images and 3D reconstructions were created on an independent workstation and reviewed.   FINDINGS: VASCULATURE:   Moderate atherosclerotic calcification of the aortic arch and thoracic and abdominal aorta. The ascending aorta is borderline ectatic measuring 4 cm in diameter.   There is a infrarenal abdominal aortic aneurysm previously treated with endograft with aneurysm sac measuring 3.8 cm in diameter.   There is severe disease involving the bilateral external iliac arteries with areas of multifocal moderate-to-severe stenosis suspected.   There is probable severe stenosis origin of the celiac artery. SMA appears to be widely patent. At least moderate disease origin of both renal arteries, right-greater-than-left.   CT CHEST:   There is underlying severe emphysema. There is consolidation seen in the lung bases, left-greater-than-right consistent with underlying pneumonia. The patient is intubated. NG tube appears to terminate near the GE junction.   There is mild cardiomegaly with severe coronary artery calcification.   No significant adenopathy.   No significant effusions.     CT ABDOMEN/PELVIS:   Arterial phase imaging of the liver, gallbladder, adrenals pancreas spleen grossly unremarkable.   The kidneys are somewhat atrophic.   No bowel obstruction or significant colitis. Mild diverticulosis.   Bladder is within normal limits   No free fluid or significant adenopathy.   There is a chronic compression fracture of T6 with sclerotic appearance. Multilevel moderate to advanced degenerative disc disease noted.       Infrarenal abdominal aortic aneurysm status post endograft treatment. Areas of multifocal moderate-to-severe stenosis bilateral external iliac arteries. Additional atherosclerotic  disease as above.   Extensive consolidation in the lung bases consistent with probable underlying pneumonia, left-greater-than-right. This is superimposed on severe emphysema.   Additional nonemergent/senescent changes as above.   MACRO: None.   Signed by: Vadim Reed 1/23/2025 4:32 PM Dictation workstation:   [id number]    XR chest abdomen for OG NG placement    Result Date: 1/23/2025  Interpreted By:  Stacie Parker, STUDY: XR CHEST ABDOMEN FOR OG NG PLACEMENT; ;  1/23/2025 1:36 pm   INDICATION: Signs/Symptoms:ETT placement, OG placement.     COMPARISON: None.   ACCESSION NUMBER(S): [id number]   ORDERING CLINICIAN: BILL SCHULZ   FINDINGS: AP portable view of the chest is obtained.  Limited exam due to portable nature. Magnified cardiac silhouette. Endotracheal tube in place and terminates above the jayy. Left perihilar and left basilar infiltrates and effusions. Mild interstitial prominence diffusely.       Left perihilar and left basilar infiltrates and effusions for which follow-up is recommended.   MACRO: None   Signed by: Stacie Parker 1/23/2025 2:07 PM Dictation workstation:   [id number]    Nuclear Stress Test    Result Date: 1/20/2025  Interpreted By:  Ric See, STUDY: NUCLEAR STRESS TEST;  1/20/2025 2:38 pm   INDICATION: Signs/Symptoms:CHF.   COMPARISON: None.   ACCESSION NUMBER(S): [id number]   ORDERING CLINICIAN: KEN ROBIN   TECHNIQUE: DIVISION OF NUCLEAR MEDICINE PHARMACOLOGIC STRESS MYOCARDIAL PERFUSION SCAN, ONE DAY PROTOCOL   The patient received an intravenous dose of  10.7 mCi of Tc-99m tetrofosmin and resting emission tomographic (SPECT) images of the myocardium were acquired. The patient then received an intravenous infusion of 0.4mg regadenoson (Lexiscan) followed by an additional dose of  34.3 mCi of Tc-99m tetrofosmin. Stress phase SPECT images of the myocardium were then acquired. These included ECG-gated images to assess and quantify ventricular function.   FINDINGS: There  is a moderate-to-large primarily fixed but partially reversible perfusion defect in the inferior, inferolateral, and lateral walls with associated wall motion abnormality suggesting prior infarct with a small area of tiffany-infarct ischemia.   Calculated ejection fraction of 46% with hypokinesis of the inferior, inferolateral, and lateral walls   Attenuation correction CT images demonstrate coronary artery calcification, aortic atherosclerotic calcification, lung consolidation/infiltrates bilaterally       1. There is a moderate-to-large primarily fixed but partially reversible perfusion defect in the inferior, inferolateral, and lateral walls with associated wall motion abnormality suggesting prior infarct with a small area of tiffany-infarct ischemia.   2. Calculated ejection fraction of 46% with hypokinesis of the inferior, inferolateral, and lateral calvillo     Signed by: Ric See 1/20/2025 4:52 PM Dictation workstation:   [id number]    Cardiology Interpretation Of Nuclear Stress - See Other Report For Nuclear Portion    Result Date: 1/20/2025              [place] [id number]      Phone [phone] Fax [phone] Nuclear Pharmacologic Stress Test Patient Name:      AMITA BELTRAN  Ordering Provider:    [id number] KEN ROBIN Study Date:        1/20/2025           Reading Physician:    [id number] Ric See DO MRN/PID:           [id number]            Supervising           [id number] Ric See DO                                        Physician: Accession#:        [id number]        Fellow: Date of Birth/Age: 1945 / 79      Fellow:                    years Gender:            ILSA                   Nurse:                Erin Ellison Admission Status:  Inpatient           Sonographer:          NA Height:            180.34 cm           Technologist: Weight:            66.23 kg            Additional Staff: BSA:               1.84 m2             Encounter#:           [id number]  BMI:               20.36 kg/m2         [facility] Study Type:    CARDIOLOGY INTERPRETATION OF NUCLEAR STRESS Diagnosis/ICD: Dyspnea, unspecified-R06.00 Indication:    Dyspnea CPT Codes:     Stress Test Interpretation-02766; Stress Test Supervision-[id number] Falls Risk: Low: Patient has low risk for sustaining a fall; environmental safety interventions in place.  Study Details: Correct procedure and correct patient verified verbally and with                ID Band checked.  Patient History: Hypertension, coronary artery disease, dyslipidemia, congestive heart failure, peripheral vascular disease, hx of thoracic aneurysm repair, chronic obstructive pulmonary disease and hyperlipidemia. aortic aneursym, CKD, PCI, right BKA. Allergies: Codeine. Smoker:    Current.  Medications: Lasix, jakafi, albuterol, potassium, carvedilol, hydralazine, isosorbide, protonix, ASA. atorvastatin. The patient took medications as prescribed.  Patient Performance: Patient received a total of 0.4 mg of Regadenoson at 8:19:41 AM. The peak heart rate achieved was 89 bpm, which was 63 % of the age predicted target heart rate of 141 bpm. The resting blood pressure was 121/61 mmHg with a heart rate of 78 bpm. The patient developed no symptoms during the stress exam. The blood pressure response was normal. The test was terminated due to: completed lab protocol. Patient has met the discharge criteria and is discharged to their floor.  Baseline ECG: Resting ECG showed Sinus Arrhythmia with prior inferior infarct and prior lateral infarct.  Stress ECG: Stress ECG showed sinus arrhythmia, with occ PVC. No ST changes.  Stress Stage Data: +----------------+--+------+-------+                 HRSys BPDias BP +----------------+--+------+-------+ Baseline [id number]   61      +----------------+--+------+-------+ Stage I         [id number]   48      +----------------+--+------+-------+ Stage II        92300   54       +----------------+--+------+-------+ Stage III       96475   54      +----------------+--+------+-------+ Stage IV        13746   58      +----------------+--+------+-------+ Stage V         [id number]   57      +----------------+--+------+-------+  Summary:  1. Correlate with myocardial perfusion imaging results.  2. No ECG changes from baseline.  3. Nuclear image results are reported separately. [id number] Ric Mayi MALDONADO Electronically signed on 1/20/2025 at 1:56:32 PM   ** Final **     ECG 12 Lead    Result Date: 1/20/2025  Sinus rhythm Probable left atrial enlargement RSR' in V1 or V2, right VCD or RVH Abnormal T, consider ischemia, lateral leads Borderline ST elevation, anterior leads Baseline wander in lead(s) V2    CT chest high resolution    Result Date: 1/20/2025  Interpreted By:  Pietro Adams, STUDY: CT CHEST HIGH RESOLUTION;  1/19/2025 1:55 pm   INDICATION: Signs/Symptoms:SOB/R/O Pulmonary fibrosis.     COMPARISON: 10/03/2023   ACCESSION NUMBER(S): [id number]   ORDERING CLINICIAN: KEN ROBIN   TECHNIQUE: Using helical multidetector technique, volumetric data acquisition of the chest was obtained without intravenous administration of contrast material under the high resolution chest CT protocol. Examination includes contiguous slices through the chest in supine positioning during inspiration, noncontiguous axial slices in supine positioning during expiration and prone positioning during inspiration.   FINDINGS: LUNGS AND AIRWAYS: The trachea and central airways are patent. No endobronchial lesion is seen.   There are severe centrilobular emphysematous changes. Interval appearance of inferior lingular consolidation/airspace disease. Interval worsening in left lower lobe consolidation/atelectasis. Interval worsening in right basilar airspace disease/infiltrate/pneumonia.   Expiratory imaging demonstrates diffuse air trapping..     MEDIASTINUM AND NICO, LOWER NECK AND AXILLA: The visualized thyroid  gland is within normal limits. There are scattered mediastinal lymph nodes are felt to be reactive/inflammatory in nature. Esophagus appears within normal limits as seen.   HEART AND VESSELS: Severe atherosclerotic calcifications of the thoracic aorta. Main pulmonary artery and its branches are normal in caliber. Severe coronary artery calcifications. The cardiac chambers are not enlarged. There is no pericardial effusion seen.   UPPER ABDOMEN: Extensive atherosclerotic calcification and infrarenal aortic stent placement. Indeterminate high attenuation within the infrarenal stent may be artifactual but correlate with any concern for renal stenosis/thrombus.     CHEST WALL AND OSSEOUS STRUCTURES: Chest wall is within normal limits. No acute osseous pathology.There are no suspicious osseous lesions.There is interval compression fracture with bony sclerosis of the T6 vertebral body.       1. There is severe emphysematous changes with multifocal parenchymal infiltrates concerning for pneumonia/aspiration. Correlate with symptoms of fever/sputum production. Recommend follow-up radiographs to document resolution. 2. Severe atherosclerotic disease of the aorta with coronary artery calcifications and correlate with coronary artery disease risk factors. 3. Interval compression fracture of the T6 vertebral body with bony sclerosis. This is most likely secondary to fracture healing but correlate with any concern for pathologic fracture.     MACRO: Critical Finding:  See findings. Notification was initiated on 1/20/2025 at 9:39 am by  Pietro Adams.  (**-YCF-**) Instructions:   Signed by: Pietro Adams 1/20/2025 9:39 AM Dictation workstation:   [id number]    Transthoracic Echo (TTE) Complete    Result Date: 1/18/2025              [place] [id number]      Phone [phone] Fax [phone] TRANSTHORACIC ECHOCARDIOGRAM REPORT Patient Name:       AMITA Mercedes  Physician:    [id number] Rigo Sim MD Study Date:         1/18/2025            Ordering Provider:    [id number] JOCELYNN OSWALD MRN/PID:            [id number]             Fellow: Accession#:         [id number]         Nurse:                ER nurse Date of Birth/Age:  1945 / 79 years Sonographer:          Amalia Fernandez RDCS Gender Assigned at  M                    Additional Staff: Birth: Height:             180.34 cm            Admit Date:           1/17/2025 Weight:             70.76 kg             Admission Status:     Inpatient -                                                                Routine BSA / BMI:          1.90 m2 / 21.76      Department Location:  [facility] [place] [facility]                     kg/m2 Blood Pressure: 138 /82 mmHg Study Type:    TRANSTHORACIC ECHO (TTE) COMPLETE Diagnosis/ICD: Other forms of dyspnea-R06.09; Elevated Troponin-R79.89 Indication:    Dyspnea, Elevated troponin CPT Codes:     Echo Complete w Full Doppler-[id number] Patient History: Pertinent History: CAD, HTN and CHF. Study Detail: The following Echo studies were performed: 2D, M-Mode, Doppler and               color flow. Optison used as a contrast agent for endocardial               border definition. Total contrast used for this procedure was 3 mL               via IV push.  PHYSICIAN INTERPRETATION: Left Ventricle: The left ventricular systolic function is mildly decreased, with a Randhawa's biplane calculated ejection fraction of 50%. There is global hypokinesis of the left ventricle with minor regional variations. The left ventricular cavity size is normal. There is normal septal and normal posterior left ventricular wall thickness. There is left ventricular concentric remodeling. Spectral Doppler shows a Grade I (impaired relaxation pattern) of left  ventricular diastolic filling with normal left atrial filling pressure. Left Atrium: The left atrium is normal in size. Right Ventricle: The right ventricle is normal in size. There is normal right ventricular global systolic function. Right Atrium: The right atrium is normal in size. Aortic Valve: The aortic valve appears structurally normal. There is mild aortic valve cusp calcification. The aortic valve dimensionless index is 0.74. There is trace aortic valve regurgitation. The peak instantaneous gradient of the aortic valve is 8 mmHg. The mean gradient of the aortic valve is 4 mmHg. Mitral Valve: The mitral valve is mildly thickened. There is trace mitral valve regurgitation. Tricuspid Valve: The tricuspid valve is structurally normal. There is trace tricuspid regurgitation. Pulmonic Valve: The pulmonic valve is not well visualized. There is no indication of pulmonic valve regurgitation. Pericardium: No pericardial effusion noted. Aorta: The aortic root is normal. There is moderate dilatation of the ascending aorta.  CONCLUSIONS:  1. The left ventricular systolic function is mildly decreased, with a Randhawa's biplane calculated ejection fraction of 50%.  2. There is global hypokinesis of the left ventricle with minor regional variations.  3. Spectral Doppler shows a Grade I (impaired relaxation pattern) of left ventricular diastolic filling with normal left atrial filling pressure.  4. There is normal right ventricular global systolic function.  5. There is moderate dilatation of the ascending aorta. QUANTITATIVE DATA SUMMARY:  2D MEASUREMENTS:           Normal Ranges: Ao Root d:       3.60 cm   (2.0-3.7cm) LAs:             3.70 cm   (2.7-4.0cm) IVSd:            1.04 cm   (0.6-1.1cm) LVPWd:           1.00 cm   (0.6-1.1cm) LVIDd:           4.09 cm   (3.9-5.9cm) LVIDs:           2.90 cm LV Mass Index:   71.3 g/m2 LV % FS          29.1 %  LA VOLUME:                    Normal Ranges: LA Vol A4C:        49.6 ml     (22+/-6mL/m2) LA Vol A2C:        44.9 ml LA Vol BP:         49.7 ml LA Vol Index A4C:  26.2ml/m2 LA Vol Index A2C:  23.7 ml/m2 LA Vol Index BP:   26.2 ml/m2 LA Area A4C:       19.6 cm2 LA Area A2C:       17.7 cm2 LA Major Axis A4C: 6.6 cm LA Major Axis A2C: 5.9 cm  RA VOLUME BY A/L METHOD:          Normal Ranges: RA Area A4C:             14.5 cm2  AORTA MEASUREMENTS:         Normal Ranges: Ao Sinus, d:        3.60 cm (2.1-3.5cm) Ao STJ, d:          2.22 cm (1.7-3.4cm) Asc Ao, d:          4.40 cm (2.1-3.4cm)  LV SYSTOLIC FUNCTION BY 2D PLANIMETRY (MOD):                      Normal Ranges: EF-A4C View:    48 % (>=55%) EF-A2C View:    53 % EF-Biplane:     50 % LV EF Reported: 50 %  LV DIASTOLIC FUNCTION:            Normal Ranges: MV Peak E:             0.80 m/s   (0.7-1.2 m/s) MV Peak A:             1.17 m/s   (0.42-0.7 m/s) E/A Ratio:             0.68       (1.0-2.2) MV e'                  0.054 m/s  (>8.0) MV lateral e'          0.06 m/s MV medial e'           0.05 m/s MV A Dur:              92.00 msec E/e' Ratio:            14.65      (<8.0)  MITRAL VALVE:          Normal Ranges: MV DT:        168 msec (150-240msec)  AORTIC VALVE:                     Normal Ranges: AoV Vmax:                1.44 m/s (<=1.7m/s) AoV Peak P.3 mmHg (<20mmHg) AoV Mean P.0 mmHg (1.7-11.5mmHg) LVOT Max Cristofer:            0.89 m/s (<=1.1m/s) AoV VTI:                 25.30 cm (18-25cm) LVOT VTI:                18.80 cm LVOT Diameter:           2.00 cm  (1.8-2.4cm) AoV Area, VTI:           2.33 cm2 (2.5-5.5cm2) AoV Area,Vmax:           1.94 cm2 (2.5-4.5cm2) AoV Dimensionless Index: 0.74  AORTIC INSUFFICIENCY: AI Vmax:       3.79 m/s AI Half-time:  498 msec AI Decel Rate: 223.00 cm/s2  RIGHT VENTRICLE: RV Basal 3.76 cm RV Mid   2.42 cm RV Major 8.4 cm TAPSE:   26.4 mm RV s'    0.14 m/s  TRICUSPID VALVE/RVSP:          Normal Ranges: Peak TR Velocity:     2.72 m/s RV Syst Pressure:     33 mmHg  (< 30mmHg) IVC Diam:              1.83 cm  [id number] Rigo Sim MD Electronically signed on 1/18/2025 at 10:45:48 AM  ** Final **     XR chest 1 view    Result Date: 1/17/2025  STUDY: Chest Radiograph;  01/17/2025 at 16:40 hours. INDICATION: Shortness of breath. COMPARISON: Chest, single portable view obtained on 06/16/2024 at 02:19 hours.  CT Chest 10/03/2023. ACCESSION NUMBER(S): [id number] ORDERING CLINICIAN: RANDALL CARSON TECHNIQUE:  Frontal chest was obtained at 16:40 hours. FINDINGS: There is a left pleural effusion.  There are increased interstitial markings at the lung bases, suspicious for edema.  Heart size is top normal.  No pneumothorax is noted.    1.  Left pleural effusion. 2.  Increased interstitial markings at the lung bases, suspicious for edema. Signed by Arden Otero MD        This patient has a urinary catheter   Reason for the urinary catheter remaining today? critically ill patient who need accurate urinary output measurements    This patient is intubated   Reason for patient to remain intubated today? they have inadequate gas-exchange without positive pressure         Malnutrition Diagnosis Status: New  Malnutrition Diagnosis: Severe malnutrition related to chronic disease or condition  Related to: chronic COPD, hx of leukemia, and hx of severe malnutrition  As Evidenced by: severe muscle and fat loss per NFPE, and PO intake <75% for > 1month  I agree with the dietitian's malnutrition diagnosis.      Assessment/Plan   Assessment & Plan  Acute on chronic respiratory failure with hypoxia (Multi)    Gram-negative pneumonia (Multi)    Coronary artery disease involving native coronary artery of native heart without angina pectoris    Acute on chronic combined systolic and diastolic congestive heart failure    Chronic renal disease, stage III (Multi)    Myeloproliferative neoplasm (Multi)    Chronic obstructive pulmonary disease with acute exacerbation (Multi)    Influenza A    Aristeo Barriga is a 79 y.o. male with  PMHx s/f COPD with baseline oxygen requirement of 4 L chronic systolic diastolic heart failure last EF in January 2020 for 50% history of myeloproliferative disorder coronary artery disease with recent stress test showing no reversible ischemia benign prostatic hypertrophy abdominal aortic aneurysm with repair was admitted to ICU for Acute hypoxic respiratory failure requiring  intubation.  On presentation to the ER his oxygen saturation was 50s on CPAP.      Acute hypoxic respite failure secondary to influenza pneumonia, strep pneumonia and COPD exacerbation   1/23/2025:  Admitted to ICU for Acute hypoxic respiratory failure requiring  intubation.    On presentation to the ER his oxygen saturation was 50s on CPAP, intubated for persistent hypoxia  I adjusted her ventilator settings to AC/VC with tidal volume of 450 cc, respiratory rate 20, FiO2 of 90% and PEEP of 8  Try to titrate down PEEP to 5  1/24/2025: Patient was placed on a sedation vacation followed by spontaneous breathing trial.    Patient failed spontaneous breathing trial because of hypoxia and tachypnea.   Was able to titrate down the PEEP to 5  Increased tidal volume to 500 cc, continuous respiratory rate 20  Ordered VBG at 8 PM  1/25/2025: Patient is passing spontaneous breathing trial and blood gas during SBT showed pH of 7.36.    Will consider extubating the patient  Patient has strep pneumonia bacteremia and urine strep antigen positive.   Respiratory culture growing gram-positive cocci, MRSA screen is negative  Continue azithromycin, vancomycin and Zosyn  Okay to maintain oxygen saturation 90% or more because of chronic lung disease     2.  Influenza A positive  Started on Tamiflu  Placed on droplet precautions     3.  Healthcare associated pneumonia  1/23/2025: Patient was discharged from the hospital 3 days back  Broaden antibiotics from ceftriaxone to vancomycin and Zosyn  Continue azithromycin  Ordered panculture, urine strep, urine  Legionella and MRSA screen  1/25/2025: Patient has strep pneumonia bacteremia and urine strep antigen positive.   Respiratory culture growing gram-positive cocci, MRSA screen is negative  Continue azithromycin, vancomycin and Zosyn     4.  Acute COPD exacerbation  1/23/2025: Patient has history of severe COPD with 100-pack-year smoking history and quit smoking 6 months back  Ordered DuoNeb every 4 hours along with as needed breathing treatments  Increase the frequency of IV Solu-Medrol to 40 mg every 6 hours  Okay to maintain oxygen saturation 90% or more because of chronic lung disease     5.  Hypotension secondary to propofol  Propofol was switched to Versed the ER  Patient is off of Levophed     6.  Hypertension with history of heart failure and coronary artery disease  Held antihypertensive and heart failure medications because of hypotension     7.  Counseling regarding advance care directives and goals of care  1/23/2025: I had extensive discussion with the patient daughter who is at bedside next of kin  Explained about overall condition of the patient  Explained difference between full code versus DNR  Daughter confirmed that the patient does not have any DNR on file and want to discuss with her sister  Patient will remain full code for now    I spent 30 minutes of cumulative critical care time with the patient.  Greater than 50% of that time was spent in the direct collaboration and or coordination of care of the patient.     Dragon dictation software was used to dictate this note and thus there may be minor errors in translation/transcription including garbled speech or misspellings. Please contact for clarification if needed.

## 2025-01-25 NOTE — CARE PLAN
The patient's goals for the shift include      The clinical goals for the shift include Pt will become more alert throughout the shift    Over the shift, the patient did not make progress toward the following goals. Barriers to progression include infection and respiratory status. Recommendations to address these barriers include IV atbs and supplemental oxygen.

## 2025-01-25 NOTE — ASSESSMENT & PLAN NOTE
ACUTE ON Chronic systolic and diastolic CHF  Recent echocardiogram [id number]  Reconcile pt home meds, diuresis as tolerated

## 2025-01-25 NOTE — CARE PLAN
Problem: Safety - Medical Restraint  Goal: Remains free of injury from restraints (Restraint for Interference with Medical Device)  1/25/2025 0045 by Rosa Hinds RN  Flowsheets (Taken 1/25/2025 0045)  Remains free of injury from restraints (restraint for interference with medical device): Every 2 hours: Monitor safety, psychosocial status, comfort, nutrition and hydration  1/25/2025 0044 by Rosa Hinds RN  Outcome: Progressing    Goal: Free from restraint(s) (Restraint for Interference with Medical Device)  1/25/2025 0045 by Rosa Hinds RN  Flowsheets (Taken 1/25/2025 0045)  Free from restraint(s) (restraint for interference with medical device): Every 24 hours: Continued use of restraint requires Licensed Independent Practitioner to perform face to face examination and written order  1/25/2025 0044 by Rosa Hinds RN  Outcome: Progressing      The clinical goals for the shift include pt will become more alert throughout shift

## 2025-01-25 NOTE — PROGRESS NOTES
Physical Therapy                 Therapy Communication Note    Patient Name: Aristeo Barriga  MRN: [id number]  Department: [facility]  Room: 05/05-A  Today's Date: 1/25/2025     Discipline: Physical Therapy          Missed Visit Reason:  Other (Comment) (Patient extubated. Approached for appropriateness of PT. Patient groggy, unable to follow commands for participation in evaluation this date. PT to reattempt when patient more alert.)    Missed Time: Attempt    Comment: Noted R OSMANI with prosthesis in his room.

## 2025-01-25 NOTE — PROGRESS NOTES
Vancomycin Dosing by Pharmacy- INITIAL    Aristeo Barriga is a 79 y.o. year old male who Pharmacy has been consulted for vancomycin dosing for pneumonia. Based on the patient's indication and renal status this patient will be dosed based on a goal AUC of 500-600.     Renal function is currently declining.    Visit Vitals  /80   Pulse 76   Temp 36.6 °C (97.9 °F)   Resp 22        Lab Results   Component Value Date    CREATININE 2.38 (H) 2025    CREATININE 2.01 (H) 2025    CREATININE 2.03 (H) 2025    CREATININE 2.14 (H) 2025        Patient weight is as follows:   Vitals:    25 0200   Weight: 68.7 kg (151 lb 7.3 oz)       Cultures:  Susceptibility data for the encounter in last 14 days.  Collected Specimen Info Organism Ceftriaxone (Meningitis) Ceftriaxone (Nonmeningitis) Clindamycin Erythromycin Penicillin (IV, Meningitidis) Penicillin (IV, Nonmeningitis) Penicillin (Oral) Tetracycline Trimethoprim/Sulfamethoxazole Vancomycin   25 Blood culture from Peripheral Venipuncture Streptococcus pneumoniae             25 Blood culture from Peripheral Venipuncture Streptococcus pneumoniae  I  S  S  R  R  S  I  S  R  S         I/O last 3 completed shifts:  In: 1309.9 (19.1 mL/kg) [I.V.:284.9 (4.1 mL/kg); NG/GT:725; IV Piggyback:300]  Out: 2150 (31.3 mL/kg) [Urine:2150 (0.9 mL/kg/hr)]  Weight: 68.7 kg   I/O during current shift:  No intake/output data recorded.    Temp (24hrs), Av.4 °C (97.5 °F), Min:36.2 °C (97.2 °F), Max:36.6 °C (97.9 °F)         Assessment/Plan     Patient will be given a loading dose of 1750 mg.  Will dose by level.   Follow-up level will be ordered on  at 0500 unless clinically indicated sooner.  Will continue to monitor renal function daily while on vancomycin and order serum creatinine at least every 48 hours if not already ordered.  Follow for continued vancomycin needs, clinical response, and signs/symptoms of toxicity.     Nicky Mittal,  PharmD

## 2025-01-25 NOTE — ASSESSMENT & PLAN NOTE
Acute on chronic hypoxic respiratory failure likely due to worsening pneumonia on chronic chf, copd  Pt is septic with respiratory failure, elevated lactate level started on pressor support  CTA of chest pending to rule out PE  Pt on vent and will be admitted to the ICU  Pt failed treatment on Zithromax/steroids  IV Vanc/Zosyn for now given recent hospitalization and failure on zithromax  IV solumedrol, otc duoneb  Resume home treatments  1/24: Remains intubated on minimal sedation.  1/25: Successfully extubated today. Multifactorial failure with Influenza A and pneumonia (possibly pneumococcal).

## 2025-01-26 PROBLEM — E87.6 HYPOKALEMIA: Status: ACTIVE | Noted: 2025-01-26

## 2025-01-26 PROBLEM — J13: Status: ACTIVE | Noted: 2025-01-26

## 2025-01-26 PROBLEM — J13: Status: ACTIVE | Noted: 2023-10-04

## 2025-01-26 LAB
ANION GAP SERPL CALC-SCNC: 15 MMOL/L (ref 10–20)
BACTERIA BLD AEROBE CULT: ABNORMAL
BACTERIA BLD AEROBE CULT: ABNORMAL
BACTERIA BLD CULT: ABNORMAL
BACTERIA BLD CULT: ABNORMAL
BACTERIA BLD CULT: NORMAL
BACTERIA SPEC RESP CULT: ABNORMAL
BACTERIA SPEC RESP CULT: ABNORMAL
BUN SERPL-MCNC: 85 MG/DL (ref 6–23)
CALCIUM SERPL-MCNC: 8.4 MG/DL (ref 8.6–10.3)
CHLORIDE SERPL-SCNC: 109 MMOL/L (ref 98–107)
CO2 SERPL-SCNC: 29 MMOL/L (ref 21–32)
CREAT SERPL-MCNC: 2.15 MG/DL (ref 0.5–1.3)
EGFRCR SERPLBLD CKD-EPI 2021: 31 ML/MIN/1.73M*2
ERYTHROCYTE [DISTWIDTH] IN BLOOD BY AUTOMATED COUNT: 20.8 % (ref 11.5–14.5)
GLUCOSE BLD MANUAL STRIP-MCNC: 193 MG/DL (ref 74–99)
GLUCOSE SERPL-MCNC: 147 MG/DL (ref 74–99)
GRAM STN SPEC: ABNORMAL
HCT VFR BLD AUTO: 24.6 % (ref 41–52)
HGB BLD-MCNC: 7.9 G/DL (ref 13.5–17.5)
MCH RBC QN AUTO: 28.9 PG (ref 26–34)
MCHC RBC AUTO-ENTMCNC: 32.1 G/DL (ref 32–36)
MCV RBC AUTO: 90 FL (ref 80–100)
NRBC BLD-RTO: 0.3 /100 WBCS (ref 0–0)
PLATELET # BLD AUTO: 201 X10*3/UL (ref 150–450)
POTASSIUM SERPL-SCNC: 3.2 MMOL/L (ref 3.5–5.3)
RBC # BLD AUTO: 2.73 X10*6/UL (ref 4.5–5.9)
SODIUM SERPL-SCNC: 150 MMOL/L (ref 136–145)
VANCOMYCIN SERPL-MCNC: 27.9 UG/ML (ref 5–20)
WBC # BLD AUTO: 9.1 X10*3/UL (ref 4.4–11.3)

## 2025-01-26 PROCEDURE — 2500000001 HC RX 250 WO HCPCS SELF ADMINISTERED DRUGS (ALT 637 FOR MEDICARE OP): Performed by: NURSE PRACTITIONER

## 2025-01-26 PROCEDURE — 2020000001 HC ICU ROOM DAILY

## 2025-01-26 PROCEDURE — 94640 AIRWAY INHALATION TREATMENT: CPT

## 2025-01-26 PROCEDURE — 82947 ASSAY GLUCOSE BLOOD QUANT: CPT

## 2025-01-26 PROCEDURE — 36415 COLL VENOUS BLD VENIPUNCTURE: CPT | Performed by: INTERNAL MEDICINE

## 2025-01-26 PROCEDURE — 2500000002 HC RX 250 W HCPCS SELF ADMINISTERED DRUGS (ALT 637 FOR MEDICARE OP, ALT 636 FOR OP/ED): Performed by: PHARMACIST

## 2025-01-26 PROCEDURE — 2500000002 HC RX 250 W HCPCS SELF ADMINISTERED DRUGS (ALT 637 FOR MEDICARE OP, ALT 636 FOR OP/ED): Performed by: INTERNAL MEDICINE

## 2025-01-26 PROCEDURE — 2500000002 HC RX 250 W HCPCS SELF ADMINISTERED DRUGS (ALT 637 FOR MEDICARE OP, ALT 636 FOR OP/ED): Performed by: NURSE PRACTITIONER

## 2025-01-26 PROCEDURE — 71045 X-RAY EXAM CHEST 1 VIEW: CPT | Performed by: RADIOLOGY

## 2025-01-26 PROCEDURE — 99291 CRITICAL CARE FIRST HOUR: CPT | Performed by: INTERNAL MEDICINE

## 2025-01-26 PROCEDURE — 80202 ASSAY OF VANCOMYCIN: CPT

## 2025-01-26 PROCEDURE — 80048 BASIC METABOLIC PNL TOTAL CA: CPT | Performed by: INTERNAL MEDICINE

## 2025-01-26 PROCEDURE — 2500000004 HC RX 250 GENERAL PHARMACY W/ HCPCS (ALT 636 FOR OP/ED): Performed by: NURSE PRACTITIONER

## 2025-01-26 PROCEDURE — 2500000001 HC RX 250 WO HCPCS SELF ADMINISTERED DRUGS (ALT 637 FOR MEDICARE OP): Performed by: INTERNAL MEDICINE

## 2025-01-26 PROCEDURE — 2500000005 HC RX 250 GENERAL PHARMACY W/O HCPCS: Performed by: INTERNAL MEDICINE

## 2025-01-26 PROCEDURE — 87186 SC STD MICRODIL/AGAR DIL: CPT | Mod: PORLAB | Performed by: INTERNAL MEDICINE

## 2025-01-26 PROCEDURE — 2500000004 HC RX 250 GENERAL PHARMACY W/ HCPCS (ALT 636 FOR OP/ED): Performed by: INTERNAL MEDICINE

## 2025-01-26 PROCEDURE — 99233 SBSQ HOSP IP/OBS HIGH 50: CPT | Performed by: INTERNAL MEDICINE

## 2025-01-26 PROCEDURE — 85027 COMPLETE CBC AUTOMATED: CPT | Performed by: INTERNAL MEDICINE

## 2025-01-26 RX ORDER — CEFTRIAXONE 1 G/50ML
1 INJECTION, SOLUTION INTRAVENOUS EVERY 24 HOURS
Status: DISCONTINUED | OUTPATIENT
Start: 2025-01-26 | End: 2025-01-26

## 2025-01-26 RX ORDER — POTASSIUM CHLORIDE 20 MEQ/1
20 TABLET, EXTENDED RELEASE ORAL ONCE
Status: COMPLETED | OUTPATIENT
Start: 2025-01-26 | End: 2025-01-26

## 2025-01-26 RX ORDER — CEFTRIAXONE 1 G/50ML
1 INJECTION, SOLUTION INTRAVENOUS EVERY 24 HOURS
Status: DISCONTINUED | OUTPATIENT
Start: 2025-01-26 | End: 2025-01-28 | Stop reason: HOSPADM

## 2025-01-26 RX ADMIN — METHYLPREDNISOLONE SODIUM SUCCINATE 40 MG: 40 INJECTION, POWDER, FOR SOLUTION INTRAMUSCULAR; INTRAVENOUS at 09:10

## 2025-01-26 RX ADMIN — METHYLPREDNISOLONE SODIUM SUCCINATE 40 MG: 40 INJECTION, POWDER, FOR SOLUTION INTRAMUSCULAR; INTRAVENOUS at 04:53

## 2025-01-26 RX ADMIN — MONTELUKAST 10 MG: 10 TABLET, FILM COATED ORAL at 08:53

## 2025-01-26 RX ADMIN — METHYLPREDNISOLONE SODIUM SUCCINATE 40 MG: 40 INJECTION, POWDER, FOR SOLUTION INTRAMUSCULAR; INTRAVENOUS at 22:45

## 2025-01-26 RX ADMIN — IPRATROPIUM BROMIDE AND ALBUTEROL SULFATE 3 ML: 2.5; .5 SOLUTION RESPIRATORY (INHALATION) at 12:00

## 2025-01-26 RX ADMIN — IPRATROPIUM BROMIDE AND ALBUTEROL SULFATE 3 ML: 2.5; .5 SOLUTION RESPIRATORY (INHALATION) at 07:58

## 2025-01-26 RX ADMIN — METHYLPREDNISOLONE SODIUM SUCCINATE 40 MG: 40 INJECTION, POWDER, FOR SOLUTION INTRAMUSCULAR; INTRAVENOUS at 15:58

## 2025-01-26 RX ADMIN — Medication 60 L/MIN: at 20:18

## 2025-01-26 RX ADMIN — FLUTICASONE FUROATE AND VILANTEROL TRIFENATATE 1 PUFF: 200; 25 POWDER RESPIRATORY (INHALATION) at 04:55

## 2025-01-26 RX ADMIN — OSELTAMIVIR PHOSPHATE 30 MG: 30 CAPSULE ORAL at 15:57

## 2025-01-26 RX ADMIN — DOXYCYCLINE 100 MG: 100 INJECTION, POWDER, LYOPHILIZED, FOR SOLUTION INTRAVENOUS at 11:19

## 2025-01-26 RX ADMIN — TAMSULOSIN HYDROCHLORIDE 0.4 MG: 0.4 CAPSULE ORAL at 08:53

## 2025-01-26 RX ADMIN — PIPERACILLIN SODIUM AND TAZOBACTAM SODIUM 3.38 G: 3; .375 INJECTION, SOLUTION INTRAVENOUS at 11:19

## 2025-01-26 RX ADMIN — RUXOLITINIB 20 MG: 20 TABLET ORAL at 22:45

## 2025-01-26 RX ADMIN — DOXYCYCLINE 100 MG: 100 INJECTION, POWDER, LYOPHILIZED, FOR SOLUTION INTRAVENOUS at 23:51

## 2025-01-26 RX ADMIN — HYDRALAZINE HYDROCHLORIDE 10 MG: 10 TABLET ORAL at 08:53

## 2025-01-26 RX ADMIN — TIOTROPIUM BROMIDE INHALATION SPRAY 2 PUFF: 3.12 SPRAY, METERED RESPIRATORY (INHALATION) at 08:53

## 2025-01-26 RX ADMIN — CARVEDILOL 3.12 MG: 6.25 TABLET, FILM COATED ORAL at 20:52

## 2025-01-26 RX ADMIN — Medication 60 L/MIN: at 08:00

## 2025-01-26 RX ADMIN — Medication 60 L/MIN: at 08:01

## 2025-01-26 RX ADMIN — PIPERACILLIN SODIUM AND TAZOBACTAM SODIUM 3.38 G: 3; .375 INJECTION, SOLUTION INTRAVENOUS at 04:53

## 2025-01-26 RX ADMIN — HYDRALAZINE HYDROCHLORIDE 10 MG: 10 TABLET ORAL at 20:52

## 2025-01-26 RX ADMIN — ISOSORBIDE MONONITRATE 30 MG: 30 TABLET, EXTENDED RELEASE ORAL at 08:53

## 2025-01-26 RX ADMIN — Medication 60 L/MIN: at 06:22

## 2025-01-26 RX ADMIN — PANTOPRAZOLE SODIUM 40 MG: 40 INJECTION, POWDER, FOR SOLUTION INTRAVENOUS at 04:55

## 2025-01-26 RX ADMIN — ASPIRIN 81 MG: 81 TABLET, COATED ORAL at 08:53

## 2025-01-26 RX ADMIN — POTASSIUM CHLORIDE 20 MEQ: 1500 TABLET, EXTENDED RELEASE ORAL at 08:53

## 2025-01-26 RX ADMIN — Medication 60 L/MIN: at 20:19

## 2025-01-26 RX ADMIN — CLOPIDOGREL 75 MG: 75 TABLET ORAL at 08:53

## 2025-01-26 RX ADMIN — RUXOLITINIB 20 MG: 20 TABLET ORAL at 08:59

## 2025-01-26 RX ADMIN — ATORVASTATIN CALCIUM 80 MG: 40 TABLET, FILM COATED ORAL at 20:52

## 2025-01-26 RX ADMIN — IPRATROPIUM BROMIDE AND ALBUTEROL SULFATE 3 ML: 2.5; .5 SOLUTION RESPIRATORY (INHALATION) at 20:08

## 2025-01-26 RX ADMIN — Medication 60 L/MIN: at 20:08

## 2025-01-26 RX ADMIN — CARVEDILOL 3.12 MG: 6.25 TABLET, FILM COATED ORAL at 08:53

## 2025-01-26 RX ADMIN — CEFTRIAXONE SODIUM 1 G: 1 INJECTION, SOLUTION INTRAVENOUS at 17:48

## 2025-01-26 RX ADMIN — IPRATROPIUM BROMIDE AND ALBUTEROL SULFATE 3 ML: 2.5; .5 SOLUTION RESPIRATORY (INHALATION) at 15:32

## 2025-01-26 RX ADMIN — Medication 60 L/MIN: at 23:10

## 2025-01-26 RX ADMIN — HYDRALAZINE HYDROCHLORIDE 10 MG: 10 TABLET ORAL at 15:57

## 2025-01-26 ASSESSMENT — PAIN SCALES - GENERAL
PAINLEVEL_OUTOF10: 0 - NO PAIN

## 2025-01-26 ASSESSMENT — PAIN - FUNCTIONAL ASSESSMENT
PAIN_FUNCTIONAL_ASSESSMENT: 0-10

## 2025-01-26 NOTE — PROGRESS NOTES
"Vancomycin Dosing by Pharmacy- FOLLOW UP    MRSA PCR NEGATIVE    Aristeo Barriga is a 79 y.o. year old male who Pharmacy has been consulted for vancomycin dosing for Vancomycin Indications: Pneumonia. Based on the patient's indication and renal status this patient will be dosed based on a goal trough/random level of 10-15.     Renal function is currently declining.    Current vancomycin dose: Dose by level    Most recent random level: 27.9 mcg/mL    Visit Vitals  /85   Pulse 74   Temp 36.2 °C (97.2 °F) (Temporal)   Resp 20           Lab Results   Component Value Date    CREATININE 2.15 (H) 01/26/2025    CREATININE 2.38 (H) 01/25/2025    CREATININE 2.01 (H) 01/24/2025    CREATININE 2.03 (H) 01/23/2025       Patient weight is No results found for: \"PTWEIGHT\"    No results found for: \"CULTURE\"    I/O last 3 completed shifts:  In: 925.8 (13.5 mL/kg) [I.V.:75.8 (1.1 mL/kg); NG/GT:650; IV Piggyback:200]  Out: 3100 (45.1 mL/kg) [Urine:3100 (1.3 mL/kg/hr)]  Weight: 68.7 kg     Lab Results   Component Value Date    PATIENTTEMP 37.0 01/25/2025    PATIENTTEMP 37.0 01/25/2025    PATIENTTEMP 37.0 01/24/2025          Assessment/Plan     Serum creatinine appears elevated from baseline, will plan to dose by level. Current level with AM labs is 27.9. Plan to hold today, 1/26, and redraw level tomorrow. Anticipate next dose 1/27  MRSA PCR IS NEGATIVE  The next level will be obtained on 1/27 at 0500. May be obtained sooner if clinically indicated.   Will continue to monitor renal function daily while on vancomycin and order serum creatinine at least every 48 hours if not already ordered.  Follow for continued vancomycin needs, clinical response, and signs/symptoms of toxicity.     Jaison TorresD, BCPS      "

## 2025-01-26 NOTE — PROGRESS NOTES
Patient was extubated yesterday, on airvo today. [facility] with [facility] listed as [facility]. [facility] and updated with patient's admission information. [facility] per request.

## 2025-01-26 NOTE — PROGRESS NOTES
Critical Care Daily Progress Notes        Subjective   Patient is a 79 y.o. male admitted on 1/23/2025 12:40 PM with the following indication(s) for ICU care: Acute hypoxic respiratory failure requiring intubation.     Interval History:   Aristeo Barriga is a 79 y.o. male with PMHx s/f COPD with baseline oxygen requirement of 4 L chronic systolic diastolic heart failure last EF in January 2020 for 50% history of myeloproliferative disorder coronary artery disease with recent stress test showing no reversible ischemia benign prostatic hypertrophy abdominal aortic aneurysm with repair was admitted to ICU for Acute hypoxic respiratory failure requiring  intubation.  On presentation to the ER his oxygen saturation was 50s on CPAP.      1/24/2025: Patient was placed on a sedation vacation followed by spontaneous breathing trial.  Patient failed spontaneous breathing trial because of hypoxia and tachypnea.     1/25/2025: Patient is passing spontaneous breathing trial and blood gas during SBT showed pH of 7.36.  Patient has strep pneumonia bacteremia and urine strep antigen positive.     1/26/2025: Patient was successfully extubated on 1/25/2025.  But the patient continued to require high amount of oxygen currently on Airvo with the 60 L/min of oxygen flow and the FiO2 of 70%.     Scheduled Medications:   aspirin, 81 mg, oral, Daily  atorvastatin, 80 mg, oral, Nightly  carvedilol, 3.125 mg, oral, BID  clopidogrel, 75 mg, oral, Daily  doxycycline, 100 mg, intravenous, q12h  fluticasone furoate-vilanteroL, 1 puff, inhalation, Daily  hydrALAZINE, 10 mg, oral, TID  ipratropium-albuteroL, 3 mL, nebulization, 4x daily  isosorbide mononitrate ER, 30 mg, oral, Daily  methylPREDNISolone sodium succinate (PF), 40 mg, intravenous, q6h  montelukast, 10 mg, oral, Daily  oseltamivir, 30 mg, oral, q24h  oxygen, , inhalation, Continuous - Inhalation  oxygen, , inhalation, Continuous - Inhalation  pantoprazole, 40 mg, oral, Daily before  breakfast   Or  pantoprazole, 40 mg, intravenous, Daily before breakfast  piperacillin-tazobactam, 3.375 g, intravenous, q6h  ruxolitinib, 20 mg, oral, BID  tamsulosin, 0.4 mg, oral, Daily  tiotropium, 2 puff, inhalation, Daily         Continuous Medications:           PRN Medications:   PRN medications: hydrALAZINE, ipratropium-albuteroL, labetaloL, vancomycin    Objective   Vitals:  Most Recent:  Vitals:    01/26/25 1500   BP: (!) 164/92   Pulse: 79   Resp: 25   Temp:    SpO2: (!) 88%       24hr Min/Max:  Temp  Min: 36.1 °C (97 °F)  Max: 37.2 °C (99 °F)  Pulse  Min: 61  Max: 80  BP  Min: 106/64  Max: 185/97  Resp  Min: 13  Max: 30  SpO2  Min: 86 %  Max: 100 %    LDA:  ETT  7.5 mm (Active)   Placement Date/Time: 01/23/25 1350   Mask Ventilation: Vent by mask  Technique: Tube changer;Stylet  ETT Type: ETT - single  Single Lumen Tube Size: 7.5 mm  Cuffed: Yes  Location: Oral  Placement Verification: Auscultation;Capnometry  Placed By: ED ph...   Number of days: 1       Urethral Catheter Coude 16 Fr. (Active)   Placement Date/Time: 01/23/25 1600   Placed by: lydia greenfield  Hand Hygiene Completed: Yes  Catheter Type: Coude  Tube Size (Fr.): 16 Fr.  Catheter Balloon Size: 10 mL  Urine Returned: Yes   Number of days: 1       NG/OG/Feeding Tube OG - Bonner sump 16 Fr Center mouth (Active)   Placement Date/Time: 01/23/25 1443   Placed by: Twila SYED RN  Hand Hygiene Completed: Yes  Type of Tube: NG/OG Tube  Tube Length: 50 cm  Tube Type: OG - Bonner sump  NG/OG Tube Size: 16 Fr  Tube Location: Center mouth   Number of days: 1         Vent settings:  FiO2 (%):  [82 %] 82 %    Hemodynamic parameters for last 24 hours:       I/O:    Intake/Output Summary (Last 24 hours) at 1/26/2025 1524  Last data filed at 1/26/2025 0600  Gross per 24 hour   Intake 600 ml   Output 2205 ml   Net -1605 ml       Physical Exam:   Physical Exam   Vitals reviewed.   Constitutional:       General: He is not in acute distress.  HENT:      Head:  Normocephalic and atraumatic.      Right Ear: External ear normal.      Left Ear: External ear normal.      Nose: Nose normal.      Mouth/Throat:      Mouth: Mucous membranes are moist.      Pharynx: Oropharynx is clear.   Eyes:      General: No scleral icterus.     Extraocular Movements: Extraocular movements intact.      Conjunctiva/sclera: Conjunctivae normal.      Pupils: Pupils are equal, round, and reactive to light.   Neck:      Vascular: No carotid bruit.   Cardiovascular:      Rate and Rhythm: Regular rhythm. Tachycardia present.      Pulses: Normal pulses.      Heart sounds: Normal heart sounds.   Pulmonary:      Effort: Pulmonary effort is normal. No respiratory distress.      Comments: Diminished in bilateral bases  Chest:      Chest wall: No tenderness.   Abdominal:      General: Bowel sounds are normal. There is no distension.      Palpations: Abdomen is soft. There is no mass.      Tenderness: There is no abdominal tenderness. There is no rebound.   Musculoskeletal:         General: Deformity present. No swelling. Normal range of motion.      Cervical back: Normal range of motion.      Comments: B/L bka and prosthesis   Skin:     General: Skin is warm and dry.      Capillary Refill: Capillary refill takes less than 2 seconds.   Neurological:      Comments: Alert but not oriented    Lab/Radiology/Diagnostic Review:  Results for orders placed or performed during the hospital encounter of 01/23/25 (from the past 24 hours)   Blood Gas Venous Full Panel   Result Value Ref Range    POCT pH, Venous 7.38 7.33 - 7.43 pH    POCT pCO2, Venous 43 41 - 51 mm Hg    POCT pO2, Venous 48 (H) 35 - 45 mm Hg    POCT SO2, Venous 77 (H) 45 - 75 %    POCT Oxy Hemoglobin, Venous 75.0 45.0 - 75.0 %    POCT Hematocrit Calculated, Venous 25.0 (L) 41.0 - 52.0 %    POCT Sodium, Venous 147 (H) 136 - 145 mmol/L    POCT Potassium, Venous 4.3 3.5 - 5.3 mmol/L    POCT Chloride, Venous 113 (H) 98 - 107 mmol/L    POCT Ionized Calicum,  Venous 1.22 1.10 - 1.33 mmol/L    POCT Glucose, Venous 176 (H) 74 - 99 mg/dL    POCT Lactate, Venous 1.3 0.4 - 2.0 mmol/L    POCT Base Excess, Venous 0.2 -2.0 - 3.0 mmol/L    POCT HCO3 Calculated, Venous 25.4 22.0 - 26.0 mmol/L    POCT Hemoglobin, Venous 8.2 (L) 13.5 - 17.5 g/dL    POCT Anion Gap, Venous 13.0 10.0 - 25.0 mmol/L    Patient Temperature 37.0 degrees Celsius    FiO2 40 %   POCT GLUCOSE   Result Value Ref Range    POCT Glucose 161 (H) 74 - 99 mg/dL   POCT GLUCOSE   Result Value Ref Range    POCT Glucose 193 (H) 74 - 99 mg/dL   CBC   Result Value Ref Range    WBC 9.1 4.4 - 11.3 x10*3/uL    nRBC 0.3 (H) 0.0 - 0.0 /100 WBCs    RBC 2.73 (L) 4.50 - 5.90 x10*6/uL    Hemoglobin 7.9 (L) 13.5 - 17.5 g/dL    Hematocrit 24.6 (L) 41.0 - 52.0 %    MCV 90 80 - 100 fL    MCH 28.9 26.0 - 34.0 pg    MCHC 32.1 32.0 - 36.0 g/dL    RDW 20.8 (H) 11.5 - 14.5 %    Platelets 201 150 - 450 x10*3/uL   Basic Metabolic Panel   Result Value Ref Range    Glucose 147 (H) 74 - 99 mg/dL    Sodium 150 (H) 136 - 145 mmol/L    Potassium 3.2 (L) 3.5 - 5.3 mmol/L    Chloride 109 (H) 98 - 107 mmol/L    Bicarbonate 29 21 - 32 mmol/L    Anion Gap 15 10 - 20 mmol/L    Urea Nitrogen 85 (H) 6 - 23 mg/dL    Creatinine 2.15 (H) 0.50 - 1.30 mg/dL    eGFR 31 (L) >60 mL/min/1.73m*2    Calcium 8.4 (L) 8.6 - 10.3 mg/dL   Vancomycin   Result Value Ref Range    Vancomycin 27.9 (H) 5.0 - 20.0 ug/mL     XR chest 1 view    Result Date: 1/24/2025  Interpreted By:  Daisy Beard, STUDY: XR CHEST 1 VIEW;  1/24/2025 9:33 am   INDICATION: Signs/Symptoms:hemoptysis, evaluate lung fields, lines/tubes.     COMPARISON: 01/23/2025 chest x-ray and chest CT   ACCESSION NUMBER(S): [id number]   ORDERING CLINICIAN: KORI MARSHALL   TECHNIQUE: Portable AP upright   FINDINGS: Endotracheal tube tip projects about 3 cm above the jayy. Nasogastric tube tip projects below the diaphragm and appears to terminate in the cardia region of the stomach. The side-hole of the NG  tube is in the distal esophagus. Cardiac silhouette is partially obscured by pulmonary infiltrate. The consolidation through the mid and lower left lung is similar to the prior study and prior CT demonstrates that this is throughout the left lower lobe and involves the posterior portion of the left upper lobe and most of the lingula. There is a small infiltrate in the medial right lung base which correlates with right lower lobe findings of atelectasis on the prior CT. Right sided findings are not significantly changed. There is probably a small amount of left pleural fluid in addition to the left-sided pneumonia. Mediastinal structures shift to the left slightly even allowing for the patient rotation, which also correlates with findings on prior CT with overall lesser volume in the left lung than right. Osseous structures show no change.       NG tube tip is barely within the stomach in the side-hole is in the distal esophagus   Left-sided pneumonia and right basilar atelectasis are similar to the prior study. There is mild diminished volume in the left lung compared to the right which is unchanged from the prior study   Probable small left pleural effusion in addition to the pneumonia   MACRO: None.   Signed by: Daisy Beard 1/24/2025 10:36 AM Dictation workstation:   [id number]    ECG 12 lead    Result Date: 1/24/2025  Sinus rhythm Paired ventricular premature complexes Aberrant conduction of SV complex(es) Probable left atrial enlargement Nonspecific intraventricular conduction delay Probable inferior infarct, age indeterminate Baseline wander in lead(s) I,III,aVR,aVL,aVF,V3,V6    XR abdomen 1 view    Result Date: 1/23/2025  Interpreted By:  Curt Shore, STUDY: Abdominal series dated 1/23/2025.   INDICATION: Enteric tube placement.   COMPARISON: None.   ACCESSION NUMBER(S): [id number]   ORDERING CLINICIAN: KORI MARSHALL   TECHNIQUE: Single AP radiographs of the abdomen.   FINDINGS: Exam is limited due  to incomplete inclusion of the abdomen in the field of view including the hemidiaphragms and lower lung zones given enteric tube placement. There appears to be visualization of a tube projecting over the upper mid abdomen mostly just proximal to the presumed gastric air bubble. There is note of stent grafting in the vascular tree. Vascular calcifications are seen over the soft tissues.       Limited exam with probable visualization of enteric tube over the upper abdomen. Repeat radiography is recommended to include the thoracoabdominal region. Further recommendations may be given on the basis of that exam.   Signed by: Curt Shore 1/23/2025 8:44 PM Dictation workstation:   [id number]    CT angio chest abdomen pelvis    Result Date: 1/23/2025  Interpreted By:  Vadim Reed, STUDY: CT ANGIO CHEST ABDOMEN PELVIS;  1/23/2025 3:54 pm   INDICATION: Signs/Symptoms:respiratory failure with hypoxia, sepsis, now intubated, hx AAA repair, r/o AAA rupture.   COMPARISON: 01/23/2025   ACCESSION NUMBER(S): [id number]   ORDERING CLINICIAN: BILL SCHULZ   TECHNIQUE: Axial non-contrast images of the chest abdomen, and pelvis.   Axial CT images of the chest, abdomen and pelvis were obtained after the intravenous administration of iodinated contrast using angiographic technique with coronal and sagittal reformatted images. MIP images and 3D reconstructions were created on an independent workstation and reviewed.   FINDINGS: VASCULATURE:   Moderate atherosclerotic calcification of the aortic arch and thoracic and abdominal aorta. The ascending aorta is borderline ectatic measuring 4 cm in diameter.   There is a infrarenal abdominal aortic aneurysm previously treated with endograft with aneurysm sac measuring 3.8 cm in diameter.   There is severe disease involving the bilateral external iliac arteries with areas of multifocal moderate-to-severe stenosis suspected.   There is probable severe stenosis origin of the celiac  artery. SMA appears to be widely patent. At least moderate disease origin of both renal arteries, right-greater-than-left.   CT CHEST:   There is underlying severe emphysema. There is consolidation seen in the lung bases, left-greater-than-right consistent with underlying pneumonia. The patient is intubated. NG tube appears to terminate near the GE junction.   There is mild cardiomegaly with severe coronary artery calcification.   No significant adenopathy.   No significant effusions.     CT ABDOMEN/PELVIS:   Arterial phase imaging of the liver, gallbladder, adrenals pancreas spleen grossly unremarkable.   The kidneys are somewhat atrophic.   No bowel obstruction or significant colitis. Mild diverticulosis.   Bladder is within normal limits   No free fluid or significant adenopathy.   There is a chronic compression fracture of T6 with sclerotic appearance. Multilevel moderate to advanced degenerative disc disease noted.       Infrarenal abdominal aortic aneurysm status post endograft treatment. Areas of multifocal moderate-to-severe stenosis bilateral external iliac arteries. Additional atherosclerotic disease as above.   Extensive consolidation in the lung bases consistent with probable underlying pneumonia, left-greater-than-right. This is superimposed on severe emphysema.   Additional nonemergent/senescent changes as above.   MACRO: None.   Signed by: Vadim Reed 1/23/2025 4:32 PM Dictation workstation:   [id number]    XR chest abdomen for OG NG placement    Result Date: 1/23/2025  Interpreted By:  Stacie Parker, STUDY: XR CHEST ABDOMEN FOR OG NG PLACEMENT; ;  1/23/2025 1:36 pm   INDICATION: Signs/Symptoms:ETT placement, OG placement.     COMPARISON: None.   ACCESSION NUMBER(S): [id number]   ORDERING CLINICIAN: BILL SCHULZ   FINDINGS: AP portable view of the chest is obtained.  Limited exam due to portable nature. Magnified cardiac silhouette. Endotracheal tube in place and terminates above the  jayy. Left perihilar and left basilar infiltrates and effusions. Mild interstitial prominence diffusely.       Left perihilar and left basilar infiltrates and effusions for which follow-up is recommended.   MACRO: None   Signed by: Stacie Parker 1/23/2025 2:07 PM Dictation workstation:   [id number]    Nuclear Stress Test    Result Date: 1/20/2025  Interpreted By:  Ric See, STUDY: NUCLEAR STRESS TEST;  1/20/2025 2:38 pm   INDICATION: Signs/Symptoms:CHF.   COMPARISON: None.   ACCESSION NUMBER(S): [id number]   ORDERING CLINICIAN: KEN ROBIN   TECHNIQUE: DIVISION OF NUCLEAR MEDICINE PHARMACOLOGIC STRESS MYOCARDIAL PERFUSION SCAN, ONE DAY PROTOCOL   The patient received an intravenous dose of  10.7 mCi of Tc-99m tetrofosmin and resting emission tomographic (SPECT) images of the myocardium were acquired. The patient then received an intravenous infusion of 0.4mg regadenoson (Lexiscan) followed by an additional dose of  34.3 mCi of Tc-99m tetrofosmin. Stress phase SPECT images of the myocardium were then acquired. These included ECG-gated images to assess and quantify ventricular function.   FINDINGS: There is a moderate-to-large primarily fixed but partially reversible perfusion defect in the inferior, inferolateral, and lateral walls with associated wall motion abnormality suggesting prior infarct with a small area of tiffany-infarct ischemia.   Calculated ejection fraction of 46% with hypokinesis of the inferior, inferolateral, and lateral walls   Attenuation correction CT images demonstrate coronary artery calcification, aortic atherosclerotic calcification, lung consolidation/infiltrates bilaterally       1. There is a moderate-to-large primarily fixed but partially reversible perfusion defect in the inferior, inferolateral, and lateral walls with associated wall motion abnormality suggesting prior infarct with a small area of tiffany-infarct ischemia.   2. Calculated ejection fraction of 46% with hypokinesis of the  inferior, inferolateral, and lateral calvillo     Signed by: Ric See 1/20/2025 4:52 PM Dictation workstation:   [id number]    Cardiology Interpretation Of Nuclear Stress - See Other Report For Nuclear Portion    Result Date: 1/20/2025              [place] [address]      Phone [phone] Fax [phone] Nuclear Pharmacologic Stress Test Patient Name:      AMITA BELTRAN  Ordering Provider:    [id number] ROBIN Study Date:        1/20/2025           Reading Physician:    [id number] Ric See DO MRN/PID:           [id number]            Supervising           [id number] Ric See DO                                        Physician: Accession#:        [id number]        Fellow: Date of Birth/Age: 1945 / 79      Fellow:                    years Gender:            ILSA                   Nurse:                Erin Ellison Admission Status:  Inpatient           Sonographer:          RADHA Height:            180.34 cm           Technologist: Weight:            66.23 kg            Additional Staff: BSA:               1.84 m2             Encounter#:           [id number] BMI:               20.36 kg/m2         Patient Location:     [facility] Study Type:    CARDIOLOGY INTERPRETATION OF NUCLEAR STRESS Diagnosis/ICD: Dyspnea, unspecified-R06.00 Indication:    Dyspnea CPT Codes:     Stress Test Interpretation-15321; Stress Test Supervision-81643 Falls Risk: Low: Patient has low risk for sustaining a fall; environmental safety interventions in place.  Study Details: Correct procedure and correct patient verified verbally and with                ID Band checked.  Patient History: Hypertension, coronary artery disease, dyslipidemia, congestive heart failure, peripheral vascular disease, hx of thoracic aneurysm repair, chronic obstructive pulmonary disease and hyperlipidemia. aortic aneursym, CKD, PCI, right BKA. Allergies: Codeine. Smoker:    Current.  Medications: Lasix, jakafi,  albuterol, potassium, carvedilol, hydralazine, isosorbide, protonix, ASA. atorvastatin. The patient took medications as prescribed.  Patient Performance: Patient received a total of 0.4 mg of Regadenoson at 8:19:41 AM. The peak heart rate achieved was 89 bpm, which was 63 % of the age predicted target heart rate of 141 bpm. The resting blood pressure was 121/61 mmHg with a heart rate of 78 bpm. The patient developed no symptoms during the stress exam. The blood pressure response was normal. The test was terminated due to: completed lab protocol. Patient has met the discharge criteria and is discharged to their floor.  Baseline ECG: Resting ECG showed Sinus Arrhythmia with prior inferior infarct and prior lateral infarct.  Stress ECG: Stress ECG showed sinus arrhythmia, with occ PVC. No ST changes.  Stress Stage Data: +----------------+--+------+-------+                 HRSys BPDias BP +----------------+--+------+-------+ Baseline [id number]   61      +----------------+--+------+-------+ Stage I         [id number]   48      +----------------+--+------+-------+ Stage II        15487   54      +----------------+--+------+-------+ Stage III       54885   54      +----------------+--+------+-------+ Stage IV        08486   58      +----------------+--+------+-------+ Stage V         18366   57      +----------------+--+------+-------+  Summary:  1. Correlate with myocardial perfusion imaging results.  2. No ECG changes from baseline.  3. Nuclear image results are reported separately. [id number] Ric See DO Electronically signed on 1/20/2025 at 1:56:32 PM   ** Final **     ECG 12 Lead    Result Date: 1/20/2025  Sinus rhythm Probable left atrial enlargement RSR' in V1 or V2, right VCD or RVH Abnormal T, consider ischemia, lateral leads Borderline ST elevation, anterior leads Baseline wander in lead(s) V2    CT chest high resolution    Result Date: 1/20/2025  Interpreted By:  Pietro Adams,  STUDY: CT CHEST HIGH RESOLUTION;  1/19/2025 1:55 pm   INDICATION: Signs/Symptoms:SOB/R/O Pulmonary fibrosis.     COMPARISON: 10/03/2023   ACCESSION NUMBER(S): [id number]   ORDERING CLINICIAN: KEN ROBIN   TECHNIQUE: Using helical multidetector technique, volumetric data acquisition of the chest was obtained without intravenous administration of contrast material under the high resolution chest CT protocol. Examination includes contiguous slices through the chest in supine positioning during inspiration, noncontiguous axial slices in supine positioning during expiration and prone positioning during inspiration.   FINDINGS: LUNGS AND AIRWAYS: The trachea and central airways are patent. No endobronchial lesion is seen.   There are severe centrilobular emphysematous changes. Interval appearance of inferior lingular consolidation/airspace disease. Interval worsening in left lower lobe consolidation/atelectasis. Interval worsening in right basilar airspace disease/infiltrate/pneumonia.   Expiratory imaging demonstrates diffuse air trapping..     MEDIASTINUM AND NICO, LOWER NECK AND AXILLA: The visualized thyroid gland is within normal limits. There are scattered mediastinal lymph nodes are felt to be reactive/inflammatory in nature. Esophagus appears within normal limits as seen.   HEART AND VESSELS: Severe atherosclerotic calcifications of the thoracic aorta. Main pulmonary artery and its branches are normal in caliber. Severe coronary artery calcifications. The cardiac chambers are not enlarged. There is no pericardial effusion seen.   UPPER ABDOMEN: Extensive atherosclerotic calcification and infrarenal aortic stent placement. Indeterminate high attenuation within the infrarenal stent may be artifactual but correlate with any concern for renal stenosis/thrombus.     CHEST WALL AND OSSEOUS STRUCTURES: Chest wall is within normal limits. No acute osseous pathology.There are no suspicious osseous lesions.There is  interval compression fracture with bony sclerosis of the T6 vertebral body.       1. There is severe emphysematous changes with multifocal parenchymal infiltrates concerning for pneumonia/aspiration. Correlate with symptoms of fever/sputum production. Recommend follow-up radiographs to document resolution. 2. Severe atherosclerotic disease of the aorta with coronary artery calcifications and correlate with coronary artery disease risk factors. 3. Interval compression fracture of the T6 vertebral body with bony sclerosis. This is most likely secondary to fracture healing but correlate with any concern for pathologic fracture.     MACRO: Critical Finding:  See findings. Notification was initiated on 1/20/2025 at 9:39 am by  Pietro Adams.  (**-YCF-**) Instructions:   Signed by: Pietro Adams 1/20/2025 9:39 AM Dictation workstation:   [id number]    Transthoracic Echo (TTE) Complete    Result Date: 1/18/2025              [place] [id number]      Phone [phone] Fax [phone] TRANSTHORACIC ECHOCARDIOGRAM REPORT Patient Name:       AMITA Mercedes Physician:    [id number] Rigo Sim MD Study Date:         1/18/2025            Ordering Provider:    [id number] JOCELYNN OSWALD MRN/PID:            [id number]             Fellow: Accession#:         [id number]         Nurse:                ER nurse Date of Birth/Age:  1945 / 79 years Sonographer:          Amalia Fernandez RDCS Gender Assigned at  M                    Additional Staff: Birth: Height:             180.34 cm            Admit Date:           1/17/2025 Weight:             70.76 kg             Admission Status:     Inpatient -                                                                Routine BSA / BMI:           1.90 m2 / 21.76      Department Location:  [facility]                     kg/m2 Blood Pressure: 138 /82 mmHg Study Type:    TRANSTHORACIC ECHO (TTE) COMPLETE Diagnosis/ICD: Other forms of dyspnea-R06.09; Elevated Troponin-R79.89 Indication:    Dyspnea, Elevated troponin CPT Codes:     Echo Complete w Full Doppler-23964 Patient History: Pertinent History: CAD, HTN and CHF. Study Detail: The following Echo studies were performed: 2D, M-Mode, Doppler and               color flow. Optison used as a contrast agent for endocardial               border definition. Total contrast used for this procedure was 3 mL               via IV push.  PHYSICIAN INTERPRETATION: Left Ventricle: The left ventricular systolic function is mildly decreased, with a Randhawa's biplane calculated ejection fraction of 50%. There is global hypokinesis of the left ventricle with minor regional variations. The left ventricular cavity size is normal. There is normal septal and normal posterior left ventricular wall thickness. There is left ventricular concentric remodeling. Spectral Doppler shows a Grade I (impaired relaxation pattern) of left ventricular diastolic filling with normal left atrial filling pressure. Left Atrium: The left atrium is normal in size. Right Ventricle: The right ventricle is normal in size. There is normal right ventricular global systolic function. Right Atrium: The right atrium is normal in size. Aortic Valve: The aortic valve appears structurally normal. There is mild aortic valve cusp calcification. The aortic valve dimensionless index is 0.74. There is trace aortic valve regurgitation. The peak instantaneous gradient of the aortic valve is 8 mmHg. The mean gradient of the aortic valve is 4 mmHg. Mitral Valve: The mitral valve is mildly thickened. There is trace mitral valve regurgitation. Tricuspid Valve: The tricuspid valve is structurally normal. There is trace tricuspid regurgitation. Pulmonic Valve: The pulmonic  valve is not well visualized. There is no indication of pulmonic valve regurgitation. Pericardium: No pericardial effusion noted. Aorta: The aortic root is normal. There is moderate dilatation of the ascending aorta.  CONCLUSIONS:  1. The left ventricular systolic function is mildly decreased, with a Randhawa's biplane calculated ejection fraction of 50%.  2. There is global hypokinesis of the left ventricle with minor regional variations.  3. Spectral Doppler shows a Grade I (impaired relaxation pattern) of left ventricular diastolic filling with normal left atrial filling pressure.  4. There is normal right ventricular global systolic function.  5. There is moderate dilatation of the ascending aorta. QUANTITATIVE DATA SUMMARY:  2D MEASUREMENTS:           Normal Ranges: Ao Root d:       3.60 cm   (2.0-3.7cm) LAs:             3.70 cm   (2.7-4.0cm) IVSd:            1.04 cm   (0.6-1.1cm) LVPWd:           1.00 cm   (0.6-1.1cm) LVIDd:           4.09 cm   (3.9-5.9cm) LVIDs:           2.90 cm LV Mass Index:   71.3 g/m2 LV % FS          29.1 %  LA VOLUME:                    Normal Ranges: LA Vol A4C:        49.6 ml    (22+/-6mL/m2) LA Vol A2C:        44.9 ml LA Vol BP:         49.7 ml LA Vol Index A4C:  26.2ml/m2 LA Vol Index A2C:  23.7 ml/m2 LA Vol Index BP:   26.2 ml/m2 LA Area A4C:       19.6 cm2 LA Area A2C:       17.7 cm2 LA Major Axis A4C: 6.6 cm LA Major Axis A2C: 5.9 cm  RA VOLUME BY A/L METHOD:          Normal Ranges: RA Area A4C:             14.5 cm2  AORTA MEASUREMENTS:         Normal Ranges: Ao Sinus, d:        3.60 cm (2.1-3.5cm) Ao STJ, d:          2.22 cm (1.7-3.4cm) Asc Ao, d:          4.40 cm (2.1-3.4cm)  LV SYSTOLIC FUNCTION BY 2D PLANIMETRY (MOD):                      Normal Ranges: EF-A4C View:    48 % (>=55%) EF-A2C View:    53 % EF-Biplane:     50 % LV EF Reported: 50 %  LV DIASTOLIC FUNCTION:            Normal Ranges: MV Peak E:             0.80 m/s   (0.7-1.2 m/s) MV Peak A:             1.17 m/s    (0.42-0.7 m/s) E/A Ratio:             0.68       (1.0-2.2) MV e'                  0.054 m/s  (>8.0) MV lateral e'          0.06 m/s MV medial e'           0.05 m/s MV A Dur:              92.00 msec E/e' Ratio:            14.65      (<8.0)  MITRAL VALVE:          Normal Ranges: MV DT:        168 msec (150-240msec)  AORTIC VALVE:                     Normal Ranges: AoV Vmax:                1.44 m/s (<=1.7m/s) AoV Peak P.3 mmHg (<20mmHg) AoV Mean P.0 mmHg (1.7-11.5mmHg) LVOT Max Cristofer:            0.89 m/s (<=1.1m/s) AoV VTI:                 25.30 cm (18-25cm) LVOT VTI:                18.80 cm LVOT Diameter:           2.00 cm  (1.8-2.4cm) AoV Area, VTI:           2.33 cm2 (2.5-5.5cm2) AoV Area,Vmax:           1.94 cm2 (2.5-4.5cm2) AoV Dimensionless Index: 0.74  AORTIC INSUFFICIENCY: AI Vmax:       3.79 m/s AI Half-time:  498 msec AI Decel Rate: 223.00 cm/s2  RIGHT VENTRICLE: RV Basal 3.76 cm RV Mid   2.42 cm RV Major 8.4 cm TAPSE:   26.4 mm RV s'    0.14 m/s  TRICUSPID VALVE/RVSP:          Normal Ranges: Peak TR Velocity:     2.72 m/s RV Syst Pressure:     33 mmHg  (< 30mmHg) IVC Diam:             1.83 cm  [id number] Rigo Sim MD Electronically signed on 2025 at 10:45:48 AM  ** Final **     XR chest 1 view    Result Date: 2025  STUDY: Chest Radiograph;  2025 at 16:40 hours. INDICATION: Shortness of breath. COMPARISON: Chest, single portable view obtained on 2024 at 02:19 hours.  CT Chest 10/03/2023. ACCESSION NUMBER(S): [id number] ORDERING CLINICIAN: RANDALL CARSON TECHNIQUE:  Frontal chest was obtained at 16:40 hours. FINDINGS: There is a left pleural effusion.  There are increased interstitial markings at the lung bases, suspicious for edema.  Heart size is top normal.  No pneumothorax is noted.    1.  Left pleural effusion. 2.  Increased interstitial markings at the lung bases, suspicious for edema. Signed by Arden Otero MD        This patient has a urinary  catheter   Reason for the urinary catheter remaining today? critically ill patient who need accurate urinary output measurements    This patient is intubated   Reason for patient to remain intubated today? they have inadequate gas-exchange without positive pressure         Malnutrition Diagnosis Status: New  Malnutrition Diagnosis: Severe malnutrition related to chronic disease or condition  Related to: chronic COPD, hx of leukemia, and hx of severe malnutrition  As Evidenced by: severe muscle and fat loss per NFPE, and PO intake <75% for > 1month  I agree with the dietitian's malnutrition diagnosis.      Assessment/Plan   Assessment & Plan  Acute on chronic respiratory failure with hypoxia (Multi)    Gram-negative pneumonia (Multi)    Coronary artery disease involving native coronary artery of native heart without angina pectoris    Acute on chronic combined systolic and diastolic congestive heart failure    Chronic renal disease, stage III (Multi)    Myeloproliferative neoplasm (Multi)    Chronic obstructive pulmonary disease with acute exacerbation (Multi)    Influenza A    Hypertensive heart and chronic kidney disease with heart failure and stage 1 through stage 4 chronic kidney disease, or unspecified chronic kidney disease    Hypokalemia    Pneumonia of both lungs due to Streptococcus pneumoniae (CMS-[facility])    Aristeo Barriga is a 79 y.o. male with PMHx s/f COPD with baseline oxygen requirement of 4 L chronic systolic diastolic heart failure last EF in January 2020 for 50% history of myeloproliferative disorder coronary artery disease with recent stress test showing no reversible ischemia benign prostatic hypertrophy abdominal aortic aneurysm with repair was admitted to ICU for Acute hypoxic respiratory failure requiring  intubation.  On presentation to the ER his oxygen saturation was 50s on CPAP.      Acute hypoxic respite failure secondary to influenza pneumonia, strep pneumonia and COPD exacerbation    1/23/2025:  Admitted to ICU for Acute hypoxic respiratory failure requiring  intubation.    On presentation to the ER his oxygen saturation was 50s on CPAP, intubated for persistent hypoxia  I adjusted her ventilator settings to AC/VC with tidal volume of 450 cc, respiratory rate 20, FiO2 of 90% and PEEP of 8  Try to titrate down PEEP to 5  1/24/2025: Patient was placed on a sedation vacation followed by spontaneous breathing trial.    Patient failed spontaneous breathing trial because of hypoxia and tachypnea.   Was able to titrate down the PEEP to 5  Increased tidal volume to 500 cc, continuous respiratory rate 20  Ordered VBG at 8 PM  1/25/2025: Patient is passing spontaneous breathing trial and blood gas during SBT showed pH of 7.36.    Will consider extubating the patient  Patient has strep pneumonia bacteremia and urine strep antigen positive.   Respiratory culture growing gram-positive cocci, MRSA screen is negative  Continue azithromycin, vancomycin and Zosyn  1/26/2025: Patient was successfully extubated on 1/25/2025.    But the patient continued to require high amount of oxygen currently on Airvo with the 60 L/min of oxygen flow and the FiO2 of 70%.   Had decrease FiO2 to 80% as the patient was saturating in mid 80s  Patient is not a mouth breather and became hypoxic on Ventimask  Okay to maintain oxygen saturation 90% or more because of chronic lung disease     2.  Influenza A positive  Started on Tamiflu  Placed on droplet precautions     3.  Healthcare associated pneumonia  1/23/2025: Patient was discharged from the hospital 3 days back  Broaden antibiotics from ceftriaxone to vancomycin and Zosyn  Continue azithromycin  Ordered panculture, urine strep, urine Legionella and MRSA screen  1/25/2025: Patient has strep pneumonia bacteremia and urine strep antigen positive.   Respiratory culture growing gram-positive cocci, MRSA screen is negative  On doxycycline, vancomycin and Zosyn  1/26/2025: Urine  strep antigen positive and blood culture growing strep pneumonia  De-escalated antibiotics from Zosyn to ceftriaxone  Continue doxycycline  Discontinued vancomycin     4.  Acute COPD exacerbation  1/23/2025: Patient has history of severe COPD with 100-pack-year smoking history and quit smoking 6 months back  Ordered DuoNeb every 4 hours along with as needed breathing treatments  Increased the frequency of IV Solu-Medrol to 40 mg every 6 hours  1/26/2025: Patient was successfully extubated on 1/25/2025.    But the patient continued to require high amount of oxygen currently on Airvo with the 60 L/min of oxygen flow and the FiO2 of 70%.   Had decrease FiO2 to 80% as the patient was saturating in mid 80s  On Singulair and Breo Ellipta  Okay to maintain oxygen saturation 90% or more because of chronic lung disease     5.  Hypotension secondary to propofol  Propofol was switched to Versed the ER  Patient is off of Levophed     6.  Hypertension with history of heart failure and coronary artery disease  Held antihypertensive and heart failure medications because of hypotension     7.  Counseling regarding advance care directives and goals of care  1/23/2025: I had extensive discussion with the patient daughter who is at bedside next of kin  Explained about overall condition of the patient  Explained difference between full code versus DNR  Daughter confirmed that the patient does not have any DNR on file and want to discuss with her sister  Patient will remain full code for now    I spent 30 minutes of cumulative critical care time with the patient.  Greater than 50% of that time was spent in the direct collaboration and or coordination of care of the patient.     Dragon dictation software was used to dictate this note and thus there may be minor errors in translation/transcription including garbled speech or misspellings. Please contact for clarification if needed.

## 2025-01-26 NOTE — PROGRESS NOTES
Aristeo Barriga is a 79 y.o. male on day 3 of admission presenting with Acute on chronic respiratory failure with hypoxia (Multi).    Review of Systems   Reason unable to perform ROS: Intubated.      Subjective   Aristeo Barriga is a 79 y.o. male with PMHx s/f COPD with baseline oxygen requirement of 4 L chronic systolic diastolic heart failure last EF in January 2020 for 50% history of myeloproliferative disorder coronary artery disease with recent stress test showing no reversible ischemia benign prostatic hypertrophy abdominal aortic aneurysm with repair.  Presenting with shortness of breath.  Patient had recently been hospitalized and discharged about 3 days ago.  At that time the patient was being treated for COPD exacerbation and CHF exacerbation.  The patient did have some CT findings suggestive of pneumonia but was improving on Zithromax therapy alone.  The patient subsequently been discharged to home about 3 days ago on oral Zithromax.  Evidently the patient progressively became more short of breath had arrived to the emergency department via squad on CPAP subsequently changed over to BiPAP patient was not saturating very well and subsequently intubated by the ED provider. Pt felt to have worsening pneumonia and possibley some CHF. Pt given IV rocephin and zithromax, no lasix given due to relatively low blood pressure  The patient was discussed with the ED provider and will be admitted to the ICU for acute on chronic respiratory failure LOS will exceed 2 midnights.      ED Course (Summary):   Vitals on presentation: Temperature is 97.5 heart rate 118 respiratory rate 30 blood pressure 130/62 SpO2 71% on BiPAP  Labs: Significant for glucose 192 creatinine 2.03 close to baseline, AST 56 initial lactate 2.5  troponin 39 CBC significant for leukopenia with white blood cell count 3.1 hemoglobin 11.7 hematocrit 36.4 platelets 198  Imaging: Chest x-ray showing left lower lobe consolidation and  effusion  Interventions: Patient intubated started on Zithromax and Rocephin    1/24: Patient seen.  Remains intubated.  Discussed with nursing, sedation is off but patient appears to be tolerating intubation.  Continue broad-spectrum antibiotic therapy, treating for gram-negative pneumonia given recent hospitalization.  Patient is positive for influenza A and is on Tamiflu.  Appreciate input from critical care.  Will continue treatment.  Requires ICU for mechanical ventilation.  Reassess in AM    1/25: Pt seen. Successfully extubated. Now positive for pneumococcal pneumonia, influenza A, and remains on empiric treatment for gram negative pneumonia. Stirs to exam, but seems to be tired. Will continue to monitor. Off pressors, remains somewhat hypotensive. Remains in ICU.    1/26: Pt seen. Remains extubated. BP elevated this AM. Resumed BP meds. Growing pneumococcal pneumonia. Antibiotics are being de-escalated. Vanco and Zosyn discontinued, now on ceftriaxone and doxycycline. Now off levophed. Will continue to monitor.       Objective     Last Recorded Vitals  /83   Pulse 75   Temp 36.6 °C (97.9 °F) (Temporal)   Resp 20   Wt 65.6 kg (144 lb 10 oz)   SpO2 (!) 88%   Intake/Output last 3 Shifts:    Intake/Output Summary (Last 24 hours) at 1/26/2025 1813  Last data filed at 1/26/2025 1650  Gross per 24 hour   Intake 600 ml   Output 3135 ml   Net -2535 ml       Admission Weight  Weight: 65.3 kg (144 lb) (01/23/25 1242)    Daily Weight  01/26/25 : 65.6 kg (144 lb 10 oz)      Physical Exam  Constitutional:       Interventions: He is sedated and intubated.   HENT:      Head: Normocephalic and atraumatic.      Nose: Nose normal. No congestion or rhinorrhea.      Mouth/Throat:      Mouth: Mucous membranes are dry.      Pharynx: Oropharynx is clear.   Eyes:      General: No scleral icterus.     Extraocular Movements: Extraocular movements intact.      Pupils: Pupils are equal, round, and reactive to light.    Cardiovascular:      Rate and Rhythm: Regular rhythm. Tachycardia present.      Heart sounds: Normal heart sounds. No murmur heard.     No friction rub. No gallop.   Pulmonary:      Effort: Pulmonary effort is normal. He is intubated.      Breath sounds: Decreased breath sounds present. No wheezing, rhonchi or rales.   Chest:      Chest wall: No tenderness.   Abdominal:      General: There is no distension.      Palpations: Abdomen is soft.      Tenderness: There is no abdominal tenderness. There is no guarding or rebound.   Musculoskeletal:         General: No swelling, tenderness or signs of injury. Normal range of motion.      Cervical back: Normal range of motion.      Comments: Bilateral BKA   Skin:     General: Skin is warm and dry.      Coloration: Skin is not jaundiced.      Findings: No bruising, erythema or rash.   Neurological:      Comments: Sedation is off, unresponsive          Lab Results  Results for orders placed or performed during the hospital encounter of 01/23/25 (from the past 24 hours)   POCT GLUCOSE   Result Value Ref Range    POCT Glucose 193 (H) 74 - 99 mg/dL   CBC   Result Value Ref Range    WBC 9.1 4.4 - 11.3 x10*3/uL    nRBC 0.3 (H) 0.0 - 0.0 /100 WBCs    RBC 2.73 (L) 4.50 - 5.90 x10*6/uL    Hemoglobin 7.9 (L) 13.5 - 17.5 g/dL    Hematocrit 24.6 (L) 41.0 - 52.0 %    MCV 90 80 - 100 fL    MCH 28.9 26.0 - 34.0 pg    MCHC 32.1 32.0 - 36.0 g/dL    RDW 20.8 (H) 11.5 - 14.5 %    Platelets 201 150 - 450 x10*3/uL   Basic Metabolic Panel   Result Value Ref Range    Glucose 147 (H) 74 - 99 mg/dL    Sodium 150 (H) 136 - 145 mmol/L    Potassium 3.2 (L) 3.5 - 5.3 mmol/L    Chloride 109 (H) 98 - 107 mmol/L    Bicarbonate 29 21 - 32 mmol/L    Anion Gap 15 10 - 20 mmol/L    Urea Nitrogen 85 (H) 6 - 23 mg/dL    Creatinine 2.15 (H) 0.50 - 1.30 mg/dL    eGFR 31 (L) >60 mL/min/1.73m*2    Calcium 8.4 (L) 8.6 - 10.3 mg/dL   Vancomycin   Result Value Ref Range    Vancomycin 27.9 (H) 5.0 - 20.0 ug/mL         Image Results  XR chest 1 view  Narrative: Interpreted By:  Vadim Rangel,   STUDY:  XR CHEST 1 VIEW;  1/26/2025 10:09 am      INDICATION:  Signs/Symptoms:Acute on chronic hypoxic respiratory failure.          COMPARISON:  CT chest without contrast 23 January 2025; portable chest 24 January 2025      ACCESSION NUMBER(S):  [id number]      ORDERING CLINICIAN:  CLEO MARSHALL      TECHNIQUE:  Single frontal view of the chest; Portable technique      FINDINGS:  Interval extubation      The cardiomediastinal silhouette is unchanged      Improved but not resolved consolidations      No new acute process such as large pleural effusion or pneumothorax  has developed      Impression: Improvement      MACRO:  None      Signed by: Vadim Rangel 1/26/2025 10:31 AM  Dictation workstation:   [id number]       Assessment/Plan     * Acute on chronic respiratory failure with hypoxia (Multi)  Assessment & Plan  Acute on chronic hypoxic respiratory failure likely due to worsening pneumonia on chronic chf, copd  Pt is septic with respiratory failure, elevated lactate level started on pressor support  CTA of chest pending to rule out PE  Pt on vent and will be admitted to the ICU  Pt failed treatment on Zithromax/steroids  IV Vanc/Zosyn for now given recent hospitalization and failure on zithromax  IV solumedrol, otc duoneb  Resume home treatments  1/24: Remains intubated on minimal sedation.  1/25: Successfully extubated today. Multifactorial failure with Influenza A and pneumonia (possibly pneumococcal).    Hypokalemia  Assessment & Plan  1/26: K was 3.2. Replace cautiously given renal function.    Influenza A  Assessment & Plan  Started on Tamiflu.  Droplet precautions.    Chronic obstructive pulmonary disease with acute exacerbation (Multi)  Assessment & Plan  Continue steroids.  Quit smoking 6 months ago.  Continue bronchodilators.    Hypertensive heart and chronic kidney disease with heart failure and stage 1 through stage 4  chronic kidney disease, or unspecified chronic kidney disease  Assessment & Plan  BP starting to come up rapidly.  Restarting home meds, may require further adjustment.    Myeloproliferative neoplasm (Multi)  Assessment & Plan  Myeloproliferative disorder  Continue Ruxolitinib     Chronic renal disease, stage III (Multi)  Assessment & Plan  CKD 3  Pt creatinine at recent level not acutely worsened  1/26: Gradually improving, watch.    Acute on chronic combined systolic and diastolic congestive heart failure  Assessment & Plan  ACUTE ON Chronic systolic and diastolic CHF  Recent echocardiogram [id number]  Reconcile pt home meds, diuresis as tolerated    Coronary artery disease involving native coronary artery of native heart without angina pectoris  Assessment & Plan  CAD hx PCI/Stent elevated troponin due to hypoxia  Stress test earlier this month negative for ischemia  Continue statin asa/plavix betablocker if tolerated    Pneumonia of both lungs due to Streptococcus pneumoniae (CMS-HCC)  Assessment & Plan  Recent hospitalization.  Treating as HCAP. Found to be pneumococcal pneumonia  1/25: Growing pneumococcus, pneumococcal pneumonia? Consider de-escalating antibiotics once more data is available.  1/26: De escalating to ceftriaxone and doxycycline.                      Carson Galindo MD

## 2025-01-26 NOTE — CARE PLAN
The clinical goals for the shift include pt will become more alert throughout shift      Problem: Safety - Medical Restraint  Goal: Remains free of injury from restraints (Restraint for Interference with Medical Device)  Outcome: Met  Goal: Free from restraint(s) (Restraint for Interference with Medical Device)  Outcome: Met     Problem: Knowledge Deficit  Goal: Patient/family/caregiver demonstrates understanding of disease process, treatment plan, medications, and discharge instructions  Outcome: Progressing     Problem: Mechanical Ventilation  Goal: Patient Will Maintain Patent Airway  Outcome: Met  Goal: Oral health is maintained or improved  Outcome: Met  Goal: Tracheostomy will be managed safely  Outcome: Met  Goal: ET tube will be managed safely  Outcome: Met  Goal: Ability to express needs and understand communication  Outcome: Met  Goal: Mobility/activity is maintained at optimum level for patient  Outcome: Met     Problem: Heart Failure  Goal: Improved gas exchange this shift  Outcome: Progressing  Goal: Improved urinary output this shift  Outcome: Progressing  Goal: Reduction in peripheral edema within 24 hours  Outcome: Progressing  Goal: Report improvement of dyspnea/breathlessness this shift  Outcome: Progressing  Goal: Weight from fluid excess reduced over 2-3 days, then stabilize  Outcome: Progressing  Goal: Increase self care and/or family involvement in 24 hours  Outcome: Progressing     Problem: Pain - Adult  Goal: Verbalizes/displays adequate comfort level or baseline comfort level  Outcome: Progressing     Problem: Safety - Adult  Goal: Free from fall injury  Outcome: Progressing     Problem: Discharge Planning  Goal: Discharge to home or other facility with appropriate resources  Outcome: Progressing     Problem: Chronic Conditions and Co-morbidities  Goal: Patient's chronic conditions and co-morbidity symptoms are monitored and maintained or improved  Outcome: Progressing     Problem:  Nutrition  Goal: Nutrient intake appropriate for maintaining nutritional needs  Outcome: Progressing     Problem: Skin  Goal: Prevent/manage excess moisture  Outcome: Progressing  Goal: Prevent/minimize sheer/friction injuries  Outcome: Progressing  Flowsheets (Taken 1/25/2025 2202)  Prevent/minimize sheer/friction injuries: Turn/reposition every 2 hours/use positioning/transfer devices  Goal: Promote/optimize nutrition  Outcome: Progressing

## 2025-01-27 VITALS
HEART RATE: 74 BPM | RESPIRATION RATE: 23 BRPM | TEMPERATURE: 97.6 F | DIASTOLIC BLOOD PRESSURE: 88 MMHG | HEIGHT: 71 IN | WEIGHT: 144.62 LBS | SYSTOLIC BLOOD PRESSURE: 152 MMHG | BODY MASS INDEX: 20.25 KG/M2 | OXYGEN SATURATION: 92 %

## 2025-01-27 VITALS
OXYGEN SATURATION: 73 % | HEIGHT: 71 IN | DIASTOLIC BLOOD PRESSURE: 21 MMHG | WEIGHT: 140.43 LBS | TEMPERATURE: 99.3 F | BODY MASS INDEX: 19.66 KG/M2 | RESPIRATION RATE: 26 BRPM | SYSTOLIC BLOOD PRESSURE: 55 MMHG

## 2025-01-27 LAB
ANION GAP BLDA CALCULATED.4IONS-SCNC: 13 MMO/L (ref 10–25)
ANION GAP SERPL CALC-SCNC: 14 MMOL/L (ref 10–20)
ANION GAP SERPL CALC-SCNC: 17 MMOL/L
BASE EXCESS BLDA CALC-SCNC: -1.7 MMOL/L (ref -2–3)
BASE EXCESS BLDA CALC-SCNC: 4.7 MMOL/L (ref -2–3)
BODY TEMPERATURE: 37 DEGREES CELSIUS
BODY TEMPERATURE: 37 DEGREES CELSIUS
BUN SERPL-MCNC: 89 MG/DL (ref 6–23)
BUN SERPL-MCNC: 96 MG/DL (ref 6–23)
CA-I BLDA-SCNC: 1.18 MMOL/L (ref 1.1–1.33)
CALCIUM SERPL-MCNC: 7.9 MG/DL (ref 8.6–10.3)
CALCIUM SERPL-MCNC: 8.5 MG/DL (ref 8.6–10.3)
CHLORIDE BLDA-SCNC: 124 MMOL/L (ref 98–107)
CHLORIDE SERPL-SCNC: 118 MMOL/L (ref 98–107)
CHLORIDE SERPL-SCNC: 120 MMOL/L (ref 98–107)
CO2 SERPL-SCNC: 27 MMOL/L (ref 21–32)
CO2 SERPL-SCNC: 29 MMOL/L (ref 21–32)
CREAT SERPL-MCNC: 2 MG/DL (ref 0.5–1.3)
CREAT SERPL-MCNC: 2.14 MG/DL (ref 0.5–1.3)
EGFRCR SERPLBLD CKD-EPI 2021: 31 ML/MIN/1.73M*2
EGFRCR SERPLBLD CKD-EPI 2021: 33 ML/MIN/1.73M*2
ERYTHROCYTE [DISTWIDTH] IN BLOOD BY AUTOMATED COUNT: 21.5 % (ref 11.5–14.5)
GLUCOSE BLDA-MCNC: 199 MG/DL (ref 74–99)
GLUCOSE SERPL-MCNC: 206 MG/DL (ref 74–99)
GLUCOSE SERPL-MCNC: 242 MG/DL (ref 74–99)
HCO3 BLDA-SCNC: 27 MMOL/L (ref 22–26)
HCO3 BLDA-SCNC: 28.1 MMOL/L (ref 22–26)
HCT VFR BLD AUTO: 27.2 % (ref 41–52)
HCT VFR BLD EST: 29 % (ref 41–52)
HGB BLD-MCNC: 8.5 G/DL (ref 13.5–17.5)
HGB BLDA-MCNC: 9.6 G/DL (ref 13.5–17.5)
INHALED O2 CONCENTRATION: 100 %
INHALED O2 CONCENTRATION: 100 %
LACTATE BLDA-SCNC: 1.5 MMOL/L (ref 0.4–2)
MAGNESIUM SERPL-MCNC: 3.22 MG/DL (ref 1.6–2.4)
MCH RBC QN AUTO: 29.6 PG (ref 26–34)
MCHC RBC AUTO-ENTMCNC: 31.3 G/DL (ref 32–36)
MCV RBC AUTO: 95 FL (ref 80–100)
NRBC BLD-RTO: 0.2 /100 WBCS (ref 0–0)
OXYHGB MFR BLDA: 83.8 % (ref 94–98)
OXYHGB MFR BLDA: 84.1 % (ref 94–98)
PCO2 BLDA: 36 MM HG (ref 38–42)
PCO2 BLDA: 63 MM HG (ref 38–42)
PH BLDA: 7.24 PH (ref 7.38–7.42)
PH BLDA: 7.5 PH (ref 7.38–7.42)
PLATELET # BLD AUTO: 230 X10*3/UL (ref 150–450)
PO2 BLDA: 57 MM HG (ref 85–95)
PO2 BLDA: 65 MM HG (ref 85–95)
POTASSIUM BLDA-SCNC: 3.3 MMOL/L (ref 3.5–5.3)
POTASSIUM SERPL-SCNC: 3.5 MMOL/L (ref 3.5–5.3)
POTASSIUM SERPL-SCNC: 3.6 MMOL/L (ref 3.5–5.3)
RBC # BLD AUTO: 2.87 X10*6/UL (ref 4.5–5.9)
SAO2 % BLDA: 86 % (ref 94–100)
SAO2 % BLDA: 86 % (ref 94–100)
SODIUM BLDA-SCNC: 162 MMOL/L (ref 136–145)
SODIUM SERPL-SCNC: 157 MMOL/L (ref 136–145)
SODIUM SERPL-SCNC: 160 MMOL/L (ref 136–145)
WBC # BLD AUTO: 9.2 X10*3/UL (ref 4.4–11.3)

## 2025-01-27 PROCEDURE — 82805 BLOOD GASES W/O2 SATURATION: CPT

## 2025-01-27 PROCEDURE — 94681 O2 UPTK CO2 OUTP % O2 XTRC: CPT

## 2025-01-27 PROCEDURE — 94640 AIRWAY INHALATION TREATMENT: CPT

## 2025-01-27 PROCEDURE — 36415 COLL VENOUS BLD VENIPUNCTURE: CPT | Performed by: INTERNAL MEDICINE

## 2025-01-27 PROCEDURE — 2500000005 HC RX 250 GENERAL PHARMACY W/O HCPCS

## 2025-01-27 PROCEDURE — 2500000004 HC RX 250 GENERAL PHARMACY W/ HCPCS (ALT 636 FOR OP/ED)

## 2025-01-27 PROCEDURE — 36415 COLL VENOUS BLD VENIPUNCTURE: CPT

## 2025-01-27 PROCEDURE — 85027 COMPLETE CBC AUTOMATED: CPT | Performed by: INTERNAL MEDICINE

## 2025-01-27 PROCEDURE — 2500000001 HC RX 250 WO HCPCS SELF ADMINISTERED DRUGS (ALT 637 FOR MEDICARE OP): Performed by: NURSE PRACTITIONER

## 2025-01-27 PROCEDURE — 80048 BASIC METABOLIC PNL TOTAL CA: CPT | Performed by: INTERNAL MEDICINE

## 2025-01-27 PROCEDURE — 2500000004 HC RX 250 GENERAL PHARMACY W/ HCPCS (ALT 636 FOR OP/ED): Performed by: INTERNAL MEDICINE

## 2025-01-27 PROCEDURE — 84132 ASSAY OF SERUM POTASSIUM: CPT

## 2025-01-27 PROCEDURE — 74018 RADEX ABDOMEN 1 VIEW: CPT | Performed by: STUDENT IN AN ORGANIZED HEALTH CARE EDUCATION/TRAINING PROGRAM

## 2025-01-27 PROCEDURE — 2500000004 HC RX 250 GENERAL PHARMACY W/ HCPCS (ALT 636 FOR OP/ED): Performed by: NURSE PRACTITIONER

## 2025-01-27 PROCEDURE — 99292 CRITICAL CARE ADDL 30 MIN: CPT | Performed by: INTERNAL MEDICINE

## 2025-01-27 PROCEDURE — 2500000002 HC RX 250 W HCPCS SELF ADMINISTERED DRUGS (ALT 637 FOR MEDICARE OP, ALT 636 FOR OP/ED): Performed by: NURSE PRACTITIONER

## 2025-01-27 PROCEDURE — 99233 SBSQ HOSP IP/OBS HIGH 50: CPT | Performed by: INTERNAL MEDICINE

## 2025-01-27 PROCEDURE — 71045 X-RAY EXAM CHEST 1 VIEW: CPT | Performed by: STUDENT IN AN ORGANIZED HEALTH CARE EDUCATION/TRAINING PROGRAM

## 2025-01-27 PROCEDURE — 31500 INSERT EMERGENCY AIRWAY: CPT | Performed by: INTERNAL MEDICINE

## 2025-01-27 PROCEDURE — 82374 ASSAY BLOOD CARBON DIOXIDE: CPT | Performed by: INTERNAL MEDICINE

## 2025-01-27 PROCEDURE — 31500 INSERT EMERGENCY AIRWAY: CPT

## 2025-01-27 PROCEDURE — 2500000001 HC RX 250 WO HCPCS SELF ADMINISTERED DRUGS (ALT 637 FOR MEDICARE OP): Performed by: INTERNAL MEDICINE

## 2025-01-27 PROCEDURE — 99291 CRITICAL CARE FIRST HOUR: CPT | Performed by: INTERNAL MEDICINE

## 2025-01-27 PROCEDURE — 2500000005 HC RX 250 GENERAL PHARMACY W/O HCPCS: Performed by: PHARMACIST

## 2025-01-27 PROCEDURE — 92610 EVALUATE SWALLOWING FUNCTION: CPT | Mod: GN | Performed by: PHARMACIST

## 2025-01-27 PROCEDURE — 2500000002 HC RX 250 W HCPCS SELF ADMINISTERED DRUGS (ALT 637 FOR MEDICARE OP, ALT 636 FOR OP/ED): Performed by: INTERNAL MEDICINE

## 2025-01-27 PROCEDURE — 94002 VENT MGMT INPAT INIT DAY: CPT

## 2025-01-27 PROCEDURE — 2020000001 HC ICU ROOM DAILY

## 2025-01-27 PROCEDURE — 83735 ASSAY OF MAGNESIUM: CPT

## 2025-01-27 PROCEDURE — 2500000005 HC RX 250 GENERAL PHARMACY W/O HCPCS: Performed by: INTERNAL MEDICINE

## 2025-01-27 RX ORDER — ATROPINE SULFATE 0.1 MG/ML
1 INJECTION INTRAVENOUS ONCE
Status: COMPLETED | OUTPATIENT
Start: 2025-01-27 | End: 2025-01-27

## 2025-01-27 RX ORDER — FENTANYL CITRATE-0.9 % NACL/PF 10 MCG/ML
0-300 PLASTIC BAG, INJECTION (ML) INTRAVENOUS CONTINUOUS
Status: DISCONTINUED | OUTPATIENT
Start: 2025-01-27 | End: 2025-01-28 | Stop reason: HOSPADM

## 2025-01-27 RX ORDER — LIDOCAINE HYDROCHLORIDE 20 MG/ML
1.25 SOLUTION OROPHARYNGEAL 2 TIMES DAILY PRN
Status: DISCONTINUED | OUTPATIENT
Start: 2025-01-27 | End: 2025-01-28 | Stop reason: HOSPADM

## 2025-01-27 RX ORDER — PANTOPRAZOLE SODIUM 40 MG/10ML
40 INJECTION, POWDER, LYOPHILIZED, FOR SOLUTION INTRAVENOUS
Status: DISCONTINUED | OUTPATIENT
Start: 2025-01-28 | End: 2025-01-28 | Stop reason: HOSPADM

## 2025-01-27 RX ORDER — FUROSEMIDE 10 MG/ML
60 INJECTION INTRAMUSCULAR; INTRAVENOUS ONCE
Status: COMPLETED | OUTPATIENT
Start: 2025-01-27 | End: 2025-01-27

## 2025-01-27 RX ORDER — ROCURONIUM BROMIDE 10 MG/ML
40 INJECTION, SOLUTION INTRAVENOUS ONCE
Status: COMPLETED | OUTPATIENT
Start: 2025-01-27 | End: 2025-01-27

## 2025-01-27 RX ORDER — IPRATROPIUM BROMIDE AND ALBUTEROL SULFATE 2.5; .5 MG/3ML; MG/3ML
3 SOLUTION RESPIRATORY (INHALATION)
Status: DISCONTINUED | OUTPATIENT
Start: 2025-01-27 | End: 2025-01-28 | Stop reason: HOSPADM

## 2025-01-27 RX ORDER — NOREPINEPHRINE BITARTRATE/D5W 8 MG/250ML
PLASTIC BAG, INJECTION (ML) INTRAVENOUS
Status: DISCONTINUED
Start: 2025-01-27 | End: 2025-01-28 | Stop reason: HOSPADM

## 2025-01-27 RX ORDER — DEXTROSE MONOHYDRATE 50 MG/ML
125 INJECTION, SOLUTION INTRAVENOUS CONTINUOUS
Status: DISCONTINUED | OUTPATIENT
Start: 2025-01-27 | End: 2025-01-28 | Stop reason: HOSPADM

## 2025-01-27 RX ORDER — NOREPINEPHRINE BITARTRATE/D5W 8 MG/250ML
0-.2 PLASTIC BAG, INJECTION (ML) INTRAVENOUS CONTINUOUS
Status: DISCONTINUED | OUTPATIENT
Start: 2025-01-27 | End: 2025-01-28 | Stop reason: HOSPADM

## 2025-01-27 RX ORDER — POLYETHYLENE GLYCOL 3350 17 G/17G
17 POWDER, FOR SOLUTION ORAL DAILY
Status: DISCONTINUED | OUTPATIENT
Start: 2025-01-27 | End: 2025-01-28 | Stop reason: HOSPADM

## 2025-01-27 RX ORDER — PROPOFOL 10 MG/ML
INJECTION, EMULSION INTRAVENOUS
Status: COMPLETED
Start: 2025-01-27 | End: 2025-01-27

## 2025-01-27 RX ORDER — ATROPINE SULFATE 0.1 MG/ML
INJECTION INTRAVENOUS
Status: COMPLETED
Start: 2025-01-27 | End: 2025-01-27

## 2025-01-27 RX ORDER — ETOMIDATE 2 MG/ML
20 INJECTION INTRAVENOUS ONCE
Status: COMPLETED | OUTPATIENT
Start: 2025-01-27 | End: 2025-01-27

## 2025-01-27 RX ORDER — PROPOFOL 10 MG/ML
5-50 INJECTION, EMULSION INTRAVENOUS CONTINUOUS
Status: DISCONTINUED | OUTPATIENT
Start: 2025-01-27 | End: 2025-01-28 | Stop reason: HOSPADM

## 2025-01-27 RX ORDER — NAPROXEN SODIUM 220 MG/1
81 TABLET, FILM COATED ORAL DAILY
Status: DISCONTINUED | OUTPATIENT
Start: 2025-01-27 | End: 2025-01-28 | Stop reason: HOSPADM

## 2025-01-27 RX ORDER — ETOMIDATE 2 MG/ML
INJECTION INTRAVENOUS
Status: COMPLETED
Start: 2025-01-27 | End: 2025-01-27

## 2025-01-27 RX ORDER — ROCURONIUM BROMIDE 10 MG/ML
INJECTION, SOLUTION INTRAVENOUS
Status: COMPLETED
Start: 2025-01-27 | End: 2025-01-27

## 2025-01-27 RX ORDER — PANTOPRAZOLE SODIUM 40 MG/1
40 TABLET, DELAYED RELEASE ORAL
Status: DISCONTINUED | OUTPATIENT
Start: 2025-01-28 | End: 2025-01-28 | Stop reason: HOSPADM

## 2025-01-27 RX ADMIN — Medication 60 L/MIN: at 09:50

## 2025-01-27 RX ADMIN — METHYLPREDNISOLONE SODIUM SUCCINATE 40 MG: 40 INJECTION, POWDER, FOR SOLUTION INTRAMUSCULAR; INTRAVENOUS at 15:32

## 2025-01-27 RX ADMIN — IPRATROPIUM BROMIDE AND ALBUTEROL SULFATE 3 ML: 2.5; .5 SOLUTION RESPIRATORY (INHALATION) at 17:39

## 2025-01-27 RX ADMIN — IPRATROPIUM BROMIDE AND ALBUTEROL SULFATE 3 ML: 2.5; .5 SOLUTION RESPIRATORY (INHALATION) at 21:17

## 2025-01-27 RX ADMIN — Medication 60 L/MIN: at 03:51

## 2025-01-27 RX ADMIN — METHYLPREDNISOLONE SODIUM SUCCINATE 40 MG: 40 INJECTION, POWDER, FOR SOLUTION INTRAMUSCULAR; INTRAVENOUS at 09:42

## 2025-01-27 RX ADMIN — IPRATROPIUM BROMIDE AND ALBUTEROL SULFATE 3 ML: 2.5; .5 SOLUTION RESPIRATORY (INHALATION) at 11:23

## 2025-01-27 RX ADMIN — Medication 100 PERCENT: at 21:17

## 2025-01-27 RX ADMIN — Medication 100 PERCENT: at 17:42

## 2025-01-27 RX ADMIN — FUROSEMIDE 60 MG: 10 INJECTION, SOLUTION INTRAMUSCULAR; INTRAVENOUS at 15:32

## 2025-01-27 RX ADMIN — ETOMIDATE INJECTION 20 MG: 2 SOLUTION INTRAVENOUS at 17:09

## 2025-01-27 RX ADMIN — TIOTROPIUM BROMIDE INHALATION SPRAY 2 PUFF: 3.12 SPRAY, METERED RESPIRATORY (INHALATION) at 09:43

## 2025-01-27 RX ADMIN — DEXTROSE MONOHYDRATE 100 ML/HR: 50 INJECTION, SOLUTION INTRAVENOUS at 18:06

## 2025-01-27 RX ADMIN — CEFTRIAXONE SODIUM 1 G: 1 INJECTION, SOLUTION INTRAVENOUS at 15:32

## 2025-01-27 RX ADMIN — ETOMIDATE 20 MG: 2 INJECTION INTRAVENOUS at 17:09

## 2025-01-27 RX ADMIN — Medication 100 PERCENT: at 20:15

## 2025-01-27 RX ADMIN — PROPOFOL 10 MCG/KG/MIN: 10 INJECTION, EMULSION INTRAVENOUS at 18:20

## 2025-01-27 RX ADMIN — ATROPINE SULFATE 1 MG: 0.1 INJECTION INTRAVENOUS at 22:59

## 2025-01-27 RX ADMIN — LIDOCAINE HYDROCHLORIDE 1.25 ML: 20 SOLUTION ORAL at 11:37

## 2025-01-27 RX ADMIN — Medication 100 PERCENT: at 17:10

## 2025-01-27 RX ADMIN — IPRATROPIUM BROMIDE AND ALBUTEROL SULFATE 3 ML: 2.5; .5 SOLUTION RESPIRATORY (INHALATION) at 07:49

## 2025-01-27 RX ADMIN — ROCURONIUM BROMIDE 40 MG: 10 INJECTION, SOLUTION INTRAVENOUS at 17:09

## 2025-01-27 RX ADMIN — Medication 60 L/MIN: at 04:14

## 2025-01-27 RX ADMIN — Medication 60 L/MIN: at 11:23

## 2025-01-27 RX ADMIN — FENTANYL CITRATE 25 MCG/HR: 50 INJECTION, SOLUTION INTRAMUSCULAR; INTRAVENOUS at 20:02

## 2025-01-27 RX ADMIN — FLUTICASONE FUROATE AND VILANTEROL TRIFENATATE 1 PUFF: 200; 25 POWDER RESPIRATORY (INHALATION) at 06:28

## 2025-01-27 RX ADMIN — ATROPINE SULFATE 1 MG: 0.1 INJECTION INTRAVENOUS at 21:55

## 2025-01-27 RX ADMIN — PANTOPRAZOLE SODIUM 40 MG: 40 INJECTION, POWDER, FOR SOLUTION INTRAVENOUS at 06:28

## 2025-01-27 RX ADMIN — SODIUM CHLORIDE, POTASSIUM CHLORIDE, SODIUM LACTATE AND CALCIUM CHLORIDE 1000 ML: 600; 310; 30; 20 INJECTION, SOLUTION INTRAVENOUS at 21:40

## 2025-01-27 RX ADMIN — Medication 60 L/MIN: at 07:50

## 2025-01-27 RX ADMIN — Medication 60 L/MIN: at 00:00

## 2025-01-27 RX ADMIN — DOXYCYCLINE 100 MG: 100 INJECTION, POWDER, LYOPHILIZED, FOR SOLUTION INTRAVENOUS at 11:37

## 2025-01-27 RX ADMIN — METHYLPREDNISOLONE SODIUM SUCCINATE 40 MG: 40 INJECTION, POWDER, FOR SOLUTION INTRAMUSCULAR; INTRAVENOUS at 04:04

## 2025-01-27 ASSESSMENT — PAIN - FUNCTIONAL ASSESSMENT
PAIN_FUNCTIONAL_ASSESSMENT: 0-10
PAIN_FUNCTIONAL_ASSESSMENT: CPOT (CRITICAL CARE PAIN OBSERVATION TOOL)
PAIN_FUNCTIONAL_ASSESSMENT: 0-10

## 2025-01-27 ASSESSMENT — PAIN SCALES - GENERAL
PAINLEVEL_OUTOF10: 0 - NO PAIN

## 2025-01-27 NOTE — PROCEDURES
Intubation    Date/Time: 1/27/2025 4:51 PM    Performed by: Luis Landon MD  Authorized by: Luis Landon MD    Consent:     Consent obtained:  Verbal    Consent given by:  Patient    Risks discussed:  Aspiration  Universal protocol:     Procedure explained and questions answered to patient or proxy's satisfaction: yes      Immediately prior to procedure, a time out was called: yes    Pre-procedure details:     Indications: respiratory failure      Look externally comment:  Old dry blood in the oropharynx    Neck mobility: normal      Induction agents:  Etomidate    Paralytics:  Rocuronium  Successful intubation attempt details:     Tube type:  Cuffed    Tube visualized through cords: yes    Placement assessment:     Tube secured with:  ETT quinteros    Placement verification: chest rise and equal breath sounds    Post-procedure details:     Procedure completion:  Tolerated well, no immediate complications

## 2025-01-27 NOTE — PROGRESS NOTES
Speech-Language Pathology Clinical Swallow Evaluation    Patient Name: Aristeo Barriga  MRN: [id number]  : 1945  Today's Date: 25  Start Time: 1012  Stop Time: 1030  Time Calculation (min): 18 min      ASSESSMENT  Impressions:  Severe oral phase and dysphagia and suspected pharyngeal phase dysphagia based on clinical swallow evaluation. Pt is not safe for PO intake at this time. Do not recommend oral medication administration.    Pt would benefit from skilled ST to minimize aspiration risk and ensure ongoing safety with the least restrictive diet.  Prognosis: Fair    PLAN    RECOMMENDATIONS:  Is MBSS recommended? Not at this time due to active oral bleeding and high supplemental O2 requirements; SLP will continue to monitor to determine if MBSS is clinically warranted.  Solid consistency: NPO  Liquid consistency: NPO  Medication administration: Non oral    Recommend frequent, gentle oral care in order to allow for healing of oral wound(s) and reduce risk of oral/pharyngeal/respiratory infection.      Recommended frequency/duration:  Skilled SLP services recommended: Yes  Frequency: 3x/week  Duration: 2 weeks  Discharge recommendation: Recommend MODERATE intensity ST upon DC in order to ensure safety with least restrictive diet.  Treatment/Interventions: Respiratory muscle strength training, Bolus trials, Diet recommendations, Oral motor exercises, Pharyngeal exercises (pending clinical course)  Strengths: N/A  Barriers to participation in tx: Severity of symptoms and Comorbidities    Goals (start date 2025):  - Pt will consume PO trials with SLP without s/sx aspiration in order to determine readiness for MBS vs PO diet.   Status: Goal initiated this date   Progress this date: N/A      SUBJECTIVE    PMHx relevant to rehab: COPD with baseline oxygen requirement of 4 L chronic systolic diastolic heart failure last EF in 2020 for 50% history of myeloproliferative disorder coronary artery  disease with recent stress test showing no reversible ischemia benign prostatic hypertrophy abdominal aortic aneurysm with repair     Chief complaint: Pt was admitted on 1/23/25 due to   Chief Complaint   Patient presents with    Shortness of Breath   . He was found to have Acute on chronic respiratory failure with hypoxia (Multi) in the setting of influenza A and COPD exacerbation. Pt was intubated on 1/23/25 and extubated on 1/25/25.    Relevant imaging results:  CXR 1/26/25: FINDINGS:  Interval extubation  The cardiomediastinal silhouette is unchanged  Improved but not resolved consolidations  No new acute process such as large pleural effusion or pneumothorax has developed    CT angio chest 1/23/25: CT CHEST:  There is underlying severe emphysema. There is consolidation seen in  the lung bases, left-greater-than-right consistent with underlying  pneumonia. The patient is intubated. NG tube appears to terminate  near the GE junction.  There is mild cardiomegaly with severe coronary artery calcification.  No significant adenopathy.  No significant effusions.    General Visit Information:  SLP Received On: 01/27/25  Patient Class: Inpatient  Living Environment: Home  Ordering Physician: TREVOR Meza  Reason for Referral: Swallow evaluation s/p extubation; rule out aspiration; suspected oral or pharyngeal dysphagia; determine if MBS needed  Prior to Session Communication: Bedside nurse    RN cleared pt to participate in session and reported that pt has a bleeding ulcer in his oral cavity s/p extubation. RN recently completed extensive oral care.    Pt reported no pain in his mouth and denied other complaints.    Date of Onset: 01/23/25  Date of Order: 01/26/25  BaseLine Diet: Regular/thin  Current Diet : NPO    Status at time of evaluation:  Pain Assessment  Pain Assessment: 0-10  0-10 (Numeric) Pain Score: 0 - No pain    Pt was alert and cooperative for session.  Orientation: Oriented to self  Ability to  follow functional commands: Follows simple commands  Nutritional status: Pt unable to self-report nutritional status    Respiratory status: Supplemental oxygen via Airvo (60lpm at 84% FiO2)  Baseline Vocal Quality: Dysphonic, Weak  Volitional Cough:  (did not test)  Volitional Swallow: Absent  Patient positioning: Upright in bed      OBJECTIVE  Clinical swallow evaluation completed and consisted of oral motor assessment and oral care. PO trials were deferred due to active oral bleeding, absence of spontaneous swallows, and high supplemental O2 requirements resulting in high risk for aspiration and related sequelae.    ORAL PHASE: Natural dentition present. Visible, pink wound noted on R side of tongue. Oral mucosa were coated in fresh blood despite recent oral care. Pt's hard palate appeared to be producing fresh blood as well. SLP completed gentle oral care with water-soaked suction swabs; pt sucked the water from the swabs, however no spontaneous swallows were visualized.    PHARYNGEAL PHASE: No pharyngeal swallows were noted to visualization or palpation during oral care despite pt removing excess water from suction swabs. Pt did not swallow on command. No PO trials were given.    Was 3oz challenge administered: No, deferred due to safety concerns.      Treatment/Education:  Results and recommendations were relayed to: Patient and Bedside nurse  Education provided: Yes   Learner: Patient   Barriers to learning: Acuteness of illness barrier   Method of teaching: Verbal   Topic: results of assessment and recommendation for NPO status   Outcome of teaching: Pt nodded understanding  Treatment provided: No  Next Treatment Priority: reassess to determine readiness for PO diet vs MBS

## 2025-01-27 NOTE — NURSING NOTE
0700:  Report received from [place] BERRY    Patient is on Airvo 60L at 85%, baseline he is on 4L NC. He is confused this morning, alert to self and occasionally place. He is NPO awaiting speech eval this AM. [place] in place. Remains in droplet precautions for Flu A+.    0900:  Mouth/ tongue ulceration with active bleeding noted likely related to intubation. Picture placed in chart with picture. Mouth care provided. Large clots removed and mouth moisturized. IMS notified    New orders:  +Lidocaine mouth swish and spit PRN BID   +some meds modified to crushable form. SLP still recommending  NPO for now     1500:  Patient appearing short of breath with spo2 sustaining 86% on 60L 85% airvo with 15L NRB. RT in room to adjust settings. Intensivist notified.    New orders:  +60 mg IV lasix once -given    1615:  Patient sustaining 83% RT and intensivisit aware.    New order:   +Bipap -applied    1630:  Patient not tolerating bipap, order to intubate patient.     1645:  Report given to Melly BERRY

## 2025-01-27 NOTE — PROGRESS NOTES
Physical Therapy                 Therapy Communication Note    Patient Name: Aristeo Barriga  MRN: [id number]  Department: [facility] ICU  Room: 09/09-A  Today's Date: 1/27/2025     Discipline: Physical Therapy    PT Missed Visit: Yes     Missed Visit Reason: Patient placed on medical hold (RN advised to HOLD PT today as pt is very lethargic, hypoxic-NRB applied over HFNC.)    Missed Time: Attempt    Comment:

## 2025-01-27 NOTE — PROGRESS NOTES
Aristeo Barriga is a 79 y.o. male on day 4 of admission presenting with Acute on chronic respiratory failure with hypoxia (Multi).    Review of Systems   Reason unable to perform ROS: Intubated.      Subjective   Aristeo Barriga is a 79 y.o. male with PMHx s/f COPD with baseline oxygen requirement of 4 L chronic systolic diastolic heart failure last EF in January 2020 for 50% history of myeloproliferative disorder coronary artery disease with recent stress test showing no reversible ischemia benign prostatic hypertrophy abdominal aortic aneurysm with repair.  Presenting with shortness of breath.  Patient had recently been hospitalized and discharged about 3 days ago.  At that time the patient was being treated for COPD exacerbation and CHF exacerbation.  The patient did have some CT findings suggestive of pneumonia but was improving on Zithromax therapy alone.  The patient subsequently been discharged to home about 3 days ago on oral Zithromax.  Evidently the patient progressively became more short of breath had arrived to the emergency department via squad on CPAP subsequently changed over to BiPAP patient was not saturating very well and subsequently intubated by the ED provider. Pt felt to have worsening pneumonia and possibley some CHF. Pt given IV rocephin and zithromax, no lasix given due to relatively low blood pressure  The patient was discussed with the ED provider and will be admitted to the ICU for acute on chronic respiratory failure LOS will exceed 2 midnights.      ED Course (Summary):   Vitals on presentation: Temperature is 97.5 heart rate 118 respiratory rate 30 blood pressure 130/62 SpO2 71% on BiPAP  Labs: Significant for glucose 192 creatinine 2.03 close to baseline, AST 56 initial lactate 2.5  troponin 39 CBC significant for leukopenia with white blood cell count 3.1 hemoglobin 11.7 hematocrit 36.4 platelets 198  Imaging: Chest x-ray showing left lower lobe consolidation and  effusion  Interventions: Patient intubated started on Zithromax and Rocephin    1/24: Patient seen.  Remains intubated.  Discussed with nursing, sedation is off but patient appears to be tolerating intubation.  Continue broad-spectrum antibiotic therapy, treating for gram-negative pneumonia given recent hospitalization.  Patient is positive for influenza A and is on Tamiflu.  Appreciate input from critical care.  Will continue treatment.  Requires ICU for mechanical ventilation.  Reassess in AM    1/25: Pt seen. Successfully extubated. Now positive for pneumococcal pneumonia, influenza A, and remains on empiric treatment for gram negative pneumonia. Stirs to exam, but seems to be tired. Will continue to monitor. Off pressors, remains somewhat hypotensive. Remains in ICU.    1/26: Pt seen. Remains extubated. BP elevated this AM. Resumed BP meds. Growing pneumococcal pneumonia. Antibiotics are being de-escalated. Vanco and Zosyn discontinued, now on ceftriaxone and doxycycline. Now off levophed. Will continue to monitor.    1/27: Pt seen. Apparently failed Airvo this afternoon. Getting ready to re-intubate. Family at bedside. Discussed with CCM. Likely multifactorial with pneumococcal pneumonia, influenza A, and severe baseline COPD. Will continue to follow. Requires ICU level of care due to need for mechanical vent.       Objective     Last Recorded Vitals  BP 97/67   Pulse 101   Temp 36.5 °C (97.7 °F) (Temporal)   Resp (!) 29   Wt 63.7 kg (140 lb 6.9 oz)   SpO2 (!) 89%   Intake/Output last 3 Shifts:    Intake/Output Summary (Last 24 hours) at 1/27/2025 1714  Last data filed at 1/27/2025 1325  Gross per 24 hour   Intake 270 ml   Output 1075 ml   Net -805 ml       Admission Weight  Weight: 65.3 kg (144 lb) (01/23/25 1242)    Daily Weight  01/27/25 : 63.7 kg (140 lb 6.9 oz)      Physical Exam  Constitutional:       Interventions: He is sedated and intubated.   HENT:      Head: Normocephalic and atraumatic.       Nose: Nose normal. No congestion or rhinorrhea.      Mouth/Throat:      Mouth: Mucous membranes are dry.      Pharynx: Oropharynx is clear.   Eyes:      General: No scleral icterus.     Extraocular Movements: Extraocular movements intact.      Pupils: Pupils are equal, round, and reactive to light.   Cardiovascular:      Rate and Rhythm: Regular rhythm. Tachycardia present.      Heart sounds: Normal heart sounds. No murmur heard.     No friction rub. No gallop.   Pulmonary:      Effort: Pulmonary effort is normal. He is intubated.      Breath sounds: Decreased breath sounds present. No wheezing, rhonchi or rales.   Chest:      Chest wall: No tenderness.   Abdominal:      General: There is no distension.      Palpations: Abdomen is soft.      Tenderness: There is no abdominal tenderness. There is no guarding or rebound.   Musculoskeletal:         General: No swelling, tenderness or signs of injury. Normal range of motion.      Cervical back: Normal range of motion.      Comments: Bilateral BKA   Skin:     General: Skin is warm and dry.      Coloration: Skin is not jaundiced.      Findings: No bruising, erythema or rash.   Neurological:      Comments: Sedation is off, unresponsive          Lab Results  Results for orders placed or performed during the hospital encounter of 01/23/25 (from the past 24 hours)   Basic Metabolic Panel   Result Value Ref Range    Glucose 206 (H) 74 - 99 mg/dL    Sodium 157 (H) 136 - 145 mmol/L    Potassium 3.5 3.5 - 5.3 mmol/L    Chloride 118 (H) 98 - 107 mmol/L    Bicarbonate 29 21 - 32 mmol/L    Anion Gap 14 10 - 20 mmol/L    Urea Nitrogen 89 (H) 6 - 23 mg/dL    Creatinine 2.14 (H) 0.50 - 1.30 mg/dL    eGFR 31 (L) >60 mL/min/1.73m*2    Calcium 8.5 (L) 8.6 - 10.3 mg/dL   CBC   Result Value Ref Range    WBC 9.2 4.4 - 11.3 x10*3/uL    nRBC 0.2 (H) 0.0 - 0.0 /100 WBCs    RBC 2.87 (L) 4.50 - 5.90 x10*6/uL    Hemoglobin 8.5 (L) 13.5 - 17.5 g/dL    Hematocrit 27.2 (L) 41.0 - 52.0 %    MCV 95 80  - 100 fL    MCH 29.6 26.0 - 34.0 pg    MCHC 31.3 (L) 32.0 - 36.0 g/dL    RDW 21.5 (H) 11.5 - 14.5 %    Platelets 230 150 - 450 x10*3/uL   Blood Gas Arterial Full Panel   Result Value Ref Range    POCT pH, Arterial 7.50 (H) 7.38 - 7.42 pH    POCT pCO2, Arterial 36 (L) 38 - 42 mm Hg    POCT pO2, Arterial 57 (L) 85 - 95 mm Hg    POCT SO2, Arterial 86 (L) 94 - 100 %    POCT Oxy Hemoglobin, Arterial 84.1 (L) 94.0 - 98.0 %    POCT Hematocrit Calculated, Arterial 29.0 (L) 41.0 - 52.0 %    POCT Sodium, Arterial 162 (HH) 136 - 145 mmol/L    POCT Potassium, Arterial 3.3 (L) 3.5 - 5.3 mmol/L    POCT Chloride, Arterial 124 (H) 98 - 107 mmol/L    POCT Ionized Calcium, Arterial 1.18 1.10 - 1.33 mmol/L    POCT Glucose, Arterial 199 (H) 74 - 99 mg/dL    POCT Lactate, Arterial 1.5 0.4 - 2.0 mmol/L    POCT Base Excess, Arterial 4.7 (H) -2.0 - 3.0 mmol/L    POCT HCO3 Calculated, Arterial 28.1 (H) 22.0 - 26.0 mmol/L    POCT Hemoglobin, Arterial 9.6 (L) 13.5 - 17.5 g/dL    POCT Anion Gap, Arterial 13 10 - 25 mmo/L    Patient Temperature 37.0 degrees Celsius    FiO2 100 %        Image Results  XR chest 1 view  Narrative: Interpreted By:  Vadim Rangel,   STUDY:  XR CHEST 1 VIEW;  1/26/2025 10:09 am      INDICATION:  Signs/Symptoms:Acute on chronic hypoxic respiratory failure.          COMPARISON:  CT chest without contrast 23 January 2025; portable chest 24 January 2025      ACCESSION NUMBER(S):  [id number]      ORDERING CLINICIAN:  CLEO MARSHALL      TECHNIQUE:  Single frontal view of the chest; Portable technique      FINDINGS:  Interval extubation      The cardiomediastinal silhouette is unchanged      Improved but not resolved consolidations      No new acute process such as large pleural effusion or pneumothorax  has developed      Impression: Improvement      MACRO:  None      Signed by: Vadim Rangel 1/26/2025 10:31 AM  Dictation workstation:   [id number]       Assessment/Plan     * Acute on chronic respiratory failure with  hypoxia (Multi)  Assessment & Plan  Acute on chronic hypoxic respiratory failure likely due to worsening pneumonia on chronic chf, copd  Pt is septic with respiratory failure, elevated lactate level started on pressor support  CTA of chest pending to rule out PE  Pt on vent and will be admitted to the ICU  Pt failed treatment on Zithromax/steroids  IV Vanc/Zosyn for now given recent hospitalization and failure on zithromax  IV solumedrol, otc duoneb  Resume home treatments  1/24: Remains intubated on minimal sedation.  1/25: Successfully extubated today. Multifactorial failure with Influenza A and pneumonia (possibly pneumococcal).  1/27: Worsening respiratory distress, will require re-intubation per CCM. Discussed with CCM, family at bedside. Treating pneumococcal pneumonia, influenza A, AE COPD.    Hypokalemia  Assessment & Plan  1/26: K was 3.2. Replace cautiously given renal function.  1/27: K is 3.5 today.    Influenza A  Assessment & Plan  Started on Tamiflu.  Droplet precautions.    Chronic obstructive pulmonary disease with acute exacerbation (Multi)  Assessment & Plan  Continue steroids.  Quit smoking 6 months ago.  Continue bronchodilators.    Hypertensive heart and chronic kidney disease with heart failure and stage 1 through stage 4 chronic kidney disease, or unspecified chronic kidney disease  Assessment & Plan  BP starting to come up rapidly.  Restarting home meds, may require further adjustment.    Myeloproliferative neoplasm (Multi)  Assessment & Plan  Myeloproliferative disorder  Continue Ruxolitinib     Chronic renal disease, stage III (Multi)  Assessment & Plan  CKD 3  Pt creatinine at recent level not acutely worsened  1/26: Gradually improving, watch.  1/27: Unchanged at present.    Acute on chronic combined systolic and diastolic congestive heart failure  Assessment & Plan  ACUTE ON Chronic systolic and diastolic CHF  Recent echocardiogram [id number]  Reconcile pt home meds, diuresis as  tolerated    Coronary artery disease involving native coronary artery of native heart without angina pectoris  Assessment & Plan  CAD hx PCI/Stent elevated troponin due to hypoxia  Stress test earlier this month negative for ischemia  Continue statin asa/plavix betablocker if tolerated    Pneumonia of both lungs due to Streptococcus pneumoniae (CMS-[facility])  Assessment & Plan  Recent hospitalization.  Treating as HCAP. Found to be pneumococcal pneumonia  1/25: Growing pneumococcus, pneumococcal pneumonia? Consider de-escalating antibiotics once more data is available.  1/26: De escalating to ceftriaxone and doxycycline.  1/27: Continue current treatment.                      Carson Galindo MD

## 2025-01-27 NOTE — PROGRESS NOTES
Occupational Therapy                 Therapy Communication Note    Patient Name: Aristeo Barriga  MRN: [id number]  Department: [facility]  Room: 09/09-A  Today's Date: 1/27/2025     Discipline: Occupational Therapy    OT Missed Visit: Yes     Missed Visit Reason: Missed Visit Reason: Patient placed on medical hold (RN advised to HOLD  today as pt is very lethargic, hypoxic-NRB applied over HFNC.)    Missed Time: Attempt    Comment:

## 2025-01-27 NOTE — CARE PLAN
The patient's goals for the shift include  stay informed    The clinical goals for the shift include maintain spo2 > 88%    Over the shift, the patient did make progress toward the following goals. Barriers to progression include understanding. Recommendations to address these barriers include education.

## 2025-01-27 NOTE — PROGRESS NOTES
Critical Care Daily Progress Notes        Subjective   Patient is a 79 y.o. male admitted on 1/23/2025 12:40 PM with the following indication(s) for ICU care: Acute hypoxic respiratory failure requiring intubation.     Interval History:   Aristeo Barriga is a 79 y.o. male with PMHx s/f COPD with baseline oxygen requirement of 4 L chronic systolic diastolic heart failure last EF in January 2020 for 50% history of myeloproliferative disorder coronary artery disease with recent stress test showing no reversible ischemia benign prostatic hypertrophy abdominal aortic aneurysm with repair was admitted to ICU for Acute hypoxic respiratory failure requiring  intubation.  On presentation to the ER his oxygen saturation was 50s on CPAP.      1/24/2025: Patient was placed on a sedation vacation followed by spontaneous breathing trial.  Patient failed spontaneous breathing trial because of hypoxia and tachypnea.     1/25/2025: Patient is passing spontaneous breathing trial and blood gas during SBT showed pH of 7.36.  Patient has strep pneumonia bacteremia and urine strep antigen positive.     1/26/2025: Patient was successfully extubated on 1/25/2025.  But the patient continued to require high amount of oxygen currently on Airvo with the 60 L/min of oxygen flow and the FiO2 of 70%.     1/27/2025: Patient developed hypoxia maxed out on Airvo with 60 L/min oxygen flow and 90% of FiO2.  Oxygen saturation low 80s even with the combination of Airvo and nonrebreather.  BiPAP 18/10 could not improve the oxygenation.  After discussing with the patient and patient daughter decided to intubate the patient.     Scheduled Medications:   [Held by provider] aspirin, 81 mg, oral, Daily  atorvastatin, 80 mg, oral, Nightly  carvedilol, 3.125 mg, oral, BID  cefTRIAXone, 1 g, intravenous, q24h  [Held by provider] clopidogrel, 75 mg, oral, Daily  doxycycline, 100 mg, intravenous, q12h  etomidate, , ,   etomidate, 20 mg, intravenous,  Once  fentaNYL, 25 mcg, intravenous, Once  fluticasone furoate-vilanteroL, 1 puff, inhalation, Daily  hydrALAZINE, 10 mg, oral, TID  ipratropium-albuteroL, 3 mL, nebulization, 4x daily  isosorbide mononitrate ER, 30 mg, oral, Daily  methylPREDNISolone sodium succinate (PF), 40 mg, intravenous, q6h  montelukast, 10 mg, oral, Daily  oseltamivir, 30 mg, oral, q24h  oxygen, , inhalation, Continuous - Inhalation  [START ON 1/28/2025] pantoprazole, 40 mg, oral, Daily before breakfast   Or  [START ON 1/28/2025] pantoprazole, 40 mg, intravenous, Daily before breakfast  polyethylene glycol, 17 g, oral, Daily  propofol, , ,   rocuronium, , ,   rocuronium, 40 mg, intravenous, Once  ruxolitinib, 20 mg, oral, BID  tamsulosin, 0.4 mg, oral, Daily  tiotropium, 2 puff, inhalation, Daily         Continuous Medications:   dextrose 5%, 100 mL/hr  fentaNYL, 0-300 mcg/hr  propofol, 5-50 mcg/kg/min           PRN Medications:   PRN medications: etomidate, fentaNYL, hydrALAZINE, ipratropium-albuteroL, labetaloL, lidocaine, propofol, rocuronium    Objective   Vitals:  Most Recent:  Vitals:    01/27/25 1742   BP:    Pulse:    Resp:    Temp:    SpO2: (!) 82%       24hr Min/Max:  Temp  Min: 36.3 °C (97.3 °F)  Max: 36.8 °C (98.2 °F)  Pulse  Min: 60  Max: 101  BP  Min: 97/67  Max: 174/92  Resp  Min: 16  Max: 44  SpO2  Min: 82 %  Max: 94 %    LDA:  ETT  7.5 mm (Active)   Placement Date/Time: 01/23/25 1350   Mask Ventilation: Vent by mask  Technique: Tube changer;Stylet  ETT Type: ETT - single  Single Lumen Tube Size: 7.5 mm  Cuffed: Yes  Location: Oral  Placement Verification: Auscultation;Capnometry  Placed By: ED ph...   Number of days: 1       Urethral Catheter Coude 16 Fr. (Active)   Placement Date/Time: 01/23/25 1600   Placed by: lydia harrah  Hand Hygiene Completed: Yes  Catheter Type: Coude  Tube Size (Fr.): 16 Fr.  Catheter Balloon Size: 10 mL  Urine Returned: Yes   Number of days: 1       NG/OG/Feeding Tube OG - [place] sump 16 Fr Center  mouth (Active)   Placement Date/Time: 01/23/25 1443   Placed by: Twila SYED RN  Hand Hygiene Completed: Yes  Type of Tube: NG/OG Tube  Tube Length: 50 cm  Tube Type: OG - [place] sump  NG/OG Tube Size: 16 Fr  Tube Location: [place] mouth   Number of days: 1         Vent settings:       Hemodynamic parameters for last 24 hours:       I/O:    Intake/Output Summary (Last 24 hours) at 1/27/2025 1745  Last data filed at 1/27/2025 1325  Gross per 24 hour   Intake 270 ml   Output 1075 ml   Net -805 ml       Physical Exam:   Physical Exam   Vitals reviewed.   Constitutional:       General: He is in acute distress.  HENT:      Head: Normocephalic and atraumatic.      Mouth and Mucosa : Significant amount of old blood from tongue ulcer  Eyes:      General: No scleral icterus.     Extraocular Movements: Extraocular movements intact.      Conjunctiva/sclera: Conjunctivae normal.      Pupils: Pupils are equal, round, and reactive to light.   Neck:      Vascular: No carotid bruit.   Cardiovascular:      Rate and Rhythm: Regular rhythm. Tachycardia present.      Pulses: Normal pulses.      Heart sounds: Normal heart sounds.   Pulmonary:      Effort: Decreased breath sounds bilaterally     Comments: Diminished in bilateral bases  Chest:      Chest wall: No tenderness.   Abdominal:      General: Bowel sounds are normal. There is no distension.      Palpations: Abdomen is soft. There is no mass.      Tenderness: There is no abdominal tenderness. There is no rebound.   Musculoskeletal:         General: Deformity present. No swelling. Normal range of motion.      Cervical back: Normal range of motion.      Comments: B/L bka and prosthesis   Skin:     General: Skin is warm and dry.      Capillary Refill: Capillary refill takes less than 2 seconds.   Neurological:      Comments: Intubated and sedated    Lab/Radiology/Diagnostic Review:  Results for orders placed or performed during the hospital encounter of 01/23/25 (from the past 24 hours)    Basic Metabolic Panel   Result Value Ref Range    Glucose 206 (H) 74 - 99 mg/dL    Sodium 157 (H) 136 - 145 mmol/L    Potassium 3.5 3.5 - 5.3 mmol/L    Chloride 118 (H) 98 - 107 mmol/L    Bicarbonate 29 21 - 32 mmol/L    Anion Gap 14 10 - 20 mmol/L    Urea Nitrogen 89 (H) 6 - 23 mg/dL    Creatinine 2.14 (H) 0.50 - 1.30 mg/dL    eGFR 31 (L) >60 mL/min/1.73m*2    Calcium 8.5 (L) 8.6 - 10.3 mg/dL   CBC   Result Value Ref Range    WBC 9.2 4.4 - 11.3 x10*3/uL    nRBC 0.2 (H) 0.0 - 0.0 /100 WBCs    RBC 2.87 (L) 4.50 - 5.90 x10*6/uL    Hemoglobin 8.5 (L) 13.5 - 17.5 g/dL    Hematocrit 27.2 (L) 41.0 - 52.0 %    MCV 95 80 - 100 fL    MCH 29.6 26.0 - 34.0 pg    MCHC 31.3 (L) 32.0 - 36.0 g/dL    RDW 21.5 (H) 11.5 - 14.5 %    Platelets 230 150 - 450 x10*3/uL   Blood Gas Arterial Full Panel   Result Value Ref Range    POCT pH, Arterial 7.50 (H) 7.38 - 7.42 pH    POCT pCO2, Arterial 36 (L) 38 - 42 mm Hg    POCT pO2, Arterial 57 (L) 85 - 95 mm Hg    POCT SO2, Arterial 86 (L) 94 - 100 %    POCT Oxy Hemoglobin, Arterial 84.1 (L) 94.0 - 98.0 %    POCT Hematocrit Calculated, Arterial 29.0 (L) 41.0 - 52.0 %    POCT Sodium, Arterial 162 (HH) 136 - 145 mmol/L    POCT Potassium, Arterial 3.3 (L) 3.5 - 5.3 mmol/L    POCT Chloride, Arterial 124 (H) 98 - 107 mmol/L    POCT Ionized Calcium, Arterial 1.18 1.10 - 1.33 mmol/L    POCT Glucose, Arterial 199 (H) 74 - 99 mg/dL    POCT Lactate, Arterial 1.5 0.4 - 2.0 mmol/L    POCT Base Excess, Arterial 4.7 (H) -2.0 - 3.0 mmol/L    POCT HCO3 Calculated, Arterial 28.1 (H) 22.0 - 26.0 mmol/L    POCT Hemoglobin, Arterial 9.6 (L) 13.5 - 17.5 g/dL    POCT Anion Gap, Arterial 13 10 - 25 mmo/L    Patient Temperature 37.0 degrees Celsius    FiO2 100 %     XR chest 1 view    Result Date: 1/24/2025  Interpreted By:  Daisy Beard, STUDY: XR CHEST 1 VIEW;  1/24/2025 9:33 am   INDICATION: Signs/Symptoms:hemoptysis, evaluate lung fields, lines/tubes.     COMPARISON: 01/23/2025 chest x-ray and chest CT    ACCESSION NUMBER(S): [id number]   ORDERING CLINICIAN: KORI MARSHALL   TECHNIQUE: Portable AP upright   FINDINGS: Endotracheal tube tip projects about 3 cm above the jayy. Nasogastric tube tip projects below the diaphragm and appears to terminate in the cardia region of the stomach. The side-hole of the NG tube is in the distal esophagus. Cardiac silhouette is partially obscured by pulmonary infiltrate. The consolidation through the mid and lower left lung is similar to the prior study and prior CT demonstrates that this is throughout the left lower lobe and involves the posterior portion of the left upper lobe and most of the lingula. There is a small infiltrate in the medial right lung base which correlates with right lower lobe findings of atelectasis on the prior CT. Right sided findings are not significantly changed. There is probably a small amount of left pleural fluid in addition to the left-sided pneumonia. Mediastinal structures shift to the left slightly even allowing for the patient rotation, which also correlates with findings on prior CT with overall lesser volume in the left lung than right. Osseous structures show no change.       NG tube tip is barely within the stomach in the side-hole is in the distal esophagus   Left-sided pneumonia and right basilar atelectasis are similar to the prior study. There is mild diminished volume in the left lung compared to the right which is unchanged from the prior study   Probable small left pleural effusion in addition to the pneumonia   MACRO: None.   Signed by: Daisy Beard 1/24/2025 10:36 AM Dictation workstation:   [id number]    ECG 12 lead    Result Date: 1/24/2025  Sinus rhythm Paired ventricular premature complexes Aberrant conduction of SV complex(es) Probable left atrial enlargement Nonspecific intraventricular conduction delay Probable inferior infarct, age indeterminate Baseline wander in lead(s) I,III,aVR,aVL,aVF,V3,V6    XR abdomen 1  view    Result Date: 1/23/2025  Interpreted By:  Curt Shore, STUDY: Abdominal series dated 1/23/2025.   INDICATION: Enteric tube placement.   COMPARISON: None.   ACCESSION NUMBER(S): [id number]   ORDERING CLINICIAN: KORI MARSHALL   TECHNIQUE: Single AP radiographs of the abdomen.   FINDINGS: Exam is limited due to incomplete inclusion of the abdomen in the field of view including the hemidiaphragms and lower lung zones given enteric tube placement. There appears to be visualization of a tube projecting over the upper mid abdomen mostly just proximal to the presumed gastric air bubble. There is note of stent grafting in the vascular tree. Vascular calcifications are seen over the soft tissues.       Limited exam with probable visualization of enteric tube over the upper abdomen. Repeat radiography is recommended to include the thoracoabdominal region. Further recommendations may be given on the basis of that exam.   Signed by: Curt Shore 1/23/2025 8:44 PM Dictation workstation:   [id number]    CT angio chest abdomen pelvis    Result Date: 1/23/2025  Interpreted By:  Vadim Reed, STUDY: CT ANGIO CHEST ABDOMEN PELVIS;  1/23/2025 3:54 pm   INDICATION: Signs/Symptoms:respiratory failure with hypoxia, sepsis, now intubated, hx AAA repair, r/o AAA rupture.   COMPARISON: 01/23/2025   ACCESSION NUMBER(S): [id number]   ORDERING CLINICIAN: BILL SCHULZ   TECHNIQUE: Axial non-contrast images of the chest abdomen, and pelvis.   Axial CT images of the chest, abdomen and pelvis were obtained after the intravenous administration of iodinated contrast using angiographic technique with coronal and sagittal reformatted images. MIP images and 3D reconstructions were created on an independent workstation and reviewed.   FINDINGS: VASCULATURE:   Moderate atherosclerotic calcification of the aortic arch and thoracic and abdominal aorta. The ascending aorta is borderline ectatic measuring 4 cm in diameter.   There is  a infrarenal abdominal aortic aneurysm previously treated with endograft with aneurysm sac measuring 3.8 cm in diameter.   There is severe disease involving the bilateral external iliac arteries with areas of multifocal moderate-to-severe stenosis suspected.   There is probable severe stenosis origin of the celiac artery. SMA appears to be widely patent. At least moderate disease origin of both renal arteries, right-greater-than-left.   CT CHEST:   There is underlying severe emphysema. There is consolidation seen in the lung bases, left-greater-than-right consistent with underlying pneumonia. The patient is intubated. NG tube appears to terminate near the GE junction.   There is mild cardiomegaly with severe coronary artery calcification.   No significant adenopathy.   No significant effusions.     CT ABDOMEN/PELVIS:   Arterial phase imaging of the liver, gallbladder, adrenals pancreas spleen grossly unremarkable.   The kidneys are somewhat atrophic.   No bowel obstruction or significant colitis. Mild diverticulosis.   Bladder is within normal limits   No free fluid or significant adenopathy.   There is a chronic compression fracture of T6 with sclerotic appearance. Multilevel moderate to advanced degenerative disc disease noted.       Infrarenal abdominal aortic aneurysm status post endograft treatment. Areas of multifocal moderate-to-severe stenosis bilateral external iliac arteries. Additional atherosclerotic disease as above.   Extensive consolidation in the lung bases consistent with probable underlying pneumonia, left-greater-than-right. This is superimposed on severe emphysema.   Additional nonemergent/senescent changes as above.   MACRO: None.   Signed by: Vadim Reed 1/23/2025 4:32 PM Dictation workstation:   [id number]    XR chest abdomen for OG NG placement    Result Date: 1/23/2025  Interpreted By:  Stacie Parker, STUDY: XR CHEST ABDOMEN FOR OG NG PLACEMENT; ;  1/23/2025 1:36 pm   INDICATION:  Signs/Symptoms:ETT placement, OG placement.     COMPARISON: None.   ACCESSION NUMBER(S): [id number]   ORDERING CLINICIAN: BILL SCHULZ   FINDINGS: AP portable view of the chest is obtained.  Limited exam due to portable nature. Magnified cardiac silhouette. Endotracheal tube in place and terminates above the jayy. Left perihilar and left basilar infiltrates and effusions. Mild interstitial prominence diffusely.       Left perihilar and left basilar infiltrates and effusions for which follow-up is recommended.   MACRO: None   Signed by: Stacie Parker 1/23/2025 2:07 PM Dictation workstation:   [id number]    Nuclear Stress Test    Result Date: 1/20/2025  Interpreted By:  Ric See, STUDY: NUCLEAR STRESS TEST;  1/20/2025 2:38 pm   INDICATION: Signs/Symptoms:CHF.   COMPARISON: None.   ACCESSION NUMBER(S): [id number]   ORDERING CLINICIAN: KEN ROBIN   TECHNIQUE: DIVISION OF NUCLEAR MEDICINE PHARMACOLOGIC STRESS MYOCARDIAL PERFUSION SCAN, ONE DAY PROTOCOL   The patient received an intravenous dose of  10.7 mCi of Tc-99m tetrofosmin and resting emission tomographic (SPECT) images of the myocardium were acquired. The patient then received an intravenous infusion of 0.4mg regadenoson (Lexiscan) followed by an additional dose of  34.3 mCi of Tc-99m tetrofosmin. Stress phase SPECT images of the myocardium were then acquired. These included ECG-gated images to assess and quantify ventricular function.   FINDINGS: There is a moderate-to-large primarily fixed but partially reversible perfusion defect in the inferior, inferolateral, and lateral walls with associated wall motion abnormality suggesting prior infarct with a small area of tiffany-infarct ischemia.   Calculated ejection fraction of 46% with hypokinesis of the inferior, inferolateral, and lateral walls   Attenuation correction CT images demonstrate coronary artery calcification, aortic atherosclerotic calcification, lung consolidation/infiltrates bilaterally        1. There is a moderate-to-large primarily fixed but partially reversible perfusion defect in the inferior, inferolateral, and lateral walls with associated wall motion abnormality suggesting prior infarct with a small area of tiffany-infarct ischemia.   2. Calculated ejection fraction of 46% with hypokinesis of the inferior, inferolateral, and lateral calvillo     Signed by: Ric See 1/20/2025 4:52 PM Dictation workstation:   [id number]    Cardiology Interpretation Of Nuclear Stress - See Other Report For Nuclear Portion    Result Date: 1/20/2025              [place] [id number]      Phone [phone] Fax [phone] Nuclear Pharmacologic Stress Test Patient Name:      AMITA BELTRAN  Ordering Provider:    [id number] ROBIN Study Date:        1/20/2025           Reading Physician:    [id number] Ric See DO MRN/PID:           [id number]            Supervising           [id number] Ric See DO                                        Physician: Accession#:        [id number]        Fellow: Date of Birth/Age: 1945 / 79      Fellow:                    years Gender:            ILSA                   Nurse:                Erin Ellison Admission Status:  Inpatient           Sonographer:          RADHA Height:            180.34 cm           Technologist: Weight:            66.23 kg            Additional Staff: BSA:               1.84 m2             Encounter#:           [id number] BMI:               20.36 kg/m2         Patient Location:     [facility] Study Type:    CARDIOLOGY INTERPRETATION OF NUCLEAR STRESS Diagnosis/ICD: Dyspnea, unspecified-R06.00 Indication:    Dyspnea CPT Codes:     Stress Test Interpretation-82514; Stress Test Supervision-[id number] Falls Risk: Low: Patient has low risk for sustaining a fall; environmental safety interventions in place.  Study Details: Correct procedure and correct patient verified verbally and with                ID Band checked.  Patient  History: Hypertension, coronary artery disease, dyslipidemia, congestive heart failure, peripheral vascular disease, hx of thoracic aneurysm repair, chronic obstructive pulmonary disease and hyperlipidemia. aortic aneursym, CKD, PCI, right BKA. Allergies: Codeine. Smoker:    Current.  Medications: Lasix, jakafi, albuterol, potassium, carvedilol, hydralazine, isosorbide, protonix, ASA. atorvastatin. The patient took medications as prescribed.  Patient Performance: Patient received a total of 0.4 mg of Regadenoson at 8:19:41 AM. The peak heart rate achieved was 89 bpm, which was 63 % of the age predicted target heart rate of 141 bpm. The resting blood pressure was 121/61 mmHg with a heart rate of 78 bpm. The patient developed no symptoms during the stress exam. The blood pressure response was normal. The test was terminated due to: completed lab protocol. Patient has met the discharge criteria and is discharged to their floor.  Baseline ECG: Resting ECG showed Sinus Arrhythmia with prior inferior infarct and prior lateral infarct.  Stress ECG: Stress ECG showed sinus arrhythmia, with occ PVC. No ST changes.  Stress Stage Data: +----------------+--+------+-------+                 HRSys BPDias BP +----------------+--+------+-------+ Baseline [id number]   61      +----------------+--+------+-------+ Stage I         19608   48      +----------------+--+------+-------+ Stage II        03244   54      +----------------+--+------+-------+ Stage III       23905   54      +----------------+--+------+-------+ Stage IV        74663   58      +----------------+--+------+-------+ Stage V         44292   57      +----------------+--+------+-------+  Summary:  1. Correlate with myocardial perfusion imaging results.  2. No ECG changes from baseline.  3. Nuclear image results are reported separately. [id number] Ric See DO Electronically signed on 1/20/2025 at 1:56:32 PM   ** Final **     ECG 12  Lead    Result Date: 1/20/2025  Sinus rhythm Probable left atrial enlargement RSR' in V1 or V2, right VCD or RVH Abnormal T, consider ischemia, lateral leads Borderline ST elevation, anterior leads Baseline wander in lead(s) V2    CT chest high resolution    Result Date: 1/20/2025  Interpreted By:  Pietro Adams, STUDY: CT CHEST HIGH RESOLUTION;  1/19/2025 1:55 pm   INDICATION: Signs/Symptoms:SOB/R/O Pulmonary fibrosis.     COMPARISON: 10/03/2023   ACCESSION NUMBER(S): [id number]   ORDERING CLINICIAN: KEN ROBIN   TECHNIQUE: Using helical multidetector technique, volumetric data acquisition of the chest was obtained without intravenous administration of contrast material under the high resolution chest CT protocol. Examination includes contiguous slices through the chest in supine positioning during inspiration, noncontiguous axial slices in supine positioning during expiration and prone positioning during inspiration.   FINDINGS: LUNGS AND AIRWAYS: The trachea and central airways are patent. No endobronchial lesion is seen.   There are severe centrilobular emphysematous changes. Interval appearance of inferior lingular consolidation/airspace disease. Interval worsening in left lower lobe consolidation/atelectasis. Interval worsening in right basilar airspace disease/infiltrate/pneumonia.   Expiratory imaging demonstrates diffuse air trapping..     MEDIASTINUM AND NICO, LOWER NECK AND AXILLA: The visualized thyroid gland is within normal limits. There are scattered mediastinal lymph nodes are felt to be reactive/inflammatory in nature. Esophagus appears within normal limits as seen.   HEART AND VESSELS: Severe atherosclerotic calcifications of the thoracic aorta. Main pulmonary artery and its branches are normal in caliber. Severe coronary artery calcifications. The cardiac chambers are not enlarged. There is no pericardial effusion seen.   UPPER ABDOMEN: Extensive atherosclerotic calcification and infrarenal  aortic stent placement. Indeterminate high attenuation within the infrarenal stent may be artifactual but correlate with any concern for renal stenosis/thrombus.     CHEST WALL AND OSSEOUS STRUCTURES: Chest wall is within normal limits. No acute osseous pathology.There are no suspicious osseous lesions.There is interval compression fracture with bony sclerosis of the T6 vertebral body.       1. There is severe emphysematous changes with multifocal parenchymal infiltrates concerning for pneumonia/aspiration. Correlate with symptoms of fever/sputum production. Recommend follow-up radiographs to document resolution. 2. Severe atherosclerotic disease of the aorta with coronary artery calcifications and correlate with coronary artery disease risk factors. 3. Interval compression fracture of the T6 vertebral body with bony sclerosis. This is most likely secondary to fracture healing but correlate with any concern for pathologic fracture.     MACRO: Critical Finding:  See findings. Notification was initiated on 1/20/2025 at 9:39 am by  Pietro Adams.  (**-YCF-**) Instructions:   Signed by: Pietro Adams 1/20/2025 9:39 AM Dictation workstation:   [id number]    Transthoracic Echo (TTE) Complete    Result Date: 1/18/2025              [place] [address]      Phone [phone] Fax [phone] TRANSTHORACIC ECHOCARDIOGRAM REPORT Patient Name:       AMITA Mercedes Physician:    [id number] Rigo Sim MD Study Date:         1/18/2025            Ordering Provider:    [id number] JOCELYNN OSWALD MRN/PID:            [id number]             Fellow: Accession#:         [id number]         Nurse:                ER nurse Date of Birth/Age:  1945 / 79 years Sonographer:          Amalia Fernandez                                                                 RDCS Gender Assigned at  M                    Additional Staff: Birth: Height:             180.34 cm            Admit Date:           1/17/2025 Weight:             70.76 kg             Admission Status:     Inpatient -                                                                Routine BSA / BMI:          1.90 m2 / 21.76      Department Location:  [facility]                     kg/m2 Blood Pressure: 138 /82 mmHg Study Type:    TRANSTHORACIC ECHO (TTE) COMPLETE Diagnosis/ICD: Other forms of dyspnea-R06.09; Elevated Troponin-R79.89 Indication:    Dyspnea, Elevated troponin CPT Codes:     Echo Complete w Full Doppler-48305 Patient History: Pertinent History: CAD, HTN and CHF. Study Detail: The following Echo studies were performed: 2D, M-Mode, Doppler and               color flow. Optison used as a contrast agent for endocardial               border definition. Total contrast used for this procedure was 3 mL               via IV push.  PHYSICIAN INTERPRETATION: Left Ventricle: The left ventricular systolic function is mildly decreased, with a Randhawa's biplane calculated ejection fraction of 50%. There is global hypokinesis of the left ventricle with minor regional variations. The left ventricular cavity size is normal. There is normal septal and normal posterior left ventricular wall thickness. There is left ventricular concentric remodeling. Spectral Doppler shows a Grade I (impaired relaxation pattern) of left ventricular diastolic filling with normal left atrial filling pressure. Left Atrium: The left atrium is normal in size. Right Ventricle: The right ventricle is normal in size. There is normal right ventricular global systolic function. Right Atrium: The right atrium is normal in size. Aortic Valve: The aortic valve appears structurally normal. There is mild aortic valve cusp calcification. The aortic valve dimensionless index is 0.74. There is trace aortic valve regurgitation. The peak instantaneous  gradient of the aortic valve is 8 mmHg. The mean gradient of the aortic valve is 4 mmHg. Mitral Valve: The mitral valve is mildly thickened. There is trace mitral valve regurgitation. Tricuspid Valve: The tricuspid valve is structurally normal. There is trace tricuspid regurgitation. Pulmonic Valve: The pulmonic valve is not well visualized. There is no indication of pulmonic valve regurgitation. Pericardium: No pericardial effusion noted. Aorta: The aortic root is normal. There is moderate dilatation of the ascending aorta.  CONCLUSIONS:  1. The left ventricular systolic function is mildly decreased, with a Randhawa's biplane calculated ejection fraction of 50%.  2. There is global hypokinesis of the left ventricle with minor regional variations.  3. Spectral Doppler shows a Grade I (impaired relaxation pattern) of left ventricular diastolic filling with normal left atrial filling pressure.  4. There is normal right ventricular global systolic function.  5. There is moderate dilatation of the ascending aorta. QUANTITATIVE DATA SUMMARY:  2D MEASUREMENTS:           Normal Ranges: Ao Root d:       3.60 cm   (2.0-3.7cm) LAs:             3.70 cm   (2.7-4.0cm) IVSd:            1.04 cm   (0.6-1.1cm) LVPWd:           1.00 cm   (0.6-1.1cm) LVIDd:           4.09 cm   (3.9-5.9cm) LVIDs:           2.90 cm LV Mass Index:   71.3 g/m2 LV % FS          29.1 %  LA VOLUME:                    Normal Ranges: LA Vol A4C:        49.6 ml    (22+/-6mL/m2) LA Vol A2C:        44.9 ml LA Vol BP:         49.7 ml LA Vol Index A4C:  26.2ml/m2 LA Vol Index A2C:  23.7 ml/m2 LA Vol Index BP:   26.2 ml/m2 LA Area A4C:       19.6 cm2 LA Area A2C:       17.7 cm2 LA Major Axis A4C: 6.6 cm LA Major Axis A2C: 5.9 cm  RA VOLUME BY A/L METHOD:          Normal Ranges: RA Area A4C:             14.5 cm2  AORTA MEASUREMENTS:         Normal Ranges: Ao Sinus, d:        3.60 cm (2.1-3.5cm) Ao STJ, d:          2.22 cm (1.7-3.4cm) Asc Ao, d:          4.40 cm  (2.1-3.4cm)  LV SYSTOLIC FUNCTION BY 2D PLANIMETRY (MOD):                      Normal Ranges: EF-A4C View:    48 % (>=55%) EF-A2C View:    53 % EF-Biplane:     50 % LV EF Reported: 50 %  LV DIASTOLIC FUNCTION:            Normal Ranges: MV Peak E:             0.80 m/s   (0.7-1.2 m/s) MV Peak A:             1.17 m/s   (0.42-0.7 m/s) E/A Ratio:             0.68       (1.0-2.2) MV e'                  0.054 m/s  (>8.0) MV lateral e'          0.06 m/s MV medial e'           0.05 m/s MV A Dur:              92.00 msec E/e' Ratio:            14.65      (<8.0)  MITRAL VALVE:          Normal Ranges: MV DT:        168 msec (150-240msec)  AORTIC VALVE:                     Normal Ranges: AoV Vmax:                1.44 m/s (<=1.7m/s) AoV Peak P.3 mmHg (<20mmHg) AoV Mean P.0 mmHg (1.7-11.5mmHg) LVOT Max Cristofer:            0.89 m/s (<=1.1m/s) AoV VTI:                 25.30 cm (18-25cm) LVOT VTI:                18.80 cm LVOT Diameter:           2.00 cm  (1.8-2.4cm) AoV Area, VTI:           2.33 cm2 (2.5-5.5cm2) AoV Area,Vmax:           1.94 cm2 (2.5-4.5cm2) AoV Dimensionless Index: 0.74  AORTIC INSUFFICIENCY: AI Vmax:       3.79 m/s AI Half-time:  498 msec AI Decel Rate: 223.00 cm/s2  RIGHT VENTRICLE: RV Basal 3.76 cm RV Mid   2.42 cm RV Major 8.4 cm TAPSE:   26.4 mm RV s'    0.14 m/s  TRICUSPID VALVE/RVSP:          Normal Ranges: Peak TR Velocity:     2.72 m/s RV Syst Pressure:     33 mmHg  (< 30mmHg) IVC Diam:             1.83 cm  [id number] Rigo Sim MD Electronically signed on 2025 at 10:45:48 AM  ** Final **     XR chest 1 view    Result Date: 2025  STUDY: Chest Radiograph;  2025 at 16:40 hours. INDICATION: Shortness of breath. COMPARISON: Chest, single portable view obtained on 2024 at 02:19 hours.  CT Chest 10/03/2023. ACCESSION NUMBER(S): [id number] ORDERING CLINICIAN: RANDALL CARSON TECHNIQUE:  Frontal chest was obtained at 16:40 hours. FINDINGS: There is a left pleural  effusion.  There are increased interstitial markings at the lung bases, suspicious for edema.  Heart size is top normal.  No pneumothorax is noted.    1.  Left pleural effusion. 2.  Increased interstitial markings at the lung bases, suspicious for edema. Signed by Arden Otero MD        This patient has a urinary catheter   Reason for the urinary catheter remaining today? critically ill patient who need accurate urinary output measurements    This patient is intubated   Reason for patient to remain intubated today? they have inadequate gas-exchange without positive pressure         Malnutrition Diagnosis Status: New  Malnutrition Diagnosis: Severe malnutrition related to chronic disease or condition  Related to: chronic COPD, hx of leukemia, and hx of severe malnutrition  As Evidenced by: severe muscle and fat loss per NFPE, and PO intake <75% for > 1month  I agree with the dietitian's malnutrition diagnosis.      Assessment/Plan   Assessment & Plan  Acute on chronic respiratory failure with hypoxia (Multi)    Pneumonia of both lungs due to Streptococcus pneumoniae (CMS-[facility])    Coronary artery disease involving native coronary artery of native heart without angina pectoris    Acute on chronic combined systolic and diastolic congestive heart failure    Chronic renal disease, stage III (Multi)    Myeloproliferative neoplasm (Multi)    Chronic obstructive pulmonary disease with acute exacerbation (Multi)    Influenza A    Hypertensive heart and chronic kidney disease with heart failure and stage 1 through stage 4 chronic kidney disease, or unspecified chronic kidney disease    Hypokalemia    Aristeo Barriga is a 79 y.o. male with PMHx s/f COPD with baseline oxygen requirement of 4 L chronic systolic diastolic heart failure last EF in January 2020 for 50% history of myeloproliferative disorder coronary artery disease with recent stress test showing no reversible ischemia benign prostatic hypertrophy abdominal aortic  aneurysm with repair was admitted to ICU for Acute hypoxic respiratory failure requiring  intubation.  On presentation to the ER his oxygen saturation was 50s on CPAP.      Acute hypoxic respite failure secondary to influenza pneumonia, strep pneumonia and COPD exacerbation   1/23/2025:  Admitted to ICU for Acute hypoxic respiratory failure requiring  intubation.    On presentation to the ER his oxygen saturation was 50s on CPAP, intubated for persistent hypoxia  I adjusted her ventilator settings to AC/VC with tidal volume of 450 cc, respiratory rate 20, FiO2 of 90% and PEEP of 8  Try to titrate down PEEP to 5  1/24/2025: Patient was placed on a sedation vacation followed by spontaneous breathing trial.    Patient failed spontaneous breathing trial because of hypoxia and tachypnea.   Was able to titrate down the PEEP to 5  Increased tidal volume to 500 cc, continuous respiratory rate 20  Ordered VBG at 8 PM  1/25/2025: Patient is passing spontaneous breathing trial and blood gas during SBT showed pH of 7.36.    Will consider extubating the patient  Patient has strep pneumonia bacteremia and urine strep antigen positive.   Respiratory culture growing gram-positive cocci, MRSA screen is negative  Continue azithromycin, vancomycin and Zosyn  1/26/2025: Patient was successfully extubated on 1/25/2025.    But the patient continued to require high amount of oxygen currently on Airvo with the 60 L/min of oxygen flow and the FiO2 of 70%.   Had decrease FiO2 to 80% as the patient was saturating in mid 80s  Patient is not a mouth breather and became hypoxic on Ventimask  1/27/2025: Patient developed hypoxia maxed out on Airvo with 60 L/min oxygen flow and 90% of FiO2.    Oxygen saturation low 80s even with the combination of Airvo and nonrebreather.  BiPAP 18/10 could not improve the oxygenation.    After discussing with the patient and patient daughter decided to intubate the patient.   Please look at the procedure  note  Placed on AC/VC with a tidal volume of 400 cc, respiratory 20, FiO2 of 100% and had to increase the PEEP from 5-10 because of persistent hypoxia  Okay to maintain oxygen saturation 88% or more because of chronic lung disease     2.  Influenza A positive  Started on Tamiflu  Placed on droplet precautions     3.  Healthcare associated pneumonia  1/23/2025: Patient was discharged from the hospital 3 days back  Broaden antibiotics from ceftriaxone to vancomycin and Zosyn  Continue azithromycin  Ordered panculture, urine strep, urine Legionella and MRSA screen  1/25/2025: Patient has strep pneumonia bacteremia and urine strep antigen positive.   Respiratory culture growing gram-positive cocci, MRSA screen is negative  On doxycycline, vancomycin and Zosyn  1/26/2025: Urine strep antigen positive and blood culture growing strep pneumonia  De-escalated antibiotics from Zosyn to ceftriaxone  Continue doxycycline  Discontinued vancomycin     4.  Acute COPD exacerbation  1/23/2025: Patient has history of severe COPD with 100-pack-year smoking history and quit smoking 6 months back  Ordered DuoNeb every 4 hours along with as needed breathing treatments  Increased the frequency of IV Solu-Medrol to 40 mg every 6 hours  1/26/2025: Patient was successfully extubated on 1/25/2025.    But the patient continued to require high amount of oxygen currently on Airvo with the 60 L/min of oxygen flow and the FiO2 of 70%.   Had decrease FiO2 to 80% as the patient was saturating in mid 80s  1/27/2025: Patient developed hypoxia maxed out on Airvo with 60 L/min oxygen flow and 90% of FiO2.    Oxygen saturation low 80s even with the combination of Airvo and nonrebreather.  BiPAP 18/10 could not improve the oxygenation.    After discussing with the patient and patient daughter decided to intubate the patient.   Held Singulair and Breo Ellipta as the patient is reintubated  Okay to maintain oxygen saturation 90% or more because of chronic  lung disease     5.  Hypotension secondary to propofol  Propofol was switched to Versed the ER  Patient is off of Levophed     6.  Hypertension with history of heart failure and coronary artery disease  Held antihypertensive and heart failure medications because of hypotension     7.  Bleeding from the oral tongue ulcer  1/27/2025: Held aspirin and Plavix    8. Counseling regarding advance care directives and goals of care  1/23/2025: I had extensive discussion with the patient daughter who is at bedside next of kin  Explained about overall condition of the patient  Explained difference between full code versus DNR  Daughter confirmed that the patient does not have any DNR on file and want to discuss with her sister  Patient will remain full code for now  1/27/2025: Patient developed hypoxia maxed out on Airvo with 60 L/min oxygen flow and 90% of FiO2.    Oxygen saturation low 80s even with the combination of Airvo and nonrebreather.  BiPAP 18/10 could not improve the oxygenation.    After discussing with the patient and patient daughter decided to intubate the patient.   Patient is alert oriented, okay to intubate the patient but want to be DNR  Daughter at bedside was in agreement with the patient wishes  Changed the CODE STATUS to DNR okay to intubate    I spent 110 minutes of cumulative critical care time with the patient, not including the procedure note.  Greater than 50% of that time was spent in the direct collaboration and or coordination of care of the patient.     Dragon dictation software was used to dictate this note and thus there may be minor errors in translation/transcription including garbled speech or misspellings. Please contact for clarification if needed.

## 2025-01-28 LAB
BACTERIA SPEC RESP CULT: ABNORMAL
BACTERIA SPEC RESP CULT: ABNORMAL
GRAM STN SPEC: ABNORMAL
GRAM STN SPEC: ABNORMAL

## 2025-01-28 NOTE — DISCHARGE SUMMARY
Discharge Diagnosis  Acute on chronic respiratory failure with hypoxia (Multi)  Hypokalemia  Influenza A  Chronic obstructive pulmonary disease  Hypertensive heart disease  Myeloproliferative neoplasm  Chronic kidney disease stage III  Acute on chronic combined systolic and diastolic congestive heart failure  Pneumonia    Issues Requiring Follow-Up  \    Discharge Meds     Medication List      ASK your doctor about these medications     allopurinol 100 mg tablet; Commonly known as: Zyloprim   aspirin 81 mg EC tablet   atorvastatin 80 mg tablet; Commonly known as: Lipitor   Breo Ellipta 200-25 mcg/dose inhaler; Generic drug: fluticasone   furoate-vilanteroL; Inhale 1 puff by mouth into the lungs once daily. Do   not start before February 27, 2024.   busPIRone 5 mg tablet; Commonly known as: Buspar   carvedilol 3.125 mg tablet; Commonly known as: Coreg; Take 1 tablet   (3.125 mg) by mouth 2 times a day.   clopidogrel 75 mg tablet; Commonly known as: Plavix   hydrALAZINE 10 mg tablet; Commonly known as: Apresoline; Take 1 tablet   (10 mg) by mouth 3 times a day.   ipratropium-albuteroL 0.5-2.5 mg/3 mL nebulizer solution; Commonly known   as: Duo-Neb; Use 3 mL by nebulization every 6 hours if needed for   wheezing.   isosorbide mononitrate ER 30 mg 24 hr tablet; Commonly known as: Imdur;   Take 1 tablet (30 mg) by mouth once daily. Do not crush or chew.   Jakafi 20 mg tablet; Generic drug: ruxolitinib   montelukast 10 mg tablet; Commonly known as: Singulair   NON FORMULARY   potassium chloride CR 10 mEq ER tablet; Commonly known as: Klor-Con   predniSONE 20 mg tablet; Commonly known as: Deltasone; Take 2 tablets   (40 mg) by mouth once daily for 4 days.; Ask about: Should I take this   medication?   sertraline 100 mg tablet; Commonly known as: Zoloft   tamsulosin 0.4 mg 24 hr capsule; Commonly known as: Flomax   tiotropium 2.5 mcg/actuation inhaler; Commonly known as: Spiriva   Respimat       Test Results Pending At  Discharge  Pending Labs       Order Current Status    Blood Culture Preliminary result            Hospital Course  Aristeo Barriga is a 79 y.o. male with PMHx s/f COPD with baseline oxygen requirement of 4 L chronic systolic diastolic heart failure last EF in January 2020 for 50% history of myeloproliferative disorder coronary artery disease with recent stress test showing no reversible ischemia benign prostatic hypertrophy abdominal aortic aneurysm with repair.  Presenting with shortness of breath.  Patient had recently been hospitalized and discharged about 3 days ago.  At that time the patient was being treated for COPD exacerbation and CHF exacerbation.  The patient did have some CT findings suggestive of pneumonia but was improving on Zithromax therapy alone.  The patient subsequently been discharged to home about 3 days ago on oral Zithromax.  Evidently the patient progressively became more short of breath had arrived to the emergency department via squad on CPAP subsequently changed over to BiPAP patient was not saturating very well and subsequently intubated by the ED provider. Pt felt to have worsening pneumonia and possibley some CHF. Pt given IV rocephin and zithromax, no lasix given due to relatively low blood pressure  The patient was discussed with the ED provider and will be admitted to the ICU for acute on chronic respiratory failure LOS will exceed 2 midnights.      ED Course (Summary):   Vitals on presentation: Temperature is 97.5 heart rate 118 respiratory rate 30 blood pressure 130/62 SpO2 71% on BiPAP  Labs: Significant for glucose 192 creatinine 2.03 close to baseline, AST 56 initial lactate 2.5  troponin 39 CBC significant for leukopenia with white blood cell count 3.1 hemoglobin 11.7 hematocrit 36.4 platelets 198  Imaging: Chest x-ray showing left lower lobe consolidation and effusion  Interventions: Patient intubated started on Zithromax and Rocephin     1/24: Patient seen.   Remains intubated.  Discussed with nursing, sedation is off but patient appears to be tolerating intubation.  Continue broad-spectrum antibiotic therapy, treating for gram-negative pneumonia given recent hospitalization.  Patient is positive for influenza A and is on Tamiflu.  Appreciate input from critical care.  Will continue treatment.  Requires ICU for mechanical ventilation.  Reassess in AM     1/25: Pt seen. Successfully extubated. Now positive for pneumococcal pneumonia, influenza A, and remains on empiric treatment for gram negative pneumonia. Stirs to exam, but seems to be tired. Will continue to monitor. Off pressors, remains somewhat hypotensive. Remains in ICU.     1/26: Pt seen. Remains extubated. BP elevated this AM. Resumed BP meds. Growing pneumococcal pneumonia. Antibiotics are being de-escalated. Vanco and Zosyn discontinued, now on ceftriaxone and doxycycline. Now off levophed. Will continue to monitor.     1/27: Pt seen. Apparently failed Airvo this afternoon. Getting ready to re-intubate. Family at bedside. Discussed with CCM. Likely multifactorial with pneumococcal pneumonia, influenza A, and severe baseline COPD. Will continue to follow. Requires ICU level of care due to need for mechanical vent.  1/28: Addendum: Patient was pronounced dead at 2200 on 1/27/2025 by nocturnist.    He was discharged to the [facility]    Pertinent Physical Exam At Time of Discharge  Physical Exam    Outpatient Follow-Up  No future appointments.      Eli Servin MD

## 2025-01-28 NOTE — SIGNIFICANT EVENT
Was called by nursing to patient's bedside. There were no heart tones present. There were no breath sounds or spontaneous respirations. There was no palpable carotid pulse. The pupils were fixed and dilated. There was no response to noxious stimli. Family is updated. Patient is pronounced on 01/27/25 at 2200.

## 2025-01-28 NOTE — NURSING NOTE
1645: Melly Greenberg, JAMAL took over care of patient from Sunny BERRY due to need of intubation    1709: Dr. Landon at bedside to intubate patient. Patient given 20mg Etomidate and 40mg of Rocuronium IVP for intubation. Patient intubated with 8.0 ETT, 24cm at the teeth. Bilateral breath sounds heard.     1725 SpO2 remaining at @ 77%. Dr. Landon. Vent settings adjusted. PEEP increased to 8    1745 NG placed. Patient tolerated well    1752 CXR completed.     1800. Patient SpO2 continues to be between 80-82%. Dr. Landon notified. No new orders.     1830. Patient SpO2 still between 80-82%. Turned pt to right side. Patient SpO2 dropped to 74% with turn and slowly increased back to 80-82%  Dr. Adam notified. Vent settings changed. PEEP increased.

## 2025-01-28 NOTE — DOCUMENTATION CLARIFICATION NOTE
"    PATIENT:               AMITA BELTRAN  ACCT #:                  [id number]  MRN:                       [id number]  :                       1945  ADMIT DATE:       2025 12:40 PM  DISCH DATE:        2025 10:00 PM  RESPONDING PROVIDER #:        [id number]          PROVIDER RESPONSE TEXT:    Septic shock    CDI QUERY TEXT:    Clarification    Instruction:    Based on your assessment of the patient and the clinical information, please provide the requested documentation by clicking on the appropriate radio button and enter any additional information if prompted.    Question: Is there a diagnosis associated with the clinical information    When answering this query, please exercise your independent professional judgment. The fact that a question is being asked, does not imply that any particular answer is desired or expected.    The patient's clinical indicators include:  Clinical Information: Patient admitted with sepsis and acute respiratory failure    Clinical Indicators: Per H and P, \"Pt is septic with respiratory failure, elevated lactate level started on pressor support.\"    Per  med PN, \"Off pressors, remains somewhat hypotensive. Remains in ICU.\"    : T 97.5, P , R 24-30, BP 88/61-89/51-87/60, pt satting 71% on cpap/bipap- placed on vent support    Treatment: pressors- now d/duy, monitoring vitals/labs, ICU monitoring, IV rocephin- active, IV zithromax- d.duy, IV doxycycline- active, IV zosyn- d/duy, IV vanco- d/duy    Risk Factors: 79yom admitted with sepsis and acute respiratory failure  Options provided:  -- Septic shock  -- Other shock  -- Other - I will add my own diagnosis  -- Refer to Clinical Documentation Reviewer    Query created by: Daisy Cordova on 2025 3:37 PM      Electronically signed by:  BARRON CLIFFORD MD 2025 9:56 AM          "

## 2025-01-28 NOTE — SIGNIFICANT EVENT
Death Within 24 Hours of Soft Wrist Restraint Utilization    Death within 24 hours of soft wrist restraint logged at 1/28/2025 at 3:28 PM.    Joslyn Barnett RN  Date: 1/28/2025  Time: 3:28 PM

## 2025-01-28 NOTE — CARE PLAN
The patient's goals for the shift include      The clinical goals for the shift include patient spo2 will remain above 90% throughout the shift    Over the shift, the patient did not make progress toward the following goals. Barriers to progression include increase oxygen demands requiring intubation. .     Problem: Chronic Conditions and Co-morbidities  Goal: Patient's chronic conditions and co-morbidity symptoms are monitored and maintained or improved  Outcome: Not Progressing     Problem: Skin  Goal: Prevent/minimize sheer/friction injuries  Outcome: Not Progressing

## 2025-01-28 NOTE — SIGNIFICANT EVENT
Notified by bedside nurse patient's blood pressure was 50s/30-40s with MAP of 46mmHg. 1 liter bolus of LR and norepinephrine ordered. While at bedside, noted patient's heart rate dropped to 30s and blood pressure in 50s/30-40s. Verbal order for atropine given, LR bolus to pressure bag, and instructed nurse to move norepinephrine to different IV site as it was infusing through same IV as IV fluid bolus. Heart rate improved to 50s after moving norepinephrine to different IV site and blood pressure 80/51 with MAP of 57mmHg without administration of atropine.

## 2025-01-30 LAB
BACTERIA BLD AEROBE CULT: ABNORMAL
BACTERIA BLD CULT: ABNORMAL
GRAM STN SPEC: ABNORMAL

## 2025-02-07 ENCOUNTER — TELEPHONE (OUTPATIENT)
Dept: FAMILY MEDICINE CLINIC | Age: 80
End: 2025-02-07

## 2025-02-07 NOTE — TELEPHONE ENCOUNTER
Patient daughter called and states that her father passed away on 01/27/25. Patient passed in the hospital.

## 2025-02-12 LAB
ATRIAL RATE: 88 BPM
BACTERIA BLD AEROBE CULT: ABNORMAL
BACTERIA BLD CULT: ABNORMAL
GRAM STN SPEC: ABNORMAL
P AXIS: 44 DEGREES
PR INTERVAL: 168 MS
Q ONSET: 251 MS
QRS COUNT: 14 BEATS
QRS DURATION: 103 MS
QT INTERVAL: 371 MS
QTC CALCULATION(BAZETT): 449 MS
QTC FREDERICIA: 421 MS
R AXIS: 20 DEGREES
T AXIS: 110 DEGREES
T OFFSET: 436 MS
VENTRICULAR RATE: 88 BPM

## 2025-02-14 LAB
ATRIAL RATE: 86 BPM
P AXIS: 49 DEGREES
PR INTERVAL: 184 MS
Q ONSET: 251 MS
QRS COUNT: 15 BEATS
QRS DURATION: 162 MS
QT INTERVAL: 439 MS
QTC CALCULATION(BAZETT): 558 MS
QTC FREDERICIA: 515 MS
R AXIS: 0 DEGREES
T OFFSET: 471 MS
VENTRICULAR RATE: 97 BPM

## (undated) DEVICE — PREP TRAY 10X5X2: Brand: MEDLINE INDUSTRIES, INC.

## (undated) DEVICE — STERILE POLYISOPRENE POWDER-FREE SURGICAL GLOVES: Brand: PROTEXIS

## (undated) DEVICE — 3 ML SYRINGE LUER-LOCK TIP: Brand: MONOJECT

## (undated) DEVICE — Device

## (undated) DEVICE — SURGICAL PREP KIT DRY EYE TY STRL

## (undated) DEVICE — SOLUTION IV IRRIG WATER 1000ML POUR BRL 2F7114

## (undated) DEVICE — SOLUTION SCRB 32OZ 7.5% POVIDONE IOD BTL GENTLE EFFECTIVE

## (undated) DEVICE — SURGICAL PROCEDURE PACK CATRCT LT EYE BASIC CUST ST JOS LF

## (undated) DEVICE — ENCORE® LATEX MICRO SIZE 8.5, STERILE LATEX POWDER-FREE SURGICAL GLOVE: Brand: ENCORE

## (undated) DEVICE — SOLUTION IV IRRIG POUR BRL 0.9% SODIUM CHL 2F7124